# Patient Record
Sex: MALE | Race: WHITE | Employment: OTHER | URBAN - METROPOLITAN AREA
[De-identification: names, ages, dates, MRNs, and addresses within clinical notes are randomized per-mention and may not be internally consistent; named-entity substitution may affect disease eponyms.]

---

## 2017-03-31 ENCOUNTER — HOSPITAL ENCOUNTER (OUTPATIENT)
Dept: NON INVASIVE DIAGNOSTICS | Age: 72
Discharge: HOME | End: 2017-03-31
Payer: COMMERCIAL

## 2017-03-31 PROCEDURE — 6360000002 HC RX W HCPCS: Performed by: INTERNAL MEDICINE

## 2017-03-31 PROCEDURE — 93308 TTE F-UP OR LMTD: CPT

## 2017-03-31 RX ADMIN — PERFLUTREN 1.3 MG: 6.52 INJECTION, SUSPENSION INTRAVENOUS at 09:14

## 2017-04-27 ENCOUNTER — HOSPITAL ENCOUNTER (OUTPATIENT)
Dept: PREADMISSION TESTING | Age: 72
Discharge: HOME | End: 2017-04-27
Payer: COMMERCIAL

## 2017-04-27 VITALS
OXYGEN SATURATION: 90 % | WEIGHT: 243.39 LBS | RESPIRATION RATE: 16 BRPM | HEART RATE: 70 BPM | BODY MASS INDEX: 34.07 KG/M2 | HEIGHT: 71 IN | SYSTOLIC BLOOD PRESSURE: 151 MMHG | DIASTOLIC BLOOD PRESSURE: 57 MMHG

## 2017-04-27 LAB
ABSOLUTE EOS #: 0.3 K/UL (ref 0–0.4)
ABSOLUTE LYMPH #: 1.5 K/UL (ref 1–4.8)
ABSOLUTE MONO #: 0.7 K/UL (ref 0.2–0.8)
ANION GAP SERPL CALCULATED.3IONS-SCNC: 13 MMOL/L (ref 9–17)
BASOPHILS # BLD: 1 %
BASOPHILS ABSOLUTE: 0 K/UL (ref 0–0.2)
BUN BLDV-MCNC: 22 MG/DL (ref 8–23)
BUN/CREAT BLD: 13 (ref 9–20)
CALCIUM SERPL-MCNC: 9.6 MG/DL (ref 8.6–10.4)
CHLORIDE BLD-SCNC: 100 MMOL/L (ref 98–107)
CO2: 22 MMOL/L (ref 20–31)
CREAT SERPL-MCNC: 1.74 MG/DL (ref 0.7–1.2)
DIFFERENTIAL TYPE: ABNORMAL
EOSINOPHILS RELATIVE PERCENT: 4 %
GFR AFRICAN AMERICAN: 47 ML/MIN
GFR NON-AFRICAN AMERICAN: 39 ML/MIN
GFR SERPL CREATININE-BSD FRML MDRD: ABNORMAL ML/MIN/{1.73_M2}
GFR SERPL CREATININE-BSD FRML MDRD: ABNORMAL ML/MIN/{1.73_M2}
GLUCOSE BLD-MCNC: 67 MG/DL (ref 70–99)
HCT VFR BLD CALC: 42.5 % (ref 41–53)
HEMOGLOBIN: 13.4 G/DL (ref 13.5–17.5)
LYMPHOCYTES # BLD: 22 %
MCH RBC QN AUTO: 23.2 PG (ref 26–34)
MCHC RBC AUTO-ENTMCNC: 31.5 G/DL (ref 31–37)
MCV RBC AUTO: 73.7 FL (ref 80–100)
MONOCYTES # BLD: 10 %
PDW BLD-RTO: 17.3 % (ref 11.5–14.5)
PLATELET # BLD: 182 K/UL (ref 130–400)
PLATELET ESTIMATE: ABNORMAL
PMV BLD AUTO: 9.2 FL (ref 6–12)
POTASSIUM SERPL-SCNC: 4.7 MMOL/L (ref 3.7–5.3)
RBC # BLD: 5.77 M/UL (ref 4.5–5.9)
RBC # BLD: ABNORMAL 10*6/UL
SEG NEUTROPHILS: 63 %
SEGMENTED NEUTROPHILS ABSOLUTE COUNT: 4.5 K/UL (ref 1.8–7.7)
SODIUM BLD-SCNC: 135 MMOL/L (ref 135–144)
WBC # BLD: 7 K/UL (ref 3.5–11)
WBC # BLD: ABNORMAL 10*3/UL

## 2017-04-27 PROCEDURE — 80048 BASIC METABOLIC PNL TOTAL CA: CPT

## 2017-04-27 PROCEDURE — 85025 COMPLETE CBC W/AUTO DIFF WBC: CPT

## 2017-04-27 PROCEDURE — 36415 COLL VENOUS BLD VENIPUNCTURE: CPT

## 2017-04-27 RX ORDER — CARISOPRODOL 350 MG/1
350 TABLET ORAL 3 TIMES DAILY PRN
COMMUNITY
End: 2018-12-07

## 2017-04-27 RX ORDER — NAPROXEN SODIUM 220 MG
440 TABLET ORAL 2 TIMES DAILY WITH MEALS
Status: ON HOLD | COMMUNITY
End: 2018-12-11 | Stop reason: HOSPADM

## 2017-04-28 ENCOUNTER — ANESTHESIA EVENT (OUTPATIENT)
Dept: OPERATING ROOM | Age: 72
End: 2017-04-28
Payer: COMMERCIAL

## 2017-05-10 ENCOUNTER — ANESTHESIA (OUTPATIENT)
Dept: OPERATING ROOM | Age: 72
End: 2017-05-10
Payer: COMMERCIAL

## 2017-05-10 ENCOUNTER — HOSPITAL ENCOUNTER (OUTPATIENT)
Age: 72
Setting detail: OUTPATIENT SURGERY
Discharge: HOME | End: 2017-05-10
Attending: SURGERY | Admitting: SURGERY
Payer: COMMERCIAL

## 2017-05-10 VITALS
TEMPERATURE: 99 F | HEIGHT: 71 IN | OXYGEN SATURATION: 91 % | WEIGHT: 243 LBS | DIASTOLIC BLOOD PRESSURE: 57 MMHG | BODY MASS INDEX: 34.02 KG/M2 | HEART RATE: 79 BPM | RESPIRATION RATE: 13 BRPM | SYSTOLIC BLOOD PRESSURE: 127 MMHG

## 2017-05-10 VITALS — SYSTOLIC BLOOD PRESSURE: 101 MMHG | DIASTOLIC BLOOD PRESSURE: 56 MMHG | TEMPERATURE: 97.3 F | OXYGEN SATURATION: 94 %

## 2017-05-10 LAB
GLUCOSE BLD-MCNC: 138 MG/DL (ref 75–110)
GLUCOSE BLD-MCNC: 144 MG/DL (ref 75–110)

## 2017-05-10 PROCEDURE — 3600000012 HC SURGERY LEVEL 2 ADDTL 15MIN: Performed by: SURGERY

## 2017-05-10 PROCEDURE — 2500000003 HC RX 250 WO HCPCS: Performed by: SURGERY

## 2017-05-10 PROCEDURE — 3700000000 HC ANESTHESIA ATTENDED CARE: Performed by: SURGERY

## 2017-05-10 PROCEDURE — 82947 ASSAY GLUCOSE BLOOD QUANT: CPT

## 2017-05-10 PROCEDURE — 7100000001 HC PACU RECOVERY - ADDTL 15 MIN: Performed by: SURGERY

## 2017-05-10 PROCEDURE — 2580000003 HC RX 258: Performed by: ANESTHESIOLOGY

## 2017-05-10 PROCEDURE — 6360000002 HC RX W HCPCS: Performed by: SURGERY

## 2017-05-10 PROCEDURE — 88302 TISSUE EXAM BY PATHOLOGIST: CPT

## 2017-05-10 PROCEDURE — 2500000003 HC RX 250 WO HCPCS

## 2017-05-10 PROCEDURE — 7100000011 HC PHASE II RECOVERY - ADDTL 15 MIN: Performed by: SURGERY

## 2017-05-10 PROCEDURE — 7100000010 HC PHASE II RECOVERY - FIRST 15 MIN: Performed by: SURGERY

## 2017-05-10 PROCEDURE — 6370000000 HC RX 637 (ALT 250 FOR IP): Performed by: SURGERY

## 2017-05-10 PROCEDURE — 2500000003 HC RX 250 WO HCPCS: Performed by: NURSE ANESTHETIST, CERTIFIED REGISTERED

## 2017-05-10 PROCEDURE — 3600000002 HC SURGERY LEVEL 2 BASE: Performed by: SURGERY

## 2017-05-10 PROCEDURE — 7100000000 HC PACU RECOVERY - FIRST 15 MIN: Performed by: SURGERY

## 2017-05-10 PROCEDURE — 6360000002 HC RX W HCPCS: Performed by: NURSE ANESTHETIST, CERTIFIED REGISTERED

## 2017-05-10 PROCEDURE — 3700000001 HC ADD 15 MINUTES (ANESTHESIA): Performed by: SURGERY

## 2017-05-10 PROCEDURE — C1781 MESH (IMPLANTABLE): HCPCS | Performed by: SURGERY

## 2017-05-10 DEVICE — PATCH HERN M DIA2.5IN CIR W/ STRP SEPRA TECHNOLOGY ABSRB: Type: IMPLANTABLE DEVICE | Site: ABDOMEN | Status: FUNCTIONAL

## 2017-05-10 RX ORDER — ONDANSETRON 2 MG/ML
INJECTION INTRAMUSCULAR; INTRAVENOUS PRN
Status: DISCONTINUED | OUTPATIENT
Start: 2017-05-10 | End: 2017-05-10 | Stop reason: SDUPTHER

## 2017-05-10 RX ORDER — FENTANYL CITRATE 50 UG/ML
INJECTION, SOLUTION INTRAMUSCULAR; INTRAVENOUS PRN
Status: DISCONTINUED | OUTPATIENT
Start: 2017-05-10 | End: 2017-05-10 | Stop reason: SDUPTHER

## 2017-05-10 RX ORDER — DEXAMETHASONE SODIUM PHOSPHATE 10 MG/ML
4 INJECTION INTRAMUSCULAR; INTRAVENOUS
Status: DISCONTINUED | OUTPATIENT
Start: 2017-05-10 | End: 2017-05-10 | Stop reason: HOSPADM

## 2017-05-10 RX ORDER — LABETALOL HYDROCHLORIDE 5 MG/ML
5 INJECTION, SOLUTION INTRAVENOUS EVERY 10 MIN PRN
Status: DISCONTINUED | OUTPATIENT
Start: 2017-05-10 | End: 2017-05-10 | Stop reason: HOSPADM

## 2017-05-10 RX ORDER — HYDROMORPHONE HCL 110MG/55ML
0.5 PATIENT CONTROLLED ANALGESIA SYRINGE INTRAVENOUS EVERY 5 MIN PRN
Status: DISCONTINUED | OUTPATIENT
Start: 2017-05-10 | End: 2017-05-10 | Stop reason: HOSPADM

## 2017-05-10 RX ORDER — PROPOFOL 10 MG/ML
INJECTION, EMULSION INTRAVENOUS PRN
Status: DISCONTINUED | OUTPATIENT
Start: 2017-05-10 | End: 2017-05-10 | Stop reason: SDUPTHER

## 2017-05-10 RX ORDER — MEPERIDINE HYDROCHLORIDE 25 MG/ML
12.5 INJECTION INTRAMUSCULAR; INTRAVENOUS; SUBCUTANEOUS EVERY 5 MIN PRN
Status: DISCONTINUED | OUTPATIENT
Start: 2017-05-10 | End: 2017-05-10 | Stop reason: HOSPADM

## 2017-05-10 RX ORDER — ROCURONIUM BROMIDE 10 MG/ML
INJECTION, SOLUTION INTRAVENOUS PRN
Status: DISCONTINUED | OUTPATIENT
Start: 2017-05-10 | End: 2017-05-10 | Stop reason: SDUPTHER

## 2017-05-10 RX ORDER — EPHEDRINE SULFATE 50 MG/ML
INJECTION, SOLUTION INTRAVENOUS PRN
Status: DISCONTINUED | OUTPATIENT
Start: 2017-05-10 | End: 2017-05-10 | Stop reason: SDUPTHER

## 2017-05-10 RX ORDER — SODIUM CHLORIDE, SODIUM LACTATE, POTASSIUM CHLORIDE, CALCIUM CHLORIDE 600; 310; 30; 20 MG/100ML; MG/100ML; MG/100ML; MG/100ML
INJECTION, SOLUTION INTRAVENOUS CONTINUOUS
Status: DISCONTINUED | OUTPATIENT
Start: 2017-05-10 | End: 2017-05-10 | Stop reason: HOSPADM

## 2017-05-10 RX ORDER — ONDANSETRON 2 MG/ML
4 INJECTION INTRAMUSCULAR; INTRAVENOUS
Status: DISCONTINUED | OUTPATIENT
Start: 2017-05-10 | End: 2017-05-10 | Stop reason: HOSPADM

## 2017-05-10 RX ORDER — BUPIVACAINE HYDROCHLORIDE 5 MG/ML
INJECTION, SOLUTION PERINEURAL PRN
Status: DISCONTINUED | OUTPATIENT
Start: 2017-05-10 | End: 2017-05-10 | Stop reason: HOSPADM

## 2017-05-10 RX ORDER — VECURONIUM BROMIDE 1 MG/ML
INJECTION, POWDER, LYOPHILIZED, FOR SOLUTION INTRAVENOUS PRN
Status: DISCONTINUED | OUTPATIENT
Start: 2017-05-10 | End: 2017-05-10 | Stop reason: SDUPTHER

## 2017-05-10 RX ORDER — OXYCODONE HYDROCHLORIDE AND ACETAMINOPHEN 5; 325 MG/1; MG/1
1 TABLET ORAL EVERY 6 HOURS PRN
Qty: 30 TABLET | Refills: 0 | Status: SHIPPED | OUTPATIENT
Start: 2017-05-10 | End: 2017-05-17

## 2017-05-10 RX ORDER — GLYCOPYRROLATE 0.2 MG/ML
INJECTION INTRAMUSCULAR; INTRAVENOUS PRN
Status: DISCONTINUED | OUTPATIENT
Start: 2017-05-10 | End: 2017-05-10 | Stop reason: SDUPTHER

## 2017-05-10 RX ORDER — OXYCODONE HYDROCHLORIDE AND ACETAMINOPHEN 5; 325 MG/1; MG/1
1 TABLET ORAL PRN
Status: COMPLETED | OUTPATIENT
Start: 2017-05-10 | End: 2017-05-10

## 2017-05-10 RX ORDER — DOCUSATE SODIUM 100 MG/1
100 CAPSULE, LIQUID FILLED ORAL 2 TIMES DAILY
Qty: 30 CAPSULE | Refills: 0 | Status: SHIPPED | OUTPATIENT
Start: 2017-05-10 | End: 2018-12-07

## 2017-05-10 RX ORDER — FENTANYL CITRATE 50 UG/ML
25 INJECTION, SOLUTION INTRAMUSCULAR; INTRAVENOUS EVERY 5 MIN PRN
Status: DISCONTINUED | OUTPATIENT
Start: 2017-05-10 | End: 2017-05-10 | Stop reason: HOSPADM

## 2017-05-10 RX ORDER — DEXAMETHASONE SODIUM PHOSPHATE 10 MG/ML
INJECTION INTRAMUSCULAR; INTRAVENOUS PRN
Status: DISCONTINUED | OUTPATIENT
Start: 2017-05-10 | End: 2017-05-10 | Stop reason: SDUPTHER

## 2017-05-10 RX ORDER — LIDOCAINE HYDROCHLORIDE AND EPINEPHRINE 10; 10 MG/ML; UG/ML
INJECTION, SOLUTION INFILTRATION; PERINEURAL PRN
Status: DISCONTINUED | OUTPATIENT
Start: 2017-05-10 | End: 2017-05-10 | Stop reason: HOSPADM

## 2017-05-10 RX ORDER — LIDOCAINE HYDROCHLORIDE 20 MG/ML
INJECTION, SOLUTION INFILTRATION; PERINEURAL PRN
Status: DISCONTINUED | OUTPATIENT
Start: 2017-05-10 | End: 2017-05-10 | Stop reason: SDUPTHER

## 2017-05-10 RX ORDER — OXYCODONE HYDROCHLORIDE AND ACETAMINOPHEN 5; 325 MG/1; MG/1
2 TABLET ORAL PRN
Status: COMPLETED | OUTPATIENT
Start: 2017-05-10 | End: 2017-05-10

## 2017-05-10 RX ORDER — HYDRALAZINE HYDROCHLORIDE 20 MG/ML
5 INJECTION INTRAMUSCULAR; INTRAVENOUS EVERY 10 MIN PRN
Status: DISCONTINUED | OUTPATIENT
Start: 2017-05-10 | End: 2017-05-10 | Stop reason: HOSPADM

## 2017-05-10 RX ORDER — DIPHENHYDRAMINE HYDROCHLORIDE 50 MG/ML
12.5 INJECTION INTRAMUSCULAR; INTRAVENOUS
Status: DISCONTINUED | OUTPATIENT
Start: 2017-05-10 | End: 2017-05-10 | Stop reason: HOSPADM

## 2017-05-10 RX ADMIN — FENTANYL CITRATE 50 MCG: 50 INJECTION, SOLUTION INTRAMUSCULAR; INTRAVENOUS at 12:27

## 2017-05-10 RX ADMIN — PHENYLEPHRINE HYDROCHLORIDE 100 MCG: 10 INJECTION INTRAVENOUS at 11:36

## 2017-05-10 RX ADMIN — SODIUM CHLORIDE, POTASSIUM CHLORIDE, SODIUM LACTATE AND CALCIUM CHLORIDE: 600; 310; 30; 20 INJECTION, SOLUTION INTRAVENOUS at 10:12

## 2017-05-10 RX ADMIN — ROCURONIUM BROMIDE 50 MG: 10 INJECTION, SOLUTION INTRAVENOUS at 11:14

## 2017-05-10 RX ADMIN — PHENYLEPHRINE HYDROCHLORIDE 100 MCG: 10 INJECTION INTRAVENOUS at 11:42

## 2017-05-10 RX ADMIN — CEFAZOLIN SODIUM 2 G: 2 SOLUTION INTRAVENOUS at 11:10

## 2017-05-10 RX ADMIN — LIDOCAINE HYDROCHLORIDE 100 MG: 20 INJECTION, SOLUTION INFILTRATION; PERINEURAL at 11:14

## 2017-05-10 RX ADMIN — GLYCOPYRROLATE 0.2 MG: 0.2 INJECTION, SOLUTION INTRAMUSCULAR; INTRAVENOUS at 11:24

## 2017-05-10 RX ADMIN — ONDANSETRON 4 MG: 2 INJECTION INTRAMUSCULAR; INTRAVENOUS at 11:24

## 2017-05-10 RX ADMIN — FENTANYL CITRATE 50 MCG: 50 INJECTION, SOLUTION INTRAMUSCULAR; INTRAVENOUS at 12:36

## 2017-05-10 RX ADMIN — EPHEDRINE SULFATE 10 MG: 50 INJECTION INTRAMUSCULAR; INTRAVENOUS; SUBCUTANEOUS at 11:31

## 2017-05-10 RX ADMIN — DEXAMETHASONE SODIUM PHOSPHATE 10 MG: 10 INJECTION INTRAMUSCULAR; INTRAVENOUS at 11:24

## 2017-05-10 RX ADMIN — FENTANYL CITRATE 150 MCG: 50 INJECTION, SOLUTION INTRAMUSCULAR; INTRAVENOUS at 11:14

## 2017-05-10 RX ADMIN — SODIUM CHLORIDE, POTASSIUM CHLORIDE, SODIUM LACTATE AND CALCIUM CHLORIDE: 600; 310; 30; 20 INJECTION, SOLUTION INTRAVENOUS at 11:55

## 2017-05-10 RX ADMIN — EPHEDRINE SULFATE 5 MG: 50 INJECTION INTRAMUSCULAR; INTRAVENOUS; SUBCUTANEOUS at 11:42

## 2017-05-10 RX ADMIN — PROPOFOL 50 MG: 10 INJECTION, EMULSION INTRAVENOUS at 12:35

## 2017-05-10 RX ADMIN — PHENYLEPHRINE HYDROCHLORIDE 100 MCG: 10 INJECTION INTRAVENOUS at 11:52

## 2017-05-10 RX ADMIN — PROPOFOL 150 MG: 10 INJECTION, EMULSION INTRAVENOUS at 11:14

## 2017-05-10 RX ADMIN — SUGAMMADEX 200 MG: 100 INJECTION, SOLUTION INTRAVENOUS at 12:28

## 2017-05-10 RX ADMIN — PHENYLEPHRINE HYDROCHLORIDE 100 MCG: 10 INJECTION INTRAVENOUS at 11:26

## 2017-05-10 RX ADMIN — OXYCODONE HYDROCHLORIDE AND ACETAMINOPHEN 1 TABLET: 5; 325 TABLET ORAL at 14:58

## 2017-05-10 RX ADMIN — EPHEDRINE SULFATE 5 MG: 50 INJECTION INTRAMUSCULAR; INTRAVENOUS; SUBCUTANEOUS at 11:36

## 2017-05-10 RX ADMIN — GLYCOPYRROLATE 0.2 MG: 0.2 INJECTION, SOLUTION INTRAMUSCULAR; INTRAVENOUS at 11:22

## 2017-05-10 RX ADMIN — VECURONIUM BROMIDE 2 MG: 1 INJECTION, POWDER, LYOPHILIZED, FOR SOLUTION INTRAVENOUS at 11:40

## 2017-05-10 RX ADMIN — PHENYLEPHRINE HYDROCHLORIDE 100 MCG: 10 INJECTION INTRAVENOUS at 11:30

## 2017-05-10 ASSESSMENT — PAIN - FUNCTIONAL ASSESSMENT: PAIN_FUNCTIONAL_ASSESSMENT: 0-10

## 2017-05-10 ASSESSMENT — PAIN SCALES - GENERAL: PAINLEVEL_OUTOF10: 4

## 2017-05-11 LAB — SURGICAL PATHOLOGY REPORT: NORMAL

## 2018-12-07 ENCOUNTER — APPOINTMENT (OUTPATIENT)
Dept: NUCLEAR MEDICINE | Age: 73
DRG: 291 | End: 2018-12-07
Payer: COMMERCIAL

## 2018-12-07 ENCOUNTER — HOSPITAL ENCOUNTER (INPATIENT)
Age: 73
LOS: 4 days | Discharge: HOME | DRG: 291 | End: 2018-12-11
Attending: EMERGENCY MEDICINE | Admitting: FAMILY MEDICINE
Payer: COMMERCIAL

## 2018-12-07 ENCOUNTER — APPOINTMENT (OUTPATIENT)
Dept: GENERAL RADIOLOGY | Age: 73
DRG: 291 | End: 2018-12-07
Payer: COMMERCIAL

## 2018-12-07 DIAGNOSIS — I50.9 CONGESTIVE HEART FAILURE, UNSPECIFIED HF CHRONICITY, UNSPECIFIED HEART FAILURE TYPE (HCC): Primary | ICD-10-CM

## 2018-12-07 DIAGNOSIS — R09.02 HYPOXEMIA: ICD-10-CM

## 2018-12-07 PROBLEM — I50.82 BIVENTRICULAR CHF (CONGESTIVE HEART FAILURE) (HCC): Status: ACTIVE | Noted: 2018-12-07

## 2018-12-07 LAB
ABSOLUTE EOS #: 0.22 K/UL (ref 0–0.4)
ABSOLUTE IMMATURE GRANULOCYTE: ABNORMAL K/UL (ref 0–0.3)
ABSOLUTE LYMPH #: 0.81 K/UL (ref 1–4.8)
ABSOLUTE MONO #: 0.67 K/UL (ref 0.2–0.8)
ALBUMIN SERPL-MCNC: 4 G/DL (ref 3.5–5.2)
ALBUMIN/GLOBULIN RATIO: ABNORMAL (ref 1–2.5)
ALP BLD-CCNC: 106 U/L (ref 40–129)
ALT SERPL-CCNC: 26 U/L (ref 5–41)
ANION GAP SERPL CALCULATED.3IONS-SCNC: 15 MMOL/L (ref 9–17)
AST SERPL-CCNC: 36 U/L
BASOPHILS # BLD: 1 % (ref 0–2)
BASOPHILS ABSOLUTE: 0.07 K/UL (ref 0–0.2)
BILIRUB SERPL-MCNC: 0.46 MG/DL (ref 0.3–1.2)
BILIRUBIN URINE: NEGATIVE
BNP INTERPRETATION: ABNORMAL
BUN BLDV-MCNC: 27 MG/DL (ref 8–23)
BUN/CREAT BLD: 14 (ref 9–20)
CALCIUM SERPL-MCNC: 9.5 MG/DL (ref 8.6–10.4)
CHLORIDE BLD-SCNC: 102 MMOL/L (ref 98–107)
CO2: 21 MMOL/L (ref 20–31)
COLOR: YELLOW
COMMENT UA: ABNORMAL
CREAT SERPL-MCNC: 1.94 MG/DL (ref 0.7–1.2)
D-DIMER QUANTITATIVE: 1.18 MG/L FEU
DIFFERENTIAL TYPE: ABNORMAL
EKG ATRIAL RATE: 70 BPM
EKG P AXIS: 40 DEGREES
EKG P-R INTERVAL: 204 MS
EKG Q-T INTERVAL: 388 MS
EKG QRS DURATION: 100 MS
EKG QTC CALCULATION (BAZETT): 419 MS
EKG R AXIS: 75 DEGREES
EKG T AXIS: -76 DEGREES
EKG VENTRICULAR RATE: 70 BPM
EOSINOPHILS RELATIVE PERCENT: 3 % (ref 1–4)
FIO2: 24
GFR AFRICAN AMERICAN: 41 ML/MIN
GFR NON-AFRICAN AMERICAN: 34 ML/MIN
GFR SERPL CREATININE-BSD FRML MDRD: ABNORMAL ML/MIN/{1.73_M2}
GFR SERPL CREATININE-BSD FRML MDRD: ABNORMAL ML/MIN/{1.73_M2}
GLUCOSE BLD-MCNC: 151 MG/DL (ref 70–99)
GLUCOSE URINE: ABNORMAL
HCT VFR BLD CALC: 42 % (ref 41–53)
HEMOGLOBIN: 12.8 G/DL (ref 13.5–17.5)
IMMATURE GRANULOCYTES: ABNORMAL %
INR BLD: 1.1
KETONES, URINE: NEGATIVE
LEUKOCYTE ESTERASE, URINE: NEGATIVE
LYMPHOCYTES # BLD: 11 % (ref 24–44)
MCH RBC QN AUTO: 21 PG (ref 26–34)
MCHC RBC AUTO-ENTMCNC: 30.4 G/DL (ref 31–37)
MCV RBC AUTO: 69 FL (ref 80–100)
MONOCYTES # BLD: 9 % (ref 1–7)
MORPHOLOGY: ABNORMAL
NEGATIVE BASE EXCESS, ART: 5 (ref 0–2)
NITRITE, URINE: NEGATIVE
NRBC AUTOMATED: ABNORMAL PER 100 WBC
O2 DEVICE/FLOW/%: ABNORMAL
PARTIAL THROMBOPLASTIN TIME: 26 SEC (ref 23–31)
PATIENT TEMP: ABNORMAL
PDW BLD-RTO: 19.9 % (ref 11.5–14.5)
PH UA: 6.5 (ref 5–8)
PLATELET # BLD: 243 K/UL (ref 130–400)
PLATELET ESTIMATE: ABNORMAL
PMV BLD AUTO: 9.9 FL (ref 6–12)
POC HCO3: 19.8 MMOL/L (ref 22–27)
POC O2 SATURATION: 88 %
POC PCO2 TEMP: ABNORMAL MM HG
POC PCO2: 34 MM HG (ref 32–45)
POC PH TEMP: ABNORMAL
POC PH: 7.37 (ref 7.35–7.45)
POC PO2 TEMP: ABNORMAL MM HG
POC PO2: 55 MM HG (ref 75–95)
POSITIVE BASE EXCESS, ART: ABNORMAL (ref 0–2)
POTASSIUM SERPL-SCNC: 4.9 MMOL/L (ref 3.7–5.3)
PRO-BNP: 4270 PG/ML
PROTEIN UA: NEGATIVE
PROTHROMBIN TIME: 11.6 SEC (ref 9.7–11.6)
RBC # BLD: 6.08 M/UL (ref 4.5–5.9)
RBC # BLD: ABNORMAL 10*6/UL
SEG NEUTROPHILS: 76 % (ref 36–66)
SEGMENTED NEUTROPHILS ABSOLUTE COUNT: 5.63 K/UL (ref 1.8–7.7)
SODIUM BLD-SCNC: 138 MMOL/L (ref 135–144)
SPECIFIC GRAVITY UA: 1.01 (ref 1–1.03)
TCO2 (CALC), ART: 21 MMOL/L (ref 23–28)
TOTAL PROTEIN: 6.4 G/DL (ref 6.4–8.3)
TROPONIN INTERP: ABNORMAL
TROPONIN T: 0.05 NG/ML
TSH SERPL DL<=0.05 MIU/L-ACNC: 5.22 MIU/L (ref 0.3–5)
TURBIDITY: CLEAR
URINE HGB: NEGATIVE
UROBILINOGEN, URINE: NORMAL
WBC # BLD: 7.4 K/UL (ref 3.5–11)
WBC # BLD: ABNORMAL 10*3/UL

## 2018-12-07 PROCEDURE — 99285 EMERGENCY DEPT VISIT HI MDM: CPT

## 2018-12-07 PROCEDURE — 82947 ASSAY GLUCOSE BLOOD QUANT: CPT

## 2018-12-07 PROCEDURE — 2060000000 HC ICU INTERMEDIATE R&B

## 2018-12-07 PROCEDURE — 6370000000 HC RX 637 (ALT 250 FOR IP): Performed by: FAMILY MEDICINE

## 2018-12-07 PROCEDURE — 6360000002 HC RX W HCPCS: Performed by: EMERGENCY MEDICINE

## 2018-12-07 PROCEDURE — 93970 EXTREMITY STUDY: CPT

## 2018-12-07 PROCEDURE — 84484 ASSAY OF TROPONIN QUANT: CPT

## 2018-12-07 PROCEDURE — 2580000003 HC RX 258: Performed by: FAMILY MEDICINE

## 2018-12-07 PROCEDURE — 81003 URINALYSIS AUTO W/O SCOPE: CPT

## 2018-12-07 PROCEDURE — 94664 DEMO&/EVAL PT USE INHALER: CPT

## 2018-12-07 PROCEDURE — 3430000000 HC RX DIAGNOSTIC RADIOPHARMACEUTICAL: Performed by: EMERGENCY MEDICINE

## 2018-12-07 PROCEDURE — 94150 VITAL CAPACITY TEST: CPT

## 2018-12-07 PROCEDURE — 85730 THROMBOPLASTIN TIME PARTIAL: CPT

## 2018-12-07 PROCEDURE — 85025 COMPLETE CBC W/AUTO DIFF WBC: CPT

## 2018-12-07 PROCEDURE — 83880 ASSAY OF NATRIURETIC PEPTIDE: CPT

## 2018-12-07 PROCEDURE — 85610 PROTHROMBIN TIME: CPT

## 2018-12-07 PROCEDURE — 71045 X-RAY EXAM CHEST 1 VIEW: CPT

## 2018-12-07 PROCEDURE — 36600 WITHDRAWAL OF ARTERIAL BLOOD: CPT

## 2018-12-07 PROCEDURE — 84443 ASSAY THYROID STIM HORMONE: CPT

## 2018-12-07 PROCEDURE — 93005 ELECTROCARDIOGRAM TRACING: CPT

## 2018-12-07 PROCEDURE — 82805 BLOOD GASES W/O2 SATURATION: CPT

## 2018-12-07 PROCEDURE — A9538 TC99M PYROPHOSPHATE: HCPCS | Performed by: EMERGENCY MEDICINE

## 2018-12-07 PROCEDURE — A9540 TC99M MAA: HCPCS | Performed by: EMERGENCY MEDICINE

## 2018-12-07 PROCEDURE — 96374 THER/PROPH/DIAG INJ IV PUSH: CPT

## 2018-12-07 PROCEDURE — 78582 LUNG VENTILAT&PERFUS IMAGING: CPT

## 2018-12-07 PROCEDURE — 2700000000 HC OXYGEN THERAPY PER DAY

## 2018-12-07 PROCEDURE — 85379 FIBRIN DEGRADATION QUANT: CPT

## 2018-12-07 PROCEDURE — 94640 AIRWAY INHALATION TREATMENT: CPT

## 2018-12-07 PROCEDURE — 80053 COMPREHEN METABOLIC PANEL: CPT

## 2018-12-07 PROCEDURE — 94761 N-INVAS EAR/PLS OXIMETRY MLT: CPT

## 2018-12-07 PROCEDURE — 82803 BLOOD GASES ANY COMBINATION: CPT

## 2018-12-07 RX ORDER — ALPRAZOLAM 0.5 MG/1
0.5 TABLET ORAL 2 TIMES DAILY PRN
Status: DISCONTINUED | OUTPATIENT
Start: 2018-12-07 | End: 2018-12-11 | Stop reason: HOSPADM

## 2018-12-07 RX ORDER — ALBUTEROL SULFATE 2.5 MG/3ML
2.5 SOLUTION RESPIRATORY (INHALATION)
Status: DISCONTINUED | OUTPATIENT
Start: 2018-12-07 | End: 2018-12-07

## 2018-12-07 RX ORDER — SODIUM CHLORIDE 0.9 % (FLUSH) 0.9 %
10 SYRINGE (ML) INJECTION PRN
Status: DISCONTINUED | OUTPATIENT
Start: 2018-12-07 | End: 2018-12-11 | Stop reason: HOSPADM

## 2018-12-07 RX ORDER — ACETAMINOPHEN 325 MG/1
650 TABLET ORAL EVERY 4 HOURS PRN
Status: DISCONTINUED | OUTPATIENT
Start: 2018-12-07 | End: 2018-12-11 | Stop reason: HOSPADM

## 2018-12-07 RX ORDER — ALBUTEROL SULFATE 2.5 MG/3ML
2.5 SOLUTION RESPIRATORY (INHALATION) EVERY 4 HOURS PRN
Status: DISCONTINUED | OUTPATIENT
Start: 2018-12-07 | End: 2018-12-11 | Stop reason: HOSPADM

## 2018-12-07 RX ORDER — ALBUTEROL SULFATE 2.5 MG/3ML
5 SOLUTION RESPIRATORY (INHALATION)
Status: DISCONTINUED | OUTPATIENT
Start: 2018-12-07 | End: 2018-12-07

## 2018-12-07 RX ORDER — DEXTROSE MONOHYDRATE 50 MG/ML
100 INJECTION, SOLUTION INTRAVENOUS PRN
Status: DISCONTINUED | OUTPATIENT
Start: 2018-12-07 | End: 2018-12-11 | Stop reason: HOSPADM

## 2018-12-07 RX ORDER — FLUTICASONE PROPIONATE 50 MCG
1 SPRAY, SUSPENSION (ML) NASAL DAILY PRN
COMMUNITY

## 2018-12-07 RX ORDER — MIRTAZAPINE 15 MG/1
45 TABLET, FILM COATED ORAL NIGHTLY
Status: DISCONTINUED | OUTPATIENT
Start: 2018-12-07 | End: 2018-12-11 | Stop reason: HOSPADM

## 2018-12-07 RX ORDER — ALBUTEROL SULFATE 90 UG/1
2 AEROSOL, METERED RESPIRATORY (INHALATION) EVERY 6 HOURS PRN
COMMUNITY

## 2018-12-07 RX ORDER — FUROSEMIDE 10 MG/ML
80 INJECTION INTRAMUSCULAR; INTRAVENOUS ONCE
Status: COMPLETED | OUTPATIENT
Start: 2018-12-07 | End: 2018-12-07

## 2018-12-07 RX ORDER — BUDESONIDE AND FORMOTEROL FUMARATE DIHYDRATE 160; 4.5 UG/1; UG/1
2 AEROSOL RESPIRATORY (INHALATION) 2 TIMES DAILY
COMMUNITY
End: 2019-07-27 | Stop reason: ALTCHOICE

## 2018-12-07 RX ORDER — ATORVASTATIN CALCIUM 20 MG/1
20 TABLET, FILM COATED ORAL DAILY
Status: DISCONTINUED | OUTPATIENT
Start: 2018-12-07 | End: 2018-12-11 | Stop reason: HOSPADM

## 2018-12-07 RX ORDER — ALBUTEROL SULFATE 2.5 MG/3ML
2.5 SOLUTION RESPIRATORY (INHALATION) 4 TIMES DAILY
Status: DISCONTINUED | OUTPATIENT
Start: 2018-12-07 | End: 2018-12-10

## 2018-12-07 RX ORDER — ELECTROLYTES/DEXTROSE
1 SOLUTION, ORAL ORAL DAILY
COMMUNITY

## 2018-12-07 RX ORDER — INSULIN GLARGINE 100 [IU]/ML
10 INJECTION, SOLUTION SUBCUTANEOUS 2 TIMES DAILY
Status: DISCONTINUED | OUTPATIENT
Start: 2018-12-07 | End: 2018-12-11 | Stop reason: HOSPADM

## 2018-12-07 RX ORDER — METOPROLOL SUCCINATE 50 MG/1
50 TABLET, EXTENDED RELEASE ORAL DAILY
COMMUNITY

## 2018-12-07 RX ORDER — DEXTROSE MONOHYDRATE 25 G/50ML
12.5 INJECTION, SOLUTION INTRAVENOUS PRN
Status: DISCONTINUED | OUTPATIENT
Start: 2018-12-07 | End: 2018-12-11 | Stop reason: HOSPADM

## 2018-12-07 RX ORDER — IPRATROPIUM BROMIDE AND ALBUTEROL SULFATE 2.5; .5 MG/3ML; MG/3ML
1 SOLUTION RESPIRATORY (INHALATION)
Status: DISCONTINUED | OUTPATIENT
Start: 2018-12-07 | End: 2018-12-07

## 2018-12-07 RX ORDER — METOPROLOL SUCCINATE 50 MG/1
50 TABLET, EXTENDED RELEASE ORAL DAILY
Status: DISCONTINUED | OUTPATIENT
Start: 2018-12-07 | End: 2018-12-11 | Stop reason: HOSPADM

## 2018-12-07 RX ORDER — NICOTINE POLACRILEX 4 MG
15 LOZENGE BUCCAL PRN
Status: DISCONTINUED | OUTPATIENT
Start: 2018-12-07 | End: 2018-12-11 | Stop reason: HOSPADM

## 2018-12-07 RX ORDER — GLIMEPIRIDE 2 MG/1
2 TABLET ORAL
Status: DISCONTINUED | OUTPATIENT
Start: 2018-12-08 | End: 2018-12-11 | Stop reason: HOSPADM

## 2018-12-07 RX ORDER — ALBUTEROL SULFATE 90 UG/1
2 AEROSOL, METERED RESPIRATORY (INHALATION)
Status: DISCONTINUED | OUTPATIENT
Start: 2018-12-07 | End: 2018-12-07

## 2018-12-07 RX ORDER — SODIUM CHLORIDE 0.9 % (FLUSH) 0.9 %
10 SYRINGE (ML) INJECTION EVERY 12 HOURS SCHEDULED
Status: DISCONTINUED | OUTPATIENT
Start: 2018-12-07 | End: 2018-12-11 | Stop reason: HOSPADM

## 2018-12-07 RX ADMIN — METOPROLOL SUCCINATE 50 MG: 50 TABLET, FILM COATED, EXTENDED RELEASE ORAL at 22:07

## 2018-12-07 RX ADMIN — ATORVASTATIN CALCIUM 20 MG: 20 TABLET, FILM COATED ORAL at 22:07

## 2018-12-07 RX ADMIN — ALBUTEROL SULFATE 2.5 MG: 5 SOLUTION RESPIRATORY (INHALATION) at 11:23

## 2018-12-07 RX ADMIN — MIRTAZAPINE 45 MG: 15 TABLET, FILM COATED ORAL at 22:07

## 2018-12-07 RX ADMIN — Medication 8.2 MILLICURIE: at 14:35

## 2018-12-07 RX ADMIN — FUROSEMIDE 80 MG: 10 INJECTION, SOLUTION INTRAMUSCULAR; INTRAVENOUS at 11:44

## 2018-12-07 RX ADMIN — ACETAMINOPHEN 650 MG: 325 TABLET ORAL at 21:58

## 2018-12-07 RX ADMIN — Medication 10 ML: at 22:16

## 2018-12-07 RX ADMIN — Medication 39.5 MILLICURIE: at 14:10

## 2018-12-07 RX ADMIN — INSULIN GLARGINE 10 UNITS: 100 INJECTION, SOLUTION SUBCUTANEOUS at 22:15

## 2018-12-07 RX ADMIN — VITAMIN D, TAB 1000IU (100/BT) 1000 UNITS: 25 TAB at 22:07

## 2018-12-07 RX ADMIN — ALBUTEROL SULFATE 2.5 MG: 5 SOLUTION RESPIRATORY (INHALATION) at 16:22

## 2018-12-07 RX ADMIN — VENLAFAXINE 100 MG: 25 TABLET ORAL at 22:16

## 2018-12-07 RX ADMIN — ALBUTEROL SULFATE 2.5 MG: 5 SOLUTION RESPIRATORY (INHALATION) at 11:35

## 2018-12-07 ASSESSMENT — PAIN DESCRIPTION - PAIN TYPE: TYPE: ACUTE PAIN

## 2018-12-07 ASSESSMENT — PAIN SCALES - GENERAL
PAINLEVEL_OUTOF10: 1
PAINLEVEL_OUTOF10: 5
PAINLEVEL_OUTOF10: 0

## 2018-12-07 ASSESSMENT — PAIN DESCRIPTION - LOCATION: LOCATION: HEAD

## 2018-12-07 ASSESSMENT — PAIN DESCRIPTION - DESCRIPTORS: DESCRIPTORS: ACHING

## 2018-12-07 NOTE — ED PROVIDER NOTES
[address] [place] [address] ED  eMERGENCY dEPARTMENT eNCOUnter      Pt Name: Pilar Randall  MRN: [id number]  [place] 1945  Date of evaluation: 12/7/2018  Provider: Marquis Sneed MD    [address] [place] [address]     Chief Complaint   Patient presents with    Leg Swelling     bilateral leg swelling with bilateral scrotal swelling since fall 1 wk ago         HISTORY OF PRESENT ILLNESS   (Location/Symptom, Timing/Onset, Context/Setting,Quality, Duration, Modifying Factors, Severity)  Note limiting factors. Pilar Randall is a 74 y.o. male who presents to the emergency department With a one-week history of worsening of swelling in his legs and scrotum with shortness of breath. Patient cannot tolerate exertion because of shortness of breath. He denies chest pain. He has a history of heart failure. He does not use oxygen at home. He has a previous history of DVT in his left leg. The history is provided by the patient and medical records. Nursing Notes werereviewed. REVIEW OF SYSTEMS    (2-9 systems for level 4, 10 or more for level 5)     Review of Systems   All other systems reviewed and are negative. Except as noted above the remainder of the review of systems was reviewed and negative.        PAST MEDICAL HISTORY     Past Medical History:   Diagnosis Date    Arthritis     Cervical disc disease     degenerative disc disease    Diabetes mellitus ([address]     type 2    History of blood transfusion     Hx of blood clots     left leg    Hyperlipidemia     Neuropathy     Right kidney mass     \"urologist is watching\"    Snores          SURGICALHISTORY       Past Surgical History:   Procedure Laterality Date    ABDOMINAL AORTIC ANEURYSM REPAIR  2005    CARPAL TUNNEL RELEASE Bilateral     CERVICAL SPINE SURGERY      COLONOSCOPY      benign polyps removed    INGUINAL HERNIA REPAIR Right     [facility] REPAIR INCISIONAL HERNIA,REDUCIBLE N/A 5/10/2017    HERNIA VENTRAL REPAIR WITH MESH  performed by Oswaldo Carvajal, and feels better. Findings are discussed with his primary care physician Dr. Micah Valentino who is admitting him. MDM    CONSULTS:  IP CONSULT TO HOSPITALIST  IP CONSULT TO CARDIOLOGY    PROCEDURES:  Unlessotherwise noted below, none     Procedures    FINAL IMPRESSION      1. Congestive heart failure, unspecified HF chronicity, unspecified heart failure type ([facility] [address] [address]    2. Hypoxemia          DISPOSITION/PLAN   DISPOSITION Admitted 12/07/2018 04:22:46 PM      PATIENT REFERRED TO:  Keila Fleming MD  [facility] [address] [place] [address] [facility]            DISCHARGE MEDICATIONS:         Problem List:  Patient Active Problem List   Diagnosis Code    Biventricular CHF (congestive heart failure) ([facility] [address] [facility] I50.82           Summation      Patient Course:  Admitted. ED Medicationsadministered this visit:    Medications   albuterol (PROVENTIL) nebulizer solution 5 mg (2.5 mg Nebulization Given 12/7/18 1622)   ipratropium-albuterol (DUONEB) nebulizer solution 1 ampule (not administered)   furosemide (LASIX) injection 80 mg (80 mg Intravenous Given 12/7/18 1144)   technetium pyrophosphate (PYP) injection 30 millicurie (84.1 millicuries Intravenous Given 12/7/18 1410)   technetium albumin aggregated (MAA) solution 8 millicurie (8.2 millicuries Intravenous Given 12/7/18 1435)       New Prescriptions from this visit:    New Prescriptions    No medications on file       Follow-up:  Keila Fleming MD  [address] [place] [address]  [phone]              Final Impression:   1. Congestive heart failure, unspecified HF chronicity, unspecified heart failure type ([facility] [address]    2.  Hypoxemia               (Please note that portions of this note were completed with a voice recognitionprogram.  Efforts were made to edit the dictations but occasionally words are mis-transcribed.)    Carleen Allen MD (electronically signed)  Attending Emergency Physician            Carleen Allen MD  12/07/18 3291

## 2018-12-07 NOTE — PROGRESS NOTES
[facility]. Biventricular [facility] (congestive heart failure) ([facility] [I50.82] . Vitals:    12/07/18 1558   BP: (!) 166/61   Pulse: 87   Resp: 16   Temp:    SpO2: 92%   . Patients home meds are   Prior to Admission medications    Medication Sig Start Date End Date Taking?  Authorizing Provider   tiotropium (SPIRIVA RESPIMAT) 2.5 MCG/ACT AERS inhaler Inhale 2 puffs into the lungs daily    Yes Historical Provider, MD   albuterol sulfate  (90 Base) MCG/ACT inhaler Inhale 2 puffs into the lungs every 6 hours as needed for Wheezing or Shortness of Breath   Yes Historical Provider, MD   budesonide-formoterol (SYMBICORT) 160-4.5 MCG/ACT AERO Inhale 2 puffs into the lungs 2 times daily   Yes Historical Provider, MD   metoprolol succinate (TOPROL XL) 50 MG extended release tablet Take 50 mg by mouth daily   Yes Historical Provider, MD   Multiple Vitamins-Minerals (MULTIVITAMIN ADULT) TABS Take 1 tablet by mouth daily   Yes Historical Provider, MD   Misc Natural Products (OSTEO BI-FLEX JOINT SHIELD) TABS Take 1 tablet by mouth 2 times daily   Yes Historical Provider, MD   vitamin D (CHOLECALCIFEROL) 1000 UNIT TABS tablet Take 1,000 Units by mouth daily   Yes Historical Provider, MD   fluticasone (FLONASE) 50 MCG/ACT nasal spray 1 spray by Each Nare route daily as needed for Rhinitis   Yes Historical Provider, MD   insulin glargine (BASAGLAR KWIKPEN) 100 UNIT/ML injection pen Inject 15 Units into the skin every morning   Yes Historical Provider, MD   insulin glargine (BASAGLAR KWIKPEN) 100 UNIT/ML injection pen Inject 10 Units into the skin Daily with supper    Yes Historical Provider, MD   naproxen sodium (ALEVE) 220 MG tablet Take 440 mg by mouth 2 times daily (with meals) 2 tabs (=440mg) BID   Yes Historical Provider, MD   aspirin 325 MG EC tablet Take 325 mg by mouth daily    Yes Historical Provider, MD   Omega-3 Fatty Acids (FISH OIL) 1000 MG CAPS Take 1 capsule by mouth daily    Yes

## 2018-12-08 LAB
ABSOLUTE EOS #: 0.35 K/UL (ref 0–0.4)
ABSOLUTE IMMATURE GRANULOCYTE: ABNORMAL K/UL (ref 0–0.3)
ABSOLUTE LYMPH #: 0.59 K/UL (ref 1–4.8)
ABSOLUTE MONO #: 0.77 K/UL (ref 0.2–0.8)
ALBUMIN SERPL-MCNC: 3.6 G/DL (ref 3.5–5.2)
ALBUMIN/GLOBULIN RATIO: ABNORMAL (ref 1–2.5)
ALP BLD-CCNC: 103 U/L (ref 40–129)
ALT SERPL-CCNC: 25 U/L (ref 5–41)
ANION GAP SERPL CALCULATED.3IONS-SCNC: 18 MMOL/L (ref 9–17)
AST SERPL-CCNC: 42 U/L
BASOPHILS # BLD: 1 %
BASOPHILS ABSOLUTE: 0.06 K/UL (ref 0–0.2)
BILIRUB SERPL-MCNC: 0.57 MG/DL (ref 0.3–1.2)
BILIRUBIN DIRECT: 0.2 MG/DL
BILIRUBIN, INDIRECT: 0.37 MG/DL (ref 0–1)
BUN BLDV-MCNC: 22 MG/DL (ref 8–23)
BUN/CREAT BLD: 13 (ref 9–20)
CALCIUM SERPL-MCNC: 9 MG/DL (ref 8.6–10.4)
CHLORIDE BLD-SCNC: 102 MMOL/L (ref 98–107)
CO2: 23 MMOL/L (ref 20–31)
CREAT SERPL-MCNC: 1.72 MG/DL (ref 0.7–1.2)
DIFFERENTIAL TYPE: ABNORMAL
EOSINOPHILS RELATIVE PERCENT: 6 % (ref 1–4)
GFR AFRICAN AMERICAN: 47 ML/MIN
GFR NON-AFRICAN AMERICAN: 39 ML/MIN
GFR SERPL CREATININE-BSD FRML MDRD: ABNORMAL ML/MIN/{1.73_M2}
GFR SERPL CREATININE-BSD FRML MDRD: ABNORMAL ML/MIN/{1.73_M2}
GLOBULIN: ABNORMAL G/DL (ref 1.5–3.8)
GLUCOSE BLD-MCNC: 108 MG/DL (ref 75–110)
GLUCOSE BLD-MCNC: 137 MG/DL (ref 75–110)
GLUCOSE BLD-MCNC: 199 MG/DL (ref 75–110)
GLUCOSE BLD-MCNC: 222 MG/DL (ref 75–110)
GLUCOSE BLD-MCNC: 230 MG/DL (ref 75–110)
GLUCOSE BLD-MCNC: 86 MG/DL (ref 70–99)
HCT VFR BLD CALC: 44.1 % (ref 41–53)
HEMOGLOBIN: 12.7 G/DL (ref 13.5–17.5)
IMMATURE GRANULOCYTES: ABNORMAL %
LV EF: 55 %
LVEF MODALITY: NORMAL
LYMPHOCYTES # BLD: 10 % (ref 24–44)
MAGNESIUM: 1.6 MG/DL (ref 1.6–2.6)
MCH RBC QN AUTO: 20.2 PG (ref 26–34)
MCHC RBC AUTO-ENTMCNC: 28.9 G/DL (ref 31–37)
MCV RBC AUTO: 70.1 FL (ref 80–100)
MONOCYTES # BLD: 13 % (ref 1–7)
MORPHOLOGY: ABNORMAL
NRBC AUTOMATED: ABNORMAL PER 100 WBC
PDW BLD-RTO: 19 % (ref 11.5–14.5)
PLATELET # BLD: 242 K/UL (ref 130–400)
PLATELET ESTIMATE: ABNORMAL
PMV BLD AUTO: ABNORMAL FL (ref 6–12)
POTASSIUM SERPL-SCNC: 3.9 MMOL/L (ref 3.7–5.3)
POTASSIUM SERPL-SCNC: 4 MMOL/L (ref 3.7–5.3)
RBC # BLD: 6.29 M/UL (ref 4.5–5.9)
RBC # BLD: ABNORMAL 10*6/UL
SEG NEUTROPHILS: 70 % (ref 36–66)
SEGMENTED NEUTROPHILS ABSOLUTE COUNT: 4.13 K/UL (ref 1.8–7.7)
SODIUM BLD-SCNC: 143 MMOL/L (ref 135–144)
TOTAL PROTEIN: 6.1 G/DL (ref 6.4–8.3)
WBC # BLD: 5.9 K/UL (ref 3.5–11)
WBC # BLD: ABNORMAL 10*3/UL

## 2018-12-08 PROCEDURE — 80076 HEPATIC FUNCTION PANEL: CPT

## 2018-12-08 PROCEDURE — 6370000000 HC RX 637 (ALT 250 FOR IP): Performed by: INTERNAL MEDICINE

## 2018-12-08 PROCEDURE — 2060000000 HC ICU INTERMEDIATE R&B

## 2018-12-08 PROCEDURE — 83036 HEMOGLOBIN GLYCOSYLATED A1C: CPT

## 2018-12-08 PROCEDURE — 36415 COLL VENOUS BLD VENIPUNCTURE: CPT

## 2018-12-08 PROCEDURE — 84132 ASSAY OF SERUM POTASSIUM: CPT

## 2018-12-08 PROCEDURE — 94760 N-INVAS EAR/PLS OXIMETRY 1: CPT

## 2018-12-08 PROCEDURE — 6370000000 HC RX 637 (ALT 250 FOR IP): Performed by: FAMILY MEDICINE

## 2018-12-08 PROCEDURE — 82947 ASSAY GLUCOSE BLOOD QUANT: CPT

## 2018-12-08 PROCEDURE — 6360000002 HC RX W HCPCS: Performed by: INTERNAL MEDICINE

## 2018-12-08 PROCEDURE — 94640 AIRWAY INHALATION TREATMENT: CPT

## 2018-12-08 PROCEDURE — 2580000003 HC RX 258: Performed by: FAMILY MEDICINE

## 2018-12-08 PROCEDURE — 84156 ASSAY OF PROTEIN URINE: CPT

## 2018-12-08 PROCEDURE — 93306 TTE W/DOPPLER COMPLETE: CPT

## 2018-12-08 PROCEDURE — 83735 ASSAY OF MAGNESIUM: CPT

## 2018-12-08 PROCEDURE — 85025 COMPLETE CBC W/AUTO DIFF WBC: CPT

## 2018-12-08 PROCEDURE — 80048 BASIC METABOLIC PNL TOTAL CA: CPT

## 2018-12-08 PROCEDURE — 6360000002 HC RX W HCPCS: Performed by: FAMILY MEDICINE

## 2018-12-08 PROCEDURE — 2700000000 HC OXYGEN THERAPY PER DAY

## 2018-12-08 PROCEDURE — 82570 ASSAY OF URINE CREATININE: CPT

## 2018-12-08 RX ORDER — MAGNESIUM SULFATE 1 G/100ML
1 INJECTION INTRAVENOUS
Status: COMPLETED | OUTPATIENT
Start: 2018-12-08 | End: 2018-12-08

## 2018-12-08 RX ORDER — FUROSEMIDE 10 MG/ML
80 INJECTION INTRAMUSCULAR; INTRAVENOUS ONCE
Status: COMPLETED | OUTPATIENT
Start: 2018-12-08 | End: 2018-12-08

## 2018-12-08 RX ORDER — FUROSEMIDE 10 MG/ML
40 INJECTION INTRAMUSCULAR; INTRAVENOUS 2 TIMES DAILY
Status: DISCONTINUED | OUTPATIENT
Start: 2018-12-08 | End: 2018-12-11 | Stop reason: HOSPADM

## 2018-12-08 RX ORDER — MINERAL OIL/PETROLATUM,WHITE
CREAM (GRAM) TOPICAL 2 TIMES DAILY
Status: DISCONTINUED | OUTPATIENT
Start: 2018-12-08 | End: 2018-12-11 | Stop reason: HOSPADM

## 2018-12-08 RX ADMIN — FUROSEMIDE 80 MG: 10 INJECTION, SOLUTION INTRAMUSCULAR; INTRAVENOUS at 10:30

## 2018-12-08 RX ADMIN — ALBUTEROL SULFATE 2.5 MG: 2.5 SOLUTION RESPIRATORY (INHALATION) at 20:00

## 2018-12-08 RX ADMIN — GLIMEPIRIDE 2 MG: 2 TABLET ORAL at 17:14

## 2018-12-08 RX ADMIN — INSULIN GLARGINE 10 UNITS: 100 INJECTION, SOLUTION SUBCUTANEOUS at 21:15

## 2018-12-08 RX ADMIN — ASPIRIN 325 MG: 325 TABLET, DELAYED RELEASE ORAL at 09:10

## 2018-12-08 RX ADMIN — VITAMIN D, TAB 1000IU (100/BT) 1000 UNITS: 25 TAB at 09:11

## 2018-12-08 RX ADMIN — INSULIN LISPRO 4 UNITS: 100 INJECTION, SOLUTION INTRAVENOUS; SUBCUTANEOUS at 12:14

## 2018-12-08 RX ADMIN — ALPRAZOLAM 0.5 MG: 0.5 TABLET ORAL at 00:39

## 2018-12-08 RX ADMIN — MAGNESIUM SULFATE HEPTAHYDRATE 1 G: 1 INJECTION, SOLUTION INTRAVENOUS at 18:15

## 2018-12-08 RX ADMIN — ALPRAZOLAM 0.5 MG: 0.5 TABLET ORAL at 09:22

## 2018-12-08 RX ADMIN — ALBUTEROL SULFATE 2.5 MG: 2.5 SOLUTION RESPIRATORY (INHALATION) at 11:32

## 2018-12-08 RX ADMIN — ATORVASTATIN CALCIUM 20 MG: 20 TABLET, FILM COATED ORAL at 09:10

## 2018-12-08 RX ADMIN — INSULIN LISPRO 1 UNITS: 100 INJECTION, SOLUTION INTRAVENOUS; SUBCUTANEOUS at 21:15

## 2018-12-08 RX ADMIN — Medication 10 ML: at 09:19

## 2018-12-08 RX ADMIN — MAGNESIUM OXIDE TAB 400 MG (241.3 MG ELEMENTAL MG) 400 MG: 400 (241.3 MG) TAB at 21:15

## 2018-12-08 RX ADMIN — ALBUTEROL SULFATE 2.5 MG: 2.5 SOLUTION RESPIRATORY (INHALATION) at 06:20

## 2018-12-08 RX ADMIN — VENLAFAXINE 100 MG: 25 TABLET ORAL at 21:15

## 2018-12-08 RX ADMIN — ALBUTEROL SULFATE 2.5 MG: 2.5 SOLUTION RESPIRATORY (INHALATION) at 15:15

## 2018-12-08 RX ADMIN — ALBUTEROL SULFATE 2.5 MG: 2.5 SOLUTION RESPIRATORY (INHALATION) at 00:07

## 2018-12-08 RX ADMIN — METOPROLOL SUCCINATE 50 MG: 50 TABLET, FILM COATED, EXTENDED RELEASE ORAL at 09:10

## 2018-12-08 RX ADMIN — MIRTAZAPINE 45 MG: 15 TABLET, FILM COATED ORAL at 21:15

## 2018-12-08 RX ADMIN — SKIN PROTECTANT: 33 OINTMENT TOPICAL at 21:16

## 2018-12-08 RX ADMIN — GLIMEPIRIDE 2 MG: 2 TABLET ORAL at 09:10

## 2018-12-08 RX ADMIN — FUROSEMIDE 40 MG: 10 INJECTION, SOLUTION INTRAMUSCULAR; INTRAVENOUS at 16:26

## 2018-12-08 RX ADMIN — INSULIN LISPRO 4 UNITS: 100 INJECTION, SOLUTION INTRAVENOUS; SUBCUTANEOUS at 17:14

## 2018-12-08 RX ADMIN — GLIMEPIRIDE 2 MG: 2 TABLET ORAL at 12:14

## 2018-12-08 RX ADMIN — INSULIN GLARGINE 10 UNITS: 100 INJECTION, SOLUTION SUBCUTANEOUS at 09:11

## 2018-12-08 RX ADMIN — MAGNESIUM SULFATE HEPTAHYDRATE 1 G: 1 INJECTION, SOLUTION INTRAVENOUS at 19:42

## 2018-12-08 ASSESSMENT — ENCOUNTER SYMPTOMS
EYE PAIN: 0
FACIAL SWELLING: 0
ANAL BLEEDING: 0
BACK PAIN: 0
COUGH: 1
CONSTIPATION: 0
EYE ITCHING: 0
PHOTOPHOBIA: 0
EYE REDNESS: 0
ABDOMINAL DISTENTION: 0
SHORTNESS OF BREATH: 1
DIARRHEA: 0
ABDOMINAL PAIN: 0
BLOOD IN STOOL: 0
EYE DISCHARGE: 0
COLOR CHANGE: 1

## 2018-12-08 ASSESSMENT — PAIN SCALES - GENERAL
PAINLEVEL_OUTOF10: 0

## 2018-12-08 NOTE — PROGRESS NOTES
Alert and oriented to room. Admission history complete. Assessment complete. Call light within reach. Patient is not having any pain. RN will continue to monitor patient.

## 2018-12-08 NOTE — CONSULTS
[facility] Cardiology   Consult Note             Date:   12/8/2018  Patient name: Mike Woodson  Date of admission:  12/7/2018 10:45 AM  MRN:   [id number]  YOB: 1945    Reason for Admission:  CHF    CHIEF COMPLAINT:  Shortness of breath, pedal edema     History Obtained From:  patient    HISTORY OF PRESENT ILLNESS:      The patient is a 74 y.o.  male who is admitted to the hospital for worsening shortness of breath and pedal edema. He is well known to Dr Luanne jarvis and is followed up for shortness of breath. He has been well until a week ago when he started having worsening pedal edema. He then came to the ED for further management. He has no chest pain. He has shortness of breath with  Minimal exertion. He has orthopnea and pnd. Past Medical History:   has a past medical history of Arthritis; Cervical disc disease; Diabetes mellitus ([address] History of blood transfusion; Hx of blood clots; Hyperlipidemia; Neuropathy; Right kidney mass; and Snores. Past Surgical History:   has a past surgical history that includes Abdominal aortic aneurysm repair (2005); Carpal tunnel release (Bilateral); Cervical spine surgery; Inguinal hernia repair (Right); Upper gastrointestinal endoscopy; Colonoscopy; ventral hernia repair (05/10/2017); and pr repair incisional hernia,reducible (N/A, 5/10/2017). Home Medications:    Prior to Admission medications    Medication Sig Start Date End Date Taking?  Authorizing Provider   tiotropium (SPIRIVA RESPIMAT) 2.5 MCG/ACT AERS inhaler Inhale 2 puffs into the lungs daily    Yes Historical Provider, MD   albuterol sulfate  (90 Base) MCG/ACT inhaler Inhale 2 puffs into the lungs every 6 hours as needed for Wheezing or Shortness of Breath   Yes Historical Provider, MD   budesonide-formoterol (SYMBICORT) 160-4.5 MCG/ACT AERO Inhale 2 puffs into the lungs 2 times daily   Yes Historical Provider, MD   metoprolol succinate (TOPROL XL) 50 MG extended

## 2018-12-08 NOTE — H&P
frequency. Musculoskeletal: Negative for arthralgias, back pain and gait problem. Skin: Positive for color change and rash. Allergic/Immunologic: Negative for environmental allergies, food allergies and immunocompromised state. Neurological: Negative for dizziness, tremors, syncope, facial asymmetry, weakness and headaches. Hematological: Negative for adenopathy. Psychiatric/Behavioral: Negative for agitation. Physical Exam   Constitutional: He appears well-developed. HENT:   Head: Normocephalic. Nose: Nose normal.   Mouth/Throat: Oropharynx is clear and moist.   Eyes: Pupils are equal, round, and reactive to light. Neck: Normal range of motion. Cardiovascular: Normal rate, regular rhythm and normal heart sounds. Pulmonary/Chest: Effort normal.   Diminished in bases   Abdominal: Soft. He exhibits no distension. There is no tenderness. There is no guarding. Genitourinary:   Genitourinary Comments: Scrotum swollen   Musculoskeletal: Normal range of motion. Legs swollen up to knees red  dermatits over both legs up to mid calf  Some misl weeping several spots   Neurological: He is alert. Skin: Rash noted. There is erythema. Psychiatric: He has a normal mood and affect. Assessment:  1) edema  Had sudden 30# wt gain over 2 mo's  Eating very salty soy meals  Inc sob  2) copd  Just had ct scan of lungs yesterday  On inhalers at home  3)diabetes is on basaglar and invokanna and metfromin at home  Hold metformin and invokanna for now  3) renal insuff creat inc to 1.7  Has had lasix  This is after lasix. .    Plan:  See above  dureses  Low salt diet    Elida Fuentes MD  12/8/2018

## 2018-12-08 NOTE — PROGRESS NOTES
by mouth daily    Yes Historical Provider, MD   amLODIPine (NORVASC) 5 MG tablet Take 5 mg by mouth nightly    Yes Historical Provider, MD   mirtazapine (REMERON) 45 MG tablet Take 45 mg by mouth nightly   Yes Historical Provider, MD   gabapentin (NEURONTIN) 400 MG capsule Take 400 mg by mouth 3 times daily   Yes Historical Provider, MD   losartan (COZAAR) 100 MG tablet Take 100 mg by mouth nightly    Yes Historical Provider, MD   venlafaxine (EFFEXOR) 100 MG tablet Take 100 mg by mouth nightly    Yes Historical Provider, MD   ALPRAZolam (XANAX) 0.5 MG tablet Take 0.5 mg by mouth 3 times daily   Yes Historical Provider, MD   canagliflozin (INVOKANA) 100 MG TABS tablet Take 100 mg by mouth every morning (before breakfast)   Yes Historical Provider, MD   atorvastatin (LIPITOR) 20 MG tablet Take 20 mg by mouth daily   Yes Historical Provider, MD   glimepiride (AMARYL) 2 MG tablet Take 2 mg by mouth 3 times daily (before meals)    Yes Historical Provider, MD   metFORMIN (GLUCOPHAGE) 1000 MG tablet Take 1,000 mg by mouth 2 times daily (with meals)   Yes Historical Provider, MD   .  Recent Surgical History: None = 0     Assessment     Peak Flow (asthma only)    Predicted:    Personal Best:    PEF    % Predicted    Peak Flow : not applicable = 0    GJW6/NLS    FEV1 Predicted        FEV1      FEV1 % Predicted    FVC    IS volume    IBW    FIO2% 2 lpm  SPO2 95  RR 18  Breath Sounds: diminished      · Bronchodilator assessment at level  3  · Hyperinflation assessment at level   · Secretion Management assessment at level    · coughing[x]    Bronchodilator Assessment  BRONCHODILATOR ASSESSMENT SCORE  Score 0 1 2 3 4 5   Breath Sounds   []  Patient Baseline []  No Wheeze good aeration []  Faint, scattered wheezing, good aeration [x]  Expiratory Wheezing and or moderately diminished []  Insp/Exp wheeze and/or very diminished []  Insp/Exp and/ or marked distress   Respiratory Rate   []  Patient Baseline []  Less than 20 [x]  Less

## 2018-12-08 NOTE — CONSULTS
Reason for Consult:  Acute kidney injury. Requesting Physician:  Tanner Bryant MD    HISTORY OF PRESENT ILLNESS:    The patient is a 74 y.o. male who presents with LE edema and SOB, he had these symptoms progressing for a bout two weeks, he recently started eating ramen noodle daily His Cr was 1.94 at presentation, he reported no Hx of CKD, his records showed a Cr of 1.7 in 2017, He reported taking Aleeve daily for about 5 years. Has Hx of cryo ablation right kidney lieson few months ago  Denies any problems with nausea, vomiting, appetite, diarrhea or difficulty with urination. Review Of Systems:   Constitutional: No fever, chills, lethargy, weakness or wt loss. HEENT:  No headache, nasal discharge or sore throat. Cardiac:  No chest pain, Has exertional  Dyspnea,  Chest:              No cough, phlegm or wheezing. Abdomen:  No abdominal pain, nausea, vomiting or diarrhea. Neuro:             No gross focal weakness, numbness. No abnormal movements or seizure like activity. Skin:   No rashes or itching. :   No hematuria, pyuria, dysuria or flank pain. Extremities:  Has B/L significant LE  Swelling, and chronic joints  pains.        Past Medical History:   Diagnosis Date    Arthritis     Cervical disc disease     degenerative disc disease    Diabetes mellitus ([facility]     type 2    History of blood transfusion     Hx of blood clots     left leg    Hyperlipidemia     Neuropathy     Right kidney mass     \"urologist is watching\"    Amrik        Past Surgical History:   Procedure Laterality Date    ABDOMINAL AORTIC ANEURYSM REPAIR  2005    CARPAL TUNNEL RELEASE Bilateral     CERVICAL SPINE SURGERY      COLONOSCOPY      benign polyps removed    INGUINAL HERNIA REPAIR Right     [facility] REPAIR INCISIONAL HERNIA,REDUCIBLE N/A 5/10/2017    HERNIA VENTRAL REPAIR WITH MESH  performed by Zahida Rangel DO at [facility] Historical Provider, MD   gabapentin (NEURONTIN) 400 MG capsule Take 400 mg by mouth 3 times daily   Yes Historical Provider, MD   losartan (COZAAR) 100 MG tablet Take 100 mg by mouth nightly    Yes Historical Provider, MD   venlafaxine (EFFEXOR) 100 MG tablet Take 100 mg by mouth nightly    Yes Historical Provider, MD   ALPRAZolam (XANAX) 0.5 MG tablet Take 0.5 mg by mouth 3 times daily   Yes Historical Provider, MD   canagliflozin (INVOKANA) 100 MG TABS tablet Take 100 mg by mouth every morning (before breakfast)   Yes Historical Provider, MD   atorvastatin (LIPITOR) 20 MG tablet Take 20 mg by mouth daily   Yes Historical Provider, MD   glimepiride (AMARYL) 2 MG tablet Take 2 mg by mouth 3 times daily (before meals)    Yes Historical Provider, MD   metFORMIN (GLUCOPHAGE) 1000 MG tablet Take 1,000 mg by mouth 2 times daily (with meals)   Yes Historical Provider, MD       Scheduled Meds:   furosemide  40 mg Intravenous BID    sodium chloride flush  10 mL Intravenous 2 times per day    albuterol  2.5 mg Nebulization 4x daily    aspirin  325 mg Oral Daily    mirtazapine  45 mg Oral Nightly    venlafaxine  100 mg Oral Nightly    glimepiride  2 mg Oral TID AC    atorvastatin  20 mg Oral Daily    metoprolol succinate  50 mg Oral Daily    vitamin D  1,000 Units Oral Daily    insulin lispro  0-12 Units Subcutaneous TID WC    insulin lispro  0-6 Units Subcutaneous Nightly    insulin glargine  10 Units Subcutaneous BID     Continuous Infusions:   dextrose       PRN Meds:sodium chloride flush, acetaminophen, albuterol, ALPRAZolam, glucose, dextrose, glucagon (rDNA), dextrose    Allergies   Allergen Reactions    Barbiturates Anxiety    Codeine Palpitations    Sulfa Antibiotics Rash       Social History     Social History    Marital status:      Spouse name: N/A    Number of children: N/A    Years of education: N/A     Occupational History    Not on file.      Social History Main Topics    Smoking 12/07/2018    CREATININE 1.74 (H) 04/27/2017    GLUCOSE 86 12/08/2018    GLUCOSE 151 (H) 12/07/2018    GLUCOSE 67 (L) 04/27/2017     CMP:   Lab Results   Component Value Date     12/08/2018    K 4.0 12/08/2018     12/08/2018    CO2 23 12/08/2018    BUN 22 12/08/2018    CREATININE 1.72 12/08/2018    GLUCOSE 86 12/08/2018    CALCIUM 9.0 12/08/2018    PROT 6.1 12/08/2018    LABALBU 3.6 12/08/2018    BILITOT 0.57 12/08/2018    ALKPHOS 103 12/08/2018    AST 42 12/08/2018    ALT 25 12/08/2018      Hepatic:   Lab Results   Component Value Date    AST 42 (H) 12/08/2018    AST 36 12/07/2018    AST 18 07/23/2016    ALT 25 12/08/2018    ALT 26 12/07/2018    ALT 14 07/23/2016    BILITOT 0.57 12/08/2018    BILITOT 0.46 12/07/2018    BILITOT 0.27 (L) 07/23/2016    ALKPHOS 103 12/08/2018    ALKPHOS 106 12/07/2018    ALKPHOS 83 07/23/2016     BNP: No results found for: BNP  Lipids: No results found for: CHOL, HDL  INR:   Lab Results   Component Value Date    INR 1.1 12/07/2018    INR 1.0 08/03/2016     PTH: No results found for: PTH  Phosphorus:  No results found for: PHOS  Ionized Calcium: No results found for: IONCA  Magnesium: No results found for: MG  Albumin:   Lab Results   Component Value Date    LABALBU 3.6 12/08/2018     Last 3 CK, CKMB, Troponin: @LABRCNT(CKTOTAL:3,CKMB:3,TROPONINI:3)       URINE:)No results found for: Kizzy Garcia     Radiology:   Cardiomegaly without focal airspace consolidation. Assessment:  1. Renal failure, most likely chronic, secondary to long term NSAIDs use  2. Edema secondary to excessive salt intake  3. Hypoxia and SOB  4. Volume overload  5.  HTN      Plan:  Responded well to IV Lasix with about 5 L UOP after recent dose  Will continue Lasix at a lower dose 40 mg BID  Check Potassium and Magnesium levels  Check for proteinuria  Renal US   Evaluate BP after diuresis  Negative V/Q and LE doppler scans  Avoid further use of NSAIDs  Avoid nephrotoxic drugs and IV contrast

## 2018-12-09 LAB
ABSOLUTE EOS #: 0.38 K/UL (ref 0–0.4)
ABSOLUTE IMMATURE GRANULOCYTE: ABNORMAL K/UL (ref 0–0.3)
ABSOLUTE LYMPH #: 0.83 K/UL (ref 1–4.8)
ABSOLUTE MONO #: 1.05 K/UL (ref 0.2–0.8)
ANION GAP SERPL CALCULATED.3IONS-SCNC: 17 MMOL/L (ref 9–17)
BASOPHILS # BLD: 1 %
BASOPHILS ABSOLUTE: 0.08 K/UL (ref 0–0.2)
BUN BLDV-MCNC: 22 MG/DL (ref 8–23)
BUN/CREAT BLD: 11 (ref 9–20)
CALCIUM SERPL-MCNC: 9.4 MG/DL (ref 8.6–10.4)
CHLORIDE BLD-SCNC: 97 MMOL/L (ref 98–107)
CO2: 28 MMOL/L (ref 20–31)
CREAT SERPL-MCNC: 1.93 MG/DL (ref 0.7–1.2)
CREATININE URINE: 18 MG/DL (ref 39–259)
DIFFERENTIAL TYPE: ABNORMAL
EOSINOPHILS RELATIVE PERCENT: 5 % (ref 1–4)
ESTIMATED AVERAGE GLUCOSE: 177 MG/DL
GFR AFRICAN AMERICAN: 41 ML/MIN
GFR NON-AFRICAN AMERICAN: 34 ML/MIN
GFR SERPL CREATININE-BSD FRML MDRD: ABNORMAL ML/MIN/{1.73_M2}
GFR SERPL CREATININE-BSD FRML MDRD: ABNORMAL ML/MIN/{1.73_M2}
GLUCOSE BLD-MCNC: 120 MG/DL (ref 70–99)
GLUCOSE BLD-MCNC: 135 MG/DL (ref 75–110)
GLUCOSE BLD-MCNC: 191 MG/DL (ref 75–110)
GLUCOSE BLD-MCNC: 242 MG/DL (ref 75–110)
HBA1C MFR BLD: 7.8 % (ref 4–6)
HCT VFR BLD CALC: 44.4 % (ref 41–53)
HEMOGLOBIN: 13.4 G/DL (ref 13.5–17.5)
IMMATURE GRANULOCYTES: ABNORMAL %
LYMPHOCYTES # BLD: 11 % (ref 24–44)
MCH RBC QN AUTO: 20.8 PG (ref 26–34)
MCHC RBC AUTO-ENTMCNC: 30.2 G/DL (ref 31–37)
MCV RBC AUTO: 69 FL (ref 80–100)
MONOCYTES # BLD: 14 % (ref 1–7)
MORPHOLOGY: ABNORMAL
NRBC AUTOMATED: ABNORMAL PER 100 WBC
PDW BLD-RTO: 18.4 % (ref 11.5–14.5)
PLATELET # BLD: 219 K/UL (ref 130–400)
PLATELET ESTIMATE: ABNORMAL
PMV BLD AUTO: ABNORMAL FL (ref 6–12)
POTASSIUM SERPL-SCNC: 3.6 MMOL/L (ref 3.7–5.3)
RBC # BLD: 6.43 M/UL (ref 4.5–5.9)
RBC # BLD: ABNORMAL 10*6/UL
SEG NEUTROPHILS: 69 % (ref 36–66)
SEGMENTED NEUTROPHILS ABSOLUTE COUNT: 5.16 K/UL (ref 1.8–7.7)
SODIUM BLD-SCNC: 142 MMOL/L (ref 135–144)
TOTAL PROTEIN, URINE: 8 MG/DL
URINE TOTAL PROTEIN CREATININE RATIO: 0.44 (ref 0–0.2)
WBC # BLD: 7.5 K/UL (ref 3.5–11)
WBC # BLD: ABNORMAL 10*3/UL

## 2018-12-09 PROCEDURE — 6370000000 HC RX 637 (ALT 250 FOR IP): Performed by: FAMILY MEDICINE

## 2018-12-09 PROCEDURE — 85025 COMPLETE CBC W/AUTO DIFF WBC: CPT

## 2018-12-09 PROCEDURE — 6370000000 HC RX 637 (ALT 250 FOR IP): Performed by: INTERNAL MEDICINE

## 2018-12-09 PROCEDURE — 6360000002 HC RX W HCPCS: Performed by: INTERNAL MEDICINE

## 2018-12-09 PROCEDURE — 94640 AIRWAY INHALATION TREATMENT: CPT

## 2018-12-09 PROCEDURE — 80048 BASIC METABOLIC PNL TOTAL CA: CPT

## 2018-12-09 PROCEDURE — 2580000003 HC RX 258: Performed by: FAMILY MEDICINE

## 2018-12-09 PROCEDURE — 82947 ASSAY GLUCOSE BLOOD QUANT: CPT

## 2018-12-09 PROCEDURE — 2700000000 HC OXYGEN THERAPY PER DAY

## 2018-12-09 PROCEDURE — 36415 COLL VENOUS BLD VENIPUNCTURE: CPT

## 2018-12-09 PROCEDURE — 2060000000 HC ICU INTERMEDIATE R&B

## 2018-12-09 PROCEDURE — 6360000002 HC RX W HCPCS: Performed by: FAMILY MEDICINE

## 2018-12-09 RX ORDER — CEPHALEXIN 500 MG/1
500 CAPSULE ORAL 2 TIMES DAILY
Status: DISCONTINUED | OUTPATIENT
Start: 2018-12-09 | End: 2018-12-11 | Stop reason: HOSPADM

## 2018-12-09 RX ORDER — POTASSIUM CHLORIDE 20 MEQ/1
20 TABLET, EXTENDED RELEASE ORAL ONCE
Status: COMPLETED | OUTPATIENT
Start: 2018-12-09 | End: 2018-12-09

## 2018-12-09 RX ADMIN — MIRTAZAPINE 45 MG: 15 TABLET, FILM COATED ORAL at 20:52

## 2018-12-09 RX ADMIN — VITAMIN D, TAB 1000IU (100/BT) 1000 UNITS: 25 TAB at 09:43

## 2018-12-09 RX ADMIN — INSULIN LISPRO 2 UNITS: 100 INJECTION, SOLUTION INTRAVENOUS; SUBCUTANEOUS at 20:52

## 2018-12-09 RX ADMIN — GLIMEPIRIDE 2 MG: 2 TABLET ORAL at 09:52

## 2018-12-09 RX ADMIN — MAGNESIUM OXIDE TAB 400 MG (241.3 MG ELEMENTAL MG) 400 MG: 400 (241.3 MG) TAB at 09:43

## 2018-12-09 RX ADMIN — ASPIRIN 325 MG: 325 TABLET, DELAYED RELEASE ORAL at 09:43

## 2018-12-09 RX ADMIN — CEPHALEXIN 500 MG: 500 CAPSULE ORAL at 20:51

## 2018-12-09 RX ADMIN — MAGNESIUM OXIDE TAB 400 MG (241.3 MG ELEMENTAL MG) 400 MG: 400 (241.3 MG) TAB at 20:52

## 2018-12-09 RX ADMIN — ATORVASTATIN CALCIUM 20 MG: 20 TABLET, FILM COATED ORAL at 09:43

## 2018-12-09 RX ADMIN — ALPRAZOLAM 0.5 MG: 0.5 TABLET ORAL at 13:10

## 2018-12-09 RX ADMIN — ALBUTEROL SULFATE 2.5 MG: 2.5 SOLUTION RESPIRATORY (INHALATION) at 06:17

## 2018-12-09 RX ADMIN — ALPRAZOLAM 0.5 MG: 0.5 TABLET ORAL at 00:15

## 2018-12-09 RX ADMIN — SKIN PROTECTANT: 33 OINTMENT TOPICAL at 20:51

## 2018-12-09 RX ADMIN — GLIMEPIRIDE 2 MG: 2 TABLET ORAL at 17:33

## 2018-12-09 RX ADMIN — METOPROLOL SUCCINATE 50 MG: 50 TABLET, FILM COATED, EXTENDED RELEASE ORAL at 09:43

## 2018-12-09 RX ADMIN — INSULIN LISPRO 2 UNITS: 100 INJECTION, SOLUTION INTRAVENOUS; SUBCUTANEOUS at 13:32

## 2018-12-09 RX ADMIN — Medication 10 ML: at 09:43

## 2018-12-09 RX ADMIN — POTASSIUM CHLORIDE 20 MEQ: 20 TABLET, EXTENDED RELEASE ORAL at 12:58

## 2018-12-09 RX ADMIN — ALBUTEROL SULFATE 2.5 MG: 2.5 SOLUTION RESPIRATORY (INHALATION) at 19:23

## 2018-12-09 RX ADMIN — INSULIN GLARGINE 10 UNITS: 100 INJECTION, SOLUTION SUBCUTANEOUS at 09:45

## 2018-12-09 RX ADMIN — GLIMEPIRIDE 2 MG: 2 TABLET ORAL at 12:58

## 2018-12-09 RX ADMIN — Medication 10 ML: at 20:54

## 2018-12-09 RX ADMIN — FUROSEMIDE 40 MG: 10 INJECTION, SOLUTION INTRAMUSCULAR; INTRAVENOUS at 17:32

## 2018-12-09 RX ADMIN — CEPHALEXIN 500 MG: 500 CAPSULE ORAL at 12:58

## 2018-12-09 RX ADMIN — INSULIN GLARGINE 10 UNITS: 100 INJECTION, SOLUTION SUBCUTANEOUS at 20:52

## 2018-12-09 RX ADMIN — FUROSEMIDE 40 MG: 10 INJECTION, SOLUTION INTRAMUSCULAR; INTRAVENOUS at 09:43

## 2018-12-09 RX ADMIN — VENLAFAXINE 100 MG: 25 TABLET ORAL at 20:52

## 2018-12-09 RX ADMIN — ALBUTEROL SULFATE 2.5 MG: 2.5 SOLUTION RESPIRATORY (INHALATION) at 15:25

## 2018-12-09 RX ADMIN — SKIN PROTECTANT: 33 OINTMENT TOPICAL at 09:59

## 2018-12-09 ASSESSMENT — PAIN SCALES - GENERAL
PAINLEVEL_OUTOF10: 0

## 2018-12-09 ASSESSMENT — ENCOUNTER SYMPTOMS: SHORTNESS OF BREATH: 1

## 2018-12-09 NOTE — PLAN OF CARE
Problem: Fluid Volume:  Goal: Ability to achieve a balanced intake and output will improve  Ability to achieve a balanced intake and output will improve   Outcome: Ongoing  BLE extremity noted and scrotal edema noted. Monitoring fluid intake and output. Lasix given in ER.

## 2018-12-09 NOTE — PROGRESS NOTES
than 20 [x]  Less than 20 []  20-25 []  Greater than 25 []  Greater than 25   Peak flow % of Pred or PB [x]  NA   []  Greater than 90%  []  81-90% []  71-80% []  Less than or equal to 70%  or unable to perform []  Unable due to Respiratory Distress   Dyspnea re []  Patient Baseline []  No SOB []  No SOB [x]  SOB on exertion []  SOB min activity []  At rest/acute   e FEV% Predicted       []  NA []  Above 69%  []  Unable []  Above 60-69%  []  Unable []  Above 50-59%  [x]  Unable []  Above 35-49%  []  Unable []  Less than 35%  []  Unable                 []  Hyperinflation Assessment  Score 1 2 3   CXR and Breath Sounds   []  Clear []  No atelectasis  Basilar aeration []  Atelectasis or absent basilar breath sounds   Incentive Spirometry Volume  (Per IBW)   []  Greater than or equal to 15ml/Kg []  less than 15ml/Kg []  less than 15ml/Kg   Surgery within last 2 weeks []  None or general   []  Abdominal or thoracic surgery  []  Abdominal or thoracic   Chronic Pulmonary Historyre []  No []  Yes []  Yes     []  Secretion Management Assessment  Score 1 2 3   Bilateral Breath Sounds   []  Occasional Rhonchi []  Scattered Rhonchi []  Course Rhonchi and/or poor aeration   Sputum    []  Small amount of thin secretions []  Moderate amount of viscous secretions []  Copius, Viscious Yellow/ Secretions   CXR as reported by physician []  clear  []  Unavailable []  Infiltrates and/or consolidation  []  Unavailable []  Mucus Plugging and or lobar consolidation  []  Unavailable   Cough []  Strong, productive cough []  Weak productive cough []  No cough or weak non-productive cough

## 2018-12-09 NOTE — PROGRESS NOTES
Teo Whalen MD   50 mg at 12/09/18 6304    vitamin D (CHOLECALCIFEROL) tablet 1,000 Units  1,000 Units Oral Daily Teo Whalen MD   1,000 Units at 12/09/18 0943    insulin lispro (HUMALOG) injection vial 0-12 Units  0-12 Units Subcutaneous TID WC Luis Sanz MD   4 Units at 12/08/18 1714    insulin lispro (HUMALOG) injection vial 0-6 Units  0-6 Units Subcutaneous Nightly Luis Sanz MD   1 Units at 12/08/18 2115    insulin glargine (LANTUS) injection vial 10 Units  10 Units Subcutaneous BID Teo Whalen MD   10 Units at 12/09/18 0945    glucose (GLUTOSE) 40 % oral gel 15 g  15 g Oral PRN Luis Sanz MD        dextrose 50 % solution 12.5 g  12.5 g Intravenous PRN Luis Sanz MD        glucagon (rDNA) injection 1 mg  1 mg Intramuscular PRN Luis Sanz MD        dextrose 5 % solution  100 mL/hr Intravenous PRN Luis Sanz MD         Allergies   Allergen Reactions    Barbiturates Anxiety    Codeine Palpitations    Sulfa Antibiotics Rash     Active Problems:    Biventricular CHF (congestive heart failure) ([facility]  Resolved Problems:    * No resolved hospital problems. *    Blood pressure (!) 122/53, pulse 94, temperature 97.7 °F (36.5 °C), temperature source Oral, resp. rate 18, height 5' 11\" (1.803 m), weight 248 lb 6.4 oz (112.7 kg), SpO2 99 %. Subjective:  Symptoms:  Improved. He reports shortness of breath. Diet:  Adequate intake. Activity level: Returning to normal.    Pain:  He reports no pain. Objective:  General Appearance:  Comfortable (able to keep O2 off at reast). Vital signs: (most recent): Blood pressure (!) 122/53, pulse 94, temperature 97.7 °F (36.5 °C), temperature source Oral, resp. rate 18, height 5' 11\" (1.803 m), weight 248 lb 6.4 oz (112.7 kg), SpO2 99 %. Vital signs are normal.    Output: Producing urine and producing stool. HEENT: Normal HEENT exam.    Lungs:  Normal effort. Breath sounds clear to auscultation. Heart: Normal rate.

## 2018-12-10 ENCOUNTER — APPOINTMENT (OUTPATIENT)
Dept: ULTRASOUND IMAGING | Age: 73
DRG: 291 | End: 2018-12-10
Payer: COMMERCIAL

## 2018-12-10 ENCOUNTER — APPOINTMENT (OUTPATIENT)
Dept: MRI IMAGING | Age: 73
DRG: 291 | End: 2018-12-10
Payer: COMMERCIAL

## 2018-12-10 PROBLEM — N18.30 CKD (CHRONIC KIDNEY DISEASE) STAGE 3, GFR 30-59 ML/MIN (HCC): Chronic | Status: ACTIVE | Noted: 2018-12-10

## 2018-12-10 PROBLEM — I10 ESSENTIAL HYPERTENSION: Status: ACTIVE | Noted: 2018-12-10

## 2018-12-10 LAB
-: NORMAL
ABSOLUTE EOS #: 0.33 K/UL (ref 0–0.4)
ABSOLUTE IMMATURE GRANULOCYTE: ABNORMAL K/UL (ref 0–0.3)
ABSOLUTE LYMPH #: 0.74 K/UL (ref 1–4.8)
ABSOLUTE MONO #: 1.23 K/UL (ref 0.2–0.8)
AMORPHOUS: NORMAL
ANION GAP SERPL CALCULATED.3IONS-SCNC: 15 MMOL/L (ref 9–17)
BACTERIA: NORMAL
BASOPHILS # BLD: 1 %
BASOPHILS ABSOLUTE: 0.08 K/UL (ref 0–0.2)
BILIRUBIN URINE: NEGATIVE
BUN BLDV-MCNC: 22 MG/DL (ref 8–23)
BUN/CREAT BLD: 12 (ref 9–20)
CALCIUM SERPL-MCNC: 9.7 MG/DL (ref 8.6–10.4)
CASTS UA: NORMAL /LPF
CHLORIDE BLD-SCNC: 99 MMOL/L (ref 98–107)
CO2: 26 MMOL/L (ref 20–31)
COLOR: YELLOW
COMMENT UA: ABNORMAL
CREAT SERPL-MCNC: 1.79 MG/DL (ref 0.7–1.2)
CRYSTALS, UA: NORMAL /HPF
DIFFERENTIAL TYPE: ABNORMAL
EOSINOPHILS RELATIVE PERCENT: 4 % (ref 1–4)
EPITHELIAL CELLS UA: NORMAL /HPF (ref 0–5)
GFR AFRICAN AMERICAN: 45 ML/MIN
GFR NON-AFRICAN AMERICAN: 37 ML/MIN
GFR SERPL CREATININE-BSD FRML MDRD: ABNORMAL ML/MIN/{1.73_M2}
GFR SERPL CREATININE-BSD FRML MDRD: ABNORMAL ML/MIN/{1.73_M2}
GLUCOSE BLD-MCNC: 161 MG/DL (ref 70–99)
GLUCOSE BLD-MCNC: 180 MG/DL (ref 75–110)
GLUCOSE BLD-MCNC: 198 MG/DL (ref 75–110)
GLUCOSE BLD-MCNC: 223 MG/DL (ref 75–110)
GLUCOSE BLD-MCNC: 234 MG/DL (ref 75–110)
GLUCOSE BLD-MCNC: 88 MG/DL (ref 75–110)
GLUCOSE URINE: ABNORMAL
HCT VFR BLD CALC: 45.5 % (ref 41–53)
HEMOGLOBIN: 13.7 G/DL (ref 13.5–17.5)
IMMATURE GRANULOCYTES: ABNORMAL %
KETONES, URINE: NEGATIVE
LEUKOCYTE ESTERASE, URINE: NEGATIVE
LYMPHOCYTES # BLD: 9 % (ref 24–44)
MAGNESIUM: 2.1 MG/DL (ref 1.6–2.6)
MCH RBC QN AUTO: 20.9 PG (ref 26–34)
MCHC RBC AUTO-ENTMCNC: 30.1 G/DL (ref 31–37)
MCV RBC AUTO: 69.6 FL (ref 80–100)
MONOCYTES # BLD: 15 % (ref 1–7)
MORPHOLOGY: ABNORMAL
MUCUS: NORMAL
NITRITE, URINE: NEGATIVE
NRBC AUTOMATED: ABNORMAL PER 100 WBC
OTHER OBSERVATIONS UA: NORMAL
PDW BLD-RTO: 18.3 % (ref 11.5–14.5)
PH UA: 6 (ref 5–8)
PLATELET # BLD: 246 K/UL (ref 130–400)
PLATELET ESTIMATE: ABNORMAL
PMV BLD AUTO: ABNORMAL FL (ref 6–12)
POTASSIUM SERPL-SCNC: 4 MMOL/L (ref 3.7–5.3)
PROTEIN UA: ABNORMAL
RBC # BLD: 6.54 M/UL (ref 4.5–5.9)
RBC # BLD: ABNORMAL 10*6/UL
RBC UA: NORMAL /HPF (ref 0–2)
RENAL EPITHELIAL, UA: NORMAL /HPF
SEG NEUTROPHILS: 71 % (ref 36–66)
SEGMENTED NEUTROPHILS ABSOLUTE COUNT: 5.82 K/UL (ref 1.8–7.7)
SODIUM BLD-SCNC: 140 MMOL/L (ref 135–144)
SPECIFIC GRAVITY UA: 1.02 (ref 1–1.03)
TRICHOMONAS: NORMAL
TURBIDITY: CLEAR
URINE HGB: ABNORMAL
UROBILINOGEN, URINE: NORMAL
WBC # BLD: 8.2 K/UL (ref 3.5–11)
WBC # BLD: ABNORMAL 10*3/UL
WBC UA: NORMAL /HPF (ref 0–5)
YEAST: NORMAL

## 2018-12-10 PROCEDURE — 82947 ASSAY GLUCOSE BLOOD QUANT: CPT

## 2018-12-10 PROCEDURE — 6370000000 HC RX 637 (ALT 250 FOR IP): Performed by: FAMILY MEDICINE

## 2018-12-10 PROCEDURE — 85025 COMPLETE CBC W/AUTO DIFF WBC: CPT

## 2018-12-10 PROCEDURE — 6360000002 HC RX W HCPCS: Performed by: FAMILY MEDICINE

## 2018-12-10 PROCEDURE — 81001 URINALYSIS AUTO W/SCOPE: CPT

## 2018-12-10 PROCEDURE — 36415 COLL VENOUS BLD VENIPUNCTURE: CPT

## 2018-12-10 PROCEDURE — 76775 US EXAM ABDO BACK WALL LIM: CPT

## 2018-12-10 PROCEDURE — 83735 ASSAY OF MAGNESIUM: CPT

## 2018-12-10 PROCEDURE — 94760 N-INVAS EAR/PLS OXIMETRY 1: CPT

## 2018-12-10 PROCEDURE — 94640 AIRWAY INHALATION TREATMENT: CPT

## 2018-12-10 PROCEDURE — 6370000000 HC RX 637 (ALT 250 FOR IP): Performed by: INTERNAL MEDICINE

## 2018-12-10 PROCEDURE — 6360000002 HC RX W HCPCS: Performed by: INTERNAL MEDICINE

## 2018-12-10 PROCEDURE — 2580000003 HC RX 258: Performed by: FAMILY MEDICINE

## 2018-12-10 PROCEDURE — 80048 BASIC METABOLIC PNL TOTAL CA: CPT

## 2018-12-10 PROCEDURE — 70551 MRI BRAIN STEM W/O DYE: CPT

## 2018-12-10 PROCEDURE — 2700000000 HC OXYGEN THERAPY PER DAY

## 2018-12-10 PROCEDURE — 93880 EXTRACRANIAL BILAT STUDY: CPT

## 2018-12-10 PROCEDURE — 2060000000 HC ICU INTERMEDIATE R&B

## 2018-12-10 RX ORDER — ALBUTEROL SULFATE 2.5 MG/3ML
2.5 SOLUTION RESPIRATORY (INHALATION) 3 TIMES DAILY
Status: DISCONTINUED | OUTPATIENT
Start: 2018-12-10 | End: 2018-12-11 | Stop reason: HOSPADM

## 2018-12-10 RX ORDER — ALPRAZOLAM 0.5 MG/1
0.5 TABLET ORAL
Status: COMPLETED | OUTPATIENT
Start: 2018-12-10 | End: 2018-12-10

## 2018-12-10 RX ADMIN — GLIMEPIRIDE 2 MG: 2 TABLET ORAL at 12:36

## 2018-12-10 RX ADMIN — ALPRAZOLAM 0.5 MG: 0.5 TABLET ORAL at 13:54

## 2018-12-10 RX ADMIN — METOPROLOL SUCCINATE 50 MG: 50 TABLET, FILM COATED, EXTENDED RELEASE ORAL at 08:17

## 2018-12-10 RX ADMIN — ALBUTEROL SULFATE 2.5 MG: 2.5 SOLUTION RESPIRATORY (INHALATION) at 21:02

## 2018-12-10 RX ADMIN — ALBUTEROL SULFATE 2.5 MG: 2.5 SOLUTION RESPIRATORY (INHALATION) at 15:05

## 2018-12-10 RX ADMIN — INSULIN LISPRO 2 UNITS: 100 INJECTION, SOLUTION INTRAVENOUS; SUBCUTANEOUS at 08:42

## 2018-12-10 RX ADMIN — FUROSEMIDE 40 MG: 10 INJECTION, SOLUTION INTRAMUSCULAR; INTRAVENOUS at 08:16

## 2018-12-10 RX ADMIN — Medication 10 ML: at 08:18

## 2018-12-10 RX ADMIN — INSULIN GLARGINE 10 UNITS: 100 INJECTION, SOLUTION SUBCUTANEOUS at 08:42

## 2018-12-10 RX ADMIN — SKIN PROTECTANT: 33 OINTMENT TOPICAL at 21:46

## 2018-12-10 RX ADMIN — INSULIN LISPRO 4 UNITS: 100 INJECTION, SOLUTION INTRAVENOUS; SUBCUTANEOUS at 12:35

## 2018-12-10 RX ADMIN — FUROSEMIDE 40 MG: 10 INJECTION, SOLUTION INTRAMUSCULAR; INTRAVENOUS at 17:17

## 2018-12-10 RX ADMIN — ALPRAZOLAM 0.5 MG: 0.5 TABLET ORAL at 08:16

## 2018-12-10 RX ADMIN — ASPIRIN 325 MG: 325 TABLET, DELAYED RELEASE ORAL at 08:17

## 2018-12-10 RX ADMIN — MIRTAZAPINE 45 MG: 15 TABLET, FILM COATED ORAL at 21:41

## 2018-12-10 RX ADMIN — ALPRAZOLAM 0.5 MG: 0.5 TABLET ORAL at 21:41

## 2018-12-10 RX ADMIN — MAGNESIUM OXIDE TAB 400 MG (241.3 MG ELEMENTAL MG) 400 MG: 400 (241.3 MG) TAB at 08:17

## 2018-12-10 RX ADMIN — INSULIN GLARGINE 10 UNITS: 100 INJECTION, SOLUTION SUBCUTANEOUS at 21:41

## 2018-12-10 RX ADMIN — INSULIN LISPRO 4 UNITS: 100 INJECTION, SOLUTION INTRAVENOUS; SUBCUTANEOUS at 17:14

## 2018-12-10 RX ADMIN — VENLAFAXINE 100 MG: 25 TABLET ORAL at 21:41

## 2018-12-10 RX ADMIN — ATORVASTATIN CALCIUM 20 MG: 20 TABLET, FILM COATED ORAL at 08:17

## 2018-12-10 RX ADMIN — ALBUTEROL SULFATE 2.5 MG: 2.5 SOLUTION RESPIRATORY (INHALATION) at 07:23

## 2018-12-10 RX ADMIN — GLIMEPIRIDE 2 MG: 2 TABLET ORAL at 17:17

## 2018-12-10 RX ADMIN — VITAMIN D, TAB 1000IU (100/BT) 1000 UNITS: 25 TAB at 08:17

## 2018-12-10 RX ADMIN — GLIMEPIRIDE 2 MG: 2 TABLET ORAL at 08:16

## 2018-12-10 RX ADMIN — CEPHALEXIN 500 MG: 500 CAPSULE ORAL at 12:42

## 2018-12-10 RX ADMIN — ALBUTEROL SULFATE 2.5 MG: 2.5 SOLUTION RESPIRATORY (INHALATION) at 11:20

## 2018-12-10 RX ADMIN — MAGNESIUM OXIDE TAB 400 MG (241.3 MG ELEMENTAL MG) 400 MG: 400 (241.3 MG) TAB at 21:41

## 2018-12-10 RX ADMIN — Medication 10 ML: at 21:42

## 2018-12-10 RX ADMIN — CEPHALEXIN 500 MG: 500 CAPSULE ORAL at 21:41

## 2018-12-10 RX ADMIN — SKIN PROTECTANT: 33 OINTMENT TOPICAL at 08:16

## 2018-12-10 ASSESSMENT — PAIN SCALES - GENERAL
PAINLEVEL_OUTOF10: 0
PAINLEVEL_OUTOF10: 0

## 2018-12-10 NOTE — PLAN OF CARE
Problem: Falls - Risk of:  Goal: Will remain free from falls  Will remain free from falls   Outcome: Ongoing  Fall risk assessment completed. Patient instructed to use call light. Bed locked and in lowest position, side rails up 2/4, call light and bedside table within reach, clutter removed, and non-skid footwear on when pt out of bed. Hourly rounds will continue.       Problem: Fluid Volume:  Goal: Ability to achieve a balanced intake and output will improve  Ability to achieve a balanced intake and output will improve   Outcome: Ongoing

## 2018-12-10 NOTE — PROGRESS NOTES
mouth daily    Yes Historical Provider, MD   amLODIPine (NORVASC) 5 MG tablet Take 5 mg by mouth nightly    Yes Historical Provider, MD   mirtazapine (REMERON) 45 MG tablet Take 45 mg by mouth nightly   Yes Historical Provider, MD   gabapentin (NEURONTIN) 400 MG capsule Take 400 mg by mouth 3 times daily   Yes Historical Provider, MD   losartan (COZAAR) 100 MG tablet Take 100 mg by mouth nightly    Yes Historical Provider, MD   venlafaxine (EFFEXOR) 100 MG tablet Take 100 mg by mouth nightly    Yes Historical Provider, MD   ALPRAZolam (XANAX) 0.5 MG tablet Take 0.5 mg by mouth 3 times daily   Yes Historical Provider, MD   canagliflozin (INVOKANA) 100 MG TABS tablet Take 100 mg by mouth every morning (before breakfast)   Yes Historical Provider, MD   atorvastatin (LIPITOR) 20 MG tablet Take 20 mg by mouth daily   Yes Historical Provider, MD   glimepiride (AMARYL) 2 MG tablet Take 2 mg by mouth 3 times daily (before meals)    Yes Historical Provider, MD   metFORMIN (GLUCOPHAGE) 1000 MG tablet Take 1,000 mg by mouth 2 times daily (with meals)   Yes Historical Provider, MD   .  Recent Surgical History: None = 0     Assessment     Peak Flow (asthma only)    Predicted:   Personal Best:     % Predicted 85  Peak Flow : 80- 100% = 0    FEV1/FVC    FEV1 Predicted       FEV1 2.4    FEV1 % Predicted 76  FVC 3.6 83%  IS volume   IBW   FIO2% 21  SPO2 94  RR 18  Breath Sounds: Clear/Diminished      · Bronchodilator assessment at level  2  · Hyperinflation assessment at level   · Secretion Management assessment at level    ·   · [x]    Bronchodilator Assessment  BRONCHODILATOR ASSESSMENT SCORE  Score 0 1 2 3 4 5   Breath Sounds   []  Patient Baseline []  No Wheeze good aeration [x]  Faint, scattered wheezing, good aeration []  Expiratory Wheezing and or moderately diminished []  Insp/Exp wheeze and/or very diminished []  Insp/Exp and/ or marked distress   Respiratory Rate   []  Patient Baseline []  Less than 20 [x]  Less

## 2018-12-10 NOTE — SIGNIFICANT EVENT
19.0*  18.4*   PLT  243  242  219   MPV  9.9  NOT REPORTED  NOT REPORTED   INR  1.1   --    --    DDIMER  1.18   --    --      Chemistry:  Recent Labs      12/07/18   1100  12/07/18   1415  12/07/18   1715  12/08/18   0631  12/08/18   1652  12/09/18   0732   NA  138   --    --   143   --   142   K  4.9   --    --   4.0  3.9  3.6*   CL  102   --    --   102   --   97*   CO2  21   --    --   23   --   28   GLUCOSE  151*   --    --   86   --   120*   BUN  27*   --    --   22   --   22   CREATININE  1.94*   --    --   1.72*   --   1.93*   MG   --    --    --    --   1.6   --    ANIONGAP  15   --    --   18*   --   17   LABGLOM  34*   --    --   39*   --   34*   GFRAA  41*   --    --   47*   --   41*   CALCIUM  9.5   --    --   9.0   --   9.4   PROBNP  4,270*   --    --    --    --    --    TROPONINT  0.05*  0.05*  0.05*   --    --    --      Recent Labs      12/07/18   1100   12/08/18   0631   12/08/18   1651  12/08/18   2045  12/09/18   1311  12/09/18   1737  12/09/18   2038  12/09/18   2356   PROT  6.4   --   6.1*   --    --    --    --    --    --    --    LABALBU  4.0   --   3.6   --    --    --    --    --    --    --    LABA1C   --    --   7.8*   --    --    --    --    --    --    --    TSH  5.22*   --    --    --    --    --    --    --    --    --    AST  36   --   42*   --    --    --    --    --    --    --    ALT  26   --   25   --    --    --    --    --    --    --    ALKPHOS  106   --   103   --    --    --    --    --    --    --    BILITOT  0.46   --   0.57   --    --    --    --    --    --    --    BILIDIR   --    --   0.20   --    --    --    --    --    --    --    POCGLU   --    < >   --    < >  222*  199*  191*  135*  242*  198*    < > = values in this interval not displayed.          No results found for: SPECIAL  No results found for: CULTURE    Lab Results   Component Value Date    POCPH 7.37 12/07/2018    POCPCO2 34 12/07/2018    POCPO2 55 12/07/2018    POCHCO3 19.8 12/07/2018    NBEA 5

## 2018-12-10 NOTE — PROGRESS NOTES
Awake, alert, not in distress. Appears to be stated age. HEENT: Atraumatic, normocephalic. Anicteric sclera. Pink and moist oral mucosa. Neck supple. No JVD. Chest: Bilateral air entry, fine crackles bibasilar, no wheezing, rhonchi or rales. Cardiovascular: RRR, S1S2, no murmur, rub or gallop. B/L ++lower extremity edema. Abdomen: Soft, non tender to palpation. Musculoskeletal: No cyanosis or clubbing. Integumentary: Pink, warm and dry. Free from rash or lesions. CNS: Oriented to person, place and time. Speech clear. Face symmetrical. No tremor.      Data:  CBC:   Lab Results   Component Value Date    WBC 8.2 12/10/2018    HGB 13.7 12/10/2018    HCT 45.5 12/10/2018    MCV 69.6 (L) 12/10/2018     12/10/2018     BMP:    Lab Results   Component Value Date     12/10/2018    K 4.0 12/10/2018    CL 99 12/10/2018    CO2 26 12/10/2018    BUN 22 12/10/2018    CREATININE 1.79 (H) 12/10/2018    GLUCOSE 161 (H) 12/10/2018     CMP:   Lab Results   Component Value Date     12/10/2018    K 4.0 12/10/2018    CL 99 12/10/2018    CO2 26 12/10/2018    BUN 22 12/10/2018    CREATININE 1.79 (H) 12/10/2018    GLUCOSE 161 (H) 12/10/2018    CALCIUM 9.7 12/10/2018    PROT 6.1 (L) 12/08/2018    LABALBU 3.6 12/08/2018    BILITOT 0.57 12/08/2018    ALKPHOS 103 12/08/2018    AST 42 (H) 12/08/2018    ALT 25 12/08/2018      Hepatic:   Lab Results   Component Value Date    AST 42 (H) 12/08/2018    ALT 25 12/08/2018    BILITOT 0.57 12/08/2018    ALKPHOS 103 12/08/2018     BNP: No results found for: BNP  Lipids: No results found for: CHOL, HDL  INR:   Lab Results   Component Value Date    INR 1.1 12/07/2018     PTH: No results found for: PTH  Phosphorus:  No results found for: PHOS  Ionized Calcium: No results found for: IONCA  Magnesium:   Lab Results   Component Value Date    MG 2.1 12/10/2018     Albumin:   Lab Results   Component Value Date    LABALBU 3.6 12/08/2018     Last 3 CK, CKMB, [facility]roponin[facility](CKTOTAL:3,CKMB:3,TROPONINI:3)       URINE:)No results found for: Wolfgang Cummins    Radiology: Renal US:     FINDINGS:    Kidneys: The right kidney measures 10.8 x 6 x 5.6 cm.  Right renal cortical thickness  is 1.9 cm. The left kidney measures 10.3 x 5.7 x 5.1 cm.  Left renal cortical thickness  measures 1.8 cm. Kidneys demonstrate normal cortical echogenicity.  No evidence of  hydronephrosis or intrarenal stones.      Impression:       Normal-sized bilateral kidneys with no evidence for nephrolithiasis or  hydronephrosis.          Assessment:  1. Chronic kidney disease stage 3, secondary to long term NSAIDs use. 2. Edema secondary to excessive salt intake. 3. Hypoxia and SOB. 4. Volume overload. 5. Hypertension. 6. Hypokalemia. 7. Hypomagnesemia.     Plan: Will continue Lasix 40 mg iv BID. Urine total protein creatinine ratio 0.44. Renal US normal.  BP improved after diuresis  Avoid further use of NSAIDs  Avoid nephrotoxic drugs and IV contrast exposure. Follow up labs ordered for AM.     Please do not hesitate to call with questions. Pt seen in collaboration with Dr. Jus Caldwell. Electronically signed by HERB Carias CNP  on 12/10/2018 at 3:31 PM     Patient seen and examined. Agree with above note. Above note was modified. We will continue to follow up with you. Electronically signed by Baldo Newton MD on 12/10/2018 at 3:58 PM  [facility].   Ph: [phone]

## 2018-12-11 VITALS
TEMPERATURE: 98.2 F | RESPIRATION RATE: 20 BRPM | HEIGHT: 71 IN | BODY MASS INDEX: 34.77 KG/M2 | SYSTOLIC BLOOD PRESSURE: 132 MMHG | WEIGHT: 248.4 LBS | DIASTOLIC BLOOD PRESSURE: 72 MMHG | OXYGEN SATURATION: 98 % | HEART RATE: 76 BPM

## 2018-12-11 LAB
ABSOLUTE EOS #: 0.3 K/UL (ref 0–0.4)
ABSOLUTE IMMATURE GRANULOCYTE: ABNORMAL K/UL (ref 0–0.3)
ABSOLUTE LYMPH #: 0.6 K/UL (ref 1–4.8)
ABSOLUTE MONO #: 0.96 K/UL (ref 0.2–0.8)
ANION GAP SERPL CALCULATED.3IONS-SCNC: 20 MMOL/L (ref 9–17)
BASOPHILS # BLD: 0 %
BASOPHILS ABSOLUTE: 0 K/UL (ref 0–0.2)
BUN BLDV-MCNC: 27 MG/DL (ref 8–23)
BUN/CREAT BLD: 15 (ref 9–20)
CALCIUM SERPL-MCNC: 8.8 MG/DL (ref 8.6–10.4)
CHLORIDE BLD-SCNC: 94 MMOL/L (ref 98–107)
CO2: 23 MMOL/L (ref 20–31)
CREAT SERPL-MCNC: 1.85 MG/DL (ref 0.7–1.2)
DIFFERENTIAL TYPE: ABNORMAL
EOSINOPHILS RELATIVE PERCENT: 5 % (ref 1–4)
GFR AFRICAN AMERICAN: 43 ML/MIN
GFR NON-AFRICAN AMERICAN: 36 ML/MIN
GFR SERPL CREATININE-BSD FRML MDRD: ABNORMAL ML/MIN/{1.73_M2}
GFR SERPL CREATININE-BSD FRML MDRD: ABNORMAL ML/MIN/{1.73_M2}
GLUCOSE BLD-MCNC: 220 MG/DL (ref 75–110)
GLUCOSE BLD-MCNC: 224 MG/DL (ref 70–99)
GLUCOSE BLD-MCNC: 257 MG/DL (ref 75–110)
HCT VFR BLD CALC: 43.5 % (ref 41–53)
HEMOGLOBIN: 13.4 G/DL (ref 13.5–17.5)
IMMATURE GRANULOCYTES: ABNORMAL %
LYMPHOCYTES # BLD: 10 % (ref 24–44)
MAGNESIUM: 1.9 MG/DL (ref 1.6–2.6)
MCH RBC QN AUTO: 21 PG (ref 26–34)
MCHC RBC AUTO-ENTMCNC: 30.6 G/DL (ref 31–37)
MCV RBC AUTO: 68.4 FL (ref 80–100)
MONOCYTES # BLD: 16 % (ref 1–7)
MORPHOLOGY: ABNORMAL
NRBC AUTOMATED: ABNORMAL PER 100 WBC
PDW BLD-RTO: 19.4 % (ref 11.5–14.5)
PHOSPHORUS: 2.8 MG/DL (ref 2.5–4.5)
PLATELET # BLD: 220 K/UL (ref 130–400)
PLATELET ESTIMATE: ABNORMAL
PMV BLD AUTO: 9.3 FL (ref 6–12)
POTASSIUM SERPL-SCNC: 3.7 MMOL/L (ref 3.7–5.3)
RBC # BLD: 6.37 M/UL (ref 4.5–5.9)
RBC # BLD: ABNORMAL 10*6/UL
SEG NEUTROPHILS: 69 % (ref 36–66)
SEGMENTED NEUTROPHILS ABSOLUTE COUNT: 4.14 K/UL (ref 1.8–7.7)
SODIUM BLD-SCNC: 137 MMOL/L (ref 135–144)
WBC # BLD: 6 K/UL (ref 3.5–11)
WBC # BLD: ABNORMAL 10*3/UL

## 2018-12-11 PROCEDURE — 6360000002 HC RX W HCPCS: Performed by: INTERNAL MEDICINE

## 2018-12-11 PROCEDURE — 94760 N-INVAS EAR/PLS OXIMETRY 1: CPT

## 2018-12-11 PROCEDURE — 2700000000 HC OXYGEN THERAPY PER DAY

## 2018-12-11 PROCEDURE — 6370000000 HC RX 637 (ALT 250 FOR IP): Performed by: FAMILY MEDICINE

## 2018-12-11 PROCEDURE — 94640 AIRWAY INHALATION TREATMENT: CPT

## 2018-12-11 PROCEDURE — 36415 COLL VENOUS BLD VENIPUNCTURE: CPT

## 2018-12-11 PROCEDURE — 83735 ASSAY OF MAGNESIUM: CPT

## 2018-12-11 PROCEDURE — 6360000002 HC RX W HCPCS: Performed by: FAMILY MEDICINE

## 2018-12-11 PROCEDURE — 6370000000 HC RX 637 (ALT 250 FOR IP): Performed by: INTERNAL MEDICINE

## 2018-12-11 PROCEDURE — 80048 BASIC METABOLIC PNL TOTAL CA: CPT

## 2018-12-11 PROCEDURE — 85025 COMPLETE CBC W/AUTO DIFF WBC: CPT

## 2018-12-11 PROCEDURE — 84100 ASSAY OF PHOSPHORUS: CPT

## 2018-12-11 PROCEDURE — 94618 PULMONARY STRESS TESTING: CPT

## 2018-12-11 PROCEDURE — 2580000003 HC RX 258: Performed by: FAMILY MEDICINE

## 2018-12-11 PROCEDURE — 82947 ASSAY GLUCOSE BLOOD QUANT: CPT

## 2018-12-11 RX ORDER — POTASSIUM CHLORIDE 20 MEQ/1
20 TABLET, EXTENDED RELEASE ORAL ONCE
Status: COMPLETED | OUTPATIENT
Start: 2018-12-11 | End: 2018-12-11

## 2018-12-11 RX ORDER — POTASSIUM CHLORIDE 20 MEQ/1
10 TABLET, EXTENDED RELEASE ORAL 2 TIMES DAILY WITH MEALS
Status: DISCONTINUED | OUTPATIENT
Start: 2018-12-11 | End: 2018-12-11 | Stop reason: HOSPADM

## 2018-12-11 RX ORDER — CEPHALEXIN 500 MG/1
500 CAPSULE ORAL 2 TIMES DAILY
Qty: 20 CAPSULE | Refills: 0 | Status: SHIPPED | OUTPATIENT
Start: 2018-12-11 | End: 2019-07-27 | Stop reason: ALTCHOICE

## 2018-12-11 RX ORDER — POTASSIUM CHLORIDE 750 MG/1
10 TABLET, EXTENDED RELEASE ORAL 2 TIMES DAILY WITH MEALS
Qty: 60 TABLET | Refills: 0 | Status: SHIPPED | OUTPATIENT
Start: 2018-12-11 | End: 2019-07-27 | Stop reason: ALTCHOICE

## 2018-12-11 RX ORDER — FUROSEMIDE 40 MG/1
40 TABLET ORAL 2 TIMES DAILY
Qty: 60 TABLET | Refills: 3 | Status: SHIPPED | OUTPATIENT
Start: 2018-12-11

## 2018-12-11 RX ADMIN — Medication 10 ML: at 08:46

## 2018-12-11 RX ADMIN — ATORVASTATIN CALCIUM 20 MG: 20 TABLET, FILM COATED ORAL at 08:45

## 2018-12-11 RX ADMIN — GLIMEPIRIDE 2 MG: 2 TABLET ORAL at 08:45

## 2018-12-11 RX ADMIN — FUROSEMIDE 40 MG: 10 INJECTION, SOLUTION INTRAMUSCULAR; INTRAVENOUS at 08:45

## 2018-12-11 RX ADMIN — SKIN PROTECTANT: 33 OINTMENT TOPICAL at 08:48

## 2018-12-11 RX ADMIN — GLIMEPIRIDE 2 MG: 2 TABLET ORAL at 12:33

## 2018-12-11 RX ADMIN — INSULIN LISPRO 6 UNITS: 100 INJECTION, SOLUTION INTRAVENOUS; SUBCUTANEOUS at 12:43

## 2018-12-11 RX ADMIN — CEPHALEXIN 500 MG: 500 CAPSULE ORAL at 08:45

## 2018-12-11 RX ADMIN — METOPROLOL SUCCINATE 50 MG: 50 TABLET, FILM COATED, EXTENDED RELEASE ORAL at 08:45

## 2018-12-11 RX ADMIN — POTASSIUM CHLORIDE 20 MEQ: 20 TABLET, EXTENDED RELEASE ORAL at 12:33

## 2018-12-11 RX ADMIN — INSULIN GLARGINE 10 UNITS: 100 INJECTION, SOLUTION SUBCUTANEOUS at 08:45

## 2018-12-11 RX ADMIN — ASPIRIN 325 MG: 325 TABLET, DELAYED RELEASE ORAL at 08:45

## 2018-12-11 RX ADMIN — MAGNESIUM OXIDE TAB 400 MG (241.3 MG ELEMENTAL MG) 400 MG: 400 (241.3 MG) TAB at 08:45

## 2018-12-11 RX ADMIN — VITAMIN D, TAB 1000IU (100/BT) 1000 UNITS: 25 TAB at 08:45

## 2018-12-11 RX ADMIN — ALPRAZOLAM 0.5 MG: 0.5 TABLET ORAL at 12:43

## 2018-12-11 RX ADMIN — ALBUTEROL SULFATE 2.5 MG: 2.5 SOLUTION RESPIRATORY (INHALATION) at 09:17

## 2018-12-11 RX ADMIN — INSULIN LISPRO 4 UNITS: 100 INJECTION, SOLUTION INTRAVENOUS; SUBCUTANEOUS at 08:45

## 2018-12-11 NOTE — PROGRESS NOTES
Discharge teaching and instructions completed with patient using teachback method. AVS reviewed. Printed prescriptions given to patient (repeat lab work). Patient voiced understanding regarding prescriptions, follow up appointments, and care of self at home. Discharged home with oxygen via wheel chair. All questions answered.

## 2018-12-11 NOTE — PROGRESS NOTES
Spoke to Dr. Xiomara Pereira via telephone and updated on need for O2 documentation. Also will get a hold of nephrology regarding lasix and potassium at discharge.

## 2018-12-11 NOTE — DISCHARGE SUMMARY
cardiology followup ,,followup ct scan chest ,,     Discharge Medications:    Savi Erika   Home Medication Instructions Counts include 234 beds at the [facility]:[id number]    Printed on:12/10/18 2004   Medication Information                      albuterol sulfate  (90 Base) MCG/ACT inhaler  Inhale 2 puffs into the lungs every 6 hours as needed for Wheezing or Shortness of Breath             ALPRAZolam (XANAX) 0.5 MG tablet  Take 0.5 mg by mouth 3 times daily             amLODIPine (NORVASC) 5 MG tablet  Take 5 mg by mouth nightly              aspirin 325 MG EC tablet  Take 325 mg by mouth daily              atorvastatin (LIPITOR) 20 MG tablet  Take 20 mg by mouth daily             budesonide-formoterol (SYMBICORT) 160-4.5 MCG/ACT AERO  Inhale 2 puffs into the lungs 2 times daily             canagliflozin (INVOKANA) 100 MG TABS tablet  Take 100 mg by mouth every morning (before breakfast)             fluticasone (FLONASE) 50 MCG/ACT nasal spray  1 spray by Each Nare route daily as needed for Rhinitis             gabapentin (NEURONTIN) 400 MG capsule  Take 400 mg by mouth 3 times daily             glimepiride (AMARYL) 2 MG tablet  Take 2 mg by mouth 3 times daily (before meals)              insulin glargine (BASAGLAR KWIKPEN) 100 UNIT/ML injection pen  Inject 15 Units into the skin every morning             insulin glargine (BASAGLAR KWIKPEN) 100 UNIT/ML injection pen  Inject 10 Units into the skin Daily with supper              losartan (COZAAR) 100 MG tablet  Take 100 mg by mouth nightly              metFORMIN (GLUCOPHAGE) 1000 MG tablet  Take 1,000 mg by mouth 2 times daily (with meals)             metoprolol succinate (TOPROL XL) 50 MG extended release tablet  Take 50 mg by mouth daily             mirtazapine (REMERON) 45 MG tablet  Take 45 mg by mouth nightly             Misc Natural Products (OSTEO BI-FLEX JOINT SHIELD) TABS  Take 1 tablet by mouth 2 times daily             Multiple Vitamins-Minerals (MULTIVITAMIN ADULT) TABS  Take 1 tablet by mouth daily             naproxen sodium (ALEVE) 220 MG tablet  Take 440 mg by mouth 2 times daily (with meals) 2 tabs (=440mg) BID             Omega-3 Fatty Acids (FISH OIL) 1000 MG CAPS  Take 1 capsule by mouth daily              tiotropium (SPIRIVA RESPIMAT) 2.5 MCG/ACT AERS inhaler  Inhale 2 puffs into the lungs daily              venlafaxine (EFFEXOR) 100 MG tablet  Take 100 mg by mouth nightly              vitamin D (CHOLECALCIFEROL) 1000 UNIT TABS tablet  Take 1,000 Units by mouth daily                  Activity: ambulate in   Recommend repeat labs in one week   Consult vascular , asa daily low dose     Diet: cardiac diet and diabetic diet    Time Spent on discharge is more than 45 minutes in the examination, evaluation, counseling and review of medications and discharge plan. Electronically signed by Idris Wilkes on 12/10/2018 at 8:04 PM     Thank you Dr. Miya Baez MD for the opportunity to be involved in this patient's care.

## 2018-12-11 NOTE — PROGRESS NOTES
bruit, no JVD, supple, symmetrical, trachea midline and thyroid not enlarged, symmetric, no tenderness/mass/nodules  Lungs: clear to auscultation bilaterally  Heart: regular rate and rhythm, S1, S2 normal, no murmur, click, rub or gallop  Abdomen: soft, non-tender; bowel sounds normal; no masses,  no organomegaly  Extremities: extremities normal, atraumatic, no cyanosis or edema  Lymphatic: No significant lymph node enlargement papable  Neurologic: Mental status: Alert, oriented, thought content appropriate, symetrical strength \mild facial nerve weakness upper as well as lower , eye closeweakness decrease in frontal muscle         Assessment & Plan:    Patient Active Problem List:     Acute delirium ,,transient      Sequela bells palsy       Ckd,stage 3 acute exacerbation       Hx of nsaid abuse      Hypertension       Electrolyte imbalance       Type 2 dm        Salt and water overload     Suspected shante,          Biventricular CHF (congestive heart failure) ([address]      See orders   Disposition:mri brain, carotid , review echo ,asa   Switch to oral lasix if ok with nephrology       Elda Yates

## 2018-12-11 NOTE — PROGRESS NOTES
Call out to Dr. Soledad Levy regarding prescription needed for lasix? Potassium and the need for documentation on the need for O2.

## 2018-12-11 NOTE — PROGRESS NOTES
Reason for Follow up:     INTERVAL HISTORY:    Cr improving. Good UOP  BP is better today. Denies SOB. HISTORY OF PRESENT ILLNESS:    The patient is a 74 y.o. male who presents with LE edema and SOB, he had these symptoms progressing for a bout two weeks, he recently started eating ramen noodle daily His Cr was 1.94 at presentation, he reported no Hx of CKD, his records showed a Cr of 1.7 in 2017, He reported taking Aleeve daily for about 5 years. Has Hx of cryo ablation right kidney lieson few months ago  Denies any problems with nausea, vomiting, appetite, diarrhea or difficulty with urination. Review of Systems -   General: No fever, chills  Cardiac: No CP, dyspnea  Ext: +edema  Neuro: alert, cooperative      Scheduled Meds:   albuterol  2.5 mg Nebulization TID    cephALEXin  500 mg Oral BID    furosemide  40 mg Intravenous BID    dermacerin   Topical BID    magnesium oxide  400 mg Oral BID    sodium chloride flush  10 mL Intravenous 2 times per day    aspirin  325 mg Oral Daily    mirtazapine  45 mg Oral Nightly    venlafaxine  100 mg Oral Nightly    glimepiride  2 mg Oral TID AC    atorvastatin  20 mg Oral Daily    metoprolol succinate  50 mg Oral Daily    vitamin D  1,000 Units Oral Daily    insulin lispro  0-12 Units Subcutaneous TID WC    insulin lispro  0-6 Units Subcutaneous Nightly    insulin glargine  10 Units Subcutaneous BID   Continuous Infusions:   dextrose       PRN Meds:sodium chloride flush, acetaminophen, albuterol, ALPRAZolam, glucose, dextrose, glucagon (rDNA), dextrose    Allergies   Allergen Reactions    Barbiturates Anxiety    Codeine Palpitations    Sulfa Antibiotics Rash       Physical Exam:  Blood pressure (!) 164/71, pulse 93, temperature 98.4 °F (36.9 °C), temperature source Oral, resp. rate 20, height 5' 11\" (1.803 m), weight 248 lb 6.4 oz (112.7 kg), SpO2 93 %.   In: 480 [P.O.:480]  Out: 1400   In: 480   Out: 1400 [Urine:1400]    General:  Awake, alert,

## 2019-05-21 ENCOUNTER — TELEPHONE (OUTPATIENT)
Dept: ONCOLOGY | Age: 74
End: 2019-05-21

## 2019-05-21 NOTE — TELEPHONE ENCOUNTER
Received a fax request for lasix, it is addressed for Dr Sonny Belcher. [facility] by mistake. Writer called [facility] and spoke with personnel. They will resend to the correct destination.

## 2019-07-08 ENCOUNTER — HOSPITAL ENCOUNTER (OUTPATIENT)
Age: 74
Setting detail: SPECIMEN
Discharge: HOME | End: 2019-07-08
Payer: COMMERCIAL

## 2019-07-08 LAB
FERRITIN: 18 UG/L (ref 30–400)
FOLATE: >20 NG/ML
IRON SATURATION: 13 % (ref 20–55)
IRON: 52 UG/DL (ref 59–158)
TOTAL IRON BINDING CAPACITY: 409 UG/DL (ref 250–450)
UNSATURATED IRON BINDING CAPACITY: 357 UG/DL (ref 112–347)
VITAMIN B-12: 516 PG/ML (ref 232–1245)

## 2019-07-11 LAB — VITAMIN B1 WHOLE BLOOD: 68 NMOL/L (ref 70–180)

## 2019-07-23 ENCOUNTER — HOSPITAL ENCOUNTER (OUTPATIENT)
Age: 74
Setting detail: SPECIMEN
Discharge: HOME | End: 2019-07-23
Payer: COMMERCIAL

## 2019-07-23 LAB
ANION GAP SERPL CALCULATED.3IONS-SCNC: 12 MMOL/L (ref 9–17)
BUN BLDV-MCNC: 28 MG/DL (ref 8–23)
BUN/CREAT BLD: ABNORMAL (ref 9–20)
CALCIUM SERPL-MCNC: 9.6 MG/DL (ref 8.6–10.4)
CHLORIDE BLD-SCNC: 100 MMOL/L (ref 98–107)
CO2: 26 MMOL/L (ref 20–31)
CREAT SERPL-MCNC: 1.87 MG/DL (ref 0.7–1.2)
ESTIMATED AVERAGE GLUCOSE: 206 MG/DL
FERRITIN: 50 UG/L (ref 30–400)
GFR AFRICAN AMERICAN: 43 ML/MIN
GFR NON-AFRICAN AMERICAN: 35 ML/MIN
GFR SERPL CREATININE-BSD FRML MDRD: ABNORMAL ML/MIN/{1.73_M2}
GFR SERPL CREATININE-BSD FRML MDRD: ABNORMAL ML/MIN/{1.73_M2}
GLUCOSE BLD-MCNC: 162 MG/DL (ref 70–99)
HBA1C MFR BLD: 8.8 % (ref 4–6)
POTASSIUM SERPL-SCNC: 4.5 MMOL/L (ref 3.7–5.3)
SODIUM BLD-SCNC: 138 MMOL/L (ref 135–144)

## 2019-07-27 ENCOUNTER — APPOINTMENT (OUTPATIENT)
Dept: CT IMAGING | Age: 74
End: 2019-07-27
Payer: COMMERCIAL

## 2019-07-27 ENCOUNTER — HOSPITAL ENCOUNTER (OUTPATIENT)
Age: 74
Setting detail: OBSERVATION
Discharge: HOME | End: 2019-07-28
Attending: EMERGENCY MEDICINE | Admitting: FAMILY MEDICINE
Payer: COMMERCIAL

## 2019-07-27 ENCOUNTER — APPOINTMENT (OUTPATIENT)
Dept: GENERAL RADIOLOGY | Age: 74
End: 2019-07-27
Payer: COMMERCIAL

## 2019-07-27 DIAGNOSIS — H53.8 BLURRED VISION, RIGHT EYE: Primary | ICD-10-CM

## 2019-07-27 LAB
ABSOLUTE EOS #: 0.19 K/UL (ref 0–0.44)
ABSOLUTE IMMATURE GRANULOCYTE: 0.06 K/UL (ref 0–0.3)
ABSOLUTE LYMPH #: 1.28 K/UL (ref 1.1–3.7)
ABSOLUTE MONO #: 0.64 K/UL (ref 0.1–1.2)
ALBUMIN SERPL-MCNC: 4.4 G/DL (ref 3.5–5.2)
ALBUMIN/GLOBULIN RATIO: ABNORMAL (ref 1–2.5)
ALP BLD-CCNC: 124 U/L (ref 40–129)
ALT SERPL-CCNC: 13 U/L (ref 5–41)
ANION GAP SERPL CALCULATED.3IONS-SCNC: 15 MMOL/L (ref 9–17)
AST SERPL-CCNC: 18 U/L
BASOPHILS # BLD: 1 % (ref 0–2)
BASOPHILS ABSOLUTE: 0.06 K/UL (ref 0–0.2)
BILIRUB SERPL-MCNC: 0.41 MG/DL (ref 0.3–1.2)
BUN BLDV-MCNC: 24 MG/DL (ref 8–23)
BUN/CREAT BLD: 13 (ref 9–20)
C-REACTIVE PROTEIN: 2 MG/L (ref 0–5)
CALCIUM SERPL-MCNC: 10.1 MG/DL (ref 8.6–10.4)
CHLORIDE BLD-SCNC: 98 MMOL/L (ref 98–107)
CO2: 25 MMOL/L (ref 20–31)
CREAT SERPL-MCNC: 1.79 MG/DL (ref 0.7–1.2)
DIFFERENTIAL TYPE: ABNORMAL
EKG ATRIAL RATE: 80 BPM
EKG P AXIS: 80 DEGREES
EKG P-R INTERVAL: 204 MS
EKG Q-T INTERVAL: 396 MS
EKG QRS DURATION: 116 MS
EKG QTC CALCULATION (BAZETT): 456 MS
EKG R AXIS: 76 DEGREES
EKG T AXIS: 63 DEGREES
EKG VENTRICULAR RATE: 80 BPM
EOSINOPHILS RELATIVE PERCENT: 3 % (ref 1–4)
GFR AFRICAN AMERICAN: 45 ML/MIN
GFR NON-AFRICAN AMERICAN: 37 ML/MIN
GFR SERPL CREATININE-BSD FRML MDRD: ABNORMAL ML/MIN/{1.73_M2}
GFR SERPL CREATININE-BSD FRML MDRD: ABNORMAL ML/MIN/{1.73_M2}
GLUCOSE BLD-MCNC: 143 MG/DL (ref 75–110)
GLUCOSE BLD-MCNC: 189 MG/DL (ref 75–110)
GLUCOSE BLD-MCNC: 88 MG/DL (ref 70–99)
GLUCOSE BLD-MCNC: 94 MG/DL (ref 75–110)
HCT VFR BLD CALC: 51.8 % (ref 40.7–50.3)
HEMOGLOBIN: 15.3 G/DL (ref 13–17)
IMMATURE GRANULOCYTES: 1 %
INR BLD: 1
LYMPHOCYTES # BLD: 20 % (ref 24–43)
MAGNESIUM: 1.9 MG/DL (ref 1.6–2.6)
MCH RBC QN AUTO: 22.1 PG (ref 25.2–33.5)
MCHC RBC AUTO-ENTMCNC: 29.5 G/DL (ref 28.4–34.8)
MCV RBC AUTO: 74.7 FL (ref 82.6–102.9)
MONOCYTES # BLD: 10 % (ref 3–12)
MORPHOLOGY: ABNORMAL
NRBC AUTOMATED: 0 PER 100 WBC
PDW BLD-RTO: 21.3 % (ref 11.8–14.4)
PLATELET # BLD: 167 K/UL (ref 138–453)
PLATELET ESTIMATE: ABNORMAL
PMV BLD AUTO: ABNORMAL FL (ref 8.1–13.5)
POTASSIUM SERPL-SCNC: 4.2 MMOL/L (ref 3.7–5.3)
PROTHROMBIN TIME: 10.5 SEC (ref 9.7–11.6)
RBC # BLD: 6.93 M/UL (ref 4.21–5.77)
RBC # BLD: ABNORMAL 10*6/UL
SEDIMENTATION RATE, ERYTHROCYTE: 6 MM (ref 0–15)
SEG NEUTROPHILS: 65 % (ref 36–65)
SEGMENTED NEUTROPHILS ABSOLUTE COUNT: 4.17 K/UL (ref 1.5–8.1)
SODIUM BLD-SCNC: 138 MMOL/L (ref 135–144)
TOTAL PROTEIN: 7.3 G/DL (ref 6.4–8.3)
TROPONIN INTERP: ABNORMAL
TROPONIN INTERP: ABNORMAL
TROPONIN T: ABNORMAL NG/ML
TROPONIN T: ABNORMAL NG/ML
TROPONIN, HIGH SENSITIVITY: 27 NG/L (ref 0–22)
TROPONIN, HIGH SENSITIVITY: 29 NG/L (ref 0–22)
VITAMIN B1 WHOLE BLOOD: 154 NMOL/L (ref 70–180)
WBC # BLD: 6.4 K/UL (ref 3.5–11.3)
WBC # BLD: ABNORMAL 10*3/UL

## 2019-07-27 PROCEDURE — 85025 COMPLETE CBC W/AUTO DIFF WBC: CPT

## 2019-07-27 PROCEDURE — 99285 EMERGENCY DEPT VISIT HI MDM: CPT

## 2019-07-27 PROCEDURE — 6370000000 HC RX 637 (ALT 250 FOR IP): Performed by: FAMILY MEDICINE

## 2019-07-27 PROCEDURE — 83735 ASSAY OF MAGNESIUM: CPT

## 2019-07-27 PROCEDURE — 84484 ASSAY OF TROPONIN QUANT: CPT

## 2019-07-27 PROCEDURE — 71046 X-RAY EXAM CHEST 2 VIEWS: CPT

## 2019-07-27 PROCEDURE — 85651 RBC SED RATE NONAUTOMATED: CPT

## 2019-07-27 PROCEDURE — 85610 PROTHROMBIN TIME: CPT

## 2019-07-27 PROCEDURE — 93005 ELECTROCARDIOGRAM TRACING: CPT | Performed by: EMERGENCY MEDICINE

## 2019-07-27 PROCEDURE — 82947 ASSAY GLUCOSE BLOOD QUANT: CPT

## 2019-07-27 PROCEDURE — 6360000002 HC RX W HCPCS: Performed by: FAMILY MEDICINE

## 2019-07-27 PROCEDURE — 96372 THER/PROPH/DIAG INJ SC/IM: CPT

## 2019-07-27 PROCEDURE — 80053 COMPREHEN METABOLIC PANEL: CPT

## 2019-07-27 PROCEDURE — 2580000003 HC RX 258: Performed by: FAMILY MEDICINE

## 2019-07-27 PROCEDURE — G0378 HOSPITAL OBSERVATION PER HR: HCPCS

## 2019-07-27 PROCEDURE — 86140 C-REACTIVE PROTEIN: CPT

## 2019-07-27 PROCEDURE — 70450 CT HEAD/BRAIN W/O DYE: CPT

## 2019-07-27 RX ORDER — INSULIN LISPRO 100 [IU]/ML
15 INJECTION, SOLUTION INTRAVENOUS; SUBCUTANEOUS
Refills: 3 | Status: ON HOLD | COMMUNITY
Start: 2019-07-01 | End: 2019-07-28 | Stop reason: SDUPTHER

## 2019-07-27 RX ORDER — ALBUTEROL SULFATE 90 UG/1
2 AEROSOL, METERED RESPIRATORY (INHALATION) EVERY 6 HOURS PRN
Status: DISCONTINUED | OUTPATIENT
Start: 2019-07-27 | End: 2019-07-28 | Stop reason: HOSPADM

## 2019-07-27 RX ORDER — ALPRAZOLAM 0.5 MG/1
0.5 TABLET ORAL 3 TIMES DAILY PRN
Status: DISCONTINUED | OUTPATIENT
Start: 2019-07-27 | End: 2019-07-28 | Stop reason: HOSPADM

## 2019-07-27 RX ORDER — METOPROLOL SUCCINATE 50 MG/1
50 TABLET, EXTENDED RELEASE ORAL NIGHTLY
Status: DISCONTINUED | OUTPATIENT
Start: 2019-07-27 | End: 2019-07-28 | Stop reason: HOSPADM

## 2019-07-27 RX ORDER — ASPIRIN 325 MG
325 TABLET ORAL ONCE
Status: DISCONTINUED | OUTPATIENT
Start: 2019-07-27 | End: 2019-07-28 | Stop reason: HOSPADM

## 2019-07-27 RX ORDER — ATORVASTATIN CALCIUM 40 MG/1
40 TABLET, FILM COATED ORAL NIGHTLY
Status: DISCONTINUED | OUTPATIENT
Start: 2019-07-27 | End: 2019-07-28 | Stop reason: HOSPADM

## 2019-07-27 RX ORDER — FERROUS SULFATE 325(65) MG
325 TABLET ORAL
COMMUNITY

## 2019-07-27 RX ORDER — AMLODIPINE BESYLATE 5 MG/1
5 TABLET ORAL NIGHTLY
Status: DISCONTINUED | OUTPATIENT
Start: 2019-07-27 | End: 2019-07-28 | Stop reason: HOSPADM

## 2019-07-27 RX ORDER — FLUTICASONE PROPIONATE 50 MCG
1 SPRAY, SUSPENSION (ML) NASAL DAILY PRN
Status: DISCONTINUED | OUTPATIENT
Start: 2019-07-27 | End: 2019-07-28 | Stop reason: HOSPADM

## 2019-07-27 RX ORDER — CLOPIDOGREL BISULFATE 75 MG/1
75 TABLET ORAL DAILY
Status: DISCONTINUED | OUTPATIENT
Start: 2019-07-27 | End: 2019-07-28 | Stop reason: HOSPADM

## 2019-07-27 RX ORDER — DEXTROSE MONOHYDRATE 25 G/50ML
12.5 INJECTION, SOLUTION INTRAVENOUS PRN
Status: DISCONTINUED | OUTPATIENT
Start: 2019-07-27 | End: 2019-07-28 | Stop reason: HOSPADM

## 2019-07-27 RX ORDER — NICOTINE POLACRILEX 4 MG
15 LOZENGE BUCCAL PRN
Status: DISCONTINUED | OUTPATIENT
Start: 2019-07-27 | End: 2019-07-28 | Stop reason: HOSPADM

## 2019-07-27 RX ORDER — DEXTROSE MONOHYDRATE 50 MG/ML
100 INJECTION, SOLUTION INTRAVENOUS PRN
Status: DISCONTINUED | OUTPATIENT
Start: 2019-07-27 | End: 2019-07-28 | Stop reason: HOSPADM

## 2019-07-27 RX ORDER — ACETAMINOPHEN 325 MG/1
650 TABLET ORAL EVERY 4 HOURS PRN
Status: DISCONTINUED | OUTPATIENT
Start: 2019-07-27 | End: 2019-07-28 | Stop reason: HOSPADM

## 2019-07-27 RX ORDER — THIAMINE MONONITRATE (VIT B1) 100 MG
100 TABLET ORAL DAILY
COMMUNITY

## 2019-07-27 RX ORDER — SODIUM CHLORIDE 0.9 % (FLUSH) 0.9 %
10 SYRINGE (ML) INJECTION PRN
Status: DISCONTINUED | OUTPATIENT
Start: 2019-07-27 | End: 2019-07-28 | Stop reason: HOSPADM

## 2019-07-27 RX ORDER — SODIUM CHLORIDE 0.9 % (FLUSH) 0.9 %
10 SYRINGE (ML) INJECTION EVERY 12 HOURS SCHEDULED
Status: DISCONTINUED | OUTPATIENT
Start: 2019-07-27 | End: 2019-07-28 | Stop reason: HOSPADM

## 2019-07-27 RX ORDER — FUROSEMIDE 40 MG/1
40 TABLET ORAL 2 TIMES DAILY
Status: DISCONTINUED | OUTPATIENT
Start: 2019-07-27 | End: 2019-07-28 | Stop reason: HOSPADM

## 2019-07-27 RX ORDER — MIRTAZAPINE 15 MG/1
45 TABLET, FILM COATED ORAL NIGHTLY
Status: DISCONTINUED | OUTPATIENT
Start: 2019-07-27 | End: 2019-07-28 | Stop reason: HOSPADM

## 2019-07-27 RX ORDER — VENLAFAXINE HYDROCHLORIDE 75 MG/1
150 CAPSULE, EXTENDED RELEASE ORAL
Status: DISCONTINUED | OUTPATIENT
Start: 2019-07-28 | End: 2019-07-28 | Stop reason: HOSPADM

## 2019-07-27 RX ORDER — ALPRAZOLAM 0.5 MG/1
0.5 TABLET ORAL 3 TIMES DAILY
COMMUNITY

## 2019-07-27 RX ORDER — INSULIN GLARGINE 100 [IU]/ML
40 INJECTION, SOLUTION SUBCUTANEOUS 2 TIMES DAILY
Status: DISCONTINUED | OUTPATIENT
Start: 2019-07-27 | End: 2019-07-28 | Stop reason: HOSPADM

## 2019-07-27 RX ADMIN — ENOXAPARIN SODIUM 30 MG: 30 INJECTION SUBCUTANEOUS at 21:26

## 2019-07-27 RX ADMIN — INSULIN LISPRO 3 UNITS: 100 INJECTION, SOLUTION INTRAVENOUS; SUBCUTANEOUS at 18:02

## 2019-07-27 RX ADMIN — ATORVASTATIN CALCIUM 40 MG: 40 TABLET, FILM COATED ORAL at 21:50

## 2019-07-27 RX ADMIN — Medication 10 ML: at 21:27

## 2019-07-27 RX ADMIN — INSULIN GLARGINE 40 UNITS: 100 INJECTION, SOLUTION SUBCUTANEOUS at 21:27

## 2019-07-27 RX ADMIN — METOPROLOL SUCCINATE 50 MG: 50 TABLET, EXTENDED RELEASE ORAL at 21:26

## 2019-07-27 RX ADMIN — AMLODIPINE BESYLATE 5 MG: 5 TABLET ORAL at 18:02

## 2019-07-27 RX ADMIN — MIRTAZAPINE 45 MG: 15 TABLET, FILM COATED ORAL at 21:26

## 2019-07-27 RX ADMIN — CLOPIDOGREL BISULFATE 75 MG: 75 TABLET ORAL at 19:45

## 2019-07-27 RX ADMIN — FUROSEMIDE 40 MG: 40 TABLET ORAL at 18:02

## 2019-07-27 RX ADMIN — ALPRAZOLAM 0.5 MG: 0.5 TABLET ORAL at 21:50

## 2019-07-27 RX ADMIN — INSULIN LISPRO 2 UNITS: 100 INJECTION, SOLUTION INTRAVENOUS; SUBCUTANEOUS at 21:27

## 2019-07-27 ASSESSMENT — PAIN SCALES - GENERAL: PAINLEVEL_OUTOF10: 0

## 2019-07-27 NOTE — PLAN OF CARE
Pt's home medications resumed. Pt will have stroke w/u; MRI brain, Carotids and Echocardiogram ordered. Neurology to see. No complaints per patient. Will continue to monitor neuro status and vss.

## 2019-07-27 NOTE — ED PROVIDER NOTES
exhibits no discharge. Left eye exhibits no discharge. Neck: Normal range of motion. Neck supple. No tracheal deviation present. Cardiovascular: Normal rate, regular rhythm, normal heart sounds and intact distal pulses. Pulmonary/Chest: Effort normal and breath sounds normal. No stridor. No respiratory distress. He has no wheezes. He has no rales. He exhibits no tenderness. Abdominal: Soft. Bowel sounds are normal. There is no tenderness. There is no rebound and no guarding. Musculoskeletal: Normal range of motion. He exhibits no edema or tenderness. Neurological: He is alert and oriented to person, place, and time. Coordination normal.   NIH Stroke Scale Assessed      Yes   Interval      Baseline   Level of Consciousness (1a. )      0   LOC Questions (1b. )      0   LOC Commands (1c. )      0   Best Gaze (2. )      0   Visual (3. )      0   Facial Palsy (4. )      3 (left side, chronic for years per patient)   Motor Arm, Left (5a. )      0   Motor Arm, Right (5b. )      0   Motor Leg, Left (6a. )      0   Motor Leg, Right (6b. )      0   Limb Ataxia (7. )      0   Sensory (8. )      0   Best Language (9. )      0   Dysarthria (10. )      0   Extinction and Inattention (11)      0   Total      3 (calculated)   Eye Opening      4   Best Verbal Response      5   Best Motor Response      6   Oxford Coma Scale Score      15 (calculated)        Skin: Skin is warm and dry. No rash noted. He is not diaphoretic. No erythema. Psychiatric: He has a normal mood and affect. His behavior is normal. Judgment normal.       MEDICAL DECISION MAKING:   Out of window for tpa or PILY, lkw weeks ago    ED Course as of Jul 27 1332   Sat Jul 27, 2019   1122 Per Dr Tyler Portillo [phone] note he wants stroke workup, MRI brain, carotid imaging, crp, esr, and temporal arteritis rule out.  Patient informed he will be staying overnight for these tests    [WM]   12 DW Dr Manda Villafuerte accepts admit    [WM]      ED Course User Index  [WM] Nanda Sahu MD     Procedures    DIAGNOSTIC RESULTS   EKG:All EKG's are interpreted by the Emergency Department Physician who either signs or Co-signs this chart in the absence of a cardiologist.  NSR, nonspecific changes, no acute ischemic changes on ST segments, no change compared to old    RADIOLOGY:All plain film, CT, MRI, and formal ultrasound images (except ED bedside ultrasound) are read by the radiologist, see reports below, unless otherwisenoted in MDM or here. XR CHEST STANDARD (2 VW)   Final Result   1. No acute cardiopulmonary process. 2. Pulmonary vascular congestion and mild cardiomegaly. 3. Pulmonary hyperinflation, a finding that can be seen with emphysema,   asthma, or other etiologies. CT Head WO Contrast   Final Result   No acute intracranial abnormality. LABS: All lab results were reviewed by myself, and all abnormals are listed below.   Labs Reviewed   CBC WITH AUTO DIFFERENTIAL - Abnormal; Notable for the following components:       Result Value    RBC 6.93 (*)     Hematocrit 51.8 (*)     MCV 74.7 (*)     MCH 22.1 (*)     RDW 21.3 (*)     Lymphocytes 20 (*)     Immature Granulocytes 1 (*)     All other components within normal limits   COMPREHENSIVE METABOLIC PANEL - Abnormal; Notable for the following components:    BUN 24 (*)     CREATININE 1.79 (*)     GFR Non- 37 (*)     GFR  45 (*)     All other components within normal limits   TROPONIN - Abnormal; Notable for the following components:    Troponin, High Sensitivity 29 (*)     All other components within normal limits   SEDIMENTATION RATE   PROTIME-INR   MAGNESIUM   C-REACTIVE PROTEIN   TROPONIN   POC GLUCOSE FINGERSTICK       EMERGENCY DEPARTMENTCOURSE:         Vitals:    Vitals:    07/27/19 1242 07/27/19 1249 07/27/19 1255 07/27/19 1303   BP:   (!) 134/54    Pulse: 85 80 79 76   Resp:       Temp:       TempSrc:       SpO2: 90% 92% (!) 86% 90%   Weight:       Height:

## 2019-07-28 VITALS
TEMPERATURE: 99.4 F | RESPIRATION RATE: 18 BRPM | HEIGHT: 71 IN | OXYGEN SATURATION: 90 % | WEIGHT: 245 LBS | HEART RATE: 68 BPM | SYSTOLIC BLOOD PRESSURE: 134 MMHG | DIASTOLIC BLOOD PRESSURE: 96 MMHG | BODY MASS INDEX: 34.3 KG/M2

## 2019-07-28 PROBLEM — Z79.4 TYPE 2 DIABETES MELLITUS TREATED WITH INSULIN (HCC): Chronic | Status: ACTIVE | Noted: 2019-07-28

## 2019-07-28 PROBLEM — R29.810 WEAKNESS ON LEFT SIDE OF FACE: Status: ACTIVE | Noted: 2019-07-28

## 2019-07-28 PROBLEM — I70.90 ATHEROSCLEROTIC PLAQUE: Status: ACTIVE | Noted: 2019-07-28

## 2019-07-28 PROBLEM — E11.9 TYPE 2 DIABETES MELLITUS TREATED WITH INSULIN (HCC): Chronic | Status: ACTIVE | Noted: 2019-07-28

## 2019-07-28 LAB
ABSOLUTE EOS #: 0.22 K/UL (ref 0–0.44)
ABSOLUTE IMMATURE GRANULOCYTE: 0.06 K/UL (ref 0–0.3)
ABSOLUTE LYMPH #: 0.9 K/UL (ref 1.1–3.7)
ABSOLUTE MONO #: 0.5 K/UL (ref 0.1–1.2)
ANION GAP SERPL CALCULATED.3IONS-SCNC: 17 MMOL/L (ref 9–17)
BASOPHILS # BLD: 1 % (ref 0–2)
BASOPHILS ABSOLUTE: 0.06 K/UL (ref 0–0.2)
BUN BLDV-MCNC: 24 MG/DL (ref 8–23)
BUN/CREAT BLD: 13 (ref 9–20)
CALCIUM SERPL-MCNC: 9.5 MG/DL (ref 8.6–10.4)
CHLORIDE BLD-SCNC: 99 MMOL/L (ref 98–107)
CHOLESTEROL/HDL RATIO: 5
CHOLESTEROL: 149 MG/DL
CO2: 25 MMOL/L (ref 20–31)
CREAT SERPL-MCNC: 1.79 MG/DL (ref 0.7–1.2)
DIFFERENTIAL TYPE: ABNORMAL
EOSINOPHILS RELATIVE PERCENT: 4 % (ref 1–4)
GFR AFRICAN AMERICAN: 45 ML/MIN
GFR NON-AFRICAN AMERICAN: 37 ML/MIN
GFR SERPL CREATININE-BSD FRML MDRD: ABNORMAL ML/MIN/{1.73_M2}
GFR SERPL CREATININE-BSD FRML MDRD: ABNORMAL ML/MIN/{1.73_M2}
GLUCOSE BLD-MCNC: 111 MG/DL (ref 70–99)
GLUCOSE BLD-MCNC: 113 MG/DL (ref 75–110)
GLUCOSE BLD-MCNC: 127 MG/DL (ref 75–110)
GLUCOSE BLD-MCNC: 284 MG/DL (ref 75–110)
HCT VFR BLD CALC: 51.6 % (ref 40.7–50.3)
HDLC SERPL-MCNC: 30 MG/DL
HEMOGLOBIN: 15.1 G/DL (ref 13–17)
IMMATURE GRANULOCYTES: 1 %
LDL CHOLESTEROL: 42 MG/DL (ref 0–130)
LYMPHOCYTES # BLD: 16 % (ref 24–43)
MCH RBC QN AUTO: 22 PG (ref 25.2–33.5)
MCHC RBC AUTO-ENTMCNC: 29.3 G/DL (ref 28.4–34.8)
MCV RBC AUTO: 75.3 FL (ref 82.6–102.9)
MONOCYTES # BLD: 9 % (ref 3–12)
MORPHOLOGY: ABNORMAL
NRBC AUTOMATED: 0 PER 100 WBC
PDW BLD-RTO: 22 % (ref 11.8–14.4)
PLATELET # BLD: 171 K/UL (ref 138–453)
PLATELET ESTIMATE: ABNORMAL
PMV BLD AUTO: ABNORMAL FL (ref 8.1–13.5)
POTASSIUM SERPL-SCNC: 4.3 MMOL/L (ref 3.7–5.3)
RBC # BLD: 6.85 M/UL (ref 4.21–5.77)
RBC # BLD: ABNORMAL 10*6/UL
SEG NEUTROPHILS: 69 % (ref 36–65)
SEGMENTED NEUTROPHILS ABSOLUTE COUNT: 3.86 K/UL (ref 1.5–8.1)
SODIUM BLD-SCNC: 141 MMOL/L (ref 135–144)
TRIGL SERPL-MCNC: 384 MG/DL
VLDLC SERPL CALC-MCNC: ABNORMAL MG/DL (ref 1–30)
WBC # BLD: 5.6 K/UL (ref 3.5–11.3)
WBC # BLD: ABNORMAL 10*3/UL

## 2019-07-28 PROCEDURE — 36415 COLL VENOUS BLD VENIPUNCTURE: CPT

## 2019-07-28 PROCEDURE — G0378 HOSPITAL OBSERVATION PER HR: HCPCS

## 2019-07-28 PROCEDURE — 80048 BASIC METABOLIC PNL TOTAL CA: CPT

## 2019-07-28 PROCEDURE — 6360000002 HC RX W HCPCS: Performed by: FAMILY MEDICINE

## 2019-07-28 PROCEDURE — 80061 LIPID PANEL: CPT

## 2019-07-28 PROCEDURE — 2580000003 HC RX 258: Performed by: FAMILY MEDICINE

## 2019-07-28 PROCEDURE — 96372 THER/PROPH/DIAG INJ SC/IM: CPT

## 2019-07-28 PROCEDURE — 99244 OFF/OP CNSLTJ NEW/EST MOD 40: CPT | Performed by: PSYCHIATRY & NEUROLOGY

## 2019-07-28 PROCEDURE — 82947 ASSAY GLUCOSE BLOOD QUANT: CPT

## 2019-07-28 PROCEDURE — 85025 COMPLETE CBC W/AUTO DIFF WBC: CPT

## 2019-07-28 PROCEDURE — 6370000000 HC RX 637 (ALT 250 FOR IP): Performed by: FAMILY MEDICINE

## 2019-07-28 RX ORDER — VENLAFAXINE HYDROCHLORIDE 150 MG/1
150 CAPSULE, EXTENDED RELEASE ORAL
Qty: 30 CAPSULE | Refills: 3 | Status: SHIPPED | OUTPATIENT
Start: 2019-07-29

## 2019-07-28 RX ORDER — INSULIN LISPRO 100 [IU]/ML
5-15 INJECTION, SOLUTION INTRAVENOUS; SUBCUTANEOUS
Qty: 5 PEN | Refills: 3 | Status: ON HOLD | OUTPATIENT
Start: 2019-07-28 | End: 2022-01-01 | Stop reason: ALTCHOICE

## 2019-07-28 RX ADMIN — VENLAFAXINE HYDROCHLORIDE 150 MG: 75 CAPSULE, EXTENDED RELEASE ORAL at 08:42

## 2019-07-28 RX ADMIN — FUROSEMIDE 40 MG: 40 TABLET ORAL at 17:23

## 2019-07-28 RX ADMIN — ENOXAPARIN SODIUM 30 MG: 30 INJECTION SUBCUTANEOUS at 08:42

## 2019-07-28 RX ADMIN — TIOTROPIUM BROMIDE 18 MCG: 18 CAPSULE ORAL; RESPIRATORY (INHALATION) at 09:59

## 2019-07-28 RX ADMIN — FUROSEMIDE 40 MG: 40 TABLET ORAL at 08:42

## 2019-07-28 RX ADMIN — INSULIN LISPRO 9 UNITS: 100 INJECTION, SOLUTION INTRAVENOUS; SUBCUTANEOUS at 12:33

## 2019-07-28 RX ADMIN — ASPIRIN 325 MG: 325 TABLET, DELAYED RELEASE ORAL at 08:42

## 2019-07-28 RX ADMIN — Medication 10 ML: at 17:23

## 2019-07-28 RX ADMIN — ALPRAZOLAM 0.5 MG: 0.5 TABLET ORAL at 09:04

## 2019-07-28 RX ADMIN — CLOPIDOGREL BISULFATE 75 MG: 75 TABLET ORAL at 08:42

## 2019-07-28 RX ADMIN — AMLODIPINE BESYLATE 5 MG: 5 TABLET ORAL at 17:23

## 2019-07-28 RX ADMIN — INSULIN GLARGINE 40 UNITS: 100 INJECTION, SOLUTION SUBCUTANEOUS at 09:04

## 2019-07-28 ASSESSMENT — PAIN SCALES - GENERAL: PAINLEVEL_OUTOF10: 0

## 2019-07-28 NOTE — PROGRESS NOTES
adenopathy, no carotid bruit, no JVD, supple, symmetrical, trachea midline and thyroid not enlarged, symmetric, no tenderness/mass/nodules  Lungs: clear to auscultation bilaterally  Heart: regular rate and rhythm, S1, S2 normal, no murmur, click, rub or gallop  Abdomen: soft, non-tender; bowel sounds normal; no masses,  no organomegaly  Extremities: extremities normal, atraumatic, no cyanosis or edema  Lymphatic: No significant lymph node enlargement papable  Neurologic: Mental status: Alert, oriented, thought content appropriate      Assessment & Plan:    Patient Active Problem List:      Vision loss      Atherosclerotic plaque to left oph artery            Biventricular CHF (congestive heart failure) ([facility])     CKD (chronic kidney disease) stage 3, GFR 30-59 ml/min ([facility])     Essential hypertension     Blurred vision     Type 2 diabetes mellitus treated with insulin ([facility])     Atherosclerotic plaque     Weakness on left side of face      See orders   Disposition: see orders   sedrate ,crp normal  Carotid dopplers mri brain , if a concern consider mra cerebral artery   (cant do cta brain due to ckd )  Echo Ladruthann Skinner  Asa , lovenox   plavik if above neg    Laron Batres
Joanna Beavers MD   budesonide-formoterol [facility]) 160-4.5 MCG/ACT AERO Inhale 2 puffs into the lungs 2 times daily  7/27/19  Historical Provider, MD   insulin glargine (BASAGLAR KWIKPEN) 100 UNIT/ML injection pen Inject 10 Units into the skin Daily with supper   7/27/19  Historical Provider, MD   ALPRAZolam Tammy Murrieta) 0.5 MG tablet Take 0.5 mg by mouth 3 times daily  7/27/19  Historical Provider, MD   glimepiride (AMARYL) 2 MG tablet Take 2 mg by mouth 3 times daily (before meals)   7/27/19  Historical Provider, MD

## 2019-07-28 NOTE — CONSULTS
Diagnosis Date    Arthritis     Atherosclerotic plaque 7/28/2019    Cervical disc disease     degenerative disc disease    Diabetes mellitus ([facility] [address]     type 2    History of blood transfusion     Hx of blood clots     left leg    Hyperlipidemia     Neuropathy     Right kidney mass     \"urologist is watching\"    Snores     Type 2 diabetes mellitus treated with insulin ([facility] [address] 7/28/2019        Past Surgical History:     Past Surgical History:   Procedure Laterality Date    ABDOMINAL AORTIC ANEURYSM REPAIR  2005    CARPAL TUNNEL RELEASE Bilateral     CERVICAL SPINE SURGERY      COLONOSCOPY      benign polyps removed    INGUINAL HERNIA REPAIR Right     MA REPAIR INCISIONAL HERNIA,REDUCIBLE N/A 5/10/2017    HERNIA VENTRAL REPAIR WITH MESH  performed by Gauri Valverde DO at [address] [place] [address] Road  05/10/2017    with mesh        Medications Prior to Admission:     Prior to Admission medications    Medication Sig Start Date End Date Taking? Authorizing Provider   HUMALOG KWIKPEN 100 UNIT/ML pen Inject 15 Units into the skin 3 times daily (before meals)  7/1/19  Yes Historical Provider, MD   vitamin B-1 (THIAMINE) 100 MG tablet Take 100 mg by mouth daily   Yes Historical Provider, MD   ferrous sulfate 325 (65 Fe) MG tablet Take 325 mg by mouth daily (with breakfast)   Yes Historical Provider, MD   ALPRAZolam (XANAX) 0.5 MG tablet Take 0.5 mg by mouth three times daily.    Yes Historical Provider, MD   insulin glargine (BASAGLAR KWIKPEN) 100 UNIT/ML injection pen Inject 40 Units into the skin 2 times daily   Yes Historical Provider, MD   furosemide (LASIX) 40 MG tablet Take 1 tablet by mouth 2 times daily 12/11/18  Yes Caryn Evans MD   tiotropium (SPIRIVA RESPIMAT) 2.5 MCG/ACT AERS inhaler Inhale 2 puffs into the lungs daily    Yes Historical Provider, MD   albuterol sulfate  (90 Base) MCG/ACT inhaler Inhale 2 puffs into the lungs every 6 hours as Intact fine motor control over upper limbs   REFLEX FUNCTION: DTR 2+ on bilateral UE and LE, no perverted reflex, neg Babinski    STATION and GAIT  Not tested         Workup:      Laboratory Testing:  Lab Results   Component Value Date    WBC 5.6 07/28/2019    HGB 15.1 07/28/2019    HCT 51.6 (H) 07/28/2019    MCV 75.3 (L) 07/28/2019     07/28/2019     Lab Results   Component Value Date     07/28/2019    K 4.3 07/28/2019    CL 99 07/28/2019    CO2 25 07/28/2019    BUN 24 (H) 07/28/2019    CREATININE 1.79 (H) 07/28/2019    GLUCOSE 111 (H) 07/28/2019    CALCIUM 9.5 07/28/2019    PROT 7.3 07/27/2019    LABALBU 4.4 07/27/2019    BILITOT 0.41 07/27/2019    ALKPHOS 124 07/27/2019    AST 18 07/27/2019    ALT 13 07/27/2019    LABGLOM 37 (L) 07/28/2019    GFRAA 45 (L) 07/28/2019    GLOB NOT REPORTED 12/08/2018     Lab Results   Component Value Date    LDLCHOLESTEROL 42 07/28/2019     Lab Results   Component Value Date    LABA1C 8.8 (H) 07/23/2019     Lab Results   Component Value Date     07/23/2019         Imaging/Diagnostics:  Noncontrast head CT 7/27/2019: No acute findings  Carotid Duplex 12/10/2018:   Moderate 50-79% stenosis of the right internal carotid artery, close to    70%.  Moderate 50-79% stenosis of the left internal carotid artery, close to    60-65%.    Patent vertebral arteries with antegrade flow. MRI brain without contrast 12/10/2018:  1. No acute intracranial abnormality or acute infarction. 2. Parenchymal volume loss and mild chronic microvascular ischemic changes. Assessment :      Patient is a 66 y.o. male who is seen for neurological consult for a 2-month history of blurring of vision. He was admitted because his optometrist sent him over for reported \"cholesterol plaque on the left eye. \"  Clinically, his exam does not show any visual field cut, or any other findings that would localize to the brain.   He does not have any eye pain, jaw claudication or headache that would indicate temporal arteritis and ESR and CRP were both within normal limits. Plan:     1. Discussed case with Dr. Osvaldo Pavon. Since his symptoms have been going on for the past 2 months and his exam is reassuring, he can be worked up in the outpatient setting. No indication for MRI brain, I think the only testing he needs is a repeat carotid Doppler, given that his prior one from December showed about 70% carotid stenosis bilaterally. He would also need to see an ophthalmologist.  Advise to continue aspirin and Lipitor. He will need optimization of his glycemic control to minimize his vascular risk, most recent A1c was elevated at 8.8, LDL at goal.      Signed: Aries Katz MD  Neurology and Sleep Medicine  [facility].    Please note that this chart was generated using voice recognition Dragon dictation software. Although every effort was made to ensure the accuracy of this automated transcription, some errors in transcription may have occurred.

## 2019-09-18 ENCOUNTER — TELEPHONE (OUTPATIENT)
Dept: INTERVENTIONAL RADIOLOGY/VASCULAR | Age: 74
End: 2019-09-18

## 2019-09-18 NOTE — TELEPHONE ENCOUNTER
Received biopsy request from Dr Toño [place]. Dr Nate Bhardwaj reviewed and recommend follow up as stated on PET/CT report. No biopsy for now.   Юлия Daniels notified in office

## 2021-01-01 ENCOUNTER — APPOINTMENT (OUTPATIENT)
Dept: CT IMAGING | Age: 76
DRG: 101 | End: 2021-01-01
Payer: COMMERCIAL

## 2021-01-01 ENCOUNTER — APPOINTMENT (OUTPATIENT)
Dept: MRI IMAGING | Age: 76
DRG: 101 | End: 2021-01-01
Payer: COMMERCIAL

## 2021-01-01 ENCOUNTER — HOSPITAL ENCOUNTER (INPATIENT)
Age: 76
LOS: 1 days | Discharge: HOME | DRG: 101 | End: 2021-11-03
Attending: EMERGENCY MEDICINE | Admitting: PSYCHIATRY & NEUROLOGY
Payer: COMMERCIAL

## 2021-01-01 VITALS
RESPIRATION RATE: 17 BRPM | SYSTOLIC BLOOD PRESSURE: 144 MMHG | HEART RATE: 62 BPM | BODY MASS INDEX: 25.8 KG/M2 | DIASTOLIC BLOOD PRESSURE: 60 MMHG | OXYGEN SATURATION: 91 % | TEMPERATURE: 97.8 F | WEIGHT: 185 LBS

## 2021-01-01 DIAGNOSIS — R56.9 SEIZURE (HCC): Primary | ICD-10-CM

## 2021-01-01 DIAGNOSIS — I50.82 BIVENTRICULAR CHF (CONGESTIVE HEART FAILURE) (HCC): ICD-10-CM

## 2021-01-01 LAB
-: NORMAL
ABSOLUTE EOS #: 0.26 K/UL (ref 0–0.44)
ABSOLUTE IMMATURE GRANULOCYTE: 0.05 K/UL (ref 0–0.3)
ABSOLUTE LYMPH #: 2.02 K/UL (ref 1.1–3.7)
ABSOLUTE MONO #: 0.53 K/UL (ref 0.1–1.2)
ALBUMIN SERPL-MCNC: 3.5 G/DL (ref 3.5–5.2)
ALBUMIN SERPL-MCNC: 4.1 G/DL (ref 3.5–5.2)
ALBUMIN/GLOBULIN RATIO: 1.5 (ref 1–2.5)
ALBUMIN/GLOBULIN RATIO: 1.7 (ref 1–2.5)
ALLEN TEST: ABNORMAL
ALLEN TEST: ABNORMAL
ALLEN TEST: NORMAL
ALP BLD-CCNC: 140 U/L (ref 40–129)
ALP BLD-CCNC: 90 U/L (ref 40–129)
ALT SERPL-CCNC: 13 U/L (ref 5–41)
ALT SERPL-CCNC: 22 U/L (ref 5–41)
ANION GAP SERPL CALCULATED.3IONS-SCNC: 12 MMOL/L (ref 9–17)
ANION GAP SERPL CALCULATED.3IONS-SCNC: 18 MMOL/L (ref 9–17)
ANION GAP SERPL CALCULATED.3IONS-SCNC: 8 MMOL/L (ref 9–17)
ANION GAP: 15 MMOL/L (ref 7–16)
ANION GAP: 5 MMOL/L (ref 7–16)
AST SERPL-CCNC: 16 U/L
AST SERPL-CCNC: 30 U/L
BASOPHILS # BLD: 1 % (ref 0–2)
BASOPHILS ABSOLUTE: 0.04 K/UL (ref 0–0.2)
BETA-HYDROXYBUTYRATE: 0.37 MMOL/L (ref 0.02–0.27)
BILIRUB SERPL-MCNC: 0.31 MG/DL (ref 0.3–1.2)
BILIRUB SERPL-MCNC: 0.56 MG/DL (ref 0.3–1.2)
BNP INTERPRETATION: ABNORMAL
BUN BLDV-MCNC: 16 MG/DL (ref 8–23)
BUN BLDV-MCNC: 17 MG/DL (ref 8–23)
BUN BLDV-MCNC: 20 MG/DL (ref 8–23)
BUN/CREAT BLD: ABNORMAL (ref 9–20)
CALCIUM SERPL-MCNC: 10 MG/DL (ref 8.6–10.4)
CALCIUM SERPL-MCNC: 8.8 MG/DL (ref 8.6–10.4)
CALCIUM SERPL-MCNC: 9.2 MG/DL (ref 8.6–10.4)
CARBOXYHEMOGLOBIN: 4.6 % (ref 0–5)
CHLORIDE BLD-SCNC: 104 MMOL/L (ref 98–107)
CHLORIDE BLD-SCNC: 104 MMOL/L (ref 98–107)
CHLORIDE BLD-SCNC: 97 MMOL/L (ref 98–107)
CHOLESTEROL/HDL RATIO: 4.3
CHOLESTEROL: 129 MG/DL
CO2: 19 MMOL/L (ref 20–31)
CO2: 23 MMOL/L (ref 20–31)
CO2: 23 MMOL/L (ref 20–31)
CREAT SERPL-MCNC: 1.1 MG/DL (ref 0.7–1.2)
CREAT SERPL-MCNC: 1.15 MG/DL (ref 0.7–1.2)
CREAT SERPL-MCNC: 1.42 MG/DL (ref 0.7–1.2)
DIFFERENTIAL TYPE: ABNORMAL
EKG ATRIAL RATE: 62 BPM
EKG ATRIAL RATE: 79 BPM
EKG P AXIS: 46 DEGREES
EKG P AXIS: 59 DEGREES
EKG P-R INTERVAL: 136 MS
EKG P-R INTERVAL: 212 MS
EKG Q-T INTERVAL: 398 MS
EKG Q-T INTERVAL: 432 MS
EKG QRS DURATION: 116 MS
EKG QRS DURATION: 118 MS
EKG QTC CALCULATION (BAZETT): 438 MS
EKG QTC CALCULATION (BAZETT): 456 MS
EKG R AXIS: 39 DEGREES
EKG R AXIS: 62 DEGREES
EKG T AXIS: 57 DEGREES
EKG T AXIS: 88 DEGREES
EKG VENTRICULAR RATE: 62 BPM
EKG VENTRICULAR RATE: 79 BPM
EOSINOPHILS RELATIVE PERCENT: 3 % (ref 1–4)
ESTIMATED AVERAGE GLUCOSE: 413 MG/DL
ESTIMATED AVERAGE GLUCOSE: 413 MG/DL
FIO2: ABNORMAL
FIO2: ABNORMAL
FIO2: NORMAL
GFR AFRICAN AMERICAN: 58 ML/MIN
GFR AFRICAN AMERICAN: >60 ML/MIN
GFR AFRICAN AMERICAN: >60 ML/MIN
GFR NON-AFRICAN AMERICAN: 48 ML/MIN
GFR NON-AFRICAN AMERICAN: >60 ML/MIN
GFR SERPL CREATININE-BSD FRML MDRD: >60 ML/MIN
GFR SERPL CREATININE-BSD FRML MDRD: >60 ML/MIN
GFR SERPL CREATININE-BSD FRML MDRD: ABNORMAL ML/MIN/{1.73_M2}
GFR SERPL CREATININE-BSD FRML MDRD: NORMAL ML/MIN/{1.73_M2}
GFR SERPL CREATININE-BSD FRML MDRD: NORMAL ML/MIN/{1.73_M2}
GLUCOSE BLD-MCNC: 228 MG/DL (ref 75–110)
GLUCOSE BLD-MCNC: 246 MG/DL (ref 70–99)
GLUCOSE BLD-MCNC: 266 MG/DL (ref 75–110)
GLUCOSE BLD-MCNC: 274 MG/DL (ref 75–110)
GLUCOSE BLD-MCNC: 299 MG/DL (ref 70–99)
GLUCOSE BLD-MCNC: 333 MG/DL (ref 75–110)
GLUCOSE BLD-MCNC: 418 MG/DL (ref 74–100)
GLUCOSE BLD-MCNC: 431 MG/DL (ref 70–99)
GLUCOSE BLD-MCNC: 435 MG/DL (ref 74–100)
HBA1C MFR BLD: 16 % (ref 4–6)
HBA1C MFR BLD: 16 % (ref 4–6)
HCO3 VENOUS: 19.5 MMOL/L (ref 22–29)
HCO3 VENOUS: 20.7 MMOL/L (ref 24–30)
HCO3 VENOUS: 25 MMOL/L (ref 22–29)
HCT VFR BLD CALC: 49.2 % (ref 40.7–50.3)
HCT VFR BLD CALC: 56.1 % (ref 40.7–50.3)
HDLC SERPL-MCNC: 30 MG/DL
HEMOGLOBIN: 16 G/DL (ref 13–17)
HEMOGLOBIN: 18 G/DL (ref 13–17)
IMMATURE GRANULOCYTES: 1 %
INR BLD: 1
LDL CHOLESTEROL: 63 MG/DL (ref 0–130)
LYMPHOCYTES # BLD: 26 % (ref 24–43)
MCH RBC QN AUTO: 27.4 PG (ref 25.2–33.5)
MCH RBC QN AUTO: 27.9 PG (ref 25.2–33.5)
MCHC RBC AUTO-ENTMCNC: 32.1 G/DL (ref 28.4–34.8)
MCHC RBC AUTO-ENTMCNC: 32.5 G/DL (ref 28.4–34.8)
MCV RBC AUTO: 85.3 FL (ref 82.6–102.9)
MCV RBC AUTO: 85.9 FL (ref 82.6–102.9)
METHEMOGLOBIN: ABNORMAL % (ref 0–1.5)
MODE: ABNORMAL
MODE: ABNORMAL
MODE: NORMAL
MONOCYTES # BLD: 7 % (ref 3–12)
NEGATIVE BASE EXCESS, VEN: 1 (ref 0–2)
NEGATIVE BASE EXCESS, VEN: 3.5 MMOL/L (ref 0–2)
NEGATIVE BASE EXCESS, VEN: 9 (ref 0–2)
NOTIFICATION TIME: ABNORMAL
NOTIFICATION: ABNORMAL
NRBC AUTOMATED: 0 PER 100 WBC
NRBC AUTOMATED: 0 PER 100 WBC
O2 DEVICE/FLOW/%: ABNORMAL
O2 DEVICE/FLOW/%: ABNORMAL
O2 DEVICE/FLOW/%: NORMAL
O2 SAT, VEN: 61 % (ref 60–85)
O2 SAT, VEN: 69 % (ref 60–85)
O2 SAT, VEN: 98.9 % (ref 60–85)
OXYHEMOGLOBIN: ABNORMAL % (ref 95–98)
PATIENT TEMP: 37
PATIENT TEMP: ABNORMAL
PATIENT TEMP: NORMAL
PCO2, VEN, TEMP ADJ: ABNORMAL MMHG (ref 39–55)
PCO2, VEN: 36.8 (ref 39–55)
PCO2, VEN: 45.9 MM HG (ref 41–51)
PCO2, VEN: 48 MM HG (ref 41–51)
PDW BLD-RTO: 12.9 % (ref 11.8–14.4)
PDW BLD-RTO: 13.2 % (ref 11.8–14.4)
PEEP/CPAP: ABNORMAL
PH VENOUS: 7.22 (ref 7.32–7.43)
PH VENOUS: 7.34 (ref 7.32–7.43)
PH VENOUS: 7.37 (ref 7.32–7.42)
PH, VEN, TEMP ADJ: ABNORMAL (ref 7.32–7.42)
PLATELET # BLD: 116 K/UL (ref 138–453)
PLATELET # BLD: ABNORMAL K/UL (ref 138–453)
PLATELET ESTIMATE: ABNORMAL
PLATELET, FLUORESCENCE: 117 K/UL (ref 138–453)
PLATELET, IMMATURE FRACTION: 10.3 % (ref 1.1–10.3)
PMV BLD AUTO: 12.1 FL (ref 8.1–13.5)
PMV BLD AUTO: ABNORMAL FL (ref 8.1–13.5)
PO2, VEN, TEMP ADJ: ABNORMAL MMHG (ref 30–50)
PO2, VEN: 135 (ref 30–50)
PO2, VEN: 38.2 MM HG (ref 30–50)
PO2, VEN: 38.5 MM HG (ref 30–50)
POC BUN: 20 MG/DL (ref 8–26)
POC BUN: 21 MG/DL (ref 8–26)
POC CHLORIDE: 103 MMOL/L (ref 98–107)
POC CHLORIDE: 99 MMOL/L (ref 98–107)
POC CREATININE: 1.05 MG/DL (ref 0.51–1.19)
POC CREATININE: 1.12 MG/DL (ref 0.51–1.19)
POC HEMATOCRIT: 49 % (ref 41–53)
POC HEMATOCRIT: 57 % (ref 41–53)
POC HEMOGLOBIN: 16.6 G/DL (ref 13.5–17.5)
POC HEMOGLOBIN: 19.5 G/DL (ref 13.5–17.5)
POC IONIZED CALCIUM: 1.11 MMOL/L (ref 1.15–1.33)
POC IONIZED CALCIUM: 1.19 MMOL/L (ref 1.15–1.33)
POC LACTIC ACID: 14.93 MMOL/L (ref 0.56–1.39)
POC LACTIC ACID: 2.71 MMOL/L (ref 0.56–1.39)
POC PCO2 TEMP: ABNORMAL MM HG
POC PCO2 TEMP: NORMAL MM HG
POC PH TEMP: ABNORMAL
POC PH TEMP: NORMAL
POC PO2 TEMP: ABNORMAL MM HG
POC PO2 TEMP: NORMAL MM HG
POC POTASSIUM: 6.6 MMOL/L (ref 3.5–5.1)
POC POTASSIUM: 7.7 MMOL/L (ref 3.5–5.1)
POC SODIUM: 133 MMOL/L (ref 138–146)
POC SODIUM: 134 MMOL/L (ref 138–146)
POC TCO2: 21 MMOL/L (ref 22–30)
POC TCO2: 26 MMOL/L (ref 22–30)
POSITIVE BASE EXCESS, VEN: ABNORMAL (ref 0–3)
POSITIVE BASE EXCESS, VEN: ABNORMAL MMOL/L (ref 0–2)
POSITIVE BASE EXCESS, VEN: NORMAL (ref 0–3)
POTASSIUM SERPL-SCNC: 3.8 MMOL/L (ref 3.7–5.3)
POTASSIUM SERPL-SCNC: 4.4 MMOL/L (ref 3.7–5.3)
POTASSIUM SERPL-SCNC: 5 MMOL/L (ref 3.7–5.3)
PRO-BNP: 1467 PG/ML
PROTHROMBIN TIME: 11.1 SEC (ref 9.1–12.3)
PSV: ABNORMAL
PT. POSITION: ABNORMAL
RBC # BLD: 5.73 M/UL (ref 4.21–5.77)
RBC # BLD: 6.58 M/UL (ref 4.21–5.77)
RBC # BLD: ABNORMAL 10*6/UL
REASON FOR REJECTION: NORMAL
RESPIRATORY RATE: ABNORMAL
SAMPLE SITE: ABNORMAL
SAMPLE SITE: ABNORMAL
SAMPLE SITE: NORMAL
SARS-COV-2, RAPID: NOT DETECTED
SEG NEUTROPHILS: 62 % (ref 36–65)
SEGMENTED NEUTROPHILS ABSOLUTE COUNT: 4.75 K/UL (ref 1.5–8.1)
SET RATE: ABNORMAL
SODIUM BLD-SCNC: 134 MMOL/L (ref 135–144)
SODIUM BLD-SCNC: 135 MMOL/L (ref 135–144)
SODIUM BLD-SCNC: 139 MMOL/L (ref 135–144)
SPECIMEN DESCRIPTION: NORMAL
TEXT FOR RESPIRATORY: ABNORMAL
TOTAL CO2, VENOUS: ABNORMAL MMOL/L (ref 23–30)
TOTAL CO2, VENOUS: NORMAL MMOL/L (ref 23–30)
TOTAL HB: ABNORMAL G/DL (ref 12–16)
TOTAL PROTEIN: 5.6 G/DL (ref 6.4–8.3)
TOTAL PROTEIN: 6.8 G/DL (ref 6.4–8.3)
TOTAL RATE: ABNORMAL
TRIGL SERPL-MCNC: 181 MG/DL
TROPONIN INTERP: ABNORMAL
TROPONIN INTERP: ABNORMAL
TROPONIN T: ABNORMAL NG/ML
TROPONIN T: ABNORMAL NG/ML
TROPONIN, HIGH SENSITIVITY: 23 NG/L (ref 0–22)
TROPONIN, HIGH SENSITIVITY: 35 NG/L (ref 0–22)
VLDLC SERPL CALC-MCNC: ABNORMAL MG/DL (ref 1–30)
VT: ABNORMAL
WBC # BLD: 5.9 K/UL (ref 3.5–11.3)
WBC # BLD: 7.7 K/UL (ref 3.5–11.3)
WBC # BLD: ABNORMAL 10*3/UL
ZZ NTE CLEAN UP: ORDERED TEST: NORMAL
ZZ NTE WITH NAME CLEAN UP: SPECIMEN SOURCE: NORMAL

## 2021-01-01 PROCEDURE — G0378 HOSPITAL OBSERVATION PER HR: HCPCS

## 2021-01-01 PROCEDURE — 83880 ASSAY OF NATRIURETIC PEPTIDE: CPT

## 2021-01-01 PROCEDURE — 96372 THER/PROPH/DIAG INJ SC/IM: CPT

## 2021-01-01 PROCEDURE — 84484 ASSAY OF TROPONIN QUANT: CPT

## 2021-01-01 PROCEDURE — 6370000000 HC RX 637 (ALT 250 FOR IP): Performed by: STUDENT IN AN ORGANIZED HEALTH CARE EDUCATION/TRAINING PROGRAM

## 2021-01-01 PROCEDURE — 99285 EMERGENCY DEPT VISIT HI MDM: CPT

## 2021-01-01 PROCEDURE — 2580000003 HC RX 258: Performed by: STUDENT IN AN ORGANIZED HEALTH CARE EDUCATION/TRAINING PROGRAM

## 2021-01-01 PROCEDURE — 84520 ASSAY OF UREA NITROGEN: CPT

## 2021-01-01 PROCEDURE — 80051 ELECTROLYTE PANEL: CPT

## 2021-01-01 PROCEDURE — 36415 COLL VENOUS BLD VENIPUNCTURE: CPT

## 2021-01-01 PROCEDURE — 6360000002 HC RX W HCPCS: Performed by: STUDENT IN AN ORGANIZED HEALTH CARE EDUCATION/TRAINING PROGRAM

## 2021-01-01 PROCEDURE — 82947 ASSAY GLUCOSE BLOOD QUANT: CPT

## 2021-01-01 PROCEDURE — 99223 1ST HOSP IP/OBS HIGH 75: CPT | Performed by: PSYCHIATRY & NEUROLOGY

## 2021-01-01 PROCEDURE — 99239 HOSP IP/OBS DSCHRG MGMT >30: CPT | Performed by: PSYCHIATRY & NEUROLOGY

## 2021-01-01 PROCEDURE — 95813 EEG EXTND MNTR 61-119 MIN: CPT | Performed by: PSYCHIATRY & NEUROLOGY

## 2021-01-01 PROCEDURE — 96365 THER/PROPH/DIAG IV INF INIT: CPT

## 2021-01-01 PROCEDURE — 70551 MRI BRAIN STEM W/O DYE: CPT

## 2021-01-01 PROCEDURE — 85014 HEMATOCRIT: CPT

## 2021-01-01 PROCEDURE — 80048 BASIC METABOLIC PNL TOTAL CA: CPT

## 2021-01-01 PROCEDURE — 2060000000 HC ICU INTERMEDIATE R&B

## 2021-01-01 PROCEDURE — 82805 BLOOD GASES W/O2 SATURATION: CPT

## 2021-01-01 PROCEDURE — 87635 SARS-COV-2 COVID-19 AMP PRB: CPT

## 2021-01-01 PROCEDURE — 94640 AIRWAY INHALATION TREATMENT: CPT

## 2021-01-01 PROCEDURE — 97535 SELF CARE MNGMENT TRAINING: CPT

## 2021-01-01 PROCEDURE — 85027 COMPLETE CBC AUTOMATED: CPT

## 2021-01-01 PROCEDURE — 82010 KETONE BODYS QUAN: CPT

## 2021-01-01 PROCEDURE — 83036 HEMOGLOBIN GLYCOSYLATED A1C: CPT

## 2021-01-01 PROCEDURE — 96361 HYDRATE IV INFUSION ADD-ON: CPT

## 2021-01-01 PROCEDURE — 80053 COMPREHEN METABOLIC PANEL: CPT

## 2021-01-01 PROCEDURE — 82565 ASSAY OF CREATININE: CPT

## 2021-01-01 PROCEDURE — 2500000003 HC RX 250 WO HCPCS: Performed by: EMERGENCY MEDICINE

## 2021-01-01 PROCEDURE — 85610 PROTHROMBIN TIME: CPT

## 2021-01-01 PROCEDURE — 97165 OT EVAL LOW COMPLEX 30 MIN: CPT

## 2021-01-01 PROCEDURE — 93010 ELECTROCARDIOGRAM REPORT: CPT | Performed by: INTERNAL MEDICINE

## 2021-01-01 PROCEDURE — 85055 RETICULATED PLATELET ASSAY: CPT

## 2021-01-01 PROCEDURE — 80061 LIPID PANEL: CPT

## 2021-01-01 PROCEDURE — 70450 CT HEAD/BRAIN W/O DYE: CPT

## 2021-01-01 PROCEDURE — 93005 ELECTROCARDIOGRAM TRACING: CPT | Performed by: STUDENT IN AN ORGANIZED HEALTH CARE EDUCATION/TRAINING PROGRAM

## 2021-01-01 PROCEDURE — 96367 TX/PROPH/DG ADDL SEQ IV INF: CPT

## 2021-01-01 PROCEDURE — 96375 TX/PRO/DX INJ NEW DRUG ADDON: CPT

## 2021-01-01 PROCEDURE — 93005 ELECTROCARDIOGRAM TRACING: CPT | Performed by: EMERGENCY MEDICINE

## 2021-01-01 PROCEDURE — 6360000002 HC RX W HCPCS

## 2021-01-01 PROCEDURE — 83605 ASSAY OF LACTIC ACID: CPT

## 2021-01-01 PROCEDURE — 85025 COMPLETE CBC W/AUTO DIFF WBC: CPT

## 2021-01-01 PROCEDURE — 82330 ASSAY OF CALCIUM: CPT

## 2021-01-01 PROCEDURE — 82803 BLOOD GASES ANY COMBINATION: CPT

## 2021-01-01 PROCEDURE — 95812 EEG 41-60 MINUTES: CPT

## 2021-01-01 RX ORDER — INSULIN GLARGINE 100 [IU]/ML
40 INJECTION, SOLUTION SUBCUTANEOUS 2 TIMES DAILY
Status: DISCONTINUED | OUTPATIENT
Start: 2021-01-01 | End: 2021-01-01

## 2021-01-01 RX ORDER — NICOTINE POLACRILEX 4 MG
15 LOZENGE BUCCAL PRN
Status: DISCONTINUED | OUTPATIENT
Start: 2021-01-01 | End: 2021-01-01 | Stop reason: HOSPADM

## 2021-01-01 RX ORDER — ONDANSETRON 4 MG/1
4 TABLET, ORALLY DISINTEGRATING ORAL EVERY 8 HOURS PRN
Status: DISCONTINUED | OUTPATIENT
Start: 2021-01-01 | End: 2021-01-01 | Stop reason: HOSPADM

## 2021-01-01 RX ORDER — LEVETIRACETAM 500 MG/1
500 TABLET ORAL 2 TIMES DAILY
Status: DISCONTINUED | OUTPATIENT
Start: 2021-01-01 | End: 2021-01-01 | Stop reason: HOSPADM

## 2021-01-01 RX ORDER — 0.9 % SODIUM CHLORIDE 0.9 %
1000 INTRAVENOUS SOLUTION INTRAVENOUS ONCE
Status: COMPLETED | OUTPATIENT
Start: 2021-01-01 | End: 2021-01-01

## 2021-01-01 RX ORDER — SODIUM CHLORIDE 0.9 % (FLUSH) 0.9 %
5-40 SYRINGE (ML) INJECTION EVERY 12 HOURS SCHEDULED
Status: DISCONTINUED | OUTPATIENT
Start: 2021-01-01 | End: 2021-01-01 | Stop reason: HOSPADM

## 2021-01-01 RX ORDER — METOPROLOL SUCCINATE 50 MG/1
50 TABLET, EXTENDED RELEASE ORAL DAILY
Status: DISCONTINUED | OUTPATIENT
Start: 2021-01-01 | End: 2021-01-01 | Stop reason: HOSPADM

## 2021-01-01 RX ORDER — LORAZEPAM 2 MG/ML
1 INJECTION INTRAMUSCULAR EVERY 5 MIN PRN
Status: DISCONTINUED | OUTPATIENT
Start: 2021-01-01 | End: 2021-01-01 | Stop reason: HOSPADM

## 2021-01-01 RX ORDER — CALCIUM GLUCONATE 20 MG/ML
1000 INJECTION, SOLUTION INTRAVENOUS ONCE
Status: COMPLETED | OUTPATIENT
Start: 2021-01-01 | End: 2021-01-01

## 2021-01-01 RX ORDER — DEXTROSE MONOHYDRATE 50 MG/ML
100 INJECTION, SOLUTION INTRAVENOUS PRN
Status: DISCONTINUED | OUTPATIENT
Start: 2021-01-01 | End: 2021-01-01 | Stop reason: HOSPADM

## 2021-01-01 RX ORDER — LISINOPRIL 20 MG/1
20 TABLET ORAL DAILY
Status: DISCONTINUED | OUTPATIENT
Start: 2021-01-01 | End: 2021-01-01 | Stop reason: HOSPADM

## 2021-01-01 RX ORDER — SODIUM CHLORIDE 0.9 % (FLUSH) 0.9 %
5-40 SYRINGE (ML) INJECTION PRN
Status: DISCONTINUED | OUTPATIENT
Start: 2021-01-01 | End: 2021-01-01 | Stop reason: HOSPADM

## 2021-01-01 RX ORDER — ALBUTEROL SULFATE 90 UG/1
2 AEROSOL, METERED RESPIRATORY (INHALATION) EVERY 6 HOURS PRN
Status: DISCONTINUED | OUTPATIENT
Start: 2021-01-01 | End: 2021-01-01 | Stop reason: HOSPADM

## 2021-01-01 RX ORDER — DEXTROSE MONOHYDRATE 25 G/50ML
12.5 INJECTION, SOLUTION INTRAVENOUS PRN
Status: DISCONTINUED | OUTPATIENT
Start: 2021-01-01 | End: 2021-01-01 | Stop reason: HOSPADM

## 2021-01-01 RX ORDER — LANOLIN ALCOHOL/MO/W.PET/CERES
100 CREAM (GRAM) TOPICAL DAILY
Status: DISCONTINUED | OUTPATIENT
Start: 2021-01-01 | End: 2021-01-01 | Stop reason: HOSPADM

## 2021-01-01 RX ORDER — LORAZEPAM 2 MG/ML
INJECTION INTRAMUSCULAR
Status: COMPLETED
Start: 2021-01-01 | End: 2021-01-01

## 2021-01-01 RX ORDER — LEVETIRACETAM 15 MG/ML
1500 INJECTION INTRAVASCULAR ONCE
Status: COMPLETED | OUTPATIENT
Start: 2021-01-01 | End: 2021-01-01

## 2021-01-01 RX ORDER — LISINOPRIL 20 MG/1
20 TABLET ORAL DAILY
Qty: 30 TABLET | Refills: 3 | Status: ON HOLD | OUTPATIENT
Start: 2021-01-01 | End: 2022-01-01 | Stop reason: ALTCHOICE

## 2021-01-01 RX ORDER — POLYETHYLENE GLYCOL 3350 17 G/17G
17 POWDER, FOR SOLUTION ORAL DAILY PRN
Status: DISCONTINUED | OUTPATIENT
Start: 2021-01-01 | End: 2021-01-01 | Stop reason: HOSPADM

## 2021-01-01 RX ORDER — FLUTICASONE PROPIONATE 50 MCG
1 SPRAY, SUSPENSION (ML) NASAL DAILY PRN
Status: DISCONTINUED | OUTPATIENT
Start: 2021-01-01 | End: 2021-01-01 | Stop reason: HOSPADM

## 2021-01-01 RX ORDER — AMLODIPINE BESYLATE 5 MG/1
5 TABLET ORAL NIGHTLY
Status: DISCONTINUED | OUTPATIENT
Start: 2021-01-01 | End: 2021-01-01

## 2021-01-01 RX ORDER — ONDANSETRON 2 MG/ML
4 INJECTION INTRAMUSCULAR; INTRAVENOUS EVERY 6 HOURS PRN
Status: DISCONTINUED | OUTPATIENT
Start: 2021-01-01 | End: 2021-01-01 | Stop reason: HOSPADM

## 2021-01-01 RX ORDER — LEVETIRACETAM 500 MG/1
500 TABLET ORAL 2 TIMES DAILY
Qty: 60 TABLET | Refills: 3 | Status: SHIPPED | OUTPATIENT
Start: 2021-01-01

## 2021-01-01 RX ORDER — CALCIUM GLUCONATE 20 MG/ML
2000 INJECTION, SOLUTION INTRAVENOUS ONCE
Status: DISCONTINUED | OUTPATIENT
Start: 2021-01-01 | End: 2021-01-01

## 2021-01-01 RX ORDER — THIAMINE MONONITRATE (VIT B1) 100 MG
100 TABLET ORAL DAILY
Status: DISCONTINUED | OUTPATIENT
Start: 2021-01-01 | End: 2021-01-01 | Stop reason: SDUPTHER

## 2021-01-01 RX ORDER — VENLAFAXINE HYDROCHLORIDE 150 MG/1
150 CAPSULE, EXTENDED RELEASE ORAL
Status: DISCONTINUED | OUTPATIENT
Start: 2021-01-01 | End: 2021-01-01 | Stop reason: HOSPADM

## 2021-01-01 RX ORDER — ACETAMINOPHEN 325 MG/1
650 TABLET ORAL EVERY 6 HOURS PRN
Status: DISCONTINUED | OUTPATIENT
Start: 2021-01-01 | End: 2021-01-01 | Stop reason: HOSPADM

## 2021-01-01 RX ORDER — SODIUM CHLORIDE 9 MG/ML
INJECTION, SOLUTION INTRAVENOUS CONTINUOUS
Status: DISCONTINUED | OUTPATIENT
Start: 2021-01-01 | End: 2021-01-01 | Stop reason: HOSPADM

## 2021-01-01 RX ORDER — SODIUM CHLORIDE 9 MG/ML
1000 INJECTION, SOLUTION INTRAVENOUS CONTINUOUS
Status: ACTIVE | OUTPATIENT
Start: 2021-01-01 | End: 2021-01-01

## 2021-01-01 RX ORDER — IRON,CARBONYL/ASCORBIC ACID 100-250 MG
TABLET ORAL
Status: ON HOLD | COMMUNITY
End: 2022-01-01

## 2021-01-01 RX ORDER — INSULIN GLARGINE 100 [IU]/ML
40 INJECTION, SOLUTION SUBCUTANEOUS NIGHTLY
Status: DISCONTINUED | OUTPATIENT
Start: 2021-01-01 | End: 2021-01-01 | Stop reason: HOSPADM

## 2021-01-01 RX ORDER — VITAMIN B COMPLEX
1000 TABLET ORAL DAILY
Status: DISCONTINUED | OUTPATIENT
Start: 2021-01-01 | End: 2021-01-01 | Stop reason: HOSPADM

## 2021-01-01 RX ORDER — FUROSEMIDE 40 MG/1
40 TABLET ORAL 2 TIMES DAILY
Status: DISCONTINUED | OUTPATIENT
Start: 2021-01-01 | End: 2021-01-01 | Stop reason: HOSPADM

## 2021-01-01 RX ORDER — ATORVASTATIN CALCIUM 20 MG/1
20 TABLET, FILM COATED ORAL DAILY
Status: DISCONTINUED | OUTPATIENT
Start: 2021-01-01 | End: 2021-01-01 | Stop reason: HOSPADM

## 2021-01-01 RX ORDER — LORAZEPAM 2 MG/ML
2 INJECTION INTRAMUSCULAR ONCE
Status: COMPLETED | OUTPATIENT
Start: 2021-01-01 | End: 2021-01-01

## 2021-01-01 RX ORDER — SODIUM CHLORIDE 9 MG/ML
25 INJECTION, SOLUTION INTRAVENOUS PRN
Status: DISCONTINUED | OUTPATIENT
Start: 2021-01-01 | End: 2021-01-01 | Stop reason: HOSPADM

## 2021-01-01 RX ORDER — ACETAMINOPHEN 650 MG/1
650 SUPPOSITORY RECTAL EVERY 6 HOURS PRN
Status: DISCONTINUED | OUTPATIENT
Start: 2021-01-01 | End: 2021-01-01 | Stop reason: HOSPADM

## 2021-01-01 RX ADMIN — METOPROLOL SUCCINATE 50 MG: 50 TABLET, FILM COATED, EXTENDED RELEASE ORAL at 17:20

## 2021-01-01 RX ADMIN — INSULIN LISPRO 9 UNITS: 100 INJECTION, SOLUTION INTRAVENOUS; SUBCUTANEOUS at 13:07

## 2021-01-01 RX ADMIN — INSULIN LISPRO 9 UNITS: 100 INJECTION, SOLUTION INTRAVENOUS; SUBCUTANEOUS at 08:37

## 2021-01-01 RX ADMIN — Medication 1000 UNITS: at 10:11

## 2021-01-01 RX ADMIN — VENLAFAXINE HYDROCHLORIDE 150 MG: 150 CAPSULE, EXTENDED RELEASE ORAL at 10:10

## 2021-01-01 RX ADMIN — SODIUM CHLORIDE 1000 ML: 9 INJECTION, SOLUTION INTRAVENOUS at 07:36

## 2021-01-01 RX ADMIN — Medication 1000 UNITS: at 17:20

## 2021-01-01 RX ADMIN — ENOXAPARIN SODIUM 40 MG: 40 INJECTION SUBCUTANEOUS at 17:20

## 2021-01-01 RX ADMIN — TIOTROPIUM BROMIDE INHALATION SPRAY 2 PUFF: 3.12 SPRAY, METERED RESPIRATORY (INHALATION) at 08:44

## 2021-01-01 RX ADMIN — INSULIN LISPRO 8 UNITS: 100 INJECTION, SOLUTION INTRAVENOUS; SUBCUTANEOUS at 17:21

## 2021-01-01 RX ADMIN — SODIUM CHLORIDE: 9 INJECTION, SOLUTION INTRAVENOUS at 14:45

## 2021-01-01 RX ADMIN — AMLODIPINE BESYLATE 5 MG: 5 TABLET ORAL at 20:36

## 2021-01-01 RX ADMIN — ATORVASTATIN CALCIUM 20 MG: 20 TABLET, FILM COATED ORAL at 10:10

## 2021-01-01 RX ADMIN — LEVETIRACETAM 1500 MG: 15 INJECTION INTRAVENOUS at 07:10

## 2021-01-01 RX ADMIN — CALCIUM GLUCONATE 1000 MG: 20 INJECTION, SOLUTION INTRAVENOUS at 14:02

## 2021-01-01 RX ADMIN — LORAZEPAM 2 MG: 2 INJECTION INTRAMUSCULAR at 06:22

## 2021-01-01 RX ADMIN — LISINOPRIL 20 MG: 20 TABLET ORAL at 11:56

## 2021-01-01 RX ADMIN — Medication 100 MG: at 17:20

## 2021-01-01 RX ADMIN — Medication 100 MG: at 10:11

## 2021-01-01 RX ADMIN — FUROSEMIDE 40 MG: 40 TABLET ORAL at 20:36

## 2021-01-01 RX ADMIN — LEVETIRACETAM 500 MG: 500 TABLET, FILM COATED ORAL at 13:08

## 2021-01-01 RX ADMIN — METOPROLOL SUCCINATE 50 MG: 50 TABLET, FILM COATED, EXTENDED RELEASE ORAL at 10:11

## 2021-01-01 RX ADMIN — FUROSEMIDE 40 MG: 40 TABLET ORAL at 10:10

## 2021-01-01 RX ADMIN — SODIUM CHLORIDE: 9 INJECTION, SOLUTION INTRAVENOUS at 03:51

## 2021-01-01 RX ADMIN — ATORVASTATIN CALCIUM 20 MG: 20 TABLET, FILM COATED ORAL at 17:20

## 2021-01-01 RX ADMIN — ENOXAPARIN SODIUM 40 MG: 40 INJECTION SUBCUTANEOUS at 10:12

## 2021-01-01 ASSESSMENT — ENCOUNTER SYMPTOMS
BLOOD IN STOOL: 0
SHORTNESS OF BREATH: 0
PHOTOPHOBIA: 0
WHEEZING: 0
NAUSEA: 0
TROUBLE SWALLOWING: 0
SORE THROAT: 0
ABDOMINAL DISTENTION: 0
EYE PAIN: 0
SHORTNESS OF BREATH: 0
VOMITING: 0
APNEA: 0
DIARRHEA: 0
CHEST TIGHTNESS: 0
COUGH: 0
ABDOMINAL PAIN: 0
CHEST TIGHTNESS: 0
CONSTIPATION: 0
ABDOMINAL PAIN: 0
COUGH: 0
STRIDOR: 0
DIARRHEA: 0
CHOKING: 0
NAUSEA: 0
VOMITING: 0
RHINORRHEA: 0
WHEEZING: 0
ABDOMINAL DISTENTION: 0

## 2021-11-02 PROBLEM — R56.9 SEIZURE-LIKE ACTIVITY (HCC): Status: ACTIVE | Noted: 2021-01-01

## 2021-11-02 NOTE — ED NOTES
Bed: 25  Expected date:   Expected time:   Means of arrival:   Comments:  Michael Whatley RN  11/02/21 8104

## 2021-11-02 NOTE — ED NOTES
Pt begins seizing on stretcher, but does not lose consciousness during. Pt seizure begins in L arm and then goes throughout his body, becoming full body convulsions. Pt remains alert and is speaking to writer. Pt given 2 mg Ativan per Dr. Charis Mann.       Anna Watson, RN  11/02/21 2778

## 2021-11-02 NOTE — PROGRESS NOTES
Physical Therapy        Physical Therapy Cancel Note      DATE: 2021    NAME: Inge Hartman  MRN: [id number]   : 1945      Patient not seen this date for Physical Therapy due to:    Testing: EEG. PT will check back 11/3/21 for evaluation.        Electronically signed by Marco Antonio Gipson PT on 2021 at 3:04 PM

## 2021-11-02 NOTE — ED NOTES
Pt is resting comfortably on stretcher. No needs at this time.           Vel Isabel RN  11/02/21 1115

## 2021-11-02 NOTE — ED PROVIDER NOTES
[facility] ED  Emergency Department Encounter  EmergencyMedicine Resident     Pt Name:Viet Massey  MRN: [id number]  Clovisgfsamina 1945  Date of evaluation: 11/2/21  PCP:  Isi Penaloza MD    This patient was evaluated in the Emergency Department for symptoms described in the history of present illness. The patient was evaluated in the context of the global COVID-19 pandemic, which necessitated consideration that the patient might be at risk for infection with the SARS-CoV-2 virus that causes COVID-19. Institutional protocols and algorithms that pertain to the evaluation of patients at risk for COVID-19 are in a state of rapid change based on information released by regulatory bodies including the CDC and federal and state organizations. These policies and algorithms were followed during the patient's care in the ED. CHIEF COMPLAINT       Chief Complaint   Patient presents with    Seizures       HISTORY OF PRESENT ILLNESS  (Location/Symptom, Timing/Onset, Context/Setting, Quality, Duration, Modifying Factors, Severity.)      Fito Childers is a 64 y.o. male who presents with new onset seizures. Patient has history of HLD, DM, history of blood clots not on any anticoagulation medication. Patient has been having intermittent focal seizures progressing to generalized tonic-clonic seizures over the last 3 days. Patient has prodrome of seizure onset, left arm seizure-like activity, becomes unresponsive with tonic-clonic, witnessed by family and EMS, tongue biting, no incontinence, postictal period. Patient denies any other symptoms at this time. Patient denies headaches, vision changes, neck pain, cough, congestion, chest pain, shortness of breath, abdominal pain, nausea, vomiting, diarrhea. Patient has not been seen for these new onset seizures for the past 3 days. Patient denies history of alcohol abuse, drug abuse.   Patient was found by EMS to be having seizure-like activity, administered 2 mg Versed prior to arrival.  Prior to my exam, patient was still exhibiting seizure-like activity, administered 2 mg Ativan. PAST MEDICAL / SURGICAL / SOCIAL / FAMILY HISTORY      has a past medical history of Arthritis, Atherosclerotic plaque, Cervical disc disease, Diabetes mellitus ([facility] [address] History of blood transfusion, Hx of blood clots, Hyperlipidemia, Neuropathy, Right kidney mass, Snores, and Type 2 diabetes mellitus treated with insulin ([facility] [address] has a past surgical history that includes Abdominal aortic aneurysm repair (2005); Carpal tunnel release (Bilateral); Cervical spine surgery; Inguinal hernia repair (Right); Upper gastrointestinal endoscopy; Colonoscopy; ventral hernia repair (05/10/2017); and pr repair incisional hernia,reducible (N/A, 5/10/2017). Social History     Socioeconomic History    Marital status:      Spouse name: Not on file    Number of children: Not on file    Years of education: Not on file    Highest education level: Not on file   Occupational History    Not on file   Tobacco Use    Smoking status: Former Smoker    Smokeless tobacco: Never Used    Tobacco comment: quit 30 years ago   Substance and Sexual Activity    Alcohol use: No    Drug use: No    Sexual activity: Not on file   Other Topics Concern    Not on file   Social History Narrative    Not on file     Social Determinants of Health     Financial Resource Strain:     Difficulty of Paying Living Expenses:    Food Insecurity:     Worried About [address] in the Last Year:     [address]  in the Last Year:    Transportation Needs:     Lack of Transportation (Medical):      Lack of Transportation (Non-Medical):    Physical Activity:     Days of Exercise per Week:     Minutes of Exercise per Session:    Stress:     Feeling of Stress :    Social Connections:     Frequency of Communication with Friends and Family:     Frequency of Social Gatherings with Friends and Family:     Attends Protestant Services:     Active Member of Clubs or Organizations:     Attends Club or Organization Meetings:     Marital Status:    Intimate Partner Violence:     Fear of Current or Ex-Partner:     Emotionally Abused:     Physically Abused:     Sexually Abused:        Family History   Problem Relation Age of Onset    Ovarian Cancer Sister     Ovarian Cancer Sister        Allergies: Barbiturates, Codeine, and Sulfa antibiotics    Home Medications:  Prior to Admission medications    Medication Sig Start Date End Date Taking? Authorizing Provider   Iron-Vitamin C (IRON 100/C) 100-250 MG TABS iron    Historical Provider, MD   venlafaxine (EFFEXOR XR) 150 MG extended release capsule Take 1 capsule by mouth daily (with breakfast) 7/29/19   Arabella Kiser   HUMALOG KWIKPEN 100 UNIT/ML pen Inject 5-15 Units into the skin 3 times daily (before meals) 7/28/19   Arabella Kiser   vitamin B-1 (THIAMINE) 100 MG tablet Take 100 mg by mouth daily    Historical Provider, MD   ferrous sulfate 325 (65 Fe) MG tablet Take 325 mg by mouth daily (with breakfast)    Historical Provider, MD   ALPRAZolam (XANAX) 0.5 MG tablet Take 0.5 mg by mouth three times daily.     Historical Provider, MD   insulin glargine (BASAGLAR KWIKPEN) 100 UNIT/ML injection pen Inject 40 Units into the skin 2 times daily    Historical Provider, MD   furosemide (LASIX) 40 MG tablet Take 1 tablet by mouth 2 times daily 12/11/18   Tobias Juarez MD   tiotropium (SPIRIVA RESPIMAT) 2.5 MCG/ACT AERS inhaler Inhale 2 puffs into the lungs daily     Historical Provider, MD   albuterol sulfate  (90 Base) MCG/ACT inhaler Inhale 2 puffs into the lungs every 6 hours as needed for Wheezing or Shortness of Breath    Historical Provider, MD   metoprolol succinate (TOPROL XL) 50 MG extended release tablet Take 50 mg by mouth daily    Historical Provider, MD   Multiple Vitamins-Minerals (MULTIVITAMIN ADULT) TABS Take 1 tablet by mouth daily    Historical Provider, MD   vitamin D (CHOLECALCIFEROL) 1000 UNIT TABS tablet Take 1,000 Units by mouth daily    Historical Provider, MD   fluticasone (FLONASE) 50 MCG/ACT nasal spray 1 spray by Each Nare route daily as needed for Rhinitis    Historical Provider, MD   aspirin 325 MG EC tablet Take 325 mg by mouth daily     Historical Provider, MD   Omega-3 Fatty Acids (FISH OIL) 1000 MG CAPS Take 1 capsule by mouth daily     Historical Provider, MD   amLODIPine (NORVASC) 5 MG tablet Take 5 mg by mouth nightly     Historical Provider, MD   mirtazapine (REMERON) 45 MG tablet Take 45 mg by mouth nightly    Historical Provider, MD   atorvastatin (LIPITOR) 20 MG tablet Take 20 mg by mouth daily    Historical Provider, MD       REVIEW OF SYSTEMS    (2-9 systems for level 4, 10 or more for level 5)      Review of Systems   Constitutional: Negative for chills, diaphoresis, fatigue and fever. HENT: Negative for congestion, rhinorrhea and sore throat. Eyes: Negative for visual disturbance. Respiratory: Negative for shortness of breath. Cardiovascular: Negative for chest pain. Gastrointestinal: Negative for abdominal pain, blood in stool, constipation, diarrhea, nausea and vomiting. Genitourinary: Negative for dysuria and hematuria. Musculoskeletal: Negative for neck pain and neck stiffness. Skin: Negative for pallor and rash. Neurological: Positive for seizures. Negative for dizziness, syncope, facial asymmetry, speech difficulty, weakness, light-headedness, numbness and headaches. Psychiatric/Behavioral: Negative for confusion. PHYSICAL EXAM   (up to 7 for level 4, 8 or more for level 5)      INITIAL VITALS:   BP (!) 115/53   Pulse 67   Temp 98 °F (36.7 °C) (Oral)   Resp 14   Wt 185 lb (83.9 kg)   SpO2 100%   BMI 25.80 kg/m²     Physical Exam  Constitutional:       General: He is not in acute distress. Appearance: He is well-developed.  He is not ill-appearing, toxic-appearing or Range    pH, [place] 7.217 (L) 7.320 - 7.430    pCO2, [place] 48.0 41.0 - 51.0 mm Hg    pO2, [place] 38.2 30 - 50 mm Hg    HCO3, Venous 19.5 (L) 22.0 - 29.0 mmol/L    Total CO2, Venous NOT REPORTED 23.0 - 30.0 mmol/L    Negative Base Excess, [place] 9 (H) 0.0 - 2.0    Positive Base Excess, [place] NOT REPORTED 0.0 - 3.0    O2 Sat, [place] 61 60.0 - 85.0 %    O2 Device/Flow/% NOT REPORTED     Matt Test NOT REPORTED     Sample Site NOT REPORTED     Mode NOT REPORTED     FIO2 NOT REPORTED     Pt Temp NOT REPORTED     POC pH Temp NOT REPORTED     POC pCO2 Temp NOT REPORTED mm Hg    POC pO2 Temp NOT REPORTED mm Hg   Creatinine W/GFR Point of Care   Result Value Ref Range    POC Creatinine 1.12 0.51 - 1.19 mg/dL    GFR Comment >60 >60 mL/min    GFR Non-African American >60 >60 mL/min    GFR Comment         POCT urea (BUN)   Result Value Ref Range    POC BUN 21 8 - 26 mg/dL   Lactic Acid, POC   Result Value Ref Range    POC Lactic Acid 14.93 (H) 0.56 - 1.39 mmol/L   POCT Glucose   Result Value Ref Range    POC Glucose 418 (HH) 74 - 100 mg/dL       IMPRESSION: 68-year-old male with no history of seizure activity, presented with 3 days of intermittent focal seizures, progressing to generalized tonic-clonic. Patient has no associated symptoms. Will obtain CT imaging to evaluate for intracranial abnormality. Will obtain EKG, troponins to evaluate for ACS. Will obtain basic labs to evaluate for anemia, electrolyte abnormality. Administered Ativan on arrival as patient appeared to be having continued seizure activity. Will load patient with Keppra, consult with neurology.     RADIOLOGY:  Pending    EKG  EKG Interpretation    Interpreted by me    Rhythm: normal sinus   Rate: normal  Axis: normal  Ectopy: none  Conduction: normal  ST Segments: no acute change  T Waves: no acute change  Q Waves: none    Clinical Impression: no acute changes and normal EKG    All EKG's are interpreted by the Emergency Department Physician who either signs or Co-signs this chart in the absence of a cardiologist.    EMERGENCY DEPARTMENT COURSE:  Patient came to emergency department, HPI and physical exam were conducted. All nursing notes were reviewed. CT imaging and labs pending, care patient is handed off to Dr. Monique Mcallister. PROCEDURES:      CONSULTS:  IP CONSULT TO NEUROLOGY    CRITICAL CARE:      FINAL IMPRESSION      1. Seizure ([facility] [address]          DISPOSITION / PLAN     DISPOSITION Decision To Admit 11/02/2021 06:33:39 AM      PATIENT REFERRED TO:  No follow-up provider specified.     DISCHARGE MEDICATIONS:  New Prescriptions    No medications on file       Mini Ariza MD  Emergency Medicine Resident    (Please note that portions of thisnote were completed with a voice recognition program.  Efforts were made to edit the dictations but occasionally words are mis-transcribed.)        Mini Ariza MD  Resident  11/02/21 1240

## 2021-11-02 NOTE — ED NOTES
Pt resting in room, connected to cardiac monitor, no distress noted. No needs at this time.      Analia Wakefield RN  11/02/21 8445

## 2021-11-02 NOTE — CONSULTS
Neurology Consult Note      Reason for Consult:  First time seizures   Requesting Physician:  Dr. Gonzalez Reading:  krissy    History Obtained From:  patient, electronic medical record       HISTORY OF PRESENT ILLNESS:              The patient is a 64 y.o. male with significant past medical history of a remote stroke affecting his left side over 20 years ago with no residual deficits, T2DM, HLD, and h/o DVTs who presents with left sided shaking ongoing for x1 week. Feels build up prior to starting with no post-ictal confusion, incontinence or tongue biting. This has been occurring multiple times per day every day over the last week with no memorable inciting event per the patient. On further questioning he states he has been \"clumsy\" over the last month hitting into walls without a LOC. He has no known history of seizures. Until this morning, it is usually LUE only, but this AM left arm and leg involved. He also reports bilat lower extremity weakness to the point he could not lift his legs for 15 minutes. Without impaired consciousness, remained AOx4. PTA during transport with LS1, he had x2 seizures 2-3 min aprt and was given 2 mg Versed. He had an additional LUE focal seizure that generalized while in the ED aborted with 2 mg Ativan. Loaded with 1.5g LEV in the ED. No h/o HA, trauma, IVDA, infection, UTI, or new medications. Blood sugars in the 400s on arrival.     On exam, Axo4, GCS 15, MAEW, NIHSS 0, no seizure like activity during the interview.          Past Medical History:        Diagnosis Date    Arthritis     Atherosclerotic plaque 7/28/2019    Cervical disc disease     degenerative disc disease    Diabetes mellitus ([address]     type 2    History of blood transfusion     Hx of blood clots     left leg    Hyperlipidemia     Neuropathy     Right kidney mass     \"urologist is watching\"    Snores     Type 2 diabetes mellitus treated with insulin ([address] 7/28/2019     Past Surgical History:        Procedure Laterality Date    ABDOMINAL AORTIC ANEURYSM REPAIR  2005    CARPAL TUNNEL RELEASE Bilateral     CERVICAL SPINE SURGERY      COLONOSCOPY      benign polyps removed    INGUINAL HERNIA REPAIR Right     ME REPAIR INCISIONAL HERNIA,REDUCIBLE N/A 5/10/2017    HERNIA VENTRAL REPAIR WITH MESH  performed by Danis Rangel DO at [facility] [address]  05/10/2017    with mesh     Current Medications:   No current facility-administered medications for this encounter. Allergies: Barbiturates, Codeine, and Sulfa antibiotics    Social History:  TOBACCO:   reports that he has quit smoking. He has never used smokeless tobacco.  ETOH:   reports no history of alcohol use. DRUGS:   reports no history of drug use. ACTIVITIES OF DAILY LIVING:    INSTRUMENTAL ACTIVITIES OF DAILY LIVING:  Patient is able to perform all instrumental activities of daily living.     Family History:       Problem Relation Age of Onset    Ovarian Cancer Sister     Ovarian Cancer Sister        REVIEW OF SYSTEMS:  Review of Systems    PHYSICAL EXAM:    Vitals:  BP (!) 115/53   Pulse 65   Temp 98 °F (36.7 °C) (Oral)   Resp 20   Wt 185 lb (83.9 kg)   SpO2 98%   BMI 25.80 kg/m²        Physical Exam  Neurologic Exam         DATA  Recent Results (from the past 24 hour(s))   Venous Blood Gas, POC    Collection Time: 11/02/21  5:53 AM   Result Value Ref Range    pH, [place] 7.217 (L) 7.320 - 7.430    pCO2, [place] 48.0 41.0 - 51.0 mm Hg    pO2, [place] 38.2 30 - 50 mm Hg    HCO3, Venous 19.5 (L) 22.0 - 29.0 mmol/L    Total CO2, Venous NOT REPORTED 23.0 - 30.0 mmol/L    Negative Base Excess, [place] 9 (H) 0.0 - 2.0    Positive Base Excess, [place] NOT REPORTED 0.0 - 3.0    O2 Sat, [place] 61 60.0 - 85.0 %    O2 Device/Flow/% NOT REPORTED     Matt Test NOT REPORTED     Sample Site NOT REPORTED     Mode NOT REPORTED     FIO2 NOT REPORTED     Pt Temp NOT REPORTED     POC pH Temp NOT REPORTED     POC pCO2 Temp NOT REPORTED mm Hg    POC pO2 Temp NOT REPORTED mm Hg   ELECTROLYTES PLUS    Collection Time: 11/02/21  5:53 AM   Result Value Ref Range    POC Sodium 134 (L) 138 - 146 mmol/L    POC Potassium 6.6 (HH) 3.5 - 5.1 mmol/L    POC Chloride 99 98 - 107 mmol/L    POC TCO2 21 (L) 22 - 30 mmol/L    Anion Gap 15 7 - 16 mmol/L   Hemoglobin and hematocrit, blood    Collection Time: 11/02/21  5:53 AM   Result Value Ref Range    POC Hemoglobin 19.5 (H) 13.5 - 17.5 g/dL    POC Hematocrit 57 (H) 41 - 53 %   Creatinine W/GFR Point of Care    Collection Time: 11/02/21  5:53 AM   Result Value Ref Range    POC Creatinine 1.12 0.51 - 1.19 mg/dL    GFR Comment >60 >60 mL/min    GFR Non-African American >60 >60 mL/min    GFR Comment         CALCIUM, IONIC (POC)    Collection Time: 11/02/21  5:53 AM   Result Value Ref Range    POC Ionized Calcium 1.11 (L) 1.15 - 1.33 mmol/L   POCT urea (BUN)    Collection Time: 11/02/21  5:53 AM   Result Value Ref Range    POC BUN 21 8 - 26 mg/dL   Lactic Acid, POC    Collection Time: 11/02/21  5:53 AM   Result Value Ref Range    POC Lactic Acid 14.93 (H) 0.56 - 1.39 mmol/L   POCT Glucose    Collection Time: 11/02/21  5:53 AM   Result Value Ref Range    POC Glucose 418 (HH) 74 - 100 mg/dL   CBC Auto Differential    Collection Time: 11/02/21  6:30 AM   Result Value Ref Range    WBC 7.7 3.5 - 11.3 k/uL    RBC 6.58 (H) 4.21 - 5.77 m/uL    Hemoglobin 18.0 (H) 13.0 - 17.0 g/dL    Hematocrit 56.1 (H) 40.7 - 50.3 %    MCV 85.3 82.6 - 102.9 fL    MCH 27.4 25.2 - 33.5 pg    MCHC 32.1 28.4 - 34.8 g/dL    RDW 13.2 11.8 - 14.4 %    Platelets See Reflexed IPF Result 138 - 453 k/uL    MPV NOT REPORTED 8.1 - 13.5 fL    NRBC Automated 0.0 0.0 per 100 WBC    Differential Type NOT REPORTED     WBC Morphology NOT REPORTED     RBC Morphology NOT REPORTED     Platelet Estimate NOT REPORTED     Seg Neutrophils 62 36 - 65 %    Lymphocytes 26 24 - 43 %    Monocytes 7 3 - 12 %    Eosinophils % 3 1 - 4 %    Basophils 1 0 - 2 %    Immature Granulocytes 1 (H) 0 %    Segs Absolute 4.75 1.50 - 8.10 k/uL    Absolute Lymph # 2.02 1.10 - 3.70 k/uL    Absolute Mono # 0.53 0.10 - 1.20 k/uL    Absolute Eos # 0.26 0.00 - 0.44 k/uL    Basophils Absolute 0.04 0.00 - 0.20 k/uL    Absolute Immature Granulocyte 0.05 0.00 - 0.30 k/uL   Comprehensive Metabolic Panel w/ Reflex to MG    Collection Time: 11/02/21  6:30 AM   Result Value Ref Range    Glucose 431 (HH) 70 - 99 mg/dL    BUN 20 8 - 23 mg/dL    CREATININE 1.42 (H) 0.70 - 1.20 mg/dL    Bun/Cre Ratio NOT REPORTED 9 - 20    Calcium 10.0 8.6 - 10.4 mg/dL    Sodium 134 (L) 135 - 144 mmol/L    Potassium 4.4 3.7 - 5.3 mmol/L    Chloride 97 (L) 98 - 107 mmol/L    CO2 19 (L) 20 - 31 mmol/L    Anion Gap 18 (H) 9 - 17 mmol/L    Alkaline Phosphatase 140 (H) 40 - 129 U/L    ALT 22 5 - 41 U/L    AST 30 <40 U/L    Total Bilirubin 0.31 0.3 - 1.2 mg/dL    Total Protein 6.8 6.4 - 8.3 g/dL    Albumin 4.1 3.5 - 5.2 g/dL    Albumin/Globulin Ratio 1.5 1.0 - 2.5    GFR Non- 48 (L) >60 mL/min    GFR  58 (L) >60 mL/min    GFR Comment          GFR Staging NOT REPORTED    Troponin    Collection Time: 11/02/21  6:30 AM   Result Value Ref Range    Troponin, High Sensitivity 23 (H) 0 - 22 ng/L    Troponin T NOT REPORTED <0.03 ng/mL    Troponin Interp NOT REPORTED    Immature Platelet Fraction    Collection Time: 11/02/21  6:30 AM   Result Value Ref Range    Platelet, Immature Fraction 10.3 1.1 - 10.3 %    Platelet, Fluorescence 117 (L) 138 - 453 k/uL   COVID-19, Rapid    Collection Time: 11/02/21  6:58 AM    Specimen: Nasopharyngeal Swab   Result Value Ref Range    Specimen Description . NASOPHARYNGEAL SWAB     SARS-CoV-2, Rapid Not Detected Not Detected   Troponin    Collection Time: 11/02/21  7:14 AM   Result Value Ref Range    Troponin, High Sensitivity 35 (H) 0 - 22 ng/L    Troponin T NOT REPORTED <0.03 ng/mL    Troponin Interp NOT REPORTED              IMAGING    CT Head WO Contrast    Result Date: 11/2/2021  No acute intracranial abnormality. IMPRESSION     Case of a 63 y/o M presenting with new onset focal seizures with generalization for the last week. RECOMMENDATIONS:     1. Blood work including CBC, CMP, AST, ALT, Lytes including Mg and Calcium, lactic acid, CPK  2. UDS and toxicology screen  3. ECG  4. CTH - negative  5. MRI brain is recommended to exclude cerebral malformations, structural lesions, Chiari malformation, assessment of size of ventricles and myelination pattern. 6. An EEG is also recommended to evaluate for epileptiform activity. 7. Seizure precautions were recommended to be maintained. The patient was instructed to notify our clinic if the child has any breakthrough seizures for an earlier appointment. 8. Troponins trending up 23-->35   9. Platelets 601  10. Lactic 14.93, on IVF @   11. Hgb 18.0  12. Blood   13. Agree withy 1.5g LEV load, recommend c/w 500 mg BID maintenance. 14. Ativan 2 mg IV PRN for seizures lasting greater than 3 minutes  15. Side effects of the medication were discussed with the patient. 16. Seizure safety precautions are also recommended to be followed. This includes not to climb high places, such as rooftops, up trees or mountain climbing. When near water, the patient should be supervised by someone else who is aware of risk of seizures, for example during tub baths, swimming, boating or fishing. A helmet should be worn when riding a bike. Thank you for the consultation. Will follow. Case will be consulted with Dr. Aurora Staton.        Kevin Ponce MD, [facility]  PGY-4 Neurology  11/2/2021 at 8:47 AM

## 2021-11-02 NOTE — PROCEDURES
EEG REPORT       Patient: Inge Hartman Age: 64 y.o. MRN: [id number]    Date: 11/2/2021  Referring Provider: No ref. provider found    History: This routine one hour three minute scalp EEG was recorded with video- monitoring for a 64 y.o.. male  who presented with concern for focal onset seizures with secondary generalization. This EEG was performed to evaluate for focal and epileptiform abnormalities.      Inge Hartman   Current Facility-Administered Medications   Medication Dose Route Frequency Provider Last Rate Last Admin    atorvastatin (LIPITOR) tablet 20 mg  20 mg Oral Daily Heath Lesches, MD        furosemide (LASIX) tablet 40 mg  40 mg Oral BID Heath Lesches, MD        metoprolol succinate (TOPROL XL) extended release tablet 50 mg  50 mg Oral Daily Heath Lesches, MD        tiotropium (SPIRIVA RESPIMAT) 2.5 MCG/ACT inhaler 2 puff  2 puff Inhalation Daily Heath Lesches, MD [facility] [address] [place] 11/3/2021] venlafaxine (EFFEXOR XR) extended release capsule 150 mg  150 mg Oral Daily with breakfast Heath Lesches, MD        vitamin D (CHOLECALCIFEROL) tablet 1,000 Units  1,000 Units Oral Daily Heath Lesches, MD        amLODIPine (NORVASC) tablet 5 mg  5 mg Oral Nightly Heath Lesches, MD        albuterol sulfate  (90 Base) MCG/ACT inhaler 2 puff  2 puff Inhalation Q6H PRN Heath Lesches, MD        fluticasone (FLONASE) 50 MCG/ACT nasal spray 1 spray  1 spray Each Nostril Daily PRN Heath Lesches, MD        0.9 % sodium chloride infusion   IntraVENous Continuous Heath Lesches, MD 75 mL/hr at 11/02/21 1445 New Bag at 11/02/21 1445    sodium chloride flush 0.9 % injection 5-40 mL  5-40 mL IntraVENous 2 times per day Heath Lesches, MD        sodium chloride flush 0.9 % injection 5-40 mL  5-40 mL IntraVENous PRN Heath Lesches, MD        0.9 % sodium chloride infusion  25 mL IntraVENous PRN Heath Lesches, MD        LORazepam (ATIVAN) injection 1 mg  1 mg IntraVENous Q5 Min PRN Trini Haywood MD        enoxaparin (LOVENOX) injection 40 mg  40 mg SubCUTAneous Daily Trini Haywood MD        ondansetron (ZOFRAN-ODT) disintegrating tablet 4 mg  4 mg Oral Q8H PRN Trini Haywood MD        Or    [facility]) injection 4 mg  4 mg IntraVENous Q6H PRN Trini Haywood MD        polyethylene glycol (GLYCOLAX) packet 17 g  17 g Oral Daily PRN Trini Haywood MD        acetaminophen (TYLENOL) tablet 650 mg  650 mg Oral Q6H PRN Trini Haywood MD        Or   [facility] acetaminophen (TYLENOL) suppository 650 mg  650 mg Rectal Q6H PRN Trini Haywood MD        thiamine tablet 100 mg  100 mg Oral Daily Trini Haywood MD        insulin lispro (HUMALOG) injection vial 0-12 Units  0-12 Units SubCUTAneous TID WC Loreto Morrell MD        glucose (GLUTOSE) 40 % oral gel 15 g  15 g Oral PRN Loreto Morrell MD        dextrose 50 % IV solution  12.5 g IntraVENous PRN Loreto Morrell MD        glucagon (rDNA) injection 1 mg  1 mg IntraMUSCular PRN Loreto Morrell MD        dextrose 5 % solution  100 mL/hr IntraVENous PRN Loreto Morrell MD            Technical Description: This is a 21 channel digital EEG recording with time-locked video. Electrodes were placed in accordance with the 10-20 International System of Electrode Placement. Single lead EKG monitoring as well as temporal electrodes were included. The patient was not sleep deprived. This recording was obtained during wakefulness. EEG Description: The dominant background activity during maximal recorded wakefulness consisted of bioccipitally dominant 8.5-9 Hz, 25-35 uV symmetric, regular activity that was reactive to eye opening. During drowsiness, the background rhythm waxed and waned and there were periods of slowing. During stage II sleep symmetric V waves, K complexes, and sleep spindles were seen. Photic stimulation - was not performed. Hyperventilation - was not preformed.      No abnormalities were activated by photic stimulation The EKG channel demonstrated a normal sinus rhythm. Interpretation  This EEG was normal in wakefulness and sleep. Clinical correlation  This one hour three minute EEG was normal. No focal or epileptiform abnormalities were seen. Donald Day MD    I have personally reviewed the EEG of Ayo Leonidas. I agree with the documentation by the resident with changes made to the note as needed. Adrian Land MD  Neurology    This note is created with the assistance of a speech-recognition program. While intending to generate a document that actually reflects the content of the visit, the document can still have some errors including those of syntax and sound a- like substitutions which may escape proofreading. In such instances, actual meaning can be extrapolated by contextual derivation.

## 2021-11-02 NOTE — ED NOTES
Pt remains on stretcher resting with eyes closed. Pt remains connected to cardiac monitor. No distress noted.      Joe Juarez RN  11/02/21 2728

## 2021-11-02 NOTE — ED NOTES
Pt is boosted up in bed, repositioned, and given MRI checklist to fill out.       Ronda Woodson RN  11/02/21 0601

## 2021-11-02 NOTE — ED NOTES
The following labs labeled with pt sticker and tubed to lab:     [] Blue     [] Lavender   [] on ice  [x] Green/yellow  [] Green/black [] on ice  [] Yellow  [] Red  [] Pink      [] COVID-19 swab    [] Rapid  [] PCR  [] Peds Viral Panel     [] Urine Sample  [] Pelvic Cultures  [] Blood Cultures            David Sargent RN  11/02/21 3499

## 2021-11-02 NOTE — CONSULTS
Neurological: Negative for dizziness, tremors, seizures, syncope, facial asymmetry, speech difficulty, weakness, light-headedness, numbness and headaches. PAST MEDICAL HISTORY     Past Medical History:   Diagnosis Date    Arthritis     Atherosclerotic plaque 7/28/2019    Cervical disc disease     degenerative disc disease    Diabetes mellitus ([facility] [address]     type 2    History of blood transfusion     Hx of blood clots     left leg    Hyperlipidemia     Neuropathy     Right kidney mass     \"urologist is watching\"    Snores     Type 2 diabetes mellitus treated with insulin ([facility] [address] 7/28/2019       PAST SURGICAL HISTORY     Past Surgical History:   Procedure Laterality Date    ABDOMINAL AORTIC ANEURYSM REPAIR  2005    CARPAL TUNNEL RELEASE Bilateral     CERVICAL SPINE SURGERY      COLONOSCOPY      benign polyps removed    INGUINAL HERNIA REPAIR Right     OR REPAIR INCISIONAL HERNIA,REDUCIBLE N/A 5/10/2017    HERNIA VENTRAL REPAIR WITH MESH  performed by Adrianna Ferrera DO at [address] [place] [address] Road  05/10/2017    with mesh       ALLERGIES     Barbiturates, Codeine, and Sulfa antibiotics    MEDICATIONS PRIOR TO ADMISSION     Prior to Admission medications    Medication Sig Start Date End Date Taking? Authorizing Provider   Iron-Vitamin C (IRON 100/C) 100-250 MG TABS iron    Historical Provider, MD   venlafaxine (EFFEXOR XR) 150 MG extended release capsule Take 1 capsule by mouth daily (with breakfast) 7/29/19   Arabella Kiser   HUMALOG KWIKPEN 100 UNIT/ML pen Inject 5-15 Units into the skin 3 times daily (before meals) 7/28/19   Arabella Kiser   vitamin B-1 (THIAMINE) 100 MG tablet Take 100 mg by mouth daily    Historical Provider, MD   ferrous sulfate 325 (65 Fe) MG tablet Take 325 mg by mouth daily (with breakfast)    Historical Provider, MD   ALPRAZolam (XANAX) 0.5 MG tablet Take 0.5 mg by mouth three times daily.     Historical Provider, MD insulin glargine (BASAGLAR KWIKPEN) 100 UNIT/ML injection pen Inject 40 Units into the skin 2 times daily    Historical Provider, MD   furosemide (LASIX) 40 MG tablet Take 1 tablet by mouth 2 times daily 18   Bossman Chisholm MD   tiotropium (SPIRIVA RESPIMAT) 2.5 MCG/ACT AERS inhaler Inhale 2 puffs into the lungs daily     Historical Provider, MD   albuterol sulfate  (90 Base) MCG/ACT inhaler Inhale 2 puffs into the lungs every 6 hours as needed for Wheezing or Shortness of Breath    Historical Provider, MD   metoprolol succinate (TOPROL XL) 50 MG extended release tablet Take 50 mg by mouth daily    Historical Provider, MD   Multiple Vitamins-Minerals (MULTIVITAMIN ADULT) TABS Take 1 tablet by mouth daily    Historical Provider, MD   vitamin D (CHOLECALCIFEROL) 1000 UNIT TABS tablet Take 1,000 Units by mouth daily    Historical Provider, MD   fluticasone (FLONASE) 50 MCG/ACT nasal spray 1 spray by Each Nare route daily as needed for Rhinitis    Historical Provider, MD   aspirin 325 MG EC tablet Take 325 mg by mouth daily     Historical Provider, MD   Omega-3 Fatty Acids (FISH OIL) 1000 MG CAPS Take 1 capsule by mouth daily     Historical Provider, MD   amLODIPine (NORVASC) 5 MG tablet Take 5 mg by mouth nightly     Historical Provider, MD   mirtazapine (REMERON) 45 MG tablet Take 45 mg by mouth nightly    Historical Provider, MD   atorvastatin (LIPITOR) 20 MG tablet Take 20 mg by mouth daily    Historical Provider, MD       SOCIAL HISTORY     Tobacco: None  Alcohol: None  Illicits: none    FAMILY HISTORY     Family History   Problem Relation Age of Onset    Ovarian Cancer Sister     Ovarian Cancer Sister        PHYSICAL EXAM     Vitals: BP (!) 170/84   Pulse 70   Temp 98.1 °F (36.7 °C) (Oral)   Resp 16   Wt 185 lb (83.9 kg)   SpO2 99%   BMI 25.80 kg/m²   Tmax: Temp (24hrs), Av.1 °F (36.7 °C), Min:98 °F (36.7 °C), Max:98.1 °F (36.7 °C)    Last Body weight:   Wt Readings from Last 3 Encounters:   11/02/21 185 lb (83.9 kg)   07/27/19 245 lb (111.1 kg)   12/08/18 248 lb 6.4 oz (112.7 kg)     Body Mass Index : Body mass index is 25.8 kg/m². PHYSICAL EXAMINATION:  Constitutional: This is a well developed,70 y.o. year old male who is alert, oriented, cooperative and in no apparent distress. Head:normocephalic and atraumatic. EENT:  PERRLA. No conjunctival injections. Septum was midline, mucosa was without erythema, exudates or cobblestoning. No thrush was noted. Neck: Supple without thyromegaly. No elevated JVP. Trachea was midline. Respiratory: Chest was symmetrical without dullness to percussion. Breath sounds bilaterally were clear to auscultation. There were no wheezes, rhonchi or rales. There is no intercostal retraction or use of accessory muscles. No egophony noted. Cardiovascular: Regular without murmur, clicks, gallops or rubs. Abdomen: Slightly rounded and soft without organomegaly. No rebound, rigidity or guarding was appreciated. Lymphatic: No lymphadenopathy. Musculoskeletal: Normal curvature of the spine. No gross muscle weakness. Extremities:  No lower extremity edema, ulcerations, tenderness, varicosities. Erythema noted to bilateral lower extremity. Muscle size, tone and strength are normal.  No involuntary movements are noted. Skin:  Warm and dry. Good color, turgor and pigmentation. No lesions or scars.   No cyanosis or clubbing  Neurological/Psychiatric: The patient's general behavior, level of consciousness, thought content and emotional status is normal.          INVESTIGATIONS     Laboratory Testing:     Recent Results (from the past 24 hour(s))   Venous Blood Gas, POC    Collection Time: 11/02/21  5:53 AM   Result Value Ref Range    pH, [place] 7.217 (L) 7.320 - 7.430    pCO2, [place] 48.0 41.0 - 51.0 mm Hg    pO2, [place] 38.2 30 - 50 mm Hg    HCO3, Venous 19.5 (L) 22.0 - 29.0 mmol/L    Total CO2, Venous NOT REPORTED 23.0 - 30.0 mmol/L    Negative Base Excess, [place] 9 (H) 0.0 - 2.0    Positive Base Excess, [place] NOT REPORTED 0.0 - 3.0    O2 Sat, [place] 61 60.0 - 85.0 %    O2 Device/Flow/% NOT REPORTED     Matt Test NOT REPORTED     Sample Site NOT REPORTED     Mode NOT REPORTED     FIO2 NOT REPORTED     Pt Temp NOT REPORTED     POC pH Temp NOT REPORTED     POC pCO2 Temp NOT REPORTED mm Hg    POC pO2 Temp NOT REPORTED mm Hg   ELECTROLYTES PLUS    Collection Time: 11/02/21  5:53 AM   Result Value Ref Range    POC Sodium 134 (L) 138 - 146 mmol/L    POC Potassium 6.6 (HH) 3.5 - 5.1 mmol/L    POC Chloride 99 98 - 107 mmol/L    POC TCO2 21 (L) 22 - 30 mmol/L    Anion Gap 15 7 - 16 mmol/L   Hemoglobin and hematocrit, blood    Collection Time: 11/02/21  5:53 AM   Result Value Ref Range    POC Hemoglobin 19.5 (H) 13.5 - 17.5 g/dL    POC Hematocrit 57 (H) 41 - 53 %   Creatinine W/GFR Point of Care    Collection Time: 11/02/21  5:53 AM   Result Value Ref Range    POC Creatinine 1.12 0.51 - 1.19 mg/dL    GFR Comment >60 >60 mL/min    GFR Non-African American >60 >60 mL/min    GFR Comment         CALCIUM, IONIC (POC)    Collection Time: 11/02/21  5:53 AM   Result Value Ref Range    POC Ionized Calcium 1.11 (L) 1.15 - 1.33 mmol/L   POCT urea (BUN)    Collection Time: 11/02/21  5:53 AM   Result Value Ref Range    POC BUN 21 8 - 26 mg/dL   Lactic Acid, POC    Collection Time: 11/02/21  5:53 AM   Result Value Ref Range    POC Lactic Acid 14.93 (H) 0.56 - 1.39 mmol/L   POCT Glucose    Collection Time: 11/02/21  5:53 AM   Result Value Ref Range    POC Glucose 418 (HH) 74 - 100 mg/dL   CBC Auto Differential    Collection Time: 11/02/21  6:30 AM   Result Value Ref Range    WBC 7.7 3.5 - 11.3 k/uL    RBC 6.58 (H) 4.21 - 5.77 m/uL    Hemoglobin 18.0 (H) 13.0 - 17.0 g/dL    Hematocrit 56.1 (H) 40.7 - 50.3 %    MCV 85.3 82.6 - 102.9 fL    MCH 27.4 25.2 - 33.5 pg    MCHC 32.1 28.4 - 34.8 g/dL    RDW 13.2 11.8 - 14.4 %    Platelets See Reflexed IPF Result 138 - 453 k/uL    MPV NOT REPORTED 8.1 - 13.5 fL    NRBC Automated 0.0 0.0 per 100 WBC    Differential Type NOT REPORTED     WBC Morphology NOT REPORTED     RBC Morphology NOT REPORTED     Platelet Estimate NOT REPORTED     Seg Neutrophils 62 36 - 65 %    Lymphocytes 26 24 - 43 %    Monocytes 7 3 - 12 %    Eosinophils % 3 1 - 4 %    Basophils 1 0 - 2 %    Immature Granulocytes 1 (H) 0 %    Segs Absolute 4.75 1.50 - 8.10 k/uL    Absolute Lymph # 2.02 1.10 - 3.70 k/uL    Absolute Mono # 0.53 0.10 - 1.20 k/uL    Absolute Eos # 0.26 0.00 - 0.44 k/uL    Basophils Absolute 0.04 0.00 - 0.20 k/uL    Absolute Immature Granulocyte 0.05 0.00 - 0.30 k/uL   Comprehensive Metabolic Panel w/ Reflex to MG    Collection Time: 11/02/21  6:30 AM   Result Value Ref Range    Glucose 431 (HH) 70 - 99 mg/dL    BUN 20 8 - 23 mg/dL    CREATININE 1.42 (H) 0.70 - 1.20 mg/dL    Bun/Cre Ratio NOT REPORTED 9 - 20    Calcium 10.0 8.6 - 10.4 mg/dL    Sodium 134 (L) 135 - 144 mmol/L    Potassium 4.4 3.7 - 5.3 mmol/L    Chloride 97 (L) 98 - 107 mmol/L    CO2 19 (L) 20 - 31 mmol/L    Anion Gap 18 (H) 9 - 17 mmol/L    Alkaline Phosphatase 140 (H) 40 - 129 U/L    ALT 22 5 - 41 U/L    AST 30 <40 U/L    Total Bilirubin 0.31 0.3 - 1.2 mg/dL    Total Protein 6.8 6.4 - 8.3 g/dL    Albumin 4.1 3.5 - 5.2 g/dL    Albumin/Globulin Ratio 1.5 1.0 - 2.5    GFR Non- 48 (L) >60 mL/min    GFR  58 (L) >60 mL/min    GFR Comment          GFR Staging NOT REPORTED    Troponin    Collection Time: 11/02/21  6:30 AM   Result Value Ref Range    Troponin, High Sensitivity 23 (H) 0 - 22 ng/L    Troponin T NOT REPORTED <0.03 ng/mL    Troponin Interp NOT REPORTED    Immature Platelet Fraction    Collection Time: 11/02/21  6:30 AM   Result Value Ref Range    Platelet, Immature Fraction 10.3 1.1 - 10.3 %    Platelet, Fluorescence 117 (L) 138 - 453 k/uL   Beta-Hydroxybutyrate    Collection Time: 11/02/21  6:38 AM   Result Value Ref Range    Beta-Hydroxybutyrate 0.37 (H) 0.02 - 0.27 mmol/L BUN 20 8 - 26 mg/dL   Lactic Acid, POC    Collection Time: 11/02/21  9:22 AM   Result Value Ref Range    POC Lactic Acid 2.71 (H) 0.56 - 1.39 mmol/L   POCT Glucose    Collection Time: 11/02/21  9:22 AM   Result Value Ref Range    POC Glucose 435 (HH) 74 - 100 mg/dL   BLOOD GAS, VENOUS    Collection Time: 11/02/21  9:25 AM   Result Value Ref Range    pH, [place] 7.370 7.320 - 7.420    pCO2, [place] 36.8 (L) 39 - 55    pO2, [place] 135.0 (H) 30 - 50    HCO3, Venous 20.7 (L) 24 - 30 mmol/L    Positive Base Excess, [place] NOT REPORTED 0.0 - 2.0 mmol/L    Negative Base Excess, [place] 3.5 (H) 0.0 - 2.0 mmol/L    O2 Sat, [place] 98.9 (H) 60.0 - 85.0 %    Total Hb NOT REPORTED 12.0 - 16.0 g/dl    Oxyhemoglobin NOT REPORTED 95.0 - 98.0 %    Carboxyhemoglobin 4.6 0 - 5 %    Methemoglobin NOT REPORTED 0.0 - 1.5 %    Pt Temp 37.0     pH, [place], Temp Adj NOT REPORTED 7.320 - 7.420    pCO2, [place], Temp Adj NOT REPORTED 39 - 55 mmHg    pO2, [place], Temp Adj NOT REPORTED 30 - 50 mmHg    O2 Device/Flow/% NOT REPORTED     Respiratory Rate NOT REPORTED     Matt Test NOT REPORTED     Sample Site NOT REPORTED     Pt. Position NOT REPORTED     Mode NOT REPORTED     Set Rate NOT REPORTED     Total Rate NOT REPORTED     VT NOT REPORTED     FIO2 INFORMATION NOT PROVIDED     Peep/Cpap NOT REPORTED     PSV NOT REPORTED     Text for Respiratory NOT REPORTED     NOTIFICATION NOT REPORTED     NOTIFICATION TIME NOT REPORTED    SPECIMEN REJECTION    Collection Time: 11/02/21  9:25 AM   Result Value Ref Range    Specimen Source . BLOOD     Ordered Test BMP     Reason for Rejection Unable to perform testing: Specimen hemolyzed.      - NOT REPORTED    Basic Metabolic Panel    Collection Time: 11/02/21  1:08 PM   Result Value Ref Range    Glucose 299 (H) 70 - 99 mg/dL    BUN 17 8 - 23 mg/dL    CREATININE 1.10 0.70 - 1.20 mg/dL    Bun/Cre Ratio NOT REPORTED 9 - 20    Calcium 9.2 8.6 - 10.4 mg/dL    Sodium 135 135 - 144 mmol/L    Potassium 5.0 3.7 - 5.3 mmol/L    Chloride 104 98 - 107 mmol/L    CO2 23 20 - 31 mmol/L    Anion Gap 8 (L) 9 - 17 mmol/L    GFR Non-African American >60 >60 mL/min    GFR African American >60 >60 mL/min    GFR Comment          GFR Staging NOT REPORTED        Imaging:   CT Head WO Contrast    Result Date: 11/2/2021  No acute intracranial abnormality. MRI BRAIN WO CONTRAST    Result Date: 11/2/2021  No acute intracranial abnormality. Minimal chronic microvascular disease within the periventricular white matter with associated mild atrophy. ASSESSMENT & PLAN     ASSESSMENT / PLAN:     Thank you for allowing us to see Terrie Grijalva in consultation for medical management of new onset focal seizures w/o LOC or head trauma. Active Problems:    Seizure-like activity ([facility] [address]   Plan:   1. Seizure   - Managed by neurology  - 500 mg BID maintenance Keppra  - CTH negative  - MRI ordered  - Seizure precautions    2. Type 2 diabetes mellitus with neuropathy  -  Home meds- Humalog Kwikpen 100 unit/mL pen  - insulin glargine 100 unit/mL  - Venlafaxine 150 mg    3. HLD  - Lipitor 20 mg    4. HTN with CKD  - metoprolol 50 mg  - lasix 40 mg  - norvasc 5 mg     5. DVT   - Lovenox 40 mg     6. COPD  - albuterol  - Flonase 50mcg/act nasal spray  - tiotropium 2.5 mcg/act         Kimberlee Guzman DPM  Internal Medicine Resident, PGY-1  [facility];  [place], [place]  11/2/2021, 3:05 PM

## 2021-11-02 NOTE — ED NOTES
The following labs labeled with pt sticker and tubed to lab:     [] Blue     [] Lavender   [] on ice  [x] Green/yellow  [] Green/black [] on ice  [] Yellow  [] Red  [] Pink      [] COVID-19 swab    [] Rapid  [] PCR  [] Peds Viral Panel     [] Urine Sample  [] Pelvic Cultures  [] Blood Cultures            Julita Domingo RN  11/02/21 7014

## 2021-11-02 NOTE — ED NOTES
The following labs labeled with pt sticker and tubed to lab:     [] Blue     [x] Lavender   [] on ice  [x] Green/yellow  [x] Green/black [] on ice  [] Yellow  [] Red  [] Pink      [] COVID-19 swab    [] Rapid  [] PCR  [] Peds Viral Panel     [] Urine Sample  [] Pelvic Cultures  [] Blood Cultures                 Shana Wong RN  11/02/21 2601

## 2021-11-02 NOTE — ED PROVIDER NOTES
[address] ED  Emergency Department  Emergency Medicine Resident Sign-out     Care of Ghada Sharpe was assumed from Dr. Juju Casarez and is being seen for Seizures  . The patient's initial evaluation and plan have been discussed with the prior provider who initially evaluated the patient.      EMERGENCY DEPARTMENT COURSE / MEDICAL DECISION MAKING:       MEDICATIONS GIVEN:  Orders Placed This Encounter   Medications    LORazepam (ATIVAN) 2 MG/ML injection     Billy Arnoldo: cabinet override    LORazepam (ATIVAN) injection 2 mg    levETIRAcetam (KEPPRA) 1500 mg/100 mL IVPB    0.9 % sodium chloride bolus       LABS / RADIOLOGY:     Labs Reviewed   CBC WITH AUTO DIFFERENTIAL - Abnormal; Notable for the following components:       Result Value    RBC 6.58 (*)     Hemoglobin 18.0 (*)     Hematocrit 56.1 (*)     Immature Granulocytes 1 (*)     All other components within normal limits   COMPREHENSIVE METABOLIC PANEL W/ REFLEX TO MG FOR LOW K - Abnormal; Notable for the following components:    Glucose 431 (*)     CREATININE 1.42 (*)     Sodium 134 (*)     Chloride 97 (*)     CO2 19 (*)     Anion Gap 18 (*)     Alkaline Phosphatase 140 (*)     GFR Non- 48 (*)     GFR  58 (*)     All other components within normal limits   TROPONIN - Abnormal; Notable for the following components:    Troponin, High Sensitivity 23 (*)     All other components within normal limits   IMMATURE PLATELET FRACTION - Abnormal; Notable for the following components:    Platelet, Fluorescence 117 (*)     All other components within normal limits   ELECTROLYTES PLUS - Abnormal; Notable for the following components:    POC Sodium 134 (*)     POC Potassium 6.6 (*)     POC TCO2 21 (*)     All other components within normal limits   HGB/HCT - Abnormal; Notable for the following components:    POC Hemoglobin 19.5 (*)     POC Hematocrit 57 (*)     All other components within normal limits   CALCIUM, IONIC (POC) - Abnormal; Notable for the following components:    POC Ionized Calcium 1.11 (*)     All other components within normal limits   VENOUS BLOOD GAS, POINT OF CARE - Abnormal; Notable for the following components:    pH, [place] 7.217 (*)     HCO3, Venous 19.5 (*)     Negative Base Excess, [place] 9 (*)     All other components within normal limits   LACTIC ACID,POINT OF CARE - Abnormal; Notable for the following components:    POC Lactic Acid 14.93 (*)     All other components within normal limits   POCT GLUCOSE - Abnormal; Notable for the following components:    POC Glucose 418 (*)     All other components within normal limits   COVID-19, RAPID   TROPONIN   CREATININE W/GFR POINT OF CARE   UREA N (POC)       CT Head WO Contrast    Result Date: 11/2/2021  EXAMINATION: CT OF THE HEAD WITHOUT CONTRAST  11/2/2021 6:31 am TECHNIQUE: CT of the head was performed without the administration of intravenous contrast. Dose modulation, iterative reconstruction, and/or weight based adjustment of the mA/kV was utilized to reduce the radiation dose to as low as reasonably achievable. COMPARISON: CT head without contrast measure July 27, 2019. MRI brain without contrast December 10, 2018. HISTORY: ORDERING SYSTEM PROVIDED HISTORY: New seizures TECHNOLOGIST PROVIDED HISTORY: New seizures Decision Support Exception - unselect if not a suspected or confirmed emergency medical condition->Emergency Medical Condition (MA) FINDINGS: BRAIN/VENTRICLES: There is no acute intracranial hemorrhage, mass effect or midline shift. No abnormal extra-axial fluid collection. The gray-white differentiation is maintained without evidence of an acute infarct. There is no evidence of hydrocephalus. ORBITS: The visualized portion of the orbits demonstrate no acute abnormality. SINUSES: The visualized paranasal sinuses and mastoid air cells demonstrate no acute abnormality.  SOFT TISSUES/SKULL:  No acute abnormality of the visualized skull or soft tissues. No acute intracranial abnormality. RECENT VITALS:     Temp: 98 °F (36.7 °C),  Pulse: 67, Resp: 14, BP: (!) 115/53, SpO2: 100 %      This patient is a 64 y.o. Male with new onset seizures. Patient denies history of seizures, CVA, has been having 3 days of focal seizures of the left arm progressing to generalized tonic-clonic seizures. Patient was found to have generalized tonic-clonic seizures, tongue biting, EMS was called, found him to be having seizure-like activity, administered 2 mg of Versed, on arrival, patient was still having seizure-like activity, administered 2 mg of Ativan, patient had some postictal period. Pending CT imaging of the head, labs, neurology consult. Patient does have anion gap metabolic acidosis, pending IV fluids, redraw of the VBG. Patient should be admitted to the neurology service. ED Course as of Nov 02 1522   Tue Nov 02, 2021   5951 Patient reassessed, repeat EKG with elevated troponins noted peak T waves that are similar to original EKG. Patient had point-of-care on arrival of 6.6 of potassium, actual lab study in the lab noted to have 4.4, repeated point-of-care potassium now which demonstrates 7.7, will give calcium gluconate. On evaluation of patient he reports no chest pain no shortness of breath, no nausea or vomiting. We will continue to evaluate repeat BMP, VBG. Patient noted to have elevated troponins but this may be secondary to seizure activity. [CR]   1355 Patient potassium noted to be 5.0 on lab draw, gave only 1 g of calcium. Beta hydroxy noted to be 0.37, mild ketones in blood but no concerns for DKA at this time glucose trending downward.    [CR]      ED Course User Index  [CR] Milad Selby, DO       OUTSTANDING TASKS / RECOMMENDATIONS:    1. CT imaging  2. Labs  3. Neurology evaluation and recommendations  4. Disposition     FINAL IMPRESSION:     1.  Seizure ([address]        DISPOSITION:         DISPOSITION:  []  Discharge []  Transfer -    [x]  Admission -neurology   []  Against Medical Advice   []  Eloped   FOLLOW-UP: No follow-up provider specified.    DISCHARGE MEDICATIONS: New Prescriptions    No medications on file          Nai Ramírez DO  Emergency Medicine Resident  [facility]     Nai Ramírez [place]  Resident  11/02/21 1548

## 2021-11-02 NOTE — ED NOTES
Pt arrived to ED via LS1. Pt has recently been diagnosed with a seizure condition. Pt states he is A&OX4 during seizure and his L hand tremors. Pt states he is not on any medication for his seizures. Per EMS pt has 2-3 seizures to mins apart PTA, as a result pt was given 2mg of versed. Pt is A&OX4, calm and answers all questions approprietly. Pt was changed into a gown, EKG was completed, connected to cardiac monitor, EPOC was completed, a second line was established.       Olivier Houston RN  11/02/21 0700

## 2021-11-02 NOTE — ED NOTES
Pt rests comfortably on stretcher within room with light on. Pt is connected to cardiac monitor. No needs at this time.      Kristi Nuñez RN  11/02/21 9740

## 2021-11-02 NOTE — CARE COORDINATION
Case Management Initial Discharge Plan  Neeraj Anthony,             Met with:patient to discuss discharge plans. Information verified: address, contacts, phone number, , insurance Yes  Insurance Provider: Medicare and [facility]    Emergency Contact/Next of Kin name & number: spouse Sanjiv Hunter  [phone]  Who are involved in patient's support system? family    PCP: Tobias Anderson MD  Date of last visit: Oct of 2021      Discharge Planning    Living Arrangements:    wife    Home has 1 stories  2 stairs to climb to get into front door, 0stairs to climb to reach second floor  Location of bedroom/bathroom in homemain    Patient able to perform ADL's:Independent    Current Services (outpatient & in home) none   DME equipment: walker at times  DME provider:     Is patient receiving oral anticoagulation therapy? No    If indicated:   Physician managing anticoagulation treatment:   Where does patient obtain lab work for ATC treatment? Potential Assistance Needed:       Patient agreeable to home care: unsure  [place] of choice provided:  yes    Prior SNF/Rehab Placement and Facility: no  Agreeable to SNF/Rehab: No  [place] of choice provided: n/a     Evaluation: no    Expected Discharge date:       Patient expects to be discharged to: If home: is the family and/or caregiver wiling & able to provide support at home? yes  Who will be providing this support? Wife*    Follow Up Appointment: Best Day/ Time:      Transportation provider: self and wife  Transportation arrangements needed for discharge: Yes    Readmission Risk              Risk of Unplanned Readmission:  12             Does patient have a readmission risk score greater than 14?: No  If yes, follow-up appointment must be made within 7 days of discharge.      Goals of Care:       Educated pateint on transitional options, provided freedom of choice and are agreeable with plan      Discharge Plan: home independently          Electronically signed by Iqbal RN on 11/2/21 at [address] [facility] EDT

## 2021-11-02 NOTE — ED NOTES
The following labs labeled with pt sticker and tubed to lab:     [] Blue     [] Lavender   [] on ice  [x] Green/yellow  [] Green/black [] on ice  [] Yellow  [] Red  [] Pink      [] COVID-19 swab    [] Rapid  [] PCR  [] Peds Viral Panel     [] Urine Sample  [] Pelvic Cultures  [] Blood Cultures            Elizabeth Delcid RN  11/02/21 9795

## 2021-11-02 NOTE — H&P
degenerative disc disease    Diabetes mellitus ([facility] [address]     type 2    History of blood transfusion     Hx of blood clots     left leg    Hyperlipidemia     Neuropathy     Right kidney mass     \"urologist is watching\"    Snores     Type 2 diabetes mellitus treated with insulin ([facility] [address] 7/28/2019        Past Surgical History:     Past Surgical History:   Procedure Laterality Date    ABDOMINAL AORTIC ANEURYSM REPAIR  2005    CARPAL TUNNEL RELEASE Bilateral     CERVICAL SPINE SURGERY      COLONOSCOPY      benign polyps removed    INGUINAL HERNIA REPAIR Right     RI REPAIR INCISIONAL HERNIA,REDUCIBLE N/A 5/10/2017    HERNIA VENTRAL REPAIR WITH MESH  performed by Melissa Saini DO at [address] [place] [address] Road  05/10/2017    with mesh        Medications Prior to Admission:     Prior to Admission medications    Medication Sig Start Date End Date Taking? Authorizing Provider   Iron-Vitamin C (IRON 100/C) 100-250 MG TABS iron    Historical Provider, MD   venlafaxine (EFFEXOR XR) 150 MG extended release capsule Take 1 capsule by mouth daily (with breakfast) 7/29/19   Arabella Kiser   HUMALOG KWIKPEN 100 UNIT/ML pen Inject 5-15 Units into the skin 3 times daily (before meals) 7/28/19   Arabella Kiser   vitamin B-1 (THIAMINE) 100 MG tablet Take 100 mg by mouth daily    Historical Provider, MD   ferrous sulfate 325 (65 Fe) MG tablet Take 325 mg by mouth daily (with breakfast)    Historical Provider, MD   ALPRAZolam (XANAX) 0.5 MG tablet Take 0.5 mg by mouth three times daily.     Historical Provider, MD   insulin glargine (BASAGLAR KWIKPEN) 100 UNIT/ML injection pen Inject 40 Units into the skin 2 times daily    Historical Provider, MD   furosemide (LASIX) 40 MG tablet Take 1 tablet by mouth 2 times daily 12/11/18   Esau Brice MD   tiotropium (SPIRIVA RESPIMAT) 2.5 MCG/ACT AERS inhaler Inhale 2 puffs into the lungs daily     Historical Provider, MD albuterol sulfate  (90 Base) MCG/ACT inhaler Inhale 2 puffs into the lungs every 6 hours as needed for Wheezing or Shortness of Breath    Historical Provider, MD   metoprolol succinate (TOPROL XL) 50 MG extended release tablet Take 50 mg by mouth daily    Historical Provider, MD   Multiple Vitamins-Minerals (MULTIVITAMIN ADULT) TABS Take 1 tablet by mouth daily    Historical Provider, MD   vitamin D (CHOLECALCIFEROL) 1000 UNIT TABS tablet Take 1,000 Units by mouth daily    Historical Provider, MD   fluticasone (FLONASE) 50 MCG/ACT nasal spray 1 spray by Each Nare route daily as needed for Rhinitis    Historical Provider, MD   aspirin 325 MG EC tablet Take 325 mg by mouth daily     Historical Provider, MD   Omega-3 Fatty Acids (FISH OIL) 1000 MG CAPS Take 1 capsule by mouth daily     Historical Provider, MD   amLODIPine (NORVASC) 5 MG tablet Take 5 mg by mouth nightly     Historical Provider, MD   mirtazapine (REMERON) 45 MG tablet Take 45 mg by mouth nightly    Historical Provider, MD   atorvastatin (LIPITOR) 20 MG tablet Take 20 mg by mouth daily    Historical Provider, MD        Allergies: Barbiturates, Codeine, and Sulfa antibiotics    Social History:     Tobacco:    reports that he has quit smoking. He has never used smokeless tobacco.  Alcohol:      reports no history of alcohol use. Drug Use:  reports no history of drug use. Family History:     Family History   Problem Relation Age of Onset    Ovarian Cancer Sister     Ovarian Cancer Sister        Review of Systems:     ROS:    Review of Systems   Constitutional: Negative for activity change, appetite change, chills, diaphoresis, fatigue, fever and unexpected weight change. HENT: Negative for tinnitus and trouble swallowing. Eyes: Negative for photophobia, pain and visual disturbance. Respiratory: Negative for apnea, cough, choking, chest tightness, shortness of breath, wheezing and stridor.     Cardiovascular: Negative for chest pain, palpitations and leg swelling. Gastrointestinal: Negative for abdominal distention, abdominal pain, constipation, diarrhea, nausea and vomiting. Genitourinary: Negative for dysuria. Musculoskeletal: Negative for gait problem, neck pain and neck stiffness. Skin: Negative for rash. Neurological: Negative for dizziness, tremors, seizures, syncope, facial asymmetry, speech difficulty, weakness, light-headedness, numbness and headaches. Physical Exam:   BP (!) 115/53   Pulse 65   Temp 98 °F (36.7 °C) (Oral)   Resp 20   Wt 185 lb (83.9 kg)   SpO2 98%   BMI 25.80 kg/m²   Temp (24hrs), Av °F (36.7 °C), Min:98 °F (36.7 °C), Max:98 °F (36.7 °C)    Recent Labs     21  0553 21  0922   POCGLU 418* 435*     No intake or output data in the 24 hours ending 21 1213    Physical Exam  Vitals and nursing note reviewed. Constitutional:       General: He is not in acute distress. Appearance: He is well-developed. He is not diaphoretic. HENT:      Head: Normocephalic and atraumatic. Right Ear: External ear normal.      Left Ear: External ear normal.   Eyes:      General: No scleral icterus. Right eye: No discharge. Left eye: No discharge. Conjunctiva/sclera: Conjunctivae normal.      Pupils: Pupils are equal, round, and reactive to light. Pulmonary:      Effort: Pulmonary effort is normal.   Abdominal:      General: There is no distension. Palpations: Abdomen is soft. Tenderness: There is no abdominal tenderness. There is no guarding. Musculoskeletal:         General: No tenderness or deformity. Normal range of motion. Cervical back: Normal range of motion. Skin:     General: Skin is warm and dry. Capillary Refill: Capillary refill takes less than 2 seconds. Findings: No erythema or rash. Neurological:      General: No focal deficit present. Mental Status: He is alert and oriented to person, place, and time.  Mental status is POC pO2 Temp NOT REPORTED mm Hg   ELECTROLYTES PLUS    Collection Time: 11/02/21  5:53 AM   Result Value Ref Range    POC Sodium 134 (L) 138 - 146 mmol/L    POC Potassium 6.6 (HH) 3.5 - 5.1 mmol/L    POC Chloride 99 98 - 107 mmol/L    POC TCO2 21 (L) 22 - 30 mmol/L    Anion Gap 15 7 - 16 mmol/L   Hemoglobin and hematocrit, blood    Collection Time: 11/02/21  5:53 AM   Result Value Ref Range    POC Hemoglobin 19.5 (H) 13.5 - 17.5 g/dL    POC Hematocrit 57 (H) 41 - 53 %   Creatinine W/GFR Point of Care    Collection Time: 11/02/21  5:53 AM   Result Value Ref Range    POC Creatinine 1.12 0.51 - 1.19 mg/dL    GFR Comment >60 >60 mL/min    GFR Non-African American >60 >60 mL/min    GFR Comment         CALCIUM, IONIC (POC)    Collection Time: 11/02/21  5:53 AM   Result Value Ref Range    POC Ionized Calcium 1.11 (L) 1.15 - 1.33 mmol/L   POCT urea (BUN)    Collection Time: 11/02/21  5:53 AM   Result Value Ref Range    POC BUN 21 8 - 26 mg/dL   Lactic Acid, POC    Collection Time: 11/02/21  5:53 AM   Result Value Ref Range    POC Lactic Acid 14.93 (H) 0.56 - 1.39 mmol/L   POCT Glucose    Collection Time: 11/02/21  5:53 AM   Result Value Ref Range    POC Glucose 418 (HH) 74 - 100 mg/dL   CBC Auto Differential    Collection Time: 11/02/21  6:30 AM   Result Value Ref Range    WBC 7.7 3.5 - 11.3 k/uL    RBC 6.58 (H) 4.21 - 5.77 m/uL    Hemoglobin 18.0 (H) 13.0 - 17.0 g/dL    Hematocrit 56.1 (H) 40.7 - 50.3 %    MCV 85.3 82.6 - 102.9 fL    MCH 27.4 25.2 - 33.5 pg    MCHC 32.1 28.4 - 34.8 g/dL    RDW 13.2 11.8 - 14.4 %    Platelets See Reflexed IPF Result 138 - 453 k/uL    MPV NOT REPORTED 8.1 - 13.5 fL    NRBC Automated 0.0 0.0 per 100 WBC    Differential Type NOT REPORTED     WBC Morphology NOT REPORTED     RBC Morphology NOT REPORTED     Platelet Estimate NOT REPORTED     Seg Neutrophils 62 36 - 65 %    Lymphocytes 26 24 - 43 %    Monocytes 7 3 - 12 %    Eosinophils % 3 1 - 4 %    Basophils 1 0 - 2 %    Immature Granulocytes 1 (H) 0 %    Segs Absolute 4.75 1.50 - 8.10 k/uL    Absolute Lymph # 2.02 1.10 - 3.70 k/uL    Absolute Mono # 0.53 0.10 - 1.20 k/uL    Absolute Eos # 0.26 0.00 - 0.44 k/uL    Basophils Absolute 0.04 0.00 - 0.20 k/uL    Absolute Immature Granulocyte 0.05 0.00 - 0.30 k/uL   Comprehensive Metabolic Panel w/ Reflex to MG    Collection Time: 11/02/21  6:30 AM   Result Value Ref Range    Glucose 431 (HH) 70 - 99 mg/dL    BUN 20 8 - 23 mg/dL    CREATININE 1.42 (H) 0.70 - 1.20 mg/dL    Bun/Cre Ratio NOT REPORTED 9 - 20    Calcium 10.0 8.6 - 10.4 mg/dL    Sodium 134 (L) 135 - 144 mmol/L    Potassium 4.4 3.7 - 5.3 mmol/L    Chloride 97 (L) 98 - 107 mmol/L    CO2 19 (L) 20 - 31 mmol/L    Anion Gap 18 (H) 9 - 17 mmol/L    Alkaline Phosphatase 140 (H) 40 - 129 U/L    ALT 22 5 - 41 U/L    AST 30 <40 U/L    Total Bilirubin 0.31 0.3 - 1.2 mg/dL    Total Protein 6.8 6.4 - 8.3 g/dL    Albumin 4.1 3.5 - 5.2 g/dL    Albumin/Globulin Ratio 1.5 1.0 - 2.5    GFR Non- 48 (L) >60 mL/min    GFR  58 (L) >60 mL/min    GFR Comment          GFR Staging NOT REPORTED    Troponin    Collection Time: 11/02/21  6:30 AM   Result Value Ref Range    Troponin, High Sensitivity 23 (H) 0 - 22 ng/L    Troponin T NOT REPORTED <0.03 ng/mL    Troponin Interp NOT REPORTED    Immature Platelet Fraction    Collection Time: 11/02/21  6:30 AM   Result Value Ref Range    Platelet, Immature Fraction 10.3 1.1 - 10.3 %    Platelet, Fluorescence 117 (L) 138 - 453 k/uL   COVID-19, Rapid    Collection Time: 11/02/21  6:58 AM    Specimen: Nasopharyngeal Swab   Result Value Ref Range    Specimen Description . NASOPHARYNGEAL SWAB     SARS-CoV-2, Rapid Not Detected Not Detected   Troponin    Collection Time: 11/02/21  7:14 AM   Result Value Ref Range    Troponin, High Sensitivity 35 (H) 0 - 22 ng/L    Troponin T NOT REPORTED <0.03 ng/mL    Troponin Interp NOT REPORTED    Venous Blood Gas, POC    Collection Time: 11/02/21  9:22 AM   Result Value Ref Range    pH, [place] 7.345 7.320 - 7.430    pCO2, [place] 45.9 41.0 - 51.0 mm Hg    pO2, [place] 38.5 30 - 50 mm Hg    HCO3, Venous 25.0 22.0 - 29.0 mmol/L    Total CO2, Venous NOT REPORTED 23.0 - 30.0 mmol/L    Negative Base Excess, [place] 1 0.0 - 2.0    Positive Base Excess, [place] NOT REPORTED 0.0 - 3.0    O2 Sat, [place] 69 60.0 - 85.0 %    O2 Device/Flow/% NOT REPORTED     Matt Test NOT REPORTED     Sample Site NOT REPORTED     Mode NOT REPORTED     FIO2 NOT REPORTED     Pt Temp NOT REPORTED     POC pH Temp NOT REPORTED     POC pCO2 Temp NOT REPORTED mm Hg    POC pO2 Temp NOT REPORTED mm Hg   ELECTROLYTES PLUS    Collection Time: 11/02/21  9:22 AM   Result Value Ref Range    POC Sodium 133 (L) 138 - 146 mmol/L    POC Potassium 7.7 (HH) 3.5 - 4.5 mmol/L    POC Chloride 103 98 - 107 mmol/L    POC TCO2 26 22 - 30 mmol/L    Anion Gap 5 (L) 7 - 16 mmol/L   Hemoglobin and hematocrit, blood    Collection Time: 11/02/21  9:22 AM   Result Value Ref Range    POC Hemoglobin 16.6 13.5 - 17.5 g/dL    POC Hematocrit 49 41 - 53 %   Creatinine W/GFR Point of Care    Collection Time: 11/02/21  9:22 AM   Result Value Ref Range    POC Creatinine 1.05 0.51 - 1.19 mg/dL    GFR Comment >60 >60 mL/min    GFR Non-African American >60 >60 mL/min    GFR Comment         CALCIUM, IONIC (POC)    Collection Time: 11/02/21  9:22 AM   Result Value Ref Range    POC Ionized Calcium 1.19 1.15 - 1.33 mmol/L   POCT urea (BUN)    Collection Time: 11/02/21  9:22 AM   Result Value Ref Range    POC BUN 20 8 - 26 mg/dL   Lactic Acid, POC    Collection Time: 11/02/21  9:22 AM   Result Value Ref Range    POC Lactic Acid 2.71 (H) 0.56 - 1.39 mmol/L   POCT Glucose    Collection Time: 11/02/21  9:22 AM   Result Value Ref Range    POC Glucose 435 (HH) 74 - 100 mg/dL   BLOOD GAS, VENOUS    Collection Time: 11/02/21  9:25 AM   Result Value Ref Range    pH, [place] 7.370 7.320 - 7.420    pCO2, [place] 36.8 (L) 39 - 55    pO2, [place] 135.0 (H) 30 - 50    HCO3, Venous 20.7 (L) 24 - 30 mmol/L    Positive Base Excess, [place] NOT REPORTED 0.0 - 2.0 mmol/L    Negative Base Excess, [place] 3.5 (H) 0.0 - 2.0 mmol/L    O2 Sat, [place] 98.9 (H) 60.0 - 85.0 %    Total Hb NOT REPORTED 12.0 - 16.0 g/dl    Oxyhemoglobin NOT REPORTED 95.0 - 98.0 %    Carboxyhemoglobin 4.6 0 - 5 %    Methemoglobin NOT REPORTED 0.0 - 1.5 %    Pt Temp 37.0     pH, [place], Temp Adj NOT REPORTED 7.320 - 7.420    pCO2, [place], Temp Adj NOT REPORTED 39 - 55 mmHg    pO2, [place], Temp Adj NOT REPORTED 30 - 50 mmHg    O2 Device/Flow/% NOT REPORTED     Respiratory Rate NOT REPORTED     Matt Test NOT REPORTED     Sample Site NOT REPORTED     Pt. Position NOT REPORTED     Mode NOT REPORTED     Set Rate NOT REPORTED     Total Rate NOT REPORTED     VT NOT REPORTED     FIO2 INFORMATION NOT PROVIDED     Peep/Cpap NOT REPORTED     PSV NOT REPORTED     Text for Respiratory NOT REPORTED     NOTIFICATION NOT REPORTED     NOTIFICATION TIME NOT REPORTED    SPECIMEN REJECTION    Collection Time: 11/02/21  9:25 AM   Result Value Ref Range    Specimen Source . BLOOD     Ordered Test BMP     Reason for Rejection Unable to perform testing: Specimen hemolyzed. - NOT REPORTED        Imaging/Diagnostics:    CT Head WO Contrast    Result Date: 11/2/2021  No acute intracranial abnormality. MRI BRAIN WO CONTRAST    Result Date: 11/2/2021  No acute intracranial abnormality. Minimal chronic microvascular disease within the periventricular white matter with associated mild atrophy. Assessment :      Primary Problem  <principal problem not specified>    Active Hospital Problems    Diagnosis Date Noted    Seizure-like activity [facility]) [R56.9] 11/02/2021                 Case of a 65 y/o M presenting with new onset focal seizures with generalization for the last week.      Plan:     Patient status Admit as inpatient in the  Progressive Unit/Step down      Blood work including CBC, CMP, AST, ALT, Lytes including Mg and Calcium, lactic acid, CPK  Agree with 1.5g LEV load, recommend c/w 500 mg BID maintenance. UA, UDS and toxicology screen  ECG  CTH - negative  MRI brain is recommended to exclude cerebral malformations, structural lesions, Chiari malformation, assessment of size of ventricles and myelination pattern. An EEG is also recommended to evaluate for epileptiform activity. Seizure precautions were recommended to be maintained. Troponins trending up 23-->35   Platelets 222  Lactic 14.93, on IVF @   Hgb 18.0  Blood glucose 431  Consult internal medicine for medical management  C/w home medications including NOrvasc 5 qD, Lipitor 40 qHS, Lasix 40 BID, metoprolol 50 qD,  qD, Venlafaxine 150 qD, and   Ativan 2 mg IV PRN for seizures lasting greater than 3 minutes  Side effects of the medication were discussed with the patient. Seizure safety precautions are also recommended to be followed. Consultations:   IP CONSULT TO NEUROLOGY  IP CONSULT TO INTERNAL MEDICINE     Patient is admitted as inpatient status because of co-morbidities listed above, severity of signs and symptoms as outlined, requirement for current medical therapies and most importantly because of direct risk to patient if care not provided in a hospital setting. Abraham Castellanos MD, [facility]  PGY-4 Neurology  11/2/2021 at 12:13 PM      Please note that this chart was generated using voice recognition Dragon dictation software. Although every effort was made to ensure the accuracy of this automated transcription, some errors in transcription may have occurred.

## 2021-11-02 NOTE — ED NOTES
Pt given 1 gram of calcium instead of 2 grams per Dr. Tracy Roy and Dr. Reynaldo Gardner.       Ric Isaac RN  11/02/21 [id number]

## 2021-11-03 NOTE — PROGRESS NOTES
CLINICAL PHARMACY NOTE: MEDS TO BEDS    Total # of Prescriptions Filled: 2   The following medications were delivered to the patient:  · Levetiracetam 500mg  · Lisinopril 20mg    Additional Documentation: delivered to patient in room 541 11/3 at 12:01pm. Co-pay paid with cash ($4.47).

## 2021-11-03 NOTE — PROGRESS NOTES
Physical Therapy        Physical Therapy Cancel Note      DATE: 11/3/2021    NAME: Vedia Severe  MRN: [id number]   : 1945      Patient not seen this date for Physical Therapy due to:    Patient independent with functional mobility. Will defer PT evaluation at this time. Please reorder PT if future needs arise.        Electronically signed by Isabella Figueroa PT on 11/3/2021 at 10:07 AM

## 2021-11-03 NOTE — PROGRESS NOTES
[facility]  Internal Medicine Teaching Residency Program  Inpatient Daily Progress Note  ______________________________________________________________________________    Patient: Stepan Austin  YOB: 1945   [id number]    Acct: [de-identified]     Room: [facility]-  Admit date: 11/2/2021  Today's date: 11/03/21  Number of days in the hospital: 1    SUBJECTIVE   Admitting Diagnosis: Seizure ([address]  CC: Medical Management, Seizure  Pt examined at bedside. Chart & results reviewed. No acute episodes overnight  Patient is heme stable and febrile  No further seizure episodes  Neurology primary with plans to discharge today     ROS:  Constitutional: Negative for activity change, appetite change, chills, diaphoresis, fatigue, fever and unexpected weight change. HENT: Negative for tinnitus and trouble swallowing.    Eyes: Negative for photophobia, pain and visual disturbance. Respiratory: Negative for apnea, cough, choking, chest tightness, shortness of breath, wheezing and stridor.    Cardiovascular: Negative for chest pain, palpitations and leg swelling. Gastrointestinal: Negative for abdominal distention, abdominal pain, constipation, diarrhea, nausea and vomiting. Genitourinary: Negative for dysuria. Musculoskeletal: Negative for gait problem, neck pain and neck stiffness. Skin: Negative for rash. Neurological: Negative for dizziness, tremors, seizures, syncope, facial asymmetry, speech difficulty, weakness, light-headedness, numbness and headaches.     BRIEF HISTORY     The patient is a pleasant 64 y.o. male presents with a chief complaint of seizures. Patient presented to [facility]'s ED with left sided shaking which was ongoing for 1 week. Denies any LOC, any trauma. No post-ictal confusion, incontinence, or tongue biting. Patient states this has been ongoing for multiple times a day sporadically throughout the week. No known PMH of seizures. Until today, usually presents with LUE however today reported bilateral LE weakness. Patient was administered 2 mg Versed, and had additional LUE focal seizure that was aborted with 2 mg Ativan and 1.5 g Keppra in ED. Reports no trauma to the head, no new medications. Patient has a PMH of T2DM, HLD, history of DVTs. Blood glucose upon arrival to the ED in 400s. OBJECTIVE     Vital Signs:  BP (!) 144/60   Pulse 62   Temp 97.8 °F (36.6 °C) (Oral)   Resp 17   Wt 185 lb (83.9 kg)   SpO2 91%   BMI 25.80 kg/m²     Temp (24hrs), Av °F (36.7 °C), Min:97.4 °F (36.3 °C), Max:98.3 °F (36.8 °C)    In: -   Out: 300 [Urine:300]    Physical Exam:  Constitutional: This is a well developed,70 y.o. year old male who is alert, oriented, cooperative and in no apparent distress. Head:normocephalic and atraumatic. EENT:  PERRLA. No conjunctival injections. Septum was midline, mucosa was without erythema, exudates or cobblestoning. No thrush was noted. Neck: Supple without thyromegaly. No elevated JVP. Trachea was midline. Respiratory: Chest was symmetrical without dullness to percussion. Breath sounds bilaterally were clear to auscultation. There were no wheezes, rhonchi or rales. There is no intercostal retraction or use of accessory muscles. No egophony noted. Cardiovascular: Regular without murmur, clicks, gallops or rubs. Abdomen: Slightly rounded and soft without organomegaly. No rebound, rigidity or guarding was appreciated. Lymphatic: No lymphadenopathy. Musculoskeletal: Normal curvature of the spine. No gross muscle weakness. Extremities:  No lower extremity edema, ulcerations, tenderness, varicosities. Erythema noted to bilateral lower extremity. Muscle size, tone and strength are normal.  No involuntary movements are noted. Skin:  Warm and dry. Good color, turgor and pigmentation. No lesions or scars.   No cyanosis or clubbing  Neurological/Psychiatric: The patient's general behavior, level of consciousness, thought content and emotional status is normal.       Medications:  Scheduled Medications:    insulin lispro  0-18 Units SubCUTAneous TID WC    insulin lispro  0-9 Units SubCUTAneous Nightly    lisinopril  20 mg Oral Daily    levETIRAcetam  500 mg Oral BID    atorvastatin  20 mg Oral Daily    furosemide  40 mg Oral BID    metoprolol succinate  50 mg Oral Daily    tiotropium  2 puff Inhalation Daily    venlafaxine  150 mg Oral Daily with breakfast    Vitamin D  1,000 Units Oral Daily    sodium chloride flush  5-40 mL IntraVENous 2 times per day    enoxaparin  40 mg SubCUTAneous Daily    thiamine  100 mg Oral Daily    insulin glargine  40 Units SubCUTAneous Nightly     Continuous Infusions:    sodium chloride 75 mL/hr at 11/03/21 0800    sodium chloride      dextrose       PRN Medicationsalbuterol sulfate HFA, 2 puff, Q6H PRN  fluticasone, 1 spray, Daily PRN  sodium chloride flush, 5-40 mL, PRN  sodium chloride, 25 mL, PRN  LORazepam, 1 mg, Q5 Min PRN  ondansetron, 4 mg, Q8H PRN   Or  ondansetron, 4 mg, Q6H PRN  polyethylene glycol, 17 g, Daily PRN  acetaminophen, 650 mg, Q6H PRN   Or  acetaminophen, 650 mg, Q6H PRN  glucose, 15 g, PRN  dextrose, 12.5 g, PRN  glucagon (rDNA), 1 mg, PRN  dextrose, 100 mL/hr, PRN      Diagnostic Labs:  CBC:   Recent Labs     11/02/21  0630 11/03/21  0338   WBC 7.7 5.9   RBC 6.58* 5.73   HGB 18.0* 16.0   HCT 56.1* 49.2   MCV 85.3 85.9   RDW 13.2 12.9   PLT See Reflexed IPF Result 116*     BMP:   Recent Labs     11/02/21  0553 11/02/21  0630 11/02/21  0922 11/02/21  1308 11/03/21  0338   NA  --  134*  --  135 139   K  --  4.4  --  5.0 3.8   CL  --  97*  --  104 104   CO2  --  19*  --  23 23   BUN  --  20  --  17 16   CREATININE   < > 1.42* 1.05 1.10 1.15    < > = values in this interval not displayed. BNP: No results for input(s): BNP in the last 72 hours.   PT/INR:   Recent Labs     11/03/21  0338   PROTIME 11.1   INR 1.0     APTT: No results for input(s): APTT in the last 72 hours. CARDIAC ENZYMES: No results for input(s): CKMB, CKMBINDEX, TROPONINI in the last 72 hours. Invalid input(s): CKTOTAL;3  FASTING LIPID PANEL:  Lab Results   Component Value Date    CHOL 129 11/03/2021    HDL 30 (L) 11/03/2021    TRIG 181 (H) 11/03/2021     LIVER PROFILE:   Recent Labs     11/02/21  0630 11/03/21  0338   AST 30 16   ALT 22 13   BILITOT 0.31 0.56   ALKPHOS 140* 90      MICROBIOLOGY: No results found for: CULTURE    Imaging:    CT Head WO Contrast    Result Date: 11/2/2021  No acute intracranial abnormality. MRI BRAIN WO CONTRAST    Result Date: 11/2/2021  No acute intracranial abnormality. Minimal chronic microvascular disease within the periventricular white matter with associated mild atrophy. ASSESSMENT & PLAN     ASSESSMENT / PLAN:     Seizure   -Managed by neurology  -500 mg BID maintenance Keppra  -Seizure precautions  -Plan to discharge today per Primary      Type 2 diabetes mellitus with neuropathy  -Home meds- Humalog Kwikpen 100 unit/mL pen  - Insulin glargine 100 unit/mL  -Venlafaxine 150 mg     HLD  -Lipitor 20 mg     HTN with CKD  -Metoprolol 50 mg  -Lasix 40 mg  -Norvasc 5 mg      COPD  -Albuterol  -Flonase 50mcg/act nasal spray  -Tiotropium 2.5 mcg/act     Thank you for allowing us to participate in care of this patient. Please do not hesitate to contact us with any questions/concerns. Layton Regalado MD  PGY-2, Internal Medicine Resident  [facility]  11/3/2021 2:55 PM

## 2021-11-03 NOTE — PROGRESS NOTES
Occupational Therapy   Occupational Therapy Initial Assessment  Date: 11/3/2021   Patient Name: Tadeo Busby  MRN: [id number]     : 1945    Date of Service: 11/3/2021  Chief Complaint   Patient presents with    Seizures       Discharge Recommendations:    No occupational therapy recommended at discharge     OT Equipment Recommendations  Equipment Needed: No    Assessment   Assessment: Pt independent with functional transfers/mobility and ADLs. Prognosis: Good  Decision Making: Low Complexity  Patient Education: Pt ed on POC, reason for eval - good return  No Skilled OT: Independent with functional mobility; Independent with ADL's;No OT goals identified  REQUIRES OT FOLLOW UP: No  Activity Tolerance  Activity Tolerance: Patient Tolerated treatment well  Safety Devices  Safety Devices in place: Yes  Type of devices: Left in bed;Bed alarm in place;Call light within reach;Gait belt;Nurse notified  Restraints  Initially in place: No           Patient Diagnosis(es): The primary encounter diagnosis was Seizure ([facility] [address] A diagnosis of Biventricular CHF (congestive heart failure) (HCC) was also pertinent to this visit. has a past medical history of Arthritis, Atherosclerotic plaque, Cervical disc disease, Diabetes mellitus ([address] History of blood transfusion, Hx of blood clots, Hyperlipidemia, Neuropathy, Right kidney mass, Snores, and Type 2 diabetes mellitus treated with insulin ([address] has a past surgical history that includes Abdominal aortic aneurysm repair (); Carpal tunnel release (Bilateral); Cervical spine surgery; Inguinal hernia repair (Right); Upper gastrointestinal endoscopy; Colonoscopy; ventral hernia repair (05/10/2017); and pr repair incisional hernia,reducible (N/A, 5/10/2017).            Restrictions  Restrictions/Precautions  Restrictions/Precautions: Seizure  Required Braces or Orthoses?: No    Subjective   General  Patient assessed for rehabilitation services?: Yes  Family / Caregiver Present: No  Diagnosis: Seizure  General Comment  Comments: RN ok'd OT this morning. Pt agreed to participate and was cooperative during session. Patient Currently in Pain: Denies  Vital Signs  Patient Currently in Pain: Denies  Social/Functional History  Social/Functional History  Lives With: Spouse  Type of Home: House  Home Layout: One level  Home Access: Stairs to enter with rails  Entrance Stairs - Number of Steps: 5  Entrance Stairs - Rails: Both  Bathroom Shower/Tub: Tub/Shower unit  Bathroom Toilet: Standard  Home Equipment: Cane (Pt reported use of cane PRN for use to/from car to grocery store until has cart to use.)  ADL Assistance: Independent  Homemaking Assistance: Independent  Homemaking Responsibilities: Yes  Meal Prep Responsibility: Secondary  Laundry Responsibility: Secondary  Cleaning Responsibility: Secondary  Ambulation Assistance: Independent  Transfer Assistance: Independent  Active : Yes  Mode of Transportation: Truck  Occupation: Retired  Type of occupation: maintenance  Leisure & Hobbies: colect coins and stamps, making masks       Objective   Vision: Impaired (Pt reported had cataract surgery.)  Vision Exceptions: Wears glasses for reading  Hearing: Within functional limits    Orientation  Overall Orientation Status: Within Functional Limits     Balance  Sitting Balance: Independent (~15 minutes EOB)  Standing Balance: Supervision (for safety)  Standing Balance  Time: ~6 minutes  Activity: Pt completed static/dynamic standing at sink in bathroom for oral care. Comment: Pt demo'd no LOB or unsteadiness. Functional Mobility  Functional - Mobility Device: No device  Activity: To/from bathroom  Assist Level: Supervision  ADL  Feeding: Independent  Grooming: Independent  UE Bathing: Independent  LE Bathing: Independent  UE Dressing: Independent  LE Dressing: Independent  Toileting: Independent  Additional Comments: OT facilitated pt doffing/donning R sock while sitting EOB.  Pt completed oral care while standing at sink in bathroom.   Tone RUE  RUE Tone: Normotonic  Tone LUE  LUE Tone: Normotonic  Coordination  Movements Are Fluid And Coordinated: Yes     Bed mobility  Supine to Sit:  (Pt sitting EOB upon arrival)  Sit to Supine:  (Pt sitting EOB upon exit with RN present)  Scooting: Independent  Transfers  Sit to stand: Supervision  Stand to sit: Supervision  Transfer Comments: for safety     Cognition  Overall Cognitive Status: WFL        Sensation  Overall Sensation Status: WFL        LUE AROM (degrees)  LUE AROM : WFL  Left Hand AROM (degrees)  Left Hand AROM: WFL  RUE AROM (degrees)  RUE AROM : WFL  Right Hand AROM (degrees)  Right Hand AROM: WFL  LUE Strength  Gross LUE Strength: WFL  L Shoulder Flex: 4+/5  L Shoulder Ext: 4+/5  L Elbow Flex: 4+/5  L Elbow Ext: 4+/5  L Hand General: 4+/5  RUE Strength  Gross RUE Strength: WFL  R Shoulder Flex: 4+/5  R Shoulder Ext: 4+/5  R Elbow Flex: 4+/5  R Elbow Ext: 4+/5  R Hand General: 4+/5                   Plan   Plan  Times per week: d/c OT    AM-PAC Score        AM-PAC Inpatient Daily Activity Raw Score: 24 (11/03/21 1103)  AM-PAC Inpatient ADL T-Scale Score : 57.54 (11/03/21 1103)  ADL Inpatient CMS 0-100% Score: 0 (11/03/21 1103)  ADL Inpatient CMS G-Code Modifier : CH (11/03/21 1103)    Goals          Therapy Time   Individual Concurrent Group Co-treatment   Time In 1000         Time Out 1021         Minutes 21         Timed Code Treatment Minutes: 19 Minutes       Tana Gordon, S/OT

## 2021-11-03 NOTE — PLAN OF CARE
Problem: Falls - Risk of:  Goal: Will remain free from falls  Description: Will remain free from falls  11/3/2021 1323 by Alexsandra Snell RN  Outcome: Completed  11/3/2021 0639 by Barbara Berg RN  Outcome: Ongoing  Goal: Absence of physical injury  Description: Absence of physical injury  11/3/2021 1323 by Alexsandra Snell RN  Outcome: Completed  11/3/2021 0639 by Barbara Berg RN  Outcome: Ongoing

## 2021-11-03 NOTE — PROGRESS NOTES
[facility] Neurology   [address] [place] [address]    Progress Note             Date:   11/3/2021  Patient name:  Alex Hubbard  Date of admission:  11/2/2021  5:28 AM  MRN:   [id number]  Account:  [de-identified]  YOB: 1945  PCP:    Felisa Saleh MD  Room:   [address] [place] [address]  Code Status:    Full Code    Chief Complaint:     Chief Complaint   Patient presents with    Seizures       Interval hx: The patient was seen and examined at bedside. Is vitally stable, alert and oriented x 3. No acute events overnight. The patient stated that he has had no more seizure-like activity since starting the Keppra. Discussed MRI and EEG results with him. Plan to discharge home today with Keppra. Brief History of Present Illness: The patient is a 64 y.o. Non- / non  male who presents with Seizures   and he is admitted to the hospital for the management of new onset seizures for the past 1 week.   Patient had generalized tonic-clonic seizures witnessed in hospital.    Past Medical History:     Past Medical History:   Diagnosis Date    Arthritis     Atherosclerotic plaque 7/28/2019    Cervical disc disease     degenerative disc disease    Diabetes mellitus ([facility] [address]     type 2    History of blood transfusion     Hx of blood clots     left leg    Hyperlipidemia     Neuropathy     Right kidney mass     \"urologist is watching\"    Snores     Type 2 diabetes mellitus treated with insulin ([facility] [address] 7/28/2019        Past Surgical History:     Past Surgical History:   Procedure Laterality Date    ABDOMINAL AORTIC ANEURYSM REPAIR  2005    CARPAL TUNNEL RELEASE Bilateral     CERVICAL SPINE SURGERY      COLONOSCOPY      benign polyps removed    INGUINAL HERNIA REPAIR Right     [place] REPAIR INCISIONAL HERNIA,REDUCIBLE N/A 5/10/2017    HERNIA VENTRAL REPAIR WITH MESH  performed by Reuben Chatterjee DO at [address] [place] [address] 05/10/2017    with mesh        Medications Prior to Admission:     Prior to Admission medications    Medication Sig Start Date End Date Taking? Authorizing Provider   Iron-Vitamin C (IRON 100/C) 100-250 MG TABS iron    Historical Provider, MD   venlafaxine (EFFEXOR XR) 150 MG extended release capsule Take 1 capsule by mouth daily (with breakfast) 7/29/19   Arabella Kiser   HUMALOG KWIKPEN 100 UNIT/ML pen Inject 5-15 Units into the skin 3 times daily (before meals) 7/28/19   Arabella Kiser   vitamin B-1 (THIAMINE) 100 MG tablet Take 100 mg by mouth daily    Historical Provider, MD   ferrous sulfate 325 (65 Fe) MG tablet Take 325 mg by mouth daily (with breakfast)    Historical Provider, MD   ALPRAZolam (XANAX) 0.5 MG tablet Take 0.5 mg by mouth three times daily.     Historical Provider, MD   insulin glargine (BASAGLAR KWIKPEN) 100 UNIT/ML injection pen Inject 40 Units into the skin 2 times daily    Historical Provider, MD   furosemide (LASIX) 40 MG tablet Take 1 tablet by mouth 2 times daily 12/11/18   Agustina Hamilton MD   tiotropium (SPIRIVA RESPIMAT) 2.5 MCG/ACT AERS inhaler Inhale 2 puffs into the lungs daily     Historical Provider, MD   albuterol sulfate  (90 Base) MCG/ACT inhaler Inhale 2 puffs into the lungs every 6 hours as needed for Wheezing or Shortness of Breath    Historical Provider, MD   metoprolol succinate (TOPROL XL) 50 MG extended release tablet Take 50 mg by mouth daily    Historical Provider, MD   Multiple Vitamins-Minerals (MULTIVITAMIN ADULT) TABS Take 1 tablet by mouth daily    Historical Provider, MD   vitamin D (CHOLECALCIFEROL) 1000 UNIT TABS tablet Take 1,000 Units by mouth daily    Historical Provider, MD   fluticasone (FLONASE) 50 MCG/ACT nasal spray 1 spray by Each Nare route daily as needed for Rhinitis    Historical Provider, MD   aspirin 325 MG EC tablet Take 325 mg by mouth daily     Historical Provider, MD   Omega-3 Fatty Acids (FISH OIL) 1000 MG CAPS Take 1 capsule by mouth daily     Historical Provider, MD   amLODIPine (NORVASC) 5 MG tablet Take 5 mg by mouth nightly     Historical Provider, MD   mirtazapine (REMERON) 45 MG tablet Take 45 mg by mouth nightly    Historical Provider, MD   atorvastatin (LIPITOR) 20 MG tablet Take 20 mg by mouth daily    Historical Provider, MD        Allergies: Barbiturates, Codeine, and Sulfa antibiotics    Social History:     Tobacco:    reports that he has quit smoking. He has never used smokeless tobacco.  Alcohol:      reports no history of alcohol use. Drug Use:  reports no history of drug use. Family History:     Family History   Problem Relation Age of Onset    Ovarian Cancer Sister     Ovarian Cancer Sister        Review of Systems:     Review of Systems   Constitutional: Negative for activity change, appetite change, chills and fever. HENT: Negative for congestion. Respiratory: Negative for cough, chest tightness, shortness of breath and wheezing. Cardiovascular: Negative for chest pain and leg swelling. Gastrointestinal: Negative for abdominal distention, abdominal pain, diarrhea, nausea and vomiting. Genitourinary: Negative for dysuria and flank pain. Skin: Negative for rash. Neurological: Negative for dizziness, weakness, numbness and headaches. Psychiatric/Behavioral: Negative for agitation, behavioral problems, confusion and sleep disturbance.        Physical Exam:   BP (!) 140/63   Pulse 65   Temp 97.4 °F (36.3 °C) (Oral)   Resp 16   Wt 185 lb (83.9 kg)   SpO2 94%   BMI 25.80 kg/m²   Temp (24hrs), Av °F (36.7 °C), Min:97.4 °F (36.3 °C), Max:98.3 °F (36.8 °C)    Recent Labs     21  0922 21  1714 21  2105 21  0757   POCGLU 435* 333* 228* 274*       Intake/Output Summary (Last 24 hours) at 11/3/2021 0958  Last data filed at 11/3/2021 0454  Gross per 24 hour   Intake --   Output 300 ml   Net -300 ml         Neurologic Exam     GENERAL  Appears comfortable and in no distress HEENT  NC/ AT   HEART  S1 and S2 heard; palpation of pulses: radial pulse    NECK  Supple and no bruits heard   MENTAL STATUS:  Alert, oriented, intact memory, no confusion, normal speech, normal language, no hallucination or delusion   CRANIAL NERVES: II     -      Visual fields intact to confrontation  III,IV,VI -  PERR, EOMs full, no ptosis  V     -     Normal facial sensation   VII    -     Normal facial symmetry  VIII   -     Intact hearing   IX,X -     Symmetrical palate  XI    -     Symmetrical shoulder shrug  XII   -     Midline tongue, no atrophy    MOTOR FUNCTION: RUE: Significant for good strength of grade 5/5 in proximal and distal muscle groups   LUE: Significant for good strength of grade 5/5 in proximal and distal muscle groups   RLE: Significant for good strength of grade 5/5 in proximal and distal muscle groups   LLE: Significant for good strength of grade 5/5 in proximal and distal muscle groups      Normal bulk, normal tone and no involuntary movements, no tremor   SENSORY FUNCTION:  Normal touch, normal pinprick, normal vibration, normal proprioception   CEREBELLAR FUNCTION:  Intact fine motor control over upper limbs and lower limbs   REFLEX FUNCTION:  Symmetric in upper and lower extremities, no Babinski sign   STATION and GAIT  Normal gait and tandem station, normal tip toes and heel walking       Investigations:      Laboratory Testing:  Recent Results (from the past 24 hour(s))   Basic Metabolic Panel    Collection Time: 11/02/21  1:08 PM   Result Value Ref Range    Glucose 299 (H) 70 - 99 mg/dL    BUN 17 8 - 23 mg/dL    CREATININE 1.10 0.70 - 1.20 mg/dL    Bun/Cre Ratio NOT REPORTED 9 - 20    Calcium 9.2 8.6 - 10.4 mg/dL    Sodium 135 135 - 144 mmol/L    Potassium 5.0 3.7 - 5.3 mmol/L    Chloride 104 98 - 107 mmol/L    CO2 23 20 - 31 mmol/L    Anion Gap 8 (L) 9 - 17 mmol/L    GFR Non-African American >60 >60 mL/min    GFR African American >60 >60 mL/min    GFR Comment          GFR Staging NOT REPORTED    POC Glucose Fingerstick    Collection Time: 11/02/21  5:14 PM   Result Value Ref Range    POC Glucose 333 (H) 75 - 110 mg/dL   POC Glucose Fingerstick    Collection Time: 11/02/21  9:05 PM   Result Value Ref Range    POC Glucose 228 (H) 75 - 110 mg/dL   Comprehensive Metabolic Panel w/ Reflex to MG    Collection Time: 11/03/21  3:38 AM   Result Value Ref Range    Glucose 246 (H) 70 - 99 mg/dL    BUN 16 8 - 23 mg/dL    CREATININE 1.15 0.70 - 1.20 mg/dL    Bun/Cre Ratio NOT REPORTED 9 - 20    Calcium 8.8 8.6 - 10.4 mg/dL    Sodium 139 135 - 144 mmol/L    Potassium 3.8 3.7 - 5.3 mmol/L    Chloride 104 98 - 107 mmol/L    CO2 23 20 - 31 mmol/L    Anion Gap 12 9 - 17 mmol/L    Alkaline Phosphatase 90 40 - 129 U/L    ALT 13 5 - 41 U/L    AST 16 <40 U/L    Total Bilirubin 0.56 0.3 - 1.2 mg/dL    Total Protein 5.6 (L) 6.4 - 8.3 g/dL    Albumin 3.5 3.5 - 5.2 g/dL    Albumin/Globulin Ratio 1.7 1.0 - 2.5    GFR Non-African American >60 >60 mL/min    GFR African American >60 >60 mL/min    GFR Comment          GFR Staging NOT REPORTED    CBC    Collection Time: 11/03/21  3:38 AM   Result Value Ref Range    WBC 5.9 3.5 - 11.3 k/uL    RBC 5.73 4.21 - 5.77 m/uL    Hemoglobin 16.0 13.0 - 17.0 g/dL    Hematocrit 49.2 40.7 - 50.3 %    MCV 85.9 82.6 - 102.9 fL    MCH 27.9 25.2 - 33.5 pg    MCHC 32.5 28.4 - 34.8 g/dL    RDW 12.9 11.8 - 14.4 %    Platelets 425 (L) 040 - 453 k/uL    MPV 12.1 8.1 - 13.5 fL    NRBC Automated 0.0 0.0 per 100 WBC   Protime-INR    Collection Time: 11/03/21  3:38 AM   Result Value Ref Range    Protime 11.1 9.1 - 12.3 sec    INR 1.0    POC Glucose Fingerstick    Collection Time: 11/03/21  7:57 AM   Result Value Ref Range    POC Glucose 274 (H) 75 - 110 mg/dL     Recent Labs     11/03/21  0338   WBC 5.9   RBC 5.73   HGB 16.0   HCT 49.2   MCV 85.9   MCH 27.9   MCHC 32.5   RDW 12.9   *   MPV 12.1     Recent Labs     11/03/21  0338      K 3.8      CO2 23   BUN 16   CREATININE 1.15 GLUCOSE 246*   CALCIUM 8.8   PROT 5.6*   LABALBU 3.5   BILITOT 0.56   ALKPHOS 90   AST 16   ALT 13     Hemoglobin A1C   Date Value Ref Range Status   07/23/2019 8.8 (H) 4.0 - 6.0 % Final       Assessment :      Primary Problem  Seizure [facility])    Active Hospital Problems    Diagnosis Date Noted    Seizure [facility]) [R56.9] 11/02/2021    Type 2 diabetes mellitus treated with insulin ([facility] [address] [E11.9, Z79.4] 07/28/2019    CKD (chronic kidney disease) stage 3, GFR 30-59 ml/min (HCC) [N18.30] 12/10/2018    Essential hypertension [I10] 12/10/2018     Case of a 47-year-old male with new onset seizures for 1 week described as left-sided shaking with feeling of aura/buildup prior to onset of shaking. Patient has been having episodes multiple times a day. Witnessed gen focal seizures with progression to bilateral tonic-clonic activity in the ER aborted with 2 mg Ativan. -CT head without contrast: Unremarkable  -MRI brain: No acute intracranial normality, minimal chronic microvascular disease  -Routine EEG: Normal in wakefulness and sleep        Plan:     -Continue Keppra 500 mg twice daily  -CT head, MRI brain unremarkable  -EEG normal in wakefulness and sleep  -Continue Lipitor 20 mg   -Ativan 1 mg as needed for seizures greater than 2 minutes  -OT/PT  -Anticipate discharge home today    No driving for at least 6 months. No heavy lifting for at least 6 months  No bathing or swimming unsupervised  Avoid locking doors, it prevents some to help you if needed  Avoid stairs unsupervised  Avoid cooking unsupervised        Follow-up further recommendations after discussing the case with attending  The plan was discussed with the patient, patient's family and the medical staff.    Consultations:   IP CONSULT TO NEUROLOGY  IP CONSULT TO INTERNAL MEDICINE    Patient is admitted as inpatient status because of co-morbidities listed above, severity of signs and symptoms as outlined, requirement for current medical therapies and most importantly because of direct risk to patient if care not provided in a hospital setting.     Omar Solares MD  11/3/2021  9:58 AM    Copy sent to Dr. Marianna Weinstein MD

## 2021-11-03 NOTE — PROGRESS NOTES
Patient discharged; left by wheelchair with daughter by car.  AVS given; medication explained; follow-ups explained

## 2021-11-03 NOTE — PROGRESS NOTES
Physician Progress Note      PATIENT:               Hector Su  CSN #:                  [id number]  :                       1945  ADMIT DATE:       2021 5:28 AM  [address] [place] DATE:  RESPONDING  PROVIDER #:        Audi Hatch MD          QUERY TEXT:    Pt admitted with seizure. Pt noted to have cardiac arrhythmias and elevated   troponins. If possible, please document in progress notes and discharge   summary if you are evaluating and/or treating any of the following: The medical record reflects the following:  Risk Factors: EKG- previous infarcts and arrhythmias, DM 2 with hyperglycemia  Clinical Indicators: troponins- 23,  35,  EKG- sinus arrhythmias with  1st   degree AV block with previous lateral and inferior infarcts cited before 2021,  glucose 246-431, VBG- 7.217 / 48 . 38.2 / 19.5 repeat with O2  -> 7.37   / 36.8 / 135 / 20.7  Treatment: Keppra - IV with PO dosing, IV Ativan PRN,  Oxygen therapy,   medicine consult    Thank You, Mikhail Granados, LATASHA-  email - [email or web address]  office- [phone]  Options provided:  -- Seizure due to cardiac arrhythmias and elevated troponins  -- Seizure due to cardiac arrhythmias  -- Seizure due to elevated troponins  -- Psychogenic Seizure  -- Seizure new onset ,unspecified  -- Other - I will add my own diagnosis  -- Disagree - Not applicable / Not valid  -- Disagree - Clinically unable to determine / Unknown  -- Refer to Clinical Documentation Reviewer    PROVIDER RESPONSE TEXT:    Seizure was a new onset and unspecified.     Query created by: Jahaira Gustafson on 11/3/2021 7:32 AM      Electronically signed by:  Audi Hatch MD 11/3/2021 12:34 PM

## 2021-11-04 NOTE — DISCHARGE SUMMARY
[facility]     Department of Neurology    INPATIENT DISCHARGE SUMMARY        Patient Identification:  Ghada Sharpe is a 64 y.o. male. :  1945  MRN: [id number]     Acct: [de-identified]   Admit Date:  2021  Discharge date and time: 11/3/2021  2:31 PM   Attending Provider: Luis Eduardo Remy DO                                   Admission Diagnoses:   Seizure ([facility] [address] [R56.9]  Seizure-like activity ([facility] [address] [R56.9]    Discharge Diagnoses:   Principal Problem:    New onset seizure ([address]  Active Problems:    CKD (chronic kidney disease) stage 3, GFR 30-59 ml/min ([facility] [address]    Essential hypertension    Type 2 diabetes mellitus treated with insulin ([facility] [address]  Resolved Problems:    * No resolved hospital problems. *       Consults:   IM    Brief Inpatient course:    Patient has a significant past medical history of peptic use, hyperlipidemia, DVT. He presented with left-sided shaking involving arm and leg for the past 1 week which have been occurring multiple times per day with no clear trigger. Patient has no history of seizures, head trauma, intracranial infections, family history. In the ER, patient had episode of GTC seizure aborted with 2 mg IV Ativan. Patient laoded with 1500 mg Keppra and continued on 500 mg bid. CT head, MRI brain unremarkable  EEG normal in wakefulness and sleep    During inpatient course, patient's BP found to hypertensive on his current antihypertensive regimen. Lisinopril 20 mg added per IM. Patient was also evaluated by IM due to concern for fluid overload. proBNP elevated at 1467. Plan for outpatient 2D ECHO, continue Lasix, and obtain BMP in 1 week with PCP follow up. Discussed with patient seizure precautions. No driving for at least 6 months.   No heavy lifting for at least 6 months  No bathing or swimming unsupervised  Avoid locking doors, it prevents some to help you if needed  Avoid stairs unsupervised  Avoid cooking unsupervised      Patient is stable from neurological standpoint to be discharged. Procedures:  EEG, MRI, CT     Any Hospital Acquired Infections: none    Discharge Functional Status:  stable    Disposition: home    Patient Instructions:   Continue Keppra 500 mg bid   Follow up with PCP regarding BMP results. Follow up with cardiology for 2D ECHO. No driving for at least 6 months. No heavy lifting for at least 6 months  No bathing or swimming unsupervised  Avoid locking doors, it prevents some to help you if needed  Avoid stairs unsupervised  Avoid cooking unsupervised        Discharge Medications:  Discharge Medication List as of 11/3/2021  1:36 PM        START taking these medications    Details   levETIRAcetam (KEPPRA) 500 MG tablet Take 1 tablet by mouth 2 times daily, Disp-60 tablet, R-3Normal      lisinopril (PRINIVIL;ZESTRIL) 20 MG tablet Take 1 tablet by mouth daily, Disp-30 tablet, R-3Normal           CONTINUE these medications which have NOT CHANGED    Details   Iron-Vitamin C (IRON 100/C) 100-250 MG TABS ironHistorical Med      venlafaxine (EFFEXOR XR) 150 MG extended release capsule Take 1 capsule by mouth daily (with breakfast), Disp-30 capsule, R-3Print      HUMALOG KWIKPEN 100 UNIT/ML pen Inject 5-15 Units into the skin 3 times daily (before meals), Disp-5 pen, R-3, DAWPrint      vitamin B-1 (THIAMINE) 100 MG tablet Take 100 mg by mouth dailyHistorical Med      ferrous sulfate 325 (65 Fe) MG tablet Take 325 mg by mouth daily (with breakfast)Historical Med      ALPRAZolam (XANAX) 0.5 MG tablet Take 0.5 mg by mouth three times daily. Historical Med      insulin glargine (BASAGLAR KWIKPEN) 100 UNIT/ML injection pen Inject 40 Units into the skin 2 times dailyHistorical Med      furosemide (LASIX) 40 MG tablet Take 1 tablet by mouth 2 times daily, Disp-60 tablet, R-3Normal      tiotropium (SPIRIVA RESPIMAT) 2.5 MCG/ACT AERS inhaler Inhale 2 puffs into the lungs daily Historical Med      albuterol sulfate  (90 Base) MCG/ACT inhaler

## 2021-11-07 NOTE — ED PROVIDER NOTES
[facility]   Emergency Department  Faculty Attestation       I performed a history and physical examination of the patient and discussed management with the resident. I reviewed the residents note and agree with the documented findings including all diagnostic interpretations and plan of care. Any areas of disagreement are noted on the chart. I was personally present for the key portions of any procedures. I have documented in the chart those procedures where I was not present during the key portions. I have reviewed the emergency nurses triage note. I agree with the chief complaint, past medical history, past surgical history, allergies, medications, social and family history as documented unless otherwise noted below. Documentation of the HPI, Physical Exam and Medical Decision Making performed by [email or web address] is based on my personal performance of the HPI, PE and MDM. For Physician Assistant/ Nurse Practitioner cases/documentation I have personally evaluated this patient and have completed at least one if not all key elements of the E/M (history, physical exam, and MDM). Additional findings are as noted. Pertinent Comments     Primary Care Physician: Felisa Saleh MD      ED Triage Vitals   BP Temp Temp Source Pulse Resp SpO2 Height Weight   11/02/21 0533 11/02/21 0601 11/02/21 0601 11/02/21 0533 11/02/21 0533 11/02/21 0533 -- 11/02/21 0601   111/65 98 °F (36.7 °C) Oral 79 19 93 %  185 lb (83.9 kg)        History/Physical: This is a 64 y.o. male who presents to the Emergency Department with complaint of new onset seizures reported as focal seizures progressing to generalized tonic-clonic seizures over 3 days. Reports tongue biting. Patient denies any chest pain, cough, fevers, or headaches. No reported neck stiffness. No reported drug use. Patient had a witnessed seizure requiring ativan prior to my evaluation.      On exam patient is awake and answering questions, but does appear postictal and groggy on my evaluation. Of note he has seizure-like activity and received Ativan prior to my evaluation normocephalic/atraumatic. Pupils are equal and reactive. Heart sounds regular lungs are auscultation. Abdomen soft nontender nondistended. No obvious extremity edema or deformity. Patient is alert and answering questions appropriately, but does not off during the conversation. Has no drift in normal strength and sensation throughout with no ataxia. No jaundice or rash on exposed skin    MDM/Plan:   New onset seizure  Seizure onset at this age is atypical, concern for electrolyte disturbance, medication withdrawal, mass, encephalopathy  Will check basic labs, EKG, and CT head  Initially was negative calcium because there is hyperacute T waves on patient's EKG however potassium resulted prior to that and family were initial decision to give calcium was held. Signed out to oncoming physician    EKG Interpretation    Interpreted by emergency department physician    Clinical Impression: Normal sinus rhythm with hyperacute T waves and questionable T wave inversions. Koko Qureshi MD        Critical Care: There was significant risk of life threatening deterioration of patient's condition requiring my direct management. Critical care time 30 minutes, excluding any documented procedures.     Koko Qureshi MD  Attending Emergency Physician        Koko Qureshi MD  11/07/21 2989

## 2022-01-01 ENCOUNTER — APPOINTMENT (OUTPATIENT)
Dept: ULTRASOUND IMAGING | Age: 77
DRG: 870 | End: 2022-01-01
Payer: COMMERCIAL

## 2022-01-01 ENCOUNTER — APPOINTMENT (OUTPATIENT)
Dept: GENERAL RADIOLOGY | Age: 77
DRG: 870 | End: 2022-01-01
Payer: COMMERCIAL

## 2022-01-01 ENCOUNTER — HOSPITAL ENCOUNTER (INPATIENT)
Age: 77
LOS: 33 days | DRG: 870 | End: 2022-02-18
Attending: EMERGENCY MEDICINE | Admitting: FAMILY MEDICINE
Payer: COMMERCIAL

## 2022-01-01 ENCOUNTER — APPOINTMENT (OUTPATIENT)
Dept: CT IMAGING | Age: 77
DRG: 870 | End: 2022-01-01
Payer: COMMERCIAL

## 2022-01-01 VITALS
TEMPERATURE: 98.5 F | BODY MASS INDEX: 34.65 KG/M2 | HEIGHT: 70 IN | DIASTOLIC BLOOD PRESSURE: 31 MMHG | OXYGEN SATURATION: 100 % | WEIGHT: 242 LBS | SYSTOLIC BLOOD PRESSURE: 69 MMHG

## 2022-01-01 DIAGNOSIS — N17.9 AKI (ACUTE KIDNEY INJURY) (HCC): ICD-10-CM

## 2022-01-01 DIAGNOSIS — J44.1 COPD EXACERBATION (HCC): ICD-10-CM

## 2022-01-01 DIAGNOSIS — J96.01 ACUTE RESPIRATORY FAILURE WITH HYPOXIA (HCC): ICD-10-CM

## 2022-01-01 DIAGNOSIS — U07.1 COVID-19: Primary | ICD-10-CM

## 2022-01-01 DIAGNOSIS — E87.6 HYPOKALEMIA: ICD-10-CM

## 2022-01-01 DIAGNOSIS — E83.42 HYPOMAGNESEMIA: ICD-10-CM

## 2022-01-01 DIAGNOSIS — I50.23 ACUTE ON CHRONIC SYSTOLIC CHF (CONGESTIVE HEART FAILURE) (HCC): ICD-10-CM

## 2022-01-01 LAB
-: ABNORMAL
ABSOLUTE EOS #: 0 K/UL (ref 0–0.4)
ABSOLUTE EOS #: 0 K/UL (ref 0–0.44)
ABSOLUTE EOS #: 0.08 K/UL (ref 0–0.44)
ABSOLUTE EOS #: 0.11 K/UL (ref 0–0.4)
ABSOLUTE EOS #: 0.14 K/UL (ref 0–0.44)
ABSOLUTE EOS #: 0.15 K/UL (ref 0–0.4)
ABSOLUTE EOS #: 0.15 K/UL (ref 0–0.4)
ABSOLUTE EOS #: 0.18 K/UL (ref 0–0.4)
ABSOLUTE EOS #: 0.2 K/UL (ref 0–0.4)
ABSOLUTE EOS #: 0.23 K/UL (ref 0–0.4)
ABSOLUTE EOS #: 0.36 K/UL (ref 0–0.4)
ABSOLUTE EOS #: 0.39 K/UL (ref 0–0.4)
ABSOLUTE EOS #: <0.03 K/UL (ref 0–0.44)
ABSOLUTE IMMATURE GRANULOCYTE: 0 K/UL (ref 0–0.3)
ABSOLUTE IMMATURE GRANULOCYTE: 0.02 K/UL (ref 0–0.3)
ABSOLUTE IMMATURE GRANULOCYTE: 0.03 K/UL (ref 0–0.3)
ABSOLUTE IMMATURE GRANULOCYTE: 0.04 K/UL (ref 0–0.3)
ABSOLUTE IMMATURE GRANULOCYTE: 0.04 K/UL (ref 0–0.3)
ABSOLUTE IMMATURE GRANULOCYTE: 0.06 K/UL (ref 0–0.3)
ABSOLUTE IMMATURE GRANULOCYTE: 0.07 K/UL (ref 0–0.3)
ABSOLUTE IMMATURE GRANULOCYTE: 0.07 K/UL (ref 0–0.3)
ABSOLUTE IMMATURE GRANULOCYTE: 0.08 K/UL (ref 0–0.3)
ABSOLUTE IMMATURE GRANULOCYTE: 0.08 K/UL (ref 0–0.3)
ABSOLUTE IMMATURE GRANULOCYTE: 0.09 K/UL (ref 0–0.3)
ABSOLUTE IMMATURE GRANULOCYTE: 0.11 K/UL (ref 0–0.3)
ABSOLUTE IMMATURE GRANULOCYTE: 0.11 K/UL (ref 0–0.3)
ABSOLUTE IMMATURE GRANULOCYTE: 0.12 K/UL (ref 0–0.3)
ABSOLUTE IMMATURE GRANULOCYTE: 0.14 K/UL (ref 0–0.3)
ABSOLUTE IMMATURE GRANULOCYTE: 0.15 K/UL (ref 0–0.3)
ABSOLUTE IMMATURE GRANULOCYTE: 0.15 K/UL (ref 0–0.3)
ABSOLUTE IMMATURE GRANULOCYTE: 0.17 K/UL (ref 0–0.3)
ABSOLUTE LYMPH #: 0.2 K/UL (ref 1–4.8)
ABSOLUTE LYMPH #: 0.21 K/UL (ref 1–4.8)
ABSOLUTE LYMPH #: 0.23 K/UL (ref 1–4.8)
ABSOLUTE LYMPH #: 0.28 K/UL (ref 1.1–3.7)
ABSOLUTE LYMPH #: 0.29 K/UL (ref 1–4.8)
ABSOLUTE LYMPH #: 0.31 K/UL (ref 1–4.8)
ABSOLUTE LYMPH #: 0.32 K/UL (ref 1–4.8)
ABSOLUTE LYMPH #: 0.34 K/UL (ref 1–4.8)
ABSOLUTE LYMPH #: 0.35 K/UL (ref 1.1–3.7)
ABSOLUTE LYMPH #: 0.35 K/UL (ref 1–4.8)
ABSOLUTE LYMPH #: 0.36 K/UL (ref 1.1–3.7)
ABSOLUTE LYMPH #: 0.37 K/UL (ref 1–4.8)
ABSOLUTE LYMPH #: 0.38 K/UL (ref 1–4.8)
ABSOLUTE LYMPH #: 0.39 K/UL (ref 1–4.8)
ABSOLUTE LYMPH #: 0.41 K/UL (ref 1.1–3.7)
ABSOLUTE LYMPH #: 0.43 K/UL (ref 1.1–3.7)
ABSOLUTE LYMPH #: 0.43 K/UL (ref 1–4.8)
ABSOLUTE LYMPH #: 0.43 K/UL (ref 1–4.8)
ABSOLUTE LYMPH #: 0.44 K/UL (ref 1.1–3.7)
ABSOLUTE LYMPH #: 0.48 K/UL (ref 1–4.8)
ABSOLUTE LYMPH #: 0.51 K/UL (ref 1–4.8)
ABSOLUTE LYMPH #: 0.54 K/UL (ref 1.1–3.7)
ABSOLUTE LYMPH #: 0.54 K/UL (ref 1–4.8)
ABSOLUTE LYMPH #: 0.58 K/UL (ref 1–4.8)
ABSOLUTE LYMPH #: 0.6 K/UL (ref 1–4.8)
ABSOLUTE LYMPH #: 0.6 K/UL (ref 1–4.8)
ABSOLUTE LYMPH #: 0.61 K/UL (ref 1.1–3.7)
ABSOLUTE LYMPH #: 0.65 K/UL (ref 1–4.8)
ABSOLUTE LYMPH #: 0.69 K/UL (ref 1–4.8)
ABSOLUTE LYMPH #: 0.71 K/UL (ref 1.1–3.7)
ABSOLUTE LYMPH #: 0.74 K/UL (ref 1–4.8)
ABSOLUTE LYMPH #: 0.75 K/UL (ref 1.1–3.7)
ABSOLUTE LYMPH #: 0.91 K/UL (ref 1–4.8)
ABSOLUTE LYMPH #: 1.14 K/UL (ref 1.1–3.7)
ABSOLUTE MONO #: 0.15 K/UL (ref 0.2–0.8)
ABSOLUTE MONO #: 0.2 K/UL (ref 0.1–1.2)
ABSOLUTE MONO #: 0.24 K/UL (ref 0.1–1.2)
ABSOLUTE MONO #: 0.24 K/UL (ref 0.2–0.8)
ABSOLUTE MONO #: 0.24 K/UL (ref 0.2–0.8)
ABSOLUTE MONO #: 0.31 K/UL (ref 0.2–0.8)
ABSOLUTE MONO #: 0.31 K/UL (ref 0.2–0.8)
ABSOLUTE MONO #: 0.32 K/UL (ref 0.2–0.8)
ABSOLUTE MONO #: 0.33 K/UL (ref 0.2–0.8)
ABSOLUTE MONO #: 0.36 K/UL (ref 0.2–0.8)
ABSOLUTE MONO #: 0.39 K/UL (ref 0.2–0.8)
ABSOLUTE MONO #: 0.4 K/UL (ref 0.2–0.8)
ABSOLUTE MONO #: 0.43 K/UL (ref 0.2–0.8)
ABSOLUTE MONO #: 0.45 K/UL (ref 0.1–1.2)
ABSOLUTE MONO #: 0.46 K/UL (ref 0.2–0.8)
ABSOLUTE MONO #: 0.48 K/UL (ref 0.2–0.8)
ABSOLUTE MONO #: 0.54 K/UL (ref 0.2–0.8)
ABSOLUTE MONO #: 0.57 K/UL (ref 0.1–1.2)
ABSOLUTE MONO #: 0.58 K/UL (ref 0.2–0.8)
ABSOLUTE MONO #: 0.59 K/UL (ref 0.2–0.8)
ABSOLUTE MONO #: 0.6 K/UL (ref 0.2–0.8)
ABSOLUTE MONO #: 0.6 K/UL (ref 0.2–0.8)
ABSOLUTE MONO #: 0.62 K/UL (ref 0.1–1.2)
ABSOLUTE MONO #: 0.62 K/UL (ref 0.1–1.2)
ABSOLUTE MONO #: 0.66 K/UL (ref 0.1–1.2)
ABSOLUTE MONO #: 0.68 K/UL (ref 0.1–1.2)
ABSOLUTE MONO #: 0.68 K/UL (ref 0.2–0.8)
ABSOLUTE MONO #: 0.71 K/UL (ref 0.1–1.2)
ABSOLUTE MONO #: 0.82 K/UL (ref 0.2–0.8)
ABSOLUTE MONO #: 0.84 K/UL (ref 0.1–1.2)
ABSOLUTE MONO #: 0.96 K/UL (ref 0.2–0.8)
ABSOLUTE MONO #: 1.05 K/UL (ref 0.1–1.2)
ALBUMIN SERPL-MCNC: 1.6 G/DL (ref 3.5–5.2)
ALBUMIN SERPL-MCNC: 2.4 G/DL (ref 3.5–5.2)
ALBUMIN SERPL-MCNC: 2.4 G/DL (ref 3.5–5.2)
ALBUMIN SERPL-MCNC: 2.5 G/DL (ref 3.5–5.2)
ALBUMIN SERPL-MCNC: 3.8 G/DL (ref 3.5–5.2)
ALBUMIN/GLOBULIN RATIO: ABNORMAL (ref 1–2.5)
ALLEN TEST: ABNORMAL
ALLEN TEST: NORMAL
ALLEN TEST: NORMAL
ALP BLD-CCNC: 100 U/L (ref 40–129)
ALP BLD-CCNC: 105 U/L (ref 40–129)
ALP BLD-CCNC: 117 U/L (ref 40–129)
ALP BLD-CCNC: 123 U/L (ref 40–129)
ALP BLD-CCNC: 99 U/L (ref 40–129)
ALT SERPL-CCNC: 24 U/L (ref 5–41)
ALT SERPL-CCNC: 26 U/L (ref 5–41)
ALT SERPL-CCNC: 36 U/L (ref 5–41)
ALT SERPL-CCNC: 43 U/L (ref 5–41)
ALT SERPL-CCNC: 77 U/L (ref 5–41)
AMMONIA: 30 UMOL/L (ref 16–60)
AMORPHOUS: ABNORMAL
AMYLASE: 46 U/L (ref 28–100)
ANION GAP SERPL CALCULATED.3IONS-SCNC: 10 MMOL/L (ref 9–17)
ANION GAP SERPL CALCULATED.3IONS-SCNC: 11 MMOL/L (ref 9–17)
ANION GAP SERPL CALCULATED.3IONS-SCNC: 12 MMOL/L (ref 9–17)
ANION GAP SERPL CALCULATED.3IONS-SCNC: 13 MMOL/L (ref 9–17)
ANION GAP SERPL CALCULATED.3IONS-SCNC: 14 MMOL/L (ref 9–17)
ANION GAP SERPL CALCULATED.3IONS-SCNC: 14 MMOL/L (ref 9–17)
ANION GAP SERPL CALCULATED.3IONS-SCNC: 15 MMOL/L (ref 9–17)
ANION GAP SERPL CALCULATED.3IONS-SCNC: 15 MMOL/L (ref 9–17)
ANION GAP SERPL CALCULATED.3IONS-SCNC: 16 MMOL/L (ref 9–17)
ANION GAP SERPL CALCULATED.3IONS-SCNC: 17 MMOL/L (ref 9–17)
ANION GAP SERPL CALCULATED.3IONS-SCNC: 4 MMOL/L (ref 9–17)
ANION GAP SERPL CALCULATED.3IONS-SCNC: 5 MMOL/L (ref 9–17)
ANION GAP SERPL CALCULATED.3IONS-SCNC: 5 MMOL/L (ref 9–17)
ANION GAP SERPL CALCULATED.3IONS-SCNC: 6 MMOL/L (ref 9–17)
ANION GAP SERPL CALCULATED.3IONS-SCNC: 7 MMOL/L (ref 9–17)
ANION GAP SERPL CALCULATED.3IONS-SCNC: 7 MMOL/L (ref 9–17)
ANION GAP SERPL CALCULATED.3IONS-SCNC: 8 MMOL/L (ref 9–17)
ANION GAP SERPL CALCULATED.3IONS-SCNC: 9 MMOL/L (ref 9–17)
AST SERPL-CCNC: 28 U/L
AST SERPL-CCNC: 39 U/L
AST SERPL-CCNC: 45 U/L
AST SERPL-CCNC: 46 U/L
AST SERPL-CCNC: 72 U/L
BACTERIA: ABNORMAL
BASOPHILS # BLD: 0 %
BASOPHILS # BLD: 0 % (ref 0–2)
BASOPHILS # BLD: 1 %
BASOPHILS # BLD: 1 % (ref 0–2)
BASOPHILS ABSOLUTE: 0 K/UL (ref 0–0.2)
BASOPHILS ABSOLUTE: 0.01 K/UL (ref 0–0.2)
BASOPHILS ABSOLUTE: 0.02 K/UL (ref 0–0.2)
BASOPHILS ABSOLUTE: 0.02 K/UL (ref 0–0.2)
BASOPHILS ABSOLUTE: 0.04 K/UL (ref 0–0.2)
BASOPHILS ABSOLUTE: 0.08 K/UL (ref 0–0.2)
BASOPHILS ABSOLUTE: 0.11 K/UL (ref 0–0.2)
BASOPHILS ABSOLUTE: <0.03 K/UL (ref 0–0.2)
BILIRUB SERPL-MCNC: 0.28 MG/DL (ref 0.3–1.2)
BILIRUB SERPL-MCNC: 0.32 MG/DL (ref 0.3–1.2)
BILIRUB SERPL-MCNC: 0.37 MG/DL (ref 0.3–1.2)
BILIRUB SERPL-MCNC: 0.37 MG/DL (ref 0.3–1.2)
BILIRUB SERPL-MCNC: 0.68 MG/DL (ref 0.3–1.2)
BILIRUBIN DIRECT: 0.16 MG/DL
BILIRUBIN DIRECT: 0.16 MG/DL
BILIRUBIN DIRECT: 0.21 MG/DL
BILIRUBIN URINE: NEGATIVE
BILIRUBIN, INDIRECT: 0.16 MG/DL (ref 0–1)
BILIRUBIN, INDIRECT: 0.16 MG/DL (ref 0–1)
BILIRUBIN, INDIRECT: 0.21 MG/DL (ref 0–1)
BNP INTERPRETATION: ABNORMAL
BUN BLDV-MCNC: 20 MG/DL (ref 8–23)
BUN BLDV-MCNC: 23 MG/DL (ref 8–23)
BUN BLDV-MCNC: 24 MG/DL (ref 8–23)
BUN BLDV-MCNC: 27 MG/DL (ref 8–23)
BUN BLDV-MCNC: 28 MG/DL (ref 8–23)
BUN BLDV-MCNC: 32 MG/DL (ref 8–23)
BUN BLDV-MCNC: 35 MG/DL (ref 8–23)
BUN BLDV-MCNC: 38 MG/DL (ref 8–23)
BUN BLDV-MCNC: 38 MG/DL (ref 8–23)
BUN BLDV-MCNC: 40 MG/DL (ref 8–23)
BUN BLDV-MCNC: 42 MG/DL (ref 8–23)
BUN BLDV-MCNC: 43 MG/DL (ref 8–23)
BUN BLDV-MCNC: 46 MG/DL (ref 8–23)
BUN BLDV-MCNC: 46 MG/DL (ref 8–23)
BUN BLDV-MCNC: 54 MG/DL (ref 8–23)
BUN BLDV-MCNC: 61 MG/DL (ref 8–23)
BUN BLDV-MCNC: 72 MG/DL (ref 8–23)
BUN BLDV-MCNC: 77 MG/DL (ref 8–23)
BUN BLDV-MCNC: 78 MG/DL (ref 8–23)
BUN BLDV-MCNC: 81 MG/DL (ref 8–23)
BUN BLDV-MCNC: 82 MG/DL (ref 8–23)
BUN BLDV-MCNC: 83 MG/DL (ref 8–23)
BUN BLDV-MCNC: 83 MG/DL (ref 8–23)
BUN BLDV-MCNC: 84 MG/DL (ref 8–23)
BUN BLDV-MCNC: 84 MG/DL (ref 8–23)
BUN BLDV-MCNC: 85 MG/DL (ref 8–23)
BUN BLDV-MCNC: 87 MG/DL (ref 8–23)
BUN BLDV-MCNC: 87 MG/DL (ref 8–23)
BUN BLDV-MCNC: 88 MG/DL (ref 8–23)
BUN BLDV-MCNC: 90 MG/DL (ref 8–23)
BUN BLDV-MCNC: 91 MG/DL (ref 8–23)
BUN BLDV-MCNC: 91 MG/DL (ref 8–23)
BUN BLDV-MCNC: 92 MG/DL (ref 8–23)
BUN BLDV-MCNC: 93 MG/DL (ref 8–23)
BUN BLDV-MCNC: 95 MG/DL (ref 8–23)
BUN BLDV-MCNC: 96 MG/DL (ref 8–23)
BUN/CREAT BLD: 15 (ref 9–20)
BUN/CREAT BLD: 18 (ref 9–20)
BUN/CREAT BLD: 19 (ref 9–20)
BUN/CREAT BLD: 24 (ref 9–20)
BUN/CREAT BLD: 25 (ref 9–20)
BUN/CREAT BLD: 27 (ref 9–20)
BUN/CREAT BLD: 34 (ref 9–20)
BUN/CREAT BLD: 43 (ref 9–20)
BUN/CREAT BLD: 49 (ref 9–20)
BUN/CREAT BLD: 51 (ref 9–20)
BUN/CREAT BLD: 51 (ref 9–20)
BUN/CREAT BLD: 57 (ref 9–20)
BUN/CREAT BLD: 58 (ref 9–20)
BUN/CREAT BLD: 59 (ref 9–20)
BUN/CREAT BLD: 60 (ref 9–20)
BUN/CREAT BLD: 60 (ref 9–20)
BUN/CREAT BLD: 62 (ref 9–20)
BUN/CREAT BLD: 63 (ref 9–20)
BUN/CREAT BLD: 64 (ref 9–20)
BUN/CREAT BLD: 65 (ref 9–20)
BUN/CREAT BLD: 67 (ref 9–20)
BUN/CREAT BLD: 68 (ref 9–20)
BUN/CREAT BLD: 69 (ref 9–20)
BUN/CREAT BLD: 70 (ref 9–20)
BUN/CREAT BLD: 72 (ref 9–20)
BUN/CREAT BLD: 73 (ref 9–20)
BUN/CREAT BLD: 74 (ref 9–20)
BUN/CREAT BLD: 80 (ref 9–20)
BUN/CREAT BLD: 81 (ref 9–20)
BUN/CREAT BLD: 83 (ref 9–20)
BUN/CREAT BLD: 85 (ref 9–20)
BUN/CREAT BLD: 86 (ref 9–20)
BUN/CREAT BLD: 87 (ref 9–20)
BUN/CREAT BLD: 88 (ref 9–20)
BUN/CREAT BLD: 92 (ref 9–20)
C-REACTIVE PROTEIN: 23.5 MG/L (ref 0–5)
CALCIUM SERPL-MCNC: 8.3 MG/DL (ref 8.6–10.4)
CALCIUM SERPL-MCNC: 8.4 MG/DL (ref 8.6–10.4)
CALCIUM SERPL-MCNC: 8.4 MG/DL (ref 8.6–10.4)
CALCIUM SERPL-MCNC: 8.5 MG/DL (ref 8.6–10.4)
CALCIUM SERPL-MCNC: 8.5 MG/DL (ref 8.6–10.4)
CALCIUM SERPL-MCNC: 8.6 MG/DL (ref 8.6–10.4)
CALCIUM SERPL-MCNC: 8.7 MG/DL (ref 8.6–10.4)
CALCIUM SERPL-MCNC: 8.8 MG/DL (ref 8.6–10.4)
CALCIUM SERPL-MCNC: 8.9 MG/DL (ref 8.6–10.4)
CALCIUM SERPL-MCNC: 9 MG/DL (ref 8.6–10.4)
CALCIUM SERPL-MCNC: 9.1 MG/DL (ref 8.6–10.4)
CALCIUM SERPL-MCNC: 9.2 MG/DL (ref 8.6–10.4)
CALCIUM SERPL-MCNC: 9.3 MG/DL (ref 8.6–10.4)
CALCIUM SERPL-MCNC: 9.4 MG/DL (ref 8.6–10.4)
CALCIUM SERPL-MCNC: 9.5 MG/DL (ref 8.6–10.4)
CALCIUM SERPL-MCNC: 9.6 MG/DL (ref 8.6–10.4)
CALCIUM SERPL-MCNC: 9.6 MG/DL (ref 8.6–10.4)
CALCIUM SERPL-MCNC: 9.7 MG/DL (ref 8.6–10.4)
CALCIUM SERPL-MCNC: 9.7 MG/DL (ref 8.6–10.4)
CALCIUM SERPL-MCNC: 9.9 MG/DL (ref 8.6–10.4)
CALCIUM SERPL-MCNC: 9.9 MG/DL (ref 8.6–10.4)
CASTS UA: ABNORMAL /LPF
CHLORIDE BLD-SCNC: 100 MMOL/L (ref 98–107)
CHLORIDE BLD-SCNC: 102 MMOL/L (ref 98–107)
CHLORIDE BLD-SCNC: 103 MMOL/L (ref 98–107)
CHLORIDE BLD-SCNC: 104 MMOL/L (ref 98–107)
CHLORIDE BLD-SCNC: 105 MMOL/L (ref 98–107)
CHLORIDE BLD-SCNC: 106 MMOL/L (ref 98–107)
CHLORIDE BLD-SCNC: 107 MMOL/L (ref 98–107)
CHLORIDE BLD-SCNC: 108 MMOL/L (ref 98–107)
CHLORIDE BLD-SCNC: 108 MMOL/L (ref 98–107)
CHLORIDE BLD-SCNC: 109 MMOL/L (ref 98–107)
CHLORIDE BLD-SCNC: 94 MMOL/L (ref 98–107)
CHLORIDE BLD-SCNC: 96 MMOL/L (ref 98–107)
CHLORIDE BLD-SCNC: 96 MMOL/L (ref 98–107)
CHLORIDE BLD-SCNC: 97 MMOL/L (ref 98–107)
CHLORIDE BLD-SCNC: 98 MMOL/L (ref 98–107)
CHLORIDE BLD-SCNC: 98 MMOL/L (ref 98–107)
CHLORIDE BLD-SCNC: 99 MMOL/L (ref 98–107)
CHLORIDE BLD-SCNC: 99 MMOL/L (ref 98–107)
CO2: 19 MMOL/L (ref 20–31)
CO2: 20 MMOL/L (ref 20–31)
CO2: 21 MMOL/L (ref 20–31)
CO2: 22 MMOL/L (ref 20–31)
CO2: 23 MMOL/L (ref 20–31)
CO2: 24 MMOL/L (ref 20–31)
CO2: 25 MMOL/L (ref 20–31)
CO2: 26 MMOL/L (ref 20–31)
CO2: 27 MMOL/L (ref 20–31)
CO2: 28 MMOL/L (ref 20–31)
CO2: 29 MMOL/L (ref 20–31)
CO2: 30 MMOL/L (ref 20–31)
CO2: 30 MMOL/L (ref 20–31)
CO2: 31 MMOL/L (ref 20–31)
CO2: 31 MMOL/L (ref 20–31)
CO2: 32 MMOL/L (ref 20–31)
COLOR: YELLOW
COMMENT UA: ABNORMAL
CREAT SERPL-MCNC: 0.59 MG/DL (ref 0.7–1.2)
CREAT SERPL-MCNC: 0.6 MG/DL (ref 0.7–1.2)
CREAT SERPL-MCNC: 0.66 MG/DL (ref 0.7–1.2)
CREAT SERPL-MCNC: 0.67 MG/DL (ref 0.7–1.2)
CREAT SERPL-MCNC: 0.69 MG/DL (ref 0.7–1.2)
CREAT SERPL-MCNC: 0.73 MG/DL (ref 0.7–1.2)
CREAT SERPL-MCNC: 0.74 MG/DL (ref 0.7–1.2)
CREAT SERPL-MCNC: 0.78 MG/DL (ref 0.7–1.2)
CREAT SERPL-MCNC: 0.84 MG/DL (ref 0.7–1.2)
CREAT SERPL-MCNC: 0.89 MG/DL (ref 0.7–1.2)
CREAT SERPL-MCNC: 0.9 MG/DL (ref 0.7–1.2)
CREAT SERPL-MCNC: 0.92 MG/DL (ref 0.7–1.2)
CREAT SERPL-MCNC: 0.97 MG/DL (ref 0.7–1.2)
CREAT SERPL-MCNC: 0.98 MG/DL (ref 0.7–1.2)
CREAT SERPL-MCNC: 1.01 MG/DL (ref 0.7–1.2)
CREAT SERPL-MCNC: 1.02 MG/DL (ref 0.7–1.2)
CREAT SERPL-MCNC: 1.04 MG/DL (ref 0.7–1.2)
CREAT SERPL-MCNC: 1.04 MG/DL (ref 0.7–1.2)
CREAT SERPL-MCNC: 1.08 MG/DL (ref 0.7–1.2)
CREAT SERPL-MCNC: 1.1 MG/DL (ref 0.7–1.2)
CREAT SERPL-MCNC: 1.18 MG/DL (ref 0.7–1.2)
CREAT SERPL-MCNC: 1.2 MG/DL (ref 0.7–1.2)
CREAT SERPL-MCNC: 1.23 MG/DL (ref 0.7–1.2)
CREAT SERPL-MCNC: 1.28 MG/DL (ref 0.7–1.2)
CREAT SERPL-MCNC: 1.28 MG/DL (ref 0.7–1.2)
CREAT SERPL-MCNC: 1.3 MG/DL (ref 0.7–1.2)
CREAT SERPL-MCNC: 1.31 MG/DL (ref 0.7–1.2)
CREAT SERPL-MCNC: 1.35 MG/DL (ref 0.7–1.2)
CREAT SERPL-MCNC: 1.46 MG/DL (ref 0.7–1.2)
CREAT SERPL-MCNC: 1.47 MG/DL (ref 0.7–1.2)
CREAT SERPL-MCNC: 1.47 MG/DL (ref 0.7–1.2)
CREAT SERPL-MCNC: 1.49 MG/DL (ref 0.7–1.2)
CREAT SERPL-MCNC: 1.51 MG/DL (ref 0.7–1.2)
CREAT SERPL-MCNC: 1.54 MG/DL (ref 0.7–1.2)
CREAT SERPL-MCNC: 1.56 MG/DL (ref 0.7–1.2)
CREAT SERPL-MCNC: 1.6 MG/DL (ref 0.7–1.2)
CREAT SERPL-MCNC: 1.71 MG/DL (ref 0.7–1.2)
CREAT SERPL-MCNC: 1.84 MG/DL (ref 0.7–1.2)
CREAT SERPL-MCNC: 2.04 MG/DL (ref 0.7–1.2)
CREAT SERPL-MCNC: 2.29 MG/DL (ref 0.7–1.2)
CREAT SERPL-MCNC: 2.43 MG/DL (ref 0.7–1.2)
CRYSTALS, UA: ABNORMAL /HPF
CULTURE: NORMAL
D-DIMER QUANTITATIVE: 1.53 MG/L FEU (ref 0–0.59)
D-DIMER QUANTITATIVE: 1.73 MG/L FEU (ref 0–0.59)
D-DIMER QUANTITATIVE: 2.96 MG/L FEU (ref 0–0.59)
D-DIMER QUANTITATIVE: 4.53 MG/L FEU (ref 0–0.59)
D-DIMER QUANTITATIVE: 5.84 MG/L FEU (ref 0–0.59)
DIFFERENTIAL TYPE: ABNORMAL
DIRECT EXAM: NORMAL
EKG ATRIAL RATE: 106 BPM
EKG ATRIAL RATE: 292 BPM
EKG ATRIAL RATE: 82 BPM
EKG P AXIS: 54 DEGREES
EKG P AXIS: 60 DEGREES
EKG P AXIS: 88 DEGREES
EKG P-R INTERVAL: 180 MS
EKG P-R INTERVAL: 210 MS
EKG Q-T INTERVAL: 300 MS
EKG Q-T INTERVAL: 332 MS
EKG Q-T INTERVAL: 428 MS
EKG QRS DURATION: 108 MS
EKG QRS DURATION: 108 MS
EKG QRS DURATION: 120 MS
EKG QTC CALCULATION (BAZETT): 441 MS
EKG QTC CALCULATION (BAZETT): 458 MS
EKG QTC CALCULATION (BAZETT): 467 MS
EKG R AXIS: 101 DEGREES
EKG R AXIS: 103 DEGREES
EKG R AXIS: 87 DEGREES
EKG T AXIS: -26 DEGREES
EKG T AXIS: -73 DEGREES
EKG T AXIS: -8 DEGREES
EKG VENTRICULAR RATE: 106 BPM
EKG VENTRICULAR RATE: 146 BPM
EKG VENTRICULAR RATE: 69 BPM
EOSINOPHILS RELATIVE PERCENT: 0 % (ref 1–4)
EOSINOPHILS RELATIVE PERCENT: 1 % (ref 1–4)
EOSINOPHILS RELATIVE PERCENT: 3 % (ref 1–4)
EOSINOPHILS RELATIVE PERCENT: 5 % (ref 1–4)
EPITHELIAL CELLS UA: ABNORMAL /HPF (ref 0–5)
ESTIMATED AVERAGE GLUCOSE: 214 MG/DL
ESTIMATED AVERAGE GLUCOSE: 232 MG/DL
ESTIMATED AVERAGE GLUCOSE: 232 MG/DL
FERRITIN: 569 UG/L (ref 30–400)
FIBRINOGEN: 402 MG/DL (ref 179–518)
FIO2: 100
FIO2: 45
FIO2: 50
FIO2: 55
FIO2: 60
FIO2: 65
FIO2: 70
FIO2: 75
FIO2: 80
FIO2: 85
FIO2: 90
FIO2: 95
FIO2: ABNORMAL
GFR AFRICAN AMERICAN: 31 ML/MIN
GFR AFRICAN AMERICAN: 34 ML/MIN
GFR AFRICAN AMERICAN: 38 ML/MIN
GFR AFRICAN AMERICAN: 43 ML/MIN
GFR AFRICAN AMERICAN: 47 ML/MIN
GFR AFRICAN AMERICAN: 51 ML/MIN
GFR AFRICAN AMERICAN: 52 ML/MIN
GFR AFRICAN AMERICAN: 53 ML/MIN
GFR AFRICAN AMERICAN: 54 ML/MIN
GFR AFRICAN AMERICAN: 55 ML/MIN
GFR AFRICAN AMERICAN: 56 ML/MIN
GFR AFRICAN AMERICAN: 56 ML/MIN
GFR AFRICAN AMERICAN: 57 ML/MIN
GFR AFRICAN AMERICAN: >60 ML/MIN
GFR NON-AFRICAN AMERICAN: 26 ML/MIN
GFR NON-AFRICAN AMERICAN: 28 ML/MIN
GFR NON-AFRICAN AMERICAN: 32 ML/MIN
GFR NON-AFRICAN AMERICAN: 36 ML/MIN
GFR NON-AFRICAN AMERICAN: 39 ML/MIN
GFR NON-AFRICAN AMERICAN: 42 ML/MIN
GFR NON-AFRICAN AMERICAN: 43 ML/MIN
GFR NON-AFRICAN AMERICAN: 44 ML/MIN
GFR NON-AFRICAN AMERICAN: 45 ML/MIN
GFR NON-AFRICAN AMERICAN: 46 ML/MIN
GFR NON-AFRICAN AMERICAN: 47 ML/MIN
GFR NON-AFRICAN AMERICAN: 51 ML/MIN
GFR NON-AFRICAN AMERICAN: 53 ML/MIN
GFR NON-AFRICAN AMERICAN: 53 ML/MIN
GFR NON-AFRICAN AMERICAN: 54 ML/MIN
GFR NON-AFRICAN AMERICAN: 54 ML/MIN
GFR NON-AFRICAN AMERICAN: 57 ML/MIN
GFR NON-AFRICAN AMERICAN: 59 ML/MIN
GFR NON-AFRICAN AMERICAN: 60 ML/MIN
GFR NON-AFRICAN AMERICAN: >60 ML/MIN
GFR SERPL CREATININE-BSD FRML MDRD: ABNORMAL ML/MIN/{1.73_M2}
GLOBULIN: ABNORMAL G/DL (ref 1.5–3.8)
GLUCOSE BLD-MCNC: 100 MG/DL (ref 75–110)
GLUCOSE BLD-MCNC: 101 MG/DL (ref 75–110)
GLUCOSE BLD-MCNC: 101 MG/DL (ref 75–110)
GLUCOSE BLD-MCNC: 102 MG/DL (ref 75–110)
GLUCOSE BLD-MCNC: 102 MG/DL (ref 75–110)
GLUCOSE BLD-MCNC: 104 MG/DL (ref 75–110)
GLUCOSE BLD-MCNC: 104 MG/DL (ref 75–110)
GLUCOSE BLD-MCNC: 105 MG/DL (ref 75–110)
GLUCOSE BLD-MCNC: 105 MG/DL (ref 75–110)
GLUCOSE BLD-MCNC: 107 MG/DL (ref 75–110)
GLUCOSE BLD-MCNC: 108 MG/DL (ref 75–110)
GLUCOSE BLD-MCNC: 108 MG/DL (ref 75–110)
GLUCOSE BLD-MCNC: 111 MG/DL (ref 75–110)
GLUCOSE BLD-MCNC: 114 MG/DL (ref 75–110)
GLUCOSE BLD-MCNC: 114 MG/DL (ref 75–110)
GLUCOSE BLD-MCNC: 115 MG/DL (ref 75–110)
GLUCOSE BLD-MCNC: 118 MG/DL (ref 75–110)
GLUCOSE BLD-MCNC: 119 MG/DL (ref 75–110)
GLUCOSE BLD-MCNC: 120 MG/DL (ref 75–110)
GLUCOSE BLD-MCNC: 122 MG/DL (ref 75–110)
GLUCOSE BLD-MCNC: 122 MG/DL (ref 75–110)
GLUCOSE BLD-MCNC: 123 MG/DL (ref 75–110)
GLUCOSE BLD-MCNC: 124 MG/DL (ref 70–99)
GLUCOSE BLD-MCNC: 124 MG/DL (ref 75–110)
GLUCOSE BLD-MCNC: 126 MG/DL (ref 75–110)
GLUCOSE BLD-MCNC: 126 MG/DL (ref 75–110)
GLUCOSE BLD-MCNC: 127 MG/DL (ref 70–99)
GLUCOSE BLD-MCNC: 130 MG/DL (ref 75–110)
GLUCOSE BLD-MCNC: 132 MG/DL (ref 75–110)
GLUCOSE BLD-MCNC: 133 MG/DL (ref 75–110)
GLUCOSE BLD-MCNC: 134 MG/DL (ref 75–110)
GLUCOSE BLD-MCNC: 134 MG/DL (ref 75–110)
GLUCOSE BLD-MCNC: 135 MG/DL (ref 75–110)
GLUCOSE BLD-MCNC: 135 MG/DL (ref 75–110)
GLUCOSE BLD-MCNC: 136 MG/DL (ref 75–110)
GLUCOSE BLD-MCNC: 137 MG/DL (ref 75–110)
GLUCOSE BLD-MCNC: 137 MG/DL (ref 75–110)
GLUCOSE BLD-MCNC: 138 MG/DL (ref 75–110)
GLUCOSE BLD-MCNC: 139 MG/DL (ref 75–110)
GLUCOSE BLD-MCNC: 140 MG/DL (ref 75–110)
GLUCOSE BLD-MCNC: 140 MG/DL (ref 75–110)
GLUCOSE BLD-MCNC: 141 MG/DL (ref 75–110)
GLUCOSE BLD-MCNC: 142 MG/DL (ref 70–99)
GLUCOSE BLD-MCNC: 142 MG/DL (ref 75–110)
GLUCOSE BLD-MCNC: 142 MG/DL (ref 75–110)
GLUCOSE BLD-MCNC: 143 MG/DL (ref 70–99)
GLUCOSE BLD-MCNC: 143 MG/DL (ref 75–110)
GLUCOSE BLD-MCNC: 144 MG/DL (ref 75–110)
GLUCOSE BLD-MCNC: 145 MG/DL (ref 75–110)
GLUCOSE BLD-MCNC: 146 MG/DL (ref 70–99)
GLUCOSE BLD-MCNC: 146 MG/DL (ref 75–110)
GLUCOSE BLD-MCNC: 147 MG/DL (ref 75–110)
GLUCOSE BLD-MCNC: 147 MG/DL (ref 75–110)
GLUCOSE BLD-MCNC: 148 MG/DL (ref 75–110)
GLUCOSE BLD-MCNC: 149 MG/DL (ref 75–110)
GLUCOSE BLD-MCNC: 150 MG/DL (ref 75–110)
GLUCOSE BLD-MCNC: 150 MG/DL (ref 75–110)
GLUCOSE BLD-MCNC: 151 MG/DL (ref 75–110)
GLUCOSE BLD-MCNC: 152 MG/DL (ref 75–110)
GLUCOSE BLD-MCNC: 153 MG/DL (ref 70–99)
GLUCOSE BLD-MCNC: 153 MG/DL (ref 70–99)
GLUCOSE BLD-MCNC: 153 MG/DL (ref 75–110)
GLUCOSE BLD-MCNC: 154 MG/DL (ref 75–110)
GLUCOSE BLD-MCNC: 155 MG/DL (ref 70–99)
GLUCOSE BLD-MCNC: 155 MG/DL (ref 75–110)
GLUCOSE BLD-MCNC: 155 MG/DL (ref 75–110)
GLUCOSE BLD-MCNC: 156 MG/DL (ref 75–110)
GLUCOSE BLD-MCNC: 156 MG/DL (ref 75–110)
GLUCOSE BLD-MCNC: 157 MG/DL (ref 75–110)
GLUCOSE BLD-MCNC: 157 MG/DL (ref 75–110)
GLUCOSE BLD-MCNC: 158 MG/DL (ref 75–110)
GLUCOSE BLD-MCNC: 159 MG/DL (ref 75–110)
GLUCOSE BLD-MCNC: 160 MG/DL (ref 75–110)
GLUCOSE BLD-MCNC: 161 MG/DL (ref 75–110)
GLUCOSE BLD-MCNC: 161 MG/DL (ref 75–110)
GLUCOSE BLD-MCNC: 163 MG/DL (ref 75–110)
GLUCOSE BLD-MCNC: 164 MG/DL (ref 75–110)
GLUCOSE BLD-MCNC: 165 MG/DL (ref 70–99)
GLUCOSE BLD-MCNC: 165 MG/DL (ref 75–110)
GLUCOSE BLD-MCNC: 165 MG/DL (ref 75–110)
GLUCOSE BLD-MCNC: 167 MG/DL (ref 75–110)
GLUCOSE BLD-MCNC: 168 MG/DL (ref 75–110)
GLUCOSE BLD-MCNC: 168 MG/DL (ref 75–110)
GLUCOSE BLD-MCNC: 169 MG/DL (ref 75–110)
GLUCOSE BLD-MCNC: 170 MG/DL (ref 75–110)
GLUCOSE BLD-MCNC: 171 MG/DL (ref 70–99)
GLUCOSE BLD-MCNC: 172 MG/DL (ref 75–110)
GLUCOSE BLD-MCNC: 173 MG/DL (ref 75–110)
GLUCOSE BLD-MCNC: 174 MG/DL (ref 75–110)
GLUCOSE BLD-MCNC: 175 MG/DL (ref 75–110)
GLUCOSE BLD-MCNC: 176 MG/DL (ref 75–110)
GLUCOSE BLD-MCNC: 177 MG/DL (ref 75–110)
GLUCOSE BLD-MCNC: 178 MG/DL (ref 70–99)
GLUCOSE BLD-MCNC: 178 MG/DL (ref 75–110)
GLUCOSE BLD-MCNC: 178 MG/DL (ref 75–110)
GLUCOSE BLD-MCNC: 179 MG/DL (ref 75–110)
GLUCOSE BLD-MCNC: 179 MG/DL (ref 75–110)
GLUCOSE BLD-MCNC: 180 MG/DL (ref 75–110)
GLUCOSE BLD-MCNC: 180 MG/DL (ref 75–110)
GLUCOSE BLD-MCNC: 181 MG/DL (ref 75–110)
GLUCOSE BLD-MCNC: 182 MG/DL (ref 75–110)
GLUCOSE BLD-MCNC: 183 MG/DL (ref 70–99)
GLUCOSE BLD-MCNC: 183 MG/DL (ref 70–99)
GLUCOSE BLD-MCNC: 183 MG/DL (ref 75–110)
GLUCOSE BLD-MCNC: 185 MG/DL (ref 75–110)
GLUCOSE BLD-MCNC: 185 MG/DL (ref 75–110)
GLUCOSE BLD-MCNC: 186 MG/DL (ref 75–110)
GLUCOSE BLD-MCNC: 187 MG/DL (ref 75–110)
GLUCOSE BLD-MCNC: 188 MG/DL (ref 75–110)
GLUCOSE BLD-MCNC: 190 MG/DL (ref 75–110)
GLUCOSE BLD-MCNC: 191 MG/DL (ref 75–110)
GLUCOSE BLD-MCNC: 192 MG/DL (ref 70–99)
GLUCOSE BLD-MCNC: 192 MG/DL (ref 75–110)
GLUCOSE BLD-MCNC: 193 MG/DL (ref 75–110)
GLUCOSE BLD-MCNC: 194 MG/DL (ref 70–99)
GLUCOSE BLD-MCNC: 194 MG/DL (ref 75–110)
GLUCOSE BLD-MCNC: 195 MG/DL (ref 70–99)
GLUCOSE BLD-MCNC: 196 MG/DL (ref 70–99)
GLUCOSE BLD-MCNC: 196 MG/DL (ref 75–110)
GLUCOSE BLD-MCNC: 197 MG/DL (ref 75–110)
GLUCOSE BLD-MCNC: 201 MG/DL (ref 75–110)
GLUCOSE BLD-MCNC: 202 MG/DL (ref 75–110)
GLUCOSE BLD-MCNC: 204 MG/DL (ref 75–110)
GLUCOSE BLD-MCNC: 205 MG/DL (ref 70–99)
GLUCOSE BLD-MCNC: 205 MG/DL (ref 75–110)
GLUCOSE BLD-MCNC: 208 MG/DL (ref 75–110)
GLUCOSE BLD-MCNC: 210 MG/DL (ref 75–110)
GLUCOSE BLD-MCNC: 211 MG/DL (ref 70–99)
GLUCOSE BLD-MCNC: 211 MG/DL (ref 75–110)
GLUCOSE BLD-MCNC: 211 MG/DL (ref 75–110)
GLUCOSE BLD-MCNC: 212 MG/DL (ref 75–110)
GLUCOSE BLD-MCNC: 213 MG/DL (ref 75–110)
GLUCOSE BLD-MCNC: 214 MG/DL (ref 70–99)
GLUCOSE BLD-MCNC: 214 MG/DL (ref 75–110)
GLUCOSE BLD-MCNC: 215 MG/DL (ref 70–99)
GLUCOSE BLD-MCNC: 215 MG/DL (ref 75–110)
GLUCOSE BLD-MCNC: 216 MG/DL (ref 75–110)
GLUCOSE BLD-MCNC: 217 MG/DL (ref 75–110)
GLUCOSE BLD-MCNC: 222 MG/DL (ref 75–110)
GLUCOSE BLD-MCNC: 223 MG/DL (ref 75–110)
GLUCOSE BLD-MCNC: 225 MG/DL (ref 75–110)
GLUCOSE BLD-MCNC: 227 MG/DL (ref 70–99)
GLUCOSE BLD-MCNC: 227 MG/DL (ref 75–110)
GLUCOSE BLD-MCNC: 228 MG/DL (ref 70–99)
GLUCOSE BLD-MCNC: 228 MG/DL (ref 75–110)
GLUCOSE BLD-MCNC: 229 MG/DL (ref 75–110)
GLUCOSE BLD-MCNC: 236 MG/DL (ref 75–110)
GLUCOSE BLD-MCNC: 237 MG/DL (ref 75–110)
GLUCOSE BLD-MCNC: 238 MG/DL (ref 75–110)
GLUCOSE BLD-MCNC: 238 MG/DL (ref 75–110)
GLUCOSE BLD-MCNC: 241 MG/DL (ref 75–110)
GLUCOSE BLD-MCNC: 246 MG/DL (ref 75–110)
GLUCOSE BLD-MCNC: 249 MG/DL (ref 75–110)
GLUCOSE BLD-MCNC: 250 MG/DL (ref 70–99)
GLUCOSE BLD-MCNC: 251 MG/DL (ref 75–110)
GLUCOSE BLD-MCNC: 252 MG/DL (ref 75–110)
GLUCOSE BLD-MCNC: 255 MG/DL (ref 70–99)
GLUCOSE BLD-MCNC: 256 MG/DL (ref 75–110)
GLUCOSE BLD-MCNC: 257 MG/DL (ref 75–110)
GLUCOSE BLD-MCNC: 261 MG/DL (ref 75–110)
GLUCOSE BLD-MCNC: 263 MG/DL (ref 75–110)
GLUCOSE BLD-MCNC: 267 MG/DL (ref 75–110)
GLUCOSE BLD-MCNC: 270 MG/DL (ref 75–110)
GLUCOSE BLD-MCNC: 274 MG/DL (ref 70–99)
GLUCOSE BLD-MCNC: 274 MG/DL (ref 75–110)
GLUCOSE BLD-MCNC: 275 MG/DL (ref 75–110)
GLUCOSE BLD-MCNC: 280 MG/DL (ref 75–110)
GLUCOSE BLD-MCNC: 283 MG/DL (ref 75–110)
GLUCOSE BLD-MCNC: 284 MG/DL (ref 75–110)
GLUCOSE BLD-MCNC: 292 MG/DL (ref 75–110)
GLUCOSE BLD-MCNC: 293 MG/DL (ref 75–110)
GLUCOSE BLD-MCNC: 296 MG/DL (ref 75–110)
GLUCOSE BLD-MCNC: 299 MG/DL (ref 75–110)
GLUCOSE BLD-MCNC: 301 MG/DL (ref 70–99)
GLUCOSE BLD-MCNC: 301 MG/DL (ref 75–110)
GLUCOSE BLD-MCNC: 303 MG/DL (ref 75–110)
GLUCOSE BLD-MCNC: 315 MG/DL (ref 70–99)
GLUCOSE BLD-MCNC: 315 MG/DL (ref 70–99)
GLUCOSE BLD-MCNC: 315 MG/DL (ref 75–110)
GLUCOSE BLD-MCNC: 318 MG/DL (ref 75–110)
GLUCOSE BLD-MCNC: 318 MG/DL (ref 75–110)
GLUCOSE BLD-MCNC: 320 MG/DL (ref 75–110)
GLUCOSE BLD-MCNC: 320 MG/DL (ref 75–110)
GLUCOSE BLD-MCNC: 324 MG/DL (ref 70–99)
GLUCOSE BLD-MCNC: 331 MG/DL (ref 70–99)
GLUCOSE BLD-MCNC: 335 MG/DL (ref 70–99)
GLUCOSE BLD-MCNC: 341 MG/DL (ref 75–110)
GLUCOSE BLD-MCNC: 347 MG/DL (ref 70–99)
GLUCOSE BLD-MCNC: 353 MG/DL (ref 75–110)
GLUCOSE BLD-MCNC: 356 MG/DL (ref 75–110)
GLUCOSE BLD-MCNC: 358 MG/DL (ref 75–110)
GLUCOSE BLD-MCNC: 359 MG/DL (ref 70–99)
GLUCOSE BLD-MCNC: 375 MG/DL (ref 75–110)
GLUCOSE BLD-MCNC: 390 MG/DL (ref 70–99)
GLUCOSE BLD-MCNC: 417 MG/DL (ref 75–110)
GLUCOSE BLD-MCNC: 419 MG/DL (ref 70–99)
GLUCOSE BLD-MCNC: 430 MG/DL (ref 75–110)
GLUCOSE BLD-MCNC: 437 MG/DL (ref 75–110)
GLUCOSE BLD-MCNC: 448 MG/DL (ref 75–110)
GLUCOSE BLD-MCNC: 51 MG/DL (ref 75–110)
GLUCOSE BLD-MCNC: 52 MG/DL (ref 70–99)
GLUCOSE BLD-MCNC: 62 MG/DL (ref 75–110)
GLUCOSE BLD-MCNC: 63 MG/DL (ref 75–110)
GLUCOSE BLD-MCNC: 65 MG/DL (ref 75–110)
GLUCOSE BLD-MCNC: 66 MG/DL (ref 75–110)
GLUCOSE BLD-MCNC: 67 MG/DL (ref 75–110)
GLUCOSE BLD-MCNC: 68 MG/DL (ref 75–110)
GLUCOSE BLD-MCNC: 69 MG/DL (ref 75–110)
GLUCOSE BLD-MCNC: 71 MG/DL (ref 75–110)
GLUCOSE BLD-MCNC: 72 MG/DL (ref 75–110)
GLUCOSE BLD-MCNC: 75 MG/DL (ref 75–110)
GLUCOSE BLD-MCNC: 75 MG/DL (ref 75–110)
GLUCOSE BLD-MCNC: 77 MG/DL (ref 75–110)
GLUCOSE BLD-MCNC: 80 MG/DL (ref 70–99)
GLUCOSE BLD-MCNC: 80 MG/DL (ref 75–110)
GLUCOSE BLD-MCNC: 83 MG/DL (ref 70–99)
GLUCOSE BLD-MCNC: 83 MG/DL (ref 75–110)
GLUCOSE BLD-MCNC: 84 MG/DL (ref 75–110)
GLUCOSE BLD-MCNC: 86 MG/DL (ref 75–110)
GLUCOSE BLD-MCNC: 88 MG/DL (ref 75–110)
GLUCOSE BLD-MCNC: 91 MG/DL (ref 75–110)
GLUCOSE BLD-MCNC: 92 MG/DL (ref 75–110)
GLUCOSE BLD-MCNC: 93 MG/DL (ref 70–99)
GLUCOSE BLD-MCNC: 94 MG/DL (ref 75–110)
GLUCOSE BLD-MCNC: 95 MG/DL (ref 70–99)
GLUCOSE BLD-MCNC: 95 MG/DL (ref 75–110)
GLUCOSE BLD-MCNC: 96 MG/DL (ref 75–110)
GLUCOSE BLD-MCNC: 96 MG/DL (ref 75–110)
GLUCOSE BLD-MCNC: 97 MG/DL (ref 75–110)
GLUCOSE BLD-MCNC: 98 MG/DL (ref 75–110)
GLUCOSE URINE: ABNORMAL
HBA1C MFR BLD: 9.1 % (ref 4–6)
HBA1C MFR BLD: 9.7 % (ref 4–6)
HBA1C MFR BLD: 9.7 % (ref 4–6)
HCO3 VENOUS: 29 MMOL/L (ref 22–29)
HCO3 VENOUS: 29.6 MMOL/L (ref 22–29)
HCO3 VENOUS: 30.8 MMOL/L (ref 22–29)
HCT VFR BLD CALC: 33.7 % (ref 40.7–50.3)
HCT VFR BLD CALC: 36.1 % (ref 40.7–50.3)
HCT VFR BLD CALC: 36.7 % (ref 40.7–50.3)
HCT VFR BLD CALC: 37.1 % (ref 40.7–50.3)
HCT VFR BLD CALC: 37.2 % (ref 40.7–50.3)
HCT VFR BLD CALC: 37.7 % (ref 40.7–50.3)
HCT VFR BLD CALC: 38.2 % (ref 40.7–50.3)
HCT VFR BLD CALC: 38.7 % (ref 40.7–50.3)
HCT VFR BLD CALC: 39.7 % (ref 40.7–50.3)
HCT VFR BLD CALC: 39.8 % (ref 40.7–50.3)
HCT VFR BLD CALC: 39.9 % (ref 40.7–50.3)
HCT VFR BLD CALC: 43.4 % (ref 40.7–50.3)
HCT VFR BLD CALC: 43.5 % (ref 40.7–50.3)
HCT VFR BLD CALC: 43.6 % (ref 40.7–50.3)
HCT VFR BLD CALC: 45.6 % (ref 40.7–50.3)
HCT VFR BLD CALC: 46 % (ref 40.7–50.3)
HCT VFR BLD CALC: 46.4 % (ref 40.7–50.3)
HCT VFR BLD CALC: 47 % (ref 40.7–50.3)
HCT VFR BLD CALC: 47.6 % (ref 40.7–50.3)
HCT VFR BLD CALC: 47.7 % (ref 40.7–50.3)
HCT VFR BLD CALC: 48.8 % (ref 40.7–50.3)
HCT VFR BLD CALC: 49.5 % (ref 40.7–50.3)
HCT VFR BLD CALC: 51.6 % (ref 40.7–50.3)
HCT VFR BLD CALC: 53 % (ref 40.7–50.3)
HCT VFR BLD CALC: 53.7 % (ref 40.7–50.3)
HCT VFR BLD CALC: 54 % (ref 40.7–50.3)
HCT VFR BLD CALC: 55.3 % (ref 40.7–50.3)
HCT VFR BLD CALC: 56.6 % (ref 40.7–50.3)
HCT VFR BLD CALC: 57.1 % (ref 40.7–50.3)
HCT VFR BLD CALC: 57.3 % (ref 40.7–50.3)
HCT VFR BLD CALC: 57.7 % (ref 40.7–50.3)
HCT VFR BLD CALC: 58.1 % (ref 40.7–50.3)
HCT VFR BLD CALC: 59.3 % (ref 40.7–50.3)
HCT VFR BLD CALC: 64.4 % (ref 40.7–50.3)
HEMOGLOBIN: 10.5 G/DL (ref 13–17)
HEMOGLOBIN: 11.1 G/DL (ref 13–17)
HEMOGLOBIN: 11.2 G/DL (ref 13–17)
HEMOGLOBIN: 11.3 G/DL (ref 13–17)
HEMOGLOBIN: 11.4 G/DL (ref 13–17)
HEMOGLOBIN: 11.5 G/DL (ref 13–17)
HEMOGLOBIN: 11.7 G/DL (ref 13–17)
HEMOGLOBIN: 11.9 G/DL (ref 13–17)
HEMOGLOBIN: 12 G/DL (ref 13–17)
HEMOGLOBIN: 12.6 G/DL (ref 13–17)
HEMOGLOBIN: 13.1 G/DL (ref 13–17)
HEMOGLOBIN: 13.2 G/DL (ref 13–17)
HEMOGLOBIN: 13.2 G/DL (ref 13–17)
HEMOGLOBIN: 13.7 G/DL (ref 13–17)
HEMOGLOBIN: 13.7 G/DL (ref 13–17)
HEMOGLOBIN: 13.8 G/DL (ref 13–17)
HEMOGLOBIN: 13.9 G/DL (ref 13–17)
HEMOGLOBIN: 14.2 G/DL (ref 13–17)
HEMOGLOBIN: 14.6 G/DL (ref 13–17)
HEMOGLOBIN: 15 G/DL (ref 13–17)
HEMOGLOBIN: 15.7 G/DL (ref 13–17)
HEMOGLOBIN: 16.3 G/DL (ref 13–17)
HEMOGLOBIN: 16.4 G/DL (ref 13–17)
HEMOGLOBIN: 16.4 G/DL (ref 13–17)
HEMOGLOBIN: 16.5 G/DL (ref 13–17)
HEMOGLOBIN: 17.3 G/DL (ref 13–17)
HEMOGLOBIN: 17.5 G/DL (ref 13–17)
HEMOGLOBIN: 17.7 G/DL (ref 13–17)
HEMOGLOBIN: 17.7 G/DL (ref 13–17)
HEMOGLOBIN: 18.2 G/DL (ref 13–17)
HEMOGLOBIN: 18.3 G/DL (ref 13–17)
HEMOGLOBIN: 18.8 G/DL (ref 13–17)
IMMATURE GRANULOCYTES: 0 %
IMMATURE GRANULOCYTES: 1 %
IMMATURE GRANULOCYTES: 2 %
IMMATURE GRANULOCYTES: 3 %
INR BLD: 1
KETONES, URINE: ABNORMAL
LACTATE DEHYDROGENASE: 336 U/L (ref 135–225)
LACTIC ACID, SEPSIS WHOLE BLOOD: NORMAL MMOL/L (ref 0.5–1.9)
LACTIC ACID, SEPSIS WHOLE BLOOD: NORMAL MMOL/L (ref 0.5–1.9)
LACTIC ACID, SEPSIS: 1.2 MMOL/L (ref 0.5–1.9)
LACTIC ACID, SEPSIS: 1.6 MMOL/L (ref 0.5–1.9)
LACTIC ACID: 1.6 MMOL/L (ref 0.5–2.2)
LACTIC ACID: 1.6 MMOL/L (ref 0.5–2.2)
LACTIC ACID: 1.9 MMOL/L (ref 0.5–2.2)
LACTIC ACID: 2 MMOL/L (ref 0.5–2.2)
LEUKOCYTE ESTERASE, URINE: NEGATIVE
LIPASE: 19 U/L (ref 13–60)
LIPASE: 34 U/L (ref 13–60)
LYMPHOCYTES # BLD: 12 % (ref 24–44)
LYMPHOCYTES # BLD: 16 % (ref 24–43)
LYMPHOCYTES # BLD: 19 % (ref 24–43)
LYMPHOCYTES # BLD: 2 % (ref 24–44)
LYMPHOCYTES # BLD: 21 % (ref 24–43)
LYMPHOCYTES # BLD: 21 % (ref 24–44)
LYMPHOCYTES # BLD: 3 % (ref 24–43)
LYMPHOCYTES # BLD: 3 % (ref 24–44)
LYMPHOCYTES # BLD: 3 % (ref 24–44)
LYMPHOCYTES # BLD: 30 % (ref 24–44)
LYMPHOCYTES # BLD: 30 % (ref 24–44)
LYMPHOCYTES # BLD: 4 % (ref 24–44)
LYMPHOCYTES # BLD: 49 % (ref 24–43)
LYMPHOCYTES # BLD: 5 % (ref 24–43)
LYMPHOCYTES # BLD: 5 % (ref 24–44)
LYMPHOCYTES # BLD: 6 % (ref 24–43)
LYMPHOCYTES # BLD: 6 % (ref 24–44)
LYMPHOCYTES # BLD: 7 % (ref 24–43)
LYMPHOCYTES # BLD: 7 % (ref 24–44)
LYMPHOCYTES # BLD: 7 % (ref 24–44)
LYMPHOCYTES # BLD: 8 % (ref 24–43)
LYMPHOCYTES # BLD: 9 % (ref 24–44)
Lab: NORMAL
MAGNESIUM: 1.5 MG/DL (ref 1.6–2.6)
MAGNESIUM: 1.5 MG/DL (ref 1.6–2.6)
MAGNESIUM: 1.7 MG/DL (ref 1.6–2.6)
MAGNESIUM: 1.8 MG/DL (ref 1.6–2.6)
MAGNESIUM: 1.9 MG/DL (ref 1.6–2.6)
MAGNESIUM: 2 MG/DL (ref 1.6–2.6)
MAGNESIUM: 2 MG/DL (ref 1.6–2.6)
MAGNESIUM: 2.1 MG/DL (ref 1.6–2.6)
MAGNESIUM: 2.3 MG/DL (ref 1.6–2.6)
MAGNESIUM: 2.4 MG/DL (ref 1.6–2.6)
MAGNESIUM: 2.6 MG/DL (ref 1.6–2.6)
MCH RBC QN AUTO: 24.3 PG (ref 25.2–33.5)
MCH RBC QN AUTO: 24.5 PG (ref 25.2–33.5)
MCH RBC QN AUTO: 24.6 PG (ref 25.2–33.5)
MCH RBC QN AUTO: 24.7 PG (ref 25.2–33.5)
MCH RBC QN AUTO: 24.8 PG (ref 25.2–33.5)
MCH RBC QN AUTO: 24.9 PG (ref 25.2–33.5)
MCH RBC QN AUTO: 25 PG (ref 25.2–33.5)
MCH RBC QN AUTO: 25.1 PG (ref 25.2–33.5)
MCH RBC QN AUTO: 25.2 PG (ref 25.2–33.5)
MCH RBC QN AUTO: 25.4 PG (ref 25.2–33.5)
MCH RBC QN AUTO: 25.5 PG (ref 25.2–33.5)
MCH RBC QN AUTO: 25.7 PG (ref 25.2–33.5)
MCH RBC QN AUTO: 25.7 PG (ref 25.2–33.5)
MCHC RBC AUTO-ENTMCNC: 28.7 G/DL (ref 28.4–34.8)
MCHC RBC AUTO-ENTMCNC: 29 G/DL (ref 28.4–34.8)
MCHC RBC AUTO-ENTMCNC: 29 G/DL (ref 28.4–34.8)
MCHC RBC AUTO-ENTMCNC: 29.2 G/DL (ref 28.4–34.8)
MCHC RBC AUTO-ENTMCNC: 29.3 G/DL (ref 28.4–34.8)
MCHC RBC AUTO-ENTMCNC: 29.5 G/DL (ref 28.4–34.8)
MCHC RBC AUTO-ENTMCNC: 29.6 G/DL (ref 28.4–34.8)
MCHC RBC AUTO-ENTMCNC: 29.7 G/DL (ref 28.4–34.8)
MCHC RBC AUTO-ENTMCNC: 29.7 G/DL (ref 28–38)
MCHC RBC AUTO-ENTMCNC: 29.8 G/DL (ref 28.4–34.8)
MCHC RBC AUTO-ENTMCNC: 29.8 G/DL (ref 28.4–34.8)
MCHC RBC AUTO-ENTMCNC: 29.9 G/DL (ref 28.4–34.8)
MCHC RBC AUTO-ENTMCNC: 29.9 G/DL (ref 28.4–34.8)
MCHC RBC AUTO-ENTMCNC: 30 G/DL (ref 28.4–34.8)
MCHC RBC AUTO-ENTMCNC: 30.2 G/DL (ref 28.4–34.8)
MCHC RBC AUTO-ENTMCNC: 30.3 G/DL (ref 28.4–34.8)
MCHC RBC AUTO-ENTMCNC: 30.4 G/DL (ref 28.4–34.8)
MCHC RBC AUTO-ENTMCNC: 30.5 G/DL (ref 28.4–34.8)
MCHC RBC AUTO-ENTMCNC: 30.7 G/DL (ref 28.4–34.8)
MCHC RBC AUTO-ENTMCNC: 30.8 G/DL (ref 28.4–34.8)
MCHC RBC AUTO-ENTMCNC: 30.9 G/DL (ref 28.4–34.8)
MCHC RBC AUTO-ENTMCNC: 30.9 G/DL (ref 28.4–34.8)
MCHC RBC AUTO-ENTMCNC: 31.2 G/DL (ref 28.4–34.8)
MCHC RBC AUTO-ENTMCNC: 31.3 G/DL (ref 28.4–34.8)
MCHC RBC AUTO-ENTMCNC: 31.5 G/DL (ref 28.4–34.8)
MCV RBC AUTO: 78.4 FL (ref 82.6–102.9)
MCV RBC AUTO: 79.3 FL (ref 82.6–102.9)
MCV RBC AUTO: 80.2 FL (ref 82.6–102.9)
MCV RBC AUTO: 81.3 FL (ref 82.6–102.9)
MCV RBC AUTO: 81.4 FL (ref 82.6–102.9)
MCV RBC AUTO: 81.6 FL (ref 82.6–102.9)
MCV RBC AUTO: 81.6 FL (ref 82.6–102.9)
MCV RBC AUTO: 81.8 FL (ref 82.6–102.9)
MCV RBC AUTO: 81.9 FL (ref 82.6–102.9)
MCV RBC AUTO: 82 FL (ref 82.6–102.9)
MCV RBC AUTO: 82 FL (ref 82.6–102.9)
MCV RBC AUTO: 82.1 FL (ref 82.6–102.9)
MCV RBC AUTO: 82.1 FL (ref 82.6–102.9)
MCV RBC AUTO: 82.2 FL (ref 82.6–102.9)
MCV RBC AUTO: 82.2 FL (ref 82.6–102.9)
MCV RBC AUTO: 82.3 FL (ref 82.6–102.9)
MCV RBC AUTO: 82.4 FL (ref 82.6–102.9)
MCV RBC AUTO: 82.7 FL (ref 82.6–102.9)
MCV RBC AUTO: 82.8 FL (ref 82.6–102.9)
MCV RBC AUTO: 82.9 FL (ref 82.6–102.9)
MCV RBC AUTO: 82.9 FL (ref 82.6–102.9)
MCV RBC AUTO: 83 FL (ref 82.6–102.9)
MCV RBC AUTO: 83.1 FL (ref 82.6–102.9)
MCV RBC AUTO: 83.2 FL (ref 82.6–102.9)
MCV RBC AUTO: 83.3 FL (ref 82.6–102.9)
MCV RBC AUTO: 83.4 FL (ref 82.6–102.9)
MCV RBC AUTO: 83.7 FL (ref 82.6–102.9)
MCV RBC AUTO: 84.7 FL (ref 82.6–102.9)
MCV RBC AUTO: 84.8 FL (ref 82.6–102.9)
MCV RBC AUTO: 85.6 FL (ref 82.6–102.9)
MCV RBC AUTO: 85.8 FL (ref 82.6–102.9)
MCV RBC AUTO: 86 FL (ref 82.6–102.9)
MODE: ABNORMAL
MODE: NORMAL
MODE: NORMAL
MONOCYTES # BLD: 10 % (ref 1–7)
MONOCYTES # BLD: 11 % (ref 3–12)
MONOCYTES # BLD: 14 % (ref 1–7)
MONOCYTES # BLD: 14 % (ref 3–12)
MONOCYTES # BLD: 17 % (ref 1–7)
MONOCYTES # BLD: 18 % (ref 1–7)
MONOCYTES # BLD: 18 % (ref 3–12)
MONOCYTES # BLD: 21 % (ref 3–12)
MONOCYTES # BLD: 3 % (ref 1–7)
MONOCYTES # BLD: 4 % (ref 1–7)
MONOCYTES # BLD: 5 % (ref 1–7)
MONOCYTES # BLD: 5 % (ref 3–12)
MONOCYTES # BLD: 5 % (ref 3–12)
MONOCYTES # BLD: 6 % (ref 3–12)
MONOCYTES # BLD: 7 % (ref 1–7)
MONOCYTES # BLD: 7 % (ref 3–12)
MONOCYTES # BLD: 7 % (ref 3–12)
MONOCYTES # BLD: 9 % (ref 1–7)
MONOCYTES # BLD: 9 % (ref 3–12)
MONOCYTES # BLD: 9 % (ref 3–12)
MORPHOLOGY: ABNORMAL
MRSA, DNA, NASAL: NEGATIVE
MUCUS: ABNORMAL
NEGATIVE BASE EXCESS, ART: 1 (ref 0–2)
NEGATIVE BASE EXCESS, ART: 3 (ref 0–2)
NEGATIVE BASE EXCESS, ART: 4 (ref 0–2)
NEGATIVE BASE EXCESS, ART: 5 (ref 0–2)
NEGATIVE BASE EXCESS, ART: 5 (ref 0–2)
NEGATIVE BASE EXCESS, ART: ABNORMAL (ref 0–2)
NEGATIVE BASE EXCESS, ART: NORMAL (ref 0–2)
NEGATIVE BASE EXCESS, ART: NORMAL (ref 0–2)
NEGATIVE BASE EXCESS, VEN: ABNORMAL (ref 0–2)
NITRITE, URINE: NEGATIVE
NRBC AUTOMATED: 0 PER 100 WBC
NRBC AUTOMATED: ABNORMAL PER 100 WBC
O2 DEVICE/FLOW/%: ABNORMAL
O2 DEVICE/FLOW/%: NORMAL
O2 DEVICE/FLOW/%: NORMAL
O2 SAT, VEN: 68 % (ref 60–85)
O2 SAT, VEN: 90 % (ref 60–85)
O2 SAT, VEN: 95 % (ref 60–85)
OTHER OBSERVATIONS UA: ABNORMAL
PATIENT TEMP: 37
PATIENT TEMP: 37.9
PATIENT TEMP: 38
PATIENT TEMP: 38.3
PATIENT TEMP: 38.6
PATIENT TEMP: 38.8
PATIENT TEMP: ABNORMAL
PATIENT TEMP: NORMAL
PATIENT TEMP: NORMAL
PCO2, VEN: 38.3 MM HG (ref 41–51)
PCO2, VEN: 42.1 MM HG (ref 41–51)
PCO2, VEN: 52.1 MM HG (ref 41–51)
PDW BLD-RTO: 15.1 % (ref 11.8–14.4)
PDW BLD-RTO: 15.3 % (ref 11.8–14.4)
PDW BLD-RTO: 15.5 % (ref 11.8–14.4)
PDW BLD-RTO: 15.6 % (ref 11.8–14.4)
PDW BLD-RTO: 15.7 % (ref 11.8–14.4)
PDW BLD-RTO: 15.8 % (ref 11.8–14.4)
PDW BLD-RTO: 15.9 % (ref 11.8–14.4)
PDW BLD-RTO: 16 % (ref 11.8–14.4)
PDW BLD-RTO: 16 % (ref 11.8–14.4)
PDW BLD-RTO: 16.1 % (ref 11.8–14.4)
PDW BLD-RTO: 16.2 % (ref 11.8–14.4)
PDW BLD-RTO: 16.2 % (ref 11.8–14.4)
PDW BLD-RTO: 16.3 % (ref 11.8–14.4)
PDW BLD-RTO: 16.6 % (ref 11.8–14.4)
PDW BLD-RTO: 16.8 % (ref 11.8–14.4)
PDW BLD-RTO: 16.9 % (ref 11.8–14.4)
PDW BLD-RTO: 17 % (ref 11.8–14.4)
PDW BLD-RTO: 17.2 % (ref 11.8–14.4)
PDW BLD-RTO: 17.3 % (ref 11.8–14.4)
PDW BLD-RTO: 17.6 % (ref 11.8–14.4)
PDW BLD-RTO: 17.7 % (ref 11.8–14.4)
PH UA: 7 (ref 5–8)
PH VENOUS: 7.38 (ref 7.32–7.43)
PH VENOUS: 7.45 (ref 7.32–7.43)
PH VENOUS: 7.5 (ref 7.32–7.43)
PHOSPHORUS: 1.9 MG/DL (ref 2.5–4.5)
PHOSPHORUS: 2 MG/DL (ref 2.5–4.5)
PHOSPHORUS: 2.2 MG/DL (ref 2.5–4.5)
PHOSPHORUS: 2.3 MG/DL (ref 2.5–4.5)
PHOSPHORUS: 2.4 MG/DL (ref 2.5–4.5)
PHOSPHORUS: 2.4 MG/DL (ref 2.5–4.5)
PHOSPHORUS: 2.6 MG/DL (ref 2.5–4.5)
PHOSPHORUS: 2.9 MG/DL (ref 2.5–4.5)
PHOSPHORUS: 3 MG/DL (ref 2.5–4.5)
PHOSPHORUS: 3 MG/DL (ref 2.5–4.5)
PHOSPHORUS: 3.2 MG/DL (ref 2.5–4.5)
PHOSPHORUS: 3.2 MG/DL (ref 2.5–4.5)
PHOSPHORUS: 3.3 MG/DL (ref 2.5–4.5)
PHOSPHORUS: 3.5 MG/DL (ref 2.5–4.5)
PLATELET # BLD: 100 K/UL (ref 138–453)
PLATELET # BLD: 105 K/UL (ref 138–453)
PLATELET # BLD: 107 K/UL (ref 138–453)
PLATELET # BLD: 111 K/UL (ref 138–453)
PLATELET # BLD: 115 K/UL (ref 138–453)
PLATELET # BLD: 119 K/UL (ref 138–453)
PLATELET # BLD: 120 K/UL (ref 138–453)
PLATELET # BLD: 121 K/UL (ref 138–453)
PLATELET # BLD: 123 K/UL (ref 138–453)
PLATELET # BLD: 125 K/UL (ref 138–453)
PLATELET # BLD: 125 K/UL (ref 138–453)
PLATELET # BLD: 129 K/UL (ref 138–453)
PLATELET # BLD: 134 K/UL (ref 138–453)
PLATELET # BLD: 138 K/UL (ref 138–453)
PLATELET # BLD: 144 K/UL (ref 138–453)
PLATELET # BLD: 148 K/UL (ref 138–453)
PLATELET # BLD: 154 K/UL (ref 138–453)
PLATELET # BLD: 157 K/UL (ref 138–453)
PLATELET # BLD: 159 K/UL (ref 138–453)
PLATELET # BLD: 162 K/UL (ref 138–453)
PLATELET # BLD: 165 K/UL (ref 138–453)
PLATELET # BLD: 170 K/UL (ref 138–453)
PLATELET # BLD: 170 K/UL (ref 138–453)
PLATELET # BLD: 171 K/UL (ref 138–453)
PLATELET # BLD: 183 K/UL (ref 138–453)
PLATELET # BLD: 193 K/UL (ref 138–453)
PLATELET # BLD: 199 K/UL (ref 138–453)
PLATELET # BLD: 203 K/UL (ref 138–453)
PLATELET # BLD: 210 K/UL (ref 138–453)
PLATELET # BLD: 70 K/UL (ref 138–453)
PLATELET # BLD: 71 K/UL (ref 138–453)
PLATELET # BLD: 73 K/UL (ref 138–453)
PLATELET # BLD: 75 K/UL (ref 138–453)
PLATELET # BLD: 95 K/UL (ref 138–453)
PLATELET ESTIMATE: ABNORMAL
PMV BLD AUTO: 11.1 FL (ref 8.1–13.5)
PMV BLD AUTO: 11.2 FL (ref 8.1–13.5)
PMV BLD AUTO: 11.3 FL (ref 8.1–13.5)
PMV BLD AUTO: 11.5 FL (ref 8.1–13.5)
PMV BLD AUTO: 11.7 FL (ref 8.1–13.5)
PMV BLD AUTO: 11.7 FL (ref 8.1–13.5)
PMV BLD AUTO: 11.9 FL (ref 8.1–13.5)
PMV BLD AUTO: 12 FL (ref 8.1–13.5)
PMV BLD AUTO: 12 FL (ref 8.1–13.5)
PMV BLD AUTO: 12.1 FL (ref 8.1–13.5)
PMV BLD AUTO: 12.2 FL (ref 8.1–13.5)
PMV BLD AUTO: 12.2 FL (ref 8.1–13.5)
PMV BLD AUTO: 12.3 FL (ref 8.1–13.5)
PMV BLD AUTO: 12.4 FL (ref 8.1–13.5)
PMV BLD AUTO: 12.4 FL (ref 8.1–13.5)
PMV BLD AUTO: 12.6 FL (ref 8.1–13.5)
PMV BLD AUTO: 12.7 FL (ref 8.1–13.5)
PMV BLD AUTO: 12.8 FL (ref 8.1–13.5)
PMV BLD AUTO: 12.8 FL (ref 8.1–13.5)
PMV BLD AUTO: 12.9 FL (ref 8.1–13.5)
PMV BLD AUTO: 12.9 FL (ref 8.1–13.5)
PMV BLD AUTO: 13.4 FL (ref 8.1–13.5)
PMV BLD AUTO: 13.4 FL (ref 8.1–13.5)
PMV BLD AUTO: 9.9 FL (ref 8.1–13.5)
PMV BLD AUTO: ABNORMAL FL (ref 8.1–13.5)
PO2, VEN: 36.8 MM HG (ref 30–50)
PO2, VEN: 53.1 MM HG (ref 30–50)
PO2, VEN: 72.2 MM HG (ref 30–50)
POC HCO3: 19.8 MMOL/L (ref 21–28)
POC HCO3: 22 MMOL/L (ref 21–28)
POC HCO3: 22.9 MMOL/L (ref 21–28)
POC HCO3: 23.1 MMOL/L (ref 21–28)
POC HCO3: 23.6 MMOL/L (ref 21–28)
POC HCO3: 23.9 MMOL/L (ref 21–28)
POC HCO3: 24 MMOL/L (ref 21–28)
POC HCO3: 24.2 MMOL/L (ref 21–28)
POC HCO3: 24.8 MMOL/L (ref 21–28)
POC HCO3: 25.6 MMOL/L (ref 21–28)
POC HCO3: 25.9 MMOL/L (ref 21–28)
POC HCO3: 26.2 MMOL/L (ref 21–28)
POC HCO3: 26.3 MMOL/L (ref 21–28)
POC HCO3: 26.6 MMOL/L (ref 21–28)
POC HCO3: 27.1 MMOL/L (ref 21–28)
POC HCO3: 28 MMOL/L (ref 21–28)
POC HCO3: 28.2 MMOL/L (ref 21–28)
POC HCO3: 28.6 MMOL/L (ref 21–28)
POC HCO3: 28.7 MMOL/L (ref 21–28)
POC HCO3: 28.8 MMOL/L (ref 21–28)
POC HCO3: 29 MMOL/L (ref 21–28)
POC HCO3: 29.4 MMOL/L (ref 21–28)
POC HCO3: 29.7 MMOL/L (ref 21–28)
POC HCO3: 29.9 MMOL/L (ref 21–28)
POC HCO3: 30.4 MMOL/L (ref 21–28)
POC HCO3: 30.6 MMOL/L (ref 21–28)
POC HCO3: 32 MMOL/L (ref 21–28)
POC HCO3: 32 MMOL/L (ref 21–28)
POC HCO3: 32.7 MMOL/L (ref 21–28)
POC O2 SATURATION: 81 % (ref 94–98)
POC O2 SATURATION: 82 % (ref 94–98)
POC O2 SATURATION: 85 % (ref 94–98)
POC O2 SATURATION: 89 % (ref 94–98)
POC O2 SATURATION: 90 % (ref 94–98)
POC O2 SATURATION: 91 % (ref 94–98)
POC O2 SATURATION: 91 % (ref 94–98)
POC O2 SATURATION: 93 % (ref 94–98)
POC O2 SATURATION: 94 % (ref 94–98)
POC O2 SATURATION: 95 % (ref 94–98)
POC O2 SATURATION: 95 % (ref 94–98)
POC O2 SATURATION: 96 % (ref 94–98)
POC O2 SATURATION: 97 % (ref 94–98)
POC O2 SATURATION: 98 % (ref 94–98)
POC O2 SATURATION: 99 % (ref 94–98)
POC O2 SATURATION: 99 % (ref 94–98)
POC PCO2 TEMP: 41 MM HG
POC PCO2 TEMP: 46 MM HG
POC PCO2 TEMP: 47 MM HG
POC PCO2 TEMP: 51 MM HG
POC PCO2 TEMP: 59 MM HG
POC PCO2 TEMP: ABNORMAL MM HG
POC PCO2 TEMP: NORMAL MM HG
POC PCO2 TEMP: NORMAL MM HG
POC PCO2: 33.6 MM HG (ref 35–48)
POC PCO2: 35 MM HG (ref 35–48)
POC PCO2: 36.6 MM HG (ref 35–48)
POC PCO2: 37 MM HG (ref 35–48)
POC PCO2: 37.3 MM HG (ref 35–48)
POC PCO2: 38.9 MM HG (ref 35–48)
POC PCO2: 41.4 MM HG (ref 35–48)
POC PCO2: 41.8 MM HG (ref 35–48)
POC PCO2: 42.6 MM HG (ref 35–48)
POC PCO2: 43.1 MM HG (ref 35–48)
POC PCO2: 43.4 MM HG (ref 35–48)
POC PCO2: 43.5 MM HG (ref 35–48)
POC PCO2: 45.2 MM HG (ref 35–48)
POC PCO2: 45.4 MM HG (ref 35–48)
POC PCO2: 45.6 MM HG (ref 35–48)
POC PCO2: 46.5 MM HG (ref 35–48)
POC PCO2: 46.7 MM HG (ref 35–48)
POC PCO2: 46.8 MM HG (ref 35–48)
POC PCO2: 47.4 MM HG (ref 35–48)
POC PCO2: 48 MM HG (ref 35–48)
POC PCO2: 49 MM HG (ref 35–48)
POC PCO2: 49.2 MM HG (ref 35–48)
POC PCO2: 51.2 MM HG (ref 35–48)
POC PCO2: 52.2 MM HG (ref 35–48)
POC PCO2: 52.3 MM HG (ref 35–48)
POC PCO2: 55.9 MM HG (ref 35–48)
POC PCO2: 55.9 MM HG (ref 35–48)
POC PCO2: 59.1 MM HG (ref 35–48)
POC PCO2: 60.8 MM HG (ref 35–48)
POC PH TEMP: 7.34
POC PH TEMP: 7.36
POC PH TEMP: 7.43
POC PH TEMP: 7.43
POC PH TEMP: 7.46
POC PH TEMP: ABNORMAL
POC PH TEMP: NORMAL
POC PH TEMP: NORMAL
POC PH: 7.22 (ref 7.35–7.45)
POC PH: 7.28 (ref 7.35–7.45)
POC PH: 7.28 (ref 7.35–7.45)
POC PH: 7.31 (ref 7.35–7.45)
POC PH: 7.31 (ref 7.35–7.45)
POC PH: 7.32 (ref 7.35–7.45)
POC PH: 7.32 (ref 7.35–7.45)
POC PH: 7.34 (ref 7.35–7.45)
POC PH: 7.35 (ref 7.35–7.45)
POC PH: 7.36 (ref 7.35–7.45)
POC PH: 7.36 (ref 7.35–7.45)
POC PH: 7.37 (ref 7.35–7.45)
POC PH: 7.37 (ref 7.35–7.45)
POC PH: 7.38 (ref 7.35–7.45)
POC PH: 7.41 (ref 7.35–7.45)
POC PH: 7.42 (ref 7.35–7.45)
POC PH: 7.44 (ref 7.35–7.45)
POC PH: 7.45 (ref 7.35–7.45)
POC PH: 7.45 (ref 7.35–7.45)
POC PH: 7.46 (ref 7.35–7.45)
POC PH: 7.47 (ref 7.35–7.45)
POC PH: 7.47 (ref 7.35–7.45)
POC PH: 7.5 (ref 7.35–7.45)
POC PH: 7.5 (ref 7.35–7.45)
POC PH: 7.51 (ref 7.35–7.45)
POC PO2 TEMP: 106 MM HG
POC PO2 TEMP: 176 MM HG
POC PO2 TEMP: 64 MM HG
POC PO2 TEMP: 67 MM HG
POC PO2 TEMP: 84 MM HG
POC PO2 TEMP: ABNORMAL MM HG
POC PO2 TEMP: NORMAL MM HG
POC PO2 TEMP: NORMAL MM HG
POC PO2: 102.8 MM HG (ref 83–108)
POC PO2: 104.4 MM HG (ref 83–108)
POC PO2: 105.3 MM HG (ref 83–108)
POC PO2: 113.4 MM HG (ref 83–108)
POC PO2: 125.1 MM HG (ref 83–108)
POC PO2: 153.5 MM HG (ref 83–108)
POC PO2: 170 MM HG (ref 83–108)
POC PO2: 41 MM HG (ref 83–108)
POC PO2: 44.8 MM HG (ref 83–108)
POC PO2: 45.4 MM HG (ref 83–108)
POC PO2: 55.3 MM HG (ref 83–108)
POC PO2: 55.5 MM HG (ref 83–108)
POC PO2: 56.9 MM HG (ref 83–108)
POC PO2: 63.2 MM HG (ref 83–108)
POC PO2: 66.5 MM HG (ref 83–108)
POC PO2: 66.6 MM HG (ref 83–108)
POC PO2: 71.3 MM HG (ref 83–108)
POC PO2: 71.8 MM HG (ref 83–108)
POC PO2: 74.8 MM HG (ref 83–108)
POC PO2: 77.1 MM HG (ref 83–108)
POC PO2: 83.4 MM HG (ref 83–108)
POC PO2: 86 MM HG (ref 83–108)
POC PO2: 87 MM HG (ref 83–108)
POC PO2: 88.7 MM HG (ref 83–108)
POC PO2: 93.9 MM HG (ref 83–108)
POC PO2: 96.9 MM HG (ref 83–108)
POC PO2: 97.5 MM HG (ref 83–108)
POC PO2: 97.8 MM HG (ref 83–108)
POC PO2: 99.2 MM HG (ref 83–108)
POC TCO2: 29 MMOL/L (ref 22–30)
POSITIVE BASE EXCESS, ART: 0 (ref 0–3)
POSITIVE BASE EXCESS, ART: 0 (ref 0–3)
POSITIVE BASE EXCESS, ART: 1 (ref 0–3)
POSITIVE BASE EXCESS, ART: 1 (ref 0–3)
POSITIVE BASE EXCESS, ART: 2 (ref 0–3)
POSITIVE BASE EXCESS, ART: 3 (ref 0–3)
POSITIVE BASE EXCESS, ART: 4 (ref 0–3)
POSITIVE BASE EXCESS, ART: 5 (ref 0–3)
POSITIVE BASE EXCESS, ART: 6 (ref 0–3)
POSITIVE BASE EXCESS, ART: ABNORMAL (ref 0–3)
POSITIVE BASE EXCESS, VEN: 4 (ref 0–3)
POSITIVE BASE EXCESS, VEN: 4 (ref 0–3)
POSITIVE BASE EXCESS, VEN: 6 (ref 0–3)
POTASSIUM SERPL-SCNC: 3.1 MMOL/L (ref 3.7–5.3)
POTASSIUM SERPL-SCNC: 3.3 MMOL/L (ref 3.7–5.3)
POTASSIUM SERPL-SCNC: 3.4 MMOL/L (ref 3.7–5.3)
POTASSIUM SERPL-SCNC: 3.5 MMOL/L (ref 3.7–5.3)
POTASSIUM SERPL-SCNC: 3.6 MMOL/L (ref 3.7–5.3)
POTASSIUM SERPL-SCNC: 3.6 MMOL/L (ref 3.7–5.3)
POTASSIUM SERPL-SCNC: 3.7 MMOL/L (ref 3.7–5.3)
POTASSIUM SERPL-SCNC: 3.7 MMOL/L (ref 3.7–5.3)
POTASSIUM SERPL-SCNC: 3.8 MMOL/L (ref 3.7–5.3)
POTASSIUM SERPL-SCNC: 3.8 MMOL/L (ref 3.7–5.3)
POTASSIUM SERPL-SCNC: 3.9 MMOL/L (ref 3.7–5.3)
POTASSIUM SERPL-SCNC: 4.2 MMOL/L (ref 3.7–5.3)
POTASSIUM SERPL-SCNC: 4.2 MMOL/L (ref 3.7–5.3)
POTASSIUM SERPL-SCNC: 4.3 MMOL/L (ref 3.7–5.3)
POTASSIUM SERPL-SCNC: 4.4 MMOL/L (ref 3.7–5.3)
POTASSIUM SERPL-SCNC: 4.5 MMOL/L (ref 3.7–5.3)
POTASSIUM SERPL-SCNC: 4.6 MMOL/L (ref 3.7–5.3)
POTASSIUM SERPL-SCNC: 4.7 MMOL/L (ref 3.7–5.3)
POTASSIUM SERPL-SCNC: 4.8 MMOL/L (ref 3.7–5.3)
POTASSIUM SERPL-SCNC: 4.9 MMOL/L (ref 3.7–5.3)
POTASSIUM SERPL-SCNC: 5.2 MMOL/L (ref 3.7–5.3)
POTASSIUM SERPL-SCNC: 5.4 MMOL/L (ref 3.7–5.3)
POTASSIUM SERPL-SCNC: 5.5 MMOL/L (ref 3.7–5.3)
POTASSIUM SERPL-SCNC: 5.6 MMOL/L (ref 3.7–5.3)
POTASSIUM SERPL-SCNC: 5.7 MMOL/L (ref 3.7–5.3)
POTASSIUM SERPL-SCNC: 5.8 MMOL/L (ref 3.7–5.3)
POTASSIUM SERPL-SCNC: 5.9 MMOL/L (ref 3.7–5.3)
POTASSIUM SERPL-SCNC: 6 MMOL/L (ref 3.7–5.3)
POTASSIUM SERPL-SCNC: 6 MMOL/L (ref 3.7–5.3)
PRO-BNP: 3484 PG/ML
PRO-BNP: 3642 PG/ML
PRO-BNP: 4221 PG/ML
PRO-BNP: 8610 PG/ML
PRO-BNP: 9327 PG/ML
PRO-BNP: ABNORMAL PG/ML
PROCALCITONIN: 0.11 NG/ML
PROCALCITONIN: 0.11 NG/ML
PROCALCITONIN: 0.24 NG/ML
PROCALCITONIN: 0.25 NG/ML
PROTEIN UA: ABNORMAL
PROTHROMBIN TIME: 13.3 SEC (ref 11.5–14.2)
RBC # BLD: 4.25 M/UL (ref 4.21–5.77)
RBC # BLD: 4.5 M/UL (ref 4.21–5.77)
RBC # BLD: 4.56 M/UL (ref 4.21–5.77)
RBC # BLD: 4.56 M/UL (ref 4.21–5.77)
RBC # BLD: 4.61 M/UL (ref 4.21–5.77)
RBC # BLD: 4.64 M/UL (ref 4.21–5.77)
RBC # BLD: 4.68 M/UL (ref 4.21–5.77)
RBC # BLD: 4.7 M/UL (ref 4.21–5.77)
RBC # BLD: 4.81 M/UL (ref 4.21–5.77)
RBC # BLD: 4.82 M/UL (ref 4.21–5.77)
RBC # BLD: 4.84 M/UL (ref 4.21–5.77)
RBC # BLD: 5.08 M/UL (ref 4.21–5.77)
RBC # BLD: 5.25 M/UL (ref 4.21–5.77)
RBC # BLD: 5.27 M/UL (ref 4.21–5.77)
RBC # BLD: 5.35 M/UL (ref 4.21–5.77)
RBC # BLD: 5.45 M/UL (ref 4.21–5.77)
RBC # BLD: 5.55 M/UL (ref 4.21–5.77)
RBC # BLD: 5.55 M/UL (ref 4.21–5.77)
RBC # BLD: 5.57 M/UL (ref 4.21–5.77)
RBC # BLD: 5.75 M/UL (ref 4.21–5.77)
RBC # BLD: 5.85 M/UL (ref 4.21–5.77)
RBC # BLD: 5.95 M/UL (ref 4.21–5.77)
RBC # BLD: 6.28 M/UL (ref 4.21–5.77)
RBC # BLD: 6.46 M/UL (ref 4.21–5.77)
RBC # BLD: 6.46 M/UL (ref 4.21–5.77)
RBC # BLD: 6.58 M/UL (ref 4.21–5.77)
RBC # BLD: 6.67 M/UL (ref 4.21–5.77)
RBC # BLD: 6.82 M/UL (ref 4.21–5.77)
RBC # BLD: 6.97 M/UL (ref 4.21–5.77)
RBC # BLD: 6.98 M/UL (ref 4.21–5.77)
RBC # BLD: 7.03 M/UL (ref 4.21–5.77)
RBC # BLD: 7.12 M/UL (ref 4.21–5.77)
RBC # BLD: 7.24 M/UL (ref 4.21–5.77)
RBC # BLD: 7.59 M/UL (ref 4.21–5.77)
RBC # BLD: ABNORMAL 10*6/UL
RBC UA: ABNORMAL /HPF (ref 0–2)
RENAL EPITHELIAL, UA: ABNORMAL /HPF
SAMPLE SITE: ABNORMAL
SAMPLE SITE: NORMAL
SAMPLE SITE: NORMAL
SARS-COV-2, RAPID: DETECTED
SEDIMENTATION RATE, ERYTHROCYTE: 3 MM (ref 0–20)
SEG NEUTROPHILS: 30 % (ref 36–65)
SEG NEUTROPHILS: 49 % (ref 36–66)
SEG NEUTROPHILS: 53 % (ref 36–66)
SEG NEUTROPHILS: 62 % (ref 36–65)
SEG NEUTROPHILS: 62 % (ref 36–66)
SEG NEUTROPHILS: 65 % (ref 36–65)
SEG NEUTROPHILS: 66 % (ref 36–65)
SEG NEUTROPHILS: 77 % (ref 36–66)
SEG NEUTROPHILS: 80 % (ref 36–66)
SEG NEUTROPHILS: 84 % (ref 36–65)
SEG NEUTROPHILS: 85 % (ref 36–65)
SEG NEUTROPHILS: 85 % (ref 36–65)
SEG NEUTROPHILS: 85 % (ref 36–66)
SEG NEUTROPHILS: 86 % (ref 36–66)
SEG NEUTROPHILS: 87 % (ref 36–65)
SEG NEUTROPHILS: 87 % (ref 36–65)
SEG NEUTROPHILS: 87 % (ref 36–66)
SEG NEUTROPHILS: 87 % (ref 36–66)
SEG NEUTROPHILS: 88 % (ref 36–66)
SEG NEUTROPHILS: 89 % (ref 36–66)
SEG NEUTROPHILS: 89 % (ref 36–66)
SEG NEUTROPHILS: 90 % (ref 36–65)
SEG NEUTROPHILS: 90 % (ref 36–65)
SEG NEUTROPHILS: 90 % (ref 36–66)
SEG NEUTROPHILS: 91 % (ref 36–66)
SEG NEUTROPHILS: 91 % (ref 36–66)
SEG NEUTROPHILS: 92 % (ref 36–66)
SEG NEUTROPHILS: 92 % (ref 36–66)
SEG NEUTROPHILS: 93 % (ref 36–66)
SEGMENTED NEUTROPHILS ABSOLUTE COUNT: 0.33 K/UL (ref 1.5–8.1)
SEGMENTED NEUTROPHILS ABSOLUTE COUNT: 0.88 K/UL (ref 1.8–7.7)
SEGMENTED NEUTROPHILS ABSOLUTE COUNT: 0.9 K/UL (ref 1.8–7.7)
SEGMENTED NEUTROPHILS ABSOLUTE COUNT: 1.11 K/UL (ref 1.8–7.7)
SEGMENTED NEUTROPHILS ABSOLUTE COUNT: 1.37 K/UL (ref 1.5–8.1)
SEGMENTED NEUTROPHILS ABSOLUTE COUNT: 1.67 K/UL (ref 1.5–8.1)
SEGMENTED NEUTROPHILS ABSOLUTE COUNT: 10.34 K/UL (ref 1.8–7.7)
SEGMENTED NEUTROPHILS ABSOLUTE COUNT: 10.44 K/UL (ref 1.8–7.7)
SEGMENTED NEUTROPHILS ABSOLUTE COUNT: 10.56 K/UL (ref 1.5–8.1)
SEGMENTED NEUTROPHILS ABSOLUTE COUNT: 10.57 K/UL (ref 1.8–7.7)
SEGMENTED NEUTROPHILS ABSOLUTE COUNT: 10.65 K/UL (ref 1.8–7.7)
SEGMENTED NEUTROPHILS ABSOLUTE COUNT: 10.71 K/UL (ref 1.5–8.1)
SEGMENTED NEUTROPHILS ABSOLUTE COUNT: 12.23 K/UL (ref 1.5–8.1)
SEGMENTED NEUTROPHILS ABSOLUTE COUNT: 12.86 K/UL (ref 1.5–8.1)
SEGMENTED NEUTROPHILS ABSOLUTE COUNT: 13.07 K/UL (ref 1.8–7.7)
SEGMENTED NEUTROPHILS ABSOLUTE COUNT: 13.44 K/UL (ref 1.8–7.7)
SEGMENTED NEUTROPHILS ABSOLUTE COUNT: 15.81 K/UL (ref 1.8–7.7)
SEGMENTED NEUTROPHILS ABSOLUTE COUNT: 2.11 K/UL (ref 1.5–8.1)
SEGMENTED NEUTROPHILS ABSOLUTE COUNT: 2.77 K/UL (ref 1.8–7.7)
SEGMENTED NEUTROPHILS ABSOLUTE COUNT: 4.65 K/UL (ref 1.8–7.7)
SEGMENTED NEUTROPHILS ABSOLUTE COUNT: 5.25 K/UL (ref 1.8–7.7)
SEGMENTED NEUTROPHILS ABSOLUTE COUNT: 5.51 K/UL (ref 1.8–7.7)
SEGMENTED NEUTROPHILS ABSOLUTE COUNT: 5.86 K/UL (ref 1.5–8.1)
SEGMENTED NEUTROPHILS ABSOLUTE COUNT: 5.94 K/UL (ref 1.8–7.7)
SEGMENTED NEUTROPHILS ABSOLUTE COUNT: 6.15 K/UL (ref 1.8–7.7)
SEGMENTED NEUTROPHILS ABSOLUTE COUNT: 6.87 K/UL (ref 1.8–7.7)
SEGMENTED NEUTROPHILS ABSOLUTE COUNT: 7.18 K/UL (ref 1.8–7.7)
SEGMENTED NEUTROPHILS ABSOLUTE COUNT: 7.4 K/UL (ref 1.8–7.7)
SEGMENTED NEUTROPHILS ABSOLUTE COUNT: 8.09 K/UL (ref 1.5–8.1)
SEGMENTED NEUTROPHILS ABSOLUTE COUNT: 8.44 K/UL (ref 1.5–8.1)
SEGMENTED NEUTROPHILS ABSOLUTE COUNT: 8.73 K/UL (ref 1.8–7.7)
SEGMENTED NEUTROPHILS ABSOLUTE COUNT: 9.2 K/UL (ref 1.8–7.7)
SEGMENTED NEUTROPHILS ABSOLUTE COUNT: 9.39 K/UL (ref 1.8–7.7)
SEGMENTED NEUTROPHILS ABSOLUTE COUNT: 9.95 K/UL (ref 1.8–7.7)
SODIUM BLD-SCNC: 132 MMOL/L (ref 135–144)
SODIUM BLD-SCNC: 133 MMOL/L (ref 135–144)
SODIUM BLD-SCNC: 134 MMOL/L (ref 135–144)
SODIUM BLD-SCNC: 135 MMOL/L (ref 135–144)
SODIUM BLD-SCNC: 135 MMOL/L (ref 135–144)
SODIUM BLD-SCNC: 136 MMOL/L (ref 135–144)
SODIUM BLD-SCNC: 136 MMOL/L (ref 135–144)
SODIUM BLD-SCNC: 137 MMOL/L (ref 135–144)
SODIUM BLD-SCNC: 138 MMOL/L (ref 135–144)
SODIUM BLD-SCNC: 139 MMOL/L (ref 135–144)
SODIUM BLD-SCNC: 140 MMOL/L (ref 135–144)
SODIUM BLD-SCNC: 141 MMOL/L (ref 135–144)
SODIUM BLD-SCNC: 142 MMOL/L (ref 135–144)
SODIUM BLD-SCNC: 143 MMOL/L (ref 135–144)
SODIUM BLD-SCNC: 144 MMOL/L (ref 135–144)
SODIUM BLD-SCNC: 144 MMOL/L (ref 135–144)
SODIUM BLD-SCNC: 145 MMOL/L (ref 135–144)
SODIUM BLD-SCNC: 147 MMOL/L (ref 135–144)
SPECIFIC GRAVITY UA: 1.02 (ref 1–1.03)
SPECIMEN DESCRIPTION: ABNORMAL
SPECIMEN DESCRIPTION: NORMAL
TCO2 (CALC), ART: ABNORMAL MMOL/L (ref 22–29)
TCO2 (CALC), ART: NORMAL MMOL/L (ref 22–29)
TCO2 (CALC), ART: NORMAL MMOL/L (ref 22–29)
TOTAL CO2, VENOUS: ABNORMAL MMOL/L (ref 23–30)
TOTAL PROTEIN: 5 G/DL (ref 6.4–8.3)
TOTAL PROTEIN: 5.6 G/DL (ref 6.4–8.3)
TOTAL PROTEIN: 5.9 G/DL (ref 6.4–8.3)
TOTAL PROTEIN: 6 G/DL (ref 6.4–8.3)
TOTAL PROTEIN: 6.5 G/DL (ref 6.4–8.3)
TRICHOMONAS: ABNORMAL
TROPONIN INTERP: ABNORMAL
TROPONIN T: ABNORMAL NG/ML
TROPONIN, HIGH SENSITIVITY: 129 NG/L (ref 0–22)
TROPONIN, HIGH SENSITIVITY: 31 NG/L (ref 0–22)
TROPONIN, HIGH SENSITIVITY: 33 NG/L (ref 0–22)
TROPONIN, HIGH SENSITIVITY: 36 NG/L (ref 0–22)
TROPONIN, HIGH SENSITIVITY: 37 NG/L (ref 0–22)
TROPONIN, HIGH SENSITIVITY: 39 NG/L (ref 0–22)
TROPONIN, HIGH SENSITIVITY: 63 NG/L (ref 0–22)
TROPONIN, HIGH SENSITIVITY: 68 NG/L (ref 0–22)
TROPONIN, HIGH SENSITIVITY: 82 NG/L (ref 0–22)
TURBIDITY: CLEAR
URINE HGB: ABNORMAL
UROBILINOGEN, URINE: NORMAL
VITAMIN D 25-HYDROXY: 26.6 NG/ML (ref 30–100)
WBC # BLD: 1.1 K/UL (ref 3.5–11.3)
WBC # BLD: 1.7 K/UL (ref 3.5–11.3)
WBC # BLD: 1.8 K/UL (ref 3.5–11.3)
WBC # BLD: 1.8 K/UL (ref 3.5–11.3)
WBC # BLD: 10.7 K/UL (ref 3.5–11.3)
WBC # BLD: 10.7 K/UL (ref 3.5–11.3)
WBC # BLD: 10.8 K/UL (ref 3.5–11.3)
WBC # BLD: 11.5 K/UL (ref 3.5–11.3)
WBC # BLD: 11.6 K/UL (ref 3.5–11.3)
WBC # BLD: 11.7 K/UL (ref 3.5–11.3)
WBC # BLD: 11.9 K/UL (ref 3.5–11.3)
WBC # BLD: 11.9 K/UL (ref 3.5–11.3)
WBC # BLD: 12.1 K/UL (ref 3.5–11.3)
WBC # BLD: 13.6 K/UL (ref 3.5–11.3)
WBC # BLD: 14.7 K/UL (ref 3.5–11.3)
WBC # BLD: 15.1 K/UL (ref 3.5–11.3)
WBC # BLD: 15.2 K/UL (ref 3.5–11.3)
WBC # BLD: 17 K/UL (ref 3.5–11.3)
WBC # BLD: 2.1 K/UL (ref 3.5–11.3)
WBC # BLD: 2.7 K/UL (ref 3.5–11.3)
WBC # BLD: 3.2 K/UL (ref 3.5–11.3)
WBC # BLD: 3.6 K/UL (ref 3.5–11.3)
WBC # BLD: 5 K/UL (ref 3.5–11.3)
WBC # BLD: 6.1 K/UL (ref 3.5–11.3)
WBC # BLD: 6.5 K/UL (ref 3.5–11.3)
WBC # BLD: 6.9 K/UL (ref 3.5–11.3)
WBC # BLD: 6.9 K/UL (ref 3.5–11.3)
WBC # BLD: 7.7 K/UL (ref 3.5–11.3)
WBC # BLD: 7.8 K/UL (ref 3.5–11.3)
WBC # BLD: 7.9 K/UL (ref 3.5–11.3)
WBC # BLD: 8.6 K/UL (ref 3.5–11.3)
WBC # BLD: 9.3 K/UL (ref 3.5–11.3)
WBC # BLD: 9.5 K/UL (ref 3.5–11.3)
WBC # BLD: 9.9 K/UL (ref 3.5–11.3)
WBC # BLD: ABNORMAL 10*3/UL
WBC UA: ABNORMAL /HPF (ref 0–5)
YEAST: ABNORMAL

## 2022-01-01 PROCEDURE — 6370000000 HC RX 637 (ALT 250 FOR IP): Performed by: INTERNAL MEDICINE

## 2022-01-01 PROCEDURE — 82947 ASSAY GLUCOSE BLOOD QUANT: CPT

## 2022-01-01 PROCEDURE — 97530 THERAPEUTIC ACTIVITIES: CPT

## 2022-01-01 PROCEDURE — 3609013300 HC EGD TUBE PLACEMENT: Performed by: INTERNAL MEDICINE

## 2022-01-01 PROCEDURE — 99233 SBSQ HOSP IP/OBS HIGH 50: CPT | Performed by: PSYCHIATRY & NEUROLOGY

## 2022-01-01 PROCEDURE — 2580000003 HC RX 258: Performed by: INTERNAL MEDICINE

## 2022-01-01 PROCEDURE — 2700000000 HC OXYGEN THERAPY PER DAY

## 2022-01-01 PROCEDURE — 6370000000 HC RX 637 (ALT 250 FOR IP): Performed by: FAMILY MEDICINE

## 2022-01-01 PROCEDURE — 99232 SBSQ HOSP IP/OBS MODERATE 35: CPT | Performed by: NURSE PRACTITIONER

## 2022-01-01 PROCEDURE — 94761 N-INVAS EAR/PLS OXIMETRY MLT: CPT

## 2022-01-01 PROCEDURE — 36556 INSERT NON-TUNNEL CV CATH: CPT

## 2022-01-01 PROCEDURE — 6360000002 HC RX W HCPCS: Performed by: FAMILY MEDICINE

## 2022-01-01 PROCEDURE — 2000000000 HC ICU R&B

## 2022-01-01 PROCEDURE — 6360000002 HC RX W HCPCS: Performed by: INTERNAL MEDICINE

## 2022-01-01 PROCEDURE — 82803 BLOOD GASES ANY COMBINATION: CPT

## 2022-01-01 PROCEDURE — 83735 ASSAY OF MAGNESIUM: CPT

## 2022-01-01 PROCEDURE — C9113 INJ PANTOPRAZOLE SODIUM, VIA: HCPCS | Performed by: INTERNAL MEDICINE

## 2022-01-01 PROCEDURE — 99283 EMERGENCY DEPT VISIT LOW MDM: CPT

## 2022-01-01 PROCEDURE — 2580000003 HC RX 258: Performed by: FAMILY MEDICINE

## 2022-01-01 PROCEDURE — 80048 BASIC METABOLIC PNL TOTAL CA: CPT

## 2022-01-01 PROCEDURE — 94003 VENT MGMT INPAT SUBQ DAY: CPT

## 2022-01-01 PROCEDURE — 71045 X-RAY EXAM CHEST 1 VIEW: CPT

## 2022-01-01 PROCEDURE — 6360000004 HC RX CONTRAST MEDICATION: Performed by: INTERNAL MEDICINE

## 2022-01-01 PROCEDURE — 94640 AIRWAY INHALATION TREATMENT: CPT

## 2022-01-01 PROCEDURE — 2500000003 HC RX 250 WO HCPCS: Performed by: INTERNAL MEDICINE

## 2022-01-01 PROCEDURE — 99232 SBSQ HOSP IP/OBS MODERATE 35: CPT | Performed by: INTERNAL MEDICINE

## 2022-01-01 PROCEDURE — 97116 GAIT TRAINING THERAPY: CPT

## 2022-01-01 PROCEDURE — 6360000002 HC RX W HCPCS: Performed by: HOSPITALIST

## 2022-01-01 PROCEDURE — 99233 SBSQ HOSP IP/OBS HIGH 50: CPT | Performed by: INTERNAL MEDICINE

## 2022-01-01 PROCEDURE — 36415 COLL VENOUS BLD VENIPUNCTURE: CPT

## 2022-01-01 PROCEDURE — 37799 UNLISTED PX VASCULAR SURGERY: CPT

## 2022-01-01 PROCEDURE — 85025 COMPLETE CBC W/AUTO DIFF WBC: CPT

## 2022-01-01 PROCEDURE — 85379 FIBRIN DEGRADATION QUANT: CPT

## 2022-01-01 PROCEDURE — 84100 ASSAY OF PHOSPHORUS: CPT

## 2022-01-01 PROCEDURE — 6370000000 HC RX 637 (ALT 250 FOR IP): Performed by: NURSE PRACTITIONER

## 2022-01-01 PROCEDURE — 6360000002 HC RX W HCPCS

## 2022-01-01 PROCEDURE — 83615 LACTATE (LD) (LDH) ENZYME: CPT

## 2022-01-01 PROCEDURE — 93308 TTE F-UP OR LMTD: CPT

## 2022-01-01 PROCEDURE — 99232 SBSQ HOSP IP/OBS MODERATE 35: CPT | Performed by: PSYCHIATRY & NEUROLOGY

## 2022-01-01 PROCEDURE — 99254 IP/OBS CNSLTJ NEW/EST MOD 60: CPT | Performed by: INTERNAL MEDICINE

## 2022-01-01 PROCEDURE — 0DH63UZ INSERTION OF FEEDING DEVICE INTO STOMACH, PERCUTANEOUS APPROACH: ICD-10-PCS | Performed by: INTERNAL MEDICINE

## 2022-01-01 PROCEDURE — APPSS30 APP SPLIT SHARED TIME 16-30 MINUTES: Performed by: NURSE PRACTITIONER

## 2022-01-01 PROCEDURE — 82374 ASSAY BLOOD CARBON DIOXIDE: CPT

## 2022-01-01 PROCEDURE — 83036 HEMOGLOBIN GLYCOSYLATED A1C: CPT

## 2022-01-01 PROCEDURE — 94660 CPAP INITIATION&MGMT: CPT

## 2022-01-01 PROCEDURE — 2500000003 HC RX 250 WO HCPCS

## 2022-01-01 PROCEDURE — 43246 EGD PLACE GASTROSTOMY TUBE: CPT | Performed by: INTERNAL MEDICINE

## 2022-01-01 PROCEDURE — 2500000003 HC RX 250 WO HCPCS: Performed by: FAMILY MEDICINE

## 2022-01-01 PROCEDURE — 93325 DOPPLER ECHO COLOR FLOW MAPG: CPT

## 2022-01-01 PROCEDURE — 2060000000 HC ICU INTERMEDIATE R&B

## 2022-01-01 PROCEDURE — 82140 ASSAY OF AMMONIA: CPT

## 2022-01-01 PROCEDURE — 87040 BLOOD CULTURE FOR BACTERIA: CPT

## 2022-01-01 PROCEDURE — 84484 ASSAY OF TROPONIN QUANT: CPT

## 2022-01-01 PROCEDURE — 93970 EXTREMITY STUDY: CPT

## 2022-01-01 PROCEDURE — 93320 DOPPLER ECHO COMPLETE: CPT

## 2022-01-01 PROCEDURE — A4216 STERILE WATER/SALINE, 10 ML: HCPCS | Performed by: INTERNAL MEDICINE

## 2022-01-01 PROCEDURE — 82150 ASSAY OF AMYLASE: CPT

## 2022-01-01 PROCEDURE — 83880 ASSAY OF NATRIURETIC PEPTIDE: CPT

## 2022-01-01 PROCEDURE — 2580000003 HC RX 258: Performed by: EMERGENCY MEDICINE

## 2022-01-01 PROCEDURE — 2709999900 HC NON-CHARGEABLE SUPPLY: Performed by: INTERNAL MEDICINE

## 2022-01-01 PROCEDURE — 02HV33Z INSERTION OF INFUSION DEVICE INTO SUPERIOR VENA CAVA, PERCUTANEOUS APPROACH: ICD-10-PCS | Performed by: INTERNAL MEDICINE

## 2022-01-01 PROCEDURE — 6360000002 HC RX W HCPCS: Performed by: NURSE PRACTITIONER

## 2022-01-01 PROCEDURE — 82306 VITAMIN D 25 HYDROXY: CPT

## 2022-01-01 PROCEDURE — APPNB30 APP NON BILLABLE TIME 0-30 MINS: Performed by: NURSE PRACTITIONER

## 2022-01-01 PROCEDURE — 85652 RBC SED RATE AUTOMATED: CPT

## 2022-01-01 PROCEDURE — 97163 PT EVAL HIGH COMPLEX 45 MIN: CPT

## 2022-01-01 PROCEDURE — 87086 URINE CULTURE/COLONY COUNT: CPT

## 2022-01-01 PROCEDURE — 93005 ELECTROCARDIOGRAM TRACING: CPT | Performed by: FAMILY MEDICINE

## 2022-01-01 PROCEDURE — 76705 ECHO EXAM OF ABDOMEN: CPT

## 2022-01-01 PROCEDURE — 74018 RADEX ABDOMEN 1 VIEW: CPT

## 2022-01-01 PROCEDURE — 0BH17EZ INSERTION OF ENDOTRACHEAL AIRWAY INTO TRACHEA, VIA NATURAL OR ARTIFICIAL OPENING: ICD-10-PCS | Performed by: INTERNAL MEDICINE

## 2022-01-01 PROCEDURE — 81001 URINALYSIS AUTO W/SCOPE: CPT

## 2022-01-01 PROCEDURE — 83690 ASSAY OF LIPASE: CPT

## 2022-01-01 PROCEDURE — 70450 CT HEAD/BRAIN W/O DYE: CPT

## 2022-01-01 PROCEDURE — 93005 ELECTROCARDIOGRAM TRACING: CPT | Performed by: EMERGENCY MEDICINE

## 2022-01-01 PROCEDURE — 6370000000 HC RX 637 (ALT 250 FOR IP): Performed by: STUDENT IN AN ORGANIZED HEALTH CARE EDUCATION/TRAINING PROGRAM

## 2022-01-01 PROCEDURE — 85610 PROTHROMBIN TIME: CPT

## 2022-01-01 PROCEDURE — 2580000003 HC RX 258

## 2022-01-01 PROCEDURE — 6360000002 HC RX W HCPCS: Performed by: EMERGENCY MEDICINE

## 2022-01-01 PROCEDURE — 87641 MR-STAPH DNA AMP PROBE: CPT

## 2022-01-01 PROCEDURE — 99233 SBSQ HOSP IP/OBS HIGH 50: CPT | Performed by: NURSE PRACTITIONER

## 2022-01-01 PROCEDURE — 96375 TX/PRO/DX INJ NEW DRUG ADDON: CPT

## 2022-01-01 PROCEDURE — 97110 THERAPEUTIC EXERCISES: CPT

## 2022-01-01 PROCEDURE — 96365 THER/PROPH/DIAG IV INF INIT: CPT

## 2022-01-01 PROCEDURE — 6370000000 HC RX 637 (ALT 250 FOR IP)

## 2022-01-01 PROCEDURE — 97167 OT EVAL HIGH COMPLEX 60 MIN: CPT

## 2022-01-01 PROCEDURE — 82728 ASSAY OF FERRITIN: CPT

## 2022-01-01 PROCEDURE — 99221 1ST HOSP IP/OBS SF/LOW 40: CPT | Performed by: PSYCHIATRY & NEUROLOGY

## 2022-01-01 PROCEDURE — 84132 ASSAY OF SERUM POTASSIUM: CPT

## 2022-01-01 PROCEDURE — 80053 COMPREHEN METABOLIC PANEL: CPT

## 2022-01-01 PROCEDURE — 99213 OFFICE O/P EST LOW 20 MIN: CPT

## 2022-01-01 PROCEDURE — 84145 PROCALCITONIN (PCT): CPT

## 2022-01-01 PROCEDURE — 83605 ASSAY OF LACTIC ACID: CPT

## 2022-01-01 PROCEDURE — 99222 1ST HOSP IP/OBS MODERATE 55: CPT | Performed by: INTERNAL MEDICINE

## 2022-01-01 PROCEDURE — 97164 PT RE-EVAL EST PLAN CARE: CPT

## 2022-01-01 PROCEDURE — XW0DXM6 INTRODUCTION OF BARICITINIB INTO MOUTH AND PHARYNX, EXTERNAL APPROACH, NEW TECHNOLOGY GROUP 6: ICD-10-PCS | Performed by: FAMILY MEDICINE

## 2022-01-01 PROCEDURE — 85384 FIBRINOGEN ACTIVITY: CPT

## 2022-01-01 PROCEDURE — 2720000010 HC SURG SUPPLY STERILE: Performed by: INTERNAL MEDICINE

## 2022-01-01 PROCEDURE — 89220 SPUTUM SPECIMEN COLLECTION: CPT

## 2022-01-01 PROCEDURE — 6370000000 HC RX 637 (ALT 250 FOR IP): Performed by: EMERGENCY MEDICINE

## 2022-01-01 PROCEDURE — 80076 HEPATIC FUNCTION PANEL: CPT

## 2022-01-01 PROCEDURE — 2580000003 HC RX 258: Performed by: HOSPITALIST

## 2022-01-01 PROCEDURE — 36620 INSERTION CATHETER ARTERY: CPT

## 2022-01-01 PROCEDURE — 94645 CONT INHLJ TX EACH ADDL HOUR: CPT

## 2022-01-01 PROCEDURE — 93010 ELECTROCARDIOGRAM REPORT: CPT | Performed by: INTERNAL MEDICINE

## 2022-01-01 PROCEDURE — 97112 NEUROMUSCULAR REEDUCATION: CPT

## 2022-01-01 PROCEDURE — 87070 CULTURE OTHR SPECIMN AEROBIC: CPT

## 2022-01-01 PROCEDURE — 87635 SARS-COV-2 COVID-19 AMP PRB: CPT

## 2022-01-01 PROCEDURE — 6360000004 HC RX CONTRAST MEDICATION: Performed by: EMERGENCY MEDICINE

## 2022-01-01 PROCEDURE — 87205 SMEAR GRAM STAIN: CPT

## 2022-01-01 PROCEDURE — 5A1955Z RESPIRATORY VENTILATION, GREATER THAN 96 CONSECUTIVE HOURS: ICD-10-PCS | Performed by: INTERNAL MEDICINE

## 2022-01-01 PROCEDURE — 97535 SELF CARE MNGMENT TRAINING: CPT | Performed by: NURSE PRACTITIONER

## 2022-01-01 PROCEDURE — 97535 SELF CARE MNGMENT TRAINING: CPT

## 2022-01-01 PROCEDURE — 94002 VENT MGMT INPAT INIT DAY: CPT

## 2022-01-01 PROCEDURE — 36600 WITHDRAWAL OF ARTERIAL BLOOD: CPT

## 2022-01-01 PROCEDURE — 99222 1ST HOSP IP/OBS MODERATE 55: CPT | Performed by: NURSE PRACTITIONER

## 2022-01-01 PROCEDURE — 96368 THER/DIAG CONCURRENT INF: CPT

## 2022-01-01 PROCEDURE — 87804 INFLUENZA ASSAY W/OPTIC: CPT

## 2022-01-01 PROCEDURE — 86140 C-REACTIVE PROTEIN: CPT

## 2022-01-01 PROCEDURE — 74176 CT ABD & PELVIS W/O CONTRAST: CPT

## 2022-01-01 PROCEDURE — 74177 CT ABD & PELVIS W/CONTRAST: CPT

## 2022-01-01 PROCEDURE — 93971 EXTREMITY STUDY: CPT

## 2022-01-01 PROCEDURE — 71260 CT THORAX DX C+: CPT

## 2022-01-01 PROCEDURE — 93005 ELECTROCARDIOGRAM TRACING: CPT | Performed by: INTERNAL MEDICINE

## 2022-01-01 RX ORDER — SODIUM CHLORIDE 9 MG/ML
INJECTION, SOLUTION INTRAVENOUS CONTINUOUS
Status: DISCONTINUED | OUTPATIENT
Start: 2022-01-01 | End: 2022-01-01

## 2022-01-01 RX ORDER — NICOTINE POLACRILEX 4 MG
15 LOZENGE BUCCAL PRN
Status: DISCONTINUED | OUTPATIENT
Start: 2022-01-01 | End: 2022-01-01

## 2022-01-01 RX ORDER — QUETIAPINE FUMARATE 25 MG/1
50 TABLET, FILM COATED ORAL ONCE
Status: DISCONTINUED | OUTPATIENT
Start: 2022-01-01 | End: 2022-01-01 | Stop reason: HOSPADM

## 2022-01-01 RX ORDER — METOPROLOL SUCCINATE 50 MG/1
50 TABLET, EXTENDED RELEASE ORAL NIGHTLY
Status: DISCONTINUED | OUTPATIENT
Start: 2022-01-01 | End: 2022-01-01

## 2022-01-01 RX ORDER — LEVALBUTEROL 1.25 MG/.5ML
1.25 SOLUTION, CONCENTRATE RESPIRATORY (INHALATION) EVERY 6 HOURS
Status: DISCONTINUED | OUTPATIENT
Start: 2022-01-01 | End: 2022-01-01 | Stop reason: ALTCHOICE

## 2022-01-01 RX ORDER — ALPRAZOLAM 0.5 MG/1
0.5 TABLET ORAL 3 TIMES DAILY
Status: DISCONTINUED | OUTPATIENT
Start: 2022-01-01 | End: 2022-01-01

## 2022-01-01 RX ORDER — ATORVASTATIN CALCIUM 20 MG/1
20 TABLET, FILM COATED ORAL DAILY
Status: DISCONTINUED | OUTPATIENT
Start: 2022-01-01 | End: 2022-01-01 | Stop reason: HOSPADM

## 2022-01-01 RX ORDER — 0.9 % SODIUM CHLORIDE 0.9 %
80 INTRAVENOUS SOLUTION INTRAVENOUS ONCE
Status: COMPLETED | OUTPATIENT
Start: 2022-01-01 | End: 2022-01-01

## 2022-01-01 RX ORDER — MAGNESIUM SULFATE IN WATER 40 MG/ML
2000 INJECTION, SOLUTION INTRAVENOUS ONCE
Status: DISCONTINUED | OUTPATIENT
Start: 2022-01-01 | End: 2022-01-01

## 2022-01-01 RX ORDER — SODIUM POLYSTYRENE SULFONATE 15 G/60ML
45 SUSPENSION ORAL; RECTAL ONCE
Status: COMPLETED | OUTPATIENT
Start: 2022-01-01 | End: 2022-01-01

## 2022-01-01 RX ORDER — DEXTROSE MONOHYDRATE 50 MG/ML
100 INJECTION, SOLUTION INTRAVENOUS PRN
Status: DISCONTINUED | OUTPATIENT
Start: 2022-01-01 | End: 2022-01-01

## 2022-01-01 RX ORDER — LORAZEPAM 2 MG/ML
1 INJECTION INTRAMUSCULAR EVERY 4 HOURS PRN
Status: DISCONTINUED | OUTPATIENT
Start: 2022-01-01 | End: 2022-01-01 | Stop reason: DRUGHIGH

## 2022-01-01 RX ORDER — DEXTROSE MONOHYDRATE 25 G/50ML
25 INJECTION, SOLUTION INTRAVENOUS PRN
Status: DISCONTINUED | OUTPATIENT
Start: 2022-01-01 | End: 2022-01-01 | Stop reason: ALTCHOICE

## 2022-01-01 RX ORDER — PROPOFOL 10 MG/ML
5-50 INJECTION, EMULSION INTRAVENOUS
Status: DISCONTINUED | OUTPATIENT
Start: 2022-01-01 | End: 2022-01-01

## 2022-01-01 RX ORDER — INSULIN GLARGINE 100 [IU]/ML
45 INJECTION, SOLUTION SUBCUTANEOUS 2 TIMES DAILY
Status: DISCONTINUED | OUTPATIENT
Start: 2022-01-01 | End: 2022-01-01 | Stop reason: ALTCHOICE

## 2022-01-01 RX ORDER — IPRATROPIUM BROMIDE AND ALBUTEROL SULFATE 2.5; .5 MG/3ML; MG/3ML
1 SOLUTION RESPIRATORY (INHALATION) 4 TIMES DAILY
Status: DISCONTINUED | OUTPATIENT
Start: 2022-01-01 | End: 2022-01-01

## 2022-01-01 RX ORDER — SODIUM CHLORIDE 9 MG/ML
25 INJECTION, SOLUTION INTRAVENOUS PRN
Status: DISCONTINUED | OUTPATIENT
Start: 2022-01-01 | End: 2022-01-01 | Stop reason: HOSPADM

## 2022-01-01 RX ORDER — ONDANSETRON 2 MG/ML
4 INJECTION INTRAMUSCULAR; INTRAVENOUS EVERY 6 HOURS PRN
Status: DISCONTINUED | OUTPATIENT
Start: 2022-01-01 | End: 2022-01-01 | Stop reason: HOSPADM

## 2022-01-01 RX ORDER — POLYETHYLENE GLYCOL 3350 17 G/17G
17 POWDER, FOR SOLUTION ORAL DAILY
Status: DISCONTINUED | OUTPATIENT
Start: 2022-01-01 | End: 2022-01-01 | Stop reason: HOSPADM

## 2022-01-01 RX ORDER — MAGNESIUM SULFATE IN WATER 40 MG/ML
2000 INJECTION, SOLUTION INTRAVENOUS ONCE
Status: COMPLETED | OUTPATIENT
Start: 2022-01-01 | End: 2022-01-01

## 2022-01-01 RX ORDER — ROSUVASTATIN CALCIUM 40 MG/1
40 TABLET, COATED ORAL EVERY EVENING
COMMUNITY

## 2022-01-01 RX ORDER — LINEZOLID 2 MG/ML
600 INJECTION, SOLUTION INTRAVENOUS EVERY 12 HOURS
Status: DISCONTINUED | OUTPATIENT
Start: 2022-01-01 | End: 2022-01-01 | Stop reason: HOSPADM

## 2022-01-01 RX ORDER — AMLODIPINE BESYLATE 5 MG/1
5 TABLET ORAL 2 TIMES DAILY
Status: DISCONTINUED | OUTPATIENT
Start: 2022-01-01 | End: 2022-01-01 | Stop reason: HOSPADM

## 2022-01-01 RX ORDER — POTASSIUM CHLORIDE 7.45 MG/ML
10 INJECTION INTRAVENOUS ONCE
Status: COMPLETED | OUTPATIENT
Start: 2022-01-01 | End: 2022-01-01

## 2022-01-01 RX ORDER — SUCRALFATE 1 G/1
1 TABLET ORAL EVERY 6 HOURS SCHEDULED
Status: DISCONTINUED | OUTPATIENT
Start: 2022-01-01 | End: 2022-01-01 | Stop reason: HOSPADM

## 2022-01-01 RX ORDER — DEXTROSE MONOHYDRATE 50 MG/ML
INJECTION, SOLUTION INTRAVENOUS CONTINUOUS
Status: DISCONTINUED | OUTPATIENT
Start: 2022-01-01 | End: 2022-01-01

## 2022-01-01 RX ORDER — LEVOFLOXACIN 500 MG/1
500 TABLET, FILM COATED ORAL DAILY
Status: DISCONTINUED | OUTPATIENT
Start: 2022-01-01 | End: 2022-01-01

## 2022-01-01 RX ORDER — FUROSEMIDE 10 MG/ML
20 INJECTION INTRAMUSCULAR; INTRAVENOUS DAILY
Status: DISCONTINUED | OUTPATIENT
Start: 2022-01-01 | End: 2022-01-01

## 2022-01-01 RX ORDER — ONDANSETRON 4 MG/1
4 TABLET, ORALLY DISINTEGRATING ORAL EVERY 8 HOURS PRN
Status: DISCONTINUED | OUTPATIENT
Start: 2022-01-01 | End: 2022-01-01 | Stop reason: HOSPADM

## 2022-01-01 RX ORDER — SODIUM POLYSTYRENE SULFONATE 15 G/60ML
30 SUSPENSION ORAL; RECTAL ONCE
Status: COMPLETED | OUTPATIENT
Start: 2022-01-01 | End: 2022-01-01

## 2022-01-01 RX ORDER — LACTULOSE 10 G/15ML
20 SOLUTION ORAL 2 TIMES DAILY
Status: DISCONTINUED | OUTPATIENT
Start: 2022-01-01 | End: 2022-01-01

## 2022-01-01 RX ORDER — ALBUTEROL SULFATE 90 UG/1
2 AEROSOL, METERED RESPIRATORY (INHALATION) 2 TIMES DAILY
Status: DISCONTINUED | OUTPATIENT
Start: 2022-01-01 | End: 2022-01-01

## 2022-01-01 RX ORDER — ACETAMINOPHEN 325 MG/1
650 TABLET ORAL EVERY 6 HOURS PRN
Status: DISCONTINUED | OUTPATIENT
Start: 2022-01-01 | End: 2022-01-01 | Stop reason: HOSPADM

## 2022-01-01 RX ORDER — AMIODARONE HYDROCHLORIDE 200 MG/1
200 TABLET ORAL DAILY
Status: DISCONTINUED | OUTPATIENT
Start: 2022-01-01 | End: 2022-01-01 | Stop reason: HOSPADM

## 2022-01-01 RX ORDER — SODIUM CHLORIDE FOR INHALATION 0.9 %
3 VIAL, NEBULIZER (ML) INHALATION EVERY 8 HOURS PRN
Status: DISCONTINUED | OUTPATIENT
Start: 2022-01-01 | End: 2022-01-01

## 2022-01-01 RX ORDER — BISACODYL 10 MG
10 SUPPOSITORY, RECTAL RECTAL DAILY
Status: DISCONTINUED | OUTPATIENT
Start: 2022-01-01 | End: 2022-01-01 | Stop reason: HOSPADM

## 2022-01-01 RX ORDER — ALBUTEROL SULFATE 90 UG/1
2 AEROSOL, METERED RESPIRATORY (INHALATION) EVERY 4 HOURS PRN
Status: DISCONTINUED | OUTPATIENT
Start: 2022-01-01 | End: 2022-01-01

## 2022-01-01 RX ORDER — DEXAMETHASONE SODIUM PHOSPHATE 10 MG/ML
10 INJECTION, SOLUTION INTRAMUSCULAR; INTRAVENOUS ONCE
Status: COMPLETED | OUTPATIENT
Start: 2022-01-01 | End: 2022-01-01

## 2022-01-01 RX ORDER — DEXTROSE MONOHYDRATE 25 G/50ML
25 INJECTION, SOLUTION INTRAVENOUS ONCE
Status: COMPLETED | OUTPATIENT
Start: 2022-01-01 | End: 2022-01-01

## 2022-01-01 RX ORDER — HYDRALAZINE HYDROCHLORIDE 20 MG/ML
10 INJECTION INTRAMUSCULAR; INTRAVENOUS EVERY 6 HOURS PRN
Status: DISCONTINUED | OUTPATIENT
Start: 2022-01-01 | End: 2022-01-01 | Stop reason: HOSPADM

## 2022-01-01 RX ORDER — DEXAMETHASONE SODIUM PHOSPHATE 10 MG/ML
6 INJECTION, SOLUTION INTRAMUSCULAR; INTRAVENOUS EVERY 24 HOURS
Status: DISCONTINUED | OUTPATIENT
Start: 2022-01-01 | End: 2022-01-01

## 2022-01-01 RX ORDER — ASPIRIN 81 MG/1
324 TABLET, CHEWABLE ORAL DAILY
Status: DISCONTINUED | OUTPATIENT
Start: 2022-01-01 | End: 2022-01-01 | Stop reason: HOSPADM

## 2022-01-01 RX ORDER — SODIUM CHLORIDE 0.9 % (FLUSH) 0.9 %
10 SYRINGE (ML) INJECTION PRN
Status: DISCONTINUED | OUTPATIENT
Start: 2022-01-01 | End: 2022-01-01 | Stop reason: HOSPADM

## 2022-01-01 RX ORDER — DEXTROSE MONOHYDRATE 50 MG/ML
100 INJECTION, SOLUTION INTRAVENOUS PRN
Status: DISCONTINUED | OUTPATIENT
Start: 2022-01-01 | End: 2022-01-01 | Stop reason: HOSPADM

## 2022-01-01 RX ORDER — LEVETIRACETAM 100 MG/ML
500 SOLUTION ORAL 2 TIMES DAILY
Status: DISCONTINUED | OUTPATIENT
Start: 2022-01-01 | End: 2022-01-01 | Stop reason: HOSPADM

## 2022-01-01 RX ORDER — POTASSIUM CHLORIDE 20 MEQ/1
40 TABLET, EXTENDED RELEASE ORAL PRN
Status: DISCONTINUED | OUTPATIENT
Start: 2022-01-01 | End: 2022-01-01

## 2022-01-01 RX ORDER — POTASSIUM CHLORIDE 29.8 MG/ML
20 INJECTION INTRAVENOUS
Status: COMPLETED | OUTPATIENT
Start: 2022-01-01 | End: 2022-01-01

## 2022-01-01 RX ORDER — VITAMIN B COMPLEX
1000 TABLET ORAL DAILY
Status: DISCONTINUED | OUTPATIENT
Start: 2022-01-01 | End: 2022-01-01 | Stop reason: HOSPADM

## 2022-01-01 RX ORDER — LISINOPRIL 20 MG/1
20 TABLET ORAL DAILY
Status: DISCONTINUED | OUTPATIENT
Start: 2022-01-01 | End: 2022-01-01

## 2022-01-01 RX ORDER — LORAZEPAM 2 MG/ML
1 INJECTION INTRAMUSCULAR
Status: DISCONTINUED | OUTPATIENT
Start: 2022-01-01 | End: 2022-01-01 | Stop reason: HOSPADM

## 2022-01-01 RX ORDER — FUROSEMIDE 10 MG/ML
40 INJECTION INTRAMUSCULAR; INTRAVENOUS 2 TIMES DAILY
Status: DISCONTINUED | OUTPATIENT
Start: 2022-01-01 | End: 2022-01-01

## 2022-01-01 RX ORDER — LEVETIRACETAM 500 MG/1
500 TABLET ORAL 2 TIMES DAILY
Status: DISCONTINUED | OUTPATIENT
Start: 2022-01-01 | End: 2022-01-01

## 2022-01-01 RX ORDER — DEXTROSE MONOHYDRATE 25 G/50ML
12.5 INJECTION, SOLUTION INTRAVENOUS PRN
Status: DISCONTINUED | OUTPATIENT
Start: 2022-01-01 | End: 2022-01-01 | Stop reason: ALTCHOICE

## 2022-01-01 RX ORDER — DEXTROSE MONOHYDRATE 25 G/50ML
12.5 INJECTION, SOLUTION INTRAVENOUS PRN
Status: DISCONTINUED | OUTPATIENT
Start: 2022-01-01 | End: 2022-01-01

## 2022-01-01 RX ORDER — SODIUM POLYSTYRENE SULFONATE 15 G/60ML
SUSPENSION ORAL; RECTAL
Status: DISPENSED
Start: 2022-01-01 | End: 2022-01-01

## 2022-01-01 RX ORDER — POTASSIUM CHLORIDE 29.8 MG/ML
20 INJECTION INTRAVENOUS PRN
Status: DISCONTINUED | OUTPATIENT
Start: 2022-01-01 | End: 2022-01-01 | Stop reason: HOSPADM

## 2022-01-01 RX ORDER — MAGNESIUM SULFATE 1 G/100ML
1000 INJECTION INTRAVENOUS PRN
Status: DISCONTINUED | OUTPATIENT
Start: 2022-01-01 | End: 2022-01-01 | Stop reason: HOSPADM

## 2022-01-01 RX ORDER — SODIUM CHLORIDE 0.9 % (FLUSH) 0.9 %
10 SYRINGE (ML) INJECTION PRN
Status: DISCONTINUED | OUTPATIENT
Start: 2022-01-01 | End: 2022-01-01 | Stop reason: SDUPTHER

## 2022-01-01 RX ORDER — POTASSIUM CHLORIDE 7.45 MG/ML
10 INJECTION INTRAVENOUS PRN
Status: DISCONTINUED | OUTPATIENT
Start: 2022-01-01 | End: 2022-01-01

## 2022-01-01 RX ORDER — BISACODYL 10 MG
10 SUPPOSITORY, RECTAL RECTAL DAILY PRN
Status: DISCONTINUED | OUTPATIENT
Start: 2022-01-01 | End: 2022-01-01

## 2022-01-01 RX ORDER — MAGNESIUM SULFATE 1 G/100ML
1000 INJECTION INTRAVENOUS PRN
Status: DISCONTINUED | OUTPATIENT
Start: 2022-01-01 | End: 2022-01-01

## 2022-01-01 RX ORDER — INSULIN GLARGINE 100 [IU]/ML
40 INJECTION, SOLUTION SUBCUTANEOUS 2 TIMES DAILY
Status: DISCONTINUED | OUTPATIENT
Start: 2022-01-01 | End: 2022-01-01

## 2022-01-01 RX ORDER — CHLORHEXIDINE GLUCONATE 0.12 MG/ML
15 RINSE ORAL 2 TIMES DAILY
Status: DISCONTINUED | OUTPATIENT
Start: 2022-01-01 | End: 2022-01-01 | Stop reason: ALTCHOICE

## 2022-01-01 RX ORDER — DEXTROSE MONOHYDRATE 25 G/50ML
25 INJECTION, SOLUTION INTRAVENOUS ONCE
Status: DISCONTINUED | OUTPATIENT
Start: 2022-01-01 | End: 2022-01-01

## 2022-01-01 RX ORDER — DEXTROSE MONOHYDRATE 25 G/50ML
12.5 INJECTION, SOLUTION INTRAVENOUS PRN
Status: DISCONTINUED | OUTPATIENT
Start: 2022-01-01 | End: 2022-01-01 | Stop reason: SDUPTHER

## 2022-01-01 RX ORDER — ACETAMINOPHEN 500 MG
1000 TABLET ORAL ONCE
Status: COMPLETED | OUTPATIENT
Start: 2022-01-01 | End: 2022-01-01

## 2022-01-01 RX ORDER — SODIUM CHLORIDE 0.9 % (FLUSH) 0.9 %
5-40 SYRINGE (ML) INJECTION EVERY 12 HOURS SCHEDULED
Status: DISCONTINUED | OUTPATIENT
Start: 2022-01-01 | End: 2022-01-01 | Stop reason: HOSPADM

## 2022-01-01 RX ORDER — AMLODIPINE BESYLATE 5 MG/1
5 TABLET ORAL NIGHTLY
Status: DISCONTINUED | OUTPATIENT
Start: 2022-01-01 | End: 2022-01-01

## 2022-01-01 RX ORDER — FUROSEMIDE 10 MG/ML
20 INJECTION INTRAMUSCULAR; INTRAVENOUS ONCE
Status: COMPLETED | OUTPATIENT
Start: 2022-01-01 | End: 2022-01-01

## 2022-01-01 RX ORDER — DEXTROSE MONOHYDRATE 50 MG/ML
100 INJECTION, SOLUTION INTRAVENOUS PRN
Status: DISCONTINUED | OUTPATIENT
Start: 2022-01-01 | End: 2022-01-01 | Stop reason: SDUPTHER

## 2022-01-01 RX ORDER — POTASSIUM CHLORIDE 7.45 MG/ML
10 INJECTION INTRAVENOUS
Status: ACTIVE | OUTPATIENT
Start: 2022-01-01 | End: 2022-01-01

## 2022-01-01 RX ORDER — FUROSEMIDE 10 MG/ML
40 INJECTION INTRAMUSCULAR; INTRAVENOUS 3 TIMES DAILY
Status: DISCONTINUED | OUTPATIENT
Start: 2022-01-01 | End: 2022-01-01 | Stop reason: HOSPADM

## 2022-01-01 RX ORDER — DEXTROSE MONOHYDRATE 25 G/50ML
25 INJECTION, SOLUTION INTRAVENOUS PRN
Status: DISCONTINUED | OUTPATIENT
Start: 2022-01-01 | End: 2022-01-01 | Stop reason: RX

## 2022-01-01 RX ORDER — NICOTINE POLACRILEX 4 MG
15 LOZENGE BUCCAL PRN
Status: DISCONTINUED | OUTPATIENT
Start: 2022-01-01 | End: 2022-01-01 | Stop reason: SDUPTHER

## 2022-01-01 RX ORDER — INSULIN GLARGINE 100 [IU]/ML
9 INJECTION, SOLUTION SUBCUTANEOUS DAILY
Status: DISCONTINUED | OUTPATIENT
Start: 2022-01-01 | End: 2022-01-01

## 2022-01-01 RX ORDER — POLYETHYLENE GLYCOL 3350 17 G/17G
17 POWDER, FOR SOLUTION ORAL DAILY
Status: DISCONTINUED | OUTPATIENT
Start: 2022-01-01 | End: 2022-01-01 | Stop reason: ALTCHOICE

## 2022-01-01 RX ORDER — 0.9 % SODIUM CHLORIDE 0.9 %
80 INTRAVENOUS SOLUTION INTRAVENOUS ONCE
Status: DISCONTINUED | OUTPATIENT
Start: 2022-01-01 | End: 2022-01-01 | Stop reason: HOSPADM

## 2022-01-01 RX ORDER — ACETAMINOPHEN 650 MG/1
650 SUPPOSITORY RECTAL EVERY 6 HOURS PRN
Status: DISCONTINUED | OUTPATIENT
Start: 2022-01-01 | End: 2022-01-01 | Stop reason: HOSPADM

## 2022-01-01 RX ORDER — BUDESONIDE 0.5 MG/2ML
0.5 INHALANT ORAL 2 TIMES DAILY
Status: DISCONTINUED | OUTPATIENT
Start: 2022-01-01 | End: 2022-01-01 | Stop reason: ALTCHOICE

## 2022-01-01 RX ORDER — NICOTINE POLACRILEX 4 MG
15 LOZENGE BUCCAL PRN
Status: DISCONTINUED | OUTPATIENT
Start: 2022-01-01 | End: 2022-01-01 | Stop reason: HOSPADM

## 2022-01-01 RX ORDER — SODIUM CHLORIDE 9 MG/ML
10 INJECTION INTRAVENOUS DAILY
Status: DISCONTINUED | OUTPATIENT
Start: 2022-01-01 | End: 2022-01-01 | Stop reason: HOSPADM

## 2022-01-01 RX ORDER — AMLODIPINE BESYLATE 5 MG/1
2.5 TABLET ORAL NIGHTLY
Status: DISCONTINUED | OUTPATIENT
Start: 2022-01-01 | End: 2022-01-01

## 2022-01-01 RX ORDER — DIGOXIN 0.25 MG/ML
250 INJECTION INTRAMUSCULAR; INTRAVENOUS ONCE
Status: COMPLETED | OUTPATIENT
Start: 2022-01-01 | End: 2022-01-01

## 2022-01-01 RX ORDER — GLYCOPYRROLATE 1 MG/5 ML
0.1 SYRINGE (ML) INTRAVENOUS EVERY 6 HOURS PRN
Status: DISCONTINUED | OUTPATIENT
Start: 2022-01-01 | End: 2022-01-01 | Stop reason: HOSPADM

## 2022-01-01 RX ORDER — FUROSEMIDE 10 MG/ML
20 INJECTION INTRAMUSCULAR; INTRAVENOUS 2 TIMES DAILY
Status: DISCONTINUED | OUTPATIENT
Start: 2022-01-01 | End: 2022-01-01

## 2022-01-01 RX ORDER — FUROSEMIDE 10 MG/ML
40 INJECTION INTRAMUSCULAR; INTRAVENOUS ONCE
Status: COMPLETED | OUTPATIENT
Start: 2022-01-01 | End: 2022-01-01

## 2022-01-01 RX ORDER — ALPRAZOLAM 0.5 MG/1
0.5 TABLET ORAL 3 TIMES DAILY
Status: DISCONTINUED | OUTPATIENT
Start: 2022-01-01 | End: 2022-01-01 | Stop reason: ALTCHOICE

## 2022-01-01 RX ORDER — INSULIN GLARGINE 100 [IU]/ML
15 INJECTION, SOLUTION SUBCUTANEOUS 2 TIMES DAILY
Status: DISCONTINUED | OUTPATIENT
Start: 2022-01-01 | End: 2022-01-01 | Stop reason: HOSPADM

## 2022-01-01 RX ORDER — PANTOPRAZOLE SODIUM 40 MG/10ML
40 INJECTION, POWDER, LYOPHILIZED, FOR SOLUTION INTRAVENOUS DAILY
Status: DISCONTINUED | OUTPATIENT
Start: 2022-01-01 | End: 2022-01-01 | Stop reason: HOSPADM

## 2022-01-01 RX ORDER — AMIODARONE HYDROCHLORIDE 50 MG/ML
150 INJECTION, SOLUTION INTRAVENOUS ONCE
Status: DISCONTINUED | OUTPATIENT
Start: 2022-01-01 | End: 2022-01-01 | Stop reason: SDUPTHER

## 2022-01-01 RX ADMIN — PANTOPRAZOLE SODIUM 40 MG: 40 INJECTION, POWDER, FOR SOLUTION INTRAVENOUS at 09:19

## 2022-01-01 RX ADMIN — SUCRALFATE 1 G: 1 TABLET ORAL at 23:58

## 2022-01-01 RX ADMIN — PANTOPRAZOLE SODIUM 40 MG: 40 INJECTION, POWDER, FOR SOLUTION INTRAVENOUS at 08:31

## 2022-01-01 RX ADMIN — SODIUM CHLORIDE, PRESERVATIVE FREE 10 ML: 5 INJECTION INTRAVENOUS at 11:23

## 2022-01-01 RX ADMIN — LEVETIRACETAM 500 MG: 500 TABLET, FILM COATED ORAL at 08:39

## 2022-01-01 RX ADMIN — IPRATROPIUM BROMIDE AND ALBUTEROL SULFATE 1 AMPULE: 2.5; .5 SOLUTION RESPIRATORY (INHALATION) at 15:29

## 2022-01-01 RX ADMIN — CHLORHEXIDINE GLUCONATE 15 ML: 1.2 RINSE ORAL at 21:06

## 2022-01-01 RX ADMIN — IPRATROPIUM BROMIDE AND ALBUTEROL SULFATE 1 AMPULE: 2.5; .5 SOLUTION RESPIRATORY (INHALATION) at 15:09

## 2022-01-01 RX ADMIN — METOPROLOL SUCCINATE 50 MG: 50 TABLET, FILM COATED, EXTENDED RELEASE ORAL at 20:54

## 2022-01-01 RX ADMIN — ENOXAPARIN SODIUM 30 MG: 100 INJECTION SUBCUTANEOUS at 08:30

## 2022-01-01 RX ADMIN — Medication 1000 UNITS: at 09:22

## 2022-01-01 RX ADMIN — Medication 3 MCG/MIN: at 12:19

## 2022-01-01 RX ADMIN — ASPIRIN 81 MG CHEWABLE TABLET 324 MG: 81 TABLET CHEWABLE at 08:08

## 2022-01-01 RX ADMIN — BUDESONIDE 500 MCG: 0.5 SUSPENSION RESPIRATORY (INHALATION) at 06:18

## 2022-01-01 RX ADMIN — Medication 3 ML: at 03:16

## 2022-01-01 RX ADMIN — INSULIN GLARGINE 15 UNITS: 100 INJECTION, SOLUTION SUBCUTANEOUS at 09:46

## 2022-01-01 RX ADMIN — ENOXAPARIN SODIUM 30 MG: 100 INJECTION SUBCUTANEOUS at 09:36

## 2022-01-01 RX ADMIN — SUCRALFATE 1 G: 1 TABLET ORAL at 12:22

## 2022-01-01 RX ADMIN — ACETAMINOPHEN 650 MG: 325 TABLET ORAL at 08:46

## 2022-01-01 RX ADMIN — ALPRAZOLAM 0.5 MG: 0.5 TABLET ORAL at 15:44

## 2022-01-01 RX ADMIN — LEVALBUTEROL HYDROCHLORIDE 1.25 MG: 1.25 SOLUTION, CONCENTRATE RESPIRATORY (INHALATION) at 19:36

## 2022-01-01 RX ADMIN — DEXTROSE MONOHYDRATE 125 ML: 100 INJECTION, SOLUTION INTRAVENOUS at 17:39

## 2022-01-01 RX ADMIN — MEROPENEM 1000 MG: 1 INJECTION, POWDER, FOR SOLUTION INTRAVENOUS at 18:37

## 2022-01-01 RX ADMIN — INSULIN LISPRO 3 UNITS: 100 INJECTION, SOLUTION INTRAVENOUS; SUBCUTANEOUS at 17:45

## 2022-01-01 RX ADMIN — ENOXAPARIN SODIUM 30 MG: 100 INJECTION SUBCUTANEOUS at 20:49

## 2022-01-01 RX ADMIN — SUCRALFATE 1 G: 1 TABLET ORAL at 05:19

## 2022-01-01 RX ADMIN — IPRATROPIUM BROMIDE AND ALBUTEROL SULFATE 1 AMPULE: 2.5; .5 SOLUTION RESPIRATORY (INHALATION) at 08:07

## 2022-01-01 RX ADMIN — ATORVASTATIN CALCIUM 20 MG: 20 TABLET, FILM COATED ORAL at 21:02

## 2022-01-01 RX ADMIN — ALPRAZOLAM 0.5 MG: 0.5 TABLET ORAL at 08:47

## 2022-01-01 RX ADMIN — INSULIN GLARGINE 40 UNITS: 100 INJECTION, SOLUTION SUBCUTANEOUS at 09:07

## 2022-01-01 RX ADMIN — INSULIN GLARGINE 9 UNITS: 100 INJECTION, SOLUTION SUBCUTANEOUS at 09:18

## 2022-01-01 RX ADMIN — SODIUM CHLORIDE, PRESERVATIVE FREE 10 ML: 5 INJECTION INTRAVENOUS at 21:27

## 2022-01-01 RX ADMIN — LEVALBUTEROL HYDROCHLORIDE 1.25 MG: 1.25 SOLUTION, CONCENTRATE RESPIRATORY (INHALATION) at 03:21

## 2022-01-01 RX ADMIN — SODIUM CHLORIDE, PRESERVATIVE FREE 10 ML: 5 INJECTION INTRAVENOUS at 10:17

## 2022-01-01 RX ADMIN — SUCRALFATE 1 G: 1 TABLET ORAL at 17:43

## 2022-01-01 RX ADMIN — Medication 1000 UNITS: at 08:40

## 2022-01-01 RX ADMIN — ENOXAPARIN SODIUM 30 MG: 100 INJECTION SUBCUTANEOUS at 20:42

## 2022-01-01 RX ADMIN — INSULIN LISPRO 6 UNITS: 100 INJECTION, SOLUTION INTRAVENOUS; SUBCUTANEOUS at 09:07

## 2022-01-01 RX ADMIN — SUCRALFATE 1 G: 1 TABLET ORAL at 12:57

## 2022-01-01 RX ADMIN — SODIUM CHLORIDE, PRESERVATIVE FREE 10 ML: 5 INJECTION INTRAVENOUS at 21:06

## 2022-01-01 RX ADMIN — ALPRAZOLAM 0.5 MG: 0.5 TABLET ORAL at 10:09

## 2022-01-01 RX ADMIN — SUCRALFATE 1 G: 1 TABLET ORAL at 00:28

## 2022-01-01 RX ADMIN — LEVALBUTEROL HYDROCHLORIDE 1.25 MG: 1.25 SOLUTION, CONCENTRATE RESPIRATORY (INHALATION) at 02:50

## 2022-01-01 RX ADMIN — LISINOPRIL 20 MG: 20 TABLET ORAL at 10:10

## 2022-01-01 RX ADMIN — LEVALBUTEROL HYDROCHLORIDE 1.25 MG: 1.25 SOLUTION, CONCENTRATE RESPIRATORY (INHALATION) at 18:52

## 2022-01-01 RX ADMIN — CHLORHEXIDINE GLUCONATE 15 ML: 1.2 RINSE ORAL at 09:00

## 2022-01-01 RX ADMIN — FUROSEMIDE 40 MG: 10 INJECTION, SOLUTION INTRAMUSCULAR; INTRAVENOUS at 09:26

## 2022-01-01 RX ADMIN — Medication 2 MG/HR: at 02:30

## 2022-01-01 RX ADMIN — ENOXAPARIN SODIUM 30 MG: 100 INJECTION SUBCUTANEOUS at 21:52

## 2022-01-01 RX ADMIN — BUDESONIDE 500 MCG: 0.5 SUSPENSION RESPIRATORY (INHALATION) at 18:08

## 2022-01-01 RX ADMIN — AMIODARONE HYDROCHLORIDE 200 MG: 200 TABLET ORAL at 08:31

## 2022-01-01 RX ADMIN — ALPRAZOLAM 0.5 MG: 0.5 TABLET ORAL at 15:15

## 2022-01-01 RX ADMIN — ENOXAPARIN SODIUM 30 MG: 100 INJECTION SUBCUTANEOUS at 20:39

## 2022-01-01 RX ADMIN — PROPOFOL 30 MCG/KG/MIN: 10 INJECTION, EMULSION INTRAVENOUS at 21:37

## 2022-01-01 RX ADMIN — IPRATROPIUM BROMIDE AND ALBUTEROL SULFATE 1 AMPULE: 2.5; .5 SOLUTION RESPIRATORY (INHALATION) at 06:15

## 2022-01-01 RX ADMIN — CHLORHEXIDINE GLUCONATE 15 ML: 1.2 RINSE ORAL at 21:50

## 2022-01-01 RX ADMIN — IPRATROPIUM BROMIDE AND ALBUTEROL SULFATE 1 AMPULE: 2.5; .5 SOLUTION RESPIRATORY (INHALATION) at 13:42

## 2022-01-01 RX ADMIN — CEFEPIME HYDROCHLORIDE 2000 MG: 2 INJECTION, POWDER, FOR SOLUTION INTRAVENOUS at 23:04

## 2022-01-01 RX ADMIN — ACETAMINOPHEN 650 MG: 325 TABLET ORAL at 12:48

## 2022-01-01 RX ADMIN — SODIUM CHLORIDE, PRESERVATIVE FREE 10 ML: 5 INJECTION INTRAVENOUS at 08:30

## 2022-01-01 RX ADMIN — BUDESONIDE 500 MCG: 0.5 SUSPENSION RESPIRATORY (INHALATION) at 19:27

## 2022-01-01 RX ADMIN — INSULIN GLARGINE 15 UNITS: 100 INJECTION, SOLUTION SUBCUTANEOUS at 21:17

## 2022-01-01 RX ADMIN — LACTULOSE 20 G: 20 SOLUTION ORAL at 08:41

## 2022-01-01 RX ADMIN — BUDESONIDE 500 MCG: 0.5 SUSPENSION RESPIRATORY (INHALATION) at 07:52

## 2022-01-01 RX ADMIN — BUDESONIDE 500 MCG: 0.5 SUSPENSION RESPIRATORY (INHALATION) at 08:13

## 2022-01-01 RX ADMIN — INSULIN GLARGINE 40 UNITS: 100 INJECTION, SOLUTION SUBCUTANEOUS at 20:07

## 2022-01-01 RX ADMIN — Medication 6 MG/HR: at 22:21

## 2022-01-01 RX ADMIN — AMIODARONE HYDROCHLORIDE 200 MG: 200 TABLET ORAL at 09:22

## 2022-01-01 RX ADMIN — SODIUM CHLORIDE, PRESERVATIVE FREE 10 ML: 5 INJECTION INTRAVENOUS at 20:44

## 2022-01-01 RX ADMIN — IPRATROPIUM BROMIDE AND ALBUTEROL SULFATE 1 AMPULE: 2.5; .5 SOLUTION RESPIRATORY (INHALATION) at 08:17

## 2022-01-01 RX ADMIN — ALPRAZOLAM 0.5 MG: 0.5 TABLET ORAL at 09:59

## 2022-01-01 RX ADMIN — INSULIN LISPRO 3 UNITS: 100 INJECTION, SOLUTION INTRAVENOUS; SUBCUTANEOUS at 18:06

## 2022-01-01 RX ADMIN — SODIUM CHLORIDE, PRESERVATIVE FREE 10 ML: 5 INJECTION INTRAVENOUS at 20:34

## 2022-01-01 RX ADMIN — LEVALBUTEROL HYDROCHLORIDE 1.25 MG: 1.25 SOLUTION, CONCENTRATE RESPIRATORY (INHALATION) at 02:58

## 2022-01-01 RX ADMIN — SODIUM CHLORIDE, PRESERVATIVE FREE 10 ML: 5 INJECTION INTRAVENOUS at 08:44

## 2022-01-01 RX ADMIN — AZITHROMYCIN MONOHYDRATE 500 MG: 500 INJECTION, POWDER, LYOPHILIZED, FOR SOLUTION INTRAVENOUS at 22:13

## 2022-01-01 RX ADMIN — MEROPENEM 1000 MG: 1 INJECTION, POWDER, FOR SOLUTION INTRAVENOUS at 09:44

## 2022-01-01 RX ADMIN — LEVETIRACETAM 500 MG: 100 SOLUTION ORAL at 08:09

## 2022-01-01 RX ADMIN — PROPOFOL 50 MCG/KG/MIN: 10 INJECTION, EMULSION INTRAVENOUS at 11:29

## 2022-01-01 RX ADMIN — ENOXAPARIN SODIUM 30 MG: 100 INJECTION SUBCUTANEOUS at 08:15

## 2022-01-01 RX ADMIN — LEVETIRACETAM 500 MG: 100 SOLUTION ORAL at 08:00

## 2022-01-01 RX ADMIN — SODIUM CHLORIDE 0.9 MCG/KG/HR: 9 INJECTION, SOLUTION INTRAVENOUS at 08:39

## 2022-01-01 RX ADMIN — Medication 1000 UNITS: at 09:19

## 2022-01-01 RX ADMIN — SODIUM CHLORIDE, PRESERVATIVE FREE 10 ML: 5 INJECTION INTRAVENOUS at 08:53

## 2022-01-01 RX ADMIN — FUROSEMIDE 20 MG: 10 INJECTION, SOLUTION INTRAMUSCULAR; INTRAVENOUS at 17:38

## 2022-01-01 RX ADMIN — CHLORHEXIDINE GLUCONATE 15 ML: 1.2 RINSE ORAL at 19:37

## 2022-01-01 RX ADMIN — Medication 9 MG/HR: at 09:11

## 2022-01-01 RX ADMIN — LACTULOSE 20 G: 20 SOLUTION ORAL at 13:46

## 2022-01-01 RX ADMIN — SODIUM CHLORIDE 0.7 MCG/KG/HR: 9 INJECTION, SOLUTION INTRAVENOUS at 13:27

## 2022-01-01 RX ADMIN — SUCRALFATE 1 G: 1 TABLET ORAL at 06:29

## 2022-01-01 RX ADMIN — Medication 100 MCG/HR: at 12:14

## 2022-01-01 RX ADMIN — DEXAMETHASONE SODIUM PHOSPHATE 6 MG: 10 INJECTION INTRAMUSCULAR; INTRAVENOUS at 15:15

## 2022-01-01 RX ADMIN — SODIUM CHLORIDE, PRESERVATIVE FREE 10 ML: 5 INJECTION INTRAVENOUS at 20:42

## 2022-01-01 RX ADMIN — LEVALBUTEROL HYDROCHLORIDE 1.25 MG: 1.25 SOLUTION, CONCENTRATE RESPIRATORY (INHALATION) at 03:34

## 2022-01-01 RX ADMIN — ALPRAZOLAM 0.5 MG: 0.5 TABLET ORAL at 21:18

## 2022-01-01 RX ADMIN — Medication 75 MCG/HR: at 01:29

## 2022-01-01 RX ADMIN — SODIUM CHLORIDE 1 MCG/KG/HR: 9 INJECTION, SOLUTION INTRAVENOUS at 03:11

## 2022-01-01 RX ADMIN — PROPOFOL 20 MCG/KG/MIN: 10 INJECTION, EMULSION INTRAVENOUS at 09:50

## 2022-01-01 RX ADMIN — LEVALBUTEROL HYDROCHLORIDE 1.25 MG: 1.25 SOLUTION, CONCENTRATE RESPIRATORY (INHALATION) at 19:44

## 2022-01-01 RX ADMIN — IPRATROPIUM BROMIDE AND ALBUTEROL SULFATE 1 AMPULE: 2.5; .5 SOLUTION RESPIRATORY (INHALATION) at 07:47

## 2022-01-01 RX ADMIN — IPRATROPIUM BROMIDE AND ALBUTEROL SULFATE 1 AMPULE: 2.5; .5 SOLUTION RESPIRATORY (INHALATION) at 15:36

## 2022-01-01 RX ADMIN — Medication 4 MG/HR: at 22:29

## 2022-01-01 RX ADMIN — ENOXAPARIN SODIUM 30 MG: 100 INJECTION SUBCUTANEOUS at 21:42

## 2022-01-01 RX ADMIN — INSULIN GLARGINE 15 UNITS: 100 INJECTION, SOLUTION SUBCUTANEOUS at 21:20

## 2022-01-01 RX ADMIN — ASPIRIN 81 MG CHEWABLE TABLET 324 MG: 81 TABLET CHEWABLE at 08:54

## 2022-01-01 RX ADMIN — Medication 3 ML: at 16:11

## 2022-01-01 RX ADMIN — ALPRAZOLAM 0.5 MG: 0.5 TABLET ORAL at 12:35

## 2022-01-01 RX ADMIN — ACETAMINOPHEN 650 MG: 650 SUPPOSITORY RECTAL at 15:48

## 2022-01-01 RX ADMIN — BUDESONIDE 500 MCG: 0.5 SUSPENSION RESPIRATORY (INHALATION) at 19:23

## 2022-01-01 RX ADMIN — Medication 75 MCG/HR: at 22:20

## 2022-01-01 RX ADMIN — BUDESONIDE 500 MCG: 0.5 SUSPENSION RESPIRATORY (INHALATION) at 20:24

## 2022-01-01 RX ADMIN — Medication 1000 UNITS: at 09:56

## 2022-01-01 RX ADMIN — Medication 3 ML: at 14:27

## 2022-01-01 RX ADMIN — LEVALBUTEROL HYDROCHLORIDE 1.25 MG: 1.25 SOLUTION, CONCENTRATE RESPIRATORY (INHALATION) at 03:10

## 2022-01-01 RX ADMIN — BUDESONIDE 500 MCG: 0.5 SUSPENSION RESPIRATORY (INHALATION) at 06:12

## 2022-01-01 RX ADMIN — LEVALBUTEROL HYDROCHLORIDE 1.25 MG: 1.25 SOLUTION, CONCENTRATE RESPIRATORY (INHALATION) at 14:29

## 2022-01-01 RX ADMIN — BISACODYL 10 MG: 10 SUPPOSITORY RECTAL at 09:23

## 2022-01-01 RX ADMIN — SODIUM CHLORIDE, PRESERVATIVE FREE 10 ML: 5 INJECTION INTRAVENOUS at 21:05

## 2022-01-01 RX ADMIN — AMIODARONE HYDROCHLORIDE 1 MG/MIN: 50 INJECTION, SOLUTION INTRAVENOUS at 07:39

## 2022-01-01 RX ADMIN — ACETAMINOPHEN 650 MG: 650 SUPPOSITORY RECTAL at 08:42

## 2022-01-01 RX ADMIN — AMIODARONE HYDROCHLORIDE 200 MG: 200 TABLET ORAL at 08:55

## 2022-01-01 RX ADMIN — AMIODARONE HYDROCHLORIDE 200 MG: 200 TABLET ORAL at 09:07

## 2022-01-01 RX ADMIN — Medication 3 ML: at 14:53

## 2022-01-01 RX ADMIN — LEVALBUTEROL HYDROCHLORIDE 1.25 MG: 1.25 SOLUTION, CONCENTRATE RESPIRATORY (INHALATION) at 08:07

## 2022-01-01 RX ADMIN — DEXTROSE MONOHYDRATE 250 ML: 10 INJECTION, SOLUTION INTRAVENOUS at 08:43

## 2022-01-01 RX ADMIN — INSULIN GLARGINE 15 UNITS: 100 INJECTION, SOLUTION SUBCUTANEOUS at 07:57

## 2022-01-01 RX ADMIN — SODIUM CHLORIDE 80 ML: 0.9 INJECTION, SOLUTION INTRAVENOUS at 14:22

## 2022-01-01 RX ADMIN — SODIUM CHLORIDE 0.3 MCG/KG/HR: 9 INJECTION, SOLUTION INTRAVENOUS at 04:57

## 2022-01-01 RX ADMIN — Medication 9 MG/HR: at 21:13

## 2022-01-01 RX ADMIN — INSULIN GLARGINE 15 UNITS: 100 INJECTION, SOLUTION SUBCUTANEOUS at 22:10

## 2022-01-01 RX ADMIN — Medication 5 MG/HR: at 05:41

## 2022-01-01 RX ADMIN — Medication 50 MCG/HR: at 11:07

## 2022-01-01 RX ADMIN — SUCRALFATE 1 G: 1 TABLET ORAL at 23:04

## 2022-01-01 RX ADMIN — INSULIN LISPRO 6 UNITS: 100 INJECTION, SOLUTION INTRAVENOUS; SUBCUTANEOUS at 09:46

## 2022-01-01 RX ADMIN — CHLORHEXIDINE GLUCONATE 15 ML: 1.2 RINSE ORAL at 21:04

## 2022-01-01 RX ADMIN — IPRATROPIUM BROMIDE AND ALBUTEROL SULFATE 1 AMPULE: 2.5; .5 SOLUTION RESPIRATORY (INHALATION) at 18:11

## 2022-01-01 RX ADMIN — FUROSEMIDE 40 MG: 10 INJECTION, SOLUTION INTRAMUSCULAR; INTRAVENOUS at 20:41

## 2022-01-01 RX ADMIN — PANTOPRAZOLE SODIUM 40 MG: 40 INJECTION, POWDER, FOR SOLUTION INTRAVENOUS at 09:26

## 2022-01-01 RX ADMIN — INSULIN GLARGINE 9 UNITS: 100 INJECTION, SOLUTION SUBCUTANEOUS at 08:52

## 2022-01-01 RX ADMIN — LEVALBUTEROL HYDROCHLORIDE 1.25 MG: 1.25 SOLUTION, CONCENTRATE RESPIRATORY (INHALATION) at 19:27

## 2022-01-01 RX ADMIN — LEVALBUTEROL HYDROCHLORIDE 1.25 MG: 1.25 SOLUTION, CONCENTRATE RESPIRATORY (INHALATION) at 18:55

## 2022-01-01 RX ADMIN — SODIUM CHLORIDE 1.4 MCG/KG/HR: 9 INJECTION, SOLUTION INTRAVENOUS at 21:48

## 2022-01-01 RX ADMIN — ASPIRIN 81 MG CHEWABLE TABLET 324 MG: 81 TABLET CHEWABLE at 10:09

## 2022-01-01 RX ADMIN — AMIODARONE HYDROCHLORIDE 0.5 MG/MIN: 50 INJECTION, SOLUTION INTRAVENOUS at 22:20

## 2022-01-01 RX ADMIN — INSULIN GLARGINE 40 UNITS: 100 INJECTION, SOLUTION SUBCUTANEOUS at 10:40

## 2022-01-01 RX ADMIN — LEVALBUTEROL HYDROCHLORIDE 1.25 MG: 1.25 SOLUTION, CONCENTRATE RESPIRATORY (INHALATION) at 03:44

## 2022-01-01 RX ADMIN — ALPRAZOLAM 0.5 MG: 0.5 TABLET ORAL at 08:09

## 2022-01-01 RX ADMIN — IPRATROPIUM BROMIDE AND ALBUTEROL SULFATE 1 AMPULE: 2.5; .5 SOLUTION RESPIRATORY (INHALATION) at 18:08

## 2022-01-01 RX ADMIN — SODIUM ZIRCONIUM CYCLOSILICATE 10 G: 10 POWDER, FOR SUSPENSION ORAL at 08:51

## 2022-01-01 RX ADMIN — LEVETIRACETAM 500 MG: 100 SOLUTION ORAL at 08:31

## 2022-01-01 RX ADMIN — HYDRALAZINE HYDROCHLORIDE 10 MG: 20 INJECTION INTRAMUSCULAR; INTRAVENOUS at 06:49

## 2022-01-01 RX ADMIN — LEVALBUTEROL HYDROCHLORIDE 1.25 MG: 1.25 SOLUTION, CONCENTRATE RESPIRATORY (INHALATION) at 19:55

## 2022-01-01 RX ADMIN — POLYETHYLENE GLYCOL 3350 17 G: 17 POWDER, FOR SOLUTION ORAL at 07:55

## 2022-01-01 RX ADMIN — BUDESONIDE 500 MCG: 0.5 SUSPENSION RESPIRATORY (INHALATION) at 19:44

## 2022-01-01 RX ADMIN — ACETAMINOPHEN 650 MG: 325 TABLET ORAL at 23:54

## 2022-01-01 RX ADMIN — SODIUM CHLORIDE 1.4 MCG/KG/HR: 9 INJECTION, SOLUTION INTRAVENOUS at 23:49

## 2022-01-01 RX ADMIN — INSULIN GLARGINE 40 UNITS: 100 INJECTION, SOLUTION SUBCUTANEOUS at 20:57

## 2022-01-01 RX ADMIN — PROPOFOL 20 MCG/KG/MIN: 10 INJECTION, EMULSION INTRAVENOUS at 17:04

## 2022-01-01 RX ADMIN — ATORVASTATIN CALCIUM 20 MG: 20 TABLET, FILM COATED ORAL at 08:40

## 2022-01-01 RX ADMIN — CHLORHEXIDINE GLUCONATE 15 ML: 1.2 RINSE ORAL at 10:24

## 2022-01-01 RX ADMIN — BUDESONIDE 500 MCG: 0.5 SUSPENSION RESPIRATORY (INHALATION) at 08:18

## 2022-01-01 RX ADMIN — PANTOPRAZOLE SODIUM 40 MG: 40 INJECTION, POWDER, FOR SOLUTION INTRAVENOUS at 10:04

## 2022-01-01 RX ADMIN — PHENYLEPHRINE HYDROCHLORIDE 110 MCG/MIN: 10 INJECTION INTRAVENOUS at 11:28

## 2022-01-01 RX ADMIN — CHLORHEXIDINE GLUCONATE 15 ML: 1.2 RINSE ORAL at 09:33

## 2022-01-01 RX ADMIN — LEVETIRACETAM 500 MG: 100 SOLUTION ORAL at 07:14

## 2022-01-01 RX ADMIN — DEXMEDETOMIDINE HYDROCHLORIDE 1.4 MCG/KG/HR: 100 INJECTION, SOLUTION INTRAVENOUS at 05:38

## 2022-01-01 RX ADMIN — BUDESONIDE 500 MCG: 0.5 SUSPENSION RESPIRATORY (INHALATION) at 07:53

## 2022-01-01 RX ADMIN — SODIUM CHLORIDE 2.1 UNITS/HR: 9 INJECTION, SOLUTION INTRAVENOUS at 05:50

## 2022-01-01 RX ADMIN — ASPIRIN 325 MG: 325 TABLET, COATED ORAL at 08:15

## 2022-01-01 RX ADMIN — IPRATROPIUM BROMIDE AND ALBUTEROL SULFATE 1 AMPULE: 2.5; .5 SOLUTION RESPIRATORY (INHALATION) at 08:32

## 2022-01-01 RX ADMIN — SODIUM CHLORIDE, PRESERVATIVE FREE 10 ML: 5 INJECTION INTRAVENOUS at 08:31

## 2022-01-01 RX ADMIN — Medication 75 MCG/HR: at 08:25

## 2022-01-01 RX ADMIN — IPRATROPIUM BROMIDE AND ALBUTEROL SULFATE 1 AMPULE: 2.5; .5 SOLUTION RESPIRATORY (INHALATION) at 18:12

## 2022-01-01 RX ADMIN — SODIUM CHLORIDE 1.4 MCG/KG/HR: 9 INJECTION, SOLUTION INTRAVENOUS at 23:59

## 2022-01-01 RX ADMIN — AMIODARONE HYDROCHLORIDE 0.5 MG/MIN: 50 INJECTION, SOLUTION INTRAVENOUS at 19:31

## 2022-01-01 RX ADMIN — DEXTROSE MONOHYDRATE 125 ML: 100 INJECTION, SOLUTION INTRAVENOUS at 08:33

## 2022-01-01 RX ADMIN — ENOXAPARIN SODIUM 30 MG: 100 INJECTION SUBCUTANEOUS at 21:16

## 2022-01-01 RX ADMIN — SODIUM ZIRCONIUM CYCLOSILICATE 15 G: 10 POWDER, FOR SUSPENSION ORAL at 08:35

## 2022-01-01 RX ADMIN — CHLORHEXIDINE GLUCONATE 15 ML: 1.2 RINSE ORAL at 22:00

## 2022-01-01 RX ADMIN — SODIUM CHLORIDE 0.8 MCG/KG/HR: 9 INJECTION, SOLUTION INTRAVENOUS at 14:07

## 2022-01-01 RX ADMIN — LEVALBUTEROL HYDROCHLORIDE 1.25 MG: 1.25 SOLUTION, CONCENTRATE RESPIRATORY (INHALATION) at 20:17

## 2022-01-01 RX ADMIN — BUDESONIDE 500 MCG: 0.5 SUSPENSION RESPIRATORY (INHALATION) at 08:00

## 2022-01-01 RX ADMIN — IPRATROPIUM BROMIDE AND ALBUTEROL SULFATE 1 AMPULE: 2.5; .5 SOLUTION RESPIRATORY (INHALATION) at 06:19

## 2022-01-01 RX ADMIN — INSULIN GLARGINE 40 UNITS: 100 INJECTION, SOLUTION SUBCUTANEOUS at 08:43

## 2022-01-01 RX ADMIN — MAGNESIUM HYDROXIDE 30 ML: 2400 SUSPENSION ORAL at 18:31

## 2022-01-01 RX ADMIN — INSULIN HUMAN 10 UNITS: 100 INJECTION, SOLUTION PARENTERAL at 08:46

## 2022-01-01 RX ADMIN — SODIUM CHLORIDE, PRESERVATIVE FREE 10 ML: 5 INJECTION INTRAVENOUS at 07:55

## 2022-01-01 RX ADMIN — VANCOMYCIN HYDROCHLORIDE 750 MG: 750 INJECTION, POWDER, LYOPHILIZED, FOR SOLUTION INTRAVENOUS at 06:43

## 2022-01-01 RX ADMIN — SODIUM CHLORIDE, PRESERVATIVE FREE 10 ML: 5 INJECTION INTRAVENOUS at 08:34

## 2022-01-01 RX ADMIN — BUDESONIDE 500 MCG: 0.5 SUSPENSION RESPIRATORY (INHALATION) at 06:02

## 2022-01-01 RX ADMIN — IPRATROPIUM BROMIDE AND ALBUTEROL SULFATE 1 AMPULE: 2.5; .5 SOLUTION RESPIRATORY (INHALATION) at 10:10

## 2022-01-01 RX ADMIN — SODIUM CHLORIDE 1 MCG/KG/HR: 9 INJECTION, SOLUTION INTRAVENOUS at 13:55

## 2022-01-01 RX ADMIN — ACETAMINOPHEN 650 MG: 325 TABLET ORAL at 00:37

## 2022-01-01 RX ADMIN — IPRATROPIUM BROMIDE AND ALBUTEROL SULFATE 1 AMPULE: 2.5; .5 SOLUTION RESPIRATORY (INHALATION) at 18:25

## 2022-01-01 RX ADMIN — INSULIN LISPRO 5 UNITS: 100 INJECTION, SOLUTION INTRAVENOUS; SUBCUTANEOUS at 20:33

## 2022-01-01 RX ADMIN — LEVETIRACETAM 500 MG: 500 TABLET, FILM COATED ORAL at 08:49

## 2022-01-01 RX ADMIN — ATORVASTATIN CALCIUM 20 MG: 20 TABLET, FILM COATED ORAL at 08:59

## 2022-01-01 RX ADMIN — SODIUM CHLORIDE, PRESERVATIVE FREE 10 ML: 5 INJECTION INTRAVENOUS at 20:10

## 2022-01-01 RX ADMIN — INSULIN GLARGINE 15 UNITS: 100 INJECTION, SOLUTION SUBCUTANEOUS at 09:44

## 2022-01-01 RX ADMIN — BUDESONIDE 500 MCG: 0.5 SUSPENSION RESPIRATORY (INHALATION) at 19:11

## 2022-01-01 RX ADMIN — INSULIN GLARGINE 15 UNITS: 100 INJECTION, SOLUTION SUBCUTANEOUS at 22:00

## 2022-01-01 RX ADMIN — MEROPENEM 1000 MG: 1 INJECTION, POWDER, FOR SOLUTION INTRAVENOUS at 10:26

## 2022-01-01 RX ADMIN — BUDESONIDE 500 MCG: 0.5 SUSPENSION RESPIRATORY (INHALATION) at 18:20

## 2022-01-01 RX ADMIN — PANTOPRAZOLE SODIUM 40 MG: 40 INJECTION, POWDER, FOR SOLUTION INTRAVENOUS at 07:59

## 2022-01-01 RX ADMIN — METOPROLOL SUCCINATE 50 MG: 50 TABLET, FILM COATED, EXTENDED RELEASE ORAL at 21:02

## 2022-01-01 RX ADMIN — Medication 100 MCG/HR: at 21:36

## 2022-01-01 RX ADMIN — FUROSEMIDE 20 MG: 10 INJECTION, SOLUTION INTRAMUSCULAR; INTRAVENOUS at 08:59

## 2022-01-01 RX ADMIN — FUROSEMIDE 40 MG: 10 INJECTION, SOLUTION INTRAMUSCULAR; INTRAVENOUS at 07:59

## 2022-01-01 RX ADMIN — LEVALBUTEROL HYDROCHLORIDE 1.25 MG: 1.25 SOLUTION, CONCENTRATE RESPIRATORY (INHALATION) at 08:01

## 2022-01-01 RX ADMIN — INSULIN LISPRO 3 UNITS: 100 INJECTION, SOLUTION INTRAVENOUS; SUBCUTANEOUS at 12:40

## 2022-01-01 RX ADMIN — LEVETIRACETAM 500 MG: 100 SOLUTION ORAL at 10:10

## 2022-01-01 RX ADMIN — CHLORHEXIDINE GLUCONATE 15 ML: 1.2 RINSE ORAL at 10:05

## 2022-01-01 RX ADMIN — FUROSEMIDE 20 MG: 10 INJECTION, SOLUTION INTRAMUSCULAR; INTRAVENOUS at 11:21

## 2022-01-01 RX ADMIN — PANTOPRAZOLE SODIUM 40 MG: 40 INJECTION, POWDER, FOR SOLUTION INTRAVENOUS at 08:21

## 2022-01-01 RX ADMIN — ASPIRIN 325 MG: 325 TABLET, COATED ORAL at 09:25

## 2022-01-01 RX ADMIN — BARICITINIB 2 MG: 2 TABLET, FILM COATED ORAL at 12:48

## 2022-01-01 RX ADMIN — ASPIRIN 325 MG: 325 TABLET, COATED ORAL at 10:21

## 2022-01-01 RX ADMIN — ENOXAPARIN SODIUM 30 MG: 100 INJECTION SUBCUTANEOUS at 20:57

## 2022-01-01 RX ADMIN — Medication 3 ML: at 02:50

## 2022-01-01 RX ADMIN — LEVALBUTEROL HYDROCHLORIDE 1.25 MG: 1.25 SOLUTION, CONCENTRATE RESPIRATORY (INHALATION) at 03:15

## 2022-01-01 RX ADMIN — POTASSIUM PHOSPHATE, MONOBASIC POTASSIUM PHOSPHATE, DIBASIC 20 MMOL: 224; 236 INJECTION, SOLUTION, CONCENTRATE INTRAVENOUS at 13:51

## 2022-01-01 RX ADMIN — PROPOFOL 30 MCG/KG/MIN: 10 INJECTION, EMULSION INTRAVENOUS at 17:10

## 2022-01-01 RX ADMIN — FUROSEMIDE 20 MG: 10 INJECTION, SOLUTION INTRAMUSCULAR; INTRAVENOUS at 18:49

## 2022-01-01 RX ADMIN — Medication 1000 UNITS: at 09:08

## 2022-01-01 RX ADMIN — INSULIN GLARGINE 40 UNITS: 100 INJECTION, SOLUTION SUBCUTANEOUS at 20:32

## 2022-01-01 RX ADMIN — ATORVASTATIN CALCIUM 20 MG: 20 TABLET, FILM COATED ORAL at 08:34

## 2022-01-01 RX ADMIN — DEXMEDETOMIDINE HYDROCHLORIDE 0.6 MCG/KG/HR: 100 INJECTION, SOLUTION INTRAVENOUS at 01:49

## 2022-01-01 RX ADMIN — FUROSEMIDE 20 MG: 10 INJECTION, SOLUTION INTRAMUSCULAR; INTRAVENOUS at 09:40

## 2022-01-01 RX ADMIN — BISACODYL 10 MG: 10 SUPPOSITORY RECTAL at 09:10

## 2022-01-01 RX ADMIN — TIOTROPIUM BROMIDE INHALATION SPRAY 2 PUFF: 3.12 SPRAY, METERED RESPIRATORY (INHALATION) at 09:00

## 2022-01-01 RX ADMIN — ASPIRIN 325 MG: 325 TABLET, COATED ORAL at 09:06

## 2022-01-01 RX ADMIN — INSULIN LISPRO 8 UNITS: 100 INJECTION, SOLUTION INTRAVENOUS; SUBCUTANEOUS at 18:34

## 2022-01-01 RX ADMIN — LEVALBUTEROL HYDROCHLORIDE 1.25 MG: 1.25 SOLUTION, CONCENTRATE RESPIRATORY (INHALATION) at 07:51

## 2022-01-01 RX ADMIN — PHENYLEPHRINE HYDROCHLORIDE 190 MCG/MIN: 10 INJECTION INTRAVENOUS at 12:24

## 2022-01-01 RX ADMIN — INSULIN LISPRO 3 UNITS: 100 INJECTION, SOLUTION INTRAVENOUS; SUBCUTANEOUS at 08:40

## 2022-01-01 RX ADMIN — LACTULOSE 20 G: 20 SOLUTION ORAL at 08:59

## 2022-01-01 RX ADMIN — AMIODARONE HYDROCHLORIDE 1 MG/MIN: 50 INJECTION, SOLUTION INTRAVENOUS at 23:59

## 2022-01-01 RX ADMIN — Medication 50 MCG/HR: at 06:06

## 2022-01-01 RX ADMIN — LEVETIRACETAM 500 MG: 100 SOLUTION ORAL at 21:41

## 2022-01-01 RX ADMIN — BUDESONIDE 500 MCG: 0.5 SUSPENSION RESPIRATORY (INHALATION) at 19:36

## 2022-01-01 RX ADMIN — LEVETIRACETAM 500 MG: 100 SOLUTION ORAL at 21:06

## 2022-01-01 RX ADMIN — INSULIN LISPRO 5 UNITS: 100 INJECTION, SOLUTION INTRAVENOUS; SUBCUTANEOUS at 21:03

## 2022-01-01 RX ADMIN — BUDESONIDE 500 MCG: 0.5 SUSPENSION RESPIRATORY (INHALATION) at 20:44

## 2022-01-01 RX ADMIN — LEVETIRACETAM 500 MG: 100 SOLUTION ORAL at 21:04

## 2022-01-01 RX ADMIN — SUCRALFATE 1 G: 1 TABLET ORAL at 12:33

## 2022-01-01 RX ADMIN — INSULIN GLARGINE 40 UNITS: 100 INJECTION, SOLUTION SUBCUTANEOUS at 08:22

## 2022-01-01 RX ADMIN — BUDESONIDE 500 MCG: 0.5 SUSPENSION RESPIRATORY (INHALATION) at 19:18

## 2022-01-01 RX ADMIN — INSULIN LISPRO 3 UNITS: 100 INJECTION, SOLUTION INTRAVENOUS; SUBCUTANEOUS at 08:37

## 2022-01-01 RX ADMIN — SODIUM ZIRCONIUM CYCLOSILICATE 10 G: 10 POWDER, FOR SUSPENSION ORAL at 13:23

## 2022-01-01 RX ADMIN — LEVETIRACETAM 500 MG: 100 SOLUTION ORAL at 20:42

## 2022-01-01 RX ADMIN — AMLODIPINE BESYLATE 5 MG: 5 TABLET ORAL at 20:34

## 2022-01-01 RX ADMIN — ENOXAPARIN SODIUM 40 MG: 100 INJECTION SUBCUTANEOUS at 08:15

## 2022-01-01 RX ADMIN — BUDESONIDE 500 MCG: 0.5 SUSPENSION RESPIRATORY (INHALATION) at 07:51

## 2022-01-01 RX ADMIN — SODIUM CHLORIDE 5.76 UNITS/HR: 9 INJECTION, SOLUTION INTRAVENOUS at 01:50

## 2022-01-01 RX ADMIN — LINEZOLID 600 MG: 600 INJECTION, SOLUTION INTRAVENOUS at 23:17

## 2022-01-01 RX ADMIN — ENOXAPARIN SODIUM 30 MG: 100 INJECTION SUBCUTANEOUS at 08:21

## 2022-01-01 RX ADMIN — ACETAMINOPHEN 650 MG: 325 TABLET ORAL at 23:04

## 2022-01-01 RX ADMIN — ASPIRIN 81 MG CHEWABLE TABLET 324 MG: 81 TABLET CHEWABLE at 11:05

## 2022-01-01 RX ADMIN — SODIUM CHLORIDE 5.46 UNITS/HR: 9 INJECTION, SOLUTION INTRAVENOUS at 21:38

## 2022-01-01 RX ADMIN — LEVALBUTEROL HYDROCHLORIDE 1.25 MG: 1.25 SOLUTION, CONCENTRATE RESPIRATORY (INHALATION) at 02:53

## 2022-01-01 RX ADMIN — ATORVASTATIN CALCIUM 20 MG: 20 TABLET, FILM COATED ORAL at 08:21

## 2022-01-01 RX ADMIN — Medication 3 ML: at 14:38

## 2022-01-01 RX ADMIN — LORAZEPAM 1 MG: 2 INJECTION INTRAMUSCULAR; INTRAVENOUS at 15:18

## 2022-01-01 RX ADMIN — LINEZOLID 600 MG: 600 INJECTION, SOLUTION INTRAVENOUS at 21:58

## 2022-01-01 RX ADMIN — SODIUM ZIRCONIUM CYCLOSILICATE 10 G: 10 POWDER, FOR SUSPENSION ORAL at 21:18

## 2022-01-01 RX ADMIN — BUDESONIDE 500 MCG: 0.5 SUSPENSION RESPIRATORY (INHALATION) at 08:39

## 2022-01-01 RX ADMIN — Medication 10 MG/HR: at 21:40

## 2022-01-01 RX ADMIN — Medication 75 MCG/HR: at 14:12

## 2022-01-01 RX ADMIN — SODIUM CHLORIDE, PRESERVATIVE FREE 10 ML: 5 INJECTION INTRAVENOUS at 08:10

## 2022-01-01 RX ADMIN — SUCRALFATE 1 G: 1 TABLET ORAL at 06:02

## 2022-01-01 RX ADMIN — ATORVASTATIN CALCIUM 20 MG: 20 TABLET, FILM COATED ORAL at 08:55

## 2022-01-01 RX ADMIN — INSULIN GLARGINE 15 UNITS: 100 INJECTION, SOLUTION SUBCUTANEOUS at 20:47

## 2022-01-01 RX ADMIN — INSULIN GLARGINE 40 UNITS: 100 INJECTION, SOLUTION SUBCUTANEOUS at 21:03

## 2022-01-01 RX ADMIN — INSULIN LISPRO 4 UNITS: 100 INJECTION, SOLUTION INTRAVENOUS; SUBCUTANEOUS at 17:26

## 2022-01-01 RX ADMIN — ASPIRIN 81 MG CHEWABLE TABLET 324 MG: 81 TABLET CHEWABLE at 09:19

## 2022-01-01 RX ADMIN — ACETAMINOPHEN 650 MG: 325 TABLET ORAL at 04:20

## 2022-01-01 RX ADMIN — Medication 100 MCG/HR: at 17:03

## 2022-01-01 RX ADMIN — AMIODARONE HYDROCHLORIDE 200 MG: 200 TABLET ORAL at 08:40

## 2022-01-01 RX ADMIN — CHLORHEXIDINE GLUCONATE 15 ML: 1.2 RINSE ORAL at 11:09

## 2022-01-01 RX ADMIN — PHENYLEPHRINE HYDROCHLORIDE 55 MCG/MIN: 10 INJECTION INTRAVENOUS at 04:11

## 2022-01-01 RX ADMIN — INSULIN LISPRO 2 UNITS: 100 INJECTION, SOLUTION INTRAVENOUS; SUBCUTANEOUS at 12:08

## 2022-01-01 RX ADMIN — ENOXAPARIN SODIUM 40 MG: 100 INJECTION SUBCUTANEOUS at 08:49

## 2022-01-01 RX ADMIN — AMIODARONE HYDROCHLORIDE 1 MG/MIN: 50 INJECTION, SOLUTION INTRAVENOUS at 15:33

## 2022-01-01 RX ADMIN — BUDESONIDE 500 MCG: 0.5 SUSPENSION RESPIRATORY (INHALATION) at 21:40

## 2022-01-01 RX ADMIN — BUDESONIDE 500 MCG: 0.5 SUSPENSION RESPIRATORY (INHALATION) at 18:25

## 2022-01-01 RX ADMIN — LEVETIRACETAM 500 MG: 500 TABLET, FILM COATED ORAL at 20:34

## 2022-01-01 RX ADMIN — SODIUM CHLORIDE, PRESERVATIVE FREE 10 ML: 5 INJECTION INTRAVENOUS at 09:22

## 2022-01-01 RX ADMIN — Medication 1000 UNITS: at 08:55

## 2022-01-01 RX ADMIN — LEVETIRACETAM 500 MG: 100 SOLUTION ORAL at 20:39

## 2022-01-01 RX ADMIN — LEVALBUTEROL HYDROCHLORIDE 1.25 MG: 1.25 SOLUTION, CONCENTRATE RESPIRATORY (INHALATION) at 14:49

## 2022-01-01 RX ADMIN — ASPIRIN 81 MG CHEWABLE TABLET 324 MG: 81 TABLET CHEWABLE at 08:34

## 2022-01-01 RX ADMIN — LEVALBUTEROL HYDROCHLORIDE 1.25 MG: 1.25 SOLUTION, CONCENTRATE RESPIRATORY (INHALATION) at 14:20

## 2022-01-01 RX ADMIN — AMIODARONE HYDROCHLORIDE 0.5 MG/MIN: 50 INJECTION, SOLUTION INTRAVENOUS at 05:12

## 2022-01-01 RX ADMIN — LEVALBUTEROL HYDROCHLORIDE 1.25 MG: 1.25 SOLUTION, CONCENTRATE RESPIRATORY (INHALATION) at 07:35

## 2022-01-01 RX ADMIN — INSULIN LISPRO 3 UNITS: 100 INJECTION, SOLUTION INTRAVENOUS; SUBCUTANEOUS at 09:39

## 2022-01-01 RX ADMIN — FUROSEMIDE 20 MG: 10 INJECTION, SOLUTION INTRAMUSCULAR; INTRAVENOUS at 16:56

## 2022-01-01 RX ADMIN — INSULIN HUMAN 10 UNITS: 100 INJECTION, SOLUTION PARENTERAL at 18:45

## 2022-01-01 RX ADMIN — MAGNESIUM HYDROXIDE 30 ML: 400 SUSPENSION ORAL at 11:10

## 2022-01-01 RX ADMIN — IPRATROPIUM BROMIDE AND ALBUTEROL SULFATE 1 AMPULE: 2.5; .5 SOLUTION RESPIRATORY (INHALATION) at 06:02

## 2022-01-01 RX ADMIN — ALPRAZOLAM 0.5 MG: 0.5 TABLET ORAL at 20:54

## 2022-01-01 RX ADMIN — BISACODYL 10 MG: 10 SUPPOSITORY RECTAL at 09:58

## 2022-01-01 RX ADMIN — ENOXAPARIN SODIUM 30 MG: 100 INJECTION SUBCUTANEOUS at 10:16

## 2022-01-01 RX ADMIN — SODIUM CHLORIDE, PRESERVATIVE FREE 10 ML: 5 INJECTION INTRAVENOUS at 08:25

## 2022-01-01 RX ADMIN — Medication 1000 UNITS: at 08:21

## 2022-01-01 RX ADMIN — ASPIRIN 81 MG CHEWABLE TABLET 324 MG: 81 TABLET CHEWABLE at 07:52

## 2022-01-01 RX ADMIN — Medication 100 MCG/HR: at 12:10

## 2022-01-01 RX ADMIN — INSULIN LISPRO 12 UNITS: 100 INJECTION, SOLUTION INTRAVENOUS; SUBCUTANEOUS at 12:43

## 2022-01-01 RX ADMIN — MEROPENEM 1000 MG: 1 INJECTION, POWDER, FOR SOLUTION INTRAVENOUS at 10:29

## 2022-01-01 RX ADMIN — LEVETIRACETAM 500 MG: 500 TABLET, FILM COATED ORAL at 09:06

## 2022-01-01 RX ADMIN — FUROSEMIDE 20 MG: 10 INJECTION, SOLUTION INTRAMUSCULAR; INTRAVENOUS at 09:19

## 2022-01-01 RX ADMIN — IPRATROPIUM BROMIDE AND ALBUTEROL SULFATE 1 AMPULE: 2.5; .5 SOLUTION RESPIRATORY (INHALATION) at 19:34

## 2022-01-01 RX ADMIN — LEVALBUTEROL HYDROCHLORIDE 1.25 MG: 1.25 SOLUTION, CONCENTRATE RESPIRATORY (INHALATION) at 01:33

## 2022-01-01 RX ADMIN — CHLORHEXIDINE GLUCONATE 15 ML: 1.2 RINSE ORAL at 20:13

## 2022-01-01 RX ADMIN — INSULIN LISPRO 9 UNITS: 100 INJECTION, SOLUTION INTRAVENOUS; SUBCUTANEOUS at 08:43

## 2022-01-01 RX ADMIN — BUDESONIDE 500 MCG: 0.5 SUSPENSION RESPIRATORY (INHALATION) at 19:29

## 2022-01-01 RX ADMIN — BUDESONIDE 500 MCG: 0.5 SUSPENSION RESPIRATORY (INHALATION) at 07:35

## 2022-01-01 RX ADMIN — DEXTROSE MONOHYDRATE 125 ML: 100 INJECTION, SOLUTION INTRAVENOUS at 07:23

## 2022-01-01 RX ADMIN — IPRATROPIUM BROMIDE AND ALBUTEROL SULFATE 1 AMPULE: 2.5; .5 SOLUTION RESPIRATORY (INHALATION) at 15:24

## 2022-01-01 RX ADMIN — INSULIN LISPRO 6 UNITS: 100 INJECTION, SOLUTION INTRAVENOUS; SUBCUTANEOUS at 17:07

## 2022-01-01 RX ADMIN — ENOXAPARIN SODIUM 30 MG: 100 INJECTION SUBCUTANEOUS at 10:55

## 2022-01-01 RX ADMIN — BUDESONIDE 500 MCG: 0.5 SUSPENSION RESPIRATORY (INHALATION) at 19:37

## 2022-01-01 RX ADMIN — LEVALBUTEROL HYDROCHLORIDE 1.25 MG: 1.25 SOLUTION, CONCENTRATE RESPIRATORY (INHALATION) at 14:15

## 2022-01-01 RX ADMIN — Medication 1000 UNITS: at 07:52

## 2022-01-01 RX ADMIN — LEVETIRACETAM 500 MG: 100 SOLUTION ORAL at 09:07

## 2022-01-01 RX ADMIN — SODIUM CHLORIDE, PRESERVATIVE FREE 10 ML: 5 INJECTION INTRAVENOUS at 08:22

## 2022-01-01 RX ADMIN — PHENYLEPHRINE HYDROCHLORIDE 30 MCG/MIN: 10 INJECTION INTRAVENOUS at 04:04

## 2022-01-01 RX ADMIN — Medication 50 MCG/HR: at 10:10

## 2022-01-01 RX ADMIN — LINEZOLID 600 MG: 600 INJECTION, SOLUTION INTRAVENOUS at 22:09

## 2022-01-01 RX ADMIN — IPRATROPIUM BROMIDE AND ALBUTEROL SULFATE 1 AMPULE: 2.5; .5 SOLUTION RESPIRATORY (INHALATION) at 19:36

## 2022-01-01 RX ADMIN — AMLODIPINE BESYLATE 5 MG: 5 TABLET ORAL at 20:54

## 2022-01-01 RX ADMIN — ALPRAZOLAM 0.5 MG: 0.5 TABLET ORAL at 08:39

## 2022-01-01 RX ADMIN — BISACODYL 10 MG: 10 SUPPOSITORY RECTAL at 08:16

## 2022-01-01 RX ADMIN — ASPIRIN 81 MG CHEWABLE TABLET 324 MG: 81 TABLET CHEWABLE at 12:19

## 2022-01-01 RX ADMIN — SODIUM CHLORIDE 1.3 MCG/KG/HR: 9 INJECTION, SOLUTION INTRAVENOUS at 00:35

## 2022-01-01 RX ADMIN — LEVETIRACETAM 500 MG: 100 SOLUTION ORAL at 12:20

## 2022-01-01 RX ADMIN — ENOXAPARIN SODIUM 30 MG: 100 INJECTION SUBCUTANEOUS at 09:22

## 2022-01-01 RX ADMIN — SODIUM ZIRCONIUM CYCLOSILICATE 10 G: 10 POWDER, FOR SUSPENSION ORAL at 20:21

## 2022-01-01 RX ADMIN — CHLORHEXIDINE GLUCONATE 15 ML: 1.2 RINSE ORAL at 09:19

## 2022-01-01 RX ADMIN — SODIUM CHLORIDE: 9 INJECTION, SOLUTION INTRAVENOUS at 13:54

## 2022-01-01 RX ADMIN — POTASSIUM PHOSPHATE, MONOBASIC POTASSIUM PHOSPHATE, DIBASIC 15 MMOL: 224; 236 INJECTION, SOLUTION, CONCENTRATE INTRAVENOUS at 14:44

## 2022-01-01 RX ADMIN — ENOXAPARIN SODIUM 30 MG: 100 INJECTION SUBCUTANEOUS at 07:14

## 2022-01-01 RX ADMIN — LEVALBUTEROL HYDROCHLORIDE 1.25 MG: 1.25 SOLUTION, CONCENTRATE RESPIRATORY (INHALATION) at 14:34

## 2022-01-01 RX ADMIN — AMLODIPINE BESYLATE 5 MG: 5 TABLET ORAL at 20:49

## 2022-01-01 RX ADMIN — IPRATROPIUM BROMIDE AND ALBUTEROL SULFATE 1 AMPULE: 2.5; .5 SOLUTION RESPIRATORY (INHALATION) at 16:36

## 2022-01-01 RX ADMIN — Medication 50 MCG/HR: at 15:41

## 2022-01-01 RX ADMIN — LEVALBUTEROL HYDROCHLORIDE 1.25 MG: 1.25 SOLUTION, CONCENTRATE RESPIRATORY (INHALATION) at 14:38

## 2022-01-01 RX ADMIN — DEXTROSE MONOHYDRATE 125 ML: 100 INJECTION, SOLUTION INTRAVENOUS at 08:16

## 2022-01-01 RX ADMIN — ATORVASTATIN CALCIUM 20 MG: 20 TABLET, FILM COATED ORAL at 08:42

## 2022-01-01 RX ADMIN — DEXMEDETOMIDINE HYDROCHLORIDE 0.2 MCG/KG/HR: 100 INJECTION, SOLUTION INTRAVENOUS at 07:20

## 2022-01-01 RX ADMIN — MEROPENEM 1000 MG: 1 INJECTION, POWDER, FOR SOLUTION INTRAVENOUS at 09:59

## 2022-01-01 RX ADMIN — INSULIN LISPRO 3 UNITS: 100 INJECTION, SOLUTION INTRAVENOUS; SUBCUTANEOUS at 08:10

## 2022-01-01 RX ADMIN — SODIUM CHLORIDE 1.1 MCG/KG/HR: 9 INJECTION, SOLUTION INTRAVENOUS at 17:01

## 2022-01-01 RX ADMIN — INSULIN LISPRO 9 UNITS: 100 INJECTION, SOLUTION INTRAVENOUS; SUBCUTANEOUS at 08:22

## 2022-01-01 RX ADMIN — LISINOPRIL 20 MG: 20 TABLET ORAL at 08:21

## 2022-01-01 RX ADMIN — DEXAMETHASONE SODIUM PHOSPHATE 10 MG: 10 INJECTION, SOLUTION INTRAMUSCULAR; INTRAVENOUS at 15:12

## 2022-01-01 RX ADMIN — POLYETHYLENE GLYCOL 3350 17 G: 17 POWDER, FOR SOLUTION ORAL at 08:55

## 2022-01-01 RX ADMIN — FUROSEMIDE 40 MG: 10 INJECTION, SOLUTION INTRAMUSCULAR; INTRAVENOUS at 21:17

## 2022-01-01 RX ADMIN — AMIODARONE HYDROCHLORIDE 0.5 MG/MIN: 50 INJECTION, SOLUTION INTRAVENOUS at 03:58

## 2022-01-01 RX ADMIN — INSULIN LISPRO 5 UNITS: 100 INJECTION, SOLUTION INTRAVENOUS; SUBCUTANEOUS at 21:58

## 2022-01-01 RX ADMIN — AMIODARONE HYDROCHLORIDE 1 MG/MIN: 50 INJECTION, SOLUTION INTRAVENOUS at 09:30

## 2022-01-01 RX ADMIN — LINEZOLID 600 MG: 600 INJECTION, SOLUTION INTRAVENOUS at 11:10

## 2022-01-01 RX ADMIN — AMIODARONE HYDROCHLORIDE 200 MG: 200 TABLET ORAL at 07:52

## 2022-01-01 RX ADMIN — Medication 1000 UNITS: at 08:59

## 2022-01-01 RX ADMIN — ENOXAPARIN SODIUM 30 MG: 100 INJECTION SUBCUTANEOUS at 07:53

## 2022-01-01 RX ADMIN — LEVETIRACETAM 500 MG: 100 SOLUTION ORAL at 11:06

## 2022-01-01 RX ADMIN — INSULIN GLARGINE 15 UNITS: 100 INJECTION, SOLUTION SUBCUTANEOUS at 20:58

## 2022-01-01 RX ADMIN — CEFEPIME HYDROCHLORIDE 2000 MG: 2 INJECTION, POWDER, FOR SOLUTION INTRAVENOUS at 22:58

## 2022-01-01 RX ADMIN — PROPOFOL 20 MCG/KG/MIN: 10 INJECTION, EMULSION INTRAVENOUS at 23:45

## 2022-01-01 RX ADMIN — SODIUM CHLORIDE, PRESERVATIVE FREE 10 ML: 5 INJECTION INTRAVENOUS at 08:41

## 2022-01-01 RX ADMIN — Medication 100 MCG/HR: at 03:00

## 2022-01-01 RX ADMIN — CHLORHEXIDINE GLUCONATE 15 ML: 1.2 RINSE ORAL at 09:08

## 2022-01-01 RX ADMIN — POLYETHYLENE GLYCOL 3350 17 G: 17 POWDER, FOR SOLUTION ORAL at 09:56

## 2022-01-01 RX ADMIN — LEVETIRACETAM 500 MG: 100 SOLUTION ORAL at 22:00

## 2022-01-01 RX ADMIN — INSULIN GLARGINE 15 UNITS: 100 INJECTION, SOLUTION SUBCUTANEOUS at 10:06

## 2022-01-01 RX ADMIN — ALPRAZOLAM 0.5 MG: 0.5 TABLET ORAL at 14:14

## 2022-01-01 RX ADMIN — SODIUM CHLORIDE, PRESERVATIVE FREE 10 ML: 5 INJECTION INTRAVENOUS at 07:14

## 2022-01-01 RX ADMIN — IPRATROPIUM BROMIDE AND ALBUTEROL SULFATE 1 AMPULE: 2.5; .5 SOLUTION RESPIRATORY (INHALATION) at 10:05

## 2022-01-01 RX ADMIN — SUCRALFATE 1 G: 1 TABLET ORAL at 06:51

## 2022-01-01 RX ADMIN — LEVALBUTEROL HYDROCHLORIDE 1.25 MG: 1.25 SOLUTION, CONCENTRATE RESPIRATORY (INHALATION) at 14:27

## 2022-01-01 RX ADMIN — BUDESONIDE 500 MCG: 0.5 SUSPENSION RESPIRATORY (INHALATION) at 20:40

## 2022-01-01 RX ADMIN — INSULIN LISPRO 3 UNITS: 100 INJECTION, SOLUTION INTRAVENOUS; SUBCUTANEOUS at 21:00

## 2022-01-01 RX ADMIN — Medication 240 ML: at 15:20

## 2022-01-01 RX ADMIN — Medication 75 MCG/HR: at 22:49

## 2022-01-01 RX ADMIN — IOPAMIDOL 75 ML: 755 INJECTION, SOLUTION INTRAVENOUS at 17:05

## 2022-01-01 RX ADMIN — IOPAMIDOL 75 ML: 755 INJECTION, SOLUTION INTRAVENOUS at 14:22

## 2022-01-01 RX ADMIN — BUDESONIDE 500 MCG: 0.5 SUSPENSION RESPIRATORY (INHALATION) at 08:07

## 2022-01-01 RX ADMIN — LORAZEPAM 1 MG: 2 INJECTION INTRAMUSCULAR; INTRAVENOUS at 06:18

## 2022-01-01 RX ADMIN — Medication 50 MCG/HR: at 21:20

## 2022-01-01 RX ADMIN — SODIUM CHLORIDE, PRESERVATIVE FREE 10 ML: 5 INJECTION INTRAVENOUS at 21:16

## 2022-01-01 RX ADMIN — CHLORHEXIDINE GLUCONATE 15 ML: 1.2 RINSE ORAL at 21:16

## 2022-01-01 RX ADMIN — MEROPENEM 1000 MG: 1 INJECTION, POWDER, FOR SOLUTION INTRAVENOUS at 18:46

## 2022-01-01 RX ADMIN — SODIUM CHLORIDE, PRESERVATIVE FREE 10 ML: 5 INJECTION INTRAVENOUS at 08:56

## 2022-01-01 RX ADMIN — DEXMEDETOMIDINE HYDROCHLORIDE 0.7 MCG/KG/HR: 100 INJECTION, SOLUTION INTRAVENOUS at 09:43

## 2022-01-01 RX ADMIN — INSULIN GLARGINE 15 UNITS: 100 INJECTION, SOLUTION SUBCUTANEOUS at 08:41

## 2022-01-01 RX ADMIN — SUCRALFATE 1 G: 1 TABLET ORAL at 17:33

## 2022-01-01 RX ADMIN — CHLORHEXIDINE GLUCONATE 15 ML: 1.2 RINSE ORAL at 08:11

## 2022-01-01 RX ADMIN — INSULIN LISPRO 3 UNITS: 100 INJECTION, SOLUTION INTRAVENOUS; SUBCUTANEOUS at 07:57

## 2022-01-01 RX ADMIN — AMIODARONE HYDROCHLORIDE 0.5 MG/MIN: 50 INJECTION, SOLUTION INTRAVENOUS at 18:23

## 2022-01-01 RX ADMIN — AMIODARONE HYDROCHLORIDE 0.5 MG/MIN: 50 INJECTION, SOLUTION INTRAVENOUS at 12:05

## 2022-01-01 RX ADMIN — FUROSEMIDE 20 MG: 10 INJECTION, SOLUTION INTRAMUSCULAR; INTRAVENOUS at 08:54

## 2022-01-01 RX ADMIN — ENOXAPARIN SODIUM 30 MG: 100 INJECTION SUBCUTANEOUS at 21:12

## 2022-01-01 RX ADMIN — ASPIRIN 81 MG CHEWABLE TABLET 324 MG: 81 TABLET CHEWABLE at 08:31

## 2022-01-01 RX ADMIN — ATORVASTATIN CALCIUM 20 MG: 20 TABLET, FILM COATED ORAL at 08:15

## 2022-01-01 RX ADMIN — SODIUM CHLORIDE 5.07 UNITS/HR: 9 INJECTION, SOLUTION INTRAVENOUS at 11:27

## 2022-01-01 RX ADMIN — CHLORHEXIDINE GLUCONATE 15 ML: 1.2 RINSE ORAL at 20:24

## 2022-01-01 RX ADMIN — ALPRAZOLAM 0.5 MG: 0.5 TABLET ORAL at 20:07

## 2022-01-01 RX ADMIN — MEROPENEM 1000 MG: 1 INJECTION, POWDER, FOR SOLUTION INTRAVENOUS at 19:34

## 2022-01-01 RX ADMIN — LEVALBUTEROL HYDROCHLORIDE 1.25 MG: 1.25 SOLUTION, CONCENTRATE RESPIRATORY (INHALATION) at 19:47

## 2022-01-01 RX ADMIN — ASPIRIN 81 MG CHEWABLE TABLET 324 MG: 81 TABLET CHEWABLE at 08:47

## 2022-01-01 RX ADMIN — INSULIN LISPRO 3 UNITS: 100 INJECTION, SOLUTION INTRAVENOUS; SUBCUTANEOUS at 07:58

## 2022-01-01 RX ADMIN — BUDESONIDE 500 MCG: 0.5 SUSPENSION RESPIRATORY (INHALATION) at 18:12

## 2022-01-01 RX ADMIN — INSULIN LISPRO 3 UNITS: 100 INJECTION, SOLUTION INTRAVENOUS; SUBCUTANEOUS at 21:17

## 2022-01-01 RX ADMIN — FUROSEMIDE 20 MG: 10 INJECTION, SOLUTION INTRAMUSCULAR; INTRAVENOUS at 08:38

## 2022-01-01 RX ADMIN — Medication 75 MCG/HR: at 13:49

## 2022-01-01 RX ADMIN — PROPOFOL 50 MCG/KG/MIN: 10 INJECTION, EMULSION INTRAVENOUS at 04:51

## 2022-01-01 RX ADMIN — INSULIN GLARGINE 15 UNITS: 100 INJECTION, SOLUTION SUBCUTANEOUS at 21:13

## 2022-01-01 RX ADMIN — Medication 3 MG/HR: at 08:39

## 2022-01-01 RX ADMIN — Medication 75 MCG/HR: at 11:36

## 2022-01-01 RX ADMIN — LEVETIRACETAM 500 MG: 100 SOLUTION ORAL at 08:40

## 2022-01-01 RX ADMIN — ATORVASTATIN CALCIUM 20 MG: 20 TABLET, FILM COATED ORAL at 07:59

## 2022-01-01 RX ADMIN — PHENYLEPHRINE HYDROCHLORIDE 30 MCG/MIN: 10 INJECTION INTRAVENOUS at 14:35

## 2022-01-01 RX ADMIN — CHLORHEXIDINE GLUCONATE 15 ML: 1.2 RINSE ORAL at 20:23

## 2022-01-01 RX ADMIN — LINEZOLID 600 MG: 600 INJECTION, SOLUTION INTRAVENOUS at 12:52

## 2022-01-01 RX ADMIN — LEVETIRACETAM 500 MG: 100 SOLUTION ORAL at 08:48

## 2022-01-01 RX ADMIN — CHLORHEXIDINE GLUCONATE 15 ML: 1.2 RINSE ORAL at 08:42

## 2022-01-01 RX ADMIN — SUCRALFATE 1 G: 1 TABLET ORAL at 00:37

## 2022-01-01 RX ADMIN — SODIUM CHLORIDE 1 MCG/KG/HR: 9 INJECTION, SOLUTION INTRAVENOUS at 14:12

## 2022-01-01 RX ADMIN — INSULIN LISPRO 6 UNITS: 100 INJECTION, SOLUTION INTRAVENOUS; SUBCUTANEOUS at 11:54

## 2022-01-01 RX ADMIN — LEVETIRACETAM 500 MG: 500 TABLET, FILM COATED ORAL at 21:02

## 2022-01-01 RX ADMIN — CHLORHEXIDINE GLUCONATE 15 ML: 1.2 RINSE ORAL at 09:34

## 2022-01-01 RX ADMIN — LEVETIRACETAM 500 MG: 100 SOLUTION ORAL at 21:17

## 2022-01-01 RX ADMIN — SODIUM CHLORIDE, PRESERVATIVE FREE 10 ML: 5 INJECTION INTRAVENOUS at 20:14

## 2022-01-01 RX ADMIN — LINEZOLID 600 MG: 600 INJECTION, SOLUTION INTRAVENOUS at 22:05

## 2022-01-01 RX ADMIN — ENOXAPARIN SODIUM 30 MG: 100 INJECTION SUBCUTANEOUS at 22:00

## 2022-01-01 RX ADMIN — SODIUM CHLORIDE 1.3 MCG/KG/HR: 9 INJECTION, SOLUTION INTRAVENOUS at 22:32

## 2022-01-01 RX ADMIN — CEFEPIME HYDROCHLORIDE 2000 MG: 2 INJECTION, POWDER, FOR SOLUTION INTRAVENOUS at 11:29

## 2022-01-01 RX ADMIN — PANTOPRAZOLE SODIUM 40 MG: 40 INJECTION, POWDER, FOR SOLUTION INTRAVENOUS at 09:30

## 2022-01-01 RX ADMIN — INSULIN LISPRO 6 UNITS: 100 INJECTION, SOLUTION INTRAVENOUS; SUBCUTANEOUS at 11:55

## 2022-01-01 RX ADMIN — Medication 50 MCG/HR: at 06:08

## 2022-01-01 RX ADMIN — DEXMEDETOMIDINE HYDROCHLORIDE 0.2 MCG/KG/HR: 100 INJECTION, SOLUTION INTRAVENOUS at 16:39

## 2022-01-01 RX ADMIN — LEVALBUTEROL HYDROCHLORIDE 1.25 MG: 1.25 SOLUTION, CONCENTRATE RESPIRATORY (INHALATION) at 20:47

## 2022-01-01 RX ADMIN — PANTOPRAZOLE SODIUM 40 MG: 40 INJECTION, POWDER, FOR SOLUTION INTRAVENOUS at 07:52

## 2022-01-01 RX ADMIN — INSULIN LISPRO 12 UNITS: 100 INJECTION, SOLUTION INTRAVENOUS; SUBCUTANEOUS at 18:24

## 2022-01-01 RX ADMIN — PANTOPRAZOLE SODIUM 40 MG: 40 INJECTION, POWDER, FOR SOLUTION INTRAVENOUS at 08:08

## 2022-01-01 RX ADMIN — CHLORHEXIDINE GLUCONATE 15 ML: 1.2 RINSE ORAL at 08:22

## 2022-01-01 RX ADMIN — BISACODYL 10 MG: 10 SUPPOSITORY RECTAL at 08:09

## 2022-01-01 RX ADMIN — AMLODIPINE BESYLATE 5 MG: 5 TABLET ORAL at 21:02

## 2022-01-01 RX ADMIN — CHLORHEXIDINE GLUCONATE 15 ML: 1.2 RINSE ORAL at 08:32

## 2022-01-01 RX ADMIN — LEVETIRACETAM 500 MG: 100 SOLUTION ORAL at 09:21

## 2022-01-01 RX ADMIN — SODIUM CHLORIDE 1.3 MCG/KG/HR: 9 INJECTION, SOLUTION INTRAVENOUS at 22:52

## 2022-01-01 RX ADMIN — SODIUM CHLORIDE, PRESERVATIVE FREE 10 ML: 5 INJECTION INTRAVENOUS at 22:16

## 2022-01-01 RX ADMIN — INSULIN LISPRO 4 UNITS: 100 INJECTION, SOLUTION INTRAVENOUS; SUBCUTANEOUS at 20:58

## 2022-01-01 RX ADMIN — BUDESONIDE 500 MCG: 0.5 SUSPENSION RESPIRATORY (INHALATION) at 07:47

## 2022-01-01 RX ADMIN — ALPRAZOLAM 0.5 MG: 0.5 TABLET ORAL at 18:27

## 2022-01-01 RX ADMIN — ALBUTEROL SULFATE 2 PUFF: 108 AEROSOL, METERED RESPIRATORY (INHALATION) at 19:43

## 2022-01-01 RX ADMIN — LEVETIRACETAM 500 MG: 100 SOLUTION ORAL at 20:22

## 2022-01-01 RX ADMIN — LEVETIRACETAM 500 MG: 100 SOLUTION ORAL at 08:59

## 2022-01-01 RX ADMIN — CHLORHEXIDINE GLUCONATE 15 ML: 1.2 RINSE ORAL at 20:52

## 2022-01-01 RX ADMIN — IPRATROPIUM BROMIDE AND ALBUTEROL SULFATE 1 AMPULE: 2.5; .5 SOLUTION RESPIRATORY (INHALATION) at 10:46

## 2022-01-01 RX ADMIN — CEFEPIME HYDROCHLORIDE 2000 MG: 2 INJECTION, POWDER, FOR SOLUTION INTRAVENOUS at 23:37

## 2022-01-01 RX ADMIN — SODIUM CHLORIDE, PRESERVATIVE FREE 10 ML: 5 INJECTION INTRAVENOUS at 23:17

## 2022-01-01 RX ADMIN — ENOXAPARIN SODIUM 30 MG: 100 INJECTION SUBCUTANEOUS at 20:58

## 2022-01-01 RX ADMIN — SODIUM CHLORIDE, PRESERVATIVE FREE 10 ML: 5 INJECTION INTRAVENOUS at 08:43

## 2022-01-01 RX ADMIN — CHLORHEXIDINE GLUCONATE 15 ML: 1.2 RINSE ORAL at 11:00

## 2022-01-01 RX ADMIN — CHLORHEXIDINE GLUCONATE 15 ML: 1.2 RINSE ORAL at 07:51

## 2022-01-01 RX ADMIN — ACETAMINOPHEN 650 MG: 650 SUPPOSITORY RECTAL at 08:21

## 2022-01-01 RX ADMIN — LEVALBUTEROL HYDROCHLORIDE 1.25 MG: 1.25 SOLUTION, CONCENTRATE RESPIRATORY (INHALATION) at 14:42

## 2022-01-01 RX ADMIN — LEVALBUTEROL HYDROCHLORIDE 1.25 MG: 1.25 SOLUTION, CONCENTRATE RESPIRATORY (INHALATION) at 14:53

## 2022-01-01 RX ADMIN — BUDESONIDE 500 MCG: 0.5 SUSPENSION RESPIRATORY (INHALATION) at 20:17

## 2022-01-01 RX ADMIN — LEVALBUTEROL HYDROCHLORIDE 1.25 MG: 1.25 SOLUTION, CONCENTRATE RESPIRATORY (INHALATION) at 04:46

## 2022-01-01 RX ADMIN — INSULIN LISPRO 2 UNITS: 100 INJECTION, SOLUTION INTRAVENOUS; SUBCUTANEOUS at 20:38

## 2022-01-01 RX ADMIN — BISACODYL 10 MG: 10 SUPPOSITORY RECTAL at 13:46

## 2022-01-01 RX ADMIN — ALPRAZOLAM 0.5 MG: 0.5 TABLET ORAL at 14:00

## 2022-01-01 RX ADMIN — PANTOPRAZOLE SODIUM 40 MG: 40 INJECTION, POWDER, FOR SOLUTION INTRAVENOUS at 10:56

## 2022-01-01 RX ADMIN — Medication 9 MG/HR: at 07:46

## 2022-01-01 RX ADMIN — Medication 100 MCG/HR: at 03:06

## 2022-01-01 RX ADMIN — AMIODARONE HYDROCHLORIDE 1 MG/MIN: 50 INJECTION, SOLUTION INTRAVENOUS at 23:54

## 2022-01-01 RX ADMIN — DEXMEDETOMIDINE HYDROCHLORIDE 0.6 MCG/KG/HR: 100 INJECTION, SOLUTION INTRAVENOUS at 08:39

## 2022-01-01 RX ADMIN — POTASSIUM BICARBONATE 40 MEQ: 782 TABLET, EFFERVESCENT ORAL at 13:01

## 2022-01-01 RX ADMIN — ENOXAPARIN SODIUM 30 MG: 100 INJECTION SUBCUTANEOUS at 21:04

## 2022-01-01 RX ADMIN — DEXTROSE MONOHYDRATE: 50 INJECTION, SOLUTION INTRAVENOUS at 11:32

## 2022-01-01 RX ADMIN — LEVETIRACETAM 500 MG: 100 SOLUTION ORAL at 11:56

## 2022-01-01 RX ADMIN — ATORVASTATIN CALCIUM 20 MG: 20 TABLET, FILM COATED ORAL at 09:07

## 2022-01-01 RX ADMIN — Medication 1000 UNITS: at 07:59

## 2022-01-01 RX ADMIN — ALPRAZOLAM 0.5 MG: 0.5 TABLET ORAL at 08:21

## 2022-01-01 RX ADMIN — LEVALBUTEROL HYDROCHLORIDE 1.25 MG: 1.25 SOLUTION, CONCENTRATE RESPIRATORY (INHALATION) at 07:28

## 2022-01-01 RX ADMIN — MAGNESIUM SULFATE HEPTAHYDRATE 2000 MG: 40 INJECTION, SOLUTION INTRAVENOUS at 15:21

## 2022-01-01 RX ADMIN — ALPRAZOLAM 0.5 MG: 0.5 TABLET ORAL at 14:17

## 2022-01-01 RX ADMIN — SUCRALFATE 1 G: 1 TABLET ORAL at 11:54

## 2022-01-01 RX ADMIN — Medication 1000 UNITS: at 08:31

## 2022-01-01 RX ADMIN — INSULIN LISPRO 3 UNITS: 100 INJECTION, SOLUTION INTRAVENOUS; SUBCUTANEOUS at 20:57

## 2022-01-01 RX ADMIN — ASPIRIN 81 MG CHEWABLE TABLET 324 MG: 81 TABLET CHEWABLE at 07:13

## 2022-01-01 RX ADMIN — SODIUM CHLORIDE, PRESERVATIVE FREE 10 ML: 5 INJECTION INTRAVENOUS at 09:26

## 2022-01-01 RX ADMIN — HYDRALAZINE HYDROCHLORIDE 10 MG: 20 INJECTION INTRAMUSCULAR; INTRAVENOUS at 00:52

## 2022-01-01 RX ADMIN — IPRATROPIUM BROMIDE AND ALBUTEROL SULFATE 1 AMPULE: 2.5; .5 SOLUTION RESPIRATORY (INHALATION) at 10:15

## 2022-01-01 RX ADMIN — LEVETIRACETAM 500 MG: 100 SOLUTION ORAL at 21:16

## 2022-01-01 RX ADMIN — DEXTROSE MONOHYDRATE 150 MG: 50 INJECTION, SOLUTION INTRAVENOUS at 02:19

## 2022-01-01 RX ADMIN — ALPRAZOLAM 0.5 MG: 0.5 TABLET ORAL at 20:33

## 2022-01-01 RX ADMIN — BUDESONIDE 500 MCG: 0.5 SUSPENSION RESPIRATORY (INHALATION) at 06:13

## 2022-01-01 RX ADMIN — SODIUM CHLORIDE, PRESERVATIVE FREE 10 ML: 5 INJECTION INTRAVENOUS at 21:54

## 2022-01-01 RX ADMIN — ENOXAPARIN SODIUM 30 MG: 100 INJECTION SUBCUTANEOUS at 20:52

## 2022-01-01 RX ADMIN — INSULIN GLARGINE 15 UNITS: 100 INJECTION, SOLUTION SUBCUTANEOUS at 08:45

## 2022-01-01 RX ADMIN — LEVALBUTEROL HYDROCHLORIDE 1.25 MG: 1.25 SOLUTION, CONCENTRATE RESPIRATORY (INHALATION) at 03:16

## 2022-01-01 RX ADMIN — POTASSIUM CHLORIDE 20 MEQ: 29.8 INJECTION, SOLUTION INTRAVENOUS at 10:57

## 2022-01-01 RX ADMIN — PHENYLEPHRINE HYDROCHLORIDE 25 MCG/MIN: 10 INJECTION INTRAVENOUS at 02:25

## 2022-01-01 RX ADMIN — INSULIN LISPRO 6 UNITS: 100 INJECTION, SOLUTION INTRAVENOUS; SUBCUTANEOUS at 17:21

## 2022-01-01 RX ADMIN — INSULIN LISPRO 9 UNITS: 100 INJECTION, SOLUTION INTRAVENOUS; SUBCUTANEOUS at 11:26

## 2022-01-01 RX ADMIN — ENOXAPARIN SODIUM 30 MG: 100 INJECTION SUBCUTANEOUS at 08:09

## 2022-01-01 RX ADMIN — ASPIRIN 81 MG CHEWABLE TABLET 324 MG: 81 TABLET CHEWABLE at 07:59

## 2022-01-01 RX ADMIN — AMLODIPINE BESYLATE 5 MG: 5 TABLET ORAL at 07:52

## 2022-01-01 RX ADMIN — PANTOPRAZOLE SODIUM 40 MG: 40 INJECTION, POWDER, FOR SOLUTION INTRAVENOUS at 08:48

## 2022-01-01 RX ADMIN — LEVALBUTEROL HYDROCHLORIDE 1.25 MG: 1.25 SOLUTION, CONCENTRATE RESPIRATORY (INHALATION) at 02:17

## 2022-01-01 RX ADMIN — IPRATROPIUM BROMIDE AND ALBUTEROL SULFATE 1 AMPULE: 2.5; .5 SOLUTION RESPIRATORY (INHALATION) at 06:12

## 2022-01-01 RX ADMIN — BUDESONIDE 500 MCG: 0.5 SUSPENSION RESPIRATORY (INHALATION) at 08:32

## 2022-01-01 RX ADMIN — LINEZOLID 600 MG: 600 INJECTION, SOLUTION INTRAVENOUS at 23:36

## 2022-01-01 RX ADMIN — INSULIN GLARGINE 40 UNITS: 100 INJECTION, SOLUTION SUBCUTANEOUS at 09:38

## 2022-01-01 RX ADMIN — IPRATROPIUM BROMIDE AND ALBUTEROL SULFATE 1 AMPULE: 2.5; .5 SOLUTION RESPIRATORY (INHALATION) at 14:10

## 2022-01-01 RX ADMIN — POTASSIUM BICARBONATE 40 MEQ: 782 TABLET, EFFERVESCENT ORAL at 12:22

## 2022-01-01 RX ADMIN — ENOXAPARIN SODIUM 30 MG: 100 INJECTION SUBCUTANEOUS at 08:52

## 2022-01-01 RX ADMIN — FUROSEMIDE 20 MG: 10 INJECTION, SOLUTION INTRAMUSCULAR; INTRAVENOUS at 08:53

## 2022-01-01 RX ADMIN — SUCRALFATE 1 G: 1 TABLET ORAL at 01:05

## 2022-01-01 RX ADMIN — LEVETIRACETAM 500 MG: 100 SOLUTION ORAL at 22:10

## 2022-01-01 RX ADMIN — Medication 10 MG/HR: at 11:20

## 2022-01-01 RX ADMIN — IPRATROPIUM BROMIDE AND ALBUTEROL SULFATE 1 AMPULE: 2.5; .5 SOLUTION RESPIRATORY (INHALATION) at 10:58

## 2022-01-01 RX ADMIN — ENOXAPARIN SODIUM 30 MG: 100 INJECTION SUBCUTANEOUS at 20:13

## 2022-01-01 RX ADMIN — LEVETIRACETAM 500 MG: 500 TABLET, FILM COATED ORAL at 20:07

## 2022-01-01 RX ADMIN — LEVALBUTEROL HYDROCHLORIDE 1.25 MG: 1.25 SOLUTION, CONCENTRATE RESPIRATORY (INHALATION) at 19:23

## 2022-01-01 RX ADMIN — SUCRALFATE 1 G: 1 TABLET ORAL at 06:14

## 2022-01-01 RX ADMIN — SODIUM CHLORIDE 0.7 MCG/KG/HR: 9 INJECTION, SOLUTION INTRAVENOUS at 20:46

## 2022-01-01 RX ADMIN — MEROPENEM 1000 MG: 1 INJECTION, POWDER, FOR SOLUTION INTRAVENOUS at 18:17

## 2022-01-01 RX ADMIN — PROPOFOL 50 MCG/KG/MIN: 10 INJECTION, EMULSION INTRAVENOUS at 08:42

## 2022-01-01 RX ADMIN — SODIUM CHLORIDE, PRESERVATIVE FREE 10 ML: 5 INJECTION INTRAVENOUS at 09:30

## 2022-01-01 RX ADMIN — LEVETIRACETAM 500 MG: 100 SOLUTION ORAL at 08:21

## 2022-01-01 RX ADMIN — ENOXAPARIN SODIUM 30 MG: 100 INJECTION SUBCUTANEOUS at 09:20

## 2022-01-01 RX ADMIN — DEXMEDETOMIDINE HYDROCHLORIDE 0.2 MCG/KG/HR: 100 INJECTION, SOLUTION INTRAVENOUS at 01:18

## 2022-01-01 RX ADMIN — ATORVASTATIN CALCIUM 20 MG: 20 TABLET, FILM COATED ORAL at 09:25

## 2022-01-01 RX ADMIN — SODIUM ZIRCONIUM CYCLOSILICATE 15 G: 10 POWDER, FOR SUSPENSION ORAL at 08:03

## 2022-01-01 RX ADMIN — LEVETIRACETAM 500 MG: 100 SOLUTION ORAL at 21:38

## 2022-01-01 RX ADMIN — LEVETIRACETAM 500 MG: 100 SOLUTION ORAL at 21:11

## 2022-01-01 RX ADMIN — INSULIN LISPRO 6 UNITS: 100 INJECTION, SOLUTION INTRAVENOUS; SUBCUTANEOUS at 00:38

## 2022-01-01 RX ADMIN — DEXTROSE MONOHYDRATE 250 ML: 100 INJECTION, SOLUTION INTRAVENOUS at 06:53

## 2022-01-01 RX ADMIN — Medication 3 ML: at 14:20

## 2022-01-01 RX ADMIN — SUCRALFATE 1 G: 1 TABLET ORAL at 12:00

## 2022-01-01 RX ADMIN — BUDESONIDE 500 MCG: 0.5 SUSPENSION RESPIRATORY (INHALATION) at 06:19

## 2022-01-01 RX ADMIN — LACTULOSE 20 G: 20 SOLUTION ORAL at 08:54

## 2022-01-01 RX ADMIN — Medication 50 MCG/HR: at 04:39

## 2022-01-01 RX ADMIN — LEVALBUTEROL HYDROCHLORIDE 1.25 MG: 1.25 SOLUTION, CONCENTRATE RESPIRATORY (INHALATION) at 15:10

## 2022-01-01 RX ADMIN — Medication 75 MCG/HR: at 15:18

## 2022-01-01 RX ADMIN — FUROSEMIDE 40 MG: 10 INJECTION, SOLUTION INTRAMUSCULAR; INTRAVENOUS at 22:10

## 2022-01-01 RX ADMIN — LINEZOLID 600 MG: 600 INJECTION, SOLUTION INTRAVENOUS at 10:51

## 2022-01-01 RX ADMIN — INSULIN LISPRO 3 UNITS: 100 INJECTION, SOLUTION INTRAVENOUS; SUBCUTANEOUS at 18:49

## 2022-01-01 RX ADMIN — SODIUM CHLORIDE 1.3 MCG/KG/HR: 9 INJECTION, SOLUTION INTRAVENOUS at 16:08

## 2022-01-01 RX ADMIN — DEXAMETHASONE SODIUM PHOSPHATE 6 MG: 10 INJECTION INTRAMUSCULAR; INTRAVENOUS at 15:17

## 2022-01-01 RX ADMIN — PANTOPRAZOLE SODIUM 40 MG: 40 INJECTION, POWDER, FOR SOLUTION INTRAVENOUS at 09:57

## 2022-01-01 RX ADMIN — SODIUM CHLORIDE 26 UNITS/HR: 9 INJECTION, SOLUTION INTRAVENOUS at 04:50

## 2022-01-01 RX ADMIN — INSULIN HUMAN 10 UNITS: 100 INJECTION, SOLUTION PARENTERAL at 06:39

## 2022-01-01 RX ADMIN — BUDESONIDE 500 MCG: 0.5 SUSPENSION RESPIRATORY (INHALATION) at 07:44

## 2022-01-01 RX ADMIN — ACETAMINOPHEN 650 MG: 325 TABLET ORAL at 07:55

## 2022-01-01 RX ADMIN — SODIUM CHLORIDE 1.2 MCG/KG/HR: 9 INJECTION, SOLUTION INTRAVENOUS at 05:27

## 2022-01-01 RX ADMIN — INSULIN LISPRO 3 UNITS: 100 INJECTION, SOLUTION INTRAVENOUS; SUBCUTANEOUS at 12:32

## 2022-01-01 RX ADMIN — ATORVASTATIN CALCIUM 20 MG: 20 TABLET, FILM COATED ORAL at 09:22

## 2022-01-01 RX ADMIN — IPRATROPIUM BROMIDE AND ALBUTEROL SULFATE 1 AMPULE: 2.5; .5 SOLUTION RESPIRATORY (INHALATION) at 17:52

## 2022-01-01 RX ADMIN — Medication 50 MCG/HR: at 01:45

## 2022-01-01 RX ADMIN — LINEZOLID 600 MG: 600 INJECTION, SOLUTION INTRAVENOUS at 10:48

## 2022-01-01 RX ADMIN — Medication 10 MG/HR: at 01:22

## 2022-01-01 RX ADMIN — BUDESONIDE 500 MCG: 0.5 SUSPENSION RESPIRATORY (INHALATION) at 20:22

## 2022-01-01 RX ADMIN — SODIUM CHLORIDE, PRESERVATIVE FREE 10 ML: 5 INJECTION INTRAVENOUS at 08:08

## 2022-01-01 RX ADMIN — LEVALBUTEROL HYDROCHLORIDE 1.25 MG: 1.25 SOLUTION, CONCENTRATE RESPIRATORY (INHALATION) at 08:00

## 2022-01-01 RX ADMIN — DEXTROSE MONOHYDRATE 150 MG: 50 INJECTION, SOLUTION INTRAVENOUS at 16:33

## 2022-01-01 RX ADMIN — DEXMEDETOMIDINE HYDROCHLORIDE 1.4 MCG/KG/HR: 100 INJECTION, SOLUTION INTRAVENOUS at 22:31

## 2022-01-01 RX ADMIN — SODIUM CHLORIDE, PRESERVATIVE FREE 10 ML: 5 INJECTION INTRAVENOUS at 21:00

## 2022-01-01 RX ADMIN — ATORVASTATIN CALCIUM 20 MG: 20 TABLET, FILM COATED ORAL at 08:09

## 2022-01-01 RX ADMIN — PHENYLEPHRINE HYDROCHLORIDE 12 MCG/MIN: 10 INJECTION INTRAVENOUS at 11:00

## 2022-01-01 RX ADMIN — AMLODIPINE BESYLATE 5 MG: 5 TABLET ORAL at 09:56

## 2022-01-01 RX ADMIN — ENOXAPARIN SODIUM 40 MG: 100 INJECTION SUBCUTANEOUS at 08:37

## 2022-01-01 RX ADMIN — PROPOFOL 20 MCG/KG/MIN: 10 INJECTION, EMULSION INTRAVENOUS at 23:57

## 2022-01-01 RX ADMIN — CHLORHEXIDINE GLUCONATE 15 ML: 1.2 RINSE ORAL at 20:02

## 2022-01-01 RX ADMIN — SODIUM CHLORIDE: 9 INJECTION, SOLUTION INTRAVENOUS at 18:49

## 2022-01-01 RX ADMIN — SODIUM CHLORIDE 1.4 MCG/KG/HR: 9 INJECTION, SOLUTION INTRAVENOUS at 15:49

## 2022-01-01 RX ADMIN — SODIUM CHLORIDE 1.1 MCG/KG/HR: 9 INJECTION, SOLUTION INTRAVENOUS at 22:11

## 2022-01-01 RX ADMIN — INSULIN LISPRO 3 UNITS: 100 INJECTION, SOLUTION INTRAVENOUS; SUBCUTANEOUS at 12:41

## 2022-01-01 RX ADMIN — LINEZOLID 600 MG: 600 INJECTION, SOLUTION INTRAVENOUS at 10:09

## 2022-01-01 RX ADMIN — PANTOPRAZOLE SODIUM 40 MG: 40 INJECTION, POWDER, FOR SOLUTION INTRAVENOUS at 09:21

## 2022-01-01 RX ADMIN — CHLORHEXIDINE GLUCONATE 15 ML: 1.2 RINSE ORAL at 07:14

## 2022-01-01 RX ADMIN — LEVETIRACETAM 500 MG: 100 SOLUTION ORAL at 20:44

## 2022-01-01 RX ADMIN — LEVETIRACETAM 500 MG: 100 SOLUTION ORAL at 08:35

## 2022-01-01 RX ADMIN — ENOXAPARIN SODIUM 40 MG: 100 INJECTION SUBCUTANEOUS at 09:06

## 2022-01-01 RX ADMIN — PANTOPRAZOLE SODIUM 40 MG: 40 INJECTION, POWDER, FOR SOLUTION INTRAVENOUS at 12:19

## 2022-01-01 RX ADMIN — SODIUM PHOSPHATE, MONOBASIC, MONOHYDRATE 10 MMOL: 276; 142 INJECTION, SOLUTION INTRAVENOUS at 15:18

## 2022-01-01 RX ADMIN — BUDESONIDE 500 MCG: 0.5 SUSPENSION RESPIRATORY (INHALATION) at 18:52

## 2022-01-01 RX ADMIN — POTASSIUM BICARBONATE 40 MEQ: 782 TABLET, EFFERVESCENT ORAL at 10:20

## 2022-01-01 RX ADMIN — ENOXAPARIN SODIUM 40 MG: 100 INJECTION SUBCUTANEOUS at 09:25

## 2022-01-01 RX ADMIN — SUCRALFATE 1 G: 1 TABLET ORAL at 17:21

## 2022-01-01 RX ADMIN — SUCRALFATE 1 G: 1 TABLET ORAL at 06:11

## 2022-01-01 RX ADMIN — SODIUM CHLORIDE, PRESERVATIVE FREE 10 ML: 5 INJECTION INTRAVENOUS at 07:53

## 2022-01-01 RX ADMIN — CHLORHEXIDINE GLUCONATE 15 ML: 1.2 RINSE ORAL at 08:43

## 2022-01-01 RX ADMIN — DEXAMETHASONE SODIUM PHOSPHATE 6 MG: 10 INJECTION INTRAMUSCULAR; INTRAVENOUS at 14:19

## 2022-01-01 RX ADMIN — HYDRALAZINE HYDROCHLORIDE 10 MG: 20 INJECTION INTRAMUSCULAR; INTRAVENOUS at 02:42

## 2022-01-01 RX ADMIN — IPRATROPIUM BROMIDE AND ALBUTEROL SULFATE 1 AMPULE: 2.5; .5 SOLUTION RESPIRATORY (INHALATION) at 17:27

## 2022-01-01 RX ADMIN — PROPOFOL 32.24 MCG/KG/MIN: 10 INJECTION, EMULSION INTRAVENOUS at 09:43

## 2022-01-01 RX ADMIN — ACETAMINOPHEN 650 MG: 325 TABLET ORAL at 00:42

## 2022-01-01 RX ADMIN — ALBUTEROL SULFATE 2 PUFF: 108 AEROSOL, METERED RESPIRATORY (INHALATION) at 09:01

## 2022-01-01 RX ADMIN — SODIUM CHLORIDE, PRESERVATIVE FREE 10 ML: 5 INJECTION INTRAVENOUS at 09:57

## 2022-01-01 RX ADMIN — CEFEPIME HYDROCHLORIDE 2000 MG: 2 INJECTION, POWDER, FOR SOLUTION INTRAVENOUS at 10:59

## 2022-01-01 RX ADMIN — INSULIN GLARGINE 15 UNITS: 100 INJECTION, SOLUTION SUBCUTANEOUS at 08:49

## 2022-01-01 RX ADMIN — AMIODARONE HYDROCHLORIDE 0.5 MG/MIN: 50 INJECTION, SOLUTION INTRAVENOUS at 04:11

## 2022-01-01 RX ADMIN — CHLORHEXIDINE GLUCONATE 15 ML: 1.2 RINSE ORAL at 08:47

## 2022-01-01 RX ADMIN — DEXTROSE MONOHYDRATE 125 ML: 100 INJECTION, SOLUTION INTRAVENOUS at 20:19

## 2022-01-01 RX ADMIN — ENOXAPARIN SODIUM 30 MG: 100 INJECTION SUBCUTANEOUS at 21:11

## 2022-01-01 RX ADMIN — LEVALBUTEROL HYDROCHLORIDE 1.25 MG: 1.25 SOLUTION, CONCENTRATE RESPIRATORY (INHALATION) at 08:13

## 2022-01-01 RX ADMIN — Medication 50 MCG/HR: at 04:52

## 2022-01-01 RX ADMIN — Medication 9 MG/HR: at 04:10

## 2022-01-01 RX ADMIN — SODIUM CHLORIDE 0.7 MCG/KG/HR: 9 INJECTION, SOLUTION INTRAVENOUS at 05:10

## 2022-01-01 RX ADMIN — LINEZOLID 600 MG: 600 INJECTION, SOLUTION INTRAVENOUS at 23:22

## 2022-01-01 RX ADMIN — LACTULOSE 20 G: 20 SOLUTION ORAL at 21:25

## 2022-01-01 RX ADMIN — SODIUM CHLORIDE, PRESERVATIVE FREE 10 ML: 5 INJECTION INTRAVENOUS at 08:50

## 2022-01-01 RX ADMIN — ENOXAPARIN SODIUM 30 MG: 100 INJECTION SUBCUTANEOUS at 08:54

## 2022-01-01 RX ADMIN — FUROSEMIDE 40 MG: 10 INJECTION, SOLUTION INTRAMUSCULAR; INTRAVENOUS at 14:06

## 2022-01-01 RX ADMIN — LEVETIRACETAM 500 MG: 500 TABLET, FILM COATED ORAL at 20:54

## 2022-01-01 RX ADMIN — SODIUM POLYSTYRENE SULFONATE 30 G: 15 SUSPENSION ORAL; RECTAL at 18:30

## 2022-01-01 RX ADMIN — INSULIN LISPRO 10 UNITS: 100 INJECTION, SOLUTION INTRAVENOUS; SUBCUTANEOUS at 06:15

## 2022-01-01 RX ADMIN — ATORVASTATIN CALCIUM 20 MG: 20 TABLET, FILM COATED ORAL at 10:21

## 2022-01-01 RX ADMIN — ENOXAPARIN SODIUM 30 MG: 100 INJECTION SUBCUTANEOUS at 08:34

## 2022-01-01 RX ADMIN — AMIODARONE HYDROCHLORIDE 200 MG: 200 TABLET ORAL at 07:59

## 2022-01-01 RX ADMIN — LEVALBUTEROL HYDROCHLORIDE 1.25 MG: 1.25 SOLUTION, CONCENTRATE RESPIRATORY (INHALATION) at 21:40

## 2022-01-01 RX ADMIN — CEFEPIME HYDROCHLORIDE 2000 MG: 2 INJECTION, POWDER, FOR SOLUTION INTRAVENOUS at 23:58

## 2022-01-01 RX ADMIN — ENOXAPARIN SODIUM 30 MG: 100 INJECTION SUBCUTANEOUS at 21:25

## 2022-01-01 RX ADMIN — LEVETIRACETAM 500 MG: 500 TABLET, FILM COATED ORAL at 10:20

## 2022-01-01 RX ADMIN — SODIUM CHLORIDE, PRESERVATIVE FREE 10 ML: 5 INJECTION INTRAVENOUS at 21:04

## 2022-01-01 RX ADMIN — FUROSEMIDE 40 MG: 10 INJECTION, SOLUTION INTRAMUSCULAR; INTRAVENOUS at 13:40

## 2022-01-01 RX ADMIN — INSULIN GLARGINE 9 UNITS: 100 INJECTION, SOLUTION SUBCUTANEOUS at 08:35

## 2022-01-01 RX ADMIN — INSULIN LISPRO 3 UNITS: 100 INJECTION, SOLUTION INTRAVENOUS; SUBCUTANEOUS at 12:53

## 2022-01-01 RX ADMIN — SODIUM CHLORIDE, PRESERVATIVE FREE 10 ML: 5 INJECTION INTRAVENOUS at 20:39

## 2022-01-01 RX ADMIN — Medication 10 MG/HR: at 11:28

## 2022-01-01 RX ADMIN — MEROPENEM 1000 MG: 1 INJECTION, POWDER, FOR SOLUTION INTRAVENOUS at 18:27

## 2022-01-01 RX ADMIN — INSULIN LISPRO 3 UNITS: 100 INJECTION, SOLUTION INTRAVENOUS; SUBCUTANEOUS at 12:12

## 2022-01-01 RX ADMIN — SODIUM CHLORIDE, PRESERVATIVE FREE 10 ML: 5 INJECTION INTRAVENOUS at 14:22

## 2022-01-01 RX ADMIN — INSULIN LISPRO 3 UNITS: 100 INJECTION, SOLUTION INTRAVENOUS; SUBCUTANEOUS at 11:44

## 2022-01-01 RX ADMIN — Medication 3 ML: at 03:44

## 2022-01-01 RX ADMIN — SODIUM CHLORIDE, PRESERVATIVE FREE 10 ML: 5 INJECTION INTRAVENOUS at 15:03

## 2022-01-01 RX ADMIN — FUROSEMIDE 40 MG: 10 INJECTION, SOLUTION INTRAMUSCULAR; INTRAVENOUS at 14:47

## 2022-01-01 RX ADMIN — DEXAMETHASONE SODIUM PHOSPHATE 6 MG: 10 INJECTION INTRAMUSCULAR; INTRAVENOUS at 15:10

## 2022-01-01 RX ADMIN — ENOXAPARIN SODIUM 30 MG: 100 INJECTION SUBCUTANEOUS at 07:59

## 2022-01-01 RX ADMIN — ALPRAZOLAM 0.5 MG: 0.5 TABLET ORAL at 14:12

## 2022-01-01 RX ADMIN — PANTOPRAZOLE SODIUM 40 MG: 40 INJECTION, POWDER, FOR SOLUTION INTRAVENOUS at 08:54

## 2022-01-01 RX ADMIN — POTASSIUM CHLORIDE 20 MEQ: 29.8 INJECTION, SOLUTION INTRAVENOUS at 10:56

## 2022-01-01 RX ADMIN — Medication 9 MG/HR: at 05:30

## 2022-01-01 RX ADMIN — BUDESONIDE 500 MCG: 0.5 SUSPENSION RESPIRATORY (INHALATION) at 08:03

## 2022-01-01 RX ADMIN — CHLORHEXIDINE GLUCONATE 15 ML: 1.2 RINSE ORAL at 21:45

## 2022-01-01 RX ADMIN — PANTOPRAZOLE SODIUM 40 MG: 40 INJECTION, POWDER, FOR SOLUTION INTRAVENOUS at 10:17

## 2022-01-01 RX ADMIN — POTASSIUM CHLORIDE 10 MEQ: 7.46 INJECTION, SOLUTION INTRAVENOUS at 15:20

## 2022-01-01 RX ADMIN — INSULIN GLARGINE 9 UNITS: 100 INJECTION, SOLUTION SUBCUTANEOUS at 09:13

## 2022-01-01 RX ADMIN — SODIUM CHLORIDE, PRESERVATIVE FREE 10 ML: 5 INJECTION INTRAVENOUS at 20:23

## 2022-01-01 RX ADMIN — BUDESONIDE 500 MCG: 0.5 SUSPENSION RESPIRATORY (INHALATION) at 08:01

## 2022-01-01 RX ADMIN — SODIUM CHLORIDE 1.2 MCG/KG/HR: 9 INJECTION, SOLUTION INTRAVENOUS at 06:51

## 2022-01-01 RX ADMIN — BUDESONIDE 500 MCG: 0.5 SUSPENSION RESPIRATORY (INHALATION) at 19:55

## 2022-01-01 RX ADMIN — ENOXAPARIN SODIUM 30 MG: 100 INJECTION SUBCUTANEOUS at 08:46

## 2022-01-01 RX ADMIN — PHENYLEPHRINE HYDROCHLORIDE 50 MCG/MIN: 10 INJECTION INTRAVENOUS at 13:25

## 2022-01-01 RX ADMIN — LEVALBUTEROL HYDROCHLORIDE 1.25 MG: 1.25 SOLUTION, CONCENTRATE RESPIRATORY (INHALATION) at 14:28

## 2022-01-01 RX ADMIN — ENOXAPARIN SODIUM 30 MG: 100 INJECTION SUBCUTANEOUS at 21:19

## 2022-01-01 RX ADMIN — IPRATROPIUM BROMIDE AND ALBUTEROL SULFATE 1 AMPULE: 2.5; .5 SOLUTION RESPIRATORY (INHALATION) at 15:21

## 2022-01-01 RX ADMIN — BUDESONIDE 500 MCG: 0.5 SUSPENSION RESPIRATORY (INHALATION) at 17:52

## 2022-01-01 RX ADMIN — ALPRAZOLAM 0.5 MG: 0.5 TABLET ORAL at 08:15

## 2022-01-01 RX ADMIN — ACETAMINOPHEN 650 MG: 325 TABLET ORAL at 00:24

## 2022-01-01 RX ADMIN — DEXMEDETOMIDINE HYDROCHLORIDE 0.5 MCG/KG/HR: 100 INJECTION, SOLUTION INTRAVENOUS at 17:30

## 2022-01-01 RX ADMIN — IPRATROPIUM BROMIDE AND ALBUTEROL SULFATE 1 AMPULE: 2.5; .5 SOLUTION RESPIRATORY (INHALATION) at 09:59

## 2022-01-01 RX ADMIN — LEVETIRACETAM 500 MG: 500 TABLET, FILM COATED ORAL at 22:18

## 2022-01-01 RX ADMIN — DEXMEDETOMIDINE HYDROCHLORIDE 0.7 MCG/KG/HR: 100 INJECTION, SOLUTION INTRAVENOUS at 01:03

## 2022-01-01 RX ADMIN — DEXAMETHASONE SODIUM PHOSPHATE 6 MG: 10 INJECTION INTRAMUSCULAR; INTRAVENOUS at 14:14

## 2022-01-01 RX ADMIN — PHENYLEPHRINE HYDROCHLORIDE 155 MCG/MIN: 10 INJECTION INTRAVENOUS at 22:21

## 2022-01-01 RX ADMIN — IPRATROPIUM BROMIDE AND ALBUTEROL SULFATE 1 AMPULE: 2.5; .5 SOLUTION RESPIRATORY (INHALATION) at 08:04

## 2022-01-01 RX ADMIN — SODIUM CHLORIDE, PRESERVATIVE FREE 10 ML: 5 INJECTION INTRAVENOUS at 12:19

## 2022-01-01 RX ADMIN — LISINOPRIL 20 MG: 20 TABLET ORAL at 07:52

## 2022-01-01 RX ADMIN — PROPOFOL 32.24 MCG/KG/MIN: 10 INJECTION, EMULSION INTRAVENOUS at 03:20

## 2022-01-01 RX ADMIN — INSULIN LISPRO 4 UNITS: 100 INJECTION, SOLUTION INTRAVENOUS; SUBCUTANEOUS at 11:48

## 2022-01-01 RX ADMIN — DEXAMETHASONE SODIUM PHOSPHATE 6 MG: 10 INJECTION INTRAMUSCULAR; INTRAVENOUS at 15:32

## 2022-01-01 RX ADMIN — LEVALBUTEROL HYDROCHLORIDE 1.25 MG: 1.25 SOLUTION, CONCENTRATE RESPIRATORY (INHALATION) at 03:04

## 2022-01-01 RX ADMIN — PHENYLEPHRINE HYDROCHLORIDE 47 MCG/MIN: 10 INJECTION INTRAVENOUS at 06:13

## 2022-01-01 RX ADMIN — CHLORHEXIDINE GLUCONATE 15 ML: 1.2 RINSE ORAL at 10:27

## 2022-01-01 RX ADMIN — ASPIRIN 81 MG CHEWABLE TABLET 324 MG: 81 TABLET CHEWABLE at 08:22

## 2022-01-01 RX ADMIN — INSULIN LISPRO 5 UNITS: 100 INJECTION, SOLUTION INTRAVENOUS; SUBCUTANEOUS at 22:05

## 2022-01-01 RX ADMIN — LEVETIRACETAM 500 MG: 100 SOLUTION ORAL at 08:41

## 2022-01-01 RX ADMIN — DEXAMETHASONE SODIUM PHOSPHATE 6 MG: 10 INJECTION INTRAMUSCULAR; INTRAVENOUS at 16:25

## 2022-01-01 RX ADMIN — ACETAMINOPHEN 650 MG: 325 TABLET ORAL at 10:01

## 2022-01-01 RX ADMIN — ASPIRIN 81 MG CHEWABLE TABLET 324 MG: 81 TABLET CHEWABLE at 11:56

## 2022-01-01 RX ADMIN — PHENYLEPHRINE HYDROCHLORIDE 40 MCG/MIN: 10 INJECTION INTRAVENOUS at 00:53

## 2022-01-01 RX ADMIN — Medication 75 MCG/HR: at 10:14

## 2022-01-01 RX ADMIN — ATORVASTATIN CALCIUM 20 MG: 20 TABLET, FILM COATED ORAL at 09:19

## 2022-01-01 RX ADMIN — DEXMEDETOMIDINE HYDROCHLORIDE 0.2 MCG/KG/HR: 100 INJECTION, SOLUTION INTRAVENOUS at 21:22

## 2022-01-01 RX ADMIN — POLYETHYLENE GLYCOL 3350 17 G: 17 POWDER, FOR SOLUTION ORAL at 09:08

## 2022-01-01 RX ADMIN — ATORVASTATIN CALCIUM 20 MG: 20 TABLET, FILM COATED ORAL at 08:31

## 2022-01-01 RX ADMIN — LEVALBUTEROL HYDROCHLORIDE 1.25 MG: 1.25 SOLUTION, CONCENTRATE RESPIRATORY (INHALATION) at 15:36

## 2022-01-01 RX ADMIN — ENOXAPARIN SODIUM 30 MG: 100 INJECTION SUBCUTANEOUS at 08:39

## 2022-01-01 RX ADMIN — LEVALBUTEROL HYDROCHLORIDE 1.25 MG: 1.25 SOLUTION, CONCENTRATE RESPIRATORY (INHALATION) at 19:11

## 2022-01-01 RX ADMIN — CEFTRIAXONE SODIUM 1000 MG: 1 INJECTION, POWDER, FOR SOLUTION INTRAMUSCULAR; INTRAVENOUS at 18:25

## 2022-01-01 RX ADMIN — CEFEPIME HYDROCHLORIDE 2000 MG: 2 INJECTION, POWDER, FOR SOLUTION INTRAVENOUS at 18:04

## 2022-01-01 RX ADMIN — ENOXAPARIN SODIUM 30 MG: 100 INJECTION SUBCUTANEOUS at 11:22

## 2022-01-01 RX ADMIN — AMIODARONE HYDROCHLORIDE 200 MG: 200 TABLET ORAL at 09:48

## 2022-01-01 RX ADMIN — PHENYLEPHRINE HYDROCHLORIDE 4 MCG/MIN: 10 INJECTION INTRAVENOUS at 00:25

## 2022-01-01 RX ADMIN — LEVALBUTEROL HYDROCHLORIDE 1.25 MG: 1.25 SOLUTION, CONCENTRATE RESPIRATORY (INHALATION) at 19:29

## 2022-01-01 RX ADMIN — LEVETIRACETAM 500 MG: 500 TABLET, FILM COATED ORAL at 20:55

## 2022-01-01 RX ADMIN — INSULIN LISPRO 9 UNITS: 100 INJECTION, SOLUTION INTRAVENOUS; SUBCUTANEOUS at 12:20

## 2022-01-01 RX ADMIN — POLYETHYLENE GLYCOL 3350 17 G: 17 POWDER, FOR SOLUTION ORAL at 09:48

## 2022-01-01 RX ADMIN — MEROPENEM 1000 MG: 1 INJECTION, POWDER, FOR SOLUTION INTRAVENOUS at 01:47

## 2022-01-01 RX ADMIN — MEROPENEM 1000 MG: 1 INJECTION, POWDER, FOR SOLUTION INTRAVENOUS at 10:54

## 2022-01-01 RX ADMIN — BUDESONIDE 500 MCG: 0.5 SUSPENSION RESPIRATORY (INHALATION) at 19:34

## 2022-01-01 RX ADMIN — ACETAMINOPHEN 650 MG: 325 TABLET ORAL at 10:23

## 2022-01-01 RX ADMIN — LEVETIRACETAM 500 MG: 100 SOLUTION ORAL at 10:00

## 2022-01-01 RX ADMIN — ENOXAPARIN SODIUM 30 MG: 100 INJECTION SUBCUTANEOUS at 20:15

## 2022-01-01 RX ADMIN — INSULIN LISPRO 12 UNITS: 100 INJECTION, SOLUTION INTRAVENOUS; SUBCUTANEOUS at 17:17

## 2022-01-01 RX ADMIN — CHLORHEXIDINE GLUCONATE 15 ML: 1.2 RINSE ORAL at 08:10

## 2022-01-01 RX ADMIN — LEVETIRACETAM 500 MG: 100 SOLUTION ORAL at 08:54

## 2022-01-01 RX ADMIN — PROPOFOL 40 MCG/KG/MIN: 10 INJECTION, EMULSION INTRAVENOUS at 12:23

## 2022-01-01 RX ADMIN — ASPIRIN 81 MG CHEWABLE TABLET 324 MG: 81 TABLET CHEWABLE at 08:42

## 2022-01-01 RX ADMIN — FUROSEMIDE 40 MG: 10 INJECTION, SOLUTION INTRAMUSCULAR; INTRAVENOUS at 09:54

## 2022-01-01 RX ADMIN — IPRATROPIUM BROMIDE AND ALBUTEROL SULFATE 1 AMPULE: 2.5; .5 SOLUTION RESPIRATORY (INHALATION) at 14:18

## 2022-01-01 RX ADMIN — CHLORHEXIDINE GLUCONATE 15 ML: 1.2 RINSE ORAL at 09:23

## 2022-01-01 RX ADMIN — INSULIN GLARGINE 15 UNITS: 100 INJECTION, SOLUTION SUBCUTANEOUS at 09:45

## 2022-01-01 RX ADMIN — SODIUM CHLORIDE, PRESERVATIVE FREE 10 ML: 5 INJECTION INTRAVENOUS at 11:53

## 2022-01-01 RX ADMIN — INSULIN LISPRO 3 UNITS: 100 INJECTION, SOLUTION INTRAVENOUS; SUBCUTANEOUS at 19:19

## 2022-01-01 RX ADMIN — AMLODIPINE BESYLATE 2.5 MG: 5 TABLET ORAL at 21:25

## 2022-01-01 RX ADMIN — BUDESONIDE 500 MCG: 0.5 SUSPENSION RESPIRATORY (INHALATION) at 19:47

## 2022-01-01 RX ADMIN — Medication 9 MG/HR: at 14:08

## 2022-01-01 RX ADMIN — ALPRAZOLAM 0.5 MG: 0.5 TABLET ORAL at 09:06

## 2022-01-01 RX ADMIN — ENOXAPARIN SODIUM 30 MG: 100 INJECTION SUBCUTANEOUS at 10:13

## 2022-01-01 RX ADMIN — LEVETIRACETAM 500 MG: 100 SOLUTION ORAL at 09:19

## 2022-01-01 RX ADMIN — INSULIN LISPRO 6 UNITS: 100 INJECTION, SOLUTION INTRAVENOUS; SUBCUTANEOUS at 12:25

## 2022-01-01 RX ADMIN — ALPRAZOLAM 0.5 MG: 0.5 TABLET ORAL at 15:35

## 2022-01-01 RX ADMIN — BUDESONIDE 500 MCG: 0.5 SUSPENSION RESPIRATORY (INHALATION) at 20:47

## 2022-01-01 RX ADMIN — AMLODIPINE BESYLATE 5 MG: 5 TABLET ORAL at 21:16

## 2022-01-01 RX ADMIN — SODIUM CHLORIDE 6.6 UNITS/HR: 9 INJECTION, SOLUTION INTRAVENOUS at 05:57

## 2022-01-01 RX ADMIN — ASPIRIN 81 MG CHEWABLE TABLET 324 MG: 81 TABLET CHEWABLE at 08:59

## 2022-01-01 RX ADMIN — DEXAMETHASONE SODIUM PHOSPHATE 6 MG: 10 INJECTION INTRAMUSCULAR; INTRAVENOUS at 13:54

## 2022-01-01 RX ADMIN — LEVETIRACETAM 500 MG: 100 SOLUTION ORAL at 20:58

## 2022-01-01 RX ADMIN — LEVALBUTEROL HYDROCHLORIDE 1.25 MG: 1.25 SOLUTION, CONCENTRATE RESPIRATORY (INHALATION) at 07:53

## 2022-01-01 RX ADMIN — ATORVASTATIN CALCIUM 20 MG: 20 TABLET, FILM COATED ORAL at 10:09

## 2022-01-01 RX ADMIN — IPRATROPIUM BROMIDE AND ALBUTEROL SULFATE 1 AMPULE: 2.5; .5 SOLUTION RESPIRATORY (INHALATION) at 18:19

## 2022-01-01 RX ADMIN — DEXAMETHASONE SODIUM PHOSPHATE 6 MG: 10 INJECTION INTRAMUSCULAR; INTRAVENOUS at 14:00

## 2022-01-01 RX ADMIN — POLYETHYLENE GLYCOL 3350 17 G: 17 POWDER, FOR SOLUTION ORAL at 08:39

## 2022-01-01 RX ADMIN — DEXAMETHASONE SODIUM PHOSPHATE 6 MG: 10 INJECTION INTRAMUSCULAR; INTRAVENOUS at 15:35

## 2022-01-01 RX ADMIN — INSULIN HUMAN 10 UNITS: 100 INJECTION, SOLUTION PARENTERAL at 09:27

## 2022-01-01 RX ADMIN — PANTOPRAZOLE SODIUM 40 MG: 40 INJECTION, POWDER, FOR SOLUTION INTRAVENOUS at 08:22

## 2022-01-01 RX ADMIN — Medication 4 MG/HR: at 05:48

## 2022-01-01 RX ADMIN — CEFEPIME HYDROCHLORIDE 2000 MG: 2 INJECTION, POWDER, FOR SOLUTION INTRAVENOUS at 11:47

## 2022-01-01 RX ADMIN — POTASSIUM CHLORIDE 20 MEQ: 29.8 INJECTION, SOLUTION INTRAVENOUS at 09:57

## 2022-01-01 RX ADMIN — INSULIN GLARGINE 40 UNITS: 100 INJECTION, SOLUTION SUBCUTANEOUS at 21:00

## 2022-01-01 RX ADMIN — BUDESONIDE 500 MCG: 0.5 SUSPENSION RESPIRATORY (INHALATION) at 07:46

## 2022-01-01 RX ADMIN — SODIUM CHLORIDE 0.2 MCG/KG/HR: 9 INJECTION, SOLUTION INTRAVENOUS at 15:52

## 2022-01-01 RX ADMIN — LEVETIRACETAM 500 MG: 100 SOLUTION ORAL at 20:15

## 2022-01-01 RX ADMIN — ENOXAPARIN SODIUM 30 MG: 100 INJECTION SUBCUTANEOUS at 09:58

## 2022-01-01 RX ADMIN — LEVETIRACETAM 500 MG: 100 SOLUTION ORAL at 21:18

## 2022-01-01 RX ADMIN — SODIUM CHLORIDE 1.4 MCG/KG/HR: 9 INJECTION, SOLUTION INTRAVENOUS at 06:11

## 2022-01-01 RX ADMIN — Medication 100 MCG/HR: at 16:36

## 2022-01-01 RX ADMIN — ALPRAZOLAM 0.5 MG: 0.5 TABLET ORAL at 13:23

## 2022-01-01 RX ADMIN — FUROSEMIDE 40 MG: 10 INJECTION, SOLUTION INTRAMUSCULAR; INTRAVENOUS at 09:06

## 2022-01-01 RX ADMIN — SODIUM CHLORIDE 1.3 MCG/KG/HR: 9 INJECTION, SOLUTION INTRAVENOUS at 13:26

## 2022-01-01 RX ADMIN — SODIUM CHLORIDE 1.4 MCG/KG/HR: 9 INJECTION, SOLUTION INTRAVENOUS at 14:51

## 2022-01-01 RX ADMIN — Medication 75 MCG/HR: at 21:10

## 2022-01-01 RX ADMIN — ENOXAPARIN SODIUM 30 MG: 100 INJECTION SUBCUTANEOUS at 09:23

## 2022-01-01 RX ADMIN — INSULIN LISPRO 2 UNITS: 100 INJECTION, SOLUTION INTRAVENOUS; SUBCUTANEOUS at 22:10

## 2022-01-01 RX ADMIN — ASPIRIN 325 MG: 325 TABLET, COATED ORAL at 08:49

## 2022-01-01 RX ADMIN — SODIUM CHLORIDE 1 MCG/KG/HR: 9 INJECTION, SOLUTION INTRAVENOUS at 00:00

## 2022-01-01 RX ADMIN — LEVALBUTEROL HYDROCHLORIDE 1.25 MG: 1.25 SOLUTION, CONCENTRATE RESPIRATORY (INHALATION) at 02:49

## 2022-01-01 RX ADMIN — ENOXAPARIN SODIUM 30 MG: 100 INJECTION SUBCUTANEOUS at 21:17

## 2022-01-01 RX ADMIN — Medication 100 MCG/HR: at 07:25

## 2022-01-01 RX ADMIN — IPRATROPIUM BROMIDE AND ALBUTEROL SULFATE 1 AMPULE: 2.5; .5 SOLUTION RESPIRATORY (INHALATION) at 14:31

## 2022-01-01 RX ADMIN — Medication 3 ML: at 14:19

## 2022-01-01 RX ADMIN — LEVALBUTEROL HYDROCHLORIDE 1.25 MG: 1.25 SOLUTION, CONCENTRATE RESPIRATORY (INHALATION) at 08:39

## 2022-01-01 RX ADMIN — INSULIN LISPRO 4 UNITS: 100 INJECTION, SOLUTION INTRAVENOUS; SUBCUTANEOUS at 18:04

## 2022-01-01 RX ADMIN — SODIUM CHLORIDE 11.1 UNITS/HR: 9 INJECTION, SOLUTION INTRAVENOUS at 20:21

## 2022-01-01 RX ADMIN — ATORVASTATIN CALCIUM 20 MG: 20 TABLET, FILM COATED ORAL at 09:06

## 2022-01-01 RX ADMIN — DEXTROSE MONOHYDRATE 150 MG: 50 INJECTION, SOLUTION INTRAVENOUS at 09:20

## 2022-01-01 RX ADMIN — INSULIN LISPRO 3 UNITS: 100 INJECTION, SOLUTION INTRAVENOUS; SUBCUTANEOUS at 11:45

## 2022-01-01 RX ADMIN — ALPRAZOLAM 0.5 MG: 0.5 TABLET ORAL at 08:49

## 2022-01-01 RX ADMIN — FUROSEMIDE 40 MG: 10 INJECTION, SOLUTION INTRAMUSCULAR; INTRAVENOUS at 15:11

## 2022-01-01 RX ADMIN — BUDESONIDE 500 MCG: 0.5 SUSPENSION RESPIRATORY (INHALATION) at 07:42

## 2022-01-01 RX ADMIN — Medication 75 MCG/HR: at 02:00

## 2022-01-01 RX ADMIN — ACETAMINOPHEN 650 MG: 325 TABLET ORAL at 17:55

## 2022-01-01 RX ADMIN — Medication 10 MG/HR: at 10:47

## 2022-01-01 RX ADMIN — SODIUM CHLORIDE 1 MCG/KG/HR: 9 INJECTION, SOLUTION INTRAVENOUS at 18:24

## 2022-01-01 RX ADMIN — SUCRALFATE 1 G: 1 TABLET ORAL at 18:17

## 2022-01-01 RX ADMIN — CHLORHEXIDINE GLUCONATE 15 ML: 1.2 RINSE ORAL at 21:00

## 2022-01-01 RX ADMIN — ASPIRIN 325 MG: 325 TABLET, COATED ORAL at 18:27

## 2022-01-01 RX ADMIN — LEVETIRACETAM 500 MG: 100 SOLUTION ORAL at 20:13

## 2022-01-01 RX ADMIN — ATORVASTATIN CALCIUM 20 MG: 20 TABLET, FILM COATED ORAL at 11:56

## 2022-01-01 RX ADMIN — INSULIN LISPRO 2 UNITS: 100 INJECTION, SOLUTION INTRAVENOUS; SUBCUTANEOUS at 20:07

## 2022-01-01 RX ADMIN — ENOXAPARIN SODIUM 30 MG: 100 INJECTION SUBCUTANEOUS at 08:42

## 2022-01-01 RX ADMIN — SODIUM CHLORIDE, PRESERVATIVE FREE 10 ML: 5 INJECTION INTRAVENOUS at 08:48

## 2022-01-01 RX ADMIN — DEXTROSE MONOHYDRATE 125 ML: 100 INJECTION, SOLUTION INTRAVENOUS at 06:55

## 2022-01-01 RX ADMIN — DEXAMETHASONE SODIUM PHOSPHATE 6 MG: 10 INJECTION INTRAMUSCULAR; INTRAVENOUS at 16:30

## 2022-01-01 RX ADMIN — FUROSEMIDE 40 MG: 10 INJECTION, SOLUTION INTRAMUSCULAR; INTRAVENOUS at 09:25

## 2022-01-01 RX ADMIN — INSULIN LISPRO 6 UNITS: 100 INJECTION, SOLUTION INTRAVENOUS; SUBCUTANEOUS at 06:20

## 2022-01-01 RX ADMIN — IPRATROPIUM BROMIDE AND ALBUTEROL SULFATE 1 AMPULE: 2.5; .5 SOLUTION RESPIRATORY (INHALATION) at 11:18

## 2022-01-01 RX ADMIN — SODIUM CHLORIDE, PRESERVATIVE FREE 10 ML: 5 INJECTION INTRAVENOUS at 20:54

## 2022-01-01 RX ADMIN — BUDESONIDE 500 MCG: 0.5 SUSPENSION RESPIRATORY (INHALATION) at 07:28

## 2022-01-01 RX ADMIN — SUCRALFATE 1 G: 1 TABLET ORAL at 00:11

## 2022-01-01 RX ADMIN — INSULIN GLARGINE 40 UNITS: 100 INJECTION, SOLUTION SUBCUTANEOUS at 22:04

## 2022-01-01 RX ADMIN — INSULIN LISPRO 6 UNITS: 100 INJECTION, SOLUTION INTRAVENOUS; SUBCUTANEOUS at 16:44

## 2022-01-01 RX ADMIN — INSULIN GLARGINE 9 UNITS: 100 INJECTION, SOLUTION SUBCUTANEOUS at 12:15

## 2022-01-01 RX ADMIN — DEXAMETHASONE SODIUM PHOSPHATE 6 MG: 10 INJECTION INTRAMUSCULAR; INTRAVENOUS at 14:47

## 2022-01-01 RX ADMIN — BUDESONIDE 500 MCG: 0.5 SUSPENSION RESPIRATORY (INHALATION) at 18:55

## 2022-01-01 RX ADMIN — INSULIN LISPRO 4 UNITS: 100 INJECTION, SOLUTION INTRAVENOUS; SUBCUTANEOUS at 11:27

## 2022-01-01 RX ADMIN — IPRATROPIUM BROMIDE AND ALBUTEROL SULFATE 1 AMPULE: 2.5; .5 SOLUTION RESPIRATORY (INHALATION) at 20:24

## 2022-01-01 RX ADMIN — LEVALBUTEROL HYDROCHLORIDE 1.25 MG: 1.25 SOLUTION, CONCENTRATE RESPIRATORY (INHALATION) at 03:06

## 2022-01-01 RX ADMIN — AMIODARONE HYDROCHLORIDE 0.5 MG/MIN: 50 INJECTION, SOLUTION INTRAVENOUS at 14:15

## 2022-01-01 RX ADMIN — ENOXAPARIN SODIUM 40 MG: 100 INJECTION SUBCUTANEOUS at 22:17

## 2022-01-01 RX ADMIN — BUDESONIDE 500 MCG: 0.5 SUSPENSION RESPIRATORY (INHALATION) at 06:27

## 2022-01-01 RX ADMIN — AMIODARONE HYDROCHLORIDE 1 MG/MIN: 50 INJECTION, SOLUTION INTRAVENOUS at 04:05

## 2022-01-01 RX ADMIN — PANTOPRAZOLE SODIUM 40 MG: 40 INJECTION, POWDER, FOR SOLUTION INTRAVENOUS at 07:14

## 2022-01-01 RX ADMIN — IPRATROPIUM BROMIDE AND ALBUTEROL SULFATE 1 AMPULE: 2.5; .5 SOLUTION RESPIRATORY (INHALATION) at 10:32

## 2022-01-01 RX ADMIN — SODIUM CHLORIDE 0.6 MCG/KG/HR: 9 INJECTION, SOLUTION INTRAVENOUS at 11:25

## 2022-01-01 RX ADMIN — LEVALBUTEROL HYDROCHLORIDE 1.25 MG: 1.25 SOLUTION, CONCENTRATE RESPIRATORY (INHALATION) at 08:03

## 2022-01-01 RX ADMIN — Medication 3 ML: at 14:29

## 2022-01-01 RX ADMIN — SUCRALFATE 1 G: 1 TABLET ORAL at 18:37

## 2022-01-01 RX ADMIN — INSULIN LISPRO 6 UNITS: 100 INJECTION, SOLUTION INTRAVENOUS; SUBCUTANEOUS at 08:52

## 2022-01-01 RX ADMIN — SODIUM CHLORIDE 1 MCG/KG/HR: 9 INJECTION, SOLUTION INTRAVENOUS at 03:26

## 2022-01-01 RX ADMIN — SODIUM CHLORIDE, PRESERVATIVE FREE 10 ML: 5 INJECTION INTRAVENOUS at 20:59

## 2022-01-01 RX ADMIN — AMLODIPINE BESYLATE 5 MG: 5 TABLET ORAL at 20:55

## 2022-01-01 RX ADMIN — IPRATROPIUM BROMIDE AND ALBUTEROL SULFATE 1 AMPULE: 2.5; .5 SOLUTION RESPIRATORY (INHALATION) at 12:48

## 2022-01-01 RX ADMIN — SODIUM CHLORIDE: 9 INJECTION, SOLUTION INTRAVENOUS at 10:38

## 2022-01-01 RX ADMIN — FUROSEMIDE 40 MG: 10 INJECTION, SOLUTION INTRAMUSCULAR; INTRAVENOUS at 18:05

## 2022-01-01 RX ADMIN — SODIUM CHLORIDE, PRESERVATIVE FREE 10 ML: 5 INJECTION INTRAVENOUS at 10:49

## 2022-01-01 RX ADMIN — INSULIN LISPRO 3 UNITS: 100 INJECTION, SOLUTION INTRAVENOUS; SUBCUTANEOUS at 17:30

## 2022-01-01 RX ADMIN — LEVETIRACETAM 500 MG: 100 SOLUTION ORAL at 09:56

## 2022-01-01 RX ADMIN — Medication 4 MG/HR: at 13:31

## 2022-01-01 RX ADMIN — ALPRAZOLAM 0.5 MG: 0.5 TABLET ORAL at 11:56

## 2022-01-01 RX ADMIN — INSULIN LISPRO 2 UNITS: 100 INJECTION, SOLUTION INTRAVENOUS; SUBCUTANEOUS at 20:47

## 2022-01-01 RX ADMIN — LEVETIRACETAM 500 MG: 100 SOLUTION ORAL at 08:55

## 2022-01-01 RX ADMIN — ATORVASTATIN CALCIUM 20 MG: 20 TABLET, FILM COATED ORAL at 07:52

## 2022-01-01 RX ADMIN — BUDESONIDE 500 MCG: 0.5 SUSPENSION RESPIRATORY (INHALATION) at 17:28

## 2022-01-01 RX ADMIN — LEVALBUTEROL HYDROCHLORIDE 1.25 MG: 1.25 SOLUTION, CONCENTRATE RESPIRATORY (INHALATION) at 16:11

## 2022-01-01 RX ADMIN — Medication 1000 UNITS: at 07:14

## 2022-01-01 RX ADMIN — IPRATROPIUM BROMIDE AND ALBUTEROL SULFATE 1 AMPULE: 2.5; .5 SOLUTION RESPIRATORY (INHALATION) at 10:20

## 2022-01-01 RX ADMIN — INSULIN LISPRO 12 UNITS: 100 INJECTION, SOLUTION INTRAVENOUS; SUBCUTANEOUS at 11:50

## 2022-01-01 RX ADMIN — Medication 50 MCG/HR: at 19:55

## 2022-01-01 RX ADMIN — CHLORHEXIDINE GLUCONATE 15 ML: 1.2 RINSE ORAL at 20:49

## 2022-01-01 RX ADMIN — ONDANSETRON 4 MG: 2 INJECTION INTRAMUSCULAR; INTRAVENOUS at 20:58

## 2022-01-01 RX ADMIN — IPRATROPIUM BROMIDE AND ALBUTEROL SULFATE 1 AMPULE: 2.5; .5 SOLUTION RESPIRATORY (INHALATION) at 11:55

## 2022-01-01 RX ADMIN — Medication 100 MCG/HR: at 17:04

## 2022-01-01 RX ADMIN — BISACODYL 10 MG: 10 SUPPOSITORY RECTAL at 08:47

## 2022-01-01 RX ADMIN — AMIODARONE HYDROCHLORIDE 0.5 MG/MIN: 50 INJECTION, SOLUTION INTRAVENOUS at 04:19

## 2022-01-01 RX ADMIN — DEXTROSE MONOHYDRATE 25 G: 25 INJECTION, SOLUTION INTRAVENOUS at 09:24

## 2022-01-01 RX ADMIN — Medication 6 MG/HR: at 18:25

## 2022-01-01 RX ADMIN — SUCRALFATE 1 G: 1 TABLET ORAL at 13:16

## 2022-01-01 RX ADMIN — SODIUM CHLORIDE, PRESERVATIVE FREE 10 ML: 5 INJECTION INTRAVENOUS at 20:50

## 2022-01-01 RX ADMIN — SUCRALFATE 1 G: 1 TABLET ORAL at 11:44

## 2022-01-01 RX ADMIN — AMIODARONE HYDROCHLORIDE 1 MG/MIN: 50 INJECTION, SOLUTION INTRAVENOUS at 06:23

## 2022-01-01 RX ADMIN — CHLORHEXIDINE GLUCONATE 15 ML: 1.2 RINSE ORAL at 08:56

## 2022-01-01 RX ADMIN — SODIUM CHLORIDE, PRESERVATIVE FREE 10 ML: 5 INJECTION INTRAVENOUS at 07:59

## 2022-01-01 RX ADMIN — POTASSIUM CHLORIDE 10 MEQ: 7.46 INJECTION, SOLUTION INTRAVENOUS at 08:33

## 2022-01-01 RX ADMIN — SODIUM CHLORIDE, PRESERVATIVE FREE 10 ML: 5 INJECTION INTRAVENOUS at 09:19

## 2022-01-01 RX ADMIN — INSULIN LISPRO 3 UNITS: 100 INJECTION, SOLUTION INTRAVENOUS; SUBCUTANEOUS at 10:22

## 2022-01-01 RX ADMIN — LACTULOSE 20 G: 20 SOLUTION ORAL at 20:13

## 2022-01-01 RX ADMIN — LEVETIRACETAM 500 MG: 100 SOLUTION ORAL at 20:52

## 2022-01-01 RX ADMIN — ATORVASTATIN CALCIUM 20 MG: 20 TABLET, FILM COATED ORAL at 09:48

## 2022-01-01 RX ADMIN — ASPIRIN 81 MG CHEWABLE TABLET 324 MG: 81 TABLET CHEWABLE at 08:39

## 2022-01-01 RX ADMIN — AMLODIPINE BESYLATE 5 MG: 5 TABLET ORAL at 20:07

## 2022-01-01 RX ADMIN — AMIODARONE HYDROCHLORIDE 200 MG: 200 TABLET ORAL at 08:22

## 2022-01-01 RX ADMIN — MEROPENEM 1000 MG: 1 INJECTION, POWDER, FOR SOLUTION INTRAVENOUS at 02:02

## 2022-01-01 RX ADMIN — Medication 9 MG/HR: at 17:06

## 2022-01-01 RX ADMIN — AMIODARONE HYDROCHLORIDE 200 MG: 200 TABLET ORAL at 15:25

## 2022-01-01 RX ADMIN — SUCRALFATE 1 G: 1 TABLET ORAL at 12:16

## 2022-01-01 RX ADMIN — PROPOFOL 20 MCG/KG/MIN: 10 INJECTION, EMULSION INTRAVENOUS at 18:16

## 2022-01-01 RX ADMIN — LEVETIRACETAM 500 MG: 100 SOLUTION ORAL at 20:24

## 2022-01-01 RX ADMIN — IPRATROPIUM BROMIDE AND ALBUTEROL SULFATE 1 AMPULE: 2.5; .5 SOLUTION RESPIRATORY (INHALATION) at 15:30

## 2022-01-01 RX ADMIN — LINEZOLID 600 MG: 600 INJECTION, SOLUTION INTRAVENOUS at 11:18

## 2022-01-01 RX ADMIN — SODIUM CHLORIDE 23.8 UNITS/HR: 9 INJECTION, SOLUTION INTRAVENOUS at 01:01

## 2022-01-01 RX ADMIN — BUDESONIDE 500 MCG: 0.5 SUSPENSION RESPIRATORY (INHALATION) at 18:11

## 2022-01-01 RX ADMIN — ATORVASTATIN CALCIUM 20 MG: 20 TABLET, FILM COATED ORAL at 08:39

## 2022-01-01 RX ADMIN — LEVETIRACETAM 500 MG: 500 TABLET, FILM COATED ORAL at 09:25

## 2022-01-01 RX ADMIN — FUROSEMIDE 20 MG: 10 INJECTION, SOLUTION INTRAMUSCULAR; INTRAVENOUS at 16:34

## 2022-01-01 RX ADMIN — DEXAMETHASONE SODIUM PHOSPHATE 6 MG: 10 INJECTION INTRAMUSCULAR; INTRAVENOUS at 14:17

## 2022-01-01 RX ADMIN — DEXTROSE MONOHYDRATE 125 ML: 100 INJECTION, SOLUTION INTRAVENOUS at 06:41

## 2022-01-01 RX ADMIN — INSULIN LISPRO 3 UNITS: 100 INJECTION, SOLUTION INTRAVENOUS; SUBCUTANEOUS at 11:24

## 2022-01-01 RX ADMIN — ENOXAPARIN SODIUM 30 MG: 100 INJECTION SUBCUTANEOUS at 22:10

## 2022-01-01 RX ADMIN — POLYETHYLENE GLYCOL 3350 17 G: 17 POWDER, FOR SOLUTION ORAL at 08:09

## 2022-01-01 RX ADMIN — CEFEPIME HYDROCHLORIDE 2000 MG: 2 INJECTION, POWDER, FOR SOLUTION INTRAVENOUS at 23:23

## 2022-01-01 RX ADMIN — DIGOXIN 250 MCG: 0.25 INJECTION INTRAMUSCULAR; INTRAVENOUS at 04:19

## 2022-01-01 RX ADMIN — INSULIN GLARGINE 40 UNITS: 100 INJECTION, SOLUTION SUBCUTANEOUS at 09:25

## 2022-01-01 RX ADMIN — LEVALBUTEROL HYDROCHLORIDE 1.25 MG: 1.25 SOLUTION, CONCENTRATE RESPIRATORY (INHALATION) at 19:37

## 2022-01-01 RX ADMIN — INSULIN LISPRO 2 UNITS: 100 INJECTION, SOLUTION INTRAVENOUS; SUBCUTANEOUS at 21:46

## 2022-01-01 RX ADMIN — SODIUM CHLORIDE, PRESERVATIVE FREE 10 ML: 5 INJECTION INTRAVENOUS at 10:27

## 2022-01-01 RX ADMIN — POLYETHYLENE GLYCOL 3350 17 G: 17 POWDER, FOR SOLUTION ORAL at 13:23

## 2022-01-01 RX ADMIN — POLYETHYLENE GLYCOL 3350 17 G: 17 POWDER, FOR SOLUTION ORAL at 11:35

## 2022-01-01 RX ADMIN — INSULIN LISPRO 6 UNITS: 100 INJECTION, SOLUTION INTRAVENOUS; SUBCUTANEOUS at 00:00

## 2022-01-01 RX ADMIN — ENOXAPARIN SODIUM 30 MG: 100 INJECTION SUBCUTANEOUS at 09:53

## 2022-01-01 RX ADMIN — LEVALBUTEROL HYDROCHLORIDE 1.25 MG: 1.25 SOLUTION, CONCENTRATE RESPIRATORY (INHALATION) at 07:46

## 2022-01-01 RX ADMIN — SODIUM CHLORIDE 12.04 UNITS/HR: 9 INJECTION, SOLUTION INTRAVENOUS at 11:15

## 2022-01-01 RX ADMIN — SODIUM CHLORIDE 1.1 MCG/KG/HR: 9 INJECTION, SOLUTION INTRAVENOUS at 06:24

## 2022-01-01 RX ADMIN — CEFEPIME HYDROCHLORIDE 2000 MG: 2 INJECTION, POWDER, FOR SOLUTION INTRAVENOUS at 02:14

## 2022-01-01 RX ADMIN — SODIUM CHLORIDE, PRESERVATIVE FREE 10 ML: 5 INJECTION INTRAVENOUS at 20:52

## 2022-01-01 RX ADMIN — LEVETIRACETAM 500 MG: 500 TABLET, FILM COATED ORAL at 08:15

## 2022-01-01 RX ADMIN — SODIUM CHLORIDE 1 MCG/KG/HR: 9 INJECTION, SOLUTION INTRAVENOUS at 23:04

## 2022-01-01 RX ADMIN — ALPRAZOLAM 0.5 MG: 0.5 TABLET ORAL at 08:42

## 2022-01-01 RX ADMIN — ALPRAZOLAM 0.5 MG: 0.5 TABLET ORAL at 15:32

## 2022-01-01 RX ADMIN — SUCRALFATE 1 G: 1 TABLET ORAL at 01:00

## 2022-01-01 RX ADMIN — Medication 50 MCG/HR: at 04:45

## 2022-01-01 RX ADMIN — LEVETIRACETAM 500 MG: 100 SOLUTION ORAL at 07:52

## 2022-01-01 RX ADMIN — ASPIRIN 81 MG CHEWABLE TABLET 324 MG: 81 TABLET CHEWABLE at 09:56

## 2022-01-01 RX ADMIN — PANTOPRAZOLE SODIUM 40 MG: 40 INJECTION, POWDER, FOR SOLUTION INTRAVENOUS at 08:14

## 2022-01-01 RX ADMIN — ENOXAPARIN SODIUM 30 MG: 100 INJECTION SUBCUTANEOUS at 20:31

## 2022-01-01 RX ADMIN — BUDESONIDE 500 MCG: 0.5 SUSPENSION RESPIRATORY (INHALATION) at 06:15

## 2022-01-01 RX ADMIN — SUCRALFATE 1 G: 1 TABLET ORAL at 05:26

## 2022-01-01 RX ADMIN — POTASSIUM CHLORIDE 20 MEQ: 29.8 INJECTION, SOLUTION INTRAVENOUS at 12:13

## 2022-01-01 RX ADMIN — BUDESONIDE 500 MCG: 0.5 SUSPENSION RESPIRATORY (INHALATION) at 08:06

## 2022-01-01 RX ADMIN — SUCRALFATE 1 G: 1 TABLET ORAL at 00:29

## 2022-01-01 RX ADMIN — FUROSEMIDE 40 MG: 10 INJECTION, SOLUTION INTRAMUSCULAR; INTRAVENOUS at 22:16

## 2022-01-01 RX ADMIN — INSULIN GLARGINE 15 UNITS: 100 INJECTION, SOLUTION SUBCUTANEOUS at 21:26

## 2022-01-01 RX ADMIN — SODIUM CHLORIDE 1.1 MCG/KG/HR: 9 INJECTION, SOLUTION INTRAVENOUS at 10:30

## 2022-01-01 RX ADMIN — POTASSIUM CHLORIDE 40 MEQ: 1500 TABLET, EXTENDED RELEASE ORAL at 08:39

## 2022-01-01 RX ADMIN — PANTOPRAZOLE SODIUM 40 MG: 40 INJECTION, POWDER, FOR SOLUTION INTRAVENOUS at 08:58

## 2022-01-01 RX ADMIN — CHLORHEXIDINE GLUCONATE 15 ML: 1.2 RINSE ORAL at 07:56

## 2022-01-01 RX ADMIN — PANTOPRAZOLE SODIUM 40 MG: 40 INJECTION, POWDER, FOR SOLUTION INTRAVENOUS at 08:34

## 2022-01-01 RX ADMIN — INSULIN LISPRO 6 UNITS: 100 INJECTION, SOLUTION INTRAVENOUS; SUBCUTANEOUS at 10:14

## 2022-01-01 RX ADMIN — SODIUM CHLORIDE, PRESERVATIVE FREE 10 ML: 5 INJECTION INTRAVENOUS at 09:00

## 2022-01-01 RX ADMIN — LEVETIRACETAM 500 MG: 100 SOLUTION ORAL at 21:27

## 2022-01-01 RX ADMIN — IPRATROPIUM BROMIDE AND ALBUTEROL SULFATE 1 AMPULE: 2.5; .5 SOLUTION RESPIRATORY (INHALATION) at 06:11

## 2022-01-01 RX ADMIN — MAGNESIUM SULFATE HEPTAHYDRATE 2000 MG: 40 INJECTION, SOLUTION INTRAVENOUS at 12:06

## 2022-01-01 RX ADMIN — SUCRALFATE 1 G: 1 TABLET ORAL at 05:30

## 2022-01-01 RX ADMIN — LORAZEPAM 1 MG: 2 INJECTION INTRAMUSCULAR; INTRAVENOUS at 20:58

## 2022-01-01 RX ADMIN — SODIUM CHLORIDE, PRESERVATIVE FREE 10 ML: 5 INJECTION INTRAVENOUS at 22:00

## 2022-01-01 RX ADMIN — CHLORHEXIDINE GLUCONATE 15 ML: 1.2 RINSE ORAL at 22:13

## 2022-01-01 RX ADMIN — AMIODARONE HYDROCHLORIDE 0.5 MG/MIN: 50 INJECTION, SOLUTION INTRAVENOUS at 11:23

## 2022-01-01 RX ADMIN — PROPOFOL 40 MCG/KG/MIN: 10 INJECTION, EMULSION INTRAVENOUS at 01:23

## 2022-01-01 RX ADMIN — INSULIN LISPRO 3 UNITS: 100 INJECTION, SOLUTION INTRAVENOUS; SUBCUTANEOUS at 17:43

## 2022-01-01 RX ADMIN — LEVETIRACETAM 500 MG: 100 SOLUTION ORAL at 20:02

## 2022-01-01 RX ADMIN — Medication 9 MG/HR: at 23:20

## 2022-01-01 RX ADMIN — AMIODARONE HYDROCHLORIDE 0.5 MG/MIN: 50 INJECTION, SOLUTION INTRAVENOUS at 12:30

## 2022-01-01 RX ADMIN — CHLORHEXIDINE GLUCONATE 15 ML: 1.2 RINSE ORAL at 21:30

## 2022-01-01 RX ADMIN — INSULIN GLARGINE 9 UNITS: 100 INJECTION, SOLUTION SUBCUTANEOUS at 11:10

## 2022-01-01 RX ADMIN — PHENYLEPHRINE HYDROCHLORIDE 30 MCG/MIN: 10 INJECTION INTRAVENOUS at 13:56

## 2022-01-01 RX ADMIN — SODIUM CHLORIDE, PRESERVATIVE FREE 10 ML: 5 INJECTION INTRAVENOUS at 19:38

## 2022-01-01 RX ADMIN — Medication 75 MCG/HR: at 03:30

## 2022-01-01 RX ADMIN — LEVALBUTEROL HYDROCHLORIDE 1.25 MG: 1.25 SOLUTION, CONCENTRATE RESPIRATORY (INHALATION) at 20:44

## 2022-01-01 RX ADMIN — FUROSEMIDE 40 MG: 10 INJECTION, SOLUTION INTRAMUSCULAR; INTRAVENOUS at 08:04

## 2022-01-01 RX ADMIN — LACTULOSE 20 G: 20 SOLUTION ORAL at 20:15

## 2022-01-01 RX ADMIN — BUDESONIDE 500 MCG: 0.5 SUSPENSION RESPIRATORY (INHALATION) at 06:11

## 2022-01-01 RX ADMIN — PANTOPRAZOLE SODIUM 40 MG: 40 INJECTION, POWDER, FOR SOLUTION INTRAVENOUS at 11:23

## 2022-01-01 RX ADMIN — PANTOPRAZOLE SODIUM 40 MG: 40 INJECTION, POWDER, FOR SOLUTION INTRAVENOUS at 08:41

## 2022-01-01 RX ADMIN — IPRATROPIUM BROMIDE AND ALBUTEROL SULFATE 1 AMPULE: 2.5; .5 SOLUTION RESPIRATORY (INHALATION) at 13:41

## 2022-01-01 RX ADMIN — ATORVASTATIN CALCIUM 20 MG: 20 TABLET, FILM COATED ORAL at 11:06

## 2022-01-01 RX ADMIN — PROPOFOL 20 MCG/KG/MIN: 10 INJECTION, EMULSION INTRAVENOUS at 08:25

## 2022-01-01 RX ADMIN — Medication 100 MCG/HR: at 12:18

## 2022-01-01 RX ADMIN — Medication 3 MG/HR: at 11:21

## 2022-01-01 RX ADMIN — FUROSEMIDE 20 MG: 10 INJECTION, SOLUTION INTRAMUSCULAR; INTRAVENOUS at 08:09

## 2022-01-01 RX ADMIN — Medication 10 MG/HR: at 22:00

## 2022-01-01 RX ADMIN — FUROSEMIDE 20 MG: 10 INJECTION, SOLUTION INTRAMUSCULAR; INTRAVENOUS at 08:15

## 2022-01-01 RX ADMIN — CHLORHEXIDINE GLUCONATE 15 ML: 1.2 RINSE ORAL at 21:11

## 2022-01-01 RX ADMIN — AMLODIPINE BESYLATE 5 MG: 5 TABLET ORAL at 08:03

## 2022-01-01 RX ADMIN — LEVALBUTEROL HYDROCHLORIDE 1.25 MG: 1.25 SOLUTION, CONCENTRATE RESPIRATORY (INHALATION) at 07:42

## 2022-01-01 RX ADMIN — SODIUM CHLORIDE, PRESERVATIVE FREE 10 ML: 5 INJECTION INTRAVENOUS at 20:03

## 2022-01-01 RX ADMIN — ATORVASTATIN CALCIUM 20 MG: 20 TABLET, FILM COATED ORAL at 08:53

## 2022-01-01 RX ADMIN — AMLODIPINE BESYLATE 2.5 MG: 5 TABLET ORAL at 20:14

## 2022-01-01 RX ADMIN — SODIUM POLYSTYRENE SULFONATE 45 G: 15 SUSPENSION ORAL; RECTAL at 13:46

## 2022-01-01 RX ADMIN — LINEZOLID 600 MG: 600 INJECTION, SOLUTION INTRAVENOUS at 12:00

## 2022-01-01 RX ADMIN — INSULIN GLARGINE 45 UNITS: 100 INJECTION, SOLUTION SUBCUTANEOUS at 09:30

## 2022-01-01 RX ADMIN — INSULIN LISPRO 15 UNITS: 100 INJECTION, SOLUTION INTRAVENOUS; SUBCUTANEOUS at 08:48

## 2022-01-01 RX ADMIN — ASPIRIN 81 MG CHEWABLE TABLET 324 MG: 81 TABLET CHEWABLE at 09:48

## 2022-01-01 RX ADMIN — Medication 50 MCG/HR: at 00:15

## 2022-01-01 RX ADMIN — CEFEPIME HYDROCHLORIDE 2000 MG: 2 INJECTION, POWDER, FOR SOLUTION INTRAVENOUS at 12:16

## 2022-01-01 RX ADMIN — SODIUM ZIRCONIUM CYCLOSILICATE 10 G: 10 POWDER, FOR SUSPENSION ORAL at 12:25

## 2022-01-01 RX ADMIN — SUCRALFATE 1 G: 1 TABLET ORAL at 12:12

## 2022-01-01 RX ADMIN — SODIUM CHLORIDE 1 MCG/KG/HR: 9 INJECTION, SOLUTION INTRAVENOUS at 13:46

## 2022-01-01 RX ADMIN — ALPRAZOLAM 0.5 MG: 0.5 TABLET ORAL at 13:58

## 2022-01-01 RX ADMIN — LACTULOSE 20 G: 20 SOLUTION ORAL at 21:06

## 2022-01-01 RX ADMIN — SODIUM CHLORIDE, PRESERVATIVE FREE 10 ML: 5 INJECTION INTRAVENOUS at 21:12

## 2022-01-01 RX ADMIN — ASPIRIN 81 MG CHEWABLE TABLET 324 MG: 81 TABLET CHEWABLE at 09:07

## 2022-01-01 RX ADMIN — SODIUM PHOSPHATE, MONOBASIC, MONOHYDRATE 15 MMOL: 276; 142 INJECTION, SOLUTION INTRAVENOUS at 12:18

## 2022-01-01 RX ADMIN — ASPIRIN 81 MG CHEWABLE TABLET 324 MG: 81 TABLET CHEWABLE at 08:21

## 2022-01-01 RX ADMIN — Medication 9 MG/HR: at 06:10

## 2022-01-01 RX ADMIN — DEXAMETHASONE SODIUM PHOSPHATE 6 MG: 10 INJECTION INTRAMUSCULAR; INTRAVENOUS at 15:44

## 2022-01-01 RX ADMIN — Medication 50 MCG/HR: at 12:12

## 2022-01-01 RX ADMIN — MEROPENEM 1000 MG: 1 INJECTION, POWDER, FOR SOLUTION INTRAVENOUS at 01:49

## 2022-01-01 RX ADMIN — Medication 100 MCG/HR: at 02:17

## 2022-01-01 RX ADMIN — AMIODARONE HYDROCHLORIDE 0.5 MG/MIN: 50 INJECTION, SOLUTION INTRAVENOUS at 10:15

## 2022-01-01 RX ADMIN — Medication 1000 UNITS: at 14:02

## 2022-01-01 RX ADMIN — IPRATROPIUM BROMIDE AND ALBUTEROL SULFATE 1 AMPULE: 2.5; .5 SOLUTION RESPIRATORY (INHALATION) at 14:48

## 2022-01-01 RX ADMIN — ENOXAPARIN SODIUM 30 MG: 100 INJECTION SUBCUTANEOUS at 20:24

## 2022-01-01 RX ADMIN — INSULIN LISPRO 6 UNITS: 100 INJECTION, SOLUTION INTRAVENOUS; SUBCUTANEOUS at 05:38

## 2022-01-01 RX ADMIN — AMIODARONE HYDROCHLORIDE 1 MG/MIN: 50 INJECTION, SOLUTION INTRAVENOUS at 16:34

## 2022-01-01 RX ADMIN — INSULIN LISPRO 12 UNITS: 100 INJECTION, SOLUTION INTRAVENOUS; SUBCUTANEOUS at 10:40

## 2022-01-01 RX ADMIN — DEXAMETHASONE SODIUM PHOSPHATE 6 MG: 10 INJECTION INTRAMUSCULAR; INTRAVENOUS at 17:47

## 2022-01-01 RX ADMIN — DEXTROSE MONOHYDRATE 1 MG/MIN: 5 INJECTION, SOLUTION INTRAVENOUS at 17:14

## 2022-01-01 RX ADMIN — FUROSEMIDE 40 MG: 10 INJECTION, SOLUTION INTRAMUSCULAR; INTRAVENOUS at 16:44

## 2022-01-01 RX ADMIN — PROPOFOL 30 MCG/KG/MIN: 10 INJECTION, EMULSION INTRAVENOUS at 14:04

## 2022-01-01 RX ADMIN — CHLORHEXIDINE GLUCONATE 15 ML: 1.2 RINSE ORAL at 20:57

## 2022-01-01 RX ADMIN — ACETAMINOPHEN 1000 MG: 500 TABLET ORAL at 13:27

## 2022-01-01 RX ADMIN — Medication 75 MCG/HR: at 04:34

## 2022-01-01 RX ADMIN — AMLODIPINE BESYLATE 2.5 MG: 5 TABLET ORAL at 21:04

## 2022-01-01 RX ADMIN — IPRATROPIUM BROMIDE AND ALBUTEROL SULFATE 1 AMPULE: 2.5; .5 SOLUTION RESPIRATORY (INHALATION) at 13:56

## 2022-01-01 RX ADMIN — LEVALBUTEROL HYDROCHLORIDE 1.25 MG: 1.25 SOLUTION, CONCENTRATE RESPIRATORY (INHALATION) at 14:45

## 2022-01-01 RX ADMIN — ATORVASTATIN CALCIUM 20 MG: 20 TABLET, FILM COATED ORAL at 09:56

## 2022-01-01 RX ADMIN — INSULIN LISPRO 3 UNITS: 100 INJECTION, SOLUTION INTRAVENOUS; SUBCUTANEOUS at 08:15

## 2022-01-01 RX ADMIN — LEVALBUTEROL HYDROCHLORIDE 1.25 MG: 1.25 SOLUTION, CONCENTRATE RESPIRATORY (INHALATION) at 20:22

## 2022-01-01 RX ADMIN — Medication 3 ML: at 14:15

## 2022-01-01 RX ADMIN — AMIODARONE HYDROCHLORIDE 0.5 MG/MIN: 50 INJECTION, SOLUTION INTRAVENOUS at 19:49

## 2022-01-01 RX ADMIN — ENOXAPARIN SODIUM 30 MG: 100 INJECTION SUBCUTANEOUS at 08:59

## 2022-01-01 RX ADMIN — MEROPENEM 1000 MG: 1 INJECTION, POWDER, FOR SOLUTION INTRAVENOUS at 11:11

## 2022-01-01 RX ADMIN — MEROPENEM 1000 MG: 1 INJECTION, POWDER, FOR SOLUTION INTRAVENOUS at 01:06

## 2022-01-01 RX ADMIN — LEVALBUTEROL HYDROCHLORIDE 1.25 MG: 1.25 SOLUTION, CONCENTRATE RESPIRATORY (INHALATION) at 07:52

## 2022-01-01 RX ADMIN — PHENYLEPHRINE HYDROCHLORIDE 30 MCG/MIN: 10 INJECTION INTRAVENOUS at 03:29

## 2022-01-01 RX ADMIN — SODIUM CHLORIDE, PRESERVATIVE FREE 10 ML: 5 INJECTION INTRAVENOUS at 09:06

## 2022-01-01 RX ADMIN — SODIUM CHLORIDE 1.3 MCG/KG/HR: 9 INJECTION, SOLUTION INTRAVENOUS at 14:00

## 2022-01-01 RX ADMIN — ASPIRIN 81 MG CHEWABLE TABLET 324 MG: 81 TABLET CHEWABLE at 08:40

## 2022-01-01 RX ADMIN — ASPIRIN 81 MG CHEWABLE TABLET 324 MG: 81 TABLET CHEWABLE at 08:55

## 2022-01-01 RX ADMIN — SODIUM CHLORIDE, PRESERVATIVE FREE 10 ML: 5 INJECTION INTRAVENOUS at 07:15

## 2022-01-01 RX ADMIN — DEXAMETHASONE SODIUM PHOSPHATE 6 MG: 10 INJECTION INTRAMUSCULAR; INTRAVENOUS at 14:12

## 2022-01-01 RX ADMIN — SODIUM CHLORIDE 1.1 MCG/KG/HR: 9 INJECTION, SOLUTION INTRAVENOUS at 06:32

## 2022-01-01 RX ADMIN — DEXMEDETOMIDINE HYDROCHLORIDE 0.7 MCG/KG/HR: 100 INJECTION, SOLUTION INTRAVENOUS at 18:48

## 2022-01-01 RX ADMIN — ASPIRIN 81 MG CHEWABLE TABLET 324 MG: 81 TABLET CHEWABLE at 09:21

## 2022-01-01 RX ADMIN — MEROPENEM 1000 MG: 1 INJECTION, POWDER, FOR SOLUTION INTRAVENOUS at 03:06

## 2022-01-01 RX ADMIN — FUROSEMIDE 40 MG: 10 INJECTION, SOLUTION INTRAMUSCULAR; INTRAVENOUS at 21:13

## 2022-01-01 RX ADMIN — INSULIN LISPRO 3 UNITS: 100 INJECTION, SOLUTION INTRAVENOUS; SUBCUTANEOUS at 21:22

## 2022-01-01 RX ADMIN — LEVETIRACETAM 500 MG: 100 SOLUTION ORAL at 21:25

## 2022-01-01 RX ADMIN — ALPRAZOLAM 0.5 MG: 0.5 TABLET ORAL at 09:25

## 2022-01-01 RX ADMIN — IPRATROPIUM BROMIDE AND ALBUTEROL SULFATE 1 AMPULE: 2.5; .5 SOLUTION RESPIRATORY (INHALATION) at 06:18

## 2022-01-01 RX ADMIN — IPRATROPIUM BROMIDE AND ALBUTEROL SULFATE 1 AMPULE: 2.5; .5 SOLUTION RESPIRATORY (INHALATION) at 10:27

## 2022-01-01 RX ADMIN — SUCRALFATE 1 G: 1 TABLET ORAL at 18:07

## 2022-01-01 RX ADMIN — DEXTROSE MONOHYDRATE 125 ML: 100 INJECTION, SOLUTION INTRAVENOUS at 01:54

## 2022-01-01 RX ADMIN — AMLODIPINE BESYLATE 5 MG: 5 TABLET ORAL at 21:17

## 2022-01-01 RX ADMIN — Medication 1000 UNITS: at 09:48

## 2022-01-01 RX ADMIN — INSULIN LISPRO 3 UNITS: 100 INJECTION, SOLUTION INTRAVENOUS; SUBCUTANEOUS at 20:42

## 2022-01-01 RX ADMIN — SUCRALFATE 1 G: 1 TABLET ORAL at 18:15

## 2022-01-01 RX ADMIN — ENOXAPARIN SODIUM 30 MG: 100 INJECTION SUBCUTANEOUS at 21:27

## 2022-01-01 RX ADMIN — PROPOFOL 15 MCG/KG/MIN: 10 INJECTION, EMULSION INTRAVENOUS at 05:06

## 2022-01-01 RX ADMIN — LEVALBUTEROL HYDROCHLORIDE 1.25 MG: 1.25 SOLUTION, CONCENTRATE RESPIRATORY (INHALATION) at 14:19

## 2022-01-01 RX ADMIN — CHLORHEXIDINE GLUCONATE 15 ML: 1.2 RINSE ORAL at 21:28

## 2022-01-01 RX ADMIN — LEVOFLOXACIN 500 MG: 500 TABLET, FILM COATED ORAL at 12:48

## 2022-01-01 RX ADMIN — Medication 9 MG/HR: at 17:09

## 2022-01-01 RX ADMIN — IPRATROPIUM BROMIDE AND ALBUTEROL SULFATE 1 AMPULE: 2.5; .5 SOLUTION RESPIRATORY (INHALATION) at 06:13

## 2022-01-01 RX ADMIN — ASPIRIN 325 MG: 325 TABLET, COATED ORAL at 08:39

## 2022-01-01 RX ADMIN — SODIUM CHLORIDE, PRESERVATIVE FREE 10 ML: 5 INJECTION INTRAVENOUS at 20:56

## 2022-01-01 RX ADMIN — DEXAMETHASONE SODIUM PHOSPHATE 6 MG: 10 INJECTION INTRAMUSCULAR; INTRAVENOUS at 14:49

## 2022-01-01 RX ADMIN — AMLODIPINE BESYLATE 5 MG: 5 TABLET ORAL at 22:17

## 2022-01-01 RX ADMIN — VANCOMYCIN HYDROCHLORIDE 2500 MG: 500 INJECTION, POWDER, LYOPHILIZED, FOR SOLUTION INTRAVENOUS at 18:08

## 2022-01-01 RX ADMIN — INSULIN GLARGINE 15 UNITS: 100 INJECTION, SOLUTION SUBCUTANEOUS at 07:59

## 2022-01-01 RX ADMIN — Medication 3 MG/HR: at 08:27

## 2022-01-01 RX ADMIN — ENOXAPARIN SODIUM 30 MG: 100 INJECTION SUBCUTANEOUS at 20:22

## 2022-01-01 RX ADMIN — BISACODYL 10 MG: 10 SUPPOSITORY RECTAL at 15:28

## 2022-01-01 RX ADMIN — ATORVASTATIN CALCIUM 20 MG: 20 TABLET, FILM COATED ORAL at 07:14

## 2022-01-01 RX ADMIN — BUDESONIDE 500 MCG: 0.5 SUSPENSION RESPIRATORY (INHALATION) at 07:07

## 2022-01-01 RX ADMIN — CHLORHEXIDINE GLUCONATE 15 ML: 1.2 RINSE ORAL at 21:27

## 2022-01-01 RX ADMIN — Medication 75 MCG/HR: at 12:15

## 2022-01-01 RX ADMIN — BUDESONIDE 500 MCG: 0.5 SUSPENSION RESPIRATORY (INHALATION) at 08:04

## 2022-01-01 RX ADMIN — FUROSEMIDE 40 MG: 10 INJECTION, SOLUTION INTRAMUSCULAR; INTRAVENOUS at 14:03

## 2022-01-01 RX ADMIN — INSULIN LISPRO 6 UNITS: 100 INJECTION, SOLUTION INTRAVENOUS; SUBCUTANEOUS at 12:06

## 2022-01-01 RX ADMIN — SODIUM CHLORIDE 1.2 MCG/KG/HR: 9 INJECTION, SOLUTION INTRAVENOUS at 21:38

## 2022-01-01 RX ADMIN — INSULIN LISPRO 3 UNITS: 100 INJECTION, SOLUTION INTRAVENOUS; SUBCUTANEOUS at 18:26

## 2022-01-01 RX ADMIN — SODIUM CHLORIDE 1.4 MCG/KG/HR: 9 INJECTION, SOLUTION INTRAVENOUS at 07:40

## 2022-01-01 RX ADMIN — AMLODIPINE BESYLATE 5 MG: 5 TABLET ORAL at 20:41

## 2022-01-01 RX ADMIN — INSULIN GLARGINE 45 UNITS: 100 INJECTION, SOLUTION SUBCUTANEOUS at 20:38

## 2022-01-01 RX ADMIN — LEVALBUTEROL HYDROCHLORIDE 1.25 MG: 1.25 SOLUTION, CONCENTRATE RESPIRATORY (INHALATION) at 07:44

## 2022-01-01 RX ADMIN — LEVETIRACETAM 500 MG: 100 SOLUTION ORAL at 09:48

## 2022-01-01 RX ADMIN — METOPROLOL SUCCINATE 50 MG: 50 TABLET, FILM COATED, EXTENDED RELEASE ORAL at 20:07

## 2022-01-01 RX ADMIN — SODIUM ZIRCONIUM CYCLOSILICATE 10 G: 10 POWDER, FOR SUSPENSION ORAL at 06:57

## 2022-01-01 RX ADMIN — ENOXAPARIN SODIUM 30 MG: 100 INJECTION SUBCUTANEOUS at 08:48

## 2022-01-01 RX ADMIN — MEROPENEM 1000 MG: 1 INJECTION, POWDER, FOR SOLUTION INTRAVENOUS at 18:10

## 2022-01-01 RX ADMIN — SODIUM CHLORIDE, PRESERVATIVE FREE 10 ML: 5 INJECTION INTRAVENOUS at 10:56

## 2022-01-01 RX ADMIN — POLYETHYLENE GLYCOL 3350 17 G: 17 POWDER, FOR SOLUTION ORAL at 10:20

## 2022-01-01 RX ADMIN — DEXTROSE MONOHYDRATE 125 ML: 100 INJECTION, SOLUTION INTRAVENOUS at 16:28

## 2022-01-01 RX ADMIN — CHLORHEXIDINE GLUCONATE 15 ML: 1.2 RINSE ORAL at 21:48

## 2022-01-01 RX ADMIN — LEVALBUTEROL HYDROCHLORIDE 1.25 MG: 1.25 SOLUTION, CONCENTRATE RESPIRATORY (INHALATION) at 14:12

## 2022-01-01 RX ADMIN — BUDESONIDE 500 MCG: 0.5 SUSPENSION RESPIRATORY (INHALATION) at 19:14

## 2022-01-01 RX ADMIN — LEVALBUTEROL HYDROCHLORIDE 1.25 MG: 1.25 SOLUTION, CONCENTRATE RESPIRATORY (INHALATION) at 03:20

## 2022-01-01 RX ADMIN — LEVETIRACETAM 500 MG: 100 SOLUTION ORAL at 20:49

## 2022-01-01 RX ADMIN — SUCRALFATE 1 G: 1 TABLET ORAL at 18:46

## 2022-01-01 RX ADMIN — SODIUM CHLORIDE, PRESERVATIVE FREE 10 ML: 5 INJECTION INTRAVENOUS at 17:05

## 2022-01-01 RX ADMIN — LEVALBUTEROL HYDROCHLORIDE 1.25 MG: 1.25 SOLUTION, CONCENTRATE RESPIRATORY (INHALATION) at 19:18

## 2022-01-01 RX ADMIN — SODIUM CHLORIDE, PRESERVATIVE FREE 10 ML: 5 INJECTION INTRAVENOUS at 21:02

## 2022-01-01 RX ADMIN — SODIUM CHLORIDE 1.1 MCG/KG/HR: 9 INJECTION, SOLUTION INTRAVENOUS at 02:52

## 2022-01-01 RX ADMIN — LISINOPRIL 20 MG: 20 TABLET ORAL at 08:55

## 2022-01-01 RX ADMIN — PANTOPRAZOLE SODIUM 40 MG: 40 INJECTION, POWDER, FOR SOLUTION INTRAVENOUS at 08:37

## 2022-01-01 RX ADMIN — SODIUM CHLORIDE, PRESERVATIVE FREE 10 ML: 5 INJECTION INTRAVENOUS at 08:54

## 2022-01-01 RX ADMIN — SODIUM CHLORIDE: 9 INJECTION, SOLUTION INTRAVENOUS at 19:52

## 2022-01-01 RX ADMIN — Medication 100 MCG/HR: at 06:51

## 2022-01-01 RX ADMIN — MEROPENEM 1000 MG: 1 INJECTION, POWDER, FOR SOLUTION INTRAVENOUS at 02:27

## 2022-01-01 RX ADMIN — SODIUM CHLORIDE, PRESERVATIVE FREE 10 ML: 5 INJECTION INTRAVENOUS at 08:38

## 2022-01-01 RX ADMIN — DEXTROSE MONOHYDRATE 250 ML: 10 INJECTION, SOLUTION INTRAVENOUS at 18:44

## 2022-01-01 RX ADMIN — SODIUM CHLORIDE 1.4 MCG/KG/HR: 9 INJECTION, SOLUTION INTRAVENOUS at 15:31

## 2022-01-01 RX ADMIN — Medication 50 MCG/HR: at 20:22

## 2022-01-01 RX ADMIN — ACETAMINOPHEN 650 MG: 325 TABLET ORAL at 20:22

## 2022-01-01 RX ADMIN — INSULIN LISPRO 6 UNITS: 100 INJECTION, SOLUTION INTRAVENOUS; SUBCUTANEOUS at 12:07

## 2022-01-01 RX ADMIN — MEROPENEM 1000 MG: 1 INJECTION, POWDER, FOR SOLUTION INTRAVENOUS at 10:15

## 2022-01-01 RX ADMIN — SODIUM CHLORIDE, PRESERVATIVE FREE 10 ML: 5 INJECTION INTRAVENOUS at 10:05

## 2022-01-01 RX ADMIN — CHLORHEXIDINE GLUCONATE 15 ML: 1.2 RINSE ORAL at 20:39

## 2022-01-01 RX ADMIN — Medication 50 MCG/HR: at 00:10

## 2022-01-01 RX ADMIN — ACETAMINOPHEN 650 MG: 325 TABLET ORAL at 09:25

## 2022-01-01 RX ADMIN — SODIUM CHLORIDE: 9 INJECTION, SOLUTION INTRAVENOUS at 05:46

## 2022-01-01 RX ADMIN — ACETAMINOPHEN 650 MG: 325 TABLET ORAL at 17:07

## 2022-01-01 RX ADMIN — SODIUM POLYSTYRENE SULFONATE 45 G: 15 SUSPENSION ORAL; RECTAL at 17:49

## 2022-01-01 RX ADMIN — Medication 75 MCG/HR: at 22:01

## 2022-01-01 RX ADMIN — SODIUM ZIRCONIUM CYCLOSILICATE 10 G: 10 POWDER, FOR SUSPENSION ORAL at 15:51

## 2022-01-01 RX ADMIN — ATORVASTATIN CALCIUM 20 MG: 20 TABLET, FILM COATED ORAL at 08:49

## 2022-01-01 RX ADMIN — CHLORHEXIDINE GLUCONATE 15 ML: 1.2 RINSE ORAL at 20:43

## 2022-01-01 RX ADMIN — CHLORHEXIDINE GLUCONATE 15 ML: 1.2 RINSE ORAL at 21:52

## 2022-01-01 RX ADMIN — CHLORHEXIDINE GLUCONATE 15 ML: 1.2 RINSE ORAL at 08:55

## 2022-01-01 RX ADMIN — IPRATROPIUM BROMIDE AND ALBUTEROL SULFATE 1 AMPULE: 2.5; .5 SOLUTION RESPIRATORY (INHALATION) at 10:08

## 2022-01-01 RX ADMIN — PROPOFOL 10 MCG/KG/MIN: 10 INJECTION, EMULSION INTRAVENOUS at 10:43

## 2022-01-01 RX ADMIN — FUROSEMIDE 20 MG: 10 INJECTION, SOLUTION INTRAMUSCULAR; INTRAVENOUS at 08:34

## 2022-01-01 RX ADMIN — ACETAMINOPHEN 650 MG: 325 TABLET ORAL at 01:08

## 2022-01-01 RX ADMIN — INSULIN GLARGINE 15 UNITS: 100 INJECTION, SOLUTION SUBCUTANEOUS at 21:46

## 2022-01-01 RX ADMIN — IPRATROPIUM BROMIDE AND ALBUTEROL SULFATE 1 AMPULE: 2.5; .5 SOLUTION RESPIRATORY (INHALATION) at 19:14

## 2022-01-01 RX ADMIN — ALPRAZOLAM 0.5 MG: 0.5 TABLET ORAL at 20:55

## 2022-01-01 RX ADMIN — Medication 80 ML: at 17:05

## 2022-01-01 RX ADMIN — AMLODIPINE BESYLATE 5 MG: 5 TABLET ORAL at 08:21

## 2022-01-01 RX ADMIN — LEVETIRACETAM 500 MG: 100 SOLUTION ORAL at 21:53

## 2022-01-01 RX ADMIN — Medication 3 ML: at 03:10

## 2022-01-01 RX ADMIN — ENOXAPARIN SODIUM 40 MG: 100 INJECTION SUBCUTANEOUS at 08:33

## 2022-01-01 RX ADMIN — ASPIRIN 81 MG CHEWABLE TABLET 324 MG: 81 TABLET CHEWABLE at 09:59

## 2022-01-01 RX ADMIN — INSULIN LISPRO 15 UNITS: 100 INJECTION, SOLUTION INTRAVENOUS; SUBCUTANEOUS at 06:38

## 2022-01-01 RX ADMIN — Medication 50 MCG/HR: at 17:00

## 2022-01-01 RX ADMIN — SODIUM CHLORIDE 1.4 MCG/KG/HR: 9 INJECTION, SOLUTION INTRAVENOUS at 08:20

## 2022-01-01 RX ADMIN — IPRATROPIUM BROMIDE AND ALBUTEROL SULFATE 1 AMPULE: 2.5; .5 SOLUTION RESPIRATORY (INHALATION) at 20:40

## 2022-01-01 RX ADMIN — ENOXAPARIN SODIUM 30 MG: 100 INJECTION SUBCUTANEOUS at 21:06

## 2022-01-01 RX ADMIN — IPRATROPIUM BROMIDE AND ALBUTEROL SULFATE 1 AMPULE: 2.5; .5 SOLUTION RESPIRATORY (INHALATION) at 06:27

## 2022-01-01 RX ADMIN — ATORVASTATIN CALCIUM 20 MG: 20 TABLET, FILM COATED ORAL at 09:59

## 2022-01-01 RX ADMIN — DEXAMETHASONE SODIUM PHOSPHATE 6 MG: 10 INJECTION INTRAMUSCULAR; INTRAVENOUS at 15:41

## 2022-01-01 RX ADMIN — Medication 100 MCG/HR: at 21:01

## 2022-01-01 RX ADMIN — LACTULOSE 20 G: 20 SOLUTION ORAL at 08:35

## 2022-01-01 RX ADMIN — ACETAMINOPHEN 650 MG: 325 TABLET ORAL at 17:50

## 2022-01-01 RX ADMIN — INSULIN LISPRO 3 UNITS: 100 INJECTION, SOLUTION INTRAVENOUS; SUBCUTANEOUS at 17:58

## 2022-01-01 ASSESSMENT — PULMONARY FUNCTION TESTS
PIF_VALUE: 29
PIF_VALUE: 26
PIF_VALUE: 32
PIF_VALUE: 24
PIF_VALUE: 36
PIF_VALUE: 31
PIF_VALUE: 33
PIF_VALUE: 34
PIF_VALUE: 35
PIF_VALUE: 33
PIF_VALUE: 33
PIF_VALUE: 32
PIF_VALUE: 37
PIF_VALUE: 31
PIF_VALUE: 25
PIF_VALUE: 24
PIF_VALUE: 35
PIF_VALUE: 31
PIF_VALUE: 36
PIF_VALUE: 32
PIF_VALUE: 32
PIF_VALUE: 28
PIF_VALUE: 33
PIF_VALUE: 24
PIF_VALUE: 37
PIF_VALUE: 32
PIF_VALUE: 36
PIF_VALUE: 30
PIF_VALUE: 31
PIF_VALUE: 33
PIF_VALUE: 31
PIF_VALUE: 32
PIF_VALUE: 40
PIF_VALUE: 32
PIF_VALUE: 29
PIF_VALUE: 32
PIF_VALUE: 36
PIF_VALUE: 25
PIF_VALUE: 32
PIF_VALUE: 25
PIF_VALUE: 33
PIF_VALUE: 32
PIF_VALUE: 30
PIF_VALUE: 40
PIF_VALUE: 31
PIF_VALUE: 32
PIF_VALUE: 24
PIF_VALUE: 32
PIF_VALUE: 37
PIF_VALUE: 28
PIF_VALUE: 30
PIF_VALUE: 22
PIF_VALUE: 32
PIF_VALUE: 32
PIF_VALUE: 33
PIF_VALUE: 31
PIF_VALUE: 32
PIF_VALUE: 33
PIF_VALUE: 28
PIF_VALUE: 32
PIF_VALUE: 30
PIF_VALUE: 32
PIF_VALUE: 22
PIF_VALUE: 37
PIF_VALUE: 25
PIF_VALUE: 32
PIF_VALUE: 42
PIF_VALUE: 19
PIF_VALUE: 31
PIF_VALUE: 30
PIF_VALUE: 31
PIF_VALUE: 31
PIF_VALUE: 34
PIF_VALUE: 32
PIF_VALUE: 33
PIF_VALUE: 24
PIF_VALUE: 27
PIF_VALUE: 39
PIF_VALUE: 36
PIF_VALUE: 32
PIF_VALUE: 32
PIF_VALUE: 31
PIF_VALUE: 38
PIF_VALUE: 31
PIF_VALUE: 30
PIF_VALUE: 24
PIF_VALUE: 23
PIF_VALUE: 23
PIF_VALUE: 20
PIF_VALUE: 33
PIF_VALUE: 31
PIF_VALUE: 29
PIF_VALUE: 27
PIF_VALUE: 37
PIF_VALUE: 27
PIF_VALUE: 31
PIF_VALUE: 32
PIF_VALUE: 33
PIF_VALUE: 31
PIF_VALUE: 33
PIF_VALUE: 26
PIF_VALUE: 38
PIF_VALUE: 31
PIF_VALUE: 31
PIF_VALUE: 24
PIF_VALUE: 32
PIF_VALUE: 25
PIF_VALUE: 27
PIF_VALUE: 34
PIF_VALUE: 35
PIF_VALUE: 24
PIF_VALUE: 30
PIF_VALUE: 28
PIF_VALUE: 31
PIF_VALUE: 30
PIF_VALUE: 30
PIF_VALUE: 25
PIF_VALUE: 25
PIF_VALUE: 33
PIF_VALUE: 30
PIF_VALUE: 26
PIF_VALUE: 31
PIF_VALUE: 25
PIF_VALUE: 37
PIF_VALUE: 29
PIF_VALUE: 32
PIF_VALUE: 27
PIF_VALUE: 31
PIF_VALUE: 25
PIF_VALUE: 29
PIF_VALUE: 32
PIF_VALUE: 30
PIF_VALUE: 33
PIF_VALUE: 35
PIF_VALUE: 31
PIF_VALUE: 32
PIF_VALUE: 31
PIF_VALUE: 29
PIF_VALUE: 32
PIF_VALUE: 31
PIF_VALUE: 26
PIF_VALUE: 27
PIF_VALUE: 33
PIF_VALUE: 33
PIF_VALUE: 28
PIF_VALUE: 32
PIF_VALUE: 31
PIF_VALUE: 33
PIF_VALUE: 29
PIF_VALUE: 26
PIF_VALUE: 36
PIF_VALUE: 32

## 2022-01-01 ASSESSMENT — PAIN SCALES - GENERAL
PAINLEVEL_OUTOF10: 0
PAINLEVEL_OUTOF10: 5
PAINLEVEL_OUTOF10: 0
PAINLEVEL_OUTOF10: 2
PAINLEVEL_OUTOF10: 0
PAINLEVEL_OUTOF10: 1
PAINLEVEL_OUTOF10: 0
PAINLEVEL_OUTOF10: 1
PAINLEVEL_OUTOF10: 0
PAINLEVEL_OUTOF10: 2

## 2022-01-01 ASSESSMENT — ENCOUNTER SYMPTOMS
ALLERGIC/IMMUNOLOGIC NEGATIVE: 1
ALLERGIC/IMMUNOLOGIC NEGATIVE: 1
DIARRHEA: 0
GASTROINTESTINAL NEGATIVE: 1
SHORTNESS OF BREATH: 1
GASTROINTESTINAL NEGATIVE: 1
VOMITING: 0
RHINORRHEA: 0
RESPIRATORY NEGATIVE: 1
ABDOMINAL PAIN: 0
COUGH: 0
ALLERGIC/IMMUNOLOGIC NEGATIVE: 1
RESPIRATORY NEGATIVE: 1
GASTROINTESTINAL NEGATIVE: 1
NAUSEA: 0
GASTROINTESTINAL NEGATIVE: 1
GASTROINTESTINAL NEGATIVE: 1
RESPIRATORY NEGATIVE: 1
SORE THROAT: 0

## 2022-01-16 PROBLEM — U07.1 COVID: Status: ACTIVE | Noted: 2022-01-01

## 2022-01-16 NOTE — ED PROVIDER NOTES
[facility]  Emergency Department Encounter     Pt Name: Gela Choudhury  MRN: [id number]  [place] 1945  Date of evaluation: 1/16/22  PCP:  Park Pereyra MD    [address] [place] [address]       Chief Complaint   Patient presents with    Leg Swelling     bilateral, has CHF, on diuretic, EMT saw pt , assisted pt into car    Fever    Other     low SPO2       HISTORY OF PRESENT ILLNESS  (Location/Symptom, Timing/Onset, Context/Setting, Quality, Duration, Modifying Factors, Severity.)    Gela Choudhury is a 64 y.o. male who has a past medical history of COPD not on oxygen at home, history of heart failure on Lasix who presents emergency department with shortness of breath, bilateral leg swelling. Patient was unaware that he has a fever and denies any body aches chills sweats. Denies any recent cough, congestion, headache, runny nose. No known exposure to anyone with COVID-19. Has received both of his doses, however not yet received his booster. No abdominal pain nausea vomiting or diarrhea. Denies any chest pain or palpitations. PAST MEDICAL / SURGICAL / SOCIAL / FAMILY HISTORY    has a past medical history of Arthritis, Atherosclerotic plaque, Cervical disc disease, Diabetes mellitus ([address] History of blood transfusion, Hx of blood clots, Hyperlipidemia, Neuropathy, Right kidney mass, Snores, and Type 2 diabetes mellitus treated with insulin ([address] has a past surgical history that includes Abdominal aortic aneurysm repair (2005); Carpal tunnel release (Bilateral); Cervical spine surgery; Inguinal hernia repair (Right); Upper gastrointestinal endoscopy; Colonoscopy; ventral hernia repair (05/10/2017); and pr repair incisional hernia,reducible (N/A, 5/10/2017).     Social History     Socioeconomic History    Marital status:      Spouse name: Not on file    Number of children: Not on file    Years of education: Not on file    Highest education level: Not on file   Occupational History    Not on file   Tobacco Use    Smoking status: Former Smoker    Smokeless tobacco: Never Used    Tobacco comment: quit 30 years ago   Substance and Sexual Activity    Alcohol use: No    Drug use: No    Sexual activity: Not on file   Other Topics Concern    Not on file   Social History Narrative    Not on file     Social Determinants of Health     Financial Resource Strain:     Difficulty of Paying Living Expenses: Not on file   Food Insecurity:     Worried About Running Out of Food in the Last Year: Not on file    Dona of Food in the Last Year: Not on file   Transportation Needs:     Lack of Transportation (Medical): Not on file    Lack of Transportation (Non-Medical): Not on file   Physical Activity:     Days of Exercise per Week: Not on file    Minutes of Exercise per Session: Not on file   Stress:     Feeling of Stress : Not on file   Social Connections:     Frequency of Communication with Friends and Family: Not on file    Frequency of Social Gatherings with Friends and Family: Not on file    Attends Hinduism Services: Not on file    Active Member of [address] [place] [address] or Organizations: Not on file    Attends Club or Organization Meetings: Not on file    Marital Status: Not on file   Intimate Partner Violence:     Fear of Current or Ex-Partner: Not on file    Emotionally Abused: Not on file    Physically Abused: Not on file    Sexually Abused: Not on file   Housing Stability:     Unable to Pay for Housing in the Last Year: Not on file    Number of [place] in the Last Year: Not on file    Unstable Housing in the Last Year: Not on file       Family History   Problem Relation Age of Onset    Ovarian Cancer Sister     Ovarian Cancer Sister        Allergies: Barbiturates, Codeine, and Sulfa antibiotics    Home Medications:  Prior to Admission medications    Medication Sig Start Date End Date Taking?  Authorizing Provider   levETIRAcetam (KEPPRA) 500 MG tablet Take 1 tablet by mouth 2 times daily 11/3/21   Kellie Coates MD   lisinopril (PRINIVIL;ZESTRIL) 20 MG tablet Take 1 tablet by mouth daily 11/3/21   Kellie Coates MD   Iron-Vitamin C (IRON 100/C) 100-250 MG TABS iron    Historical Provider, MD   venlafaxine (EFFEXOR XR) 150 MG extended release capsule Take 1 capsule by mouth daily (with breakfast) 7/29/19   Arabella Kiser   HUMALOG KWIKPEN 100 UNIT/ML pen Inject 5-15 Units into the skin 3 times daily (before meals) 7/28/19   Arabella Kiser   vitamin B-1 (THIAMINE) 100 MG tablet Take 100 mg by mouth daily    Historical Provider, MD   ferrous sulfate 325 (65 Fe) MG tablet Take 325 mg by mouth daily (with breakfast)    Historical Provider, MD   ALPRAZolam [place]) 0.5 MG tablet Take 0.5 mg by mouth three times daily.     Historical Provider, MD   insulin glargine (BASAGLAR KWIKPEN) 100 UNIT/ML injection pen Inject 40 Units into the skin 2 times daily    Historical Provider, MD   furosemide (LASIX) 40 MG tablet Take 1 tablet by mouth 2 times daily 12/11/18   Michael Waldrop MD   tiotropium (SPIRIVA RESPIMAT) 2.5 MCG/ACT AERS inhaler Inhale 2 puffs into the lungs daily     Historical Provider, MD   albuterol sulfate  (90 Base) MCG/ACT inhaler Inhale 2 puffs into the lungs every 6 hours as needed for Wheezing or Shortness of Breath    Historical Provider, MD   metoprolol succinate (TOPROL XL) 50 MG extended release tablet Take 50 mg by mouth daily    Historical Provider, MD   Multiple Vitamins-Minerals (MULTIVITAMIN ADULT) TABS Take 1 tablet by mouth daily    Historical Provider, MD   vitamin D (CHOLECALCIFEROL) 1000 UNIT TABS tablet Take 1,000 Units by mouth daily    Historical Provider, MD   fluticasone (FLONASE) 50 MCG/ACT nasal spray 1 spray by Each Nare route daily as needed for Rhinitis    Historical Provider, MD   aspirin 325 MG EC tablet Take 325 mg by mouth daily     Historical Provider, MD   Omega-3 Fatty Acids (FISH OIL) 1000 MG CAPS Take 1 PT evaluation and treat    Initiate Oxygen Therapy Protocol    Initiate Oxygen Therapy Protocol    Pulse Oximetry Spot Check    Venous Blood Gas, POC    POCT glucose    POCT glucose - If patient is NPO, TPN, or continuous tube feed and on HumaLOG    POCT Glucose    EKG 12 Lead    Insert peripheral IV    PATIENT STATUS (FROM ED OR OR/PROCEDURAL) Inpatient       MEDICATIONS ORDERED:  Orders Placed This Encounter   Medications    acetaminophen (TYLENOL) tablet 1,000 mg    potassium chloride 10 mEq/100 mL IVPB (Peripheral Line)    furosemide (LASIX) injection 40 mg    dexamethasone (PF) (DECADRON) injection 10 mg    magnesium sulfate 2000 mg in 50 mL IVPB premix    azithromycin (ZITHROMAX) 500 mg in D5W 250ml addavial     Order Specific Question:   Antimicrobial Indications     Answer:   Pneumonia (CAP)    cefTRIAXone (ROCEPHIN) 1000 mg IVPB in 50 mL D5W minibag     Order Specific Question:   Antimicrobial Indications     Answer:   Pneumonia (CAP)    0.9 % sodium chloride bolus    DISCONTD: sodium chloride flush 0.9 % injection 10 mL    iopamidol (ISOVUE-370) 76 % injection 75 mL    dexamethasone (PF) (DECADRON) injection 6 mg    albuterol sulfate  (90 Base) MCG/ACT inhaler 2 puff     Order Specific Question:   Initiate RT Bronchodilator Protocol     Answer:    Yes    furosemide (LASIX) injection 40 mg    sodium chloride flush 0.9 % injection 5-40 mL    sodium chloride flush 0.9 % injection 10 mL    0.9 % sodium chloride infusion    OR Linked Order Group     potassium chloride (KLOR-CON M) extended release tablet 40 mEq     potassium bicarb-citric acid (EFFER-K) effervescent tablet 40 mEq     potassium chloride 10 mEq/100 mL IVPB (Peripheral Line)    magnesium sulfate 1000 mg in dextrose 5% 100 mL IVPB    enoxaparin (LOVENOX) injection 40 mg    OR Linked Order Group     ondansetron (ZOFRAN-ODT) disintegrating tablet 4 mg     ondansetron (ZOFRAN) injection 4 mg    magnesium hydroxide (MILK OF MAGNESIA) 400 MG/5ML suspension 30 mL    OR Linked Order Group     acetaminophen (TYLENOL) tablet 650 mg     acetaminophen (TYLENOL) suppository 650 mg    levETIRAcetam (KEPPRA) tablet 500 mg    aspirin EC tablet 325 mg    amLODIPine (NORVASC) tablet 5 mg    atorvastatin (LIPITOR) tablet 20 mg    ALPRAZolam (XANAX) tablet 0.5 mg    insulin glargine (LANTUS;BASAGLAR) injection pen 40 Units    insulin lispro (HUMALOG) injection vial 0-18 Units    insulin lispro (HUMALOG) injection vial 0-9 Units    glucose (GLUTOSE) 40 % oral gel 15 g    dextrose 50 % IV solution    glucagon (rDNA) injection 1 mg    dextrose 5 % solution         DIAGNOSTIC RESULTS / EMERGENCYDEPARTMENT COURSE / MDM   LABS:  Labs Reviewed   COVID-19, RAPID - Abnormal; Notable for the following components:       Result Value    SARS-CoV-2, Rapid DETECTED (*)     All other components within normal limits   CBC WITH AUTO DIFFERENTIAL - Abnormal; Notable for the following components:    WBC 2.7 (*)     RBC 6.82 (*)     Hemoglobin 17.5 (*)     Hematocrit 56.6 (*)     RDW 16.6 (*)     Platelets 831 (*)     Lymphocytes 16 (*)     Monocytes 21 (*)     Eosinophils % 0 (*)     Immature Granulocytes 1 (*)     Absolute Lymph # 0.43 (*)     All other components within normal limits   COMPREHENSIVE METABOLIC PANEL W/ REFLEX TO MG FOR LOW K - Abnormal; Notable for the following components:    Glucose 215 (*)     CREATININE 1.31 (*)     Calcium 8.5 (*)     Potassium 3.5 (*)     Chloride 94 (*)     AST 45 (*)     GFR Non- 53 (*)     All other components within normal limits   TROPONIN - Abnormal; Notable for the following components:    Troponin, High Sensitivity 36 (*)     All other components within normal limits   BRAIN NATRIURETIC PEPTIDE - Abnormal; Notable for the following components:    Pro-BNP 9,327 (*)     All other components within normal limits   D-DIMER, QUANTITATIVE - Abnormal; Notable for the following components:    D-Dimer, Quant 1.73 (*)     All other components within normal limits   FERRITIN - Abnormal; Notable for the following components:    Ferritin 569 (*)     All other components within normal limits   LACTATE DEHYDROGENASE - Abnormal; Notable for the following components:     (*)     All other components within normal limits   URINALYSIS WITH MICROSCOPIC - Abnormal; Notable for the following components:    Glucose, Ur TRACE (*)     Ketones, Urine 1+ (*)     Urine Hgb TRACE (*)     Protein, UA 2+ (*)     Bacteria, UA FEW (*)     All other components within normal limits   MAGNESIUM - Abnormal; Notable for the following components:    Magnesium 1.5 (*)     All other components within normal limits   TROPONIN - Abnormal; Notable for the following components:    Troponin, High Sensitivity 31 (*)     All other components within normal limits   VENOUS BLOOD GAS, POINT OF CARE - Abnormal; Notable for the following components:    pCO2, [place] 52.1 (*)     HCO3, Venous 30.8 (*)     Positive Base Excess, [place] 4 (*)     All other components within normal limits   RAPID INFLUENZA A/B ANTIGENS   CULTURE, BLOOD 1   CULTURE, BLOOD 1   CULTURE, URINE   LACTIC ACID   FIBRINOGEN   SEDIMENTATION RATE   PROCALCITONIN   C-REACTIVE PROTEIN   HEMOGLOBIN A1C   TROPONIN   TROPONIN   TROPONIN   POCT GLUCOSE   POCT GLUCOSE   POCT GLUCOSE   POCT GLUCOSE   POCT GLUCOSE       RADIOLOGY:  XR CHEST 1 VIEW    Result Date: 1/16/2022  EXAMINATION: ONE XRAY VIEW OF THE CHEST 1/16/2022 1:18 pm COMPARISON: The previous study performed 07/27/2019. HISTORY: ORDERING SYSTEM PROVIDED HISTORY: SOB cough leg swelling hypoxia TECHNOLOGIST PROVIDED HISTORY: SOB cough leg swelling hypoxia Reason for Exam: SOB, leg swelling, fever. AP UPRIGHT PORTABLE FINDINGS: The lungs are hypoinflated. Cardiomegaly is again identified. Diffuse, increased interstitial markings are noted throughout both lungs.   Some of this can be seen on the prior examination and may represent baseline chronic interstitial lung changes. The increased prominence on this examination could be secondary to the suboptimal inspiratory effort, however, the presence of pulmonary vascular congestion/mild CHF or bilateral interstitial pneumonia cannot be excluded. Clinical correlation is recommended. Follow-up radiographs are recommended. No active pleural disease is seen. The thoracic aorta is again atherosclerotic. The tracheobronchial tree is midline and patent. Osteo-degenerative changes are again noted involving the thoracic spine. Hypoinflated lungs. Cardiomegaly again seen, when compared to the previous study performed 07/27/2019. Diffuse, increased interstitial markings throughout both lungs, some of which can be seen on the prior study may represent baseline chronic interstitial lung changes. The increased prominence on this exam could be secondary to the suboptimal inspiratory effort. The presence of pulmonary vascular congestion/mild CHF or bilateral interstitial pneumonia cannot be excluded. Clinical correlation is recommended. CT CHEST PULMONARY EMBOLISM W CONTRAST    Result Date: 1/16/2022  EXAMINATION: CTA OF THE CHEST, 1/16/2022 2:09 pm TECHNIQUE: CTA of the chest was performed after the administration of intravenous contrast.  Multiplanar reformatted images are provided for review. MIP images are provided for review. Dose modulation, iterative reconstruction, and/or weight based adjustment of the mA/kV was utilized to reduce the radiation dose to as low as reasonably achievable. COMPARISON: CT scan of the chest from 2008, and chest x-ray from the same day at 13:10. HISTORY: ORDERING SYSTEM PROVIDED HISTORY:  +D-dimer, hypoxia, SOB. TECHNOLOGIST PROVIDED HISTORY: +D-dimer, hypoxia, SOB. Decision Support Exception - unselect if not a suspected or confirmed emergency medical condition->Emergency Medical Condition (MA) Reason for Exam:  D-dimer 1.73 COVID +, SOB. subacute or acute interstitial lung disease, best seen left upper lobe; differential includes interstitial pneumonia or pneumonitis superimposed on emphysema, DIP, HP, and other interstitial pneumonias as well as infectious or inflammatory process superimposed on emphysema disease. Findings are not typical for COVID-19 pneumonia, but an element of the latter should be considered. Thickening and distortion left major fissure with ill-defined mass-like focus left lower lobe. The latter could also be infectious or inflammatory, although neoplasm should be excluded. Underlying worsening emphysema. Nodular densities, best seen right upper lobe. Small pleural effusions, slightly larger on the left. See recommendations below. Mild mediastinal and left hilar adenopathy; see recommendations below. Worsening now moderate-severe pulmonary artery hypertension. Mild cardiomegaly. Stable mild dilatation ascending thoracic aorta. Additional unchanged or incidental findings, as above. RECOMMENDATIONS: Clinical correlation and short-term follow-up CT chest in 1-4 months depending upon the clinical presentation/course.        EKG    EKG Interpretation    Interpreted by emergency department physician    Rhythm: normal sinus   Rate: normal  Axis: normal  Ectopy: premature ventricular contractions (infrequent)  Conduction: 1st degree AV block  ST Segments: no acute change  T Waves: no acute change  Q Waves: III    Clinical Impression: non-specific EKG    All EKG's are interpreted by the Emergency Department Physician whoeither signs or Co-signs this chart in the absence of a cardiologist.    EMERGENCY DEPARTMENT COURSE:  ED Course as of 01/16/22 1645   Sun Jan 16, 2022   1252 CBC with DIFF(!):    WBC 2.7(!)   RBC 6.82(!)   Hemoglobin Quant 17.5(!)   Hematocrit 56.6(!)   MCV 83.0   MCH 25.7   MCHC 30.9   RDW 16.6(!)   Platelet Count 713(!)   MPV 12.4   NRBC Automated 0.0   Differential Type NOT REPORTED   Seg Neutrophils PENDING Lymphocytes PENDING   Monocytes PENDING   Eosinophils % PENDING   Basophils PENDING   Immature Granulocytes PENDING   Segs Absolute PENDING   Absolute Lymph # PENDING   Absolute Mono # PENDING   Absolute Eos # PENDING   Basophils Absolute PENDING   Absolute Immature Granulocyte PENDING   WBC Morphology NOT REPORTED   RBC Morphology NOT REPORTED   Platelet Estimate NOT REPORTED [AO]   1814 Venous Blood Gas, POC(!):    pH, [place] 7.380   pCO2, [place] 52. 1(!)   pO2, [place] 36.8   HCO3, Venous 30.8(!)   Total CO2, Venous NOT REPORTED   Negative Base Excess, [place] NOT REPORTED   Positive Base Excess, [place] 4(!)   O2 Sat, [place] 68   O2 Device/Flow/% NOT REPORTED   Matt Test NOT REPORTED   Sample Site NOT REPORTED   Mode NOT REPORTED   FIO2 NOT REPORTED   Pt Temp NOT REPORTED   POC pH Temp NOT REPORTED   POC pCO2 Temp NOT REPORTED   POC pO2 Temp NOT REPORTED [AO]   1252 Sed Rate: 3 [AO]   1301 Lactic Acid: 2.0 [AO]   1312 LD(!): 336 [AO]   1312 D-Dimer, Quant(!): 1.73 [AO]   1312 Comprehensive Metabolic Panel w/ Reflex to MG(!):    Glucose 215(!)   BUN 20   Creatinine 1.31(!)   Bun/Cre Ratio 15   CALCIUM, SERUM, 912614 8.5(!)   Sodium 135   Potassium PENDING   Chloride 94(!)   CO2 26   Anion Gap 15   Alk Phos 123   ALT 24   AST 45(!)   Bilirubin 0.68   Total Protein 6.5   Albumin 3.8   Albumin/Globulin Ratio NOT REPORTED   GFR Non- 53(!)   GFR  >60   GFR Comment        GFR Staging NOT REPORTED [AO]   1315 SARS-CoV-2, Rapid(!): DETECTED [AO]   1315 Fibrinogen: 402 [AO]   1315 DIRECT EXAM.: NEGATIVE for Influenza A + B antigens. PCR testing to confirm this result is available upon request.  Specimen will be saved in the laboratory for 7 days. Please call [phone] if PCR testing is indicated.  [AO]   1329 Pro-BNP(!): 9,327 [AO]   1333 Troponin, High Sensitivity(!): 36 [AO]   1341 Magnesium(!): 1.5 [AO]   1359 Dr Jeannette Bates agrees with plan [AO]   1106 Urinalysis with Microscopic(!):    Color, UA Yellow   Turbidity UA Clear   Glucose, UA TRACE(!)   Bilirubin, Urine NEGATIVE   Ketones, Urine 1+(!)   Specific Gravity, UA 1.025   Urine Hgb TRACE(!)   pH, UA 7.0   Protein, UA 2+(!)   Urobilinogen, Urine Normal   Nitrite, Urine NEGATIVE   Leukocyte Esterase, Urine NEGATIVE   Urinalysis Comments NOT REPORTED   -        WBC, UA None   RBC, UA None   Casts UA NOT REPORTED   Crystals, UA NOT REPORTED   Epithelial Cells, UA None   Renal Epithelial, UA NOT REPORTED   Bacteria, UA FEW(!)   Mucus, UA NOT REPORTED   Trichomonas, UA NOT REPORTED   Amorphous, UA NOT REPORTED   Other Observations UA NOT REPORTED   Yeast, Urine NOT REPORTED [AO]   1502 Troponin, High Sensitivity(!): 31 [AO]   1539 CT CHEST PULMONARY EMBOLISM W CONTRAST [AO]   1545 Patient updated states that he is feeling a littler bit better  [AO]   1613 Speaking to Dr. She Mooney [AO]      ED Course User Index  [AO] Ev Leonardo Mckeon 1721, DO       MDM  Number of Diagnoses or Management Options  Acute on chronic systolic CHF (congestive heart failure) (HCC)  Acute respiratory failure with hypoxia (HCC)  BLANCHE (acute kidney injury) ([facility]  COPD exacerbation (HCC)  COVID-19  Hypokalemia  Hypomagnesemia  Diagnosis management comments: 75-year-old male with known history of COPD and CHF but does not wear oxygen at home presents emergency department dyspnea on exertion and leg swelling. Initial evaluation he is mildly hypertensive but also febrile, tachypneic and hypoxic on room air requiring 4 L nasal cannula. Heart regular rate and rhythm. Lungs with rhonchi. 3+ pitting edema to bilateral lower extremities which are exquisitely tender to touch. Venous insufficiency also noted. Patient appears to be in acute congestive heart failure exacerbation. Mild elevation in tropes, however EKG unchanged. Chest x-ray for CHF exacerbation/pulmonary edema versus infectious process versus COVID-19. Unfortunately he is COVID-19 positive.   Did speak to cardiology, feel like his mild elevation in troponins is likely secondary to heart failure. Agree with diuresis. He is on 40 of Lasix oral at home, this was given to him by IV. He was also given Decadron for both COVID and likely COPD exacerbation. Replaced both his magnesium and potassium IV. Mild BLANCHE. Positive D-dimer, CT of the chest redemonstrated COVID-19 pneumonia, however there is no pulmonary embolism. There is also an area on the left concerning for potential suspicious mass. Patient states that he did start to have some mild improvement in how he felt while he was in the emergency department. Plan for admission to hospitalist with cardiology on his consult. Amount and/or Complexity of Data Reviewed  Clinical lab tests: ordered and reviewed  Tests in the radiology section of CPT®: ordered and reviewed  Review and summarize past medical records: yes  Discuss the patient with other providers: yes  Independent visualization of images, tracings, or specimens: yes    Critical Care  Total time providing critical care: < 30 minutes    Patient Progress  Patient progress: stable      PROCEDURES:  Procedures     CONSULTS:  IP CONSULT TO CARDIOLOGY  IP CONSULT TO HOSPITALIST  IP CONSULT TO INFECTIOUS DISEASES  IP CONSULT TO PULMONOLOGY    CRITICAL CARE:  There was significant risk of life threatening deterioration of patient's condition requiring my direct management. Critical care time 19 minutes, excluding any documented procedures. FINAL IMPRESSION     1. COVID-19    2. Acute respiratory failure with hypoxia (HCC)    3. BLANCHE (acute kidney injury) ([address]    4. Acute on chronic systolic CHF (congestive heart failure) ([address]    5. Hypokalemia    6. Hypomagnesemia    7. COPD exacerbation ([address]         DISPOSITION / PLAN   DISPOSITION      ADMIT    Goldie Pike [place], [place]  Emergency Medicine Physician    (Please note that portions of this note were completed with a voice recognition program.  Efforts were made to edit the dictations but occasionally words are mis-transcribed.)        Andree Odom DO  01/16/22 4400

## 2022-01-16 NOTE — H&P
History & Physical  [facility]    Adult Hospitalist      Name: Fermin Coates  MRN: [id number]     Acct: [de-identified]  Room: [facility]07/07    Admit Date: 1/16/2022 11:51 AM  PCP: Janey Layne MD    Primary Problem  Active Problems:    COVID  Resolved Problems:    * No resolved hospital problems. *        Assesment:   Acute congestive heart failure, unspecified  COVID-19  Viral pneumonia secondary to above  Essential hypertension  Mixed hyperlipidemia  Diabetes mellitus type 2  History of seizure disorder  CKD stage III  Lung mass? Plan:   Admit patient to intermediate floor  Oxygen to keep SPO2 more than 90%  Telemetry  Check vitals closely  CBC BMP daily    Trend troponin  Trend BMP  Echocardiogram  IV Lasix  Cardiology consult    IV Decadron  IV azithromycin  Bronchodilators  Infectious disease consult  Pulmonology consult    Continue Keppra  Continue amlodipine atorvastatin  Continue aspirin  Continue other medication as below.     Scheduled Meds:   magnesium sulfate  2,000 mg IntraVENous Once    azithromycin  500 mg IntraVENous Once    cefTRIAXone (ROCEPHIN) IV  1,000 mg IntraVENous Once    [START ON 1/17/2022] dexamethasone  6 mg IntraVENous Q24H    furosemide  40 mg IntraVENous BID    sodium chloride flush  5-40 mL IntraVENous 2 times per day    enoxaparin  40 mg SubCUTAneous Daily    levETIRAcetam  500 mg Oral BID    aspirin  325 mg Oral Daily    amLODIPine  5 mg Oral Nightly    atorvastatin  20 mg Oral Daily    ALPRAZolam  0.5 mg Oral TID    insulin glargine  40 Units SubCUTAneous BID    insulin lispro  0-18 Units SubCUTAneous TID WC    insulin lispro  0-9 Units SubCUTAneous Nightly     Continuous Infusions:   sodium chloride      dextrose       PRN Meds:  sodium chloride flush, 10 mL, PRN  albuterol sulfate HFA, 2 puff, Q4H PRN  sodium chloride flush, 10 mL, PRN  sodium chloride, 25 mL, PRN  potassium chloride, 40 mEq, PRN   Or  potassium alternative oral replacement, 40 mEq, PRN   Or  potassium chloride, 10 mEq, PRN  magnesium sulfate, 1,000 mg, PRN  ondansetron, 4 mg, Q8H PRN   Or  ondansetron, 4 mg, Q6H PRN  magnesium hydroxide, 30 mL, Daily PRN  acetaminophen, 650 mg, Q6H PRN   Or  acetaminophen, 650 mg, Q6H PRN  glucose, 15 g, PRN  dextrose, 12.5 g, PRN  glucagon (rDNA), 1 mg, PRN  dextrose, 100 mL/hr, PRN        Chief Complaint:     Chief Complaint   Patient presents with    Leg Swelling     bilateral, has CHF, on diuretic, EMT saw pt , assisted pt into car    Fever    Other     low SPO2         History of Present Illness:      Odette Freeman is a 64 y.o.  male who presents with Leg Swelling (bilateral, has CHF, on diuretic, EMT saw pt , assisted pt into car), Fever, and Other (low SPO2)  This is a 78-year-old gentleman admitted via ER, come to ER with complaint of having shortness of breath, patient has medical history significant for COPD patient with history of cardiac failure: Patient noted that he has been having increasing swelling in his lower extremity and becoming more short of breath, further patient also been having some body aches and sweats, patient patient testing in the emergency room showed that he is positive for COVID-19 also noticed to have an elevated BNP, imaging concerning for fluid overload, admitted for further regiment    I have personally reviewed the past medical history, past surgical history, medications, social history, and family history, and summarized in the note. Review of Systems:     All 10 point system is reviewed and negative otherwise mentioned in HPI.       Past Medical History:     Past Medical History:   Diagnosis Date    Arthritis     Atherosclerotic plaque 7/28/2019    Cervical disc disease     degenerative disc disease    Diabetes mellitus ([facility]     type 2    History of blood transfusion     Hx of blood clots     left leg    Hyperlipidemia     Neuropathy     Right kidney mass     \"urologist is watching\"    Snores  Type 2 diabetes mellitus treated with insulin ([facility] [address] 7/28/2019        Past Surgical History:     Past Surgical History:   Procedure Laterality Date    ABDOMINAL AORTIC ANEURYSM REPAIR  2005    CARPAL TUNNEL RELEASE Bilateral     CERVICAL SPINE SURGERY      COLONOSCOPY      benign polyps removed    INGUINAL HERNIA REPAIR Right     [place] REPAIR INCISIONAL HERNIA,REDUCIBLE N/A 5/10/2017    HERNIA VENTRAL REPAIR WITH MESH  performed by Brenda Robison DO at [address] [place] [address] Road  05/10/2017    with mesh        Medications Prior to Admission:       Prior to Admission medications    Medication Sig Start Date End Date Taking? Authorizing Provider   levETIRAcetam (KEPPRA) 500 MG tablet Take 1 tablet by mouth 2 times daily 11/3/21   Inge Valdez MD   lisinopril (PRINIVIL;ZESTRIL) 20 MG tablet Take 1 tablet by mouth daily 11/3/21   Inge Valdez MD   Iron-Vitamin C (IRON 100/C) 100-250 MG TABS iron    Historical Provider, MD   venlafaxine (EFFEXOR XR) 150 MG extended release capsule Take 1 capsule by mouth daily (with breakfast) 7/29/19   Arabella Kiser   HUMALOG KWIKPEN 100 UNIT/ML pen Inject 5-15 Units into the skin 3 times daily (before meals) 7/28/19   Arabella Kiser   vitamin B-1 (THIAMINE) 100 MG tablet Take 100 mg by mouth daily    Historical Provider, MD   ferrous sulfate 325 (65 Fe) MG tablet Take 325 mg by mouth daily (with breakfast)    Historical Provider, MD   ALPRAZolam [place]) 0.5 MG tablet Take 0.5 mg by mouth three times daily.     Historical Provider, MD   insulin glargine (BASAGLAR KWIKPEN) 100 UNIT/ML injection pen Inject 40 Units into the skin 2 times daily    Historical Provider, MD   furosemide (LASIX) 40 MG tablet Take 1 tablet by mouth 2 times daily 12/11/18   Bing Mares MD   tiotropium (SPIRIVA RESPIMAT) 2.5 MCG/ACT AERS inhaler Inhale 2 puffs into the lungs daily     Historical Provider, MD   albuterol sulfate  (90 clear  Ears - hearing appears to be intact  Nose - no drainage noted  Mouth - mucous membranes moist  Neck - supple, no carotid bruits, thyroid not palpable  Chest -basal crackles to auscultation, normal effort  Heart - normal rate, regular rhythm, no murmur  Abdomen - soft, nontender, nondistended, bowel sounds present all four quadrants, no masses, hepatomegaly or splenomegaly  Neurological - normal speech, no focal findings or movement disorder noted, cranial nerves II through XII grossly intact  Extremities - peripheral pulses palpable, 2+ edema  Skin - no gross lesions, rashes, or induration noted        Data:     Labs:    Hematology:  Recent Labs     01/16/22  1220   WBC 2.7*   RBC 6.82*   HGB 17.5*   HCT 56.6*   MCV 83.0   MCH 25.7   MCHC 30.9   RDW 16.6*   *   MPV 12.4   SEDRATE 3   DDIMER 1.73*     Chemistry:  Recent Labs     01/16/22  1220 01/16/22  1430     --    K 3.5*  --    CL 94*  --    CO2 26  --    GLUCOSE 215*  --    BUN 20  --    CREATININE 1.31*  --    MG 1.5*  --    ANIONGAP 15  --    LABGLOM 53*  --    GFRAA >60  --    CALCIUM 8.5*  --    PROBNP 9,327*  --    TROPHS 36* 31*     Recent Labs     01/16/22  1220   PROT 6.5   LABALBU 3.8   AST 45*   ALT 24   *   ALKPHOS 123   BILITOT 0.68       Lab Results   Component Value Date    INR 1.0 11/03/2021    INR 1.0 07/27/2019    INR 1.1 12/07/2018    PROTIME 11.1 11/03/2021    PROTIME 10.5 07/27/2019    PROTIME 11.6 12/07/2018       Lab Results   Component Value Date/Time    SPECIAL NOT REPORTED 01/16/2022 12:30 PM     Lab Results   Component Value Date/Time    CULTURE NO GROWTH <24 HRS 01/16/2022 12:20 PM       Lab Results   Component Value Date    POCPH 7.37 12/07/2018    POCPCO2 34 12/07/2018    POCPO2 55 12/07/2018    POCHCO3 19.8 12/07/2018    NBEA 5 12/07/2018    PBEA NOT REPORTED 12/07/2018    ZUI4UUU 21 12/07/2018    UEES4VDG 88 12/07/2018    FIO2 NOT REPORTED 01/16/2022       Radiology:    XR CHEST 1 VIEW    Result Date: 1/16/2022  Hypoinflated lungs. Cardiomegaly again seen, when compared to the previous study performed 07/27/2019. Diffuse, increased interstitial markings throughout both lungs, some of which can be seen on the prior study may represent baseline chronic interstitial lung changes. The increased prominence on this exam could be secondary to the suboptimal inspiratory effort. The presence of pulmonary vascular congestion/mild CHF or bilateral interstitial pneumonia cannot be excluded. Clinical correlation is recommended. CT CHEST PULMONARY EMBOLISM W CONTRAST    Result Date: 1/16/2022  No evidence of pulmonary embolism. Compared to 2008, worsening underlying emphysema, with worsening subacute or acute interstitial lung disease, best seen left upper lobe; differential includes interstitial pneumonia or pneumonitis superimposed on emphysema, DIP, HP, and other interstitial pneumonias as well as infectious or inflammatory process superimposed on emphysema disease. Findings are not typical for COVID-19 pneumonia, but an element of the latter should be considered. Thickening and distortion left major fissure with ill-defined mass-like focus left lower lobe. The latter could also be infectious or inflammatory, although neoplasm should be excluded. Underlying worsening emphysema. Nodular densities, best seen right upper lobe. Small pleural effusions, slightly larger on the left. See recommendations below. Mild mediastinal and left hilar adenopathy; see recommendations below. Worsening now moderate-severe pulmonary artery hypertension. Mild cardiomegaly. Stable mild dilatation ascending thoracic aorta. Additional unchanged or incidental findings, as above. RECOMMENDATIONS: Clinical correlation and short-term follow-up CT chest in 1-4 months depending upon the clinical presentation/course.          All radiological studies reviewed                Code Status:  Full Code    Electronically signed by Darlene Mackay Pushpa Carrizales MD on 1/16/2022 at 4:30 PM     Copy sent to Dr. Alexis Pompa MD    This note was created with the assistance of a speech-recognition program.  Although the intention is to generate a document that actually reflects the content of the visit, no guarantees can be provided that every mistake has been identified and corrected by editing. Note was updated later by me after  physical examination and  completion of the assessment.

## 2022-01-17 NOTE — CONSULTS
[facility]    Consult                        Today's Date: 1/17/2022  Patient Name: Sammy Schrader  Date of admission: 1/16/2022 11:51 AM  Patient's age: 64 y.o., 1945  Admission Dx: Hypokalemia [E87.6]  Hypomagnesemia [E83.42]  COPD exacerbation ([address] [J44.1]  BLANCHE (acute kidney injury) ([address] [N17.9]  Acute respiratory failure with hypoxia ([address] [J96.01]  Acute on chronic systolic CHF (congestive heart failure) ([address] [I50.23]  COVID [U07.1]  COVID-19 [U07.1]    Reason for Consult:  Cardiac evaluation    Requesting Physician: Christie Lucas MD    CHIEF COMPLAINT:  Shortness of breath    History Obtained From:  patient, electronic medical record    HISTORY OF PRESENT ILLNESS:      The patient is a 64 y.o.  male who is admitted to the hospital for COVID-19 PNA, CHF exacerbation. Sammy Schrader is a 64 y.o.  male who presents with Leg Swelling (bilateral, has CHF, on diuretic, EMT saw pt , assisted pt into car), Fever, and Other (low SPO2)  This is a 68-year-old gentleman admitted via ER, come to ER with complaint of having shortness of breath, patient has medical history significant for COPD patient with history of cardiac failure: Patient noted that he has been having increasing swelling in his lower extremity and becoming more short of breath, further patient also been having some body aches and sweats, patient patient testing in the emergency room showed that he is positive for COVID-19 also noticed to have an elevated BNP, imaging concerning for fluid overload. Has never been seen by cardiology    Past Medical History:   has a past medical history of Arthritis, Atherosclerotic plaque, Cervical disc disease, Diabetes mellitus ([address] History of blood transfusion, Hx of blood clots, Hyperlipidemia, Neuropathy, Right kidney mass, Snores, and Type 2 diabetes mellitus treated with insulin ([address]     Past Surgical History:   has a past surgical history that includes Abdominal aortic aneurysm repair (2005); Carpal tunnel release (Bilateral); Cervical spine surgery; Inguinal hernia repair (Right); Upper gastrointestinal endoscopy; Colonoscopy; ventral hernia repair (05/10/2017); and pr repair incisional hernia,reducible (N/A, 5/10/2017). Home Medications:    Prior to Admission medications    Medication Sig Start Date End Date Taking? Authorizing Provider   levETIRAcetam (KEPPRA) 500 MG tablet Take 1 tablet by mouth 2 times daily 11/3/21  Yes Douglas Duckworth MD   lisinopril (PRINIVIL;ZESTRIL) 20 MG tablet Take 1 tablet by mouth daily 11/3/21  Yes Douglas Duckworth MD   venlafaxine (EFFEXOR XR) 150 MG extended release capsule Take 1 capsule by mouth daily (with breakfast) 7/29/19  Yes Arabella Kiser   vitamin B-1 (THIAMINE) 100 MG tablet Take 100 mg by mouth daily   Yes Historical Provider, MD   ferrous sulfate 325 (65 Fe) MG tablet Take 325 mg by mouth daily (with breakfast)   Yes Historical Provider, MD   ALPRAZolam (XANAX) 0.5 MG tablet Take 0.5 mg by mouth three times daily.    Yes Historical Provider, MD   furosemide (LASIX) 40 MG tablet Take 1 tablet by mouth 2 times daily 12/11/18  Yes Mary Kate Evans MD   tiotropium (SPIRIVA RESPIMAT) 2.5 MCG/ACT AERS inhaler Inhale 2 puffs into the lungs daily    Yes Historical Provider, MD   albuterol sulfate  (90 Base) MCG/ACT inhaler Inhale 2 puffs into the lungs every 6 hours as needed for Wheezing or Shortness of Breath   Yes Historical Provider, MD   metoprolol succinate (TOPROL XL) 50 MG extended release tablet Take 50 mg by mouth daily   Yes Historical Provider, MD   Multiple Vitamins-Minerals (MULTIVITAMIN ADULT) TABS Take 1 tablet by mouth daily   Yes Historical Provider, MD   vitamin D (CHOLECALCIFEROL) 1000 UNIT TABS tablet Take 1,000 Units by mouth daily   Yes Historical Provider, MD   fluticasone (FLONASE) 50 MCG/ACT nasal spray 1 spray by Each Nare route daily as needed for Rhinitis   Yes Historical Provider, MD   aspirin 325 MG EC tablet Take 325 mg by mouth daily    Yes Historical Provider, MD   Omega-3 Fatty Acids (FISH OIL) 1000 MG CAPS Take 1 capsule by mouth daily    Yes Historical Provider, MD   amLODIPine (NORVASC) 5 MG tablet Take 5 mg by mouth nightly    Yes Historical Provider, MD   mirtazapine (REMERON) 45 MG tablet Take 45 mg by mouth nightly   Yes Historical Provider, MD   Iron-Vitamin C (IRON 100/C) 100-250 MG TABS iron    Historical Provider, MD   HUMALOG KWIKPEN 100 UNIT/ML pen Inject 5-15 Units into the skin 3 times daily (before meals) 7/28/19   Arabella Kiser   insulin glargine (BASAGLAR KWIKPEN) 100 UNIT/ML injection pen Inject 40 Units into the skin 2 times daily    Historical Provider, MD   atorvastatin (LIPITOR) 20 MG tablet Take 20 mg by mouth daily    Historical Provider, MD       Allergies: Barbiturates, Codeine, and Sulfa antibiotics    Social History:   reports that he has quit smoking. He has never used smokeless tobacco. He reports that he does not drink alcohol and does not use drugs. Family History: family history includes Ovarian Cancer in his sister and sister. No h/o sudden cardiac death. No for premature CAD    REVIEW OF SYSTEMS:    Constitutional: there has been no unanticipated weight loss. There's been No change in energy level, No change in activity level. Eyes: No visual changes or diplopia. No scleral icterus. ENT: No Headaches, hearing loss or vertigo. No mouth sores or sore throat. Cardiovascular: see above  Respiratory: see above  Gastrointestinal: No abdominal pain, appetite loss, blood in stools. Genitourinary: No dysuria, trouble voiding, or hematuria. Musculoskeletal:  No gait disturbance, No weakness or joint complaints. Integumentary: No rash or pruritis. Neurological: No headache or diplopia. No tingling  Psychiatric: No anxiety, or depression. Endocrine: No temperature intolerance.    Hematologic/Lymphatic: No abnormal bruising or bleeding, blood clots or swollen lymph nodes. Allergic/Immunologic: No nasal congestion or hives. PHYSICAL EXAM:      /69   Pulse 70   Temp 98.2 °F (36.8 °C) (Oral)   Resp 19   Ht 5' 10.98\" (1.803 m)   Wt 228 lb (103.4 kg)   SpO2 93%   BMI 31.81 kg/m²    Constitutional and General Appearance: alert, cooperative, no distress and appears stated age  HEENT: PERRL, no cervical lymphadenopathy. No masses palpable. Normal oral mucosa  Respiratory:  Normal excursion and expansion without use of accessory muscles  Resp Auscultation: Good respiratory effort. No for increased work of breathing. On auscultation: clear to auscultation bilaterally  Cardiovascular:  Heart tones are crisp and normal. regular S1 and S2.  Jugular venous pulsation Normal  The carotid upstroke is normal in amplitude and contour without delay or bruit   Abdomen:   soft  Bowel sounds present  Extremities:   No edema  Neurological:  Alert and oriented. DATA:    Diagnostics:    EKG: normal EKG, normal sinus rhythm, unchanged from previous tracings. ECHO: previously taken 2018 and show as below. Ejection fraction: 55%  Stress Test: not obtained. Cardiac Angiography: not obtained. Echo 2018  Summary  Moderate left ventricular hypertrophy  Global left ventricular systolic function is normal  Estimated ejection fraction is 55 % . Left atrium is mildly dilated. Right atrium is mildly dilated . No significant valvular regurgitation or stenosis seen. No pericardial effusion seen. Normal aortic root dimension. Labs:     CBC:   Recent Labs     01/16/22  1220 01/17/22  0435   WBC 2.7* 1.8*   HGB 17.5* 17.7*   HCT 56.6* 57.3*   * 100*     BMP:   Recent Labs     01/16/22  1220 01/17/22  0435    138   K 3.5* 3.6*   CO2 26 28   BUN 20 27*   CREATININE 1.31* 1.49*   LABGLOM 53* 46*   GLUCOSE 215* 195*     BNP: No results for input(s): BNP in the last 72 hours.   PT/INR:   Recent Labs     01/17/22  0435   PROTIME 13.3   INR 1.0     APTT:No results for input(s): APTT in the last 72 hours. CARDIAC ENZYMES:No results for input(s): CKTOTAL, CKMB, CKMBINDEX, TROPONINI in the last 72 hours. FASTING LIPID PANEL:  Lab Results   Component Value Date    HDL 30 11/03/2021    TRIG 181 11/03/2021     LIVER PROFILE:  Recent Labs     01/16/22  1220   AST 45*   ALT 24   LABALBU 3.8       IMPRESSION:    Patient Active Problem List   Diagnosis    Biventricular CHF (congestive heart failure) ([facility]    CKD (chronic kidney disease) stage 3, GFR 30-59 ml/min ([facility])    Essential hypertension    Blurred vision, right eye    Type 2 diabetes mellitus treated with insulin ([address]    Atherosclerotic plaque    Weakness on left side of face    Carotid stenosis, asymptomatic, bilateral    New onset seizure ([address]    COVID     1. Covid 19 PNA  2. Acute CHF, likely diastolic  3. HTN  4. Minimal trop elevation, demand ischemia in setting of acute CHF & COVID PNA  5. CKD3  6. Seizure disorder  7. Hypomagnesium and Hypokalemia  8. HLP        RECOMMENDATIONS:  Presently stable with some improvement. On 4L NC. Continue IV Lasix through today and appreciate nephrology recommendations on diuretics. Resume home BB and will hold off on Lisinopril until seen by nephrology  Trop elevation flat and in setting of volume overload, COVID, and known CAD. No trend to suggest ischemic. Continue PO ASA, statin, & BB. Will check echo to reassess LVEF and WMA  COVID-19 tx per primary/ID      Discussed with patient and Nurse    HERB Wheeler - CNP    Attending Cardiologist Addendum: I have reviewed the history, physical, subjective, objective, assessment, and plan with the CNP and agree with the note. I have made changes to the note above as needed. Thank you for allowing me to participate in the care of this patient, please do not hesitate to call if you have any questions. [place] Query, [id number] [facility]  [facility]. IQ[facility]  [phone]

## 2022-01-17 NOTE — CONSULTS
[facility]  Initial Consult Note  Date: 1/17/2022    Hospital day :1     Impression:   1. COVID-19 virus infection tested + 1/16/2022  2. Acute respiratory insufficiency on 3 L of oxygen by nasal  3. Emphysema with worsening changes on imaging  4. Worsening acute interstitial lung disease and clinically not typical of COVID-19 virus infection  5. Worsening moderate to severe pulmonary artery hypertension  6. Bilateral lower extremity edema likely related to above    Recommendations   · COVID therapy:  · The patient is COVID-19 vaccinated though on boosted and has very low inflammatory markers  · Given the constellation of symptoms I doubt that the patient truly has COVID pneumonia  · I would hold off any antiviral therapy, monoclonal antibiotic, or immunosuppressive therapy as again COVID-19 virus infection is not the likely cause of his presentation  · The patient likely has increasing interstitial changes either relating to an underlying pulmonary process versus congestive heart failure. · I would recommend diuretic therapy  · Continue supplemental oxygen  · I will follow his clinical progress and adjust therapy accordingly    Chief complaint/reason for consultation:   COVID-19 virus infection    History of Present Illness:   Stacey Al is a 64y.o.-year-old male who was initially admitted on 1/16/2022. Zhanna Leyva has a history of diabetes mellitus type 2, essential hypertension, seizure disorder, chronic kidney disease stage III, hyperlipidemia among other medical problems. The patient reports that about 1 to 2 months ago he had his diabetes medications changed and since that time he has noticed increasing swelling in his legs, hardness in the legs, difficulty ambulating, and weight gain from 178 to 255 pounds over the last 1 to 2 months. He did not report any subjective fevers, chills, cough or shortness of breath. He denies any loss of smell or change in taste.   No abdominal pain nausea vomiting or diarrhea. The patient does report that he has home oxygen that he uses as needed at home and he reports that he hardly needs it. He is vaccinated did receive the J&J vaccine but has not yet received a booster dose. The patient presented to the emergency department and was found to have leukopenia with a white blood cell count of 2.7 and thrombocytopenia with a platelet count of 367. Creatinine slightly elevated at 1.31 and proBNP is elevated at 9327. Chest x-ray showed hyperinflated lungs with cardiomegaly seen and diffuse increased interstitial markings in both lungs which may represent baseline chronic interstitial lung changes given that these findings were present before. A CT of the chest was done with IV contrast that showed no evidence of pulmonary embolism and compared to 2008 there is worsening underlying emphysema with worsening subacute or acute interstitial lung disease best seen in the left upper lobe. Findings were not felt typical for COVID-19 virus pneumonia. There was thickening and distortion of the left major fissure with an ill-defined masslike focus in the left lower lobe. There is worsening moderate to severe pulmonary artery hypertension    COVID testing was positive and I was asked to evaluate and help with COVID-19 virus infection    I have personally reviewed the past medical history, past surgical history, medications, social history, and family history, and I have updated the database accordingly.   Past Medical History:     Past Medical History:   Diagnosis Date    Arthritis     Atherosclerotic plaque 7/28/2019    Cervical disc disease     degenerative disc disease    Diabetes mellitus ([address]     type 2    History of blood transfusion     Hx of blood clots     left leg    Hyperlipidemia     Neuropathy     Right kidney mass     \"urologist is watching\"    Snores     Type 2 diabetes mellitus treated with insulin ([address] 7/28/2019     Past Surgical History:     Past Surgical History:   Procedure Laterality Date    ABDOMINAL AORTIC ANEURYSM REPAIR  2005    CARPAL TUNNEL RELEASE Bilateral     CERVICAL SPINE SURGERY      COLONOSCOPY      benign polyps removed    INGUINAL HERNIA REPAIR Right     LA REPAIR INCISIONAL HERNIA,REDUCIBLE N/A 5/10/2017    HERNIA VENTRAL REPAIR WITH MESH  performed by Anya Betancourt DO at [address]  05/10/2017    with mesh     Medications:      metoprolol succinate  50 mg Oral Nightly    dexamethasone  6 mg IntraVENous Q24H    furosemide  40 mg IntraVENous BID    sodium chloride flush  5-40 mL IntraVENous 2 times per day    enoxaparin  40 mg SubCUTAneous Daily    levETIRAcetam  500 mg Oral BID    aspirin  325 mg Oral Daily    amLODIPine  5 mg Oral Nightly    atorvastatin  20 mg Oral Daily    ALPRAZolam  0.5 mg Oral TID    insulin glargine  40 Units SubCUTAneous BID    insulin lispro  0-18 Units SubCUTAneous TID WC    insulin lispro  0-9 Units SubCUTAneous Nightly     Social History:     Social History     Socioeconomic History    Marital status:      Spouse name: Not on file    Number of children: Not on file    Years of education: Not on file    Highest education level: Not on file   Occupational History    Not on file   Tobacco Use    Smoking status: Former Smoker    Smokeless tobacco: Never Used    Tobacco comment: quit 30 years ago   Substance and Sexual Activity    Alcohol use: No    Drug use: No    Sexual activity: Not on file   Other Topics Concern    Not on file   Social History Narrative    Not on file     Social Determinants of Health     Financial Resource Strain:     Difficulty of Paying Living Expenses: Not on file   Food Insecurity:     Worried About Running Out of Food in the Last Year: Not on file    Dona of Food in the Last Year: Not on file   Transportation Needs:     Lack of Transportation (Medical):  Not on file    Lack of Transportation (Non-Medical): Not on file   Physical Activity:     Days of Exercise per Week: Not on file    Minutes of Exercise per Session: Not on file   Stress:     Feeling of Stress : Not on file   Social Connections:     Frequency of Communication with Friends and Family: Not on file    Frequency of Social Gatherings with Friends and Family: Not on file    Attends Zoroastrian Services: Not on file    Active Member of [address] [place] [address] or Organizations: Not on file    Attends Club or Organization Meetings: Not on file    Marital Status: Not on file   Intimate Partner Violence:     Fear of Current or Ex-Partner: Not on file    Emotionally Abused: Not on file    Physically Abused: Not on file    Sexually Abused: Not on file   Housing Stability:     Unable to Pay for Housing in the Last Year: Not on file    Number of [place] in the Last Year: Not on file    Unstable Housing in the Last Year: Not on file     Family History:     Family History   Problem Relation Age of Onset    Ovarian Cancer Sister     Ovarian Cancer Sister       Allergies: Barbiturates, Codeine, and Sulfa antibiotics     Review of Systems:   General: No fevers or chills. Eyes: No double vision or blurry vision. ENT: No sore throat or runny nose. Cardiovascular: No chest pain or palpitations. Lung: No significant cough but did have some mild increasing shortness of breath  Abdomen: No nausea, vomiting, diarrhea, or abdominal pain. Genitourinary: No increased urinary frequency, or dysuria. Musculoskeletal: He has had increasing swelling in the lower extremities bilaterally  Hematologic: No bleeding or bruising. Neurologic: No headache, weakness, numbness, or tingling.     Physical Examination :   /69   Pulse 70   Temp 98.2 °F (36.8 °C) (Oral)   Resp 19   Ht 5' 10.98\" (1.803 m)   Wt 228 lb (103.4 kg)   SpO2 93%   BMI 31.81 kg/m²     Temperature Range: Temp: 98.2 °F (36.8 °C) Temp  Av.3 °F (36.8 °C) Min: 97.5 °F (36.4 °C)  Max: 100.2 °F (37.9 °C)  General Appearance: Awake, alert, and in no apparent distress  Head: Normocephalic, without obvious abnormality, atraumatic  Eyes: Pupils equal, round, reactive, to light and accommodation; extraocular movements intact; sclera anicteric; conjunctivae pink  ENT: Oropharynx clear, without erythema, exudate, or thrush. Neck: Supple, without lymphadenopathy. Pulmonary/Chest: Clear to auscultation, without wheezes, rales, or rhonchi  Cardiovascular: Regular rate and rhythm without murmurs, rubs, or gallops. Abdomen: Soft, nontender, nondistended. Extremities: There is significant soft tissue swelling in the lower extremities bilaterally which are taut in the legs to the thighs with some mild dermatitis skin changes  Neurologic: No gross sensory or motor deficits. Skin: Warm and dry with no rash.     Medical Decision Making:   I have independently reviewed/ordered the following labs:  CBC with Differential:   Recent Labs     01/16/22  1220 01/17/22  0435   WBC 2.7* 1.8*   HGB 17.5* 17.7*   HCT 56.6* 57.3*   * 100*   LYMPHOPCT 16* 21*   MONOPCT 21* 17*     BMP:   Recent Labs     01/16/22  1220 01/17/22  0435    138   K 3.5* 3.6*   CL 94* 94*   CO2 26 28   BUN 20 27*   CREATININE 1.31* 1.49*   MG 1.5*  --      Hepatic Function Panel:   Recent Labs     01/16/22  1220   PROT 6.5   LABALBU 3.8   BILITOT 0.68   ALKPHOS 123   ALT 24   AST 45*       Lab Results   Component Value Date    PROCAL 0.11 01/16/2022       Lab Results   Component Value Date    CRP 23.5 01/16/2022    CRP 2.0 07/27/2019     Lab Results   Component Value Date    FERRITIN 569 01/16/2022    FERRITIN 50 07/23/2019    FERRITIN 18 07/08/2019     Lab Results   Component Value Date    FIBRINOGEN 402 01/16/2022     Lab Results   Component Value Date    DDIMER 1.73 01/16/2022    DDIMER 1.18 12/07/2018     Lab Results   Component Value Date     01/16/2022       Lab Results   Component Value Date    SEDRATE 3 01/16/2022       Lab Results   Component Value Date    COVID19 DETECTED 01/16/2022    COVID19 Not Detected 11/02/2021     No results found for requested labs within last 30 days. Imaging Studies:   ONE XRAY VIEW OF THE CHEST 1/16/2022 1:18 pm FINDINGS:   Hypoinflated lungs. Cardiomegaly again seen, when compared to the previous study performed 07/27/2019. Diffuse, increased interstitial markings throughout both lungs, some of which can be seen on the prior study may represent baseline chronic interstitial lung changes. The increased prominence on this exam could be secondary to the suboptimal inspiratory effort. The presence of pulmonary vascular congestion/mild CHF or bilateral interstitial pneumonia cannot be excluded. Clinical correlation is recommended. CTA OF THE CHEST, 1/16/2022 2:09 pm           FINDINGS:   No evidence of pulmonary embolism. Compared to 2008, worsening underlying emphysema, with worsening subacute or acute interstitial lung disease, best seen left upper lobe; differential includes interstitial pneumonia or pneumonitis superimposed on emphysema, DIP, HP, and other interstitial pneumonias as well as infectious or inflammatory process superimposed on emphysema disease. Findings are not typical for COVID-19 pneumonia, but an element of the latter should be considered. Thickening and distortion left major fissure with ill-defined mass-like focus left lower lobe. The latter could also be infectious or inflammatory, although neoplasm should be excluded. Underlying worsening emphysema. Nodular densities, best seen right upper lobe. Small pleural effusions, slightly larger on the left. See recommendations below. Mild mediastinal and left hilar adenopathy; see recommendations below. Worsening now moderate-severe pulmonary artery hypertension. Mild cardiomegaly. Stable mild dilatation ascending thoracic aorta.  Additional unchanged or incidental findings, as above. RECOMMENDATIONS: Clinical correlation and short-term follow-up CT chest in 1-4 months depending upon the clinical presentation/course. Cultures:     Culture, Blood 1 [[id number]] Collected: 01/16/22 1205   Order Status: Completed Specimen: Blood Updated: 01/17/22 0316    Specimen Description . BLOOD    Special Requests RAC 12ML    Culture NO GROWTH 12 HOURS   Culture, Blood 1 [[id number]] Collected: 01/16/22 1220   Order Status: Completed Specimen: Blood Updated: 01/17/22 0314    Specimen Description . BLOOD    Special Requests LAC 12ML    Culture NO GROWTH 12 HOURS   Culture, Urine [[id number]] Collected: 01/16/22 1315   Order Status: Sent Specimen: Urine, clean catch Updated: 01/16/22 1853   COVID-19, Rapid [[id number]] (Abnormal) Collected: 01/16/22 1230   Order Status: Completed Specimen: Nasopharyngeal Swab Updated: 01/16/22 1314    Specimen Description . NASOPHARYNGEAL SWAB    SARS-CoV-2, Rapid DETECTED Abnormal     Comment:        Rapid NAAT: The specimen is POSITIVE for SARS-Cov-2, the novel coronavirus associated with   COVID-19.         This test has been authorized by the FDA under an Emergency Use Authorization (EUA) for use   by authorized laboratories.         The ID NOW COVID-19 assay is designed to detect the virus that causes COVID-19 in patients   with signs and symptoms of infection who are suspected of COVID-19. An individual without symptoms of COVID-19 and who is not shedding SARS-CoV-2 virus would   expect to have a negative (not detected) result in this assay.    Fact sheet for Healthcare Providers: Cee   Fact sheet for Patients: [email or web address]           Methodology: Isothermal Nucleic Acid Amplification         Results reported to the appropriate Health Department       Flu A/B Ag Detection [[id number]] Collected: 01/16/22 1230   Order Status: Completed Specimen: Nasopharyngeal Swab Updated: 01/16/22 1313    Specimen Description . NASOPHARYNGEAL SWAB    Special Requests NOT REPORTED    Direct Exam NEGATIVE for Influenza A + B antigens.                                PCR testing to confirm this result is available upon request. Og Coulter will be saved in the laboratory for 7 days.  Please call [phone] if PCR testing is indicated. Thank you for allowing us to participate in the care of this patient. Please call with questions. Electronically signed by Maria G Perrin MD on 1/17/2022 at 10:10 AM      [facility]  Maria G Perrin MD  [employer] messaging  OFFICE: [phone]      This note is created with the assistance of a speech recognition program.  While intending to generate a document that actually reflects the content of the visit, the document can still have some errors including those of syntax and sound a like substitutions which may escape proof reading. In such instances, actual meaning can be extrapolated by contextual diversion.

## 2022-01-17 NOTE — CARE COORDINATION
Social Work-Spoke with patient's wife regarding dc plans. Discussed therapy progress. Discussed home vs SNF vs acute rehab. If rehab is required at dc, her first choice is [facility] and 2 choice is [facility]. Sent referral to [facility]. Asked wife who supplies th O2. She does not know supplier.  Dewey Ramirez

## 2022-01-17 NOTE — FLOWSHEET NOTE
Patient is COVID. Writer attempted phone call but no answer. Writer provided a silent prayer of healing, comfort, and rest during his stay. Spiritual care will follow up as needed or requested.      01/17/22 1512   Encounter Summary   Services provided to: Patient   Referral/Consult From: Denny   Continue Visiting   (1/17/2022 COVID-19 attempted Phone call)   Complexity of Encounter Low   Length of Encounter 15 minutes   Routine   Type Initial   Assessment   (Attempted Phone call no answer)   Intervention Prayer

## 2022-01-17 NOTE — CARE COORDINATION
Case Management Initial Discharge Plan  Drena Agent,         Readmission Risk              Risk of Unplanned Readmission:  26             Met with:patient to discuss discharge plans. Information verified: address, contacts, phone number, , insurance Yes  PCP: Josefa Klein MD  Date of last visit: 1 month     Insurance Provider: medicare and [facility]     Discharge Planning  Current Residence:  Private home   Living Arrangements:  Spouse/Significant Other       Home has 1 stories/1 stairs to climb  Support Systems:  Spouse/Significant Other,Children       Current Services PTA:  02   Agency: private paid to agency. Unsure of name       Patient able to perform ADL's:Assisted  DME in home:  Walker, cane, neb and 02 at HS   DME used to aid ambulation prior to admission:   Walker prn   DME used during admission:  [address] Needed:  Outpatient PT/OT    Ph[facility]acy: [facility] [address] Medications:  No  Does patient want to participate in local refill/ meds to beds program?  No    Patient agreeable to home care: Yes  Freedom of choice provided:  yes     The Plan for Transition of Care is related to the following treatment goals: skilled RN and therapy     The Patient and/or patient representative   was provided with a choice of provider and agrees   with the discharge plan. [x] Yes [] No    Freedom of choice list was provided with basic dialogue that supports the patient's individualized plan of care/goals, treatment preferences and shares the quality data associated with the providers.  [x] Yes [] No      Type of Home Care Services:  None  Patient expects to be discharged to:    home care vs snf     Prior SNF/Rehab Placement and Facility: none   Agreeable to SNF/Rehab: unsure   [place] of choice provided: await speak with wife    Evaluation: yes    Expected Discharge date:  22  Follow Up Appointment: Best Day/ Time: Tuesday AM    Transportation provider: depends on final plan   Transportation arrangements needed for discharge: No    Discharge Plan:   Met with patient to complete an assessment. Patient lives at home with wife and daughter Dane Floyd. He is independent in most ADL's. Therapy just completed working with patient and initially stated home care . Patient admitted with COVID and is currently on 4 Liters. Discussed at first home care and he was agreeable but wishes for writer to speak with wife. Will follow. Patient has 02 but he stated was privately bought, no thru his insurance. Explained that medicare and [facility] cover home 02 if needed and will complete a home 02 evaluation prior to discharge if home. Patient would need home care if home. Did discuss with him and he was agreeable to agency and did not have preference understanding that choices are limited due to COVID>  Did place referral to [facility] thru [facility].     Therapy did come back to writer after assessment completed and stated they are going to now be recommending. Did update SS on rounds that PT will likely be recommending snf. Attempted to call wife both at home and on cell but no answer. ( home [phone] and cell [phone]). Will attempt again later today.      Electronically signed by Aaron Lindsey RN on 1/17/22 at 12:22 PM EST

## 2022-01-17 NOTE — PROGRESS NOTES
[facility]    Adult Hospitalist      Name: Donal Cortes  MRN: [id number]     Acct: [de-identified]  Room: [address] [place] [address]    Admit Date: 1/16/2022 11:51 AM  PCP: Kaveh Cano MD    Primary Problem  Active Problems:    COVID  Resolved Problems:    * No resolved hospital problems. *        Assesment:   Acute congestive heart failure, unspecified  COVID-19  Viral pneumonia secondary to above  Essential hypertension  Mixed hyperlipidemia  Diabetes mellitus type 2  History of seizure disorder  CKD stage III  Lung mass? Leukopenia  Polycythemia  Thrombocytopenia  Anxiety disorder      Plan:   Admit patient to intermediate floor  Oxygen to keep SPO2 more than 90%  Telemetry  Check vitals closely  CBC BMP daily    Trend troponin  Trend BMP  Echocardiogram  IV Lasix  Cardiology consult    IV Decadron  IV azithromycin  Bronchodilators  Infectious disease consult  Pulmonology consult    Continue Keppra  Continue amlodipine atorvastatin  Continue aspirin  Continue other medication as below.     Scheduled Meds:   metoprolol succinate  50 mg Oral Nightly    albuterol sulfate HFA  2 puff Inhalation BID    dexamethasone  6 mg IntraVENous Q24H    furosemide  40 mg IntraVENous BID    sodium chloride flush  5-40 mL IntraVENous 2 times per day    enoxaparin  40 mg SubCUTAneous Daily    levETIRAcetam  500 mg Oral BID    aspirin  325 mg Oral Daily    amLODIPine  5 mg Oral Nightly    atorvastatin  20 mg Oral Daily    ALPRAZolam  0.5 mg Oral TID    insulin glargine  40 Units SubCUTAneous BID    insulin lispro  0-18 Units SubCUTAneous TID WC    insulin lispro  0-9 Units SubCUTAneous Nightly     Continuous Infusions:   sodium chloride      dextrose       PRN Meds:  dextrose bolus (hypoglycemia), 125 mL, PRN   Or  dextrose bolus (hypoglycemia), 250 mL, PRN  sodium chloride flush, 10 mL, PRN  sodium chloride, 25 mL, PRN  potassium chloride, 40 mEq, PRN   Or  potassium alternative oral replacement, 40 mEq, PRN Or  potassium chloride, 10 mEq, PRN  magnesium sulfate, 1,000 mg, PRN  ondansetron, 4 mg, Q8H PRN   Or  ondansetron, 4 mg, Q6H PRN  magnesium hydroxide, 30 mL, Daily PRN  acetaminophen, 650 mg, Q6H PRN   Or  acetaminophen, 650 mg, Q6H PRN  glucose, 15 g, PRN  glucagon (rDNA), 1 mg, PRN  dextrose, 100 mL/hr, PRN  hydrALAZINE, 10 mg, Q6H PRN        Chief Complaint:     Chief Complaint   Patient presents with    Leg Swelling     bilateral, has CHF, on diuretic, EMT saw pt , assisted pt into car    Fever    Other     low SPO2         History of Present Illness:        Patient seen and examined at bedside  Patient says he feels better  Breathing is improved 70 to 80%  Cough improved  Denies any wheezing  Denies chest tightness or orthopnea  Sitting up in chair  Says pedal edema has improved  Eating better    Denies chest pain, orthopnea, nausea or vomiting  Denies headache, photophobia, diplopia or neck pain  Denies nausea, vomiting or abdominal pain  Denies diarrhea or constipation  Denies back pain or joint swelling      Initial HPI  Toy Walters is a 64 y.o.  male who presents with Leg Swelling (bilateral, has CHF, on diuretic, EMT saw pt , assisted pt into car), Fever, and Other (low SPO2)  This is a 68-year-old gentleman admitted via ER, come to ER with complaint of having shortness of breath, patient has medical history significant for COPD patient with history of cardiac failure: Patient noted that he has been having increasing swelling in his lower extremity and becoming more short of breath, further patient also been having some body aches and sweats, patient patient testing in the emergency room showed that he is positive for COVID-19 also noticed to have an elevated BNP, imaging concerning for fluid overload, admitted for further regiment    I have personally reviewed the past medical history, past surgical history, medications, social history, and family history, and summarized in the note.     Review of Systems:     All 10 point system is reviewed and negative otherwise mentioned in HPI. Past Medical History:     Past Medical History:   Diagnosis Date    Arthritis     Atherosclerotic plaque 7/28/2019    Cervical disc disease     degenerative disc disease    Diabetes mellitus ([facility] [address]     type 2    History of blood transfusion     Hx of blood clots     left leg    Hyperlipidemia     Neuropathy     Right kidney mass     \"urologist is watching\"    Snores     Type 2 diabetes mellitus treated with insulin ([facility] [address] 7/28/2019        Past Surgical History:     Past Surgical History:   Procedure Laterality Date    ABDOMINAL AORTIC ANEURYSM REPAIR  2005    CARPAL TUNNEL RELEASE Bilateral     CERVICAL SPINE SURGERY      COLONOSCOPY      benign polyps removed    INGUINAL HERNIA REPAIR Right     PA REPAIR INCISIONAL HERNIA,REDUCIBLE N/A 5/10/2017    HERNIA VENTRAL REPAIR WITH MESH  performed by Rachell Herbert DO at [address] [place] [address] Road  05/10/2017    with mesh        Medications Prior to Admission:       Prior to Admission medications    Medication Sig Start Date End Date Taking? Authorizing Provider   levETIRAcetam (KEPPRA) 500 MG tablet Take 1 tablet by mouth 2 times daily 11/3/21  Yes Mildred Mitchell MD   lisinopril (PRINIVIL;ZESTRIL) 20 MG tablet Take 1 tablet by mouth daily 11/3/21  Yes Mildred Mitchell MD   venlafaxine (EFFEXOR XR) 150 MG extended release capsule Take 1 capsule by mouth daily (with breakfast) 7/29/19  Yes Arabella Kiser   vitamin B-1 (THIAMINE) 100 MG tablet Take 100 mg by mouth daily   Yes Historical Provider, MD   ferrous sulfate 325 (65 Fe) MG tablet Take 325 mg by mouth daily (with breakfast)   Yes Historical Provider, MD   ALPRAZolam (XANAX) 0.5 MG tablet Take 0.5 mg by mouth three times daily.    Yes Historical Provider, MD   furosemide (LASIX) 40 MG tablet Take 1 tablet by mouth 2 times daily 12/11/18  Yes Aislinn Evans, MD   tiotropium (SPIRIVA RESPIMAT) 2.5 MCG/ACT AERS inhaler Inhale 2 puffs into the lungs daily    Yes Historical Provider, MD   albuterol sulfate  (90 Base) MCG/ACT inhaler Inhale 2 puffs into the lungs every 6 hours as needed for Wheezing or Shortness of Breath   Yes Historical Provider, MD   metoprolol succinate (TOPROL XL) 50 MG extended release tablet Take 50 mg by mouth daily   Yes Historical Provider, MD   Multiple Vitamins-Minerals (MULTIVITAMIN ADULT) TABS Take 1 tablet by mouth daily   Yes Historical Provider, MD   vitamin D (CHOLECALCIFEROL) 1000 UNIT TABS tablet Take 1,000 Units by mouth daily   Yes Historical Provider, MD   fluticasone (FLONASE) 50 MCG/ACT nasal spray 1 spray by Each Nare route daily as needed for Rhinitis   Yes Historical Provider, MD   aspirin 325 MG EC tablet Take 325 mg by mouth daily    Yes Historical Provider, MD   Omega-3 Fatty Acids (FISH OIL) 1000 MG CAPS Take 1 capsule by mouth daily    Yes Historical Provider, MD   amLODIPine (NORVASC) 5 MG tablet Take 5 mg by mouth nightly    Yes Historical Provider, MD   mirtazapine (REMERON) 45 MG tablet Take 45 mg by mouth nightly   Yes Historical Provider, MD   Iron-Vitamin C (IRON 100/C) 100-250 MG TABS iron    Historical Provider, MD   HUMALOG KWIKPEN 100 UNIT/ML pen Inject 5-15 Units into the skin 3 times daily (before meals) 7/28/19   Arabella HOFFMAN Margi   insulin glargine (BASAGLAR KWIKPEN) 100 UNIT/ML injection pen Inject 40 Units into the skin 2 times daily    Historical Provider, MD   atorvastatin (LIPITOR) 20 MG tablet Take 20 mg by mouth daily    Historical Provider, MD        Allergies: Barbiturates, Codeine, and Sulfa antibiotics    Social History:     Tobacco:    reports that he has quit smoking. He has never used smokeless tobacco.  Alcohol:      reports no history of alcohol use. Drug Use:  reports no history of drug use.     Family History:     Family History   Problem Relation Age of Onset    Ovarian Cancer Sister     Ovarian Cancer Sister          Physical Exam:     Vitals:  BP (!) 144/57   Pulse 72   Temp 97.5 °F (36.4 °C) (Oral)   Resp 18   Ht 5' 10.98\" (1.803 m)   Wt 228 lb (103.4 kg)   SpO2 90%   BMI 31.82 kg/m²   Temp (24hrs), Av.8 °F (36.6 °C), Min:97.5 °F (36.4 °C), Max:98.2 °F (36.8 °C)      General appearance - alert, well appearing, and in no acute distress  Mental status - oriented to person, place, and time with normal affect  Head - normocephalic and atraumatic  Eyes - pupils equal and reactive, extraocular eye movements intact, conjunctiva clear  Ears - hearing appears to be intact  Nose - no drainage noted  Mouth - mucous membranes moist  Neck - supple, no carotid bruits, thyroid not palpable  Chest -sporadic coarse crackles to auscultation, normal effort  Heart - normal rate, regular rhythm, no murmur  Abdomen - soft, nontender, nondistended, bowel sounds present all four quadrants, no masses, hepatomegaly or splenomegaly  Neurological - normal speech, no focal findings or movement disorder noted, cranial nerves II through XII grossly intact  Extremities - peripheral pulses palpable, 2+ edema  Skin - no gross lesions, rashes, or induration noted        Data:     Labs:    Hematology:  Recent Labs     22  1220 22  0435   WBC 2.7* 1.8*   RBC 6.82* 6.97*   HGB 17.5* 17.7*   HCT 56.6* 57.3*   MCV 83.0 82.2*   MCH 25.7 25.4   MCHC 30.9 30.9   RDW 16.6* 16.8*   * 100*   MPV 12.4 11.3   SEDRATE 3  --    CRP 23.5*  --    INR  --  1.0   DDIMER 1.73*  --      Chemistry:  Recent Labs     22  1220 22  1430 22  1630 22  2225 22  0435     --   --   --  138   K 3.5*  --   --   --  3.6*   CL 94*  --   --   --  94*   CO2 26  --   --   --  28   GLUCOSE 215*  --   --   --  195*   BUN 20  --   --   --  27*   CREATININE 1.31*  --   --   --  1.49*   MG 1.5*  --   --   --   --    ANIONGAP 15  --   --   --  16   LABGLOM 53*  --   --   --  46*   GFRAA >60  -- --   --  55*   CALCIUM 8.5*  --   --   --  8.4*   PROBNP 9,327*  --   --   --  8,610*   TROPHS 36*   < > 39* 33* 37*    < > = values in this interval not displayed. Recent Labs     01/16/22  1220 01/16/22  1630 01/16/22  1817 01/16/22 2028 01/17/22  0721 01/17/22  1219   PROT 6.5  --   --   --   --   --    LABALBU 3.8  --   --   --   --   --    LABA1C  --  9.1*  --   --   --   --    AST 45*  --   --   --   --   --    ALT 24  --   --   --   --   --    *  --   --   --   --   --    ALKPHOS 123  --   --   --   --   --    BILITOT 0.68  --   --   --   --   --    POCGLU  --   --  197* 263* 205* 114*       Lab Results   Component Value Date    INR 1.0 01/17/2022    INR 1.0 11/03/2021    INR 1.0 07/27/2019    PROTIME 13.3 01/17/2022    PROTIME 11.1 11/03/2021    PROTIME 10.5 07/27/2019       Lab Results   Component Value Date/Time    SPECIAL NOT REPORTED 01/16/2022 12:30 PM     Lab Results   Component Value Date/Time    CULTURE NO GROWTH 12 HOURS 01/16/2022 12:20 PM       Lab Results   Component Value Date    POCPH 7.37 12/07/2018    POCPCO2 34 12/07/2018    POCPO2 55 12/07/2018    POCHCO3 19.8 12/07/2018    NBEA 5 12/07/2018    PBEA NOT REPORTED 12/07/2018    TAY1LLS 21 12/07/2018    HJMZ3NTY 88 12/07/2018    FIO2 NOT REPORTED 01/16/2022       Radiology:    XR CHEST 1 VIEW    Result Date: 1/16/2022  Hypoinflated lungs. Cardiomegaly again seen, when compared to the previous study performed 07/27/2019. Diffuse, increased interstitial markings throughout both lungs, some of which can be seen on the prior study may represent baseline chronic interstitial lung changes. The increased prominence on this exam could be secondary to the suboptimal inspiratory effort. The presence of pulmonary vascular congestion/mild CHF or bilateral interstitial pneumonia cannot be excluded. Clinical correlation is recommended. CT CHEST PULMONARY EMBOLISM W CONTRAST    Result Date: 1/16/2022  No evidence of pulmonary embolism. Compared to 2008, worsening underlying emphysema, with worsening subacute or acute interstitial lung disease, best seen left upper lobe; differential includes interstitial pneumonia or pneumonitis superimposed on emphysema, DIP, HP, and other interstitial pneumonias as well as infectious or inflammatory process superimposed on emphysema disease. Findings are not typical for COVID-19 pneumonia, but an element of the latter should be considered. Thickening and distortion left major fissure with ill-defined mass-like focus left lower lobe. The latter could also be infectious or inflammatory, although neoplasm should be excluded. Underlying worsening emphysema. Nodular densities, best seen right upper lobe. Small pleural effusions, slightly larger on the left. See recommendations below. Mild mediastinal and left hilar adenopathy; see recommendations below. Worsening now moderate-severe pulmonary artery hypertension. Mild cardiomegaly. Stable mild dilatation ascending thoracic aorta. Additional unchanged or incidental findings, as above. RECOMMENDATIONS: Clinical correlation and short-term follow-up CT chest in 1-4 months depending upon the clinical presentation/course. All radiological studies reviewed                Code Status:  Full Code    Electronically signed by Monetta Lesches, MD on 1/17/2022 at 1:08 PM     Copy sent to Dr. Park Pereyra MD    This note was created with the assistance of a speech-recognition program.  Although the intention is to generate a document that actually reflects the content of the visit, no guarantees can be provided that every mistake has been identified and corrected by editing. Note was updated later by me after  physical examination and  completion of the assessment.

## 2022-01-17 NOTE — RT PROTOCOL NOTE
RT Inhaler-Nebulizer Bronchodilator Protocol Note    There is a bronchodilator order in the chart from a provider indicating to follow the RT Bronchodilator Protocol and there is an Initiate RT Inhaler-Nebulizer Bronchodilator Protocol order as well (see protocol at bottom of note). CXR Findings:  No results found. The findings from the last RT Protocol Assessment were as follows:   History Pulmonary Disease: Chronic pulmonary disease  Respiratory Pattern: Regular pattern and RR 12-20 bpm  Breath Sounds: Slightly diminished and/or crackles  Cough: Strong, spontaneous, non-productive  Indication for Bronchodilator Therapy: On home bronchodilators  Bronchodilator Assessment Score: 4    Aerosolized bronchodilator medication orders have been revised according to the RT Inhaler-Nebulizer Bronchodilator Protocol below. Respiratory Therapist to perform RT Therapy Protocol Assessment initially then follow the protocol. Repeat RT Therapy Protocol Assessment PRN for score 0-3 or on second treatment, BID, and PRN for scores above 3. No Indications - adjust the frequency to every 6 hours PRN wheezing or bronchospasm, if no treatments needed after 48 hours then discontinue using Per Protocol order mode. If indication present, adjust the RT bronchodilator orders based on the Bronchodilator Assessment Score as indicated below. Use Inhaler orders unless patient has one or more of the following: on home nebulizer, not able to hold breath for 10 seconds, is not alert and oriented, cannot activate and use MDI correctly, or respiratory rate 25 breaths per minute or more, then use the equivalent nebulizer order(s) with same Frequency and PRN reasons based on the score. If a patient is on this medication at home then do not decrease Frequency below that used at home.     0-3 - enter or revise RT bronchodilator order(s) to equivalent RT Bronchodilator order with Frequency of every 4 hours PRN for wheezing or increased work of breathing using Per Protocol order mode. 4-6 - enter or revise RT Bronchodilator order(s) to two equivalent RT bronchodilator orders with one order with BID Frequency and one order with Frequency of every 4 hours PRN wheezing or increased work of breathing using Per Protocol order mode. 7-10 - enter or revise RT Bronchodilator order(s) to two equivalent RT bronchodilator orders with one order with TID Frequency and one order with Frequency of every 4 hours PRN wheezing or increased work of breathing using Per Protocol order mode. 11-13 - enter or revise RT Bronchodilator order(s) to one equivalent RT bronchodilator order with QID Frequency and an Albuterol order with Frequency of every 4 hours PRN wheezing or increased work of breathing using Per Protocol order mode. Greater than 13 - enter or revise RT Bronchodilator order(s) to one equivalent RT bronchodilator order with every 4 hours Frequency and an Albuterol order with Frequency of every 2 hours PRN wheezing or increased work of breathing using Per Protocol order mode. RT to enter RT Home Evaluation for COPD & MDI Assessment order using Per Protocol order mode.     Electronically signed by Jerel Cardoso RCP on 1/17/2022 at 10:41 AM

## 2022-01-17 NOTE — PROGRESS NOTES
Occupational Therapy   Occupational Therapy Initial Assessment  Date: 2022   Patient Name: Elena Castellano  MRN: [id number]     : 1945    Date of Service: 2022    Discharge Recommendations:  Patient would benefit from continued therapy after discharge   Pt currently functioning below baseline. Would suggest additional therapy at time of discharge to maximize long term outcomes and prevent re-admission. Please refer to AM-PAC score for current level of function. RN reports patient is medically stable for therapy treatment this date. Chart reviewed prior to treatment and patient is agreeable for therapy. All lines intact and patient positioned comfortably at end of treatment. All patient needs addressed prior to ending therapy session. Assessment   Performance deficits / Impairments: Decreased functional mobility ; Decreased balance;Decreased safe awareness;Decreased ADL status; Decreased endurance;Decreased strength;Decreased cognition;Decreased posture;Decreased sensation  Assessment: Skilled OT is indicated to increase overall IND and safety with self-care and functional tasks to return home when appropriate  Prognosis: Good  Decision Making: High Complexity  OT Education: OT Role;Transfer Training;Equipment;Plan of Care;Energy Conservation;Home Exercise Program;ADL Adaptive Strategies  Patient Education: OT POC, benefits of OOB activity, pacing, pursed lip breathing, call light/fall prevention  REQUIRES OT FOLLOW UP: Yes  Activity Tolerance  Activity Tolerance: Patient Tolerated treatment well  Safety Devices  Safety Devices in place: Yes  Type of devices: All fall risk precautions in place;Nurse notified; Patient at risk for falls;Gait belt;Left in chair;Chair alarm in place;Call light within reach           Patient Diagnosis(es): The primary encounter diagnosis was COVID-19.  Diagnoses of Acute respiratory failure with hypoxia ([address] BLANCHE (acute kidney injury) ([address] Acute on chronic systolic CHF (congestive heart failure) ([facility] Hypokalemia, Hypomagnesemia, and COPD exacerbation ([facility] [address] [facility] were also pertinent to this visit. has a past medical history of Arthritis, Atherosclerotic plaque, Cervical disc disease, Diabetes mellitus ([address] History of blood transfusion, Hx of blood clots, Hyperlipidemia, Neuropathy, Right kidney mass, Snores, and Type 2 diabetes mellitus treated with insulin ([facility] [address] has a past surgical history that includes Abdominal aortic aneurysm repair (2005); Carpal tunnel release (Bilateral); Cervical spine surgery; Inguinal hernia repair (Right); Upper gastrointestinal endoscopy; Colonoscopy; ventral hernia repair (05/10/2017); and pr repair incisional hernia,reducible (N/A, 5/10/2017). Per H&P: Kizzy Herron is a 64 y.o.  male who presents with Leg Swelling (bilateral, has CHF, on diuretic, EMT saw pt , assisted pt into car), Fever, and Other (low SPO2)  This is a 70-year-old gentleman admitted via ER, come to ER with complaint of having shortness of breath, patient has medical history significant for COPD patient with history of cardiac failure: Patient noted that he has been having increasing swelling in his lower extremity and becoming more short of breath, further patient also been having some body aches and sweats, patient patient testing in the emergency room showed that he is positive for COVID-19 also noticed to have an elevated BNP, imaging concerning for fluid overload, admitted for further regiment     Restrictions  Restrictions/Precautions  Restrictions/Precautions: General Precautions,Fall Risk,Isolation  Required Braces or Orthoses?: No  Position Activity Restriction  Other position/activity restrictions:  Up with assist, telemetry, droplet+ isiolation 2* Covid 19, RUE IV, O2 [email or web address]    Subjective   General  Chart Reviewed: Yes  Patient assessed for rehabilitation services?: Yes  Family / Caregiver Present: No  Subjective  Subjective: \"I feel good today\"  General Comment  Comments: pt pleasant and cooperative for OT eval  Vital Signs  Temp: 97.5 °F (36.4 °C)  Temp Source: Oral  Pulse: 72  Resp: 18  BP: (!) 144/57  BP Location: Left upper arm  MAP (mmHg): 80  Patient Position: Sitting  Height and Weight  Height: 5' 10.98\" (180.3 cm)  Oxygen Therapy  SpO2: 90 %  O2 Device: Nasal cannula  Social/Functional History  Social/Functional History  Lives With: Spouse  Type of Home: House  Home Layout: One level  Home Access: Stairs to enter with rails  Entrance Stairs - Number of Steps: 2  Entrance Stairs - Rails: Both  Bathroom Shower/Tub: Tub/Shower unit  Bathroom Toilet: Handicap height  Bathroom Equipment: Shower chair  Home Equipment: 4 wheeled walker,Cane  ADL Assistance: Independent  Homemaking Assistance: Needs assistance (Spouse completes I ADL's)  Ambulation Assistance: Independent (no device in home, 4WW in community)  Transfer Assistance: Independent  Active : No (spouse or daughter)  Mode of Transportation: Car  Occupation: Retired  Type of occupation: building maintenance  Leisure & Hobbies: collects coins or stamps  Additional Comments: pt denies had recent fall 2 weeks ago when slid out of recliner & flipped him       Objective   Vision: Impaired  Vision Exceptions: Wears glasses for reading (pt reports he has had saman cataract surgery)  Hearing: Exceptions to [facility]  Hearing Exceptions: Hard of hearing/hearing concerns; No hearing aid    Orientation  Overall Orientation Status: Within Functional Limits  Observation/Palpation  Posture: Poor (forward flexed posture)  Observation: pt seated EOB on 4LO2 NC, saO2 resting at 88%. With pursed lip breathing techniques inc to 91-93% after 2 minutes. BLE's are very edematous & erythema appearance with fragkile skin & blistering noted at lower legs.  Pt has healing abrasions at left wrist/forearm caused 2* getting arm caught in closet door  Edema: BLE's & has mod pitting  Scar: old scar posterior neck from cerv fusion  Balance  Sitting Balance: Stand by assistance  Standing Balance: Moderate assistance (initally Mod A x2 d/t posterior lean, progressed to Min A x2)  Standing Balance  Time: ~3-4  Activity: functional mob  Functional Mobility  Functional - Mobility Device: Rolling Walker  Activity: To/from bathroom  Assist Level: Moderate assistance (Mod A x2 for safety and line mgmt)  Functional Mobility Comments: SPO2 dec to ~80% with mobility, however SPO2 had a questionable reading. Pt took small steps, d/t BLE edema. Pt unable to take larger steps this date. MAX VCs for pursed lip breating. Pt recovered to ~91% with pursed lip breathing and seated rest. MOD VCs for pacing, pursed lip breathing, upright posture, scanning environment, RW safety/mgmt, assist/awareness of lines, all to increase safety/reduce fall risk. Toilet Transfers  Toilet - Technique: Ambulating  Equipment Used: Standard toilet  Toilet Transfer: Maximum assistance;2 Person assistance  ADL  Feeding: Modified independent   Grooming: Setup;Verbal cueing;Stand by assistance (seated)  UE Bathing: Stand by assistance;Setup  LE Bathing: Maximum assistance;Setup  UE Dressing: Minimal assistance;Setup (to adjust hosp gown)  LE Dressing: Maximum assistance;Setup (to doff/hay B socks)  Toileting: Minimal assistance (Min A for clothing mgmt, pt urinated while seated on toilet, SBA for hygiene while seated.)  Additional Comments: Pt limited d/t SOB, COVID+, dec standing gael/balance, dec mobility, and dec strength/endurance. Pt instructed to \"move what moves\", such as moving all joints in any comfortable direction, within ROM and pain tolerance. OT provided demonstration of AROM of neck, shoulders, elbows, forearms, wrists, fingers, knees, and ankles. OT recommended while pt is watching TV to use commercials as a guide to initiate AROM of one joint.  Pt with Good understanding of importance of AROM to promote circulation, maintain strength/endurance and to Inpatient Daily Activity Raw Score: 17 (01/17/22 1258)  AM-PAC Inpatient ADL T-Scale Score : 37.26 (01/17/22 1258)  ADL Inpatient CMS 0-100% Score: 50.11 (01/17/22 1258)  ADL Inpatient CMS G-Code Modifier : CK (01/17/22 1258)    Goals  Short term goals  Time Frame for Short term goals: by discharge, pt to demo  Short term goal 1: I/MI for bed mob tasks without bed rails, while maintaining an appropriate SPO2%  Short term goal 2: I/MI for UB ADLs and SBA for LB ADLs with AE as needed and Good safety, while maintaining an appropriate SPO2%  Short term goal 3: SBA for ADL transfers and functional mob with AD and Good safety, while maintaining an appropriate SPO2%  Short term goal 4: increase BUE strength by 1/2 grade to increase IND with self-care and be IND with HEP  Short term goal 5: pt to be IND with EC/WS, fall prevention tech, COVID+ education, as well as DME/AE recommendations with use of handouts as needed  Patient Goals   Patient goals : To go home       Therapy Time   Individual Concurrent Group Co-treatment   Time In 0855 +10 chart review         Time Out 0953         Minutes 68          tx time: 62 min      Co-treatment with PT warranted secondary to decreased safety and independence requiring 2 skilled therapy professionals to address individual discipline's goals. OT addressing preparation for ADL transfer, sitting balance for increased ADL performance, sitting/activity tolerance, functional reaching, environmental safety/scanning, fall prevention, functional mobility for ADL transfers, ability to sequence and follow directions and functional UE strength. Upon writer exit, call light within reach, pt retired to chair. All lines intact and patient positioned comfortably. All patient needs addressed prior to ending therapy session. Chart reviewed prior to treatment and patient is agreeable for therapy. RN reports patient is medically stable for therapy treatment this date.     Juliann Alexis OTR/L

## 2022-01-17 NOTE — PROGRESS NOTES
Comprehensive Nutrition Assessment    Type and Reason for Visit:  Initial,Positive Nutrition Screen (2-13 lb weight loss, malnutrition score: 1 ; wounds: open blisters to bilateral ankles)    Nutrition Recommendations/Plan:   1. Modify current diet to include 4 CHO per meal (60 gms/meal) due to hx T2DM  2. Monitor po intakes and labs     Nutrition Assessment:  Patient admission r/t SOB and bilateral leg swelling. Pt +COVID-19 virus - placed in droplet plus precautions. Hx: T2DM and CKD III. Spoke with pt on phone- Pt reports 1 to 2 months ago having a change in his diabetes medications and then since that time he started experiencing increasing swelling in legs which caused him difficulty to ambulate/bend over. Pt gained wt from 178-255#. Pt reports change in wt due to fluid retention. Pt reports good appetite- consuming 100% of all meals and no N/V. Pt reports he used to have blisters on ankles- reports this has since improved. Discussed CHF dx and need for diet recommendations- pt reports watching what he eats and not needing diet edu. Malnutrition Assessment:  Malnutrition Status: At risk for malnutrition (Comment)    Context:  Chronic Illness     Findings of the 6 clinical characteristics of malnutrition:  Energy Intake:  No significant decrease in energy intake  Weight Loss:  1 - Mild weight loss (specify amount and time period) (Fluid retention/loss -- 178# to 255# to now 228 #)     Body Fat Loss:  Unable to assess     Muscle Mass Loss:  Unable to assess    Fluid Accumulation:  1 - Mild Extremities   Strength:  Not Performed    Estimated Daily Nutrient Needs:  Energy (kcal):  4355-5059 kcal (16-18 kcal/kg); Weight Used for Energy Requirements:  Current     Protein (g):   gm protein (0.8-1.0 g/kg) d/t renal status; Weight Used for Protein Requirements:  Ideal       Nutrition Related Findings:  COVID-19 virus. Concern for fluid overload. Edema: BLE +2 pitting.       Wounds:  None       Current Nutrition Therapies:    ADULT DIET; Regular    Anthropometric Measures:  · Height: 5' 10.98\" (180.3 cm)  · Current Body Weight: 228 lb (103.4 kg)   · Admission Body Weight: 184 lb 15.5 oz (83.9 kg)    · Usual Body Weight: 255 lb (115.7 kg) (Fluid retention)     · Ideal Body Weight: 172 lbs; % Ideal Body Weight 132.6 %   · BMI: 31.8  · Adjusted Body Weight:  ; No Adjustment    · BMI Categories: Obese Class 1 (BMI 30.0-34. 9)       Nutrition Diagnosis:   · Unintended weight loss related to endocrine dysfuntion (change in diabetes medication) as evidenced by localized or generalized fluid accumulation (fluid retention/loss)    Nutrition Interventions:   Food and/or Nutrient Delivery:  Continue Current Diet  Nutrition Education/Counseling:  Education not indicated   Coordination of Nutrition Care:  Continue to monitor while inpatient    Goals:  PO intakes to provide >75% of estimated nutritional needs       Nutrition Monitoring and Evaluation:   Behavioral-Environmental Outcomes:  None Identified   Food/Nutrient Intake Outcomes:  Food and Nutrient Intake  Physical Signs/Symptoms Outcomes:  Biochemical Data,Fluid Status or Edema,Weight,Skin     Discharge Planning:    Continue current diet     Jorge Devine, 64 [address]  Office Number: [phone]

## 2022-01-17 NOTE — PLAN OF CARE
Nutrition Problem #1: Unintended weight loss  Intervention: Food and/or Nutrient Delivery: Modify Current Diet  Nutritional Goals: PO intakes to provide >75% of estimated nutritional needs

## 2022-01-17 NOTE — PROGRESS NOTES
Pt admitted to room 1015 per cart in stable condition from ED   Oriented to room and surroundings  Bed in lowest position, wheels locked, 2/4 side rails up  Call light in reach, room free of clutter, adequate lighting provided  Denies any further questions at this time  Instructed to call out with any questions/concerns/new onset of pain and/or n/v   White board updated  Continue to monitor with hourly rounding  STAY WITH ME protocol initiated   Bed alarm on/Fall Risk signs in place/Fall risk sticker to wrist band  Non-skid socks on/at bedside

## 2022-01-17 NOTE — PROGRESS NOTES
Physical Therapy    Facility/Department: [facility]ESS[facility]  Initial Assessment    NAME: Aaron Lockwood  : 1945  MRN: [id number]    Date of Service: 2022    Discharge Recommendations:  Patient would benefit from continued therapy after discharge       Pt presented to ED on 22 with shortness of breath, bilateral leg swelling. Pt was unaware that he has a fever and denies any body aches chills sweats. Denies any recent cough, congestion, headache, runny nose. No known exposure to anyone with COVID-19    Pt underwent Covid testing & specimen  POSITIVE for SARS-Cov-2, the novel coronavirus associated with COVID-19. Pt admitted for further medical management of Covid 19 & resultant viral pneumonia    RN reports patient is medically stable for therapy treatment this date. Chart reviewed prior to treatment and patient is agreeable for therapy. Assessment   Body structures, Functions, Activity limitations: Decreased functional mobility ; Decreased ROM; Decreased strength;Decreased safe awareness;Decreased endurance;Decreased balance;Decreased posture;Decreased ADL status  Assessment: Pt with deficits of bed mobility, transfers, ambulation, balance, safety awareness and endurance this session,  & required 2 assist for transfers & gait. , & is decline compared to prior level of function. With current deficits, Pt HIGH risk for falls & requires continued PT to maximize independence with functional mobility, balance, safety awareness & activity tolerance to improve aerobic capacity & overall tolerance of I ADL's. Pt currently functioning below baseline. Would suggest additional therapy at time of discharge to maximize long term outcomes and prevent re-admission. Please refer to AM-PAC score for current level of function.   Prognosis: Good  Decision Making: High Complexity  Exam: ROM, MMT, functional mobility, activity tolerance, Balance, & [employer] AM-PAC 6 Clicks Basic Mobility  Clinical Presentation: evolving  PT Education: Goals;PT Role;Plan of Care;Precautions;Transfer Training;Energy Conservation;General Safety;Gait Training;Pressure Relief; Functional Mobility Training  Patient Education: Ed pt on functional mobility, safety awareness, importance of being up & OOB to regain strength, & prevention of sedentary complications, circulation ex's,  pressure relief & optimal breathing techniques  Activity Tolerance  Activity Tolerance: Patient limited by fatigue;Patient limited by endurance       Patient Diagnosis(es): The primary encounter diagnosis was COVID-19. Diagnoses of Acute respiratory failure with hypoxia (HCC), BLANCHE (acute kidney injury) ([address] Acute on chronic systolic CHF (congestive heart failure) ([address] Hypokalemia, Hypomagnesemia, and COPD exacerbation ([address] were also pertinent to this visit. has a past medical history of Arthritis, Atherosclerotic plaque, Cervical disc disease, Diabetes mellitus ([address] History of blood transfusion, Hx of blood clots, Hyperlipidemia, Neuropathy, Right kidney mass, Snores, and Type 2 diabetes mellitus treated with insulin ([address] has a past surgical history that includes Abdominal aortic aneurysm repair (2005); Carpal tunnel release (Bilateral); Cervical spine surgery; Inguinal hernia repair (Right); Upper gastrointestinal endoscopy; Colonoscopy; ventral hernia repair (05/10/2017); and pr repair incisional hernia,reducible (N/A, 5/10/2017). Restrictions  Restrictions/Precautions  Restrictions/Precautions: General Precautions,Fall Risk,Isolation  Required Braces or Orthoses?: No  Position Activity Restriction  Other position/activity restrictions:  Up with assist, telemetry, droplet+ isiolation 2* Covid 19, RUE IV, O2 [email or web address]  Vision/Hearing  Vision: Impaired  Vision Exceptions: Wears glasses for reading (pt reports he has had saman cataract surgery)  Hearing: Exceptions to [facility]  Hearing Exceptions: Hard of hearing/hearing concerns; No hearing aid     Subjective  General  Chart Reviewed: Yes  Patient assessed for rehabilitation services?: Yes  Additional Pertinent Hx: COPD not on oxygen at home, history of heart failure on Lasix Arthritis, Atherosclerotic plaque, Cervical disc disease, DM2,  Neuropathy, Right kidney mass  Response To Previous Treatment: Not applicable  General Comment  Comments: RN okays PT  Subjective  Subjective: Pt agreeable to PT  Pain Screening  Patient Currently in Pain: No  Vital Signs  Patient Currently in Pain: Denies       Orientation  Orientation  Overall Orientation Status: Within Functional Limits  Social/Functional History  Social/Functional History  Lives With: Spouse  Type of Home: House  Home Layout: One level  Home Access: Stairs to enter with rails  Entrance Stairs - Number of Steps: 2  Entrance Stairs - Rails: Both  Bathroom Shower/Tub: Tub/Shower unit  Bathroom Toilet: Handicap height  Bathroom Equipment: Shower chair  Home Equipment: 4 wheeled walker,Cane  ADL Assistance: Independent  Homemaking Assistance: Needs assistance (Spouse completes I ADL's)  Ambulation Assistance: Independent (no device in home, 4WW in community)  Transfer Assistance: Independent  Active : No (spouse or daughter)  Mode of Transportation: Car  Occupation: Retired  Type of occupation: building maintenance  Leisure & Hobbies: collects coins or stamps  Additional Comments: pt denies had recent fall 2 weeks ago when slid out of recliner & flipped him  Cognition   Cognition  Overall Cognitive Status: Exceptions  Arousal/Alertness: Appropriate responses to stimuli  Following Commands:  Follows one step commands with repetition  Attention Span: Appears intact  Memory: Appears intact  Safety Judgement: Decreased awareness of need for safety;Decreased awareness of need for assistance  Problem Solving: Assistance required to correct errors made;Assistance required to implement solutions  Insights: Decreased awareness of deficits  Initiation: Does not require cues  Sequencing: Requires cues for some    Objective     Observation/Palpation  Observation: pt seated EOB on 4LO2 NC, saO2 resting at 88%. With pursed lip breathing ttechniques inc to 91-93% after 2 minutes. BLE's are very edematous & erythema appearance with fragkile skin & blistering noted at lower legs. Pt has healing abrasions at left wrist/forearm caused 2* getting arm caught in closet door  Edema: BLE's & has mod pitting  Scar: old scar posterior neck from cerv fusion    AROM RLE (degrees)  RLE General AROM: cannot fully extend knee 2* edema  AROM LLE (degrees)  LLE AROM : Exceptions  LLE General AROM: cannot fully extend knee 2* edema  AROM RUE (degrees)  RUE General AROM: See OT assessment  AROM LUE (degrees)  LUE General AROM: See OT assessment  Strength RLE  Comment: 3+/5  Strength LLE  Comment: 3+/5  Strength RUE  Comment: See OT assessment  Strength LUE  Comment: See OT assessment  Tone RLE  RLE Tone: Normotonic  Tone LLE  LLE Tone: Normotonic  Coordination  Movements Are Fluid And Coordinated: Yes  Motor Control  Gross Motor?: WFL  Sensation  Overall Sensation Status: Impaired (pt has paresthesias in feet 2* neuropathy 90% of time, goes away if he gets up & walks around)  Bed mobility  Comment: Pt seated EOB upon arrival and bedside chair at exit  Transfers  Sit to Stand: Moderate Assistance;2 Person Assistance  Stand to sit: Maximum Assistance;2 Person Assistance  Bed to Chair: Moderate assistance;2 Person Assistance  Stand Pivot Transfers: Moderate Assistance;2 Person Assistance  Lateral Transfers:  Moderate Assistance;2 Person Assistance  Comment: Ed + tactile assist on correct use of upper body for safe sit/stand + to back all way back to surface with walker until he feels touch behind legs & to ensure he reaches with UB support to arms of chair & demonstrates poor eccentric control  Ambulation  Ambulation?: Yes  Ambulation 1  Surface: level tile  Device: [address]: Moderate assistance;2 Person assistance  Quality of Gait: Short & shuffle step pattern, MAX cues to keep walker close at all times & to amb inside base of walker & upright posture as well as pursed lip breathing techniques & to manage lines  Gait Deviations: Decreased step length;Decreased step height;Slow Sydnee  Distance: 10ft  Comments: Pt amb to BR for toileting, 2 Max  Assistance to sit to toilet. Pt stood with 2 Mod Assistance,stood 3 minutes for pericare & amb 3ft to sink for hand hygiene x 2 minutes then ambulated 12 ft with R/walker & sat to chair with 2 Mod Assistance     Balance  Posture: Fair  Sitting - Static: Good  Sitting - Dynamic: Good;-  Standing - Static: Fair  Standing - Dynamic: Fair;- (R/W)     Pt completed seated ant/post & lateral WS seated & completed sit to stands x 3 to promote mobility and functional pre gait activities & completed static standing weight shifts & picking feet up off floor with UB support at R/walker to improve core strength & stability    [id number]  Exercises  Comments: Ed circulation ex's, pursed lip breathing techniques & deep breathing with incentive spirometer including technique, frequency and purpose, pressure relief, & ex's to move what moves to maintain strength & joint congruency     Plan   Plan  Times per week: 1-2x/D, 5-6D/week  Current Treatment Recommendations: Strengthening,Balance Training,Functional Mobility Training,Transfer Training,ADL/Self-care Training,Endurance Training,Gait Training,Home Exercise Program,Safety Education & Training,Patient/Caregiver Education & Training  Safety Devices  Type of devices: Call light within reach,Gait belt,Chair alarm in place,Patient at risk for falls,Left in chair    G-Code       OutComes Score                                                  AM-PAC Score  AM-PAC Inpatient Mobility Raw Score : 12 (01/17/22 2090)  AM-PAC Inpatient T-Scale Score : 35.33 (01/17/22 3899)  Mobility Inpatient CMS 0-100% Score: 68.66 (01/17/22 0953)  Mobility Inpatient CMS G-Code Modifier : CL (01/17/22 6944)          Goals  Short term goals  Time Frame for Short term goals: 12 visits  Short term goal 1: Inc bed-mobility & transfers to independent to enable pt to safely get in/OOB & chair  Short term goal 2: Inc gait to amb 150ft or > indep w/ RW to enable pt to return to previous level of independence  Short term goal 3: Inc strength to [address] standing balance to good with device to facilitate pt independence for performance of ADL's & functional mobility, & reduce fall risk  Short term goal 4: Pt able to tolerate 30 min of activity to include ex, breathing techniques & endurance training with pt demonstrating ability to perform energy conservation strategies during functional activity to self-regulate pacing & breathing techniques to avoid SOB & maintain saO2 in safe range  Short term goal 5: Ed pt on home ex's, optimal breathing techniques, fall prevention, safety & energy principles, Covid 19 pt education & issue written pt education       Therapy Time   Individual Concurrent Group Co-treatment   Time In 0856 ([phone])         Time Out 0953 (1228)         Minutes [id number]              Additional 10 minutes for chart review      Treatment time: 69 minutes  Co-treatment with OT ([id number]) warranted secondary to decreased safety and independence requiring 2 skilled therapy professionals to address individual discipline's goals. PT addressing pre gait trunk strengthening, weight shifting prior to transfers, transfer training and postural control in sitting    .         [address], PT

## 2022-01-18 NOTE — PROGRESS NOTES
Occupational Therapy  Facility/Department: [facility] PROGRESSIVE CARE  Daily Treatment Note  NAME: Sammy Schrader  : 1945  MRN: [id number]    Date of Service: 2022    Discharge Recommendations:  Patient would benefit from continued therapy after discharge    Pt currently functioning below baseline. Would suggest additional therapy at time of discharge to maximize long term outcomes and prevent re-admission. Please refer to AM-PAC score for current level of function. Assessment   Performance deficits / Impairments: Decreased functional mobility ; Decreased balance;Decreased safe awareness;Decreased ADL status; Decreased endurance;Decreased strength;Decreased cognition;Decreased posture;Decreased sensation  Assessment: Pt would benefit from continued skilled OT services to address deficits in areas of functional balance, functional reach, ADL completion, safety awareness, transfers, UE strength and functional mobility, all limiting safe return home to [facility]. Prognosis: Good  OT Education: OT Role;Transfer Training;Equipment;Plan of Care;Energy Conservation; ADL Adaptive Strategies  Patient Education: OT POC, benefits of OOB activity, pacing, pursed lip breathing, call light/fall prevention  REQUIRES OT FOLLOW UP: Yes  Activity Tolerance  Activity Tolerance: Patient Tolerated treatment well  Safety Devices  Safety Devices in place: Yes  Type of devices: All fall risk precautions in place;Nurse notified; Patient at risk for falls;Gait belt;Left in chair;Chair alarm in place;Call light within reach         Patient Diagnosis(es): The primary encounter diagnosis was COVID-19. Diagnoses of Acute respiratory failure with hypoxia (HCC), BLANCHE (acute kidney injury) ([facility] Acute on chronic systolic CHF (congestive heart failure) ([address] Hypokalemia, Hypomagnesemia, and COPD exacerbation ([facility] were also pertinent to this visit.       has a past medical history of Arthritis, Atherosclerotic plaque, Cervical disc disease, Diabetes mellitus ([facility] History of blood transfusion, Hx of blood clots, Hyperlipidemia, Neuropathy, Right kidney mass, Snores, and Type 2 diabetes mellitus treated with insulin ([facility] has a past surgical history that includes Abdominal aortic aneurysm repair (2005); Carpal tunnel release (Bilateral); Cervical spine surgery; Inguinal hernia repair (Right); Upper gastrointestinal endoscopy; Colonoscopy; ventral hernia repair (05/10/2017); and pr repair incisional hernia,reducible (N/A, 5/10/2017). Restrictions  Restrictions/Precautions  Restrictions/Precautions: General Precautions,Fall Risk,Isolation  Required Braces or Orthoses?: No  Position Activity Restriction  Other position/activity restrictions: Up with assist, telemetry, COVID isolation, salter O2, IV, alarms  Subjective   General  Chart Reviewed: Yes  Patient assessed for rehabilitation services?: Yes  Response to previous treatment: Patient with no complaints from previous session  Family / Caregiver Present: No  Subjective  Subjective: \"I feel good today\"  General Comment  Comments: pt pleasant and cooperative for OT eval  Oxygen Therapy  O2 Device: High flow nasal cannula   Orientation  Orientation  Overall Orientation Status: Within Functional Limits  Objective             Balance  Sitting Balance: Stand by assistance  Standing Balance: Maximum assistance (Major posterior lean requiring maxA to correct even with VC)  Functional Mobility  Functional - Mobility Device: Rolling Walker  Assist Level: Maximum assistance  Functional Mobility Comments: 4 very small steps FW/retro with posterior lean throughout, max VC for upright posture, breathing tech with fair carryover. Pt satting 86->93 throughout tx. Difficult to assess due to poor reading/wave form. Bed mobility  Comment: Pt up in chair upon arrival  Transfers  Sit to stand:  Moderate assistance;2 Person assistance  Stand to sit: Maximum assistance;2 Person assistance  Transfer Comments: MAX VCs/tactile cues for proper hand placement on stable surface, squaring self/AD to surface, controlled sit<>stand, pacing, pursed lip breathing, and assist/awarenes of lines. Cognition  Overall Cognitive Status: Exceptions  Arousal/Alertness: Delayed responses to stimuli;Inconsistent responses to stimuli  Following Commands:  Follows one step commands consistently  Attention Span: Appears intact  Memory: Appears intact  Safety Judgement: Decreased awareness of need for safety;Decreased awareness of need for assistance  Problem Solving: Assistance required to correct errors made;Assistance required to implement solutions  Insights: Decreased awareness of deficits  Initiation: Requires cues for some  Sequencing: Requires cues for some               Plan   Plan  Times per week: 4-5x per week, 1-2x per day  Times per day: Daily  Current Treatment Recommendations: Strengthening,Endurance Training,Patient/Caregiver Education & Training,Functional Mobility Training,Balance Training,Positioning,Safety Education & Training,Equipment Evaluation, Education, & procurement,Self-Care / ADL    AM-PAC Score        AM-Merged with [facility] Inpatient Daily Activity Raw Score: 17 (01/18/22 1509)  AM-PAC Inpatient ADL T-Scale Score : 37.26 (01/18/22 1509)  ADL Inpatient CMS 0-100% Score: 50.11 (01/18/22 1509)  ADL Inpatient CMS G-Code Modifier : CK (01/18/22 1509)    Goals  Short term goals  Time Frame for Short term goals: by discharge, pt to demo  Short term goal 1: I/MI for bed mob tasks without bed rails, while maintaining an appropriate SPO2%  Short term goal 2: I/MI for UB ADLs and SBA for LB ADLs with AE as needed and Good safety, while maintaining an appropriate SPO2%  Short term goal 3: SBA for ADL transfers and functional mob with AD and Good safety, while maintaining an appropriate SPO2%  Short term goal 4: increase BUE strength by 1/2 grade to increase IND with self-care and be IND with HEP  Short term goal 5: pt to be IND with EC/WS, fall prevention tech, COVID+ education, as well as DME/AE recommendations with use of handouts as needed  Patient Goals   Patient goals : To go home       Therapy Time   Individual Concurrent Group Co-treatment   Time In 1132         Time Out 56         Minutes 24           RN reports patient is medically stable for therapy treatment this date. Chart reviewed prior to treatment and patient is agreeable for therapy. All lines intact and patient positioned comfortably at end of treatment. All patient needs addressed prior to ending therapy session. Co-treatment with PT warranted secondary to decreased safety and independence requiring 2 skilled therapy professionals to address individual discipline's goals. OT addressing preparation for ADL transfer, sitting balance for increased ADL performance, sitting/activity tolerance, functional reaching, environmental safety/scanning, fall prevention, functional mobility for ADL transfers, ability to sequence and follow directions and functional UE strength.          SANDY Lynn

## 2022-01-18 NOTE — CONSULTS
Pulmonary Medicine and Critical Care Consult    Patient - Kevin Varela   MRN -  [id number]   [facility]   - 1945      Date of Admission -  2022 11:51 AM  Date of evaluation -  2022  Room - 1015/1015-   Hospital Day - 1  Consulting - Ryland Mae MD Primary Care Physician - Winston Ramírez MD     Reason for Consult      COVID-19  Assessment   · Chronic respiratory failure  ·  COVID-19 pneumonia  ·  COPD/ emphysema  · Left lower lobe opacity versus nodule/possible worsening ILD ;CT changes or likely related to COVID superimposed on emphysema  · Pulmonary edema  · systolic heart failure status post AICD  · BLANCHE on CKD  · diabetes    Recommendations   · Oxygen by nasal cannula  · Incentive spirometry every hour awake  · Air-bone isolation   · prone as tolerated  · Albuterol  · Spiriva  ·  Decadron IV 6 mg daily  · Lasix 40 IV every 12 hrs  · Follow-up CT chest outpatient  · cardiology consult  · Infectious disease on consult  · DVT prophylaxis    Problem List      Patient Active Problem List   Diagnosis    Biventricular CHF (congestive heart failure) [facility]    CKD (chronic kidney disease) stage 3, GFR 30-59 ml/min ([facility])    Essential hypertension    Blurred vision, right eye    Type 2 diabetes mellitus treated with insulin ([address]    Atherosclerotic plaque    Weakness on left side of face    Carotid stenosis, asymptomatic, bilateral    New onset seizure ([address]    COVID       HPI     Kevin Varela is 64 y.o.,  male, previous medical history of chronic respiratory failure on nocturnal oxygen at 2 L with history of COPD, systolic heart failure status post AICD, diabetes, chronic renal insufficiency, hypertension, peripheral neuropathy, right renal mass followed by urology, cervical disc disease. Patient presented to the hospital for worsening shortness of breath ad and worsening lower extremity edema. He had diffuse body aches and sweats. .  He had associated dry cough. He tested positive for COVID-19. He had received his [employer] vaccine a month ago. He required oxygen on presentation and now is on 4 L oxygen. He feels better since admission. His sitting in chair.     PMHx   Past Medical History      Diagnosis Date    Arthritis     Atherosclerotic plaque 7/28/2019    Cervical disc disease     degenerative disc disease    Diabetes mellitus ([address]     type 2    History of blood transfusion     Hx of blood clots     left leg    Hyperlipidemia     Neuropathy     Right kidney mass     \"urologist is watching\"    Snores     Type 2 diabetes mellitus treated with insulin ([address] 7/28/2019      Past Surgical History        Procedure Laterality Date    ABDOMINAL AORTIC ANEURYSM REPAIR  2005    CARPAL TUNNEL RELEASE Bilateral     CERVICAL SPINE SURGERY      COLONOSCOPY      benign polyps removed    INGUINAL HERNIA REPAIR Right     PA REPAIR INCISIONAL HERNIA,REDUCIBLE N/A 5/10/2017    HERNIA VENTRAL REPAIR WITH MESH  performed by Kai Rodriguez DO at [address] [place] [address] Road  05/10/2017    with mesh       Meds    Current Medications    metoprolol succinate  50 mg Oral Nightly    albuterol sulfate HFA  2 puff Inhalation BID    [START ON 1/18/2022] tiotropium  2 puff Inhalation Daily    dexamethasone  6 mg IntraVENous Q24H    furosemide  40 mg IntraVENous BID    sodium chloride flush  5-40 mL IntraVENous 2 times per day    enoxaparin  40 mg SubCUTAneous Daily    levETIRAcetam  500 mg Oral BID    aspirin  325 mg Oral Daily    amLODIPine  5 mg Oral Nightly    atorvastatin  20 mg Oral Daily    ALPRAZolam  0.5 mg Oral TID    insulin glargine  40 Units SubCUTAneous BID    insulin lispro  0-18 Units SubCUTAneous TID WC    insulin lispro  0-9 Units SubCUTAneous Nightly     dextrose bolus (hypoglycemia) **OR** dextrose bolus (hypoglycemia), sodium chloride flush, sodium chloride, potassium chloride **OR** potassium alternative oral replacement **OR** potassium chloride, magnesium sulfate, ondansetron **OR** ondansetron, magnesium hydroxide, acetaminophen **OR** acetaminophen, glucose, glucagon (rDNA), dextrose, hydrALAZINE  IV Drips/Infusions   sodium chloride      dextrose       Home Medications  Medications Prior to Admission: levETIRAcetam (KEPPRA) 500 MG tablet, Take 1 tablet by mouth 2 times daily  lisinopril (PRINIVIL;ZESTRIL) 20 MG tablet, Take 1 tablet by mouth daily  venlafaxine (EFFEXOR XR) 150 MG extended release capsule, Take 1 capsule by mouth daily (with breakfast)  vitamin B-1 (THIAMINE) 100 MG tablet, Take 100 mg by mouth daily  ferrous sulfate 325 (65 Fe) MG tablet, Take 325 mg by mouth daily (with breakfast)  ALPRAZolam (XANAX) 0.5 MG tablet, Take 0.5 mg by mouth three times daily.   furosemide (LASIX) 40 MG tablet, Take 1 tablet by mouth 2 times daily  tiotropium (SPIRIVA RESPIMAT) 2.5 MCG/ACT AERS inhaler, Inhale 2 puffs into the lungs daily   albuterol sulfate  (90 Base) MCG/ACT inhaler, Inhale 2 puffs into the lungs every 6 hours as needed for Wheezing or Shortness of Breath  metoprolol succinate (TOPROL XL) 50 MG extended release tablet, Take 50 mg by mouth daily  Multiple Vitamins-Minerals (MULTIVITAMIN ADULT) TABS, Take 1 tablet by mouth daily  vitamin D (CHOLECALCIFEROL) 1000 UNIT TABS tablet, Take 1,000 Units by mouth daily  fluticasone (FLONASE) 50 MCG/ACT nasal spray, 1 spray by Each Nare route daily as needed for Rhinitis  aspirin 325 MG EC tablet, Take 325 mg by mouth daily   Omega-3 Fatty Acids (FISH OIL) 1000 MG CAPS, Take 1 capsule by mouth daily   amLODIPine (NORVASC) 5 MG tablet, Take 5 mg by mouth nightly   mirtazapine (REMERON) 45 MG tablet, Take 45 mg by mouth nightly  Iron-Vitamin C (IRON 100/C) 100-250 MG TABS, iron  HUMALOG KWIKPEN 100 UNIT/ML pen, Inject 5-15 Units into the skin 3 times daily (before meals)  insulin glargine (BASAGLAR KWIKPEN) 100 UNIT/ML injection pen, Inject 40 Units into the skin 2 times daily  atorvastatin (LIPITOR) 20 MG tablet, Take 20 mg by mouth daily    Allergies    Barbiturates, Codeine, and Sulfa antibiotics  Social History     Social History     Socioeconomic History    Marital status:      Spouse name: Not on file    Number of children: Not on file    Years of education: Not on file    Highest education level: Not on file   Occupational History    Not on file   Tobacco Use    Smoking status: Former Smoker    Smokeless tobacco: Never Used    Tobacco comment: quit 30 years ago   Substance and Sexual Activity    Alcohol use: No    Drug use: No    Sexual activity: Not on file   Other Topics Concern    Not on file   Social History Narrative    Not on file     Social Determinants of Health     Financial Resource Strain:     Difficulty of Paying Living Expenses: Not on file   Food Insecurity:     Worried About [address] in the Last Year: Not on file    [address] [employer] in the Last Year: Not on file   Transportation Needs:     Lack of Transportation (Medical): Not on file    Lack of Transportation (Non-Medical):  Not on file   Physical Activity:     Days of Exercise per Week: Not on file    Minutes of Exercise per Session: Not on file   Stress:     Feeling of Stress : Not on file   Social Connections:     Frequency of Communication with Friends and Family: Not on file    Frequency of Social Gatherings with Friends and Family: Not on file    Attends Sabianism Services: Not on file    Active Member of Clubs or Organizations: Not on file    Attends Club or Organization Meetings: Not on file    Marital Status: Not on file   Intimate Partner Violence:     Fear of Current or Ex-Partner: Not on file    Emotionally Abused: Not on file    Physically Abused: Not on file    Sexually Abused: Not on file   Housing Stability:     Unable to Pay for Housing in the Last Year: Not on file    Number of [facility] in the Last Year: Not on file    Unstable Housing in the Last Year: Not on file     Family History          Problem Relation Age of Onset    Ovarian Cancer Sister     Ovarian Cancer Sister      ROS - 11 systems   General Denies any fever or chills  HEENT Denies any diplopia, tinnitus or vertigo  Resp as above  Cardiac Denies any chest pain, palpitations, claudication or edema  GI Denies any melena, hematochezia, hematemesis or pyrosis   Denies any frequency, urgency, hesitancy or incontinence  Heme Denies bruising or bleeding easily  Endocrine Denies any history of  thyroid disease  Neuro Denies any focal motor  deficits  Psychiatric Denies anxiety, depression, suicidal ideation  Skin Denies rashes, itching, open sores    Vitals     height is 5' 10.98\" (1.803 m) and weight is 228 lb (103.4 kg). His oral temperature is 98.6 °F (37 °C). His blood pressure is 151/62 (abnormal) and his pulse is 76. His respiration is 20 and oxygen saturation is 87% (abnormal). Body mass index is 31.82 kg/m². I/O        Intake/Output Summary (Last 24 hours) at 1/17/2022 1901  Last data filed at 1/17/2022 1643  Gross per 24 hour   Intake 119.24 ml   Output 2345 ml   Net -2225.76 ml     I/O last 3 completed shifts: In: 119.2 [IV Piggyback:119.2]  Out: 8335 [[facility]]   Patient Vitals for the past 96 hrs (Last 3 readings):   Weight   01/17/22 0514 228 lb (103.4 kg)   01/16/22 1715 184 lb 15.5 oz (83.9 kg)     Exam   General Appearance  Awake, alert, oriented, in no acute distress  HEENT - Head is normocephalic, atraumatic. Pupil reactive to light  Neck - Supple, symmetrical, trachea midline and Soft, trachea midline and straight  Lungs -decreased breath sound no crackles or wheezing  Cardiovascular - Heart sounds are normal.  Regular rhythm normal rate without murmur, gallop or rub. Abdomen - Soft, nontender, nondistended, no masses or organomegaly  Neurologic - CN II-XII are grossly intact.  There are no focal motor deficits  Skin - No bruising or bleeding  Extremities - No cyanosis, clubbing or edema    Labs  - Old records and notes have been reviewed in [facility]   CBC     Lab Results   Component Value Date    WBC 1.8 01/17/2022    RBC 6.97 01/17/2022    HGB 17.7 01/17/2022    HCT 57.3 01/17/2022     01/17/2022    MCV 82.2 01/17/2022    MCH 25.4 01/17/2022    MCHC 30.9 01/17/2022    RDW 16.8 01/17/2022    LYMPHOPCT 21 01/17/2022    MONOPCT 17 01/17/2022    BASOPCT 0 01/17/2022    MONOSABS 0.31 01/17/2022    LYMPHSABS 0.38 01/17/2022    EOSABS 0.00 01/17/2022    BASOSABS 0.00 01/17/2022    DIFFTYPE NOT REPORTED 01/17/2022     BMP   Lab Results   Component Value Date     01/17/2022    K 3.6 01/17/2022    CL 94 01/17/2022    CO2 28 01/17/2022    BUN 27 01/17/2022    CREATININE 1.49 01/17/2022    GLUCOSE 195 01/17/2022    CALCIUM 8.4 01/17/2022    MG 1.5 01/16/2022     LFTS  Lab Results   Component Value Date    ALKPHOS 123 01/16/2022    ALT 24 01/16/2022    AST 45 01/16/2022    PROT 6.5 01/16/2022    BILITOT 0.68 01/16/2022    BILIDIR 0.20 12/08/2018    IBILI 0.37 12/08/2018    LABALBU 3.8 01/16/2022       Lab Results   Component Value Date    APTT 26.0 12/07/2018     INR   Lab Results   Component Value Date    INR 1.0 01/17/2022    INR 1.0 11/03/2021    INR 1.0 07/27/2019    PROTIME 13.3 01/17/2022    PROTIME 11.1 11/03/2021    PROTIME 10.5 07/27/2019     Results for Delaney Ramesh (MRN [id number]) as of 1/17/2022 19:13   Ref. Range 1/17/2022 04:35   Pro-BNP Latest Ref Range: <300 pg/mL 8,610 (H)     Radiology    CXR           CTA OF THE CHEST, 1/16/2022 2:09 pm           FINDINGS:   No evidence of pulmonary embolism.  Compared to 2008, worsening underlying emphysema, with worsening subacute or acute interstitial lung disease, best seen left upper lobe; differential includes interstitial pneumonia or pneumonitis superimposed on emphysema, DIP, HP, and other interstitial pneumonias as well as infectious or inflammatory process superimposed on emphysema disease. Findings are not typical for COVID-19 pneumonia, but an element of the latter should be considered. Thickening and distortion left major fissure with ill-defined mass-like focus left lower lobe. The latter could also be infectious or inflammatory, although neoplasm should be excluded. Underlying worsening emphysema. Nodular densities, best seen right upper lobe. Small pleural effusions, slightly larger on the left. See recommendations below. Mild mediastinal and left hilar adenopathy; see recommendations below. Worsening now moderate-severe pulmonary artery hypertension. Mild cardiomegaly. Stable mild dilatation ascending thoracic aorta. (See actual reports for details)    \"Thank you for asking us to see this patient\"    Case discussed with nurse and patient/family. Questions and concerns addressed.     Electronically signed by     Altaf Stallings MD on 1/17/2022 at 7:01 PM

## 2022-01-18 NOTE — PROGRESS NOTES
[facility]  Progress Note    Cal Steele  MRN: [id number]  Date: 1/18/2022  LOS: 2     Reason for F/U :   COVID-19 virus infection    Impression :   1. COVID-19 virus infection tested + 1/16/2022  2. Acute respiratory insufficiency on 3 L of oxygen by nasal  3. Emphysema with worsening changes on imaging  4. Worsening acute interstitial lung disease and clinically not typical of COVID-19 virus infection  5. Worsening moderate to severe pulmonary artery hypertension  6. Bilateral lower extremity edema likely related to above    Recommendations:   · COVID-19 therapy:  · The patient is on Decadron 6 mg IV daily started by the pulmonary team.  · The oxygen requirements have increased significantly up to 10 L from 3 L yesterday. · While the pneumonia changes are not typical of COVID-19 virus infection given the acute increase in oxygenation and increased pulmonary opacities I will start him on baricitinib  · The patient will also continue on antimicrobial therapy with levofloxacin. · I will follow his progress and adjust antimicrobial therapy according    Infection Control Recommendations:   Droplet plus precautions    Discharge Planning:   Estimated Length of IV antimicrobials: 5 to 7 days  Patient will need Midline Catheter Insertion/ PICC line Insertion: No  Patient will need: Home IV , [place],  SNF,  LTAC: Undetermined  Patient willneed outpatient wound care: No    Medical Decision making / Summary of Stay:   Cal Steele is a 64y.o.-year-old male who was initially admitted on 1/16/2022.    Madisyn Arriaza has a history of diabetes mellitus type 2, essential hypertension, seizure disorder, chronic kidney disease stage III, hyperlipidemia among other medical problems.     The patient reports that about 1 to 2 months ago he had his diabetes medications changed and since that time he has noticed increasing swelling in his legs, hardness in the legs, difficulty ambulating, and weight gain from 178 to 255 pounds over the last 1 to 2 months. He did not report any subjective fevers, chills, cough or shortness of breath. He denies any loss of smell or change in taste. No abdominal pain nausea vomiting or diarrhea. The patient does report that he has home oxygen that he uses as needed at home and he reports that he hardly needs it. He is vaccinated did receive the [employer] vaccine but has not yet received a booster dose.     The patient presented to the emergency department and was found to have leukopenia with a white blood cell count of 2.7 and thrombocytopenia with a platelet count of 062. Creatinine slightly elevated at 1.31 and proBNP is elevated at 9327. Chest x-ray showed hyperinflated lungs with cardiomegaly seen and diffuse increased interstitial markings in both lungs which may represent baseline chronic interstitial lung changes given that these findings were present before. A CT of the chest was done with IV contrast that showed no evidence of pulmonary embolism and compared to 2008 there is worsening underlying emphysema with worsening subacute or acute interstitial lung disease best seen in the left upper lobe. Findings were not felt typical for COVID-19 virus pneumonia. There was thickening and distortion of the left major fissure with an ill-defined masslike focus in the left lower lobe. There is worsening moderate to severe pulmonary artery hypertension     COVID testing was positive and I was asked to evaluate and help with COVID-19 virus infection    Current evaluation:2022    BP (!) 146/90   Pulse 66   Temp 100.6 °F (38.1 °C) (Oral)   Resp 22   Ht 5' 10.98\" (1.803 m)   Wt 228 lb (103.4 kg)   SpO2 91%   BMI 31.82 kg/m²     Temperature Range: Temp: 100.6 °F (38.1 °C) Temp  Av.7 °F (37.1 °C)  Min: 97.5 °F (36.4 °C)  Max: 100.6 °F (38.1 °C)  The patient is seen and evaluated at bedside he is awake and alert sitting up in the chair.   He has a low-grade fever to 100.6 degrees. The patient has increased oxygen requirements is on 10 L by nasal cannula. He saturating 90% at the time my evaluation. He does not report any subjective complaints in terms of fevers, shortness of breath, or chest pain. Review of Systems   Constitutional: Negative. Respiratory: Negative. Cardiovascular: Negative. Gastrointestinal: Negative. Genitourinary: Negative. Musculoskeletal: Negative. Allergic/Immunologic: Negative. Neurological: Negative. Physical Examination :     Physical Exam  Constitutional:       Appearance: He is well-developed. HENT:      Head: Normocephalic and atraumatic. Cardiovascular:      Rate and Rhythm: Normal rate. Heart sounds: Normal heart sounds. No friction rub. No gallop. Pulmonary:      Effort: Pulmonary effort is normal.      Breath sounds: Normal breath sounds. No wheezing. Abdominal:      General: Bowel sounds are normal.      Palpations: Abdomen is soft. There is no mass. Tenderness: There is no abdominal tenderness. Musculoskeletal:         General: Normal range of motion. Cervical back: Normal range of motion and neck supple. Lymphadenopathy:      Cervical: No cervical adenopathy. Skin:     General: Skin is warm and dry. Neurological:      Mental Status: He is alert and oriented to person, place, and time. Laboratory data:   I have independently reviewed the followinglabs:  CBC with Differential:   Recent Labs     01/17/22  0435 01/18/22  0554   WBC 1.8* 3.2*   HGB 17.7* 18.3*   HCT 57.3* 58.1*   * 107*   LYMPHOPCT 21* 19*   MONOPCT 17* 14*     BMP:   Recent Labs     01/16/22  1220 01/16/22  1220 01/17/22  0435 01/18/22  0554      < > 138 136   K 3.5*   < > 3.6* 3.3*   CL 94*   < > 94* 94*   CO2 26   < > 28 28   BUN 20   < > 27* 28*   CREATININE 1.31*   < > 1.49* 1.56*   MG 1.5*  --   --  1.7    < > = values in this interval not displayed.      Hepatic Function Panel:   Recent Labs 01/16/22  1220   PROT 6.5   LABALBU 3.8   BILITOT 0.68   ALKPHOS 123   ALT 24   AST 45*         Lab Results   Component Value Date    PROCAL 0.11 01/16/2022     Lab Results   Component Value Date    CRP 23.5 01/16/2022    CRP 2.0 07/27/2019     Lab Results   Component Value Date    SEDRATE 3 01/16/2022         Lab Results   Component Value Date    DDIMER 1.53 01/18/2022    DDIMER 1.73 01/16/2022    DDIMER 1.18 12/07/2018     Lab Results   Component Value Date    FERRITIN 569 01/16/2022    FERRITIN 50 07/23/2019    FERRITIN 18 07/08/2019     Lab Results   Component Value Date     01/16/2022     Lab Results   Component Value Date    FIBRINOGEN 402 01/16/2022       Results in Past 30 Days  Result Component Current Result Ref Range Previous Result Ref Range   SARS-CoV-2, Rapid DETECTED (A) (1/16/2022) Not Detected Not in Time Range      Lab Results   Component Value Date    COVID19 DETECTED 01/16/2022    COVID19 Not Detected 11/02/2021       No results for input(s): [facility] in the last 72 hours. Imaging Studies:     Culture, Blood 1 [[id number]] Collected: 01/16/22 1220   Order Status: Completed Specimen: Blood Updated: 01/18/22 0112    Specimen Description . BLOOD    Special Requests LAC 12ML    Culture NO GROWTH 2 DAYS   Culture, Blood 1 [[id number]] Collected: 01/16/22 1205   Order Status: Completed Specimen: Blood Updated: 01/18/22 0112    Specimen Description . BLOOD    Special Requests RAC 12ML    Culture NO GROWTH 2 DAYS   Culture, Urine [[id number]] Collected: 01/16/22 1315   Order Status: Completed Specimen: Urine, clean catch Updated: 01/17/22 2128    Specimen Description . CLEAN CATCH URINE    Special Requests NOT REPORTED    Culture NO SIGNIFICANT GROWTH   COVID-19, Rapid [[id number]] (Abnormal) Collected: 01/16/22 1230   Order Status: Completed Specimen: Nasopharyngeal Swab Updated: 01/16/22 1314    Specimen Description . NASOPHARYNGEAL SWAB    SARS-CoV-2, Rapid DETECTED Abnormal     Comment:      Rapid NAAT: The specimen is POSITIVE for SARS-Cov-2, the novel coronavirus associated with   COVID-19.         This test has been authorized by the FDA under an Emergency Use Authorization (EUA) for use   by authorized laboratories.         The ID NOW COVID-19 assay is designed to detect the virus that causes COVID-19 in patients   with signs and symptoms of infection who are suspected of COVID-19. An individual without symptoms of COVID-19 and who is not shedding SARS-CoV-2 virus would   expect to have a negative (not detected) result in this assay. Fact sheet for Healthcare Providers: BuildHer.es   Fact sheet for Patients: BuildHer.es           Methodology: Isothermal Nucleic Acid Amplification         Results reported to the appropriate Health Department           Cultures:     Culture, Blood 1 [[id number]] Collected: 01/16/22 1220   Order Status: Completed Specimen: Blood Updated: 01/18/22 0112    Specimen Description . BLOOD    Special Requests LAC 12ML    Culture NO GROWTH 2 DAYS   Culture, Blood 1 [[id number]] Collected: 01/16/22 1205   Order Status: Completed Specimen: Blood Updated: 01/18/22 0112    Specimen Description . BLOOD    Special Requests RAC 12ML    Culture NO GROWTH 2 DAYS   Culture, Urine [[id number]] Collected: 01/16/22 1315   Order Status: Completed Specimen: Urine, clean catch Updated: 01/17/22 2128    Specimen Description . CLEAN CATCH URINE    Special Requests NOT REPORTED    Culture NO SIGNIFICANT GROWTH     Flu A/B Ag Detection [[id number]] Collected: 01/16/22 1230   Order Status: Completed Specimen: Nasopharyngeal Swab Updated: 01/16/22 1313    Specimen Description . NASOPHARYNGEAL SWAB    Special Requests NOT REPORTED    Direct Exam NEGATIVE for Influenza A + B antigens.                                PCR testing to confirm this result is available upon request. Tatiana Dumont will be saved in the laboratory for 7 days. [facility] call [phone] if PCR testing is indicated. Medications:      metoprolol succinate  50 mg Oral Nightly    albuterol sulfate HFA  2 puff Inhalation BID    tiotropium  2 puff Inhalation Daily    dexamethasone  6 mg IntraVENous Q24H    furosemide  40 mg IntraVENous BID    sodium chloride flush  5-40 mL IntraVENous 2 times per day    enoxaparin  40 mg SubCUTAneous Daily    levETIRAcetam  500 mg Oral BID    aspirin  325 mg Oral Daily    amLODIPine  5 mg Oral Nightly    atorvastatin  20 mg Oral Daily    ALPRAZolam  0.5 mg Oral TID    insulin glargine  40 Units SubCUTAneous BID    insulin lispro  0-18 Units SubCUTAneous TID WC    insulin lispro  0-9 Units SubCUTAneous Nightly           [facility]  Lara Huerta MD  [employer] messaging  OFFICE: [phone]      Electronically signed by Lara Huerta MD on 1/18/2022 at 9:38 AM  Thank you for allowing us to participate in the care of this patient. Please call with questions. This note iscreated with the assistance of a speech recognition program.  While intending to generate a document that actually reflects the content of the visit, the document can still have some errors including those of syntax andsound a like substitutions which may escape proof reading. In such instances, actual meaning can be extrapolated by contextual diversion.

## 2022-01-18 NOTE — CONSULTS
VASCULAR SURGERY   CONSULT        Name: Demetria Stevenson  MRN: [id number]     Acct: [de-identified]  Room: [address] [place] [address]    Admit Date: 1/16/2022  PCP: Douglas Blackwood MD    Physician Requesting Consult: HERB Stewart    Reason for Consult: Lower extremity edema/COVID-19    Chief Complaint:     Chief Complaint   Patient presents with    Leg Swelling     bilateral, has CHF, on diuretic, EMT saw pt , assisted pt into car    Fever    Other     low SPO2         History Obtained From:     electronic medical record and nursing    History of Present Illness:      Demetria Stevenson is a 64 y.o.  male who presents with Leg Swelling (bilateral, has CHF, on diuretic, EMT saw pt , assisted pt into car), Fever, and Other (low SPO2)  Patient currently admitted with COVID-19 pneumonia. Presently confused and in CT being evaluated. Asked to evaluate for lower extremity edema with mild erythema. Patient previously underwent open aortic aneurysm repair by Dr. Radha Cummins approximately 11 years ago.     Past Medical History:     Past Medical History:   Diagnosis Date    Arthritis     Atherosclerotic plaque 7/28/2019    Cervical disc disease     degenerative disc disease    Diabetes mellitus ([address]     type 2    History of blood transfusion     Hx of blood clots     left leg    Hyperlipidemia     Neuropathy     Right kidney mass     \"urologist is watching\"    Snores     Type 2 diabetes mellitus treated with insulin ([address] 7/28/2019        Past Surgical History:     Past Surgical History:   Procedure Laterality Date    ABDOMINAL AORTIC ANEURYSM REPAIR  2005    CARPAL TUNNEL RELEASE Bilateral     CERVICAL SPINE SURGERY      COLONOSCOPY      benign polyps removed    INGUINAL HERNIA REPAIR Right     OK REPAIR INCISIONAL HERNIA,REDUCIBLE N/A 5/10/2017    HERNIA VENTRAL REPAIR WITH MESH  performed by Estelle Contreras DO at [address] [place] [address] 05/10/2017    with mesh        Medications Prior to Admission:       Prior to Admission medications    Medication Sig Start Date End Date Taking? Authorizing Provider   levETIRAcetam (KEPPRA) 500 MG tablet Take 1 tablet by mouth 2 times daily 11/3/21  Yes Jesús Lopez MD   lisinopril (PRINIVIL;ZESTRIL) 20 MG tablet Take 1 tablet by mouth daily 11/3/21  Yes Jesús Lopez MD   venlafaxine (EFFEXOR XR) 150 MG extended release capsule Take 1 capsule by mouth daily (with breakfast) 7/29/19  Yes Arabella Kiser   vitamin B-1 (THIAMINE) 100 MG tablet Take 100 mg by mouth daily   Yes Historical Provider, MD   ferrous sulfate 325 (65 Fe) MG tablet Take 325 mg by mouth daily (with breakfast)   Yes Historical Provider, MD   ALPRAZolam (XANAX) 0.5 MG tablet Take 0.5 mg by mouth three times daily.    Yes Historical Provider, MD   furosemide (LASIX) 40 MG tablet Take 1 tablet by mouth 2 times daily 12/11/18  Yes Brenda Evans MD   tiotropium (SPIRIVA RESPIMAT) 2.5 MCG/ACT AERS inhaler Inhale 2 puffs into the lungs daily    Yes Historical Provider, MD   albuterol sulfate  (90 Base) MCG/ACT inhaler Inhale 2 puffs into the lungs every 6 hours as needed for Wheezing or Shortness of Breath   Yes Historical Provider, MD   metoprolol succinate (TOPROL XL) 50 MG extended release tablet Take 50 mg by mouth daily   Yes Historical Provider, MD   Multiple Vitamins-Minerals (MULTIVITAMIN ADULT) TABS Take 1 tablet by mouth daily   Yes Historical Provider, MD   vitamin D (CHOLECALCIFEROL) 1000 UNIT TABS tablet Take 1,000 Units by mouth daily   Yes Historical Provider, MD   fluticasone (FLONASE) 50 MCG/ACT nasal spray 1 spray by Each Nare route daily as needed for Rhinitis   Yes Historical Provider, MD   aspirin 325 MG EC tablet Take 325 mg by mouth daily    Yes Historical Provider, MD   Omega-3 Fatty Acids (FISH OIL) 1000 MG CAPS Take 1 capsule by mouth daily    Yes Historical Provider, MD   amLODIPine (NORVASC) 5 MG tablet Take Av.7 °F (37.1 °C), Min:97.5 °F (36.4 °C), Max:100.6 °F (38.1 °C)      General appearance - alert, well appearing and in no acute distress  Mental status - oriented to person, place and time with normal affect  Head - normocephalic and atraumatic  Eyes - pupils equal and reactive, extraocular eye movements intact, conjunctiva clear  Ears - hearing appears to be intact  Nose - no drainage noted  Mouth - mucous membranes moist  Neck - supple, no carotid bruits, thyroid not palpable, no JVD  Chest -crackles bilaterally  Heart - normal rate, regular rhythm, no murmurs  Abdomen - soft, non-tender, non-distended, bowel sounds present all four quadrants, no masses, hepatomegaly, splenomegaly, midline incision healed  Neurological - normal speech, no focal findings or movement disorder noted, cranial nerves II through XII grossly intact  Extremities -bilateral lower extremity edema with mild erythema  Skin - no gross lesions, rashes, or induration noted      Data:     Hematology:  Recent Labs     22  1220 22  0435 22  0554   WBC 2.7* 1.8* 3.2*   RBC 6.82* 6.97* 7.12*   HGB 17.5* 17.7* 18.3*   HCT 56.6* 57.3* 58.1*   MCV 83.0 82.2* 81.6*   MCH 25.7 25.4 25.7   MCHC 30.9 30.9 31.5   RDW 16.6* 16.8* 17.2*   * 100* 107*   MPV 12.4 11.3 Abnormal   SEGS 62 62 66*   LYMPHOPCT 16* 21* 19*   MONOPCT 21* 17* 14*   EOSRELPCT 0* 0* 0*   BASOPCT 0 0 0   SEDRATE 3  --   --    CRP 23.5*  --   --    PROTIME  --  13.3  --    INR  --  1.0  --    DDIMER 1.73*  --  1.53*     Chemistry:  Recent Labs     22  1220 22  1430 22  1630 22  2225 22  0435 22  0554     --   --   --  138 136   K 3.5*  --   --   --  3.6* 3.3*   CL 94*  --   --   --  94* 94*   CO2 26  --   --   --  28 28   GLUCOSE 215*  --   --   --  195* 146*   BUN 20  --   --   --  27* 28*   CREATININE 1.31*  --   --   --  1.49* 1.56*   MG 1.5*  --   --   --   --  1.7   ANIONGAP 15  --   --   --  16 14   LABGLOM 53* --   --   --  46* 43*   GFRAA >60  --   --   --  55* 52*   CALCIUM 8.5*  --   --   --  8.4* 9.0   PROBNP 9,327*  --   --   --  8,610* 3,484*   TROPHS 36*   < > 39* 33* 37*  --    LACTA 2.0  --   --   --   --   --    PROCAL 0.11*  --   --   --   --   --     < > = values in this interval not displayed. Recent Labs     01/16/22  1220 01/16/22  1630   PROT 6.5  --    LABALBU 3.8  --    EAG  --  214   AST 45*  --    ALT 24  --    *  --    ALKPHOS 123  --    BILITOT 0.68  --      Glucose:  Recent Labs     01/16/22  1630 01/16/22  1817 01/17/22  1648 01/17/22 2011 01/17/22  2343 01/18/22  0813 01/18/22  1229 01/18/22  1653   LABA1C 9.1*  --   --   --   --   --   --   --    POCGLU  --    < > 75 80 124* 124* 94 69*    < > = values in this interval not displayed. Assessment:     Primary Problem  <principal problem not specified>  3 75-year-old male with COVID-19 pneumonia and confusion  2. Bilateral lower extremity edema  3. Status post open abdominal aortic aneurysm repair by Dr. Luba Rubio Problems    Diagnosis Date Noted   Umang Yung [U07.1] 01/16/2022       Plan:     1. Continue supportive care  2. Bilateral JEFF hose  3.  Continue antibiotics      Electronically signed by Sabina Molina MD on 1/18/2022 at 5:43 PM     Copy sent to Dr. Cassy Finn MD

## 2022-01-18 NOTE — PROGRESS NOTES
Dr. Yajaira Dominguez aware of consult, states vascular changes are patients baseline.  OK to complete venous dopplers per MD. WDL

## 2022-01-18 NOTE — PROGRESS NOTES
Writer performed bilateral doppler scans on patients feet. Patients left foot had an audible pulse, is warm to the touch, and red in color. The patients right foot had a pulse that was barely audible, was cold to the touch, and had purple discoloration. Patient is able to wiggle toes on both feet and explained that this is normal for him. Bilateral venous doppler ordered and vascular consult added.

## 2022-01-18 NOTE — PLAN OF CARE
Problem: Airway Clearance - Ineffective  Goal: Achieve or maintain patent airway  Outcome: Ongoing     Problem: Gas Exchange - Impaired  Goal: Absence of hypoxia  Outcome: Ongoing     Problem: Gas Exchange - Impaired  Goal: Promote optimal lung function  Outcome: Ongoing     Problem: Breathing Pattern - Ineffective  Goal: Ability to achieve and maintain a regular respiratory rate  Outcome: Ongoing     Problem: Body Temperature -  Risk of, Imbalanced  Goal: Will regain or maintain usual level of consciousness  Outcome: Ongoing     Problem:  Body Temperature -  Risk of, Imbalanced  Goal: Complications related to the disease process, condition or treatment will be avoided or minimized  Outcome: Ongoing     Problem: Isolation Precautions - Risk of Spread of Infection  Goal: Prevent transmission of infection  Outcome: Ongoing  Note: Proper PPE and handwashing used     Problem: Nutrition Deficits  Goal: Optimize nutritional status  Outcome: Ongoing     Problem: Risk for Fluid Volume Deficit  Goal: Maintain normal heart rhythm  Outcome: Ongoing     Problem: Risk for Fluid Volume Deficit  Goal: Maintain absence of muscle cramping  Outcome: Ongoing     Problem: Risk for Fluid Volume Deficit  Goal: Maintain normal serum potassium, sodium, calcium, phosphorus, and pH  Outcome: Ongoing     Problem: Loneliness or Risk for Loneliness  Goal: Demonstrate positive use of time alone when socialization is not possible  Outcome: Ongoing     Problem: Fatigue  Goal: Verbalize increase energy and improved vitality  Outcome: Ongoing     Problem: Patient Education: Go to Patient Education Activity  Goal: Patient/Family Education  Outcome: Ongoing     Problem: Falls - Risk of:  Goal: Will remain free from falls  Description: Will remain free from falls  Outcome: Ongoing     Problem: Falls - Risk of:  Goal: Absence of physical injury  Description: Absence of physical injury  Outcome: Ongoing     Problem: Safety:  Goal: Free from accidental

## 2022-01-18 NOTE — RT PROTOCOL NOTE
RT Inhaler-Nebulizer Bronchodilator Protocol Note    There is a bronchodilator order in the chart from a provider indicating to follow the RT Bronchodilator Protocol and there is an Initiate RT Inhaler-Nebulizer Bronchodilator Protocol order as well (see protocol at bottom of note). CXR Findings:  No results found. The findings from the last RT Protocol Assessment were as follows:   History Pulmonary Disease: Chronic pulmonary disease  Respiratory Pattern: Regular pattern and RR 12-20 bpm  Breath Sounds: Slightly diminished and/or crackles  Cough: Strong, spontaneous, non-productive  Indication for Bronchodilator Therapy: On home bronchodilators  Bronchodilator Assessment Score: 4    Aerosolized bronchodilator medication orders have been revised according to the RT Inhaler-Nebulizer Bronchodilator Protocol below. Respiratory Therapist to perform RT Therapy Protocol Assessment initially then follow the protocol. Repeat RT Therapy Protocol Assessment PRN for score 0-3 or on second treatment, BID, and PRN for scores above 3. No Indications - adjust the frequency to every 6 hours PRN wheezing or bronchospasm, if no treatments needed after 48 hours then discontinue using Per Protocol order mode. If indication present, adjust the RT bronchodilator orders based on the Bronchodilator Assessment Score as indicated below. Use Inhaler orders unless patient has one or more of the following: on home nebulizer, not able to hold breath for 10 seconds, is not alert and oriented, cannot activate and use MDI correctly, or respiratory rate 25 breaths per minute or more, then use the equivalent nebulizer order(s) with same Frequency and PRN reasons based on the score. If a patient is on this medication at home then do not decrease Frequency below that used at home.     0-3 - enter or revise RT bronchodilator order(s) to equivalent RT Bronchodilator order with Frequency of every 4 hours PRN for wheezing or increased work of breathing using Per Protocol order mode. 4-6 - enter or revise RT Bronchodilator order(s) to two equivalent RT bronchodilator orders with one order with BID Frequency and one order with Frequency of every 4 hours PRN wheezing or increased work of breathing using Per Protocol order mode. 7-10 - enter or revise RT Bronchodilator order(s) to two equivalent RT bronchodilator orders with one order with TID Frequency and one order with Frequency of every 4 hours PRN wheezing or increased work of breathing using Per Protocol order mode. 11-13 - enter or revise RT Bronchodilator order(s) to one equivalent RT bronchodilator order with QID Frequency and an Albuterol order with Frequency of every 4 hours PRN wheezing or increased work of breathing using Per Protocol order mode. Greater than 13 - enter or revise RT Bronchodilator order(s) to one equivalent RT bronchodilator order with every 4 hours Frequency and an Albuterol order with Frequency of every 2 hours PRN wheezing or increased work of breathing using Per Protocol order mode. RT to enter RT Home Evaluation for COPD & MDI Assessment order using Per Protocol order mode.     Electronically signed by Cher Carrera RCP on 1/18/2022 at 9:38 AM

## 2022-01-18 NOTE — CARE COORDINATION
Discharge planning    ran no longer following they cannot accept patient insurance. His medicare became secondary today.

## 2022-01-18 NOTE — PROGRESS NOTES
Writer rounded on this patient. Patient appearing more lethargic and drowsy from AM assessment. Patient able to respond to commands and is alert and oriented x3. No focal deficits noted. Blood sugar checked and was 69. Juice given and recheck came back to 109. Pulm rounding and ordered stroke alert was called for this patient at 1738. Ct of head and ABG's ordered. Ct of head showing new lacunar infarct of left thalamus. ABG's showing ph of 7.44, co2 of 42.1, o2 of 72.2, hco3 of 29. Pulm notified of above mentioned results. Neuro consult ordered. Dr. Brianna Figueroa rounded and reviewed imaging. Per neuro, patient's infarct is not acute. However patient is requiring more oxygen demands. Patient currently on non rebreather at 15 liters. Per pulm, orders placed for Bipap and to transfer patient back to ICU. Clin lead and house supervisor notified. Dr. Mary Ellen Meehan notified, no new orders from him at this time. Writer spoke with spouse Ajay Marquis to notify of transfer. All questions answered at this time.

## 2022-01-18 NOTE — PROGRESS NOTES
Physical Therapy  Facility/Department: [facility] PROGRESSIVE CARE  Daily Treatment Note  NAME: Maricarmen Betts  : 1945  MRN: [id number]    Date of Service: 2022    Discharge Recommendations:  Patient would benefit from continued therapy after discharge   Pt currently functioning below baseline. Would suggest additional therapy at time of discharge to maximize long term outcomes and prevent re-admission. Please refer to AM-PAC score for current level of function. PT Equipment Recommendations  Equipment Needed: No    Assessment   Body structures, Functions, Activity limitations: Decreased functional mobility ; Decreased ROM; Decreased strength;Decreased safe awareness;Decreased endurance;Decreased balance;Decreased posture;Decreased ADL status  Assessment: Patient continues to required Mod-Max x 2 A for all functional mobility/gait and is currently a HIGH fall risk. Patient would benefit from continued [place] PT services to return to [facility] and maximize long term outcomes. Prognosis: Good  Decision Making: High Complexity  PT Education: Goals;PT Role;Plan of Care;Precautions;Transfer Training;Energy Conservation;General Safety;Gait Training;Pressure Relief; Functional Mobility Training  Patient Education: Ed pt on functional mobility, safety awareness, importance of being up & OOB to regain strength, & prevention of sedentary complications, circulation ex's,  pressure relief & optimal breathing techniques  REQUIRES PT FOLLOW UP: Yes  Activity Tolerance  Activity Tolerance: Patient limited by fatigue;Patient limited by endurance     Patient Diagnosis(es): The primary encounter diagnosis was COVID-19. Diagnoses of Acute respiratory failure with hypoxia (HCC), BLANCHE (acute kidney injury) ([address] Acute on chronic systolic CHF (congestive heart failure) ([address] Hypokalemia, Hypomagnesemia, and COPD exacerbation ([facility] were also pertinent to this visit.      has a past medical history of Arthritis, Atherosclerotic plaque, Cervical disc disease, Diabetes mellitus ([facility] History of blood transfusion, Hx of blood clots, Hyperlipidemia, Neuropathy, Right kidney mass, Snores, and Type 2 diabetes mellitus treated with insulin ([facility] has a past surgical history that includes Abdominal aortic aneurysm repair (2005); Carpal tunnel release (Bilateral); Cervical spine surgery; Inguinal hernia repair (Right); Upper gastrointestinal endoscopy; Colonoscopy; ventral hernia repair (05/10/2017); and pr repair incisional hernia,reducible (N/A, 5/10/2017). Restrictions  Restrictions/Precautions  Restrictions/Precautions: General Precautions,Fall Risk,Isolation  Required Braces or Orthoses?: No  Position Activity Restriction  Other position/activity restrictions: Up with assist, telemetry, COVID isolation, salter O2, IV, alarms  Subjective   General  Chart Reviewed: Yes  Additional Pertinent Hx: COPD not on oxygen at home, history of heart failure on Lasix Arthritis, Atherosclerotic plaque, Cervical disc disease, DM2,  Neuropathy, Right kidney mass  Response To Previous Treatment: Patient with no complaints from previous session. Family / Caregiver Present: No  Subjective  Subjective: Pt agreeable to PT  General Comment  Comments: RN okays PT          Orientation  Orientation  Overall Orientation Status: Within Functional Limits  Cognition   Cognition  Overall Cognitive Status: Exceptions  Arousal/Alertness: Delayed responses to stimuli;Inconsistent responses to stimuli  Following Commands:  Follows one step commands consistently  Attention Span: Appears intact  Memory: Appears intact  Safety Judgement: Decreased awareness of need for safety;Decreased awareness of need for assistance  Problem Solving: Assistance required to correct errors made;Assistance required to implement solutions  Insights: Decreased awareness of deficits  Initiation: Requires cues for some  Sequencing: Requires cues for some  Objective   Bed mobility  Comment: Patient in recliner upon writer's arrival and exit. Regino Hatch endtx  Transfers  Sit to Stand: Moderate Assistance;2 Person Assistance  Stand to sit: Maximum Assistance;2 Person Assistance  Comment: Ed + tactile assist on correct use of upper body for safe sit/stand + to back all way back to surface with walker until he feels touch behind legs & to ensure he reaches with UB support to arms of chair & demonstrates poor eccentric control  Ambulation  Ambulation?: Yes  Ambulation 1  Surface: level tile  Device: Rolling Walker  Other Apparatus: O2 (11L [place] HFNC)  Assistance: Maximum assistance;2 Person assistance  Quality of Gait: Short & shuffle step pattern, MAX cues to keep walker close at all times & to amb inside base of walker & upright posture as well as pursed lip breathing techniques & to manage lines, Significant posterior lean during standing and gait, patient unable to correct himself. Distance: 4' x 1  Comments: 4 very small steps FW/retro with posterior lean throughout, max VC for upright posture, breathing tech with fair carryover. Pt satting 86->93 throughout tx. Difficult to assess due to poor reading/wave form. Neuromuscular Education  NDT Treatment: Gait ;Sitting;Standing  Neuromuscular Comments: .neuro  Balance  Posture: Fair  Sitting - Static: Good  Sitting - Dynamic: -;Good  Standing - Static: Fair  Standing - Dynamic: Fair;-  Comments: w/ RW for standing balance         Strength RLE  Comment: 3+/5  Strength LLE  Comment: 3+/5              AM-PAC Score  AM-PAC Inpatient Mobility Raw Score : 12 (01/18/22 1546)  AM-PAC Inpatient T-Scale Score : 35.33 (01/18/22 1546)  Mobility Inpatient CMS 0-100% Score: 68.66 (01/18/22 1546)  Mobility Inpatient CMS G-Code Modifier : CL (01/18/22 1546)          Goals  Short term goals  Time Frame for Short term goals: 12 visits  Short term goal 1: Inc bed-mobility & transfers to independent to enable pt to safely get in/OOB & chair  Short term goal 2:  Inc gait to amb 150ft or > indep w/ RW to enable pt to return to previous level of independence  Short term goal 3: Inc strength to [address] standing balance to good with device to facilitate pt independence for performance of ADL's & functional mobility, & reduce fall risk  Short term goal 4: Pt able to tolerate 30 min of activity to include ex, breathing techniques & endurance training with pt demonstrating ability to perform energy conservation strategies during functional activity to self-regulate pacing & breathing techniques to avoid SOB & maintain SpO2 in safe range  Short term goal 5: Ed pt on home ex's, optimal breathing techniques, fall prevention, safety & energy principles, Covid 19 pt education & issue written pt education    Plan    Plan  Times per week: 1-2x/D, 5-6D/week  Current Treatment Recommendations: Strengthening,Balance Training,Functional Mobility Training,Transfer Training,ADL/Self-care Training,Endurance Training,Gait Training,Home Exercise Program,Safety Education & Training,Patient/Caregiver Education & Training  Safety Devices  Type of devices: Call light within reach,Gait belt,Chair alarm in place,Patient at risk for falls,Left in chair  Restraints  Initially in place: No     Therapy Time   Individual Concurrent Group Co-treatment   Time In      1132   Time Out       1156   Minutes       24        Co-treatment with OT warranted secondary to decreased safety and independence requiring 2 skilled therapy professionals to address individual discipline's goals. PT addressing pre gait trunk strengthening, weight shifting prior to transfers, transfer training and postural control in sitting/standing.     Jacquelyn Ramirez, PTA

## 2022-01-18 NOTE — CONSULTS
Family History:       Problem Relation Age of Onset    Ovarian Cancer Sister     Ovarian Cancer Sister        Review of Systems:  All systems reviewed and are all negative except what is mentioned in history of present illness. I reviewed the patient PMH,medications, family history, social history. Physical Exam:  /65   Pulse 58   Temp 97.9 °F (36.6 °C) (Oral)   Resp 20   Ht 5' 10.98\" (1.803 m)   Wt 228 lb (103.4 kg)   SpO2 94%   BMI 31.82 kg/m²  I Body mass index is 31.82 kg/m². I   Wt Readings from Last 1 Encounters:   01/17/22 228 lb (103.4 kg)           General appearance - alert, in no distress, oriented to  person, place, and time and weight  Mental status -Level of Alertness: lethargic    Memory: normal  Language: normal. Mood is normal  Neck - supple, no neck adenopathy, carotids upstroke normal bilaterally, no carotid bruits. There is no LAP in the neck. There is no thyroid enlargement. Neurological -   Cranial [id number]:   Cranial nerve II: Normal. There is full visual fields  Cranial nerve III: Pupils: equal, round, reactive to light   Cranial nerves III, IV, VI: Extraocular Movements: intact   Cranial nerve V: Facial sensation: intact   Cranial nerve VII:Facial strength: intact   Cranial nerve VIII: Hearing: intact   Cranial nerve IX: Palate Elevation intact bilaterally  Cranial nerve XI: Shoulder shrug intact bilaterally  Cranial nerve XII: Tongue midline   neck supple without rigidity    Motor exam is 5/5 in the upper and lower extremities . Normal muscle tone. There is no muscle atrophy. There is no muscle fasciculation *  Sensory is intact   Coordination: finger to nose intact  Gait and station not tested  Abnormal movement none.   Long tracts are intact   Skin - no rashes or lesions, warm and dry to touch  Superficial temporal artery pulses are normal.   Musculoskeletal: Has no hand arthritis, no limitation of ROM in any of the four extremities, . no joint intracranial vessels appear maintained. Mild involutional changes are identified within the brain. Minimal chronic  microvascular disease is appreciated within the periventricular white matter. No mesial temporal sclerosis is appreciated. ORBITS: The visualized portion of the orbits demonstrate no acute abnormality. SINUSES: The visualized paranasal sinuses and mastoid air cells are well  aerated. BONES/SOFT TISSUES: The bone marrow signal intensity appears normal. The soft  tissues demonstrate no acute abnormality. Impression  No acute intracranial abnormality. Minimal chronic microvascular disease within the periventricular white matter  with associated mild atrophy. No results found for this or any previous visit. No results found for this or any previous visit. Results for orders placed during the hospital encounter of 01/16/22    CT HEAD WO CONTRAST    Addendum 1/18/2022  6:23 PM  ADDENDUM:  Case discussed with Dr. Sarah Workman at 6:19 p.m. Narrative  EXAMINATION:  CT OF THE HEAD WITHOUT CONTRAST  1/18/2022 5:42 pm    TECHNIQUE:  CT of the head was performed without the administration of intravenous  contrast. Dose modulation, iterative reconstruction, and/or weight based  adjustment of the mA/kV was utilized to reduce the radiation dose to as low  as reasonably achievable. COMPARISON:  CT head November 2,    HISTORY:  ORDERING SYSTEM PROVIDED HISTORY: r/o cva  TECHNOLOGIST PROVIDED HISTORY:  r/o cva    Reason for Exam: stroke alert, ams    FINDINGS:  BRAIN/VENTRICLES: There is no acute intracranial hemorrhage, mass effect or  midline shift. No abnormal extra-axial fluid collection. The gray-white  differentiation is maintained without evidence of an acute infarct. There is  no evidence of hydrocephalus. Punctate calcifications noted in the occipital  lobes bilaterally. Lacunar infarct left thalamus. This appears new since  the previous exam however.     ORBITS: The visualized portion of the orbits demonstrate no acute abnormality. SINUSES: The visualized paranasal sinuses and mastoid air cells demonstrate  no acute abnormality. SOFT TISSUES/SKULL:  No acute abnormality of the visualized skull or soft  tissues. Impression  New lacunar infarct left thalamus, age indeterminate. MRI may be helpful. We reviewed the patient records and available information in the EHR       Impression:    64year old man with hx of DVT not on anticoagulant now, vaccinated but not boosted, p/w COVID pneumonia, increasing requirement of oxygen, and p/w increasing letharginess      Recommendations:    - this is not an acute stroke, this stroke happened sometime in the last 3 months, the CT head did show a small left thalamic stroke that was not present on MRI 3 months ago, the however this stroke happened sometime ago in the last 3 months, as judging by the color, it is certainly not new, clinically, pt also does not have any symptoms related to it  - pt letharginess is due to his hypoxia and it looks pt is going to require more oxygen, bipap will help, defer to pulmonary critical care  - I don't see any acute neurological issue here, when pt is stable we can get him an MRI brain w/o contrast and MRA head/neck w/o contrast to do the stroke workup for the stroke he had sometime ago  - con't aspirin 81mg daily  - con't lovenox for DVT prophylaxis, given his 4+ edema and hx of DVT, strongly recommend DVT dopper to r/o DVT, if there is DVT pt needs to be fully anticoagulated  - will follow up                                            1. Discussed with primary service. 2. Please call if any questions. Time spent was at least 67 min of time evaluating patient, discussion with staff,  planning for treatment and evaluation.   The time was spent examining patient, reviewing the images personally, reviewing the chart, perform high complexity decision making and speaking with the nursing staff regarding recommendations.      Electronically signed by Gary Linton MD on 1/18/2022 at 6:37 PM    Bautista Watts MD  Attending Neurologist/Neurointensivist

## 2022-01-18 NOTE — CARE COORDINATION
Discharge planning    Chart reviewed. SS working on potential placement. Plan is ARU vs snf vs home with home care and home 02 evaluation ( his 02 is thru private pay)     Dual referral was sent to [facility] at [address] . will need to f.u if can accept patient.      COVID   10 L HF   COVID testing 1/16  IV decadron and IV lasix   Step down status 1/16

## 2022-01-18 NOTE — PROGRESS NOTES
Pulmonary Critical Care Progress Note    Patient seen for the follow up of respiratory failure/COVID-pneumonia    Subjective:    I was contacted by RN this morning the patient had change in mental status  with worsening oxygenation and requested ABGs. I was not contacted since and came in to evaluate. Patient sitting in chair seems to be lethargic. His O2 requirement increased from 4 to 8 L. He answers questions but seems to be confused. He denies pain. I ordered a stat CT scan of the brain. I was contacted shortly after by radiologist reporting a new left thalamic lacunar infarct. Examination:    Vitals: /65   Pulse 58   Temp 97.9 °F (36.6 °C) (Oral)   Resp 20   Ht 5' 10.98\" (1.803 m)   Wt 228 lb (103.4 kg)   SpO2 94%   BMI 31.82 kg/m²   SpO2  Av.1 %  Min: 90 %  Max: 98 %  General appearance: Lethargic not following commands  Neck: No JVD  Lungs: Decreased breath sound no crackles or wheezing  Heart: regular rate and rhythm, S1, S2 normal, no gallop  Abdomen: Soft, non tender, + BS  Extremities: no cyanosis or clubbing. No significant edema    LABs:    CBC:   Recent Labs     22  0435 22  0554   WBC 1.8* 3.2*   HGB 17.7* 18.3*   HCT 57.3* 58.1*   * 107*     BMP:   Recent Labs     22  0435 22  0554    136   K 3.6* 3.3*   CO2 28 28   BUN 27* 28*   CREATININE 1.49* 1.56*   LABGLOM 46* 43*   GLUCOSE 195* 146*     PT/INR:   Recent Labs     22  0435   PROTIME 13.3   INR 1.0     APTT:No results for input(s): APTT in the last 72 hours. LIVER PROFILE:  Recent Labs     22  1220   AST 45*   ALT 24   LABALBU 3.8   Results for Everett Jimenez (MRN [id number]) as of 2022 18:28   Ref.  Range 2022 09:02 2022 18:18   pH, [place] Latest Ref Range: 7.320 - 7.430  7.496 (H) 7.445 (H)   pCO2, [place] Latest Ref Range: 41.0 - 51.0 mm Hg 38.3 (L) 42.1   pO2, [place] Latest Ref Range: 30.0 - 50.0 mm Hg 53.1 (H) 72.2 (H)   HCO3, Venous Latest Ref Range: 22.0 - 29.0 mmol/L 29.6 (H) 29.0   Positive Base Excess, [place] Latest Ref Range: 0.0 - 3.0  6 (H) 4 (H)   Negative Base Excess, [place] Latest Ref Range: 0.0 - 2.0  NOT REPORTED NOT REPORTED   Total CO2, Venous Latest Ref Range: 23.0 - 30.0 mmol/L NOT REPORTED NOT REPORTED   O2 Sat, [place] Latest Ref Range: 60.0 - 85.0 % 90 (H) 95 (H)   Pt Temp Unknown 37.0 37.0   Sample Site Unknown NOT REPORTED NOT REPORTED   O2 Device/Flow/% Unknown HFNC NRB       Radiology:  Chest x-ray    No significant change in bilateral airspace disease.             CT brain  New lacunar infarct left thalamus, age indeterminate.  MRI may be helpful  Impression:  · Acute on chronic respiratory failure  ·  COVID-19 pneumonia  ·  COPD/ emphysema  · Acute left thalamic infarct/CVA  · Left lower lobe opacity versus nodule/possible worsening ILD ;CT changes or likely related to COVID superimposed on emphysema  · Pulmonary edema  · Elevated D-dimer  · systolic heart failure status post AICD  · BLANCHE on CKD  · diabetes    Recommendations:  · High flow oxygen by nasal cannula  · BiPAP support  · DuoNeb by nebulizer  · Pulmicort  · N.p.o.  · Move to ICU  · Stat neurology consult/stroke alert  · Neurochecks  · Discontinue Ativan 0.5 3 times daily ordered since 2 days  · Air-bone isolation   · Bedrest  · Discontinue albuterol  · Discontinue Spiriva  ·  Decadron IV 6 mg daily  · Discontinue Levaquin check procalcitonin  · Hold Lasix 40 IV every 12 hrs  · Follow-up CT chest outpatient  · Lower extremity venous Dopplers  · cardiology consult  · Infectious disease on consult  · DVT prophylaxis    Shahab Beal MD, MD, [facility]  Pulmonary Critical Care and Sleep Medicine,  [facility]  Cell: [phone]  Office: [phone]  cc35 min

## 2022-01-19 NOTE — PROGRESS NOTES
Transitions of Care Pharmacy Service   Medication Review    The patient's list of current home medications has been reviewed. UNABLE to interview patient due to his isolation status and being on biPap. Did review his prescription medications with [facility] dispensing [facility]. Unable to ascertain his use of OTC medications. Source(s) of information: [facility] ([phone])    Based on information provided by the above source(s), I have updated the patient's home med list as described below. Please review the ACTION REQUESTED section of this note below for any discrepancies on current hospital orders. I changed or updated the following medications on the patient's home medication list:  Removed Lipitor - see Crestor  Basaglar - see Humulin N  Humalog kwikpen     Added Crestor 40mg PO nightly  Humulin N 20 units BID     Adjusted   none   Other Notes Lasix was 40mg daily, increased to BID on 1/14. PROVIDER ACTION REQUESTED  Medications that need to be addressed by a physician/nurse practitioner:    Medication Action Requested   Lipitor     Please change to Crestor 40mg nightly as appropriate. Please feel free to call me with any questions about this encounter. Thank you.     Farshad Sy [facility]   Transitions of Care Pharmacy Service  Phone:  [phone]  Fax: [phone]      Electronically signed by Farshad Sy [facility] on 1/19/2022 at 3:19 PM           Medications Prior to Admission: rosuvastatin (CRESTOR) 40 MG tablet, Take 40 mg by mouth every evening  insulin NPH (HUMULIN N;NOVOLIN N) 100 UNIT/ML injection vial, Inject 20 Units into the skin 2 times daily (before meals)  levETIRAcetam (KEPPRA) 500 MG tablet, Take 1 tablet by mouth 2 times daily  venlafaxine (EFFEXOR XR) 150 MG extended release capsule, Take 1 capsule by mouth daily (with breakfast)  vitamin B-1 (THIAMINE) 100 MG tablet, Take 100 mg by mouth daily  ferrous sulfate 325 (65 Fe) MG tablet, Take 325 mg by mouth daily (with breakfast)  ALPRAZolam (XANAX) 0.5 MG tablet, Take 0.5 mg by mouth three times daily.   furosemide (LASIX) 40 MG tablet, Take 1 tablet by mouth 2 times daily  tiotropium (SPIRIVA RESPIMAT) 2.5 MCG/ACT AERS inhaler, Inhale 2 puffs into the lungs daily   albuterol sulfate  (90 Base) MCG/ACT inhaler, Inhale 2 puffs into the lungs every 6 hours as needed for Wheezing or Shortness of Breath  metoprolol succinate (TOPROL XL) 50 MG extended release tablet, Take 50 mg by mouth daily  Multiple Vitamins-Minerals (MULTIVITAMIN ADULT) TABS, Take 1 tablet by mouth daily  vitamin D (CHOLECALCIFEROL) 1000 UNIT TABS tablet, Take 1,000 Units by mouth daily  fluticasone (FLONASE) 50 MCG/ACT nasal spray, 1 spray by Each Nare route daily as needed for Rhinitis  aspirin 325 MG EC tablet, Take 325 mg by mouth daily   Omega-3 Fatty Acids (FISH OIL) 1000 MG CAPS, Take 1 capsule by mouth daily   amLODIPine (NORVASC) 5 MG tablet, Take 5 mg by mouth nightly   mirtazapine (REMERON) 45 MG tablet, Take 45 mg by mouth night

## 2022-01-19 NOTE — PLAN OF CARE
Problem: Airway Clearance - Ineffective  Goal: Achieve or maintain patent airway  Outcome: Ongoing     Problem: Gas Exchange - Impaired  Goal: Absence of hypoxia  Outcome: Ongoing  Goal: Promote optimal lung function  Outcome: Ongoing     Problem: Breathing Pattern - Ineffective  Goal: Ability to achieve and maintain a regular respiratory rate  Outcome: Ongoing     Problem:  Body Temperature -  Risk of, Imbalanced  Goal: Ability to maintain a body temperature within defined limits  Outcome: Ongoing  Goal: Will regain or maintain usual level of consciousness  Outcome: Ongoing  Goal: Complications related to the disease process, condition or treatment will be avoided or minimized  Outcome: Ongoing     Problem: Isolation Precautions - Risk of Spread of Infection  Goal: Prevent transmission of infection  Outcome: Ongoing     Problem: Nutrition Deficits  Goal: Optimize nutritional status  Outcome: Ongoing     Problem: Risk for Fluid Volume Deficit  Goal: Maintain normal heart rhythm  Outcome: Ongoing  Goal: Maintain absence of muscle cramping  Outcome: Ongoing  Goal: Maintain normal serum potassium, sodium, calcium, phosphorus, and pH  Outcome: Ongoing     Problem: Loneliness or Risk for Loneliness  Goal: Demonstrate positive use of time alone when socialization is not possible  Outcome: Ongoing     Problem: Fatigue  Goal: Verbalize increase energy and improved vitality  Outcome: Ongoing     Problem: Patient Education: Go to Patient Education Activity  Goal: Patient/Family Education  Outcome: Ongoing     Problem: Falls - Risk of:  Goal: Will remain free from falls  Description: Will remain free from falls  Outcome: Ongoing  Goal: Absence of physical injury  Description: Absence of physical injury  Outcome: Ongoing     Problem: Infection:  Goal: Will remain free from infection  Description: Will remain free from infection  Outcome: Ongoing     Problem: Safety:  Goal: Free from accidental physical injury  Description: Free from accidental physical injury  Outcome: Ongoing  Goal: Free from intentional harm  Description: Free from intentional harm  Outcome: Ongoing     Problem: Daily Care:  Goal: Daily care needs are met  Description: Daily care needs are met  Outcome: Ongoing     Problem: Pain:  Goal: Patient's pain/discomfort is manageable  Description: Patient's pain/discomfort is manageable  Outcome: Ongoing     Problem: Skin Integrity:  Goal: Skin integrity will stabilize  Description: Skin integrity will stabilize  Outcome: Ongoing     Problem: Discharge Planning:  Goal: Patients continuum of care needs are met  Description: Patients continuum of care needs are met  Outcome: Ongoing     Problem: Nutrition  Goal: Optimal nutrition therapy  Outcome: Ongoing     Problem: Skin Integrity:  Goal: Will show no infection signs and symptoms  Description: Will show no infection signs and symptoms  Outcome: Ongoing  Goal: Absence of new skin breakdown  Description: Absence of new skin breakdown  Outcome: Ongoing

## 2022-01-19 NOTE — PROGRESS NOTES
[facility]        Date:   1/18/2022  Patient name: William Krueger  Date of admission:  1/16/2022 11:51 AM  MRN:   [id number]  YOB: 1945  PCP: Iam Florez MD    Reason for Consult:       Subjective: No new cardiac issues. No overnight events. Chart reviewed. Physical Exam:   Vitals: /65   Pulse 58   Temp 97.9 °F (36.6 °C) (Oral)   Resp 20   Ht 5' 10.98\" (1.803 m)   Wt 228 lb (103.4 kg)   SpO2 94%   BMI 31.82 kg/m²   Deferred due to covid      Lab work, imaging, nursing, and chart reviewed extensively. Medications:   Scheduled Meds:   baricitinib  2 mg Oral Daily    ipratropium-albuterol  1 ampule Inhalation 4x daily    budesonide  0.5 mg Nebulization BID    metoprolol succinate  50 mg Oral Nightly    dexamethasone  6 mg IntraVENous Q24H    sodium chloride flush  5-40 mL IntraVENous 2 times per day    enoxaparin  40 mg SubCUTAneous Daily    levETIRAcetam  500 mg Oral BID    aspirin  325 mg Oral Daily    amLODIPine  5 mg Oral Nightly    atorvastatin  20 mg Oral Daily    insulin glargine  40 Units SubCUTAneous BID    insulin lispro  0-18 Units SubCUTAneous TID WC    insulin lispro  0-9 Units SubCUTAneous Nightly     Continuous Infusions:   sodium chloride      dextrose           Labs:     CBC:   Recent Labs     01/16/22  1220 01/17/22  0435 01/18/22  0554   WBC 2.7* 1.8* 3.2*   HGB 17.5* 17.7* 18.3*   * 100* 107*     BMP:    Recent Labs     01/16/22  1220 01/17/22  0435 01/18/22  0554    138 136   K 3.5* 3.6* 3.3*   CL 94* 94* 94*   CO2 26 28 28   BUN 20 27* 28*   CREATININE 1.31* 1.49* 1.56*   GLUCOSE 215* 195* 146*     Hepatic:   Recent Labs     01/16/22  1220   AST 45*   ALT 24   BILITOT 0.68   ALKPHOS 123     Troponin: No results for input(s): TROPONINI in the last 72 hours. BNP: No results for input(s): BNP in the last 72 hours. Lipids: No results for input(s): CHOL, HDL in the last 72 hours.     Invalid input(s): LDLCALCU  INR:   Recent Labs     01/17/22  0435   INR 1.0       Other active acute problems:  Patient Active Problem List   Diagnosis    Biventricular CHF (congestive heart failure) (HCC)    CKD (chronic kidney disease) stage 3, GFR 30-59 ml/min ([facility])    Essential hypertension    Blurred vision, right eye    Type 2 diabetes mellitus treated with insulin ([address]    Atherosclerotic plaque    Weakness on left side of face    Carotid stenosis, asymptomatic, bilateral    New onset seizure ([address]    COVID         IMPRESSION & Recommendations:      1. Covid 19 PNA- stable. 2. Respiratory failure- per pulmonary  2. Acute CHF, likely diastolic- stable  3. HTN- optimize meds, well controlled  4. Minimal trop elevation, demand ischemia in setting of acute CHF & COVID PNA  5. CKD3- diuresis per nephro, Creat elevation  6. Seizure disorder  7. Hypomagnesium and Hypokalemia  8. HLP      Discussed with patient, family, and Nurse. Electronically signed by Franca Rosas DO on 1/18/2022 at 7:03 PM    Franca Rosas, [id number] [facility]  [facility]. [facility]  [phone]

## 2022-01-19 NOTE — PROGRESS NOTES
VASCULAR SURGERY  PROGRESS NOTE      1/19/2022 3:50 PM  Subjective:   Admit Date: 1/16/2022  PCP: Jocelyne Goodwin MD    Chief Complaint   Patient presents with    Leg Swelling     bilateral, has CHF, on diuretic, EMT saw pt , assisted pt into car    Fever    Other     low SPO2     Interval History: Feeling better. More alert. Diet: ADULT DIET; Regular; 5 carb choices (75 gm/meal); Low Fat/Low Chol/High Fiber/2 gm Na    Medications:   Scheduled Meds:   ALPRAZolam  0.5 mg Oral TID    ipratropium-albuterol  1 ampule Inhalation 4x daily    budesonide  0.5 mg Nebulization BID    metoprolol succinate  50 mg Oral Nightly    dexamethasone  6 mg IntraVENous Q24H    sodium chloride flush  5-40 mL IntraVENous 2 times per day    enoxaparin  40 mg SubCUTAneous Daily    levETIRAcetam  500 mg Oral BID    aspirin  325 mg Oral Daily    amLODIPine  5 mg Oral Nightly    atorvastatin  20 mg Oral Daily    insulin glargine  40 Units SubCUTAneous BID    insulin lispro  0-18 Units SubCUTAneous TID WC    insulin lispro  0-9 Units SubCUTAneous Nightly     Continuous Infusions:   sodium chloride      dextrose           Labs:   CBC:   Recent Labs     01/17/22  0435 01/18/22  0554 01/19/22  0401   WBC 1.8* 3.2* 1.1*   HGB 17.7* 18.3* 16.3   * 107* 70*     BMP:    Recent Labs     01/17/22  0435 01/18/22  0554 01/19/22  0401    136 141   K 3.6* 3.3* 3.1*   CL 94* 94* 99   CO2 28 28 28   BUN 27* 28* 32*   CREATININE 1.49* 1.56* 1.30*   GLUCOSE 195* 146* 93     Hepatic: No results for input(s): AST, ALT, ALB, BILITOT, ALKPHOS in the last 72 hours. Troponin: Invalid input(s): TROPONIN  BNP: No results for input(s): BNP in the last 72 hours. Lipids: No results for input(s): CHOL, HDL in the last 72 hours.     Invalid input(s): LDLCALCU  INR:   Recent Labs     01/17/22  0435   INR 1.0       Objective:   Vitals: BP (!) 132/54   Pulse 69   Temp 99.1 °F (37.3 °C) (Oral)   Resp 22   Ht 5' 10.98\" (1.803 m) Wt 228 lb (103.4 kg)   SpO2 95%   BMI 31.82 kg/m²   General appearance: alert, cooperative and no distress  Mental Status: oriented to person, place and time with normal affect  Neck: good carotid pulses, no JVD  Lungs: clear to auscultation bilaterally, normal effort  Heart: regular rate and rhythm, no murmur,  Abdomen: soft, non-tender, non-distended, bowel sounds present all four quadrants, no masses, hepatomegaly, splenomegaly or aortic enlargement  Extremities: Moderate bilateral lower extremity edema with mild erythema. Chronic venous skin changes bilaterally    Assessment:   3 64-year-old male with COVID-19 pneumonia  2. Bilateral lower extremity edema with mild cellulitis and chronic venous skin changes  3. Status post open abdominal aortic aneurysm repair  4. Lacunar infarct left thalamus    Patient Active Problem List:     Biventricular CHF (congestive heart failure) ([facility])     CKD (chronic kidney disease) stage 3, GFR 30-59 ml/min ([facility])     Essential hypertension     Blurred vision, right eye     Type 2 diabetes mellitus treated with insulin ([address]     Atherosclerotic plaque     Weakness on left side of face     Carotid stenosis, asymptomatic, bilateral     New onset seizure ([address]     COVID      Plan:   1. Continue supportive care  2. Continue antibiotics  3.  JEFF hose for lower extremity swelling    Electronically signed by Cindy Varela MD on 1/19/2022 at 3:50 PM

## 2022-01-19 NOTE — FLOWSHEET NOTE
receives alert for stroke alert. Patient is isolated for Covid.  speaks to RN about patient, stroke alert. States patient is ok, no needs expressed. Follow up as needed.      01/18/22 2007   Encounter Summary   Services provided to: Patient not available   Referral/Consult From: Multi-disciplinary team   Continue Visiting   (1-18-22, isolated PT,)   Complexity of Encounter Low   Length of Encounter 15 minutes   Spiritual Assessment Completed Yes   Routine   Type Follow up   Assessment Unable to respond   Intervention Prayer   Crisis   Type Stroke Alert

## 2022-01-19 NOTE — PROGRESS NOTES
[facility]  Progress Note    Coleman Giang  MRN: [id number]  Date: 1/19/2022  LOS: 3     Reason for F/U :   COVID-19 virus infection    Impression :   1. COVID-19 virus infection tested + 1/16/2022  2. Acute hypoxic respiratory failure, now requiring BiPAP  3. Emphysema with worsening changes on imaging  4. Worsening acute interstitial lung disease and clinically not typical COVID-19 virus infection  5. Worsening moderate to severe pulmonary artery hypertension  6. Bilateral lower extremity edema, likely related to above  7. Lacunar infarct of the left thalamus, not felt to be acute per the neurology team    Recommendations:   · COVID-19 therapy:  · The patient continues on Decadron 6 mg IV daily, initiated by the pulmonary team  · Patient was started on baricitinib, initiated 1/18/2022  · Despite patient's rapid decline in respiratory status, he has also had a significant drop in his [address] from yesterday. At this time we will place baricitinib on hold.     · Patient continues on levofloxacin  · Patient is now requiring BiPAP at 60% FiO2  · Continue supportive care    Infection Control Recommendations:   Droplet plus precautions    Discharge Planning:   Estimated Length of IV antimicrobials: 5 to 7 days  Patient will need Midline Catheter Insertion/ PICC line Insertion: No  [facility],  LTAC: Undetermined  Patient willneed outpatient wound care: No    Medical Decision making / Summary of Stay:   Taryn Dominguez is a 64y.o.-year-old male who was initially admitted on 1/16/2022.   Arely Luis has a history of diabetes mellitus type 2, essential hypertension, seizure disorder, chronic kidney disease stage III, hyperlipidemia among other medical problems.     The patient reports that about 1 to 2 months ago he had his diabetes medications changed and since that time he has noticed increasing swelling in his legs, hardness in the legs, difficulty ambulating, and FiO2  No elevated temperatures  He denies any shortness of breath or cough  He does does complain of a dry mouth    Review of Systems   Constitutional: Negative. HENT:        Dry mouth   Respiratory: Negative. Cardiovascular: Negative. Gastrointestinal: Negative. Genitourinary: Negative. Musculoskeletal: Negative. Skin: Negative. Neurological: Negative. Psychiatric/Behavioral: Negative. Physical Examination :     Physical Exam  Constitutional:       General: He is not in acute distress. Appearance: Normal appearance. He is normal weight. HENT:      Head: Normocephalic and atraumatic. Cardiovascular:      Rate and Rhythm: Normal rate and regular rhythm. Pulmonary:      Effort: Pulmonary effort is normal. No respiratory distress. Breath sounds: Normal breath sounds. Abdominal:      General: Abdomen is flat. Bowel sounds are normal.      Palpations: Abdomen is soft. Musculoskeletal:         General: Normal range of motion. Skin:     General: Skin is warm and dry. Neurological:      General: No focal deficit present. Mental Status: He is alert and oriented to person, place, and time. Mental status is at baseline. Psychiatric:         Mood and Affect: Mood normal.         Behavior: Behavior normal.         Thought Content:  Thought content normal.         Laboratory data:   I have independently reviewed the followinglabs:  CBC with Differential:   Recent Labs     01/18/22  0554 01/19/22  0401   WBC 3.2* 1.1*   HGB 18.3* 16.3   HCT 58.1* 53.0*   * 70*   LYMPHOPCT 19* PENDING   MONOPCT 14* PENDING     BMP:   Recent Labs     01/18/22  0554 01/19/22  0401    141   K 3.3* 3.1*   CL 94* 99   CO2 28 28   BUN 28* 32*   CREATININE 1.56* 1.30*   MG 1.7 1.7     Hepatic Function Panel:   Recent Labs     01/16/22  1220   PROT 6.5   LABALBU 3.8   BILITOT 0.68   ALKPHOS 123   ALT 24   AST 45*         Lab Results   Component Value Date    PROCAL 0.11 01/16/2022 Lab Results   Component Value Date    CRP 23.5 01/16/2022    CRP 2.0 07/27/2019     Lab Results   Component Value Date    SEDRATE 3 01/16/2022         Lab Results   Component Value Date    DDIMER 1.53 01/18/2022    DDIMER 1.73 01/16/2022    DDIMER 1.18 12/07/2018     Lab Results   Component Value Date    FERRITIN 569 01/16/2022    FERRITIN 50 07/23/2019    FERRITIN 18 07/08/2019     Lab Results   Component Value Date     01/16/2022     Lab Results   Component Value Date    FIBRINOGEN 402 01/16/2022       Results in Past 30 Days  Result Component Current Result Ref Range Previous Result Ref Range   SARS-CoV-2, Rapid DETECTED (A) (1/16/2022) Not Detected Not in Time Range      Lab Results   Component Value Date    COVID19 DETECTED 01/16/2022    COVID19 Not Detected 11/02/2021       No results for input(s): VANCOTROUGH in the last 72 hours. Imaging Studies:   CT head  Impression:        New lacunar infarct left thalamus, age indeterminate.  MRI may be helpful. Cultures:     Procedure Component Value Units Date/Time   Culture, Blood 1 [[id number]] Collected: 01/16/22 1220   Order Status: Completed Specimen: Blood Updated: 01/18/22 0112    Specimen Description . BLOOD    Special Requests LAC 12ML    Culture NO GROWTH 2 DAYS   Culture, Blood 1 [[id number]] Collected: 01/16/22 1205   Order Status: Completed Specimen: Blood Updated: 01/18/22 0112    Specimen Description . BLOOD    Special Requests RAC 12ML    Culture NO GROWTH 2 DAYS   Culture, Urine [[id number]] Collected: 01/16/22 1315   Order Status: Completed Specimen: Urine, clean catch Updated: 01/17/22 2128    Specimen Description . CLEAN CATCH URINE    Special Requests NOT REPORTED    Culture NO SIGNIFICANT GROWTH   COVID-19, Rapid [[id number]] (Abnormal) Collected: 01/16/22 1230   Order Status: Completed Specimen: Nasopharyngeal Swab Updated: 01/16/22 1314    Specimen Description . NASOPHARYNGEAL SWAB    SARS-CoV-2, Rapid DETECTED Abnormal  Medications:      baricitinib  2 mg Oral Daily    ipratropium-albuterol  1 ampule Inhalation 4x daily    budesonide  0.5 mg Nebulization BID    metoprolol succinate  50 mg Oral Nightly    dexamethasone  6 mg IntraVENous Q24H    sodium chloride flush  5-40 mL IntraVENous 2 times per day    enoxaparin  40 mg SubCUTAneous Daily    levETIRAcetam  500 mg Oral BID    aspirin  325 mg Oral Daily    amLODIPine  5 mg Oral Nightly    atorvastatin  20 mg Oral Daily    insulin glargine  40 Units SubCUTAneous BID    insulin lispro  0-18 Units SubCUTAneous TID WC    insulin lispro  0-9 Units SubCUTAneous Nightly           [facility]  HERB Levy CNP  Perfect Serve messaging  OFFICE: [phone]      Electronically signed by HERB Sparks CNP on 1/19/2022 at 6:24 AM  Thank you for allowing us to participate in the care of this patient. Please call with questions. This note iscreated with the assistance of a speech recognition program.  While intending to generate a document that actually reflects the content of the visit, the document can still have some errors including those of syntax andsound a like substitutions which may escape proof reading. In such instances, actual meaning can be extrapolated by contextual diversion.

## 2022-01-19 NOTE — PROGRESS NOTES
Occupational Therapy  Facility/Department: [facility] ICU  Daily Treatment Note  NAME: Milla Gimenez  : 1945  MRN: [id number]    Date of Service: 2022    Discharge Recommendations:  Patient would benefit from continued therapy after discharge       Assessment   Performance deficits / Impairments: Decreased functional mobility ; Decreased balance;Decreased safe awareness;Decreased ADL status; Decreased endurance;Decreased strength;Decreased cognition;Decreased posture;Decreased sensation  Assessment: Pt. completed bed mobility with max assist x2 to roll side to side and sit EOB. Pt. completed sitting EOB with CGA once positioned up right. Functional transfer completed mod assist x2 to ambulate to recliner. Chair alarm in place. Skilled OT warranted to promote I/safety to return pt to prior living arrangement with assist as needed. Prognosis: Good  Patient Education: Pt. educated on importance of mobility to promote functional endurance and strength. REQUIRES OT FOLLOW UP: Yes  Activity Tolerance  Activity Tolerance: Patient limited by fatigue;Patient Tolerated treatment well  Activity Tolerance: Fair-  Safety Devices  Safety Devices in place: Yes  Type of devices: All fall risk precautions in place;Nurse notified; Patient at risk for falls;Gait belt;Left in chair;Chair alarm in place;Call light within reach         Patient Diagnosis(es): The primary encounter diagnosis was COVID-19. Diagnoses of Acute respiratory failure with hypoxia (HCC), BLANCHE (acute kidney injury) ([address] Acute on chronic systolic CHF (congestive heart failure) ([address] Hypokalemia, Hypomagnesemia, and COPD exacerbation ([address] were also pertinent to this visit. has a past medical history of Arthritis, Atherosclerotic plaque, Cervical disc disease, Diabetes mellitus ([address] History of blood transfusion, Hx of blood clots, Hyperlipidemia, Neuropathy, Right kidney mass, Snores, and Type 2 diabetes mellitus treated with insulin ([address]    has a assistance;Maximum assistance  Scootin Person assistance;Maximal assistance  Comment: Pt. required hand placement on side rails to assist with positioning and rolling side to side. Transfers  Sit to stand: Moderate assistance;2 Person assistance  Stand to sit: Moderate assistance;2 Person assistance  Transfer Comments: Mod assist x2 for sit to stand with use of RW to ambulate to recliner. Max verbal cues required to instruct on safey with use of RW. Cognition  Overall Cognitive Status: Exceptions  Arousal/Alertness: Delayed responses to stimuli;Inconsistent responses to stimuli  Following Commands: Follows one step commands with repetition; Follows one step commands with increased time  Attention Span: Attends with cues to redirect  Memory: Decreased recall of precautions  Safety Judgement: Decreased awareness of need for safety;Decreased awareness of need for assistance  Problem Solving: Assistance required to correct errors made;Assistance required to implement solutions  Insights: Decreased awareness of deficits  Initiation: Requires cues for all  Sequencing: Requires cues for all     Perception  Overall Perceptual Status: [facility]      Plan   Plan  Times per week: 4-5x per week, 1-2x per day  Times per day: Daily  Current Treatment Recommendations: Strengthening,Endurance Training,Patient/Caregiver Education & Training,Functional Mobility Training,Balance Training,Positioning,Safety Education & Training,Equipment Evaluation, Education, & procurement,Self-Care / ADL  Plan Comment: Cont with stated POC     AM-PAC Score        AM-PAC Inpatient Daily Activity Raw Score: 10 (22)  AM-PAC Inpatient ADL T-Scale Score : 27.31 (22)  ADL Inpatient CMS 0-100% Score: 74.7 (22)  ADL Inpatient CMS G-Code Modifier : CL (22)    Goals  Short term goals  Time Frame for Short term goals: by discharge, pt to demo  Short term goal 1: I/MI for bed mob tasks without bed rails, while maintaining an appropriate SPO2%  Short term goal 2: I/MI for UB ADLs and SBA for LB ADLs with AE as needed and Good safety, while maintaining an appropriate SPO2%  Short term goal 3: SBA for ADL transfers and functional mob with AD and Good safety, while maintaining an appropriate SPO2%  Short term goal 4: increase BUE strength by 1/2 grade to increase IND with self-care and be IND with HEP  Short term goal 5: pt to be IND with EC/WS, fall prevention tech, COVID+ education, as well as DME/AE recommendations with use of handouts as needed  Patient Goals   Patient goals : To go home       Therapy Time   Individual Concurrent Group Co-treatment   Time In 0215         Time Out 0254         Minutes 44              Co-treatment with PT warranted secondary to decreased safety and independence requiring 2 skilled therapy professionals to address individual discipline's goals. OT addressing preparation for ADL transfer, sitting balance for increased ADL performance, environmental safety/scanning, fall prevention, functional mobility for ADL transfers and ability to sequence and follow directions.     Marcy Aschoff, SANDY

## 2022-01-19 NOTE — PROGRESS NOTES
hours. Lipids: No results for input(s): CHOL, HDL in the last 72 hours. Invalid input(s): LDLCALCU  INR:   Recent Labs     01/17/22  0435   INR 1.0       Other active acute problems:  Patient Active Problem List   Diagnosis    Biventricular CHF (congestive heart failure) (HCC)    CKD (chronic kidney disease) stage 3, GFR 30-59 ml/min ([facility])    Essential hypertension    Blurred vision, right eye    Type 2 diabetes mellitus treated with insulin ([address]    Atherosclerotic plaque    Weakness on left side of face    Carotid stenosis, asymptomatic, bilateral    New onset seizure ([address]    COVID     ECHO 12/7/18  Summary  Moderate left ventricular hypertrophy  Global left ventricular systolic function is normal  Estimated ejection fraction is 55 % . Left atrium is mildly dilated. Right atrium is mildly dilated . No significant valvular regurgitation or stenosis seen. No pericardial effusion seen. Normal aortic root dimension. IMPRESSION & Recommendations:      1. Covid 19 PNA-  On bipap currently. Pulmonary and ID following   2. Acute CHF, likely diastolic. Will order ECHO when acute issues resolve/out of isolation. 3. HTN- Elevated today. Advised RN to give PO medications when able, Will order Lopressor IV PRN as needed for HTN   4. Minimal trop elevation, demand ischemia in setting of acute CHF & COVID PNA  5. CKD3- diuresis per nephro, Creat elevation  6. Keep K > 4 and MAG > 2       Electronically signed by HERB Berger - CNP on 1/19/2022 at 11:24 [address] [place] [address] [facility] [employer]  [phone]

## 2022-01-19 NOTE — PROGRESS NOTES
[facility].,    Adult Hospitalist      Name: Marilou Bermudez  MRN: [id number]     Acct: [de-identified]  Room: [address] [place] [address]    Admit Date: 1/16/2022 11:51 AM  PCP: Mikey Finn MD    Primary Problem  Active Problems:    COVID  Resolved Problems:    * No resolved hospital problems. *        Assesment:   Acute congestive heart failure, unspecified  COVID-19  Viral pneumonia secondary to above  Essential hypertension  Mixed hyperlipidemia  Diabetes mellitus type 2  History of seizure disorder  CKD stage III  Lung mass? Leukopenia  Polycythemia  Thrombocytopenia  Anxiety disorder      Plan:   Admit patient to intermediate floor  Oxygen to keep SPO2 more than 90%  Telemetry  Check vitals closely  CBC BMP daily    Trend troponin  Trend BMP  Echocardiogram  IV Lasix  Cardiology consult    IV Decadron  IV azithromycin  Bronchodilators  Infectious disease consult  Pulmonology consult    Continue Keppra  Continue amlodipine atorvastatin  Continue aspirin  Continue other medication as below.     Scheduled Meds:   baricitinib  2 mg Oral Daily    ipratropium-albuterol  1 ampule Inhalation 4x daily    budesonide  0.5 mg Nebulization BID    metoprolol succinate  50 mg Oral Nightly    dexamethasone  6 mg IntraVENous Q24H    sodium chloride flush  5-40 mL IntraVENous 2 times per day    enoxaparin  40 mg SubCUTAneous Daily    levETIRAcetam  500 mg Oral BID    aspirin  325 mg Oral Daily    amLODIPine  5 mg Oral Nightly    atorvastatin  20 mg Oral Daily    insulin glargine  40 Units SubCUTAneous BID    insulin lispro  0-18 Units SubCUTAneous TID WC    insulin lispro  0-9 Units SubCUTAneous Nightly     Continuous Infusions:   sodium chloride      dextrose       PRN Meds:  dextrose bolus (hypoglycemia), 125 mL, PRN   Or  dextrose bolus (hypoglycemia), 250 mL, PRN  sodium chloride flush, 10 mL, PRN  sodium chloride, 25 mL, PRN  potassium chloride, 40 mEq, PRN   Or  potassium alternative oral replacement, 40 mEq, PRN   Or  potassium chloride, 10 mEq, PRN  magnesium sulfate, 1,000 mg, PRN  ondansetron, 4 mg, Q8H PRN   Or  ondansetron, 4 mg, Q6H PRN  magnesium hydroxide, 30 mL, Daily PRN  acetaminophen, 650 mg, Q6H PRN   Or  acetaminophen, 650 mg, Q6H PRN  glucose, 15 g, PRN  glucagon (rDNA), 1 mg, PRN  dextrose, 100 mL/hr, PRN  hydrALAZINE, 10 mg, Q6H PRN        Chief Complaint:     Chief Complaint   Patient presents with    Leg Swelling     bilateral, has CHF, on diuretic, EMT saw pt , assisted pt into car    Fever    Other     low SPO2         History of Present Illness:        Patient seen   Breathing is improved   Denies any wheezing  Denies chest tightness or orthopnea  Sitting up in chair  Says pedal edema has improved  Eating better    Denies chest pain, orthopnea, nausea or vomiting  Denies headache, photophobia, diplopia or neck pain  Denies nausea, vomiting or abdominal pain  Denies diarrhea or constipation  Denies back pain or joint swelling      Initial HPI  Toy Walters is a 64 y.o.  male who presents with Leg Swelling (bilateral, has CHF, on diuretic, EMT saw pt , assisted pt into car), Fever, and Other (low SPO2)  This is a 17-year-old gentleman admitted via ER, come to ER with complaint of having shortness of breath, patient has medical history significant for COPD patient with history of cardiac failure: Patient noted that he has been having increasing swelling in his lower extremity and becoming more short of breath, further patient also been having some body aches and sweats, patient patient testing in the emergency room showed that he is positive for COVID-19 also noticed to have an elevated BNP, imaging concerning for fluid overload, admitted for further regiment    I have personally reviewed the past medical history, past surgical history, medications, social history, and family history, and summarized in the note.     Review of Systems:     All 10 point system is reviewed and negative otherwise mentioned in HPI. Past Medical History:     Past Medical History:   Diagnosis Date    Arthritis     Atherosclerotic plaque 7/28/2019    Cervical disc disease     degenerative disc disease    Diabetes mellitus ([facility]     type 2    History of blood transfusion     Hx of blood clots     left leg    Hyperlipidemia     Neuropathy     Right kidney mass     \"urologist is watching\"    Snores     Type 2 diabetes mellitus treated with insulin ([facility] 7/28/2019        Past Surgical History:     Past Surgical History:   Procedure Laterality Date    ABDOMINAL AORTIC ANEURYSM REPAIR  2005    CARPAL TUNNEL RELEASE Bilateral     CERVICAL SPINE SURGERY      COLONOSCOPY      benign polyps removed    INGUINAL HERNIA REPAIR Right     MS REPAIR INCISIONAL HERNIA,REDUCIBLE N/A 5/10/2017    HERNIA VENTRAL REPAIR WITH MESH  performed by Wiley Powers DO at [address] [place] [address] Road  05/10/2017    with mesh        Medications Prior to Admission:       Prior to Admission medications    Medication Sig Start Date End Date Taking? Authorizing Provider   levETIRAcetam (KEPPRA) 500 MG tablet Take 1 tablet by mouth 2 times daily 11/3/21  Yes Dary Zarate MD   lisinopril (PRINIVIL;ZESTRIL) 20 MG tablet Take 1 tablet by mouth daily 11/3/21  Yes Dary Zarate MD   venlafaxine (EFFEXOR XR) 150 MG extended release capsule Take 1 capsule by mouth daily (with breakfast) 7/29/19  Yes Arabella Kiser   vitamin B-1 (THIAMINE) 100 MG tablet Take 100 mg by mouth daily   Yes Historical Provider, MD   ferrous sulfate 325 (65 Fe) MG tablet Take 325 mg by mouth daily (with breakfast)   Yes Historical Provider, MD   ALPRAZolam (XANAX) 0.5 MG tablet Take 0.5 mg by mouth three times daily.    Yes Historical Provider, MD   furosemide (LASIX) 40 MG tablet Take 1 tablet by mouth 2 times daily 12/11/18  Yes Jean Vivar MD   tiotropium (SPIRIVA RESPIMAT) 2.5 MCG/ACT AERS inhaler Inhale 2 puffs into the lungs daily    Yes Historical Provider, MD   albuterol sulfate  (90 Base) MCG/ACT inhaler Inhale 2 puffs into the lungs every 6 hours as needed for Wheezing or Shortness of Breath   Yes Historical Provider, MD   metoprolol succinate (TOPROL XL) 50 MG extended release tablet Take 50 mg by mouth daily   Yes Historical Provider, MD   Multiple Vitamins-Minerals (MULTIVITAMIN ADULT) TABS Take 1 tablet by mouth daily   Yes Historical Provider, MD   vitamin D (CHOLECALCIFEROL) 1000 UNIT TABS tablet Take 1,000 Units by mouth daily   Yes Historical Provider, MD   fluticasone (FLONASE) 50 MCG/ACT nasal spray 1 spray by Each Nare route daily as needed for Rhinitis   Yes Historical Provider, MD   aspirin 325 MG EC tablet Take 325 mg by mouth daily    Yes Historical Provider, MD   Omega-3 Fatty Acids (FISH OIL) 1000 MG CAPS Take 1 capsule by mouth daily    Yes Historical Provider, MD   amLODIPine (NORVASC) 5 MG tablet Take 5 mg by mouth nightly    Yes Historical Provider, MD   mirtazapine (REMERON) 45 MG tablet Take 45 mg by mouth nightly   Yes Historical Provider, MD   Iron-Vitamin C (IRON 100/C) 100-250 MG TABS iron    Historical Provider, MD   HUMALOG KWIKPEN 100 UNIT/ML pen Inject 5-15 Units into the skin 3 times daily (before meals) 7/28/19   Arabella Kiser   insulin glargine (BASAGLAR KWIKPEN) 100 UNIT/ML injection pen Inject 40 Units into the skin 2 times daily    Historical Provider, MD   atorvastatin (LIPITOR) 20 MG tablet Take 20 mg by mouth daily    Historical Provider, MD        Allergies: Barbiturates, Codeine, and Sulfa antibiotics    Social History:     Tobacco:    reports that he has quit smoking. He has never used smokeless tobacco.  Alcohol:      reports no history of alcohol use. Drug Use:  reports no history of drug use.     Family History:     Family History   Problem Relation Age of Onset    Ovarian Cancer Sister     Ovarian Cancer Sister          Physical Exam: Vitals:  /65   Pulse 58   Temp 97.9 °F (36.6 °C) (Oral)   Resp 20   Ht 5' 10.98\" (1.803 m)   Wt 228 lb (103.4 kg)   SpO2 92%   BMI 31.82 kg/m²   Temp (24hrs), Av.6 °F (37 °C), Min:97.5 °F (36.4 °C), Max:100.6 °F (38.1 °C)      General appearance - alert, well appearing, and in no acute distress  Mental status - oriented to person, place, and time with normal affect  Head - normocephalic and atraumatic  Eyes - pupils equal and reactive, extraocular eye movements intact, conjunctiva clear  Ears - hearing appears to be intact  Nose - no drainage noted  Mouth - mucous membranes moist  Neck - supple, no carotid bruits, thyroid not palpable  Chest -sporadic coarse crackles to auscultation, normal effort  Heart - normal rate, regular rhythm, no murmur  Abdomen - soft, nontender, nondistended, bowel sounds present all four quadrants, no masses, hepatomegaly or splenomegaly  Neurological - normal speech, no focal findings or movement disorder noted, cranial nerves II through XII grossly intact  Extremities - peripheral pulses palpable, 2+ edema  Skin - no gross lesions, rashes, or induration noted        Data:     Labs:    Hematology:  Recent Labs     22  1220 22  0435 22  0554   WBC 2.7* 1.8* 3.2*   RBC 6.82* 6.97* 7.12*   HGB 17.5* 17.7* 18.3*   HCT 56.6* 57.3* 58.1*   MCV 83.0 82.2* 81.6*   MCH 25.7 25.4 25.7   MCHC 30.9 30.9 31.5   RDW 16.6* 16.8* 17.2*   * 100* 107*   MPV 12.4 11.3 Abnormal   SEDRATE 3  --   --    CRP 23.5*  --   --    INR  --  1.0  --    DDIMER 1.73*  --  1.53*     Chemistry:  Recent Labs     22  1220 22  1430 22  1630 22  2225 22  0435 22  0554     --   --   --  138 136   K 3.5*  --   --   --  3.6* 3.3*   CL 94*  --   --   --  94* 94*   CO2 26  --   --   --  28 28   GLUCOSE 215*  --   --   --  195* 146*   BUN 20  --   --   --  27* 28*   CREATININE 1.31*  --   --   --  1.49* 1.56*   MG 1.5*  --   --   --   --  1.7 ANIONGAP 15  --   --   --  16 14   LABGLOM 53*  --   --   --  46* 43*   GFRAA >60  --   --   --  55* 52*   CALCIUM 8.5*  --   --   --  8.4* 9.0   PROBNP 9,327*  --   --   --  8,610* 3,484*   TROPHS 36*   < > 39* 33* 37*  --     < > = values in this interval not displayed. Recent Labs     01/16/22  1220 01/16/22  1630 01/16/22  1817 01/17/22 2011 01/17/22  2343 01/18/22  0813 01/18/22  1229 01/18/22  1653 01/18/22  1744   PROT 6.5  --   --   --   --   --   --   --   --    LABALBU 3.8  --   --   --   --   --   --   --   --    LABA1C  --  9.1*  --   --   --   --   --   --   --    AST 45*  --   --   --   --   --   --   --   --    ALT 24  --   --   --   --   --   --   --   --    *  --   --   --   --   --   --   --   --    ALKPHOS 123  --   --   --   --   --   --   --   --    BILITOT 0.68  --   --   --   --   --   --   --   --    POCGLU  --   --    < > 80 124* 124* 94 69* 102    < > = values in this interval not displayed. Lab Results   Component Value Date    INR 1.0 01/17/2022    INR 1.0 11/03/2021    INR 1.0 07/27/2019    PROTIME 13.3 01/17/2022    PROTIME 11.1 11/03/2021    PROTIME 10.5 07/27/2019       Lab Results   Component Value Date/Time    SPECIAL NOT REPORTED 01/16/2022 01:15 PM     Lab Results   Component Value Date/Time    CULTURE NO SIGNIFICANT GROWTH 01/16/2022 01:15 PM       Lab Results   Component Value Date    POCPH 7.37 12/07/2018    POCPCO2 34 12/07/2018    POCPO2 55 12/07/2018    POCHCO3 19.8 12/07/2018    NBEA 5 12/07/2018    PBEA NOT REPORTED 12/07/2018    HUS6INO 21 12/07/2018    AKPU1ODT 88 12/07/2018    FIO2 100.0 01/18/2022       Radiology:    XR CHEST 1 VIEW    Result Date: 1/16/2022  Hypoinflated lungs. Cardiomegaly again seen, when compared to the previous study performed 07/27/2019. Diffuse, increased interstitial markings throughout both lungs, some of which can be seen on the prior study may represent baseline chronic interstitial lung changes.   The increased prominence on this exam could be secondary to the suboptimal inspiratory effort. The presence of pulmonary vascular congestion/mild CHF or bilateral interstitial pneumonia cannot be excluded. Clinical correlation is recommended. CT CHEST PULMONARY EMBOLISM W CONTRAST    Result Date: 1/16/2022  No evidence of pulmonary embolism. Compared to 2008, worsening underlying emphysema, with worsening subacute or acute interstitial lung disease, best seen left upper lobe; differential includes interstitial pneumonia or pneumonitis superimposed on emphysema, DIP, HP, and other interstitial pneumonias as well as infectious or inflammatory process superimposed on emphysema disease. Findings are not typical for COVID-19 pneumonia, but an element of the latter should be considered. Thickening and distortion left major fissure with ill-defined mass-like focus left lower lobe. The latter could also be infectious or inflammatory, although neoplasm should be excluded. Underlying worsening emphysema. Nodular densities, best seen right upper lobe. Small pleural effusions, slightly larger on the left. See recommendations below. Mild mediastinal and left hilar adenopathy; see recommendations below. Worsening now moderate-severe pulmonary artery hypertension. Mild cardiomegaly. Stable mild dilatation ascending thoracic aorta. Additional unchanged or incidental findings, as above. RECOMMENDATIONS: Clinical correlation and short-term follow-up CT chest in 1-4 months depending upon the clinical presentation/course.          All radiological studies reviewed                Code Status:  Full Code    Electronically signed by Anaya Kiran MD on 1/18/2022 at 8:32 PM     Copy sent to Dr. Cassy Finn MD    This note was created with the assistance of a speech-recognition program.  Although the intention is to generate a document that actually reflects the content of the visit, no guarantees can be provided that every mistake has been identified and corrected by editing. Note was updated later by me after  physical examination and  completion of the assessment.

## 2022-01-19 NOTE — PROGRESS NOTES
Occupational Therapy  DATE: 2022    NAME: Aaron Lockwood  MRN: [id number]   : 1945    Patient not seen this date for Occupational Therapy due to:      [] Cancel by RN or physician due to:    [] Hemodialysis    [] Critical Lab Value Level     [] Blood transfusion in progress    [] Acute or unstable cardiovascular status   _MAP < 55 or more than >115  _HR < 40 or > 130    [x] Acute or unstable pulmonary status: Pt. On bipap.             -FiO2 > 60%   _RR < 5 or >40    _O2 sats < 85%    [] Strict Bedrest    [] Off Unit for surgery or procedure    [] Off Unit for testing       [] Pending imaging to R/O fracture    [] Refusal by Patient      [] Other      [] OT being discontinued at this time. Patient independent. No further needs. [] OT being discontinued at this time as the patient has been transferred to hospice care. No further needs.       Vera Pierce, SANDY

## 2022-01-19 NOTE — PROGRESS NOTES
Pulmonary Critical Care Progress Note  Richy Rapp MD     Patient seen for the follow up of COVID-19 pneumonia, acute hypoxic respiratory failure. Subjective:  Patient sitting up in the chair. He denies any chest pain. He does have cough. His shortness of breath not much change. Examination:  Vitals: BP (!) 132/54   Pulse 69   Temp 99.1 °F (37.3 °C) (Oral)   Resp 22   Ht 5' 10.98\" (1.803 m)   Wt 228 lb (103.4 kg)   SpO2 95%   BMI 31.82 kg/m²   General appearance:  alert and cooperative with exam  Neck: No JVD  Lungs: Bilateral crackles, no wheeze, moderate air exchange. Heart: regular rate and rhythm, S1, S2 normal, no gallop  Abdomen: Soft, non tender, + BS  Extremities: no cyanosis or clubbing. No significant edema    LABs:  CBC:   Recent Labs     01/18/22  0554 01/19/22  0401   WBC 3.2* 1.1*   HGB 18.3* 16.3   HCT 58.1* 53.0*   * 70*     BMP:   Recent Labs     01/18/22  0554 01/19/22  0401    141   K 3.3* 3.1*   CO2 28 28   BUN 28* 32*   CREATININE 1.56* 1.30*   LABGLOM 43* 53*   GLUCOSE 146* 93     PT/INR:   Recent Labs     01/17/22  0435   PROTIME 13.3   INR 1.0     APTT:No results for input(s): APTT in the last 72 hours. LIVER PROFILE:No results for input(s): AST, ALT, LABALBU in the last 72 hours.   ABG:  Lab Results   Component Value Date    RGJ8OCH 21 12/07/2018    FIO2 100.0 01/18/2022       Lab Results   Component Value Date    POCPH 7.37 12/07/2018    POCPCO2 34 12/07/2018    POCPO2 55 12/07/2018    POCHCO3 19.8 12/07/2018    PBEA NOT REPORTED 12/07/2018    WYB1ZGF 21 12/07/2018    GIZZ8FBG 88 12/07/2018    FIO2 100.0 01/18/2022     Radiology:  X-ray chest: Bibasilar infiltrates      Impression:  · Acute on chronic hypoxic respiratory failure  · COVID-19 pneumonia  · COPD/pulmonary emphysema  · Acute left thalamic infarct/CVA  · Left lower lobe opacity versus nodule  · Pulmonary edema  · Elevated D-dimer, decreased platelets  · Systolic heart failure, s/p AICD placement  · BLANCHE on CKD  · Diabetes mellitus    Recommendations:  · High flow oxygen by nasal cannula, wean FiO2 as tolerated  · BiPAP while asleep and as needed  · Pulmicort aerosol treatment  · Airborne isolation  · Patient encouraged for prone positioning  · IV Decadron  · Albuterol and Ipratropium Q 4 hours and prn  · X-ray chest in am  · Labs: CBC and BMP in am  · DVT prophylaxis with low molecular weight heparin  · Will follow with you    Kylah Sevilla MD, [facility]  Pulmonary Critical Care and Sleep Medicine,  [facility]  Cell: [phone]  Office: [phone]

## 2022-01-19 NOTE — PROGRESS NOTES
Physical Therapy  DATE: 2022    NAME: Kizzy Herron  MRN: [id number]   : 1945    Patient not seen this date for Physical Therapy due to:      [] Cancel by RN or physician due to:    [] Hemodialysis    [] Critical Lab Value Level     [] Blood transfusion in progress    [] Acute or unstable cardiovascular status   _MAP < 55 or more than >115  _HR < 40 or > 130    [] Acute or unstable pulmonary status   -FiO2 > 60%   _RR < 5 or >40    _O2 sats < 85%    [] Strict Bedrest    [] Off Unit for surgery or procedure    [] Off Unit for testing       [] Pending imaging to R/O fracture    [] Refusal by Patient      [x] Other (RN requested to wait for treatment till patient was switched to HFNC from BiPap but she had to get one in his room first. Checked back around 1030 and patient still on BiPap. will continue to follow)      [] PT being discontinued at this time. Patient independent. No further needs. [] PT being discontinued at this time as the patient has been transferred to hospice care. No further needs.       Moni Elaine, PTA

## 2022-01-19 NOTE — PROGRESS NOTES
[facility]    Adult Hospitalist      Name: Kevin Varela  MRN: [id number]     Acct: [[id number]]  Room: [facility]8883-60    Admit Date: 1/16/2022 11:51 AM  PCP: Winston Ramírez MD    Primary Problem  Active Problems:    COVID  Resolved Problems:    * No resolved hospital problems. *        Assesment:   Acute congestive heart failure, unspecified  COVID-19  Viral pneumonia secondary to above  Essential hypertension  Mixed hyperlipidemia  Diabetes mellitus type 2  History of seizure disorder  CKD stage III  Lung mass? Leukopenia  Polycythemia  Thrombocytopenia  Anxiety disorder  Recent h/o lacunar infarct       Plan:   Admit patient to intermediate floor  Oxygen to keep SPO2 more than 90%  Telemetry  Check vitals closely  CBC BMP daily    Trend troponin  Trend BMP  Echocardiogram  IV Lasix  Cardiology consult    IV Decadron  IV azithromycin  Bronchodilators  Pulmicort  Infectious disease consult  Pulmonology consult    Continue Keppra  Continue amlodipine atorvastatin  Continue aspirin  Xanax as needed  Lantus  Continue other medication as below.     Scheduled Meds:   ALPRAZolam  0.5 mg Oral TID    ipratropium-albuterol  1 ampule Inhalation 4x daily    budesonide  0.5 mg Nebulization BID    metoprolol succinate  50 mg Oral Nightly    dexamethasone  6 mg IntraVENous Q24H    sodium chloride flush  5-40 mL IntraVENous 2 times per day    enoxaparin  40 mg SubCUTAneous Daily    levETIRAcetam  500 mg Oral BID    aspirin  325 mg Oral Daily    amLODIPine  5 mg Oral Nightly    atorvastatin  20 mg Oral Daily    insulin glargine  40 Units SubCUTAneous BID    insulin lispro  0-18 Units SubCUTAneous TID WC    insulin lispro  0-9 Units SubCUTAneous Nightly     Continuous Infusions:   sodium chloride      dextrose       PRN Meds:  dextrose bolus (hypoglycemia), 125 mL, PRN   Or  dextrose bolus (hypoglycemia), 250 mL, PRN  sodium chloride flush, 10 mL, PRN  sodium chloride, 25 mL, PRN  potassium chloride, 40 mEq, PRN   Or  potassium alternative oral replacement, 40 mEq, PRN   Or  potassium chloride, 10 mEq, PRN  magnesium sulfate, 1,000 mg, PRN  ondansetron, 4 mg, Q8H PRN   Or  ondansetron, 4 mg, Q6H PRN  magnesium hydroxide, 30 mL, Daily PRN  acetaminophen, 650 mg, Q6H PRN   Or  acetaminophen, 650 mg, Q6H PRN  glucose, 15 g, PRN  glucagon (rDNA), 1 mg, PRN  dextrose, 100 mL/hr, PRN  hydrALAZINE, 10 mg, Q6H PRN        Chief Complaint:     Chief Complaint   Patient presents with    Leg Swelling     bilateral, has CHF, on diuretic, EMT saw pt , assisted pt into car    Fever    Other     low SPO2         History of Present Illness:        Patient seen and examined at bedside  Stroke alert was called last evening  Patient was transferred to the ICU  Found to have a lacunar infarct  Neurology consulted  They believe it is important for    Patient says his breathing is improved   Denies any wheezing  Denies chest tightness or orthopnea  Sitting up in chair  Says pedal edema has improved  Eating better  Presently on high flow oxygen    Denies chest pain, orthopnea, nausea or vomiting  Denies headache, photophobia, diplopia or neck pain  Denies nausea, vomiting or abdominal pain  Denies diarrhea or constipation  Denies back pain or joint swelling      Initial HPI  Rogers Jensen is a 64 y.o.  male who presents with Leg Swelling (bilateral, has CHF, on diuretic, EMT saw pt , assisted pt into car), Fever, and Other (low SPO2)  This is a 66-year-old gentleman admitted via ER, come to ER with complaint of having shortness of breath, patient has medical history significant for COPD patient with history of cardiac failure: Patient noted that he has been having increasing swelling in his lower extremity and becoming more short of breath, further patient also been having some body aches and sweats, patient patient testing in the emergency room showed that he is positive for COVID-19 also noticed to have an elevated BNP, imaging concerning for fluid overload, admitted for further regiment    I have personally reviewed the past medical history, past surgical history, medications, social history, and family history, and summarized in the note. Review of Systems:     All 10 point system is reviewed and negative otherwise mentioned in HPI. Past Medical History:     Past Medical History:   Diagnosis Date    Arthritis     Atherosclerotic plaque 7/28/2019    Cervical disc disease     degenerative disc disease    Diabetes mellitus ([facility] [address]     type 2    History of blood transfusion     Hx of blood clots     left leg    Hyperlipidemia     Neuropathy     Right kidney mass     \"urologist is watching\"    Snores     Type 2 diabetes mellitus treated with insulin ([facility] [address] 7/28/2019        Past Surgical History:     Past Surgical History:   Procedure Laterality Date    ABDOMINAL AORTIC ANEURYSM REPAIR  2005    CARPAL TUNNEL RELEASE Bilateral     CERVICAL SPINE SURGERY      COLONOSCOPY      benign polyps removed    INGUINAL HERNIA REPAIR Right     DE REPAIR INCISIONAL HERNIA,REDUCIBLE N/A 5/10/2017    HERNIA VENTRAL REPAIR WITH MESH  performed by Purvi Delaney DO at [address] [place] [address] Road  05/10/2017    with mesh        Medications Prior to Admission:       Prior to Admission medications    Medication Sig Start Date End Date Taking?  Authorizing Provider   rosuvastatin (CRESTOR) 40 MG tablet Take 40 mg by mouth every evening   Yes Historical Provider, MD   insulin NPH (HUMULIN N;NOVOLIN N) 100 UNIT/ML injection vial Inject 20 Units into the skin 2 times daily (before meals)   Yes Historical Provider, MD   levETIRAcetam (KEPPRA) 500 MG tablet Take 1 tablet by mouth 2 times daily 11/3/21  Yes Nancy Batres MD   venlafaxine (EFFEXOR XR) 150 MG extended release capsule Take 1 capsule by mouth daily (with breakfast) 7/29/19  Yes Arabella Kiser   vitamin B-1 (THIAMINE) 100 MG tablet Take 100 mg by mouth daily   Yes Historical Provider, MD   ferrous sulfate 325 (65 Fe) MG tablet Take 325 mg by mouth daily (with breakfast)   Yes Historical Provider, MD   ALPRAZolam (XANAX) 0.5 MG tablet Take 0.5 mg by mouth three times daily. Yes Historical Provider, MD   furosemide (LASIX) 40 MG tablet Take 1 tablet by mouth 2 times daily 12/11/18  Yes Min Elie Evans MD   tiotropium (SPIRIVA RESPIMAT) 2.5 MCG/ACT AERS inhaler Inhale 2 puffs into the lungs daily    Yes Historical Provider, MD   albuterol sulfate  (90 Base) MCG/ACT inhaler Inhale 2 puffs into the lungs every 6 hours as needed for Wheezing or Shortness of Breath   Yes Historical Provider, MD   metoprolol succinate (TOPROL XL) 50 MG extended release tablet Take 50 mg by mouth daily   Yes Historical Provider, MD   Multiple Vitamins-Minerals (MULTIVITAMIN ADULT) TABS Take 1 tablet by mouth daily   Yes Historical Provider, MD   vitamin D (CHOLECALCIFEROL) 1000 UNIT TABS tablet Take 1,000 Units by mouth daily   Yes Historical Provider, MD   fluticasone (FLONASE) 50 MCG/ACT nasal spray 1 spray by Each Nare route daily as needed for Rhinitis   Yes Historical Provider, MD   aspirin 325 MG EC tablet Take 325 mg by mouth daily    Yes Historical Provider, MD   Omega-3 Fatty Acids (FISH OIL) 1000 MG CAPS Take 1 capsule by mouth daily    Yes Historical Provider, MD   amLODIPine (NORVASC) 5 MG tablet Take 5 mg by mouth nightly    Yes Historical Provider, MD   mirtazapine (REMERON) 45 MG tablet Take 45 mg by mouth nightly   Yes Historical Provider, MD        Allergies: Barbiturates, Codeine, and Sulfa antibiotics    Social History:     Tobacco:    reports that he has quit smoking. He has never used smokeless tobacco.  Alcohol:      reports no history of alcohol use. Drug Use:  reports no history of drug use.     Family History:     Family History   Problem Relation Age of Onset    Ovarian Cancer Sister     Ovarian Cancer Sister          Physical Exam:     Vitals:  BP (!) 130/54   Pulse 67   Temp 99.1 °F (37.3 °C) (Oral)   Resp 19   Ht 5' 10.98\" (1.803 m)   Wt 228 lb (103.4 kg)   SpO2 93%   BMI 31.82 kg/m²   Temp (24hrs), Av.9 °F (36.6 °C), Min:97.3 °F (36.3 °C), Max:99.1 °F (37.3 °C)      General appearance - alert, well appearing, and in no acute distress  Mental status - oriented to person, place, and time with normal affect  Head - normocephalic and atraumatic  Eyes - pupils equal and reactive, extraocular eye movements intact, conjunctiva clear  Ears - hearing appears to be intact  Nose - no drainage noted  Mouth - mucous membranes moist  Neck - supple, no carotid bruits, thyroid not palpable  Chest -sporadic coarse crackles to auscultation, normal effort  Heart - normal rate, regular rhythm, no murmur  Abdomen - soft, nontender, nondistended, bowel sounds present all four quadrants, no masses, hepatomegaly or splenomegaly  Neurological - normal speech, no focal findings or movement disorder noted, cranial nerves II through XII grossly intact  Extremities - peripheral pulses palpable, 2+ edema  Skin - no gross lesions, rashes, or induration noted        Data:     Labs:    Hematology:  Recent Labs     22  0554 22  0401   WBC 1.8* 3.2* 1.1*   RBC 6.97* 7.12* 6.46*   HGB 17.7* 18.3* 16.3   HCT 57.3* 58.1* 53.0*   MCV 82.2* 81.6* 82.0*   MCH 25.4 25.7 25.2   MCHC 30.9 31.5 30.8   RDW 16.8* 17.2* 16.0*   * 107* 70*   MPV 11.3 Abnormal 11.7   INR 1.0  --   --    DDIMER  --  1.53*  --      Chemistry:  Recent Labs     22  04322  0554 22  0401   NA  --  138 136 141   K  --  3.6* 3.3* 3.1*   CL  --  94* 94* 99   CO2  --  28 28 28   GLUCOSE  --  195* 146* 93   BUN  --  27* 28* 32*   CREATININE  --  1.49* 1.56* 1.30*   MG  --   --  1.7 1.7   ANIONGAP  --  16 14 14   LABGLOM  --  46* 43* 53*   GFRAA  --  55* 52* >60   CALCIUM  --  8.4* 9.0 8.8   PROBNP  --  8,610* 3,484* 4,221*   TROPHS 33* 37*  --   --      Recent Labs     01/18/22 2039 01/18/22  2333 01/19/22  0848 01/19/22  0917 01/19/22  1107 01/19/22  1730   POCGLU 83 86 67* 96 114* 190*       Lab Results   Component Value Date    INR 1.0 01/17/2022    INR 1.0 11/03/2021    INR 1.0 07/27/2019    PROTIME 13.3 01/17/2022    PROTIME 11.1 11/03/2021    PROTIME 10.5 07/27/2019       Lab Results   Component Value Date/Time    SPECIAL NOT REPORTED 01/16/2022 01:15 PM     Lab Results   Component Value Date/Time    CULTURE NO SIGNIFICANT GROWTH 01/16/2022 01:15 PM       Lab Results   Component Value Date    POCPH 7.37 12/07/2018    POCPCO2 34 12/07/2018    POCPO2 55 12/07/2018    POCHCO3 19.8 12/07/2018    NBEA 5 12/07/2018    PBEA NOT REPORTED 12/07/2018    CPN2FEV 21 12/07/2018    WVAA2IMS 88 12/07/2018    FIO2 100.0 01/18/2022       Radiology:    XR CHEST 1 VIEW    Result Date: 1/16/2022  Hypoinflated lungs. Cardiomegaly again seen, when compared to the previous study performed 07/27/2019. Diffuse, increased interstitial markings throughout both lungs, some of which can be seen on the prior study may represent baseline chronic interstitial lung changes. The increased prominence on this exam could be secondary to the suboptimal inspiratory effort. The presence of pulmonary vascular congestion/mild CHF or bilateral interstitial pneumonia cannot be excluded. Clinical correlation is recommended. CT CHEST PULMONARY EMBOLISM W CONTRAST    Result Date: 1/16/2022  No evidence of pulmonary embolism. Compared to 2008, worsening underlying emphysema, with worsening subacute or acute interstitial lung disease, best seen left upper lobe; differential includes interstitial pneumonia or pneumonitis superimposed on emphysema, DIP, HP, and other interstitial pneumonias as well as infectious or inflammatory process superimposed on emphysema disease. Findings are not typical for COVID-19 pneumonia, but an element of the latter should be considered. Thickening and distortion left major fissure with ill-defined mass-like focus left lower lobe. The latter could also be infectious or inflammatory, although neoplasm should be excluded. Underlying worsening emphysema. Nodular densities, best seen right upper lobe. Small pleural effusions, slightly larger on the left. See recommendations below. Mild mediastinal and left hilar adenopathy; see recommendations below. Worsening now moderate-severe pulmonary artery hypertension. Mild cardiomegaly. Stable mild dilatation ascending thoracic aorta. Additional unchanged or incidental findings, as above. RECOMMENDATIONS: Clinical correlation and short-term follow-up CT chest in 1-4 months depending upon the clinical presentation/course. All radiological studies reviewed                Code Status:  Full Code    Electronically signed by Jer Schwab MD on 1/19/2022 at 6:39 PM     Copy sent to Dr. Jocelyne Goodwin MD    This note was created with the assistance of a speech-recognition program.  Although the intention is to generate a document that actually reflects the content of the visit, no guarantees can be provided that every mistake has been identified and corrected by editing. Note was updated later by me after  physical examination and  completion of the assessment.

## 2022-01-19 NOTE — ADT AUTH CERT
Utilization Reviews         Viral Illness, Acute - Care Day 2 (1/17/2022) by Leah Boston RN       Review Status Review Entered   Completed 1/19/2022 10:35      Criteria Review      Care Day: 2 Care Date: 1/17/2022 Level of Care: Intermediate Care    Guideline Day 2    Level Of Care    (X) Floor    Clinical Status    (X) * Hypotension absent    (X) * No requirement for mechanical ventilation    ( ) * Oxygenation at baseline or improved    1/19/2022 10:35 AM EST by Suki Adams      87 % on 4 L/m per n/c    (X) * Mental status at baseline    Routes    (X) * Oral hydration    (X) Oral or IV medications    1/19/2022 10:35 AM EST by Deena Justice      decadron 6 mg IV qd   lasix 40 mg IV bid  lantus 40 u sc bid   keppra 500 mg po bid   toprol xl 50 mg po q hs   xanax 0.5 mg po tid   norvasc 5 mg po qd   asa 325 mg po qd    (X) Usual diet    1/19/2022 10:35 AM EST by Deena Justice      carb control diet    Interventions    (X) Possible isolation    (X) Pulse oximetry    (X) Possible oxygen    1/19/2022 10:35 AM EST by Deena Justice      6 L/m per n/c    Medications    (X) Possible DVT prophylaxis    1/19/2022 10:35 AM EST by Deena Justice      lovenox 40 mg qd    * Milestone   Additional Notes   01/17/22         VS   T-   99. 5     P-   82     R-    20      B/P-   161/75      Spo2-    87 % on 4 L/m per n/c       Baselines (lab values, vitals, O2 amount/delivery, etc):    Room air         Pertinent Updates:      Pulmonology consulted         Abnl/Pertinent Labs/Radiology/Diagnostic Studies:      1/17/2022 04:35   Sodium: 138   Potassium: 3.6 (L)   Chloride: 94 (L)   CO2: 28   BUN: 27 (H)   Creatinine: 1.49 (H)   Bun/Cre Ratio: 18   Anion Gap: 16   GFR Non-: 46 (L)   GFR : 55 (L)   Glucose: 195 (H)   CALCIUM, SERUM, 484559: 8.4 (L)   GFR Comment: Pend   GFR Staging: NOT REPORTED   Pro-BNP: 8,610 (H)   Troponin T: NOT REPORTED   Troponin, High Sensitivity: 37 (H)   Troponin Interp: NOT REPORTED BNP Interpretation: NOT REPORTED   WBC: 1.8 (L)   RBC: 6.97 (H)   Hemoglobin Quant: 17.7 (H)   Hematocrit: 57.3 (H)   MCV: 82.2 (L)   MCH: 25.4   MCHC: 30.9   MPV: 11.3   RDW: 16.8 (H)   Platelet Count: 726 (L)   Platelet Estimate: NOT REPORTED   Absolute Mono #: 0.31   Eosinophils %: 0 (L)   Basophils Absolute: 0.00   Differential Type: NOT REPORTED   Seg Neutrophils: 62   Segs Absolute: 1.11 (L)   Lymphocytes: 21 (L)   Absolute Lymph #: 0.38 (L)   Monocytes: 17 (H)   Absolute Eos #: 0.00   Basophils: 0   Immature Granulocytes: 0   WBC Morphology: NOT REPORTED   RBC Morphology: NOT REPORTED   Prothrombin Time: 13.3   INR: 1.0   Absolute Immature Granulocyte: 0.00   NRBC Automated: 0.0      1/17/2022 07:21   POC Glucose: 205 (H)      1/17/2022 12:19   POC Glucose: 114 (H)      1/17/2022 16:48   POC Glucose: 75      1/17/2022 20:11   POC Glucose: 80      1/17/2022 23:43   POC Glucose: 124 (H)         MD Consults/Assessments & Plans:      Cardiology   Consults         IMPRESSION:     Patient Active Problem List   Diagnosis   · Biventricular CHF (congestive heart failure) ([facility])   · CKD (chronic kidney disease) stage 3, GFR 30-59 ml/min ([facility])   · Essential hypertension   · Blurred vision, right eye   · Type 2 diabetes mellitus treated with insulin ([facility])   · Atherosclerotic plaque   · Weakness on left side of face   · Carotid stenosis, asymptomatic, bilateral   · New onset seizure ([facility])   · COVID       1. Covid 19 PNA   2. Acute CHF, likely diastolic   3. HTN   4. Minimal trop elevation, demand ischemia in setting of acute CHF & COVID PNA   5. CKD3   6. Seizure disorder   7. Hypomagnesium and Hypokalemia   8. HLP        RECOMMENDATIONS:   Presently stable with some improvement. On 4L NC. Continue IV Lasix through today and appreciate nephrology recommendations on diuretics.  Resume home BB and will hold off on Lisinopril until seen by nephrology   Trop elevation flat and in setting of volume overload, COVID, and known CAD. No trend to suggest ischemic. Continue PO ASA, statin, & BB. Will check echo to reassess LVEF and WMA   COVID-19 tx per primary/ID            Infectious Disease   Consults         Impression:   RNQRE-17 virus infection tested + 1/16/2022   Acute respiratory insufficiency on 3 L of oxygen by nasal   Emphysema with worsening changes on imaging   Worsening acute interstitial lung disease and clinically not typical of COVID-19 virus infection   Worsening moderate to severe pulmonary artery hypertension   Bilateral lower extremity edema likely related to above       Recommendations   COVID therapy: The patient is COVID-19 vaccinated though on boosted and has very low inflammatory markers   Given the constellation of symptoms I doubt that the patient truly has COVID pneumonia   I would hold off any antiviral therapy, monoclonal antibiotic, or immunosuppressive therapy as again COVID-19 virus infection is not the likely cause of his presentation   The patient likely has increasing interstitial changes either relating to an underlying pulmonary process versus congestive heart failure. I would recommend diuretic therapy   Continue supplemental oxygen   I will follow his clinical progress and adjust therapy accordingly            Family Medicine   Progress Notes         Assesment:   Acute congestive heart failure, unspecified   COVID-19   Viral pneumonia secondary to above   Essential hypertension   Mixed hyperlipidemia   Diabetes mellitus type 2   History of seizure disorder   CKD stage III   Lung mass?    Leukopenia   Polycythemia   Thrombocytopenia   Anxiety disorder           Plan:   Admit patient to intermediate floor   Oxygen to keep SPO2 more than 90%   Telemetry   Check vitals closely   CBC BMP daily       Trend troponin   Trend BMP   Echocardiogram   IV Lasix   Cardiology consult       IV Decadron   IV azithromycin   Bronchodilators   Infectious disease consult   Pulmonology consult       Continue Keppra Continue amlodipine atorvastatin   Continue aspirin   Continue other medication as below.                  Pulmonary Medicine and Critical Care Consult       Reason for Consult        COVID-19   Assessment   Chronic respiratory failure    COVID-19 pneumonia    COPD/ emphysema   Left lower lobe opacity versus nodule/possible worsening ILD ;CT changes or likely related to COVID superimposed on emphysema   Pulmonary edema   systolic heart failure status post AICD   BLANCHE on CKD   diabetes       Recommendations   Oxygen by nasal cannula   Incentive spirometry every hour awake   Air-bone isolation    prone as tolerated   Albuterol   Spiriva    Decadron IV 6 mg daily   Lasix 40 IV every 12 hrs   Follow-up CT chest outpatient   cardiology consult   Infectious disease on consult   DVT prophylaxis

## 2022-01-20 NOTE — PROGRESS NOTES
Pulmonary Critical Care Progress Note  Mirian Harkins MD     Patient seen for the follow up of COVID-19 pneumonia, acute hypoxic respiratory failure. Subjective:  Patient sitting up in the chair. He denies any chest pain. He does have cough. His shortness of breath not much change. His FiO2 requirement is increasing. Examination:  Vitals: BP (!) 132/57   Pulse 67   Temp 98.6 °F (37 °C) (Oral)   Resp 23   Ht 5' 10.98\" (1.803 m)   Wt 228 lb (103.4 kg)   SpO2 97%   BMI 31.82 kg/m²   General appearance:  alert and cooperative with exam  Neck: No JVD  Lungs: Bilateral crackles, no wheeze, moderate air exchange. Heart: regular rate and rhythm, S1, S2 normal, no gallop  Abdomen: Soft, non tender, + BS  Extremities: no cyanosis or clubbing. No significant edema    LABs:  CBC:   Recent Labs     01/19/22  0401 01/20/22  0418   WBC 1.1* 1.7*   HGB 16.3 16.5   HCT 53.0* 53.7*   PLT 70* 73*     BMP:   Recent Labs     01/19/22  0401 01/20/22  0418    139   K 3.1* 3.6*   CO2 28 32*   BUN 32* 28*   CREATININE 1.30* 1.60*   LABGLOM 53* 42*   GLUCOSE 93 153*     PT/INR:   No results for input(s): PROTIME, INR in the last 72 hours. APTT:No results for input(s): APTT in the last 72 hours. LIVER PROFILE:No results for input(s): AST, ALT, LABALBU in the last 72 hours.   ABG:  Lab Results   Component Value Date    QFD3LUC 21 12/07/2018    FIO2 100.0 01/18/2022       Lab Results   Component Value Date    POCPH 7.37 12/07/2018    POCPCO2 34 12/07/2018    POCPO2 55 12/07/2018    POCHCO3 19.8 12/07/2018    PBEA NOT REPORTED 12/07/2018    OWF4YSC 21 12/07/2018    LIMF1PPM 88 12/07/2018    FIO2 100.0 01/18/2022     Radiology:  X-ray chest: Bibasilar infiltrates      Impression:  · Acute on chronic hypoxic respiratory failure  · COVID-19 pneumonia  · COPD/pulmonary emphysema  · Acute left thalamic infarct/CVA  · Left lower lobe opacity versus nodule  · Pulmonary edema  · Elevated D-dimer, decreased platelets  · Systolic heart failure, s/p AICD placement  · BLANCHE on CKD  · Diabetes mellitus    Recommendations:  · High flow oxygen by nasal cannula, wean FiO2 as tolerated, watch for need for intubation  · BiPAP while asleep and as needed  · Pulmicort aerosol treatment  · Airborne isolation  · Patient encouraged for prone positioning  · IV Decadron  · Albuterol and Ipratropium Q 4 hours and prn  · X-ray chest in am  · Labs: CBC and BMP in am  · DVT prophylaxis with low molecular weight heparin  · Will follow with you    Wendy Rendon MD, [facility]  Pulmonary Critical Care and Sleep Medicine,  [facility]  Cell: [phone]  Office: [phone]

## 2022-01-20 NOTE — PROGRESS NOTES
VASCULAR SURGERY  PROGRESS NOTE      1/20/2022 2:52 PM  Subjective:   Admit Date: 1/16/2022  PCP: Douglas Blackwood MD    Chief Complaint   Patient presents with    Leg Swelling     bilateral, has CHF, on diuretic, EMT saw pt , assisted pt into car    Fever    Other     low SPO2     Interval History: No complaints. Currently on high flow. Diet: ADULT DIET; Regular; 5 carb choices (75 gm/meal); Low Fat/Low Chol/High Fiber/2 gm Na    Medications:   Scheduled Meds:   ALPRAZolam  0.5 mg Oral TID    ipratropium-albuterol  1 ampule Inhalation 4x daily    budesonide  0.5 mg Nebulization BID    metoprolol succinate  50 mg Oral Nightly    dexamethasone  6 mg IntraVENous Q24H    sodium chloride flush  5-40 mL IntraVENous 2 times per day    enoxaparin  40 mg SubCUTAneous Daily    levETIRAcetam  500 mg Oral BID    aspirin  325 mg Oral Daily    amLODIPine  5 mg Oral Nightly    atorvastatin  20 mg Oral Daily    insulin glargine  40 Units SubCUTAneous BID    insulin lispro  0-18 Units SubCUTAneous TID WC    insulin lispro  0-9 Units SubCUTAneous Nightly     Continuous Infusions:   sodium chloride      dextrose           Labs:   CBC:   Recent Labs     01/18/22  0554 01/19/22  0401 01/20/22  0418   WBC 3.2* 1.1* 1.7*   HGB 18.3* 16.3 16.5   * 70* 73*     BMP:    Recent Labs     01/18/22  0554 01/19/22  0401 01/20/22  0418    141 139   K 3.3* 3.1* 3.6*   CL 94* 99 96*   CO2 28 28 32*   BUN 28* 32* 28*   CREATININE 1.56* 1.30* 1.60*   GLUCOSE 146* 93 153*     Hepatic: No results for input(s): AST, ALT, ALB, BILITOT, ALKPHOS in the last 72 hours. Troponin: Invalid input(s): TROPONIN  BNP: No results for input(s): BNP in the last 72 hours. Lipids: No results for input(s): CHOL, HDL in the last 72 hours. Invalid input(s): LDLCALCU  INR:   No results for input(s): INR in the last 72 hours.     Objective:   Vitals: BP (!) 140/52   Pulse 65   Temp 98.6 °F (37 °C) (Oral)   Resp 18   Ht 5' 10.98\" (1.803 m)   Wt 228 lb (103.4 kg)   SpO2 90%   BMI 31.82 kg/m²   General appearance: alert, cooperative and no distress  Mental Status: oriented to person, place and time with normal affect  Neck: good carotid pulses, no JVD  Lungs: clear to auscultation bilaterally, normal effort  Heart: regular rate and rhythm, no murmur,  Abdomen: soft, non-tender, non-distended, bowel sounds present all four quadrants, no masses, hepatomegaly, splenomegaly or aortic enlargement  Extremities: Moderate bilateral lower extremity edema with mild erythema. Chronic venous skin changes bilaterally    Assessment:   3 41-year-old male with COVID-19 pneumonia  2. Bilateral lower extremity edema with mild cellulitis and chronic venous skin changes  3. Status post open abdominal aortic aneurysm repair  4. Lacunar infarct left thalamus    Patient Active Problem List:     Biventricular CHF (congestive heart failure) ([facility])     CKD (chronic kidney disease) stage 3, GFR 30-59 ml/min ([facility])     Essential hypertension     Blurred vision, right eye     Type 2 diabetes mellitus treated with insulin ([address]     Atherosclerotic plaque     Weakness on left side of face     Carotid stenosis, asymptomatic, bilateral     New onset seizure ([address]     COVID      Plan:   1. Continue supportive care  2. Continue antibiotics  3.  JEFF hose for lower extremity swelling    Electronically signed by Makenna Tabares MD on 1/20/2022 at 2:52 PM

## 2022-01-20 NOTE — PROGRESS NOTES
Patient transferred to PCU room 1026. All belongings collected and transferred with patient. Report given to [facility]. Daughter/POJACKIE Robbins notified of transfer, all questions answered at this time.

## 2022-01-20 NOTE — PROGRESS NOTES
Physical Therapy  Facility/Department: [facility] ICU  Daily Treatment Note  NAME: Gina Bansal  : 1945  MRN: [id number]    Date of Service: 2022   RN Madelin Saint reports patient is medically stable for therapy treatment this date. Chart reviewed prior to treatment and patient is agreeable for therapy. All lines intact and patient positioned comfortably at end of treatment. All patient needs addressed prior to ending therapy session. Discharge Recommendations:  Pt currently functioning below baseline. Would suggest additional therapy at time of discharge to maximize long term outcomes and prevent re-admission. Please refer to AM-PAC score for current level of function. Patient would benefit from continued therapy after discharge      Assessment   Body structures, Functions, Activity limitations: Decreased functional mobility ; Decreased ROM; Decreased strength;Decreased safe awareness;Decreased endurance;Decreased balance;Decreased posture;Decreased ADL status  Assessment: Pt tolerated session fair. Pt making progress towards STGs, most limited by decreased endurance. Pt continues to require skilled 2 person assist for safe functional mobility at this time and is a HIGH fall risk. Pt would benefit from continued skilled physical therapy to address these deficits in order to return to PLOF. Prognosis: Good  Decision Making: High Complexity  PT Education: Goals;PT Role;Plan of Care;Precautions;Transfer Training;Energy Conservation;General Safety;Gait Training;Pressure Relief; Functional Mobility Training  Patient Education: Pt educated on: pursed lip breathing, safe transfers w/ RW, importance of continued mobility throughout admission, energy conservation, activity tolerance, and PT POC. Pt verbalized fair understanding. REQUIRES PT FOLLOW UP: Yes  Activity Tolerance  Activity Tolerance: Patient limited by endurance; Patient limited by fatigue     Patient Diagnosis(es): The primary encounter diagnosis was COVID-19. Diagnoses of Acute respiratory failure with hypoxia (HCC), BLANCHE (acute kidney injury) ([facility] Acute on chronic systolic CHF (congestive heart failure) ([facility] Hypokalemia, Hypomagnesemia, and COPD exacerbation ([facility] were also pertinent to this visit. has a past medical history of Arthritis, Atherosclerotic plaque, Cervical disc disease, Diabetes mellitus ([facility] History of blood transfusion, Hx of blood clots, Hyperlipidemia, Neuropathy, Right kidney mass, Snores, and Type 2 diabetes mellitus treated with insulin ([facility] has a past surgical history that includes Abdominal aortic aneurysm repair (2005); Carpal tunnel release (Bilateral); Cervical spine surgery; Inguinal hernia repair (Right); Upper gastrointestinal endoscopy; Colonoscopy; ventral hernia repair (05/10/2017); and pr repair incisional hernia,reducible (N/A, 5/10/2017). Restrictions  Restrictions/Precautions  Restrictions/Precautions: General Precautions,Fall Risk,Isolation,Contact Precautions  Required Braces or Orthoses?: No  Position Activity Restriction  Other position/activity restrictions: Up with assist, telemetry, COVID isolation,  IV, alarms  Subjective   General  Response To Previous Treatment: Patient with no complaints from previous session. Family / Caregiver Present: No  Subjective  Subjective: Pt reporting feeling \"ok\" today. General Comment  Comments: RN and pt agreeable to therapy. Pt seated in bedside chair upon arrival.  Pt pleasant and cooperative throughout. Orientation  Orientation  Overall Orientation Status: Within Functional Limits  Cognition   Cognition  Overall Cognitive Status: WFL  Arousal/Alertness: Appropriate responses to stimuli  Following Commands: Follows one step commands consistently; Follows multistep commands with repitition  Attention Span: Appears intact  Memory: Decreased recall of precautions  Safety Judgement: Decreased awareness of need for safety;Decreased awareness of need for assistance  Problem Solving: Assistance required to correct errors made;Assistance required to implement solutions  Insights: Decreased awareness of deficits  Initiation: Requires cues for some  Sequencing: Requires cues for some  Cognition Comment: Pt. alert and very cooperative with treatment  Objective   Bed mobility  Comment: Not assessed this date. Pt seated in bedside chair upon arrival and exit. Transfers  Sit to Stand: Moderate Assistance;2 Person Assistance  Stand to sit: Moderate Assistance;2 Person Assistance  Comment: Pt performed 2 STS transfers throughout session this date w/ poor steadiness throughout. Pt requiring max verbal cueing for proper hand placement throughout transfers w/ RW w/ fair return demo. Upon standing, pt demonstrating posterior lean requiring max verbal cueing to self-correct. Throughout first stand, pt able to tolerate standing ~5 min this date while maintain SpO2 90% and above throughout. Throughout second stand, pt tolerated standing ~3 min before fatiguing and requesting to sit. Pt demonstrating poor eccentric quad control throughout stand>sit transfers this date despite max verbal cueing. Ambulation  Ambulation?: No (Pt performed pre-gait activites this date, fatiguing quickly throughout.)  More Ambulation?: No  Stairs/Curb  Stairs?: No  Neuromuscular Education  NDT Treatment: Lower extremity; Upper extremity; Sitting  Balance  Posture: Fair  Sitting - Static: Good  Sitting - Dynamic: Good;-  Standing - Static: Fair;+  Standing - Dynamic: Fair;-  Comments: Standing balance assessed w/ RW  Exercises  Hip Flexion: Seated marches x 10  Knee Long Arc Quad: x 10  Ankle Pumps: x 15  Comments: Pre-gait standing marches 2 x 15. Pt w/ fair tolerance to ther ex this date, fatiguing quickly requiring frequent rest breaks throughout.       AM-PAC Score  AM-PAC Inpatient Mobility Raw Score : 12 (01/20/22 1404)  AM-PAC Inpatient T-Scale Score : 35.33 (01/20/22 1404)  Mobility Inpatient CMS 0-100% Score: 68.66 (01/20/22 1404)  Mobility Inpatient CMS G-Code Modifier : CL (01/20/22 1404)       Functional Outcome Measure-   Single Leg Stance Test:  0 sec. (<5 sec.= fall risk)    Goals  Short term goals  Time Frame for Short term goals: 12 visits  Short term goal 1: Inc bed-mobility & transfers to independent to enable pt to safely get in/OOB & chair  Short term goal 2: Inc gait to amb 150ft or > indep w/ RW to enable pt to return to previous level of independence  Short term goal 3: Inc strength to [address] standing balance to good with device to facilitate pt independence for performance of ADL's & functional mobility, & reduce fall risk  Short term goal 4: Pt able to tolerate 30 min of activity to include ex, breathing techniques & endurance training with pt demonstrating ability to perform energy conservation strategies during functional activity to self-regulate pacing & breathing techniques to avoid SOB & maintain SpO2 in safe range  Short term goal 5: Ed pt on home ex's, optimal breathing techniques, fall prevention, safety & energy principles, Covid 19 pt education & issue written pt education    Plan    Plan  Times per week: 1-2x/D, 5-6D/week  Current Treatment Recommendations: Strengthening,Balance Training,Functional Mobility Training,Transfer Training,ADL/Self-care Training,Endurance Training,Gait Training,Home Exercise Program,Safety Education & Training,Patient/Caregiver Education & Training  Safety Devices  Type of devices: Call light within reach,Gait belt,Nurse notified,Left in chair (Palliative care RN present in room upon writer's exit.)  Restraints  Initially in place: No     Therapy Time   Individual Concurrent Group Co-treatment   Time In 1129         Time Out 1153         Minutes 24              Co-treatment with OT warranted secondary to decreased safety and independence requiring 2 skilled therapy professionals to address individual discipline's goals.     Manuela Espinoza, PT

## 2022-01-20 NOTE — PROGRESS NOTES
[facility]  Progress Note    Gela Choudhury  MRN: [id number]  Date: 1/20/2022  LOS: 4     Reason for F/U :   COVID-19 virus infection    Impression :   1. COVID-19 virus infection tested + 1/16/2022  2. Acute hypoxic respiratory failure, now requiring high flow oxygen per nasal cannula  3. Emphysema with worsening changes on imaging  4. Worsening acute interstitial lung disease and clinically not typical COVID-19 virus infection  5. Worsening moderate to severe pulmonary artery hypertension  6. Bilateral lower extremity edema, likely related to above  7.  Lacunar infarct of the left thalamus, not felt to be acute per the neurology team    Recommendations:   · COVID-19 therapy:  · The patient continues on Decadron 6 mg IV daily, initiated by the pulmonary team  · Patient was started on baricitinib, initiated 1/18/2022  · This was placed on hold 1/19/2022 due to neutropenia and ANC dropping    · Patient continues on levofloxacin  · Patient is currently requiring high flow oxygen per nasal cannula, 60 L at 80% FiO2  · Continue supportive care    Infection Control Recommendations:   Droplet plus precautions    Discharge Planning:   Estimated Length of IV antimicrobials: 5 to 7 days  Patient will need Midline Catheter Insertion/ PICC line Insertion: No  Patient will need: Home IV , [place],  SNF,  LTAC: Undetermined  Patient willneed outpatient wound care: No    Medical Decision making / Summary of Stay:   Janene Dominguez is a 64y.o.-year-old male who was initially admitted on 1/16/2022.   Cheryl Beckford has a history of diabetes mellitus type 2, essential hypertension, seizure disorder, chronic kidney disease stage III, hyperlipidemia among other medical problems.     The patient reports that about 1 to 2 months ago he had his diabetes medications changed and since that time he has noticed increasing swelling in his legs, hardness in the legs, difficulty ambulating, and weight gain from 178 to 255 pounds over the last 1 to 2 months. He did not report any subjective fevers, chills, cough or shortness of breath. He denies any loss of smell or change in taste. No abdominal pain nausea vomiting or diarrhea. The patient does report that he has home oxygen that he uses as needed at home and he reports that he hardly needs it. He is vaccinated did receive the J&J vaccine but has not yet received a booster dose.     The patient presented to the emergency department and was found to have leukopenia with a white blood cell count of 2.7 and thrombocytopenia with a platelet count of 567. Creatinine slightly elevated at 1.31 and proBNP is elevated at 9327. Chest x-ray showed hyperinflated lungs with cardiomegaly seen and diffuse increased interstitial markings in both lungs which may represent baseline chronic interstitial lung changes given that these findings were present before. A CT of the chest was done with IV contrast that showed no evidence of pulmonary embolism and compared to 2008 there is worsening underlying emphysema with worsening subacute or acute interstitial lung disease best seen in the left upper lobe.  Findings were not felt typical for COVID-19 virus pneumonia.   There was thickening and distortion of the left major fissure with an ill-defined masslike focus in the left lower lobe.  There is worsening moderate to severe pulmonary artery hypertension     COVID testing was positive and I was asked to evaluate and help with COVID-19 virus infection    Current evaluation:2022    /61   Pulse 64   Temp 98.6 °F (37 °C) (Oral)   Resp 18   Ht 5' 10.98\" (1.803 m)   Wt 228 lb (103.4 kg)   SpO2 94%   BMI 31.82 kg/m²     Temperature Range: Temp: 98.6 °F (37 °C) Temp  Av.6 °F (37 °C)  Min: 97.4 °F (36.3 °C)  Max: 99.1 °F (37.3 °C)    Patient was seen and evaluated sitting up in chair  Patient is currently on high flow oxygen per nasal cannula, 60 L at 80% FiO2  No elevated temperatures  He states he feels well, denies shortness of breath or cough    Review of Systems   Constitutional: Negative. HENT: Negative. Respiratory: Negative. Cardiovascular: Negative. Gastrointestinal: Negative. Genitourinary: Negative. Musculoskeletal: Negative. Skin: Negative. Neurological: Negative. Psychiatric/Behavioral: Negative. Physical Examination :     Physical Exam  Constitutional:       General: He is not in acute distress. Appearance: Normal appearance. He is normal weight. HENT:      Head: Normocephalic and atraumatic. Cardiovascular:      Rate and Rhythm: Normal rate and regular rhythm. Pulmonary:      Effort: Pulmonary effort is normal. No respiratory distress. Breath sounds: Normal breath sounds. Abdominal:      General: Abdomen is flat. Bowel sounds are normal.      Palpations: Abdomen is soft. Musculoskeletal:         General: Normal range of motion. Skin:     General: Skin is warm and dry. Neurological:      General: No focal deficit present. Mental Status: He is alert and oriented to person, place, and time. Mental status is at baseline. Psychiatric:         Mood and Affect: Mood normal.         Behavior: Behavior normal.         Thought Content: Thought content normal.         Laboratory data:   I have independently reviewed the followinglabs:  CBC with Differential:   Recent Labs     01/19/22  0401 01/20/22  0418   WBC 1.1* 1.7*   HGB 16.3 16.5   HCT 53.0* 53.7*   PLT 70* 73*   LYMPHOPCT 49* 30   MONOPCT 18* 14*     BMP:   Recent Labs     01/18/22  0554 01/18/22  0554 01/19/22  0401 01/20/22  0418      < > 141 139   K 3.3*   < > 3.1* 3.6*   CL 94*   < > 99 96*   CO2 28   < > 28 32*   BUN 28*   < > 32* 28*   CREATININE 1.56*   < > 1.30* 1.60*   MG 1.7  --  1.7  --     < > = values in this interval not displayed.      Hepatic Function Panel:   No results for input(s): PROT, LABALBU, BILIDIR, IBILI, BILITOT, ALKPHOS, ALT, AST in the last 72 hours. Lab Results   Component Value Date    PROCAL 0.11 01/16/2022     Lab Results   Component Value Date    CRP 23.5 01/16/2022    CRP 2.0 07/27/2019     Lab Results   Component Value Date    SEDRATE 3 01/16/2022         Lab Results   Component Value Date    DDIMER 1.53 01/18/2022    DDIMER 1.73 01/16/2022    DDIMER 1.18 12/07/2018     Lab Results   Component Value Date    FERRITIN 569 01/16/2022    FERRITIN 50 07/23/2019    FERRITIN 18 07/08/2019     Lab Results   Component Value Date     01/16/2022     Lab Results   Component Value Date    FIBRINOGEN 402 01/16/2022       Results in Past 30 Days  Result Component Current Result Ref Range Previous Result Ref Range   SARS-CoV-2, Rapid DETECTED (A) (1/16/2022) Not Detected Not in Time Range      Lab Results   Component Value Date    COVID19 DETECTED 01/16/2022    COVID19 Not Detected 11/02/2021       No results for input(s): VICTORINO in the last 72 hours. Imaging Studies:   Dopplers  Summary        No evidence of superficial or deep venous thrombosis in both lower    extremities.        Signature        ----------------------------------------------------------------    Electronically signed by Linda Vazquez RVT(Sonographer) on    01/19/2022 08:10 AM    ----------------------------------------------------------------        ----------------------------------------------------------------    Electronically signed by Regina sheth)   Bella Horne 01/19/2022 06:21 PM   Cultures:     Culture, Blood 1 [[id number]] Collected: 01/16/22 1205   Order Status: Completed Specimen: Blood Updated: 01/19/22 1516    Specimen Description . BLOOD    Special Requests RAC 12ML    Culture NO GROWTH 3 DAYS   Culture, Blood 1 [[id number]] Collected: 01/16/22 1220   Order Status: Completed Specimen: Blood Updated: 01/19/22 1514    Specimen Description . BLOOD    Special Requests LAC 12ML    Culture NO GROWTH 3 DAYS   Culture, Urine [[id number]] Collected: 01/16/22 1315   Order Status: Completed Specimen: Urine, clean catch Updated: 01/17/22 2128    Specimen Description . CLEAN CATCH URINE    Special Requests NOT REPORTED    Culture NO SIGNIFICANT GROWTH   COVID-19, Rapid [[id number]] (Abnormal) Collected: 01/16/22 1230   Order Status: Completed Specimen: Nasopharyngeal Swab Updated: 01/16/22 1314    Specimen Description . NASOPHARYNGEAL SWAB    SARS-CoV-2, Rapid DETECTED Abnormal          Medications:      ALPRAZolam  0.5 mg Oral TID    ipratropium-albuterol  1 ampule Inhalation 4x daily    budesonide  0.5 mg Nebulization BID    metoprolol succinate  50 mg Oral Nightly    dexamethasone  6 mg IntraVENous Q24H    sodium chloride flush  5-40 mL IntraVENous 2 times per day    enoxaparin  40 mg SubCUTAneous Daily    levETIRAcetam  500 mg Oral BID    aspirin  325 mg Oral Daily    amLODIPine  5 mg Oral Nightly    atorvastatin  20 mg Oral Daily    insulin glargine  40 Units SubCUTAneous BID    insulin lispro  0-18 Units SubCUTAneous TID WC    insulin lispro  0-9 Units SubCUTAneous Nightly           [facility]  HERB Mills CNP  Perfect Serve messaging  OFFICE: [phone]      Electronically signed by HERB Duarte CNP on 1/20/2022 at 9:39 AM  Thank you for allowing us to participate in the care of this patient. Please call with questions. This note iscreated with the assistance of a speech recognition program.  While intending to generate a document that actually reflects the content of the visit, the document can still have some errors including those of syntax andsound a like substitutions which may escape proof reading. In such instances, actual meaning can be extrapolated by contextual diversion.

## 2022-01-20 NOTE — PROGRESS NOTES
hours.    Invalid input(s): LDLCALCU  INR:   No results for input(s): INR in the last 72 hours. Other active acute problems:  Patient Active Problem List   Diagnosis    Biventricular [facility]congestive [facility] failure) [facility]    CKD (chronic kidney disease) stage 3, GFR 30-59 ml/min ([facility])    Essential hypertension    Blurred vision, right eye    Type 2 diabetes mellitus treated with insulin ([address]    Atherosclerotic plaque    Weakness on left side of face    Carotid stenosis, asymptomatic, bilateral    New onset seizure ([address]    COVID     ECHO 12/7/18  Summary  Moderate left ventricular hypertrophy  Global left ventricular systolic function is normal  Estimated ejection fraction is 55 % . Left atrium is mildly dilated. Right atrium is mildly dilated . No significant valvular regurgitation or stenosis seen. No pericardial effusion seen. Normal aortic root dimension. IMPRESSION & Recommendations:      1. Covid 19 PNA-  On high flow. Pulmonary and ID following   2. Acute CHF, likely diastolic  3. HTN-  Stable. Continue current medications   4. Minimal trop elevation, demand ischemia in setting of acute CHF & COVID PNA  5. CKD3- diuresis per nephro  6. Keep K > 4 and MAG > 2   7. Will sign off and follow up as outpt once acute issues resolve. Please call with further questions or concerns. Electronically signed by HERB Bridges CNP on 1/20/2022 at 11:06 AM    [facility]  [facility]  [phone]

## 2022-01-20 NOTE — CARE COORDINATION
DISCHARGE PLAN:    Patient is currently on HF 85%/60L. BiPAP at night- 75%. CT of brain was completed and shows infarct. Note acute. WBC is 1.7. PT/OT to eval. Referral made to declan

## 2022-01-20 NOTE — PROGRESS NOTES
NEUROLOGY INPATIENT PROGRESS NOTE    Cb Delong    MRN -  [id number]   [facility]      - 1945    64 y.o. Subjective: The patient is seen as follow up for incidental recent small stroke. Patient is alert at this time. He is much better today on bipap and he is comfortably sitting on a chair in the ICU    Objective:   BP (!) 130/54   Pulse 67   Temp 99.1 °F (37.3 °C) (Oral)   Resp 19   Ht 5' 10.98\" (1.803 m)   Wt 228 lb (103.4 kg)   SpO2 93%   BMI 31.82 kg/m²     Intake/Output Summary (Last 24 hours) at 2022  Last data filed at 2022 1527  Gross per 24 hour   Intake 1400 ml   Output 1150 ml   Net 250 ml       Physical Exam:  General:  Awake,  up in chair,  not  in distress. Language is intact. HEENT: pink conjunctiva, unicteric sclera, moist oral mucosa. There is no neck lymphadenopathy. Neck: There is no carotid bruits. The Neck is supple. Neuro: CN 2-12 grossly intact with no focal deficits. Power 5/5 Throughout symmetric, . Long tracts are intact. Cerebellar exam is Intact. Sensory exam is intact to light touch. Gait is not tested. No abnormal movement. Osteo: There is no LROM of any of the 4 extremity joints, no joint tenderness. Leg edema none  Skin: no lesions, no rash, warm and moist to touch. ROS:    Cardiac: no chest pain. No palpitations.   Renal : no flank pain, no hematuria  Skin: no rash    Medications:     ALPRAZolam  0.5 mg Oral TID    ipratropium-albuterol  1 ampule Inhalation 4x daily    budesonide  0.5 mg Nebulization BID    metoprolol succinate  50 mg Oral Nightly    dexamethasone  6 mg IntraVENous Q24H    sodium chloride flush  5-40 mL IntraVENous 2 times per day    enoxaparin  40 mg SubCUTAneous Daily    levETIRAcetam  500 mg Oral BID    aspirin  325 mg Oral Daily    amLODIPine  5 mg Oral Nightly    atorvastatin  20 mg Oral Daily    insulin glargine  40 Units SubCUTAneous BID    insulin lispro  0-18 Units SubCUTAneous TID WC    insulin lispro  0-9 Units SubCUTAneous Nightly       Data:   CBC:   Recent Labs     01/17/22  0435 01/18/22  0554 01/19/22  0401   WBC 1.8* 3.2* 1.1*   HGB 17.7* 18.3* 16.3   * 107* 70*     BMP:    Recent Labs     01/17/22  0435 01/18/22  0554 01/19/22  0401    136 141   K 3.6* 3.3* 3.1*   CL 94* 94* 99   CO2 28 28 28   BUN 27* 28* 32*   CREATININE 1.49* 1.56* 1.30*   GLUCOSE 195* 146* 93           No results found for this or any previous visit. No results found for this or any previous visit. No results found for this or any previous visit. No results found for this or any previous visit. Results for orders placed during the hospital encounter of 11/02/21    MRI BRAIN WO CONTRAST    Narrative  EXAMINATION:  MRI OF THE BRAIN WITHOUT CONTRAST  11/2/2021 9:54 am    TECHNIQUE:  Multiplanar multisequence MRI of the brain was performed without the  administration of intravenous contrast.    COMPARISON:  12/10/2018 MRI brain and CT head 11/02/2021. HISTORY:  ORDERING SYSTEM PROVIDED HISTORY: new onset focal sz for 1 week  TECHNOLOGIST PROVIDED HISTORY:  new onset focal sz for 1 week  Decision Support Exception - unselect if not a suspected or confirmed  emergency medical condition->Emergency Medical Condition (MA)  Reason for Exam: new onset focal sz for 1 week    FINDINGS:  INTRACRANIAL STRUCTURES/VENTRICLES: There is no acute infarct. No mass effect  or midline shift. No evidence of an acute intracranial hemorrhage. The  ventricles and sulci are normal in size and configuration. The  sellar/suprasellar regions appear unremarkable. The normal signal voids  within the major intracranial vessels appear maintained. Mild involutional changes are identified within the brain. Minimal chronic  microvascular disease is appreciated within the periventricular white matter. No mesial temporal sclerosis is appreciated.     ORBITS: The visualized portion of the orbits demonstrate no acute abnormality. SINUSES: The visualized paranasal sinuses and mastoid air cells are well  aerated. BONES/SOFT TISSUES: The bone marrow signal intensity appears normal. The soft  tissues demonstrate no acute abnormality. Impression  No acute intracranial abnormality. Minimal chronic microvascular disease within the periventricular white matter  with associated mild atrophy. No results found for this or any previous visit. No results found for this or any previous visit. Results for orders placed during the hospital encounter of 01/16/22    CT HEAD WO CONTRAST    Addendum 1/18/2022  6:23 PM  ADDENDUM:  Case discussed with Dr. Seferino Dahl at 6:19 p.m. Narrative  EXAMINATION:  CT OF THE HEAD WITHOUT CONTRAST  1/18/2022 5:42 pm    TECHNIQUE:  CT of the head was performed without the administration of intravenous  contrast. Dose modulation, iterative reconstruction, and/or weight based  adjustment of the mA/kV was utilized to reduce the radiation dose to as low  as reasonably achievable. COMPARISON:  CT head November 2,    HISTORY:  ORDERING SYSTEM PROVIDED HISTORY: r/o cva  TECHNOLOGIST PROVIDED HISTORY:  r/o cva    Reason for Exam: stroke alert, ams    FINDINGS:  BRAIN/VENTRICLES: There is no acute intracranial hemorrhage, mass effect or  midline shift. No abnormal extra-axial fluid collection. The gray-white  differentiation is maintained without evidence of an acute infarct. There is  no evidence of hydrocephalus. Punctate calcifications noted in the occipital  lobes bilaterally. Lacunar infarct left thalamus. This appears new since  the previous exam however. ORBITS: The visualized portion of the orbits demonstrate no acute abnormality. SINUSES: The visualized paranasal sinuses and mastoid air cells demonstrate  no acute abnormality. SOFT TISSUES/SKULL:  No acute abnormality of the visualized skull or soft  tissues.     Impression  New lacunar infarct left thalamus, age indeterminate. MRI may be helpful. Assessment:  Active Problems:    COVID  Resolved Problems:    * No resolved hospital problems. [de-identified]    64year old man with hx of DVT not on anticoagulant now, vaccinated but not boosted, p/w COVID pneumonia, increasing requirement of oxygen, and p/w increasing letharginess    Plan:    1. Doing much better today on bipap, pt is very awake and appropriate  2. MRI brain w/o contrast and MRA head/neck non urgently for incidental stroke workup  3. When mri done, please call neurology to follow up  4. con't aspirin 81mg daily  5. con't lipitor  6.  Lovenox for DVT prophylaxis    Harry Jordan MD 1/19/2022 7:33 PM     Jennifer Caban MD  Attending Neurologist/Neurointensivist

## 2022-01-20 NOTE — PLAN OF CARE
Free from accidental physical injury  Outcome: Ongoing  Goal: Free from intentional harm  Description: Free from intentional harm  Outcome: Ongoing     Problem: Daily Care:  Goal: Daily care needs are met  Description: Daily care needs are met  Outcome: Ongoing     Problem: Pain:  Goal: Patient's pain/discomfort is manageable  Description: Patient's pain/discomfort is manageable  Outcome: Ongoing     Problem: Skin Integrity:  Goal: Skin integrity will stabilize  Description: Skin integrity will stabilize  Outcome: Ongoing     Problem: Discharge Planning:  Goal: Patients continuum of care needs are met  Description: Patients continuum of care needs are met  Outcome: Ongoing     Problem: Nutrition  Goal: Optimal nutrition therapy  Outcome: Ongoing     Problem: Skin Integrity:  Goal: Will show no infection signs and symptoms  Description: Will show no infection signs and symptoms  Outcome: Ongoing  Goal: Absence of new skin breakdown  Description: Absence of new skin breakdown  Outcome: Ongoing

## 2022-01-20 NOTE — CONSULTS
Palliative Care Inpatient Consult    NAME:  Heri Guo  MEDICAL RECORD NUMBER:  [id number]  AGE: 64 y.o. GENDER: male  : 1945  TODAY'S DATE:  2022    Reasons for Consultation:    Provision of information regarding PC and/or hospice philosophies  Complex, time-intensive communication and interdisciplinary psychosocial support  Clarification of goals of care and/or assistance with difficult decision-making  Guidance in regards to resources and transition(s)    Code Status: Full Code    Members of PC team contributing to this consultation are :  David Montgomery Rn    History of Present Illness     The patient is a 64 y.o. Non- / non  male who presents with Leg Swelling (bilateral, has CHF, on diuretic, EMT saw pt , assisted pt into car), Fever, and Other (low SPO2)    Referred to Palliative Care by   [x] Physician   [] Nursing  [] Family Request   [] Other:       He was admitted to the primary service for Hypokalemia [E87.6]  Hypomagnesemia [E83.42]  COPD exacerbation ([address] [J44.1]  BLANCHE (acute kidney injury) ([address] [N17.9]  Acute respiratory failure with hypoxia ([address] [J96.01]  Acute on chronic systolic CHF (congestive heart failure) ([address] [I50.23]  COVID [U07.1]  COVID-19 [U07.1]. His hospital course has been associated with Hypoxic respiratory failure, covid pneumonia, Pulmonary edema, Diabetes, mellitus, elevated d dimer, left lower lobe opacity versus nodule. . The patient has a complicated medical history and has been hospitalized since 2022 11:51 AM.    Active Hospital Problems    Diagnosis Date Noted    COVID [U07.1] 2022       Data        Code Status: Full Code     ADVANCED CARE PLANNING:  Patient has capacity for medical decisions: yes  Health Care Power of : yes  Living Will: no     Personal, Social, and Family History  Marital Status:   Living situation:with family:  spouse  Selina/Spiritual History:   No spiritual requests  Psychological Distress: moderate  Does patient understand diagnosis/treatment? yes  Does family/caregiver understand diagnosis/treatment? yes    Assessment        Palliative Performance Scale:    ___100% Full ambulation; normal activity and work; no evidence of disease; able to do own self care; normal intake; fully conscious  ___90% Full ambulation; normal activity and work; some evidence of disease; able to do own self care; normal intake; fully conscious  ___80% Full ambulation; normal activity with effort; some evidence of disease; able to do own self care; normal or reduced intake; fully conscious  ___70% Ambulation reduced; unable to perform normal job/work; significant disease; able to do own self care; normal or reduced intake; fully conscious  ___60%  Ambulation reduced; cannot do hobbies/housework; significant disease; occasional assist; intake normal or reduced; fully conscious/some confusion            x    50%  Mainly sit/lie; can't do any work; extensive disease; considerable assist; intake normal or reduced; fully conscious/some confusion  ___40%  Mainly in bed; extensive disease; mainly assist; intake normal or reduced; fully conscious/ some confusion   ___30%  Bed bound; extensive disease; total care; intake reduced; fully conscious/some confusion  ___20%  Bed bound; extensive disease; total care; intake minimal; drowsy/coma  ___10%  Bed bound; extensive disease; total care; mouth care only; drowsy/coma  ___0       Death       Risk Assessments:    Nini Risk Score: [unfilled]    Readmission Risk Score: 18.4 ( )        1 Year Mortality Risk Score: @1YEARMORTALITYRISK@     Plan        Palliative Interaction:  Reviewed patient's chart. Discussed case with bedside nurse Jyothi Pringle. Met with patient in room 1115. Introduced myself and palliative care role to Franky Connell.   Let Carole Staton know that Palliative Care is an additional support to patients and families while in the hospital.  We are here to support patient's and families, we want to help them with their symptoms. Let Francesco Vinson know palliative care is for patient's with serious/chronic disease we can see patients while in the hospital and outpatient. We are here to help with symptom management. Let him know that we are here to make sure good communication happens throughout stay. Here for psychosocial support, to discuss goals of care and decision making. We discussed DPOA and living will. Patient relates he has a DPOA established his wife and paperwork is at home in his safe. I asked if I could reach out to his wife to see if that paperwork could be brought in so we can have a copy added to the chart. Let him know that as long as he is able he will be his own decision maker. Explained that only if his condition does not allow for him to make decisions will his DPOA be asked to make decisions. Explained that according to [address] his spouse would be the primary decision maker even if DPOA paperwork does not get brought in. He relates no living will. Francesco Vinson relates he is feeling better today. Francesco Vinson is up in chair and was just working with [employer]. They had him standing at two different intervals and doing some marching. Francesco Vinson is on high flow oxygen sats are 92%. Francesco Vinson is able to communicate with writer without difficulty. Encouragement given for doing therapy. Francesco Vinson denies pain. I explained covid pneumonia to patient. I spoke to Francesco Vinson about the 3 code status' that [facility] and state of [place] recognize. Explained that he is a full code at present. I explained the three code status' to Francesco Vinson. I let him know that we want to make sure that we know his wishes and make sure that we are doing what he wants. I let him know that he does not have to make any decisions right know. I wanted him to know the options. I let him know that he is a full code at present and we will do all interventions.   We will do cpr, shock if if needed and if needed will put tube into his trachea and attach him to the ventilator for breathing support. I asked Phoebe Bolivar if he would want cpr and if he would want to be on a ventilator? He relates not really, but this is all overwhelming. When asked he relates he will keep the full code status and we will do everything to keep him a live. I asked if it is ok to talk to his daughter about code status. Phoebe Bolivar relates it is. I was able to talk to him about his past.  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 I thanked him for his service. He has 5 children between him and his wife. He has 15 grand children and 2 great grandchildren. Rossana Ro know the palliative care team will continue to follow. Let him know that he can ask the nurses to contact us if he would like to talk about anything. Wished him well. Called and introduced myself and the palliative care roll to his daughter Shaq Neri know I spoke to her dad and he asked if I contacted her instead of his wife. I gave Latesha Steiner our phone number to reach us at the palliative care office. Explained why we are seeing her dad. I updated her on her dad's condition. I let her know that I talked to him about DPOA and code status. I let her know that the DPOA forms are at home in the safe and asked her if she could bring them in and we would make a copy at some point. I explained the 3 code classifications. I let her know that her dad is a full code and we would do everything unless code status change is requested. Wanted her to have the information so she could talk with her mom and dad as needed. Vero is appreciative of the call. Encouraged her to call us as needed, we are her to help her and her family with needs during hospitalization.      Education/support to staff  Education/support to family  Education/support to patient  Discharge planning/helping to coordinate care  Communications with primary service  Providing support for coping/adaptation/distress of family  Providing support for coping/adaptation/distress of patient  Discussing meaning/purpose   Continue with current plan of care  Clarification of medical condition to patient and family  Code status clarified: Full Code  Code status clarified: [facility]  Code status clarified: [facility]  Validating patient/family distress  Continued communication updates  Recognizing, reflecting, and empathizing with family members' anticipatory grief    Principle Problem/Diagnosis:  Hypokalemia [E87.6]  Hypomagnesemia [E83.42]  COPD exacerbation [address] [J44.1]  BLANCHE (acute kidney injury) ([address] [N17.9]  Acute respiratory failure with hypoxia ([facility] [J96.01]  Acute on chronic systolic CHF (congestive heart failure) ([facility] [I50.23]  COVID [U07.1]  COVID-19 [U07.1]    Goals of care evaluation:  The patient goals of care are cure, live longer, improve or maintain function/quality of life, preserve independence/autonomy/control and support for family/caregiver   Goals of care discussed with:    [] Patient independently    [x] Patient and Family    [] Family or Healthcare DPOA independently    [] Unable to discuss with patient, family/DPOA not present    Code Status  Full Code    Other recommendations:  Please call with any palliative questions or concerns. Palliative Care Team is available via perfect serve or via phone - [phone]. Palliative Care will continue to follow Mr. Dominguez's care as needed. Thank you for allowing Palliative Care to participate in the care of Mr. Manish Johnson .     Electronically signed by   Jud Poon RN  Palliative Care Team  on 1/20/2022 at 2:43 PM    Palliative care office: [phone]

## 2022-01-20 NOTE — PROGRESS NOTES
[facility].,    Adult Hospitalist      Name: Gina Bansal  MRN: [id number]     Acct: [de-identified]  Room: [address] [place] [address]    Admit Date: 1/16/2022 11:51 AM  PCP: Felisa Davison MD    Primary Problem  Active Problems:    COVID  Resolved Problems:    * No resolved hospital problems. *        Assesment:   Acute congestive heart failure, diastolic   [id number]   Viral pneumonia secondary to above  Acute respiratory failure with hypoxia   Essential hypertension  Mixed hyperlipidemia  Diabetes mellitus type 2  History of seizure disorder  CKD stage III  Lung mass? Leukopenia  Polycythemia  Thrombocytopenia  Anxiety disorder  Recent past h/o lacunar infarct left thalamus   Altered mental status       Plan:   Admit patient to intermediate floor  Oxygen to keep SPO2 more than 90%  Telemetry  Check vitals closely  CBC BMP daily    Trend troponin  Trend BMP  Echocardiogram  IV Lasix  Cardiology consult    IV Decadron  IV azithromycin  Bronchodilators  Pulmicort  Infectious disease consult  Pulmonology consult    Continue Keppra  Continue amlodipine atorvastatin  Continue aspirin  Xanax as needed  Lantus  seroquel prn   Precedex gtt prn for agitation   Continue other medication as below.     Scheduled Meds:   ALPRAZolam  0.5 mg Oral TID    ipratropium-albuterol  1 ampule Inhalation 4x daily    budesonide  0.5 mg Nebulization BID    metoprolol succinate  50 mg Oral Nightly    dexamethasone  6 mg IntraVENous Q24H    sodium chloride flush  5-40 mL IntraVENous 2 times per day    enoxaparin  40 mg SubCUTAneous Daily    levETIRAcetam  500 mg Oral BID    aspirin  325 mg Oral Daily    amLODIPine  5 mg Oral Nightly    atorvastatin  20 mg Oral Daily    insulin glargine  40 Units SubCUTAneous BID    insulin lispro  0-18 Units SubCUTAneous TID     insulin lispro  0-9 Units SubCUTAneous Nightly     Continuous Infusions:   sodium chloride      dextrose       PRN Meds:  dextrose bolus (hypoglycemia), 125 mL, PRN Or  dextrose bolus (hypoglycemia), 250 mL, PRN  sodium chloride flush, 10 mL, PRN  sodium chloride, 25 mL, PRN  potassium chloride, 40 mEq, PRN   Or  potassium alternative oral replacement, 40 mEq, PRN   Or  potassium chloride, 10 mEq, PRN  magnesium sulfate, 1,000 mg, PRN  ondansetron, 4 mg, Q8H PRN   Or  ondansetron, 4 mg, Q6H PRN  magnesium hydroxide, 30 mL, Daily PRN  acetaminophen, 650 mg, Q6H PRN   Or  acetaminophen, 650 mg, Q6H PRN  glucose, 15 g, PRN  glucagon (rDNA), 1 mg, PRN  dextrose, 100 mL/hr, PRN  hydrALAZINE, 10 mg, Q6H PRN        Chief Complaint:     Chief Complaint   Patient presents with    Leg Swelling     bilateral, has CHF, on diuretic, EMT saw pt , assisted pt into car    Fever    Other     low SPO2         History of Present Illness:        Pt was confused   Denies any wheezing  Denies chest tightness or orthopnea  Sitting up in chair  Says pedal edema has improved  Eating better  Presently on high flow oxygen    Denies chest pain, orthopnea, nausea or vomiting  Denies headache, photophobia, diplopia or neck pain  Denies nausea, vomiting or abdominal pain  Denies diarrhea or constipation  Denies back pain or joint swelling      Initial HPI  Neida Contreras is a 64 y.o.  male who presents with Leg Swelling (bilateral, has CHF, on diuretic, EMT saw pt , assisted pt into car), Fever, and Other (low SPO2)  This is a 53-year-old gentleman admitted via ER, come to ER with complaint of having shortness of breath, patient has medical history significant for COPD patient with history of cardiac failure: Patient noted that he has been having increasing swelling in his lower extremity and becoming more short of breath, further patient also been having some body aches and sweats, patient patient testing in the emergency room showed that he is positive for COVID-19 also noticed to have an elevated BNP, imaging concerning for fluid overload, admitted for further regiment    I have personally reviewed the past medical history, past surgical history, medications, social history, and family history, and summarized in the note. Review of Systems:     All 10 point system is reviewed and negative otherwise mentioned in HPI. Past Medical History:     Past Medical History:   Diagnosis Date    Arthritis     Atherosclerotic plaque 7/28/2019    Cervical disc disease     degenerative disc disease    Diabetes mellitus ([facility] [address]     type 2    History of blood transfusion     Hx of blood clots     left leg    Hyperlipidemia     Neuropathy     Right kidney mass     \"urologist is watching\"    Snores     Type 2 diabetes mellitus treated with insulin ([facility] [address] 7/28/2019        Past Surgical History:     Past Surgical History:   Procedure Laterality Date    ABDOMINAL AORTIC ANEURYSM REPAIR  2005    CARPAL TUNNEL RELEASE Bilateral     CERVICAL SPINE SURGERY      COLONOSCOPY      benign polyps removed    INGUINAL HERNIA REPAIR Right     [place] REPAIR INCISIONAL HERNIA,REDUCIBLE N/A 5/10/2017    HERNIA VENTRAL REPAIR WITH MESH  performed by Edwina Mayen DO at [address] [place] [address] Road  05/10/2017    with mesh        Medications Prior to Admission:       Prior to Admission medications    Medication Sig Start Date End Date Taking?  Authorizing Provider   rosuvastatin (CRESTOR) 40 MG tablet Take 40 mg by mouth every evening   Yes Historical Provider, MD   insulin NPH (HUMULIN N;NOVOLIN N) 100 UNIT/ML injection vial Inject 20 Units into the skin 2 times daily (before meals)   Yes Historical Provider, MD   levETIRAcetam (KEPPRA) 500 MG tablet Take 1 tablet by mouth 2 times daily 11/3/21  Yes Humera Li MD   venlafaxine (EFFEXOR XR) 150 MG extended release capsule Take 1 capsule by mouth daily (with breakfast) 7/29/19  Yes Arabella Kiser   vitamin B-1 (THIAMINE) 100 MG tablet Take 100 mg by mouth daily   Yes Historical Provider, MD   ferrous sulfate 325 (65 Fe) MG tablet Take 325 mg by mouth daily (with breakfast)   Yes Historical Provider, MD   ALPRAZolam (XANAX) 0.5 MG tablet Take 0.5 mg by mouth three times daily. Yes Historical Provider, MD   furosemide (LASIX) 40 MG tablet Take 1 tablet by mouth 2 times daily 12/11/18  Yes Jitendra Evans MD   tiotropium (SPIRIVA RESPIMAT) 2.5 MCG/ACT AERS inhaler Inhale 2 puffs into the lungs daily    Yes Historical Provider, MD   albuterol sulfate  (90 Base) MCG/ACT inhaler Inhale 2 puffs into the lungs every 6 hours as needed for Wheezing or Shortness of Breath   Yes Historical Provider, MD   metoprolol succinate (TOPROL XL) 50 MG extended release tablet Take 50 mg by mouth daily   Yes Historical Provider, MD   Multiple Vitamins-Minerals (MULTIVITAMIN ADULT) TABS Take 1 tablet by mouth daily   Yes Historical Provider, MD   vitamin D (CHOLECALCIFEROL) 1000 UNIT TABS tablet Take 1,000 Units by mouth daily   Yes Historical Provider, MD   fluticasone (FLONASE) 50 MCG/ACT nasal spray 1 spray by Each Nare route daily as needed for Rhinitis   Yes Historical Provider, MD   aspirin 325 MG EC tablet Take 325 mg by mouth daily    Yes Historical Provider, MD   Omega-3 Fatty Acids (FISH OIL) 1000 MG CAPS Take 1 capsule by mouth daily    Yes Historical Provider, MD   amLODIPine (NORVASC) 5 MG tablet Take 5 mg by mouth nightly    Yes Historical Provider, MD   mirtazapine (REMERON) 45 MG tablet Take 45 mg by mouth nightly   Yes Historical Provider, MD        Allergies: Barbiturates, Codeine, and Sulfa antibiotics    Social History:     Tobacco:    reports that he has quit smoking. He has never used smokeless tobacco.  Alcohol:      reports no history of alcohol use. Drug Use:  reports no history of drug use.     Family History:     Family History   Problem Relation Age of Onset    Ovarian Cancer Sister     Ovarian Cancer Sister          Physical Exam:     Vitals:  BP (!) 140/52   Pulse 65   Temp 98.6 °F (37 °C) (Oral)   Resp 25 Ht 5' 10.98\" (1.803 m)   Wt 228 lb (103.4 kg)   SpO2 92%   BMI 31.82 kg/m²   Temp (24hrs), Av.8 °F (37.1 °C), Min:98.5 °F (36.9 °C), Max:99.1 °F (37.3 °C)      General appearance - alert, well appearing, and in no acute distress  Mental status -confusion  Head - normocephalic and atraumatic  Eyes - pupils equal and reactive, extraocular eye movements intact, conjunctiva clear  Ears - hearing appears to be intact  Nose - no drainage noted  Mouth - mucous membranes moist  Neck - supple, no carotid bruits, thyroid not palpable  Chest -sporadic coarse crackles to auscultation, normal effort  Heart - normal rate, regular rhythm, no murmur  Abdomen - soft, nontender, nondistended, bowel sounds present all four quadrants, no masses, hepatomegaly or splenomegaly  Neurological - normal speech, no focal findings or movement disorder noted, cranial nerves II through XII grossly intact  Extremities - peripheral pulses palpable, 2+ edema  Skin - no gross lesions, rashes, or induration noted        Data:     Labs:    Hematology:  Recent Labs     22  0554 22  0401 22   WBC 3.2* 1.1* 1.7*   RBC 7.12* 6.46* 6.46*   HGB 18.3* 16.3 16.5   HCT 58.1* 53.0* 53.7*   MCV 81.6* 82.0* 83.1   MCH 25.7 25.2 25.5   MCHC 31.5 30.8 30.7   RDW 17.2* 16.0* 16.0*   * 70* 73*   MPV Abnormal 11.7 12.7   DDIMER 1.53*  --   --      Chemistry:  Recent Labs     22  0554 22  04022    141 139   K 3.3* 3.1* 3.6*   CL 94* 99 96*   CO2 28 28 32*   GLUCOSE 146* 93 153*   BUN 28* 32* 28*   CREATININE 1.56* 1.30* 1.60*   MG 1.7 1.7  --    ANIONGAP 14 14 11   LABGLOM 43* 53* 42*   GFRAA 52* >60 51*   CALCIUM 9.0 8.8 8.5*   PROBNP 3,484* 4,221*  --      Recent Labs     22  0917 22  1107 22  1730 22  2045 22  0849 22  1143   POCGLU 96 114* 190* 236* 105 141*       Lab Results   Component Value Date    INR 1.0 2022    INR 1.0 2021    INR 1.0 07/27/2019    PROTIME 13.3 01/17/2022    PROTIME 11.1 11/03/2021    PROTIME 10.5 07/27/2019       Lab Results   Component Value Date/Time    SPECIAL NOT REPORTED 01/16/2022 01:15 PM     Lab Results   Component Value Date/Time    CULTURE NO SIGNIFICANT GROWTH 01/16/2022 01:15 PM       Lab Results   Component Value Date    POCPH 7.37 12/07/2018    POCPCO2 34 12/07/2018    POCPO2 55 12/07/2018    POCHCO3 19.8 12/07/2018    NBEA 5 12/07/2018    PBEA NOT REPORTED 12/07/2018    VKV2HLD 21 12/07/2018    HYSO3LQG 88 12/07/2018    FIO2 100.0 01/18/2022       Radiology:    XR CHEST 1 VIEW    Result Date: 1/16/2022  Hypoinflated lungs. Cardiomegaly again seen, when compared to the previous study performed 07/27/2019. Diffuse, increased interstitial markings throughout both lungs, some of which can be seen on the prior study may represent baseline chronic interstitial lung changes. The increased prominence on this exam could be secondary to the suboptimal inspiratory effort. The presence of pulmonary vascular congestion/mild CHF or bilateral interstitial pneumonia cannot be excluded. Clinical correlation is recommended. CT CHEST PULMONARY EMBOLISM W CONTRAST    Result Date: 1/16/2022  No evidence of pulmonary embolism. Compared to 2008, worsening underlying emphysema, with worsening subacute or acute interstitial lung disease, best seen left upper lobe; differential includes interstitial pneumonia or pneumonitis superimposed on emphysema, DIP, HP, and other interstitial pneumonias as well as infectious or inflammatory process superimposed on emphysema disease. Findings are not typical for COVID-19 pneumonia, but an element of the latter should be considered. Thickening and distortion left major fissure with ill-defined mass-like focus left lower lobe. The latter could also be infectious or inflammatory, although neoplasm should be excluded. Underlying worsening emphysema.   Nodular densities, best seen right upper lobe.  Small pleural effusions, slightly larger on the left. See recommendations below. Mild mediastinal and left hilar adenopathy; see recommendations below. Worsening now moderate-severe pulmonary artery hypertension. Mild cardiomegaly. Stable mild dilatation ascending thoracic aorta. Additional unchanged or incidental findings, as above. RECOMMENDATIONS: Clinical correlation and short-term follow-up CT chest in 1-4 months depending upon the clinical presentation/course. All radiological studies reviewed                Code Status:  Full Code    Electronically signed by Alberto Horton MD on 1/20/2022 at 3:39 PM     Copy sent to Dr. Gilford Salisbury, MD    This note was created with the assistance of a speech-recognition program.  Although the intention is to generate a document that actually reflects the content of the visit, no guarantees can be provided that every mistake has been identified and corrected by editing. Note was updated later by me after  physical examination and  completion of the assessment.

## 2022-01-20 NOTE — PROGRESS NOTES
Occupational Therapy  Facility/Department: [facility] ICU  Daily Treatment Note  NAME: Kevin Varela  : 1945  MRN: [id number]    Date of Service: 2022    Discharge Recommendations:  Patient would benefit from continued therapy after discharge       Assessment   Performance deficits / Impairments: Decreased functional mobility ; Decreased balance;Decreased safe awareness;Decreased ADL status; Decreased endurance;Decreased strength;Decreased cognition;Decreased posture;Decreased sensation  Assessment: Pt. completed functional standing task with use of RW and UE exercise while seated in bedside chair. Pt. completed sit to stand with mod assist x2 with verbal cues on proper tech to push up from sitting surface. Pt. tolerated standing 5 min with use of RW. Pt. educated on importance of mobility and completed 2 sets of B UE exercise with success. Pt. making progress towards goals however still limited on impaired respiratory status and increased fatigue. Skilled OT warranted to promote I/safety to return pt to prior living arrangement with assist as needed. Prognosis: Good  OT Education: OT Role;Transfer Training;Equipment;Plan of Care;Energy Conservation; ADL Adaptive Strategies  Patient Education: Pt. educated on UE exercise and safety with use of RW.  REQUIRES OT FOLLOW UP: Yes  Activity Tolerance  Activity Tolerance: Patient Tolerated treatment well;Patient limited by fatigue  Activity Tolerance: Fair  Safety Devices  Safety Devices in place: Yes  Type of devices: All fall risk precautions in place;Nurse notified; Patient at risk for falls;Gait belt;Left in chair;Call light within reach; Chair alarm in place         Patient Diagnosis(es): The primary encounter diagnosis was COVID-19.  Diagnoses of Acute respiratory failure with hypoxia (HCC), BLANCHE (acute kidney injury) ([facility] [address] Acute on chronic systolic CHF (congestive heart failure) ([facility] [address] Hypokalemia, Hypomagnesemia, and COPD exacerbation ([facility] [address] were also pertinent to this visit. has a past medical history of Arthritis, Atherosclerotic plaque, Cervical disc disease, Diabetes mellitus ([facility] [address] History of blood transfusion, Hx of blood clots, Hyperlipidemia, Neuropathy, Right kidney mass, Snores, and Type 2 diabetes mellitus treated with insulin ([facility] [address] has a past surgical history that includes Abdominal aortic aneurysm repair (2005); Carpal tunnel release (Bilateral); Cervical spine surgery; Inguinal hernia repair (Right); Upper gastrointestinal endoscopy; Colonoscopy; ventral hernia repair (05/10/2017); and pr repair incisional hernia,reducible (N/A, 5/10/2017). Restrictions  Restrictions/Precautions  Restrictions/Precautions: General Precautions,Fall Risk,Isolation  Required Braces or Orthoses?: No  Position Activity Restriction  Other position/activity restrictions: Up with assist, telemetry, COVID isolation,  IV, alarms  Subjective   General  Chart Reviewed: Yes  Patient assessed for rehabilitation services?: Yes  Additional Pertinent Hx: Pt. agreeable for treatment  Response to previous treatment: Patient with no complaints from previous session  Family / Caregiver Present: No  Subjective  Subjective: Pt. stated \"I think I am feeling better\"  General Comment  Comments: Pt. sitting up in bedside chair upon arrival to room      Orientation  Orientation  Overall Orientation Status: Within Functional Limits  Objective             Balance  Sitting Balance: Supervision (Supervised in bedside chair.)  Standing Balance: Minimal assistance (CGA/min assist x1 for static standing)  Standing Balance  Time: 5 min stand  Activity: Standing task  Comment: with use of RW  Functional Mobility  Functional - Mobility Device: Rolling Walker  Activity:  (Functional standing with walker in place at bedside chair.)  Assist Level: Minimal assistance  Functional Mobility Comments: Mod assist x2 for sit to stand from bedside chair.  Once standing, CGA/min assist x1 to  place and maintain functional balance. Bed mobility  Comment: Pt. up in bedside chair for treatment  Transfers  Sit to stand: Moderate assistance;2 Person assistance  Stand to sit: Moderate assistance;2 Person assistance  Transfer Comments: Sit to stand mod assist x2 from low bedside chair to stand with RW. Cognition  Overall Cognitive Status: WFL  Arousal/Alertness: Appropriate responses to stimuli  Following Commands: Follows one step commands consistently; Follows multistep commands with repitition  Attention Span: Appears intact  Memory: Decreased recall of precautions  Safety Judgement: Decreased awareness of need for safety;Decreased awareness of need for assistance  Problem Solving: Assistance required to correct errors made;Assistance required to implement solutions  Insights: Decreased awareness of deficits  Initiation: Requires cues for some  Sequencing: Requires cues for some  Cognition Comment: Pt. alert and very cooperative with treatment     Perception  Overall Perceptual Status: WFL              Type of ROM/Therapeutic Exercise  Type of ROM/Therapeutic Exercise: AROM  Comment: Pt. completed B UE exercise while seated to promote functional strength and endurance. Exercises  Shoulder ABduction: x10  Shoulder ADduction: x10  Horizontal ABduction: x10  Horizontal ADduction: x10  Other: O2 monitored and levels remained in healthy levels of 92-97% saturation.        Plan   Plan  Times per week: 4-5x per week, 1-2x per day  Times per day: Daily  Current Treatment Recommendations: Strengthening,Endurance Training,Patient/Caregiver Education & Training,Functional Mobility Training,Balance Training,Positioning,Safety Education & Training,Equipment Evaluation, Education, & procurement,Self-Care / ADL  Plan Comment: Cont with stated POC      AM-PAC Score        AM-PAC Inpatient Daily Activity Raw Score: 13 (01/20/22 1201)  AM-PAC Inpatient ADL T-Scale Score : 32.03 (01/20/22 1201)  ADL Inpatient CMS 0-100% Score: 63.03 (01/20/22 1201)  ADL Inpatient CMS G-Code Modifier : CL (01/20/22 1201)    Goals  Short term goals  Time Frame for Short term goals: by discharge, pt to demo  Short term goal 1: I/MI for bed mob tasks without bed rails, while maintaining an appropriate SPO2%  Short term goal 2: I/MI for UB ADLs and SBA for LB ADLs with AE as needed and Good safety, while maintaining an appropriate SPO2%  Short term goal 3: SBA for ADL transfers and functional mob with AD and Good safety, while maintaining an appropriate SPO2%  Short term goal 4: increase BUE strength by 1/2 grade to increase IND with self-care and be IND with HEP  Short term goal 5: pt to be IND with EC/WS, fall prevention tech, COVID+ education, as well as DME/AE recommendations with use of handouts as needed  Patient Goals   Patient goals : To go home       Therapy Time   Individual Concurrent Group Co-treatment   Time In 1129         Time Out 1153         Minutes 24              Co-treatment with PT warranted secondary to decreased safety and independence requiring 2 skilled therapy professionals to address individual discipline's goals. OT addressing preparation for ADL transfer, sitting balance for increased ADL performance, sitting/activity tolerance, functional mobility for ADL transfers and functional UE strength.     SANDY Mccoy

## 2022-01-21 NOTE — PROGRESS NOTES
Physical Therapy  DATE: 2022    NAME: Maurizio Crawford  MRN: [id number]   : 1945    Patient not seen this date for Physical Therapy due to:      [x] Cancel by RN or physician due to:    [] Hemodialysis    [] Critical Lab Value Level     [] Blood transfusion in progress    [] Acute or unstable cardiovascular status   _MAP < 55 or more than >115  _HR < 40 or > 130    [x] Acute or unstable pulmonary status (Patient BiPap dependent and desat with minimal mobility in bed per RN. Patient on close watch for intubation.)   -FiO2 > 60%   _RR < 5 or >40    _O2 sats < 85%    [] Strict Bedrest    [] Off Unit for surgery or procedure    [] Off Unit for testing       [] Pending imaging to R/O fracture    [] Refusal by Patient      [] Other      [] PT being discontinued at this time. Patient independent. No further needs. [] PT being discontinued at this time as the patient has been transferred to hospice care. No further needs.       Rony Artist, PTA

## 2022-01-21 NOTE — PLAN OF CARE
Problem: Falls - Risk of:  Goal: Will remain free from falls  Description: Will remain free from falls  Outcome: Ongoing  Note: Bed locked in lowest position, non skid footwear on, call light within reach, side rails up x2.  Hourly rounding

## 2022-01-21 NOTE — PROGRESS NOTES
[facility]  Progress Note    Gina Bansal  MRN: [id number]  Date: 1/21/2022  LOS: 5     Reason for F/U :   COVID-19 virus infection    Impression :   COVID-19 virus infection tested + 1/16/2022  Acute respiratory failure  Emphysema with worsening changes on imaging  Worsening acute interstitial lung disease and clinically not typical of COVID-19 virus infection  Worsening moderate to severe pulmonary artery hypertension  Bilateral lower extremity edema likely related to above  Leukopenia and thrombocytopenia  Lacunar l infarct on the left thalamus    Recommendations:   COVID-19 therapy: The patient is on Decadron 6 mg IV daily   The patient has been started on baricitinib 1/17/2022 but it was discontinued 1/18/2022 due to cytopenias. Actemra had been considered but again due to the cytopenias and thrombocytopenia this has been held. Antimicrobial therapy: The patient received Rocephin 1000 mg and azithromycin 500 mg on 1/16/2022  The patient received a dose of levofloxacin 500 mg on 1/18/2020. The chest x-ray remained stable with scattered bilateral interstitial and hazy left basilar opacity. MRI/MRA of the head and neck have been ordered by the neurology service  Consider hematology oncology evaluation for the cytopenias as the etiology is not entirely clear-typically viral infections the cytopenias are short-lived. Infection Control Recommendations:   Droplet plus precautions    Discharge Planning:   Estimated Length of IV antimicrobials: 5 to 7 days  Patient will need Midline Catheter Insertion/ PICC line Insertion: No  Patient will need: Home IV , [place],  SNF,  LTAC: Undetermined  Patient willneed outpatient wound care: No    Medical Decision making / Summary of Stay:   Gina Bansal is a 64y.o.-year-old male who was initially admitted on 1/16/2022.    Fabricio Wheat has a history of diabetes mellitus type 2, essential hypertension, seizure disorder, chronic kidney disease stage III, hyperlipidemia among other medical problems.     The patient reports that about 1 to 2 months ago he had his diabetes medications changed and since that time he has noticed increasing swelling in his legs, hardness in the legs, difficulty ambulating, and weight gain from 178 to 255 pounds over the last 1 to 2 months. He did not report any subjective fevers, chills, cough or shortness of breath. He denies any loss of smell or change in taste. No abdominal pain nausea vomiting or diarrhea. The patient does report that he has home oxygen that he uses as needed at home and he reports that he hardly needs it. He is vaccinated did receive the J&J vaccine but has not yet received a booster dose.     The patient presented to the emergency department and was found to have leukopenia with a white blood cell count of 2.7 and thrombocytopenia with a platelet count of 058. Creatinine slightly elevated at 1.31 and proBNP is elevated at 9327. Chest x-ray showed hyperinflated lungs with cardiomegaly seen and diffuse increased interstitial markings in both lungs which may represent baseline chronic interstitial lung changes given that these findings were present before. A CT of the chest was done with IV contrast that showed no evidence of pulmonary embolism and compared to 2008 there is worsening underlying emphysema with worsening subacute or acute interstitial lung disease best seen in the left upper lobe. Findings were not felt typical for COVID-19 virus pneumonia. There was thickening and distortion of the left major fissure with an ill-defined masslike focus in the left lower lobe.   There is worsening moderate to severe pulmonary artery hypertension     COVID testing was positive and I was asked to evaluate and help with COVID-19 virus infection    Current evaluation:1/21/2022    BP (!) 144/63   Pulse 58   Temp 98.7 °F (37.1 °C)   Resp 22 Comment: pt trig 97%, ti/tot 32%  Ht 5' 10.98\" (1.803 m)   Wt 228 lb (103.4 kg)   SpO2 92%   BMI 31.82 kg/m²     Temperature Range: Temp: 98.7 °F (37.1 °C) Temp  Av.6 °F (37 °C)  Min: 98.6 °F (37 °C)  Max: 98.7 °F (37.1 °C)  The patient is seen and evaluated at bedside he is awake and alert sitting up in the chair. He has a low-grade fever to 100.6 degrees. The patient has increased oxygen requirements is on 10 L by nasal cannula. He saturating 90% at the time my evaluation. He does not report any subjective complaints in terms of fevers, shortness of breath, or chest pain. Review of Systems   Constitutional: Negative. Respiratory: Negative. Cardiovascular: Negative. Gastrointestinal: Negative. Genitourinary: Negative. Musculoskeletal: Negative. Allergic/Immunologic: Negative. Neurological: Negative. Physical Examination :     Physical Exam  Constitutional:       Appearance: He is well-developed. HENT:      Head: Normocephalic and atraumatic. Cardiovascular:      Rate and Rhythm: Normal rate. Heart sounds: Normal heart sounds. No friction rub. No gallop. Pulmonary:      Effort: Pulmonary effort is normal.      Breath sounds: Normal breath sounds. No wheezing. Abdominal:      General: Bowel sounds are normal.      Palpations: Abdomen is soft. There is no mass. Tenderness: There is no abdominal tenderness. Musculoskeletal:         General: Normal range of motion. Cervical back: Normal range of motion and neck supple. Lymphadenopathy:      Cervical: No cervical adenopathy. Skin:     General: Skin is warm and dry. Neurological:      Mental Status: He is alert and oriented to person, place, and time.          Laboratory data:   I have independently reviewed the followinglabs:  CBC with Differential:   Recent Labs     22  0418 22  0552   WBC 1.7* 1.8*   HGB 16.5 16.4   HCT 53.7* 54.0*   PLT 73* 71*   LYMPHOPCT 30 30   MONOPCT 14* 18*     BMP:   Recent Labs     22  0401 22  0401 01/20/22  0418 01/21/22  0552      < > 139 140   K 3.1*   < > 3.6* 3.4*   CL 99   < > 96* 98   CO2 28   < > 32* 32*   BUN 32*   < > 28* 24*   CREATININE 1.30*   < > 1.60* 1.28*   MG 1.7  --   --  1.7    < > = values in this interval not displayed. Hepatic Function Panel:   No results for input(s): PROT, LABALBU, BILIDIR, IBILI, BILITOT, ALKPHOS, ALT, AST in the last 72 hours. Lab Results   Component Value Date    PROCAL 0.11 01/19/2022    PROCAL 0.11 01/16/2022     Lab Results   Component Value Date    CRP 23.5 01/16/2022    CRP 2.0 07/27/2019     Lab Results   Component Value Date    SEDRATE 3 01/16/2022         Lab Results   Component Value Date    DDIMER 1.53 01/18/2022    DDIMER 1.73 01/16/2022    DDIMER 1.18 12/07/2018     Lab Results   Component Value Date    FERRITIN 569 01/16/2022    FERRITIN 50 07/23/2019    FERRITIN 18 07/08/2019     Lab Results   Component Value Date     01/16/2022     Lab Results   Component Value Date    FIBRINOGEN 402 01/16/2022       Results in Past 30 Days  Result Component Current Result Ref Range Previous Result Ref Range   SARS-CoV-2, Rapid DETECTED (A) (1/16/2022) Not Detected Not in Time Range      Lab Results   Component Value Date    COVID19 DETECTED 01/16/2022    COVID19 Not Detected 11/02/2021       No results for input(s): VANCOTROUGH in the last 72 hours. Imaging Studies:   ONE XRAY VIEW OF THE CHEST 1/21/2022 5:30 am  Impression   No significant interval change.  Scattered bilateral interstitial and hazy   left basilar opacity. Veins: Lower Extremities DVT Study, Venous Scan Lower Bilateral.  Indications for Study: Leg Swelling . Patient Status: In Patient. Technical Quality: Limited visualization. Limitation reason: Edema.   Comments: Simultaneous real time imaging utilizing B-Mode, color doppler and spectral waveform analysis was performed on the  bilateral lower extremities for venous examination of the deep and superficial systems. Conclusions  Summary  No evidence of superficial or deep venous thrombosis in both lower extremities. Signature  Electronically signed by Linda Vazquez RVT(Sonographer) on 01/19/2022 08:10 AM  Electronically signed by Regina Gonzalez physician) on 01/19/2022 06:21 PM      CT OF THE HEAD WITHOUT CONTRAST  1/18/2022 5:42 pm  Impression   New lacunar infarct left thalamus, age indeterminate. Kaleen Lips may be helpful.           Cultures:     Culture, Blood 2 [[id number]]    Order Status: Sent Specimen: Blood    Culture, Blood 1 [[id number]]    Order Status: Sent Specimen: Blood    Culture, Blood 1 [[id number]] Collected: 01/16/22 1205   Order Status: Completed Specimen: Blood Updated: 01/20/22 1516    Specimen Description . BLOOD    Special Requests RAC 12ML    Culture NO GROWTH 4 DAYS   Culture, Blood 1 [[id number]] Collected: 01/16/22 1220   Order Status: Completed Specimen: Blood Updated: 01/20/22 1514    Specimen Description . BLOOD    Special Requests LAC 12ML    Culture NO GROWTH 4 DAYS   Culture, Urine [[id number]] Collected: 01/16/22 1315   Order Status: Completed Specimen: Urine, clean catch Updated: 01/17/22 2128    Specimen Description . CLEAN CATCH URINE    Special Requests NOT REPORTED    Culture NO SIGNIFICANT GROWTH       COVID-19, Rapid [[id number]] (Abnormal) Collected: 01/16/22 1230   Order Status: Completed Specimen: Nasopharyngeal Swab Updated: 01/16/22 1314    Specimen Description . NASOPHARYNGEAL SWAB    SARS-CoV-2, Rapid DETECTED Abnormal     Comment:        Rapid NAAT: The specimen is POSITIVE for SARS-Cov-2, the novel coronavirus associated with   COVID-19.         This test has been authorized by the FDA under an Emergency Use Authorization (EUA) for use   by authorized laboratories.         The ID NOW COVID-19 assay is designed to detect the virus that causes COVID-19 in patients   with signs and symptoms of infection who are suspected of COVID-19.    An individual without symptoms of COVID-19 and who is not shedding SARS-CoV-2 virus would   expect to have a negative (not detected) result in this assay. Fact sheet for Healthcare Providers: Cee   Fact sheet for Patients: Natacha.sangeetha           Methodology: Isothermal Nucleic Acid Amplification         Results reported to the appropriate Health Department         Flu A/B Ag Detection [[id number]] Collected: 01/16/22 1230   Order Status: Completed Specimen: Nasopharyngeal Swab Updated: 01/16/22 1313    Specimen Description . NASOPHARYNGEAL SWAB    Special Requests NOT REPORTED    Direct Exam NEGATIVE for Influenza A + B antigens.                                PCR testing to confirm this result is available upon request. Quinton Gill will be saved in the laboratory for 7 days.  Please call [phone] if PCR testing is indicated. Medications:      QUEtiapine  50 mg Oral Once    ALPRAZolam  0.5 mg Oral TID    ipratropium-albuterol  1 ampule Inhalation 4x daily    budesonide  0.5 mg Nebulization BID    metoprolol succinate  50 mg Oral Nightly    dexamethasone  6 mg IntraVENous Q24H    sodium chloride flush  5-40 mL IntraVENous 2 times per day    enoxaparin  40 mg SubCUTAneous Daily    levETIRAcetam  500 mg Oral BID    aspirin  325 mg Oral Daily    amLODIPine  5 mg Oral Nightly    atorvastatin  20 mg Oral Daily    insulin glargine  40 Units SubCUTAneous BID    insulin lispro  0-18 Units SubCUTAneous TID WC    insulin lispro  0-9 Units SubCUTAneous Nightly           [facility]  Eyad Ellis MD  Perfect [employer] messaging  OFFICE: [phone]      Electronically signed by Eyad Ellis MD on 1/21/2022 at 6:59 AM  Thank you for allowing us to participate in the care of this patient. Please call with questions.     This note iscreated with the assistance of a speech recognition program.  While intending to generate a

## 2022-01-21 NOTE — PROGRESS NOTES
Occupational Therapy  DATE: 2022    NAME: Odette Freeman  MRN: [id number]   : 1945    Patient not seen this date for Occupational Therapy due to:      [] Cancel by RN or physician due to:    [] Hemodialysis    [] Critical Lab Value Level     [] Blood transfusion in progress    [] Acute or unstable cardiovascular status   _MAP < 55 or more than >115  _HR < 40 or > 130    [x] Acute or unstable pulmonary status: Desat with min movement. -FiO2 > 60%   _RR < 5 or >40    _O2 sats < 85%    [] Strict Bedrest    [] Off Unit for surgery or procedure    [] Off Unit for testing       [] Pending imaging to R/O fracture    [] Refusal by Patient      [] Other      [] OT being discontinued at this time. Patient independent. No further needs. [] OT being discontinued at this time as the patient has been transferred to hospice care. No further needs.       Orlan Closs, SANDY

## 2022-01-21 NOTE — PROGRESS NOTES
Pt has been confused. He has pulled his oxygen out 5 times since 7p. He keeps trying to get out of chair saying he is leaving. Xanax was given with no relief. Contacted Dr German Mcelroy he ordered precedex drip. precedex drip started, pt put back in bed. Will continue to monitor.

## 2022-01-21 NOTE — PROGRESS NOTES
NEUROLOGY INPATIENT PROGRESS NOTE    Jack Cherry    MRN -  [id number]   Acct # - [de-identified]      - 1945    64 y.o. Subjective: The patient is seen as follow up for incidental recent small stroke. Patient is sedated at this time. Earlier nursing staff reported pt was confused and agitated, precedex was started, pt is now resting comfortably on the bed    Objective:   BP (!) 150/73   Pulse 75   Temp 98.6 °F (37 °C) (Oral)   Resp 21   Ht 5' 10.98\" (1.803 m)   Wt 228 lb (103.4 kg)   SpO2 92%   BMI 31.82 kg/m²       Intake/Output Summary (Last 24 hours) at 2022  Last data filed at 2022 1800  Gross per 24 hour   Intake --   Output 900 ml   Net -900 ml       Physical Exam:  General:  Sleeping in bed,  not  in distress. Given the agitation, will not wake pt up at this time        ROS:    Cardiac: no chest pain. No palpitations.   Renal : no flank pain, no hematuria  Skin: no rash    Medications:     QUEtiapine  50 mg Oral Once    ALPRAZolam  0.5 mg Oral TID    ipratropium-albuterol  1 ampule Inhalation 4x daily    budesonide  0.5 mg Nebulization BID    metoprolol succinate  50 mg Oral Nightly    dexamethasone  6 mg IntraVENous Q24H    sodium chloride flush  5-40 mL IntraVENous 2 times per day    enoxaparin  40 mg SubCUTAneous Daily    levETIRAcetam  500 mg Oral BID    aspirin  325 mg Oral Daily    amLODIPine  5 mg Oral Nightly    atorvastatin  20 mg Oral Daily    insulin glargine  40 Units SubCUTAneous BID    insulin lispro  0-18 Units SubCUTAneous TID WC    insulin lispro  0-9 Units SubCUTAneous Nightly       Data:   CBC:   Recent Labs     22  0554 22  0401 22  0418   WBC 3.2* 1.1* 1.7*   HGB 18.3* 16.3 16.5   * 70* 73*     BMP:    Recent Labs     22  0554 22  0401 22  0418    141 139   K 3.3* 3.1* 3.6*   CL 94* 99 96*   CO2 28 28 32*   BUN 28* 32* 28*   CREATININE 1.56* 1.30* 1.60*   GLUCOSE 146* 93 153*           No results found for this or any previous visit. No results found for this or any previous visit. No results found for this or any previous visit. No results found for this or any previous visit. Results for orders placed during the hospital encounter of 11/02/21    MRI BRAIN WO CONTRAST    Narrative  EXAMINATION:  MRI OF THE BRAIN WITHOUT CONTRAST  11/2/2021 9:54 am    TECHNIQUE:  Multiplanar multisequence MRI of the brain was performed without the  administration of intravenous contrast.    COMPARISON:  12/10/2018 MRI brain and CT head 11/02/2021. HISTORY:  ORDERING SYSTEM PROVIDED HISTORY: new onset focal sz for 1 week  TECHNOLOGIST PROVIDED HISTORY:  new onset focal sz for 1 week  Decision Support Exception - unselect if not a suspected or confirmed  emergency medical condition->Emergency Medical Condition (MA)  Reason for Exam: new onset focal sz for 1 week    FINDINGS:  INTRACRANIAL STRUCTURES/VENTRICLES: There is no acute infarct. No mass effect  or midline shift. No evidence of an acute intracranial hemorrhage. The  ventricles and sulci are normal in size and configuration. The  sellar/suprasellar regions appear unremarkable. The normal signal voids  within the major intracranial vessels appear maintained. Mild involutional changes are identified within the brain. Minimal chronic  microvascular disease is appreciated within the periventricular white matter. No mesial temporal sclerosis is appreciated. ORBITS: The visualized portion of the orbits demonstrate no acute abnormality. SINUSES: The visualized paranasal sinuses and mastoid air cells are well  aerated. BONES/SOFT TISSUES: The bone marrow signal intensity appears normal. The soft  tissues demonstrate no acute abnormality. Impression  No acute intracranial abnormality.     Minimal chronic microvascular disease within the periventricular white matter  with associated mild atrophy. No results found for this or any previous visit. No results found for this or any previous visit. Results for orders placed during the hospital encounter of 01/16/22    CT HEAD WO CONTRAST    Addendum 1/18/2022  6:23 PM  ADDENDUM:  Case discussed with Dr. Syeda Worthington at 6:19 p.m. Narrative  EXAMINATION:  CT OF THE HEAD WITHOUT CONTRAST  1/18/2022 5:42 pm    TECHNIQUE:  CT of the head was performed without the administration of intravenous  contrast. Dose modulation, iterative reconstruction, and/or weight based  adjustment of the mA/kV was utilized to reduce the radiation dose to as low  as reasonably achievable. COMPARISON:  CT head November 2,    HISTORY:  ORDERING SYSTEM PROVIDED HISTORY: r/o cva  TECHNOLOGIST PROVIDED HISTORY:  r/o cva    Reason for Exam: stroke alert, ams    FINDINGS:  BRAIN/VENTRICLES: There is no acute intracranial hemorrhage, mass effect or  midline shift. No abnormal extra-axial fluid collection. The gray-white  differentiation is maintained without evidence of an acute infarct. There is  no evidence of hydrocephalus. Punctate calcifications noted in the occipital  lobes bilaterally. Lacunar infarct left thalamus. This appears new since  the previous exam however. ORBITS: The visualized portion of the orbits demonstrate no acute abnormality. SINUSES: The visualized paranasal sinuses and mastoid air cells demonstrate  no acute abnormality. SOFT TISSUES/SKULL:  No acute abnormality of the visualized skull or soft  tissues. Impression  New lacunar infarct left thalamus, age indeterminate. MRI may be helpful. Assessment:  Active Problems:    COVID  Resolved Problems:    * No resolved hospital problems. [de-identified]    64year old man with hx of DVT not on anticoagulant now, vaccinated but not boosted, p/w COVID pneumonia, increasing requirement of oxygen, and p/w increasing letharginess    Plan:    1.  Pt is most likely sundowning given his age, his COVID pneumonia exacerbated it  2. MRI brain w/o contrast and MRA head/neck non urgently for incidental stroke workup  3. When mri done, please call neurology to follow up  4. con't aspirin 81mg daily  5. con't lipitor  6. Ok to use low dose precedex, also ok to use seroquel 50mg qnightly PRN to keep him calm  7.  Lovenox for DVT prophylaxis    Manuel Stewart MD 1/20/2022 10:07 PM     Nikia Carty MD  Attending Neurologist/Neurointensivist

## 2022-01-21 NOTE — PROGRESS NOTES
Pulmonary Critical Care Progress Note  Charlene Roldan MD     Patient seen for the follow up of COVID-19 pneumonia, acute hypoxic respiratory failure. Subjective:  He is eating his lunch tolerating his soft diet. He is on high flow 60 L / 90% FiO2. He will wear nonrebreather on top of high flow if needed. He denies any chest pain. He has a cough, mostly nonproductive. He does not have any chest pain. He is on small amount of Precedex. Examination:  Vitals: BP (!) 154/70   Pulse 62   Temp 98.3 °F (36.8 °C) (Oral)   Resp 29   Ht 5' 10.98\" (1.803 m)   Wt 228 lb (103.4 kg)   SpO2 90%   BMI 31.82 kg/m²   General appearance:  alert and cooperative with exam, eating lunch  Neck: No JVD  Lungs: Bilateral crackles, no wheeze, moderate air exchange. Heart: regular rate and rhythm, S1, S2 normal, no gallop  Abdomen: Soft, non tender, + BS  Extremities: no cyanosis or clubbing.  No significant edema    LABs:  CBC:   Recent Labs     01/20/22  0418 01/21/22  0552   WBC 1.7* 1.8*   HGB 16.5 16.4   HCT 53.7* 54.0*   PLT 73* 71*     BMP:   Recent Labs     01/20/22  0418 01/21/22  0552    140   K 3.6* 3.4*   CO2 32* 32*   BUN 28* 24*   CREATININE 1.60* 1.28*   LABGLOM 42* 54*   GLUCOSE 153* 52*     ABG:  Lab Results   Component Value Date    AYO5QKA 21 12/07/2018    FIO2 100.0 01/18/2022       Lab Results   Component Value Date    POCPH 7.37 12/07/2018    POCPCO2 34 12/07/2018    POCPO2 55 12/07/2018    POCHCO3 19.8 12/07/2018    PBEA NOT REPORTED 12/07/2018    WVS7TMV 21 12/07/2018    HYLJ8GFL 88 12/07/2018    FIO2 100.0 01/18/2022     Radiology:  X-ray chest 1/21/2022      Impression:  · Acute on chronic hypoxic respiratory failure  · COVID-19 pneumonia  · COPD/pulmonary emphysema  · Acute left thalamic infarct/CVA  · Left lower lobe opacity versus nodule  · Pulmonary edema  · Elevated D-dimer, decreased platelets  · Systolic heart failure, s/p AICD placement  · BLANCHE on CKD  · Diabetes mellitus    Recommendations:  · High flow oxygen by nasal cannula, wean FiO2 as tolerated, watch for need for intubation  · BiPAP while asleep and as needed  · Pulmicort aerosol treatment  · Airborne isolation  · Patient encouraged for prone positioning  · IV Decadron 6 mg daily  · Not a candidate for Actemra/baricitinib stopped secondary to cytopenias  · Neurology following  · Albuterol and Ipratropium Q 4 hours and prn  · X-ray chest in am  · Labs: CBC and BMP in am  · DVT prophylaxis with low molecular weight heparin  · Will follow with you    Mihir Burgos MD, [facility]  Pulmonary Critical Care and Sleep Medicine,  [facility]  Cell: [phone]  Office: [phone]

## 2022-01-21 NOTE — PROGRESS NOTES
VASCULAR SURGERY  PROGRESS NOTE      1/21/2022 3:15 PM  Subjective:   Admit Date: 1/16/2022  PCP: Gilford Salisbury, MD    Chief Complaint   Patient presents with    Leg Swelling     bilateral, has CHF, on diuretic, EMT saw pt , assisted pt into car    Fever    Other     low SPO2     Interval History: No complaints. Remains on high flow. Diet: ADULT DIET; Regular; 5 carb choices (75 gm/meal); Low Fat/Low Chol/High Fiber/2 gm Na    Medications:   Scheduled Meds:   QUEtiapine  50 mg Oral Once    ALPRAZolam  0.5 mg Oral TID    ipratropium-albuterol  1 ampule Inhalation 4x daily    budesonide  0.5 mg Nebulization BID    metoprolol succinate  50 mg Oral Nightly    dexamethasone  6 mg IntraVENous Q24H    sodium chloride flush  5-40 mL IntraVENous 2 times per day    enoxaparin  40 mg SubCUTAneous Daily    levETIRAcetam  500 mg Oral BID    aspirin  325 mg Oral Daily    amLODIPine  5 mg Oral Nightly    atorvastatin  20 mg Oral Daily    insulin glargine  40 Units SubCUTAneous BID    insulin lispro  0-18 Units SubCUTAneous TID WC    insulin lispro  0-9 Units SubCUTAneous Nightly     Continuous Infusions:   dexmedetomidine (PRECEDEX) IV infusion 0.2 mcg/kg/hr (01/21/22 0720)    sodium chloride      dextrose           Labs:   CBC:   Recent Labs     01/19/22  0401 01/20/22  0418 01/21/22  0552   WBC 1.1* 1.7* 1.8*   HGB 16.3 16.5 16.4   PLT 70* 73* 71*     BMP:    Recent Labs     01/19/22  0401 01/20/22  0418 01/21/22  0552    139 140   K 3.1* 3.6* 3.4*   CL 99 96* 98   CO2 28 32* 32*   BUN 32* 28* 24*   CREATININE 1.30* 1.60* 1.28*   GLUCOSE 93 153* 52*     Hepatic: No results for input(s): AST, ALT, ALB, BILITOT, ALKPHOS in the last 72 hours. Troponin: Invalid input(s): TROPONIN  BNP: No results for input(s): BNP in the last 72 hours. Lipids: No results for input(s): CHOL, HDL in the last 72 hours.     Invalid input(s): LDLCALCU  INR:   No results for input(s): INR in the last 72 hours.    Objective:   Vitals: BP (!) 154/70   Pulse 62   Temp 98.3 °F (36.8 °C) (Oral)   Resp 18   Ht 5' 10.98\" (1.803 m)   Wt 228 lb (103.4 kg)   SpO2 (!) 85%   BMI 31.82 kg/m²   General appearance: alert, cooperative and no distress  Mental Status: oriented to person, place and time with normal affect  Neck: good carotid pulses, no JVD  Lungs: clear to auscultation bilaterally, normal effort  Heart: regular rate and rhythm, no murmur,  Abdomen: soft, non-tender, non-distended, bowel sounds present all four quadrants, no masses, hepatomegaly, splenomegaly or aortic enlargement  Extremities: Moderate bilateral lower extremity edema with mild erythema. Chronic venous skin changes bilaterally    Assessment:   3 68-year-old male with COVID-19 pneumonia  2. Bilateral lower extremity edema with mild cellulitis and chronic venous skin changes  3. Status post open abdominal aortic aneurysm repair  4. Lacunar infarct left thalamus    Patient Active Problem List:     Biventricular CHF (congestive heart failure) ([facility])     CKD (chronic kidney disease) stage 3, GFR 30-59 ml/min ([facility])     Essential hypertension     Blurred vision, right eye     Type 2 diabetes mellitus treated with insulin ([address]     Atherosclerotic plaque     Weakness on left side of face     Carotid stenosis, asymptomatic, bilateral     New onset seizure ([address]     COVID      Plan:   1. Continue supportive care  2. Continue antibiotics  3.  JEFF hose for lower extremity swelling    Electronically signed by Nubia Fontana MD on 1/21/2022 at 3:15 PM

## 2022-01-21 NOTE — PROGRESS NOTES
Pt sats maintaining <90% on HFNC+NRB, RT placed pt back on bipap 90%. Writer asked pt about wishes if MD decides intubation is the best next step, pt initially stated that he does not want intubation, and that family also knows his wishes. Pt called writer back into room shortly thereafter, and stated that he \"wants to live. \" Writer asked if the pt is indicating that he does want to be intubated if his vitals drop to the point of needing it, the pt said yes.

## 2022-01-22 NOTE — PLAN OF CARE
Problem: Falls - Risk of:  Goal: Will remain free from falls  Description: Will remain free from falls  Outcome: Ongoing  Note: Bed locked in lowest position, non skid footwear on, call light within reach, side rails up x3.  Hourly rounding

## 2022-01-22 NOTE — PROGRESS NOTES
Pt panicking on precedex 1.0, 100% bipap, no PRN sedative IVP available, pt states wants to go to sleep or otherwise stop being fully aware, states okay with intubation if MD believes it is the best option. Contacted on-call Kimberlyberg Dewane Habermann, left phone message.

## 2022-01-22 NOTE — PROGRESS NOTES
[facility]  Progress Note    Maurizio Crawford  MRN: [id number]  Date: 1/22/2022  LOS: 6     Reason for F/U :   COVID-19 virus infection    Impression :   COVID-19 virus infection tested + 1/16/2022  Acute respiratory failure  Emphysema with worsening changes on imaging  Worsening acute interstitial lung disease and clinically not typical of COVID-19 virus infection  Worsening moderate to severe pulmonary artery hypertension  Bilateral lower extremity edema likely related to above  Leukopenia and thrombocytopenia  Lacunar l infarct on the left thalamus    Recommendations:   COVID-19 therapy: The patient is on Decadron 6 mg IV daily   The patient has been started on baricitinib 1/17/2022 but it was discontinued 1/18/2022 due to cytopenias. Actemra had been considered but again due to the cytopenias and thrombocytopenia this has been held. Antimicrobial therapy: The patient received Rocephin 1000 mg and azithromycin 500 mg on 1/16/2022  The patient received a dose of levofloxacin 500 mg on 1/18/2020. Monitor off antibiotics for now    MRI/MRA of the head and neck pending  Continue supportive care    Infection Control Recommendations:   Droplet plus precautions    Discharge Planning:   Estimated Length of IV antimicrobials:  Patient will need Midline Catheter Insertion/ PICC line Insertion: No  [facility],  LTAC: Undetermined  Patient willneed outpatient wound care: No    Medical Decision making / Summary of Stay:   Maurizio Crawford is a 64y.o.-year-old male who was initially admitted on 1/16/2022.    Jame Robertson has a history of diabetes mellitus type 2, essential hypertension, seizure disorder, chronic kidney disease stage III, hyperlipidemia among other medical problems.     The patient reports that about 1 to 2 months ago he had his diabetes medications changed and since that time he has noticed increasing swelling in his legs, hardness in the legs, difficulty ambulating, and weight gain from 178 to 255 pounds over the last 1 to 2 months. He did not report any subjective fevers, chills, cough or shortness of breath. He denies any loss of smell or change in taste. No abdominal pain nausea vomiting or diarrhea. The patient does report that he has home oxygen that he uses as needed at home and he reports that he hardly needs it. He is vaccinated did receive the J&J vaccine but has not yet received a booster dose.     The patient presented to the emergency department and was found to have leukopenia with a white blood cell count of 2.7 and thrombocytopenia with a platelet count of 645. Creatinine slightly elevated at 1.31 and proBNP is elevated at 9327. Chest x-ray showed hyperinflated lungs with cardiomegaly seen and diffuse increased interstitial markings in both lungs which may represent baseline chronic interstitial lung changes given that these findings were present before. A CT of the chest was done with IV contrast that showed no evidence of pulmonary embolism and compared to 2008 there is worsening underlying emphysema with worsening subacute or acute interstitial lung disease best seen in the left upper lobe. Findings were not felt typical for COVID-19 virus pneumonia. There was thickening and distortion of the left major fissure with an ill-defined masslike focus in the left lower lobe.   There is worsening moderate to severe pulmonary artery hypertension     COVID testing was positive and I was asked to evaluate and help with COVID-19 virus infection    Current evaluation:2022    BP (!) 171/87   Pulse 69   Temp 97.5 °F (36.4 °C) (Axillary)   Resp 25   Ht 5' 10.98\" (1.803 m)   Wt 228 lb (103.4 kg)   SpO2 (!) 86%   BMI 31.82 kg/m²     Temperature Range: Temp: 97.5 °F (36.4 °C) Temp  Av.8 °F (36.6 °C)  Min: 97.5 °F (36.4 °C)  Max: 98 °F (36.7 °C)  The patient is seen and evaluated at bedside he is awake and alert sitting up in the chair.  The patient is afebrile, on high flow oxygen/BiPAP, no acute events  WBC 2.1, platelets 75  Review of Systems   Constitutional: Negative. Respiratory: Negative. Cardiovascular: Negative. Gastrointestinal: Negative. Genitourinary: Negative. Musculoskeletal: Negative. Allergic/Immunologic: Negative. Neurological: Negative. All other systems reviewed and are negative. Physical Examination :     Physical Exam  Constitutional:       Appearance: He is well-developed. HENT:      Head: Normocephalic and atraumatic. Cardiovascular:      Rate and Rhythm: Normal rate. Heart sounds: Normal heart sounds. No friction rub. No gallop. Pulmonary:      Effort: Pulmonary effort is normal.      Breath sounds: Normal breath sounds. No wheezing. Abdominal:      General: Bowel sounds are normal.      Palpations: Abdomen is soft. There is no mass. Tenderness: There is no abdominal tenderness. Musculoskeletal:         General: Normal range of motion. Cervical back: Normal range of motion and neck supple. Lymphadenopathy:      Cervical: No cervical adenopathy. Skin:     General: Skin is warm and dry. Neurological:      Mental Status: He is alert and oriented to person, place, and time. Laboratory data:   I have independently reviewed the followinglabs:  CBC with Differential:   Recent Labs     01/21/22  0552 01/22/22  0526   WBC 1.8* 2.1*   HGB 16.4 17.7*   HCT 54.0* 57.7*   PLT 71* 75*   LYMPHOPCT 30 21*   MONOPCT 18* 11     BMP:   Recent Labs     01/21/22  0552 01/22/22  0526    139   K 3.4* 3.9   CL 98 102   CO2 32* 25   BUN 24* 23   CREATININE 1.28* 0.97   MG 1.7  --      Hepatic Function Panel:   No results for input(s): PROT, LABALBU, BILIDIR, IBILI, BILITOT, ALKPHOS, ALT, AST in the last 72 hours.       Lab Results   Component Value Date    PROCAL 0.11 01/19/2022    PROCAL 0.11 01/16/2022     Lab Results   Component Value Date    CRP 23.5 01/16/2022 [[id number]]    Order Status: Sent Specimen: Blood    Culture, Blood 1 [[id number]] Collected: 01/16/22 1205   Order Status: Completed Specimen: Blood Updated: 01/20/22 1516    Specimen Description . BLOOD    Special Requests RAC 12ML    Culture NO GROWTH 4 DAYS   Culture, Blood 1 [[id number]] Collected: 01/16/22 1220   Order Status: Completed Specimen: Blood Updated: 01/20/22 1514    Specimen Description . BLOOD    Special Requests LAC 12ML    Culture NO GROWTH 4 DAYS   Culture, Urine [[id number]] Collected: 01/16/22 1315   Order Status: Completed Specimen: Urine, clean catch Updated: 01/17/22 2128    Specimen Description . CLEAN CATCH URINE    Special Requests NOT REPORTED    Culture NO SIGNIFICANT GROWTH       COVID-19, Rapid [[id number]] (Abnormal) Collected: 01/16/22 1230   Order Status: Completed Specimen: Nasopharyngeal Swab Updated: 01/16/22 1314    Specimen Description . NASOPHARYNGEAL SWAB    SARS-CoV-2, Rapid DETECTED Abnormal     Comment:        Rapid NAAT: The specimen is POSITIVE for SARS-Cov-2, the novel coronavirus associated with   COVID-19.         This test has been authorized by the FDA under an Emergency Use Authorization (EUA) for use   by authorized laboratories.         The ID NOW COVID-19 assay is designed to detect the virus that causes COVID-19 in patients   with signs and symptoms of infection who are suspected of COVID-19. An individual without symptoms of COVID-19 and who is not shedding SARS-CoV-2 virus would   expect to have a negative (not detected) result in this assay.    Fact sheet for Healthcare Providers: Cee   Fact sheet for Patients: Cee           Methodology: Isothermal Nucleic Acid Amplification         Results reported to the appropriate Health Department         Flu A/B Ag Detection [[id number]] Collected: 01/16/22 1230   Order Status: Completed Specimen: Nasopharyngeal Swab Updated: 01/16/22 1313 Specimen Description . NASOPHARYNGEAL SWAB    Special Requests NOT REPORTED    Direct Exam NEGATIVE for Influenza A + B antigens.                                PCR testing to confirm this result is available upon request. Zheng Herman will be saved in the laboratory for 7 days.  Please call [phone] if PCR testing is indicated. Medications:      enoxaparin  30 mg SubCUTAneous BID    QUEtiapine  50 mg Oral Once    ALPRAZolam  0.5 mg Oral TID    ipratropium-albuterol  1 ampule Inhalation 4x daily    budesonide  0.5 mg Nebulization BID    metoprolol succinate  50 mg Oral Nightly    dexamethasone  6 mg IntraVENous Q24H    sodium chloride flush  5-40 mL IntraVENous 2 times per day    levETIRAcetam  500 mg Oral BID    aspirin  325 mg Oral Daily    amLODIPine  5 mg Oral Nightly    atorvastatin  20 mg Oral Daily    insulin glargine  40 Units SubCUTAneous BID    insulin lispro  0-18 Units SubCUTAneous TID WC    insulin lispro  0-9 Units SubCUTAneous Nightly           [facility]  Saba Eldridge MD  [employer] messaging  OFFICE: [phone]      Electronically signed by Saba Eldridge MD on 1/22/2022 at 12:02 PM  Thank you for allowing us to participate in the care of this patient. Please call with questions. This note iscreated with the assistance of a speech recognition program.  While intending to generate a document that actually reflects the content of the visit, the document can still have some errors including those of syntax andsound a like substitutions which may escape proof reading. In such instances, actual meaning can be extrapolated by contextual diversion.

## 2022-01-22 NOTE — PLAN OF CARE
Problem: Gas Exchange - Impaired  Goal: Promote optimal lung function  Outcome: Ongoing     Problem: Breathing Pattern - Ineffective  Goal: Ability to achieve and maintain a regular respiratory rate  Outcome: Ongoing     Problem: Nutrition Deficits  Goal: Optimize nutritional status  Outcome: Ongoing

## 2022-01-22 NOTE — PROGRESS NOTES
Crit care MD Dav Cardoso called back, ordered to increase precedex directly to 1.4, and add an order for 1mg ativan q4 prn, with first dose okay to give right now.

## 2022-01-22 NOTE — PROGRESS NOTES
Pt informed writer that he wishes to die, does not want to be intubated, and he wants to be taken off bipap to start the dying process. Pt initially did not want writer to contact family whatsoever, believing they will not allow him to withdraw care, but acquiesced after an explanation of his legally-impaired status due to IV precedex. Writer called the wife to inform her of the pt stated wishes, heard from same that she intends to come in to speak with the pt in person. Daughter called right after for a different matter, writer updated daughter on the ongoing matter. Both intend to come by within the next hour. Updated Dr Jenny Alva on situation.

## 2022-01-22 NOTE — PROGRESS NOTES
Pt has experienced 3 panic attacks this evening, first two before administration of ativan and increased precedex, this third panic resulted in the pt tearing their gown, BP cuff, and bipap mask off, with a desat to 50s%. Mask reapplied, pt calmed down, denies any particular instigating thought or event for the panic, resumed eye-closed and quiet affect. Pt does not seem to be fully oriented at this time, confused to situation, though not new to pt, known hx of evening confusion. Pt and family confirmed prior that pt has intermittent evening situational confusion, typically starting in the late afternoon, with a return to a&ox4 the following morning.

## 2022-01-22 NOTE — CARE COORDINATION
Social work: [facility] called to get updates. Advised of pt not being currently ready for rehab. He will continue to follow. Will need rachna,  and discharge order at discharge.  Jf tenorio

## 2022-01-22 NOTE — PROGRESS NOTES
Physical Therapy  DATE: 2022    NAME: Odette Freeman  MRN: [id number]   : 1945    Patient not seen this date for Physical Therapy due to:      [x] Cancel by RN or physician due to:    [] Hemodialysis    [] Critical Lab Value Level     [] Blood transfusion in progress    [] Acute or unstable cardiovascular status   _MAP < 55 or more than >115  _HR < 40 or > 130    [x] Acute or unstable pulmonary status   -FiO2 > 60%   _RR < 5 or >40    _O2 sats < 85%    [] Strict Bedrest    [] Off Unit for surgery or procedure    [] Off Unit for testing       [] Pending imaging to R/O fracture    [] Refusal by Patient      [] Other      [] PT being discontinued at this time. Patient independent. No further needs. [] PT being discontinued at this time as the patient has been transferred to hospice care. No further needs.       Shantelle Murphy, PTA

## 2022-01-22 NOTE — PROGRESS NOTES
Pulmonary Critical Care Progress Note       Patient seen for the follow up of COVID-19 pneumonia, acute hypoxic respiratory failure. Subjective:   He is on bipap 20/12 100% FI02 . Examination:  Vitals: BP (!) 170/81   Pulse 71   Temp 97.7 °F (36.5 °C) (Axillary)   Resp 28   Ht 5' 10.98\" (1.803 m)   Wt 228 lb (103.4 kg)   SpO2 91%   BMI 31.82 kg/m²   General appearance:  alert and cooperative with exam  Lungs: poor to fair AE, diminished in bases, 91-94% 02 sat  Heart: regular rate and rhythm, S1, S2 normal, no gallop  Abdomen: Soft, non tender, + BS  Extremities: no cyanosis or clubbing.  No significant edema    LABs:  CBC:   Recent Labs     01/21/22  0552 01/22/22  0526   WBC 1.8* 2.1*   HGB 16.4 17.7*   HCT 54.0* 57.7*   PLT 71* 75*     BMP:   Recent Labs     01/21/22  0552 01/22/22  0526    139   K 3.4* 3.9   CO2 32* 25   BUN 24* 23   CREATININE 1.28* 0.97   LABGLOM 54* >60   GLUCOSE 52* 205*     ABG:  Lab Results   Component Value Date    JSD3QRK 21 12/07/2018    FIO2 100.0 01/18/2022       Lab Results   Component Value Date    POCPH 7.37 12/07/2018    POCPCO2 34 12/07/2018    POCPO2 55 12/07/2018    POCHCO3 19.8 12/07/2018    PBEA NOT REPORTED 12/07/2018    WQJ8RQL 21 12/07/2018    AKNB3UPQ 88 12/07/2018    FIO2 100.0 01/18/2022     Radiology:  X-ray chest 1/21/2022      Impression:  · Acute on chronic hypoxic respiratory failure  · COVID-19 pneumonia  · COPD/pulmonary emphysema  · Acute left thalamic infarct/CVA  · Left lower lobe opacity versus nodule  · Pulmonary edema  · Elevated D-dimer, decreased platelets  · Systolic heart failure, s/p AICD placement  · BLANCHE on CKD  · Diabetes mellitus    Recommendations:  · High flow oxygen by nasal cannula, wean FiO2 as tolerated, watch for need for intubation  · BiPAP while asleep and as needed  · Pulmicort aerosol treatment  · Airborne isolation  · Patient encouraged for prone positioning  · IV Decadron 6 mg daily  · Not a candidate for Actemra/baricitinib stopped secondary to cytopenias  · Neurology following  · Albuterol and Ipratropium Q 4 hours and prn  · DVT prophylaxis with low molecular weight heparin  · CXR in am 1-23-22    Plan of care discussed with Dr Kate Diego, CNP

## 2022-01-22 NOTE — PROGRESS NOTES
[facility]    Adult Hospitalist      Name: Fermin Coates  MRN: [id number]     Acct: [de-identified]  Room: [address] [place] [address]    Admit Date: 1/16/2022 11:51 AM  PCP: Janey Layne MD    Primary Problem  Active Problems:    COVID  Resolved Problems:    * No resolved hospital problems. *        Assesment:   Acute congestive heart failure, diastolic   [id number]   Viral pneumonia secondary to above  Acute respiratory failure with hypoxia   Essential hypertension  Mixed hyperlipidemia  Diabetes mellitus type 2  History of seizure disorder  CKD stage III  Lung mass? Leukopenia  Polycythemia  Thrombocytopenia  Anxiety disorder  Recent past h/o lacunar infarct left thalamus   Altered mental status/agitation      Plan:   Admit patient to intermediate floor  Oxygen to keep SPO2 more than 90%  Telemetry  Check vitals closely  CBC BMP daily    Trend troponin  Trend BMP  Echocardiogram  IV Lasix  Cardiology consult    IV Decadron  IV azithromycin  Bronchodilators  Pulmicort  Infectious disease consult  Pulmonology consult    Continue Keppra  Continue amlodipine atorvastatin  Continue aspirin  Xanax as needed  Lantus  seroquel prn   Precedex gtt prn for agitation   Continue other medication as below.     Scheduled Meds:   QUEtiapine  50 mg Oral Once    ALPRAZolam  0.5 mg Oral TID    ipratropium-albuterol  1 ampule Inhalation 4x daily    budesonide  0.5 mg Nebulization BID    metoprolol succinate  50 mg Oral Nightly    dexamethasone  6 mg IntraVENous Q24H    sodium chloride flush  5-40 mL IntraVENous 2 times per day    enoxaparin  40 mg SubCUTAneous Daily    levETIRAcetam  500 mg Oral BID    aspirin  325 mg Oral Daily    amLODIPine  5 mg Oral Nightly    atorvastatin  20 mg Oral Daily    insulin glargine  40 Units SubCUTAneous BID    insulin lispro  0-18 Units SubCUTAneous TID     insulin lispro  0-9 Units SubCUTAneous Nightly     Continuous Infusions:   dexmedetomidine (PRECEDEX) IV infusion 0.5 mcg/kg/hr (01/21/22 1730)    sodium chloride      dextrose       PRN Meds:  dextrose bolus (hypoglycemia), 125 mL, PRN   Or  dextrose bolus (hypoglycemia), 250 mL, PRN  sodium chloride flush, 10 mL, PRN  sodium chloride, 25 mL, PRN  potassium chloride, 40 mEq, PRN   Or  potassium alternative oral replacement, 40 mEq, PRN   Or  potassium chloride, 10 mEq, PRN  magnesium sulfate, 1,000 mg, PRN  ondansetron, 4 mg, Q8H PRN   Or  ondansetron, 4 mg, Q6H PRN  magnesium hydroxide, 30 mL, Daily PRN  acetaminophen, 650 mg, Q6H PRN   Or  acetaminophen, 650 mg, Q6H PRN  glucose, 15 g, PRN  glucagon (rDNA), 1 mg, PRN  dextrose, 100 mL/hr, PRN  hydrALAZINE, 10 mg, Q6H PRN        Chief Complaint:     Chief Complaint   Patient presents with    Leg Swelling     bilateral, has CHF, on diuretic, EMT saw pt , assisted pt into car    Fever    Other     low SPO2         History of Present Illness:        Pt was confused   Denies any wheezing  Denies chest tightness or orthopnea  Sitting up in chair  Says pedal edema has improved  Eating better  Presently on high flow oxygen    Denies chest pain, orthopnea, nausea or vomiting  Denies headache, photophobia, diplopia or neck pain  Denies nausea, vomiting or abdominal pain  Denies diarrhea or constipation  Denies back pain or joint swelling      Initial HPI  Floy Prom is a 64 y.o.  male who presents with Leg Swelling (bilateral, has CHF, on diuretic, EMT saw pt , assisted pt into car), Fever, and Other (low SPO2)  This is a 66-year-old gentleman admitted via ER, come to ER with complaint of having shortness of breath, patient has medical history significant for COPD patient with history of cardiac failure: Patient noted that he has been having increasing swelling in his lower extremity and becoming more short of breath, further patient also been having some body aches and sweats, patient patient testing in the emergency room showed that he is positive for COVID-19 also noticed to have an elevated BNP, imaging concerning for fluid overload, admitted for further regiment    I have personally reviewed the past medical history, past surgical history, medications, social history, and family history, and summarized in the note. Review of Systems:     All 10 point system is reviewed and negative otherwise mentioned in HPI. Past Medical History:     Past Medical History:   Diagnosis Date    Arthritis     Atherosclerotic plaque 7/28/2019    Cervical disc disease     degenerative disc disease    Diabetes mellitus ([facility]     type 2    History of blood transfusion     Hx of blood clots     left leg    Hyperlipidemia     Neuropathy     Right kidney mass     \"urologist is watching\"    Snores     Type 2 diabetes mellitus treated with insulin ([facility] 7/28/2019        Past Surgical History:     Past Surgical History:   Procedure Laterality Date    ABDOMINAL AORTIC ANEURYSM REPAIR  2005    CARPAL TUNNEL RELEASE Bilateral     CERVICAL SPINE SURGERY      COLONOSCOPY      benign polyps removed    INGUINAL HERNIA REPAIR Right     [place] REPAIR INCISIONAL HERNIA,REDUCIBLE N/A 5/10/2017    HERNIA VENTRAL REPAIR WITH MESH  performed by Loretta Santana DO at [address] [place] [address] Road  05/10/2017    with mesh        Medications Prior to Admission:       Prior to Admission medications    Medication Sig Start Date End Date Taking?  Authorizing Provider   rosuvastatin (CRESTOR) 40 MG tablet Take 40 mg by mouth every evening   Yes Historical Provider, MD   insulin NPH (HUMULIN N;NOVOLIN N) 100 UNIT/ML injection vial Inject 20 Units into the skin 2 times daily (before meals)   Yes Historical Provider, MD   levETIRAcetam (KEPPRA) 500 MG tablet Take 1 tablet by mouth 2 times daily 11/3/21  Yes Kathy Lala MD   venlafaxine (EFFEXOR XR) 150 MG extended release capsule Take 1 capsule by mouth daily (with breakfast) 7/29/19  Yes [address]   vitamin B-1 (THIAMINE) 100 MG tablet Take 100 mg by mouth daily   Yes Historical Provider, MD   ferrous sulfate 325 (65 Fe) MG tablet Take 325 mg by mouth daily (with breakfast)   Yes Historical Provider, MD   ALPRAZolam (XANAX) 0.5 MG tablet Take 0.5 mg by mouth three times daily. Yes Historical Provider, MD   furosemide (LASIX) 40 MG tablet Take 1 tablet by mouth 2 times daily 12/11/18  Yes Monica Evans MD   tiotropium (SPIRIVA RESPIMAT) 2.5 MCG/ACT AERS inhaler Inhale 2 puffs into the lungs daily    Yes Historical Provider, MD   albuterol sulfate  (90 Base) MCG/ACT inhaler Inhale 2 puffs into the lungs every 6 hours as needed for Wheezing or Shortness of Breath   Yes Historical Provider, MD   metoprolol succinate (TOPROL XL) 50 MG extended release tablet Take 50 mg by mouth daily   Yes Historical Provider, MD   Multiple Vitamins-Minerals (MULTIVITAMIN ADULT) TABS Take 1 tablet by mouth daily   Yes Historical Provider, MD   vitamin D (CHOLECALCIFEROL) 1000 UNIT TABS tablet Take 1,000 Units by mouth daily   Yes Historical Provider, MD   fluticasone (FLONASE) 50 MCG/ACT nasal spray 1 spray by Each Nare route daily as needed for Rhinitis   Yes Historical Provider, MD   aspirin 325 MG EC tablet Take 325 mg by mouth daily    Yes Historical Provider, MD   Omega-3 Fatty Acids (FISH OIL) 1000 MG CAPS Take 1 capsule by mouth daily    Yes Historical Provider, MD   amLODIPine (NORVASC) 5 MG tablet Take 5 mg by mouth nightly    Yes Historical Provider, MD   mirtazapine (REMERON) 45 MG tablet Take 45 mg by mouth nightly   Yes Historical Provider, MD        Allergies: Barbiturates, Codeine, and Sulfa antibiotics    Social History:     Tobacco:    reports that he has quit smoking. He has never used smokeless tobacco.  Alcohol:      reports no history of alcohol use. Drug Use:  reports no history of drug use.     Family History:     Family History   Problem Relation Age of Onset    Ovarian Cancer Sister     Ovarian Cancer Sister          Physical Exam:     Vitals:  BP (!) 154/75   Pulse 64   Temp 98.3 °F (36.8 °C) (Oral)   Resp 26   Ht 5' 10.98\" (1.803 m)   Wt 228 lb (103.4 kg)   SpO2 91%   BMI 31.82 kg/m²   Temp (24hrs), Av.2 °F (36.8 °C), Min:97.8 °F (36.6 °C), Max:98.7 °F (37.1 °C)      General appearance - alert, well appearing, and in no acute distress  Mental status -confusion  Head - normocephalic and atraumatic  Eyes - pupils equal and reactive, extraocular eye movements intact, conjunctiva clear  Ears - hearing appears to be intact  Nose - no drainage noted  Mouth - mucous membranes moist  Neck - supple, no carotid bruits, thyroid not palpable  Chest -sporadic coarse crackles to auscultation, normal effort  Heart - normal rate, regular rhythm, no murmur  Abdomen - soft, nontender, nondistended, bowel sounds present all four quadrants, no masses, hepatomegaly or splenomegaly  Neurological - normal speech, no focal findings or movement disorder noted, cranial nerves II through XII grossly intact  Extremities - peripheral pulses palpable, 2+ edema  Skin - no gross lesions, rashes, or induration noted        Data:     Labs:    Hematology:  Recent Labs     22  0552   WBC 1.1* 1.7* 1.8*   RBC 6.46* 6.46* 6.58*   HGB 16.3 16.5 16.4   HCT 53.0* 53.7* 54.0*   MCV 82.0* 83.1 82.1*   MCH 25.2 25.5 24.9*   MCHC 30.8 30.7 30.4   RDW 16.0* 16.0* 15.9*   PLT 70* 73* 71*   MPV 11.7 12.7 12.0     Chemistry:  Recent Labs     22  0552    139 140   K 3.1* 3.6* 3.4*   CL 99 96* 98   CO2 28 32* 32*   GLUCOSE 93 153* 52*   BUN 32* 28* 24*   CREATININE 1.30* 1.60* 1.28*   MG 1.7  --  1.7   ANIONGAP 14 11 10   LABGLOM 53* 42* 54*   GFRAA >60 51* >60   CALCIUM 8.8 8.5* 8.8   PROBNP 4,221*  --   --      Recent Labs     22  0827 22  0941 22  1154 22  1732 22  1820 22  1946   POCGLU 62* 88 91 51* 84 148*       Lab Results Component Value Date    INR 1.0 01/17/2022    INR 1.0 11/03/2021    INR 1.0 07/27/2019    PROTIME 13.3 01/17/2022    PROTIME 11.1 11/03/2021    PROTIME 10.5 07/27/2019       Lab Results   Component Value Date/Time    SPECIAL LEFT HAND 5ML 01/21/2022 07:50 AM     Lab Results   Component Value Date/Time    CULTURE NO GROWTH <24 HRS 01/21/2022 07:50 AM       Lab Results   Component Value Date    POCPH 7.37 12/07/2018    POCPCO2 34 12/07/2018    POCPO2 55 12/07/2018    POCHCO3 19.8 12/07/2018    NBEA 5 12/07/2018    PBEA NOT REPORTED 12/07/2018    EVB3AHK 21 12/07/2018    YYWG9ASU 88 12/07/2018    FIO2 100.0 01/18/2022       Radiology:    XR CHEST 1 VIEW    Result Date: 1/16/2022  Hypoinflated lungs. Cardiomegaly again seen, when compared to the previous study performed 07/27/2019. Diffuse, increased interstitial markings throughout both lungs, some of which can be seen on the prior study may represent baseline chronic interstitial lung changes. The increased prominence on this exam could be secondary to the suboptimal inspiratory effort. The presence of pulmonary vascular congestion/mild CHF or bilateral interstitial pneumonia cannot be excluded. Clinical correlation is recommended. CT CHEST PULMONARY EMBOLISM W CONTRAST    Result Date: 1/16/2022  No evidence of pulmonary embolism. Compared to 2008, worsening underlying emphysema, with worsening subacute or acute interstitial lung disease, best seen left upper lobe; differential includes interstitial pneumonia or pneumonitis superimposed on emphysema, DIP, HP, and other interstitial pneumonias as well as infectious or inflammatory process superimposed on emphysema disease. Findings are not typical for COVID-19 pneumonia, but an element of the latter should be considered. Thickening and distortion left major fissure with ill-defined mass-like focus left lower lobe. The latter could also be infectious or inflammatory, although neoplasm should be excluded. Underlying worsening emphysema. Nodular densities, best seen right upper lobe. Small pleural effusions, slightly larger on the left. See recommendations below. Mild mediastinal and left hilar adenopathy; see recommendations below. Worsening now moderate-severe pulmonary artery hypertension. Mild cardiomegaly. Stable mild dilatation ascending thoracic aorta. Additional unchanged or incidental findings, as above. RECOMMENDATIONS: Clinical correlation and short-term follow-up CT chest in 1-4 months depending upon the clinical presentation/course. All radiological studies reviewed                Code Status:  Full Code    Electronically signed by Mihir Myers MD on 1/21/2022 at 8:47 PM     Copy sent to Dr. Yesica Dominguez MD    This note was created with the assistance of a speech-recognition program.  Although the intention is to generate a document that actually reflects the content of the visit, no guarantees can be provided that every mistake has been identified and corrected by editing. Note was updated later by me after  physical examination and  completion of the assessment.

## 2022-01-23 NOTE — PROGRESS NOTES
[id number] Pharmacokinetic Monitoring Service - Vancomycin     Coleman Giang is a 64 y.o. male starting on vancomycin therapy for sepsis of unknown origin (RN noted new fever of 103 up from 99.5, requiring more levophed, called Dr. Jersey Oliveira who ordered respiratory/sputum culture as well as cefepime and vanco)    Target Concentration: Goal AUC/PRIYA 400-600 mg*hr/L    Additional Antimicrobials: Cefepime    Pertinent Laboratory Values: Wt Readings from Last 1 Encounters:   01/17/22 228 lb (103.4 kg)     Temp Readings from Last 1 Encounters:   01/23/22 102.6 °F (39.2 °C) (Oral)     Estimated Creatinine Clearance: 72 mL/min (based on SCr of 1.04 mg/dL). Recent Labs     01/22/22  0526 01/23/22  0358   CREATININE 0.97 1.04   WBC 2.1* 3.6     Procalcitonin: 0.24    Pertinent Cultures:  Culture Date Source Results   1/16/22 blood No growth   MRSA Nasal Swab: was ordered by provider, awaiting results.     Plan:  Dosing recommendations based on Bayesian software  Start vancomycin 2500mg x 1 then 750mg every 12 hours  Anticipated AUC of 439 and trough concentration of 15.1 at steady state  Renal labs as indicated   Vancomycin concentration ordered for 1/24/22 @ 1300   Pharmacy will continue to monitor patient and adjust therapy as indicated    Thank you for the consult,  Ashanti Alegre, [facility]  1/23/2022 5:33 PM

## 2022-01-23 NOTE — PROGRESS NOTES
Merged with [facility].,    Adult Hospitalist      Name: William Krueger  MRN: [id number]     Acct: [de-identified]  Room: [address] [place] [address]    Admit Date: 1/16/2022 11:51 AM  PCP: Iam Florez MD    Primary Problem  Active Problems:    COVID  Resolved Problems:    * No resolved hospital problems. *        Assesment:   Acute congestive heart failure, diastolic   [id number]   Viral pneumonia secondary to above  Acute respiratory failure with hypoxia   Essential hypertension  Mixed hyperlipidemia  Diabetes mellitus type 2  History of seizure disorder  CKD stage III  Lung mass? Leukopenia  Polycythemia  Thrombocytopenia  Anxiety disorder  Recent past h/o lacunar infarct left thalamus   Altered mental status/agitation      Plan:   Admit patient to intermediate floor  Oxygen to keep SPO2 more than 90%  Telemetry  Check vitals closely  CBC BMP daily    Trend troponin  Trend BMP  Echocardiogram  IV Lasix  Cardiology consult    IV Decadron  IV azithromycin  Bronchodilators  Pulmicort  Infectious disease consult  Pulmonology consult    Continue Keppra  Continue amlodipine atorvastatin  Continue aspirin  Xanax as needed  Lantus  seroquel prn   Precedex gtt needed   Continue other medication as below.     Scheduled Meds:   enoxaparin  30 mg SubCUTAneous BID    QUEtiapine  50 mg Oral Once    ALPRAZolam  0.5 mg Oral TID    ipratropium-albuterol  1 ampule Inhalation 4x daily    budesonide  0.5 mg Nebulization BID    metoprolol succinate  50 mg Oral Nightly    dexamethasone  6 mg IntraVENous Q24H    sodium chloride flush  5-40 mL IntraVENous 2 times per day    levETIRAcetam  500 mg Oral BID    aspirin  325 mg Oral Daily    amLODIPine  5 mg Oral Nightly    atorvastatin  20 mg Oral Daily    insulin glargine  40 Units SubCUTAneous BID    insulin lispro  0-18 Units SubCUTAneous TID     insulin lispro  0-9 Units SubCUTAneous Nightly     Continuous Infusions:   dexmedetomidine HCl in NaCl 0.8 mcg/kg/hr (01/22/22 9613)    sodium chloride      dextrose       PRN Meds:  LORazepam, 1 mg, Q4H PRN  dextrose bolus (hypoglycemia), 125 mL, PRN   Or  dextrose bolus (hypoglycemia), 250 mL, PRN  sodium chloride flush, 10 mL, PRN  sodium chloride, 25 mL, PRN  potassium chloride, 40 mEq, PRN   Or  potassium alternative oral replacement, 40 mEq, PRN   Or  potassium chloride, 10 mEq, PRN  magnesium sulfate, 1,000 mg, PRN  ondansetron, 4 mg, Q8H PRN   Or  ondansetron, 4 mg, Q6H PRN  magnesium hydroxide, 30 mL, Daily PRN  acetaminophen, 650 mg, Q6H PRN   Or  acetaminophen, 650 mg, Q6H PRN  glucose, 15 g, PRN  glucagon (rDNA), 1 mg, PRN  dextrose, 100 mL/hr, PRN  hydrALAZINE, 10 mg, Q6H PRN        Chief Complaint:     Chief Complaint   Patient presents with    Leg Swelling     bilateral, has CHF, on diuretic, EMT saw pt , assisted pt into car    Fever    Other     low SPO2         History of Present Illness:      Pt seen and examined at bedside  D/w RN  Pt accidentally pulled off his BiPAP  O2 requirement has increased  Pt was confused earlier too  Denies any wheezing  Denies chest tightness or orthopnea    Denies chest pain, orthopnea, nausea or vomiting  Denies headache, photophobia, diplopia or neck pain  Denies nausea, vomiting or abdominal pain  Denies diarrhea or constipation  Denies back pain or joint swelling      Initial HPI  William Krueger is a 64 y.o.  male who presents with Leg Swelling (bilateral, has CHF, on diuretic, EMT saw pt , assisted pt into car), Fever, and Other (low SPO2)  This is a 66-year-old gentleman admitted via ER, come to ER with complaint of having shortness of breath, patient has medical history significant for COPD patient with history of cardiac failure: Patient noted that he has been having increasing swelling in his lower extremity and becoming more short of breath, further patient also been having some body aches and sweats, patient patient testing in the emergency room showed that he is positive for COVID-19 also noticed to have an elevated BNP, imaging concerning for fluid overload, admitted for further regiment    I have personally reviewed the past medical history, past surgical history, medications, social history, and family history, and summarized in the note. Review of Systems:     All 10 point system is reviewed and negative otherwise mentioned in HPI. Past Medical History:     Past Medical History:   Diagnosis Date    Arthritis     Atherosclerotic plaque 7/28/2019    Cervical disc disease     degenerative disc disease    Diabetes mellitus ([facility] [address]     type 2    History of blood transfusion     Hx of blood clots     left leg    Hyperlipidemia     Neuropathy     Right kidney mass     \"urologist is watching\"    Snores     Type 2 diabetes mellitus treated with insulin ([facility] [address] 7/28/2019        Past Surgical History:     Past Surgical History:   Procedure Laterality Date    ABDOMINAL AORTIC ANEURYSM REPAIR  2005    CARPAL TUNNEL RELEASE Bilateral     CERVICAL SPINE SURGERY      COLONOSCOPY      benign polyps removed    INGUINAL HERNIA REPAIR Right     NH REPAIR INCISIONAL HERNIA,REDUCIBLE N/A 5/10/2017    HERNIA VENTRAL REPAIR WITH MESH  performed by Purvi Delaney DO at [address] [place] [address] Road  05/10/2017    with mesh        Medications Prior to Admission:       Prior to Admission medications    Medication Sig Start Date End Date Taking?  Authorizing Provider   rosuvastatin (CRESTOR) 40 MG tablet Take 40 mg by mouth every evening   Yes Historical Provider, MD   insulin NPH (HUMULIN N;NOVOLIN N) 100 UNIT/ML injection vial Inject 20 Units into the skin 2 times daily (before meals)   Yes Historical Provider, MD   levETIRAcetam (KEPPRA) 500 MG tablet Take 1 tablet by mouth 2 times daily 11/3/21  Yes Nancy Batres MD   venlafaxine (EFFEXOR XR) 150 MG extended release capsule Take 1 capsule by mouth daily (with breakfast) 7/29/19  Yes Meredith Gonzalez Oceano   vitamin B-1 (THIAMINE) 100 MG tablet Take 100 mg by mouth daily   Yes Historical Provider, MD   ferrous sulfate 325 (65 Fe) MG tablet Take 325 mg by mouth daily (with breakfast)   Yes Historical Provider, MD   ALPRAZolam (XANAX) 0.5 MG tablet Take 0.5 mg by mouth three times daily. Yes Historical Provider, MD   furosemide (LASIX) 40 MG tablet Take 1 tablet by mouth 2 times daily 12/11/18  Yes Kasie Evans MD   tiotropium (SPIRIVA RESPIMAT) 2.5 MCG/ACT AERS inhaler Inhale 2 puffs into the lungs daily    Yes Historical Provider, MD   albuterol sulfate  (90 Base) MCG/ACT inhaler Inhale 2 puffs into the lungs every 6 hours as needed for Wheezing or Shortness of Breath   Yes Historical Provider, MD   metoprolol succinate (TOPROL XL) 50 MG extended release tablet Take 50 mg by mouth daily   Yes Historical Provider, MD   Multiple Vitamins-Minerals (MULTIVITAMIN ADULT) TABS Take 1 tablet by mouth daily   Yes Historical Provider, MD   vitamin D (CHOLECALCIFEROL) 1000 UNIT TABS tablet Take 1,000 Units by mouth daily   Yes Historical Provider, MD   fluticasone (FLONASE) 50 MCG/ACT nasal spray 1 spray by Each Nare route daily as needed for Rhinitis   Yes Historical Provider, MD   aspirin 325 MG EC tablet Take 325 mg by mouth daily    Yes Historical Provider, MD   Omega-3 Fatty Acids (FISH OIL) 1000 MG CAPS Take 1 capsule by mouth daily    Yes Historical Provider, MD   amLODIPine (NORVASC) 5 MG tablet Take 5 mg by mouth nightly    Yes Historical Provider, MD   mirtazapine (REMERON) 45 MG tablet Take 45 mg by mouth nightly   Yes Historical Provider, MD        Allergies: Barbiturates, Codeine, and Sulfa antibiotics    Social History:     Tobacco:    reports that he has quit smoking. He has never used smokeless tobacco.  Alcohol:      reports no history of alcohol use. Drug Use:  reports no history of drug use.     Family History:     Family History   Problem Relation Age of Onset    Ovarian Cancer Sister     Ovarian Cancer Sister          Physical Exam:     Vitals:  BP (!) 158/86   Pulse 77   Temp 97.7 °F (36.5 °C) (Axillary)   Resp 16   Ht 5' 10.98\" (1.803 m)   Wt 228 lb (103.4 kg)   SpO2 96%   BMI 31.82 kg/m²   Temp (24hrs), Av.6 °F (36.4 °C), Min:97.5 °F (36.4 °C), Max:97.7 °F (36.5 °C)      General appearance - alert, well appearing, and in no acute distress  Mental status -confusion  Head - normocephalic and atraumatic  Eyes - pupils equal and reactive, extraocular eye movements intact, conjunctiva clear  Ears - hearing appears to be intact  Nose - no drainage noted  Mouth - mucous membranes moist  Neck - supple, no carotid bruits, thyroid not palpable  Chest -sporadic coarse crackles to auscultation, normal effort  Heart - normal rate, regular rhythm, no murmur  Abdomen - soft, nontender, nondistended, bowel sounds present all four quadrants, no masses, hepatomegaly or splenomegaly  Neurological - normal speech, no focal findings or movement disorder noted, cranial nerves II through XII grossly intact  Extremities - peripheral pulses palpable, no edema, distal extremity discoloration   Skin - no gross lesions, rashes, or induration noted        Data:     Labs:    Hematology:  Recent Labs     22  0526   WBC 1.7* 1.8* 2.1*   RBC 6.46* 6.58* 7.03*   HGB 16.5 16.4 17.7*   HCT 53.7* 54.0* 57.7*   MCV 83.1 82.1* 82.1*   MCH 25.5 24.9* 25.2   MCHC 30.7 30.4 30.7   RDW 16.0* 15.9* 16.9*   PLT 73* 71* 75*   MPV 12.7 12.0 9.9     Chemistry:  Recent Labs     22  0552 22  0526    140 139   K 3.6* 3.4* 3.9   CL 96* 98 102   CO2 32* 32* 25   GLUCOSE 153* 52* 205*   BUN 28* 24* 23   CREATININE 1.60* 1.28* 0.97   MG  --  1.7  --    ANIONGAP 11 10 12   LABGLOM 42* 54* >60   GFRAA 51* >60 >60   CALCIUM 8.5* 8.8 8.6     Recent Labs     22  2315 22  0423 22  0722 22  1138 22  1623 22   POCGLU 187* 214* although neoplasm should be excluded. Underlying worsening emphysema. Nodular densities, best seen right upper lobe. Small pleural effusions, slightly larger on the left. See recommendations below. Mild mediastinal and left hilar adenopathy; see recommendations below. Worsening now moderate-severe pulmonary artery hypertension. Mild cardiomegaly. Stable mild dilatation ascending thoracic aorta. Additional unchanged or incidental findings, as above. RECOMMENDATIONS: Clinical correlation and short-term follow-up CT chest in 1-4 months depending upon the clinical presentation/course. All radiological studies reviewed                Code Status:  Full Code    Electronically signed by Darren Chan MD on 1/22/2022 at 9:25 PM     Copy sent to Dr. Kaveh Cano MD    This note was created with the assistance of a speech-recognition program.  Although the intention is to generate a document that actually reflects the content of the visit, no guarantees can be provided that every mistake has been identified and corrected by editing. Note was updated later by me after  physical examination and  completion of the assessment.

## 2022-01-23 NOTE — PROGRESS NOTES
[facility].,    Adult Hospitalist      Name: Gina Bansal  MRN: [id number]     Acct: [[id number]]  Room: [facility]    Admit Date: 1/16/2022 11:51 AM  PCP: Felisa Davison MD    Primary Problem  Active Problems:    COVID  Resolved Problems:    * No resolved hospital problems. *        Assesment:   Acute congestive heart failure, diastolic   [id number]   Viral pneumonia secondary to above  Acute respiratory failure with hypoxia   Essential hypertension  Mixed hyperlipidemia  Diabetes mellitus type 2  History of seizure disorder  History of CKD stage III, presently normal renal function  Lung mass? Leukopenia  Polycythemia  Thrombocytopenia  Anxiety disorder  Recent past h/o lacunar infarct left thalamus   Altered mental status/agitation  Endotracheal intubation secondary to hypoxia dated 1/23/2022  Supraventricular tachycardia/elevated troponin  Fever      Plan:   Admit patient to intermediate floor  Oxygen to keep SPO2 more than 90%  Telemetry  Check vitals closely  CBC BMP daily    Trend troponin  Trend BMP  Echocardiogram  IV Lasix  Cardiology consult  Levophed infusion    IV Decadron  IV azithromycin-DC  Bronchodilators  Pulmicort  Infectious disease consult  Pulmonology consult  Vanco/cefepime per ID    Continue Keppra  Continue amlodipine, atorvastatin  Continue aspirin  Xanax as needed  Lantus  seroquel prn   Precedex gtt needed   Continue other medication as below.     Scheduled Meds:   chlorhexidine  15 mL Mouth/Throat BID    enoxaparin  30 mg SubCUTAneous BID    QUEtiapine  50 mg Oral Once    ALPRAZolam  0.5 mg Oral TID    ipratropium-albuterol  1 ampule Inhalation 4x daily    budesonide  0.5 mg Nebulization BID    metoprolol succinate  50 mg Oral Nightly    dexamethasone  6 mg IntraVENous Q24H    sodium chloride flush  5-40 mL IntraVENous 2 times per day    levETIRAcetam  500 mg Oral BID    aspirin  325 mg Oral Daily    amLODIPine  5 mg Oral Nightly    atorvastatin  20 mg Oral Daily    insulin glargine  40 Units SubCUTAneous BID    insulin lispro  0-18 Units SubCUTAneous TID WC    insulin lispro  0-9 Units SubCUTAneous Nightly     Continuous Infusions:   propofol 50 mcg/kg/min (01/23/22 1129)    midazolam (VERSED) 1 mg/mL in D5W infusion 6 mg/hr (01/23/22 0957)    fentaNYL 75 mcg/hr (01/23/22 0825)    norepinephrine 3 mcg/min (01/23/22 1219)    dexmedetomidine (PRECEDEX) IV infusion 1.4 mcg/kg/hr (01/23/22 0600)    sodium chloride      dextrose       PRN Meds:  LORazepam, 1 mg, Q4H PRN  dextrose bolus (hypoglycemia), 125 mL, PRN   Or  dextrose bolus (hypoglycemia), 250 mL, PRN  sodium chloride flush, 10 mL, PRN  sodium chloride, 25 mL, PRN  potassium chloride, 40 mEq, PRN   Or  potassium alternative oral replacement, 40 mEq, PRN   Or  potassium chloride, 10 mEq, PRN  magnesium sulfate, 1,000 mg, PRN  ondansetron, 4 mg, Q8H PRN   Or  ondansetron, 4 mg, Q6H PRN  magnesium hydroxide, 30 mL, Daily PRN  acetaminophen, 650 mg, Q6H PRN   Or  acetaminophen, 650 mg, Q6H PRN  glucose, 15 g, PRN  glucagon (rDNA), 1 mg, PRN  dextrose, 100 mL/hr, PRN  hydrALAZINE, 10 mg, Q6H PRN        Chief Complaint:     Chief Complaint   Patient presents with    Leg Swelling     bilateral, has CHF, on diuretic, EMT saw pt , assisted pt into car    Fever    Other     low SPO2         History of Present Illness:      Pt seen and examined at bedside  Patient continues to have panic attacks  He was having hypoxic spells  He agreed to get intubated earlier today    Had 2 runs of SVT  Cardiac enzymes ordered  Cardiology consulted    Fever quickly spiked later in the day  Blood cultures x2, 20 minutes apart  Antibiotics per ID  D/w RN    Unable to conduct ROS secondary to patient being intubated      Initial HPI  Aaron Lockwood is a 64 y.o.  male who presents with Leg Swelling (bilateral, has CHF, on diuretic, EMT saw pt , assisted pt into car), Fever, and Other (low SPO2)  This is a 77-year-old gentleman admitted via ER, come to ER with complaint of having shortness of breath, patient has medical history significant for COPD patient with history of cardiac failure: Patient noted that he has been having increasing swelling in his lower extremity and becoming more short of breath, further patient also been having some body aches and sweats, patient patient testing in the emergency room showed that he is positive for COVID-19 also noticed to have an elevated BNP, imaging concerning for fluid overload, admitted for further regiment    I have personally reviewed the past medical history, past surgical history, medications, social history, and family history, and summarized in the note. Review of Systems:       Unable to conduct ROS secondary to patient being intubated      Past Medical History:     Past Medical History:   Diagnosis Date    Arthritis     Atherosclerotic plaque 7/28/2019    Cervical disc disease     degenerative disc disease    Diabetes mellitus ([address]     type 2    History of blood transfusion     Hx of blood clots     left leg    Hyperlipidemia     Neuropathy     Right kidney mass     \"urologist is watching\"    Snores     Type 2 diabetes mellitus treated with insulin ([address] 7/28/2019        Past Surgical History:     Past Surgical History:   Procedure Laterality Date    ABDOMINAL AORTIC ANEURYSM REPAIR  2005    CARPAL TUNNEL RELEASE Bilateral     CERVICAL SPINE SURGERY      COLONOSCOPY      benign polyps removed    INGUINAL HERNIA REPAIR Right     [place] REPAIR INCISIONAL HERNIA,REDUCIBLE N/A 5/10/2017    HERNIA VENTRAL REPAIR WITH MESH  performed by Asmita Shah DO at [address] [place] [address] Road  05/10/2017    with mesh        Medications Prior to Admission:       Prior to Admission medications    Medication Sig Start Date End Date Taking?  Authorizing Provider   rosuvastatin (CRESTOR) 40 MG tablet Take 40 mg by mouth every evening   Yes Historical Provider, MD   insulin NPH (HUMULIN N;NOVOLIN N) 100 UNIT/ML injection vial Inject 20 Units into the skin 2 times daily (before meals)   Yes Historical Provider, MD   levETIRAcetam (KEPPRA) 500 MG tablet Take 1 tablet by mouth 2 times daily 11/3/21  Yes Uzair Meyer MD   venlafaxine (EFFEXOR XR) 150 MG extended release capsule Take 1 capsule by mouth daily (with breakfast) 7/29/19  Yes Arabella DALTON Kiser   vitamin B-1 (THIAMINE) 100 MG tablet Take 100 mg by mouth daily   Yes Historical Provider, MD   ferrous sulfate 325 (65 Fe) MG tablet Take 325 mg by mouth daily (with breakfast)   Yes Historical Provider, MD   ALPRAZolam (XANAX) 0.5 MG tablet Take 0.5 mg by mouth three times daily.    Yes Historical Provider, MD   furosemide (LASIX) 40 MG tablet Take 1 tablet by mouth 2 times daily 12/11/18  Yes Shaheed Evans MD   tiotropium (SPIRIVA RESPIMAT) 2.5 MCG/ACT AERS inhaler Inhale 2 puffs into the lungs daily    Yes Historical Provider, MD   albuterol sulfate  (90 Base) MCG/ACT inhaler Inhale 2 puffs into the lungs every 6 hours as needed for Wheezing or Shortness of Breath   Yes Historical Provider, MD   metoprolol succinate (TOPROL XL) 50 MG extended release tablet Take 50 mg by mouth daily   Yes Historical Provider, MD   Multiple Vitamins-Minerals (MULTIVITAMIN ADULT) TABS Take 1 tablet by mouth daily   Yes Historical Provider, MD   vitamin D (CHOLECALCIFEROL) 1000 UNIT TABS tablet Take 1,000 Units by mouth daily   Yes Historical Provider, MD   fluticasone (FLONASE) 50 MCG/ACT nasal spray 1 spray by Each Nare route daily as needed for Rhinitis   Yes Historical Provider, MD   aspirin 325 MG EC tablet Take 325 mg by mouth daily    Yes Historical Provider, MD   Omega-3 Fatty Acids (FISH OIL) 1000 MG CAPS Take 1 capsule by mouth daily    Yes Historical Provider, MD   amLODIPine (NORVASC) 5 MG tablet Take 5 mg by mouth nightly    Yes Historical Provider, MD   mirtazapine (REMERON) 45 MG tablet Take 45 mg by mouth nightly   Yes Historical Provider, MD        Allergies: Barbiturates, Codeine, and Sulfa antibiotics    Social History:     Tobacco:    reports that he has quit smoking. He has never used smokeless tobacco.  Alcohol:      reports no history of alcohol use. Drug Use:  reports no history of drug use.     Family History:     Family History   Problem Relation Age of Onset    Ovarian Cancer Sister     Ovarian Cancer Sister          Physical Exam:     Vitals:  /68   Pulse 111   Temp 102.6 °F (39.2 °C) (Oral)   Resp 21   Ht 5' 10.98\" (1.803 m)   Wt 228 lb (103.4 kg)   SpO2 96%   BMI 31.82 kg/m²   Temp (24hrs), Av.3 °F (37.9 °C), Min:98.3 °F (36.8 °C), Max:103 °F (39.4 °C)      General appearance -on ventilator  Mental status -sedated  Head - normocephalic and atraumatic  Eyes - conjunctiva clear  Ears -external ears normal  Nose - no drainage noted  Mouth - mucous membranes moist  Neck - supple, no carotid bruits, thyroid not palpable  Chest -sporadic coarse crackles to auscultation, increased effort  Heart - normal rate, regular rhythm, no murmur  Abdomen - soft, nontender, nondistended, bowel sounds present all four quadrants, no masses, hepatomegaly or splenomegaly  Neurological -sedated on vent  Extremities - no edema, distal extremity discoloration    Skin - no gross lesions, rashes, or induration noted        Data:     Labs:    Hematology:  Recent Labs     22  0552 22  0526 22  0358 22  1118   WBC 1.8* 2.1* 3.6  --    RBC 6.58* 7.03* 7.24*  --    HGB 16.4 17.7* 18.2*  --    HCT 54.0* 57.7* 59.3*  --    MCV 82.1* 82.1* 81.9*  --    MCH 24.9* 25.2 25.1*  --    MCHC 30.4 30.7 30.7  --    RDW 15.9* 16.9* 17.0*  --    PLT 71* 75* 95*  --    MPV 12.0 9.9 Abnormal  --    DDIMER  --   --   --  5.84*     Chemistry:  Recent Labs     22  0552 22  0526 22  0358 22  1118 22  1604    139 139  --   --    K 3.4* 3.9 4.6  --   -- CL 98 102 103  --   --    CO2 32* 25 21  --   --    GLUCOSE 52* 205* 214*  --   --    BUN 24* 23 28*  --   --    CREATININE 1.28* 0.97 1.04  --   --    MG 1.7  --   --   --   --    ANIONGAP 10 12 15  --   --    LABGLOM 54* >60 >60  --   --    GFRAA >60 >60 >60  --   --    CALCIUM 8.8 8.6 8.4*  --   --    PROBNP  --   --   --  3,642*  --    TROPHS  --   --   --   --  63*     Recent Labs     01/22/22  0722 01/22/22  1138 01/22/22  1623 01/22/22 2000 01/23/22  0912 01/23/22  1124   POCGLU 194* 177* 145* 172* 217* 202*       Lab Results   Component Value Date    INR 1.0 01/17/2022    INR 1.0 11/03/2021    INR 1.0 07/27/2019    PROTIME 13.3 01/17/2022    PROTIME 11.1 11/03/2021    PROTIME 10.5 07/27/2019       Lab Results   Component Value Date/Time    SPECIAL LEFT HAND 5ML 01/21/2022 07:50 AM     Lab Results   Component Value Date/Time    CULTURE NO GROWTH 2 DAYS 01/21/2022 07:50 AM       Lab Results   Component Value Date    POCPH 7.337 01/23/2022    POCPCO2 52.3 01/23/2022    POCPO2 113.4 01/23/2022    POCHCO3 28.0 01/23/2022    NBEA NOT REPORTED 01/23/2022    PBEA 1 01/23/2022    BLP2HNO NOT REPORTED 01/23/2022    TCWK2YIZ 98 01/23/2022    FIO2 95.0 01/23/2022       Radiology:    XR CHEST 1 VIEW    Result Date: 1/16/2022  Hypoinflated lungs. Cardiomegaly again seen, when compared to the previous study performed 07/27/2019. Diffuse, increased interstitial markings throughout both lungs, some of which can be seen on the prior study may represent baseline chronic interstitial lung changes. The increased prominence on this exam could be secondary to the suboptimal inspiratory effort. The presence of pulmonary vascular congestion/mild CHF or bilateral interstitial pneumonia cannot be excluded. Clinical correlation is recommended. CT CHEST PULMONARY EMBOLISM W CONTRAST    Result Date: 1/16/2022  No evidence of pulmonary embolism.  Compared to 2008, worsening underlying emphysema, with worsening subacute or acute interstitial lung disease, best seen left upper lobe; differential includes interstitial pneumonia or pneumonitis superimposed on emphysema, DIP, HP, and other interstitial pneumonias as well as infectious or inflammatory process superimposed on emphysema disease. Findings are not typical for COVID-19 pneumonia, but an element of the latter should be considered. Thickening and distortion left major fissure with ill-defined mass-like focus left lower lobe. The latter could also be infectious or inflammatory, although neoplasm should be excluded. Underlying worsening emphysema. Nodular densities, best seen right upper lobe. Small pleural effusions, slightly larger on the left. See recommendations below. Mild mediastinal and left hilar adenopathy; see recommendations below. Worsening now moderate-severe pulmonary artery hypertension. Mild cardiomegaly. Stable mild dilatation ascending thoracic aorta. Additional unchanged or incidental findings, as above. RECOMMENDATIONS: Clinical correlation and short-term follow-up CT chest in 1-4 months depending upon the clinical presentation/course. All radiological studies reviewed                Code Status:  Full Code    Electronically signed by Marquis Delaney MD on 1/23/2022 at 4:40 PM     Copy sent to Dr. Edilma Bolanos MD    This note was created with the assistance of a speech-recognition program.  Although the intention is to generate a document that actually reflects the content of the visit, no guarantees can be provided that every mistake has been identified and corrected by editing. Note was updated later by me after  physical examination and  completion of the assessment.

## 2022-01-23 NOTE — PROCEDURES
Right IJ triple lumen catheter inserted via seldinger technique. All ports flushed. Patient tolerated procedure well.     CXR pending    Electronically signed by Stephen Deluca MD on 01/23/22 at 11:20 AM

## 2022-01-23 NOTE — PLAN OF CARE
Nutrition Problem #1: Inadequate protein-energy intake  Intervention: Food and/or Nutrient Delivery: Continue NPO,Start Tube Feeding  Nutritional Goals: EN support to provide >75% of estimated nutritional needs

## 2022-01-23 NOTE — PROGRESS NOTES
Pulmonary Critical Care Progress Note       Patient seen for the follow up of COVID-19 pneumonia, acute hypoxic respiratory failure. Subjective:   He was intubated earlier today due to O2 desat despite BIPAP + 100% O2 and inability to keep BIPAP on. Examination:  Vitals: BP (!) 111/55   Pulse 72   Temp 99.5 °F (37.5 °C) (Oral)   Resp 29   Ht 5' 10.98\" (1.803 m)   Wt 228 lb (103.4 kg)   SpO2 95%   BMI 31.82 kg/m²   General appearance: sedated, orally intubated. Lungs: poor to fair AE, diminished in bases, 91-94% 02 sat  Heart: regular rate and rhythm, S1, S2 normal, no gallop  Abdomen: Soft, non tender, + BS  Extremities: no cyanosis or clubbing.  No significant edema    LABs:  CBC:   Recent Labs     01/22/22  0526 01/23/22  0358   WBC 2.1* 3.6   HGB 17.7* 18.2*   HCT 57.7* 59.3*   PLT 75* 95*     BMP:   Recent Labs     01/22/22  0526 01/23/22  0358    139   K 3.9 4.6   CO2 25 21   BUN 23 28*   CREATININE 0.97 1.04   LABGLOM >60 >60   GLUCOSE 205* 214*     ABG:  Lab Results   Component Value Date    EXZ8JPJ 21 12/07/2018    FIO2 100.0 01/18/2022       Lab Results   Component Value Date    POCPH 7.37 12/07/2018    POCPCO2 34 12/07/2018    POCPO2 55 12/07/2018    POCHCO3 19.8 12/07/2018    PBEA NOT REPORTED 12/07/2018    TZQ2BPU 21 12/07/2018    ELTU3ORO 88 12/07/2018    FIO2 100.0 01/18/2022     Radiology:  X-ray chest 1/21/2022      Impression:  · Acute on chronic hypoxic respiratory failure  · COVID-19 pneumonia  · COPD/pulmonary emphysema  · Acute left thalamic infarct/CVA  · Left lower lobe opacity versus nodule  · Pulmonary edema  · Elevated D-dimer, decreased platelets  · Systolic heart failure, s/p AICD placement  · BLANCHE on CKD  · Diabetes mellitus    Recommendations:  · Intubation/ mechanical ventilation - intubated 1/23/22  · Pulmicort aerosol treatment  · Airborne isolation  · Patient encouraged for prone positioning  · IV Decadron 6 mg daily  · Not a candidate for Actemra/baricitinib

## 2022-01-23 NOTE — PROGRESS NOTES
[facility]  Progress Note    Betsy Corcoran  MRN: [id number]  Date: 1/23/2022  LOS: 7     Reason for F/U :   COVID-19 virus infection    Impression :   COVID-19 virus infection tested + 1/16/2022  Acute respiratory failure  Emphysema   Worsening acute interstitial lung disease and clinically not typical of COVID-19 virus infection  Worsening moderate to severe pulmonary artery hypertension  Bilateral lower extremity edema likely related to above  Leukopenia and thrombocytopenia  Lacunar l infarct on the left thalamus    Recommendations:   COVID-19 therapy: The patient is on Decadron 6 mg IV daily   The patient has been started on baricitinib 1/17/2022 but it was discontinued 1/18/2022 due to cytopenias. Antimicrobial therapy: The patient received Rocephin 1000 mg and azithromycin 500 mg on 1/16/2022  The patient received a dose of levofloxacin 500 mg on 1/18/2020. Monitor off antibiotics for now    MRI/MRA of the head and neck pending  Continue supportive care    Infection Control Recommendations:   Droplet plus precautions    Discharge Planning:   Estimated Length of IV antimicrobials:  Patient will need Midline Catheter Insertion/ PICC line Insertion: No  Patient will need: Home IV , [place],  SNF,  LTAC: Undetermined  Patient willneed outpatient wound care: No    Medical Decision making / Summary of Stay:   Betsy Corcoran is a 64y.o.-year-old male who was initially admitted on 1/16/2022. Sidra Peabody has a history of diabetes mellitus type 2, essential hypertension, seizure disorder, chronic kidney disease stage III, hyperlipidemia among other medical problems.     The patient reports that about 1 to 2 months ago he had his diabetes medications changed and since that time he has noticed increasing swelling in his legs, hardness in the legs, difficulty ambulating, and weight gain from 178 to 255 pounds over the last 1 to 2 months.   He did not report any subjective fevers, chills, cough or shortness of breath. He denies any loss of smell or change in taste. No abdominal pain nausea vomiting or diarrhea. The patient does report that he has home oxygen that he uses as needed at home and he reports that he hardly needs it. He is vaccinated did receive the J&J vaccine but has not yet received a booster dose.     The patient presented to the emergency department and was found to have leukopenia with a white blood cell count of 2.7 and thrombocytopenia with a platelet count of 648. Creatinine slightly elevated at 1.31 and proBNP is elevated at 9327. Chest x-ray showed hyperinflated lungs with cardiomegaly seen and diffuse increased interstitial markings in both lungs which may represent baseline chronic interstitial lung changes given that these findings were present before. A CT of the chest was done with IV contrast that showed no evidence of pulmonary embolism and compared to 2008 there is worsening underlying emphysema with worsening subacute or acute interstitial lung disease best seen in the left upper lobe. Findings were not felt typical for COVID-19 virus pneumonia. There was thickening and distortion of the left major fissure with an ill-defined masslike focus in the left lower lobe. There is worsening moderate to severe pulmonary artery hypertension     COVID testing was positive and I was asked to evaluate and help with COVID-19 virus infection    Current evaluation:2022    BP (!) 111/55   Pulse 72   Temp 99.5 °F (37.5 °C) (Oral)   Resp 29   Ht 5' 10.98\" (1.803 m)   Wt 228 lb (103.4 kg)   SpO2 95%   BMI 31.82 kg/m²     Temperature Range: Temp: 99.5 °F (37.5 °C) Temp  Av.4 °F (36.9 °C)  Min: 97.7 °F (36.5 °C)  Max: 99.5 °F (37.5 °C)  The patient is seen and evaluated at bedside   The pt became agitated ,confused on BIPAP ,afebrile   WBC 3.6   Blood cultures from 22 no growth to date.       ROS : unable to obtain due to confusion  Physical Examination : Physical Exam  Constitutional:       Appearance: He is well-developed. HENT:      Head: Normocephalic and atraumatic. Cardiovascular:      Rate and Rhythm: Normal rate. Heart sounds: Normal heart sounds. No friction rub. No gallop. Pulmonary:      Effort: Pulmonary effort is normal.      Breath sounds: Normal breath sounds. No wheezing. Abdominal:      General: Bowel sounds are normal.      Palpations: Abdomen is soft. There is no mass. Tenderness: There is no abdominal tenderness. Musculoskeletal:         General: Normal range of motion. Cervical back: Normal range of motion and neck supple. Lymphadenopathy:      Cervical: No cervical adenopathy. Skin:     General: Skin is warm and dry. Neurological:      Mental Status: He is alert. Laboratory data:   I have independently reviewed the followinglabs:  CBC with Differential:   Recent Labs     01/22/22  0526 01/23/22  0358   WBC 2.1* 3.6   HGB 17.7* 18.2*   HCT 57.7* 59.3*   PLT 75* 95*   LYMPHOPCT 21* 12*   MONOPCT 11 10*     BMP:   Recent Labs     01/21/22  0552 01/21/22  0552 01/22/22  0526 01/23/22  0358      < > 139 139   K 3.4*   < > 3.9 4.6   CL 98   < > 102 103   CO2 32*   < > 25 21   BUN 24*   < > 23 28*   CREATININE 1.28*   < > 0.97 1.04   MG 1.7  --   --   --     < > = values in this interval not displayed. Hepatic Function Panel:   No results for input(s): PROT, LABALBU, BILIDIR, IBILI, BILITOT, ALKPHOS, ALT, AST in the last 72 hours.       Lab Results   Component Value Date    PROCAL 0.11 01/19/2022    PROCAL 0.11 01/16/2022     Lab Results   Component Value Date    CRP 23.5 01/16/2022    CRP 2.0 07/27/2019     Lab Results   Component Value Date    SEDRATE 3 01/16/2022         Lab Results   Component Value Date    DDIMER 1.53 01/18/2022    DDIMER 1.73 01/16/2022    DDIMER 1.18 12/07/2018     Lab Results   Component Value Date    FERRITIN 569 01/16/2022    FERRITIN 50 07/23/2019    FERRITIN 18 07/08/2019 Lab Results   Component Value Date     01/16/2022     Lab Results   Component Value Date    FIBRINOGEN 402 01/16/2022       Results in Past 30 Days  Result Component Current Result Ref Range Previous Result Ref Range   SARS-CoV-2, Rapid DETECTED (A) (1/16/2022) Not Detected Not in Time Range      Lab Results   Component Value Date    COVID19 DETECTED 01/16/2022    COVID19 Not Detected 11/02/2021       No results for input(s): VANCOTROUGH in the last 72 hours. Imaging Studies:   ONE XRAY VIEW OF THE CHEST 1/21/2022 5:30 am  Impression   No significant interval change.  Scattered bilateral interstitial and hazy   left basilar opacity. Veins: Lower Extremities DVT Study, Venous Scan Lower Bilateral.  Indications for Study: Leg Swelling . Patient Status: In Patient. Technical Quality: Limited visualization. Limitation reason: Edema. Comments: Simultaneous real time imaging utilizing B-Mode, color doppler and spectral waveform analysis was performed on the  bilateral lower extremities for venous examination of the deep and superficial systems. Conclusions  Summary  No evidence of superficial or deep venous thrombosis in both lower extremities. Signature  Electronically signed by Mik Ordaz RVT(Sonographer) on 01/19/2022 08:10 AM  Electronically signed by Jessica Nguyen physician) on 01/19/2022 06:21 PM      CT OF THE HEAD WITHOUT CONTRAST  1/18/2022 5:42 pm  Impression   New lacunar infarct left thalamus, age indeterminate. Adelene Stephen may be helpful.           Cultures:     Culture, Blood 2 [[id number]]    Order Status: Sent Specimen: Blood    Culture, Blood 1 [[id number]]    Order Status: Sent Specimen: Blood    Culture, Blood 1 [[id number]] Collected: 01/16/22 1205   Order Status: Completed Specimen: Blood Updated: 01/20/22 1516    Specimen Description . BLOOD    Special Requests RAC 12ML    Culture NO GROWTH 4 DAYS   Culture, Blood 1 [[id number]] Collected: 01/16/22 1220   Order Status: Completed Specimen: Blood Updated: 01/20/22 1514    Specimen Description . BLOOD    Special Requests LAC 12ML    Culture NO GROWTH 4 DAYS   Culture, Urine [[id number]] Collected: 01/16/22 1315   Order Status: Completed Specimen: Urine, clean catch Updated: 01/17/22 2128    Specimen Description . CLEAN CATCH URINE    Special Requests NOT REPORTED    Culture NO SIGNIFICANT GROWTH       COVID-19, Rapid [[id number]] (Abnormal) Collected: 01/16/22 1230   Order Status: Completed Specimen: Nasopharyngeal Swab Updated: 01/16/22 1314    Specimen Description . NASOPHARYNGEAL SWAB    SARS-CoV-2, Rapid DETECTED Abnormal     Comment:        Rapid NAAT: The specimen is POSITIVE for SARS-Cov-2, the novel coronavirus associated with   COVID-19.         This test has been authorized by the FDA under an Emergency Use Authorization (EUA) for use   by authorized laboratories.         The ID NOW COVID-19 assay is designed to detect the virus that causes COVID-19 in patients   with signs and symptoms of infection who are suspected of COVID-19. An individual without symptoms of COVID-19 and who is not shedding SARS-CoV-2 virus would   expect to have a negative (not detected) result in this assay. Fact sheet for Healthcare Providers: BuildHer.es   Fact sheet for Patients: BuildHer.es           Methodology: Isothermal Nucleic Acid Amplification         Results reported to the appropriate Health Department         Flu A/B Ag Detection [[id number]] Collected: 01/16/22 1230   Order Status: Completed Specimen: Nasopharyngeal Swab Updated: 01/16/22 1313    Specimen Description . NASOPHARYNGEAL SWAB    Special Requests NOT REPORTED    Direct Exam NEGATIVE for Influenza A + B antigens.                                PCR testing to confirm this result is available upon request. Shanon Nielson will be saved in the laboratory for 7 days.  Please call [phone] if PCR testing is indicated. Medications:      enoxaparin  30 mg SubCUTAneous BID    QUEtiapine  50 mg Oral Once    ALPRAZolam  0.5 mg Oral TID    ipratropium-albuterol  1 ampule Inhalation 4x daily    budesonide  0.5 mg Nebulization BID    metoprolol succinate  50 mg Oral Nightly    dexamethasone  6 mg IntraVENous Q24H    sodium chloride flush  5-40 mL IntraVENous 2 times per day    levETIRAcetam  500 mg Oral BID    aspirin  325 mg Oral Daily    amLODIPine  5 mg Oral Nightly    atorvastatin  20 mg Oral Daily    insulin glargine  40 Units SubCUTAneous BID    insulin lispro  0-18 Units SubCUTAneous TID WC    insulin lispro  0-9 Units SubCUTAneous Nightly           [facility]  Narciso Salinas MD  [employer] messaging  OFFICE: [phone]      Electronically signed by Narciso Salinas MD on 1/23/2022 at 11:12 AM  Thank you for allowing us to participate in the care of this patient. Please call with questions. This note iscreated with the assistance of a speech recognition program.  While intending to generate a document that actually reflects the content of the visit, the document can still have some errors including those of syntax andsound a like substitutions which may escape proof reading. In such instances, actual meaning can be extrapolated by contextual diversion.

## 2022-01-23 NOTE — PROGRESS NOTES
Pt has a new fever 103 @ 1400 today, up from 99.5 at 1200. Additional cardiac ST changes thought to be seen on 4-lead, plus x2 runs of SVT @~180HR, and levo needs slowly rising. Contacted crit care, received orders for consult to Dr Brian Singh if ok with primary MD, 12-lead stat, troponin, and suppository tylenol    Spoke to attending Dr Ceci Houser with updates, received ok to consult Dr Brian Singh, added orders for q6 troponin, and asked writer to contact ID about pt new fever and to consider abx. Contacted on-call for ID Nav with infection concerns. Received verbal order for a respiratory/sputum culture, IV vancomycin w/pharmacy dosing, and cefepime 2g q12 starting now.

## 2022-01-23 NOTE — PROGRESS NOTES
Pt become highly agitated this early morning, continued to attempt to tear bipap off, successfully twice, and indicated that he was tired of fighting his breathing. Contacted crit care MD Lillian Gerardo, received ok to intubate pt. Called pt wife Mar Harry, received tentative consent to intubate provided that JULISA Abad also okayed it, called Reshma Gallardo and updated pt status and MD recommendation to intubate, received consent from Reshma Gallardo, called wife Mar Harry back and confirmed consent to intubate. Intubation went well, pt currently restful with moderate rates of sedation, no evidence of distress or similar concerns at this time.

## 2022-01-23 NOTE — PROGRESS NOTES
Continue to monitor while inpatient    Goals:  EN support to provide >75% of estimated nutritional needs       Nutrition Monitoring and Evaluation:   Behavioral-Environmental Outcomes:  None Identified   Food/Nutrient Intake Outcomes:  Enteral Nutrition Intake/Tolerance  Physical Signs/Symptoms Outcomes:  Biochemical Data,Fluid Status or Edema,GI Status,Skin,Weight     Discharge Planning:     Too soon to determine     Velia Hall, [address] [place] [address], [address] [place] [address]  Office Number: [phone]

## 2022-01-23 NOTE — PLAN OF CARE
Problem: Falls - Risk of:  Goal: Will remain free from falls  Description: Will remain free from falls  1/22/2022 2220 by Leonor Taylor, RN  Note: Bed locked in lowest position, side rails up x3, call light within reach, hourly rounding  1/22/2022 1106 by Hayder Mckeon RN  Outcome: Ongoing

## 2022-01-23 NOTE — PROGRESS NOTES
Pt's wife and daughter at bedside to discuss code status. Pt and pt's wife and daughter both have decided to stay a full code and to do everything we can do to save him. Dr Aneesh Warner was notified of decision. Pt still on bipap.  Will continue to monitor

## 2022-01-24 NOTE — PROGRESS NOTES
Physical Therapy  DATE: 2022    NAME: Aliya Tamez  MRN: [id number]   : 1945    Patient not seen this date for Physical Therapy due to:      [x] Cancel by RN or physician due to: (RN asked PT to hold treatment as patient's saman LE are cold, dark color and poor pulse noted, possible doppler to be performed soon.)    [] Hemodialysis    [] Critical Lab Value Level     [] Blood transfusion in progress    [] Acute or unstable cardiovascular status   _MAP < 55 or more than >115  _HR < 40 or > 130    [] Acute or unstable pulmonary status   -FiO2 > 60%   _RR < 5 or >40    _O2 sats < 85%    [] Strict Bedrest    [] Off Unit for surgery or procedure    [] Off Unit for testing       [] Pending imaging to R/O fracture    [] Refusal by Patient      [] Other      [] PT being discontinued at this time. Patient independent. No further needs. [] PT being discontinued at this time as the patient has been transferred to hospice care. No further needs.       Karo Stacy, PTA

## 2022-01-24 NOTE — PROGRESS NOTES
Pulmonary Critical Care Progress Note  Wendy Rendon MD     Patient seen for the follow up of COVID-19 pneumonia, acute hypoxic respiratory failure. Subjective:  He sedated, intubated on ventilator, FiO2 55%/PEEP 16. He had SVT on Levophed so he has been switched to Luke-Synephrine. He is also on amiodarone drip. He is sedated with propofol and Versed. He is also on fentanyl drip. Examination:  Vitals: /72   Pulse 92   Temp 100.1 °F (37.8 °C) (Axillary)   Resp (!) 32   Ht 5' 10.98\" (1.803 m)   Wt 215 lb (97.5 kg)   SpO2 97%   BMI 30.00 kg/m²   General appearance: Sedated, intubated on ventilator  Neck: No JVD  Lungs: Bilateral crackles, no wheeze, moderate air exchange. Heart: regular rate and rhythm, S1, S2 normal, no gallop  Abdomen: Soft, non tender, + BS  Extremities: no cyanosis or clubbing.  No significant edema    LABs:  CBC:   Recent Labs     01/23/22 0358 01/24/22 0428   WBC 3.6 15.2*   HGB 18.2* 18.8*   HCT 59.3* 64.4*   PLT 95* 170     BMP:   Recent Labs     01/23/22 0358 01/24/22 0428    137   K 4.6 4.9   CO2 21 21   BUN 28* 46*   CREATININE 1.04 1.84*   LABGLOM >60 36*   GLUCOSE 214* 274*     ABG:  Lab Results   Component Value Date    AUY7RBH NOT REPORTED 01/24/2022    FIO2 70.0 01/24/2022       Lab Results   Component Value Date    POCPH 7.279 01/24/2022    POCPCO2 49.2 01/24/2022    POCPO2 125.1 01/24/2022    POCHCO3 23.1 01/24/2022    PBEA NOT REPORTED 01/24/2022    EKG1KBZ NOT REPORTED 01/24/2022    BMVU8PVB 98 01/24/2022    FIO2 70.0 01/24/2022     Radiology:  X-ray chest 1/24/2022      Impression:  · Acute on chronic hypoxic respiratory failure  · COVID-19 pneumonia  · COPD/pulmonary emphysema  · Acute left thalamic infarct/CVA  · Left lower lobe opacity versus nodule  · Pulmonary edema  · Elevated D-dimer, decreased platelets  · Systolic heart failure, s/p AICD placement  · BLANCHE on CKD  · Diabetes mellitus    Recommendations:  · Continue vent support, wean FiO2 as tolerated.   · Fentanyl drip  · Propofol for sedation  · Versed for sedation  · Luke-Synephrine, titrate for map 65 or greater  · Amiodarone per cardiology  · Pulmicort aerosol treatment  · Airborne isolation  · IV Decadron 6 mg daily  · Not a candidate for Actemra/baricitinib stopped secondary to cytopenias  · Neurology following  · Albuterol and Ipratropium Q 4 hours and prn  · X-ray chest in am  · Labs: CBC and BMP in am  · DVT prophylaxis with low molecular weight heparin  · Will follow with you    Eriberto Bunn MD, [facility]  Pulmonary Critical Care and Sleep Medicine,  [facility]  Cell: [phone]  Office: [phone]  CC: 35 minutes

## 2022-01-24 NOTE — CONSULTS
[facility] Cardiology   Consult Note             Date:   1/24/2022  Patient name: Aliya Tamez  Date of admission:  1/16/2022 11:51 AM  MRN:   [id number]  YOB: 1945    Reason for Admission:  Covid 19 Pneumonia  Indication for cardiology consult. Paroxysmal atrial fibrillation and flutter. History Obtained From:  patient, electronic medical record    HISTORY OF PRESENT ILLNESS:    This is a 78-year-old gentleman admitted via ER, come to ER with complaint of having shortness of breath, patient has medical history significant for COPD patient with history of cardiac failure: Patient noted that he has been having increasing swelling in his lower extremity and becoming more short of breath, further patient also been having some body aches and sweats, patient patient testing in the emergency room showed that he is positive for COVID-19  He was diagnosed with covid 19 pneumonia and got intubated for respiratory failure. He developed atrial fibrillation overnight and I was consulted to help manage him. I did recommend iv amiodarone due to his hypotension and he received a 150mg bolus. He was then put on a drip. He has converted to sinus rhythm now. Remains intubated and sedated. Past Medical History:   has a past medical history of Arthritis, Atherosclerotic plaque, Cervical disc disease, Diabetes mellitus ([address] History of blood transfusion, Hx of blood clots, Hyperlipidemia, Neuropathy, Right kidney mass, Snores, and Type 2 diabetes mellitus treated with insulin ([address] Past Surgical History:   has a past surgical history that includes Abdominal aortic aneurysm repair (2005); Carpal tunnel release (Bilateral); Cervical spine surgery; Inguinal hernia repair (Right); Upper gastrointestinal endoscopy; Colonoscopy; ventral hernia repair (05/10/2017); and pr repair incisional hernia,reducible (N/A, 5/10/2017).      Home Medications:    Prior to Admission medications    Medication Sig Start Date End Date Taking? Authorizing Provider   rosuvastatin (CRESTOR) 40 MG tablet Take 40 mg by mouth every evening   Yes Historical Provider, MD   insulin NPH (HUMULIN N;NOVOLIN N) 100 UNIT/ML injection vial Inject 20 Units into the skin 2 times daily (before meals)   Yes Historical Provider, MD   levETIRAcetam (KEPPRA) 500 MG tablet Take 1 tablet by mouth 2 times daily 11/3/21  Yes Salvador Rutledge MD   venlafaxine (EFFEXOR XR) 150 MG extended release capsule Take 1 capsule by mouth daily (with breakfast) 7/29/19  Yes Arabella S Margi   vitamin B-1 (THIAMINE) 100 MG tablet Take 100 mg by mouth daily   Yes Historical Provider, MD   ferrous sulfate 325 (65 Fe) MG tablet Take 325 mg by mouth daily (with breakfast)   Yes Historical Provider, MD   ALPRAZolam (XANAX) 0.5 MG tablet Take 0.5 mg by mouth three times daily.    Yes Historical Provider, MD   furosemide (LASIX) 40 MG tablet Take 1 tablet by mouth 2 times daily 12/11/18  Yes Kasie Evans MD   tiotropium (SPIRIVA RESPIMAT) 2.5 MCG/ACT AERS inhaler Inhale 2 puffs into the lungs daily    Yes Historical Provider, MD   albuterol sulfate  (90 Base) MCG/ACT inhaler Inhale 2 puffs into the lungs every 6 hours as needed for Wheezing or Shortness of Breath   Yes Historical Provider, MD   metoprolol succinate (TOPROL XL) 50 MG extended release tablet Take 50 mg by mouth daily   Yes Historical Provider, MD   Multiple Vitamins-Minerals (MULTIVITAMIN ADULT) TABS Take 1 tablet by mouth daily   Yes Historical Provider, MD   vitamin D (CHOLECALCIFEROL) 1000 UNIT TABS tablet Take 1,000 Units by mouth daily   Yes Historical Provider, MD   fluticasone (FLONASE) 50 MCG/ACT nasal spray 1 spray by Each Nare route daily as needed for Rhinitis   Yes Historical Provider, MD   aspirin 325 MG EC tablet Take 325 mg by mouth daily    Yes Historical Provider, MD   Omega-3 Fatty Acids (FISH OIL) 1000 MG CAPS Take 1 capsule by mouth daily    Yes Historical Provider, MD   amLODIPine (NORVASC) 5 MG tablet Take 5 mg by mouth nightly    Yes Historical Provider, MD   mirtazapine (REMERON) 45 MG tablet Take 45 mg by mouth nightly   Yes Historical Provider, MD       Allergies: Barbiturates, Codeine, and Sulfa antibiotics    Social History:   reports that he has quit smoking. He has never used smokeless tobacco. He reports that he does not drink alcohol and does not use drugs. Family History: family history includes Ovarian Cancer in his sister and sister. REVIEW OF SYSTEMS:    Unable to do  PHYSICAL EXAM:    Physical Examination:    /72   Pulse 164   Temp 100.1 °F (37.8 °C) (Axillary)   Resp (!) 32   Ht 5' 10.98\" (1.803 m)   Wt 215 lb (97.5 kg)   SpO2 96%   BMI 30.00 kg/m²    Constitutional and General Appearance: intubated and sedated. · Full physical exam was not done but patient had bilateral discolored toes. DATA:    Diagnostics:      EKG:atrial flutter with 2:1 conduction. Labs:     CBC:   Recent Labs     01/23/22  0358 01/24/22  0428   WBC 3.6 15.2*   HGB 18.2* 18.8*   HCT 59.3* 64.4*   PLT 95* 170     BMP:   Recent Labs     01/23/22  0358 01/24/22  0428    137   K 4.6 4.9   CO2 21 21   BUN 28* 46*   CREATININE 1.04 1.84*   LABGLOM >60 36*   GLUCOSE 214* 274*     BNP: No results for input(s): BNP in the last 72 hours. PT/INR: No results for input(s): PROTIME, INR in the last 72 hours. APTT:No results for input(s): APTT in the last 72 hours. CARDIAC ENZYMES:No results for input(s): CKTOTAL, CKMB, CKMBINDEX, TROPONINI in the last 72 hours. FASTING LIPID PANEL:  Lab Results   Component Value Date    HDL 30 11/03/2021    TRIG 181 11/03/2021     LIVER PROFILE:No results for input(s): AST, ALT, LABALBU in the last 72 hours.       IMPRESSION:    Patient Active Problem List   Diagnosis    Biventricular CHF (congestive heart failure) (HCC)    CKD (chronic kidney disease) stage 3, GFR 30-59 ml/min (HCC)    Essential hypertension    Blurred vision, right eye    Type 2 diabetes mellitus treated with insulin (HCC)    Atherosclerotic plaque    Weakness on left side of face    Carotid stenosis, asymptomatic, bilateral    New onset seizure ([facility]    COVID       RECOMMENDATIONS:  1. Paroxysmal atrial fibrillation  2. covid 19 pneumonia  3. CHF ? 4. Respiratory failure on vent  Continue amiodarone drip  Wean and extubate as tolerated. Treat covid 19 pneumonia     Discussed with  and nursing.     Pat Kurtz MD, MD  [facility] cardiology

## 2022-01-24 NOTE — PROGRESS NOTES
Occupational Therapy    DATE: 2022    NAME: Sergio Aparicio  MRN: [id number]   : 1945    Patient not seen this date for Occupational Therapy due to:      [] Cancel by RN or physician due to:    [] Hemodialysis    [] Critical Lab Value Level     [] Blood transfusion in progress    [] Acute or unstable cardiovascular status   _MAP < 55 or more than >115  _HR < 40 or > 130    [] Acute or unstable pulmonary status   -FiO2 > 60%   _RR < 5 or >40    _O2 sats < 85%    [] Strict Bedrest    [] Off Unit for surgery or procedure    [] Off Unit for testing       [] Pending imaging to R/O fracture    [] Refusal by Patient      [] Other: Pt intubated, not appropriate for OT this date. Will continue to follow.     Juliann Alexis OTR/L

## 2022-01-24 NOTE — PROGRESS NOTES
Pt is having runs of tachycardia with heart rate reaching the 180's. An EKG was done and and results sent via secure message to cardiology. Pt is now in Afib with RVR according to monitor with HR reaching 190's at times. Cardiology notified orders placed. Will continue to monitor pt.

## 2022-01-24 NOTE — PROGRESS NOTES
[facility].,    Adult Hospitalist      Name: Heri Guo  MRN: [id number]     Acct: [de-identified]  Room: [address] [place] [address]    Admit Date: 1/16/2022 11:51 AM  PCP: Martha Cespedes MD    Primary Problem  Active Problems:    COVID  Resolved Problems:    * No resolved hospital problems. *        Assesment:   Acute congestive heart failure, diastolic   [id number]   Viral pneumonia secondary to above  Acute respiratory failure with hypoxia   Essential hypertension  Mixed hyperlipidemia  Diabetes mellitus type 2  History of seizure disorder  History of CKD stage III, presently normal renal function  Lung mass? Leukopenia  Polycythemia  Thrombocytopenia  Anxiety disorder  Recent past h/o lacunar infarct left thalamus   Altered mental status/agitation  Endotracheal intubation secondary to hypoxia dated 1/23/2022  Supraventricular tachycardia/elevated troponin  Fever      Plan:   Admit patient to intermediate floor  Oxygen to keep SPO2 more than 90%  Telemetry  Check vitals closely  CBC BMP daily    Trend troponin  Trend BMP  Echocardiogram  IV Lasix  Cardiology consult  Luke-Synephrine    IVF @ 75 ml/ hr  Monitor urine output    Amiodarone  Cardiology following    IV Decadron  IV azithromycin-DC  Bronchodilators  Pulmicort  Infectious disease consult  Pulmonology consult  Vanco/cefepime per ID    Continue Keppra  Continue amlodipine, atorvastatin  Continue aspirin  Xanax as needed  Lantus  seroquel prn   Precedex gtt needed   Continue other medication as below.     Scheduled Meds:   pantoprazole  40 mg IntraVENous Daily    And    sodium chloride (PF)  10 mL IntraVENous Daily    aspirin  324 mg Oral Daily    levETIRAcetam  500 mg Oral BID    cefepime  2,000 mg IntraVENous Q12H    chlorhexidine  15 mL Mouth/Throat BID    enoxaparin  30 mg SubCUTAneous BID    QUEtiapine  50 mg Oral Once    ALPRAZolam  0.5 mg Oral TID    ipratropium-albuterol  1 ampule Inhalation 4x daily    budesonide  0.5 mg Nebulization BID  metoprolol succinate  50 mg Oral Nightly    dexamethasone  6 mg IntraVENous Q24H    sodium chloride flush  5-40 mL IntraVENous 2 times per day    amLODIPine  5 mg Oral Nightly    atorvastatin  20 mg Oral Daily    insulin glargine  40 Units SubCUTAneous BID    insulin lispro  0-18 Units SubCUTAneous TID WC    insulin lispro  0-9 Units SubCUTAneous Nightly     Continuous Infusions:   amiodarone 0.5 mg/min (01/24/22 1720)    phenylephrine (KEARA-SYNEPHRINE) 50mg/250mL infusion 180 mcg/min (01/24/22 1720)    propofol 30 mcg/kg/min (01/24/22 1720)    midazolam (VERSED) 1 mg/mL in D5W infusion 10 mg/hr (01/24/22 1720)    fentaNYL 75 mcg/hr (01/24/22 1014)    norepinephrine Stopped (01/24/22 1046)    dexmedetomidine (PRECEDEX) IV infusion Stopped (01/23/22 1450)    sodium chloride      dextrose       PRN Meds:  LORazepam, 1 mg, Q4H PRN  dextrose bolus (hypoglycemia), 125 mL, PRN   Or  dextrose bolus (hypoglycemia), 250 mL, PRN  sodium chloride flush, 10 mL, PRN  sodium chloride, 25 mL, PRN  potassium chloride, 40 mEq, PRN   Or  potassium alternative oral replacement, 40 mEq, PRN   Or  potassium chloride, 10 mEq, PRN  magnesium sulfate, 1,000 mg, PRN  ondansetron, 4 mg, Q8H PRN   Or  ondansetron, 4 mg, Q6H PRN  magnesium hydroxide, 30 mL, Daily PRN  acetaminophen, 650 mg, Q6H PRN   Or  acetaminophen, 650 mg, Q6H PRN  glucose, 15 g, PRN  glucagon (rDNA), 1 mg, PRN  dextrose, 100 mL/hr, PRN  hydrALAZINE, 10 mg, Q6H PRN        Chief Complaint:     Chief Complaint   Patient presents with    Leg Swelling     bilateral, has CHF, on diuretic, EMT saw pt , assisted pt into car    Fever    Other     low SPO2         History of Present Illness:      Patient seen and examined at the bedside.   Nonverbal/intubated  Currently on [place]-Synephrine due to SVT on Levophed  Also on amiodarone drip  Decreased urine output  Afebrile      Review of systems:  Unable to conduct ROS secondary to patient being intubated      Initial HPI  Donal Cortes is a 64 y.o.  male who presents with Leg Swelling (bilateral, has CHF, on diuretic, EMT saw pt , assisted pt into car), Fever, and Other (low SPO2)  This is a 57-year-old gentleman admitted via ER, come to ER with complaint of having shortness of breath, patient has medical history significant for COPD patient with history of cardiac failure: Patient noted that he has been having increasing swelling in his lower extremity and becoming more short of breath, further patient also been having some body aches and sweats, patient patient testing in the emergency room showed that he is positive for COVID-19 also noticed to have an elevated BNP, imaging concerning for fluid overload, admitted for further regiment    I have personally reviewed the past medical history, past surgical history, medications, social history, and family history, and summarized in the note.     Review of Systems:       Unable to conduct ROS secondary to patient being intubated      Past Medical History:     Past Medical History:   Diagnosis Date    Arthritis     Atherosclerotic plaque 7/28/2019    Cervical disc disease     degenerative disc disease    Diabetes mellitus ([address]     type 2    History of blood transfusion     Hx of blood clots     left leg    Hyperlipidemia     Neuropathy     Right kidney mass     \"urologist is watching\"    Snores     Type 2 diabetes mellitus treated with insulin ([address] 7/28/2019        Past Surgical History:     Past Surgical History:   Procedure Laterality Date    ABDOMINAL AORTIC ANEURYSM REPAIR  2005    CARPAL TUNNEL RELEASE Bilateral     CERVICAL SPINE SURGERY      COLONOSCOPY      benign polyps removed    INGUINAL HERNIA REPAIR Right     TN REPAIR INCISIONAL HERNIA,REDUCIBLE N/A 5/10/2017    HERNIA VENTRAL REPAIR WITH MESH  performed by Wilfred Gann DO at [facility] [address]  05/10/2017    with mesh        Medications Prior to Admission:       Prior to Admission medications    Medication Sig Start Date End Date Taking? Authorizing Provider   rosuvastatin (CRESTOR) 40 MG tablet Take 40 mg by mouth every evening   Yes Historical Provider, MD   insulin NPH (HUMULIN N;NOVOLIN N) 100 UNIT/ML injection vial Inject 20 Units into the skin 2 times daily (before meals)   Yes Historical Provider, MD   levETIRAcetam (KEPPRA) 500 MG tablet Take 1 tablet by mouth 2 times daily 11/3/21  Yes Jenn Garber MD   venlafaxine (EFFEXOR XR) 150 MG extended release capsule Take 1 capsule by mouth daily (with breakfast) 7/29/19  Yes Arabella Kiser   vitamin B-1 (THIAMINE) 100 MG tablet Take 100 mg by mouth daily   Yes Historical Provider, MD   ferrous sulfate 325 (65 Fe) MG tablet Take 325 mg by mouth daily (with breakfast)   Yes Historical Provider, MD   ALPRAZolam (XANAX) 0.5 MG tablet Take 0.5 mg by mouth three times daily.    Yes Historical Provider, MD   furosemide (LASIX) 40 MG tablet Take 1 tablet by mouth 2 times daily 12/11/18  Yes Malik Evans MD   tiotropium (SPIRIVA RESPIMAT) 2.5 MCG/ACT AERS inhaler Inhale 2 puffs into the lungs daily    Yes Historical Provider, MD   albuterol sulfate  (90 Base) MCG/ACT inhaler Inhale 2 puffs into the lungs every 6 hours as needed for Wheezing or Shortness of Breath   Yes Historical Provider, MD   metoprolol succinate (TOPROL XL) 50 MG extended release tablet Take 50 mg by mouth daily   Yes Historical Provider, MD   Multiple Vitamins-Minerals (MULTIVITAMIN ADULT) TABS Take 1 tablet by mouth daily   Yes Historical Provider, MD   vitamin D (CHOLECALCIFEROL) 1000 UNIT TABS tablet Take 1,000 Units by mouth daily   Yes Historical Provider, MD   fluticasone (FLONASE) 50 MCG/ACT nasal spray 1 spray by Each Nare route daily as needed for Rhinitis   Yes Historical Provider, MD   aspirin 325 MG EC tablet Take 325 mg by mouth daily    Yes Historical Provider, MD   Omega-3 Fatty Acids (FISH OIL) 1000 MG CAPS Take 1 capsule by mouth daily    Yes Historical Provider, MD   amLODIPine (NORVASC) 5 MG tablet Take 5 mg by mouth nightly    Yes Historical Provider, MD   mirtazapine (REMERON) 45 MG tablet Take 45 mg by mouth nightly   Yes Historical Provider, MD        Allergies: Barbiturates, Codeine, and Sulfa antibiotics    Social History:     Tobacco:    reports that he has quit smoking. He has never used smokeless tobacco.  Alcohol:      reports no history of alcohol use. Drug Use:  reports no history of drug use.     Family History:     Family History   Problem Relation Age of Onset    Ovarian Cancer Sister     Ovarian Cancer Sister          Physical Exam:     Vitals:  BP (!) 145/65   Pulse 84   Temp 100.8 °F (38.2 °C) (Axillary)   Resp (!) 32   Ht 5' 10.98\" (1.803 m)   Wt 215 lb (97.5 kg)   SpO2 95%   BMI 30.00 kg/m²   Temp (24hrs), Av.3 °F (37.9 °C), Min:99.4 °F (37.4 °C), Max:101.6 °F (38.7 °C)      General appearance -on ventilator  Mental status -sedated  Head - normocephalic and atraumatic  Eyes - conjunctiva clear  Ears -external ears normal  Nose - no drainage noted  Mouth - mucous membranes moist  Neck - supple, no carotid bruits, thyroid not palpable  Chest -sporadic coarse crackles to auscultation, increased effort  Heart - normal rate, regular rhythm, no murmur  Abdomen - soft, nontender, nondistended, bowel sounds present all four quadrants, no masses, hepatomegaly or splenomegaly  Neurological -sedated on vent  Extremities - no edema, distal extremity discoloration    Skin - no gross lesions, rashes, or induration noted        Data:     Labs:    Hematology:  Recent Labs     22  0526 22  0358 22  1118 22  0428   WBC 2.1* 3.6  --  15.2*   RBC 7.03* 7.24*  --  7.59*   HGB 17.7* 18.2*  --  18.8*   HCT 57.7* 59.3*  --  64.4*   MCV 82.1* 81.9*  --  84.8   MCH 25.2 25.1*  --  24.8*   MCHC 30.7 30.7  --  29.2   RDW 16.9* 17.0*  --  17.7*   PLT 75* 95*  --  170   MPV 9.9 Abnormal  --  11.2   DDIMER  --   --  5.84*  --      Chemistry:  Recent Labs     01/22/22  0526 01/23/22  0358 01/23/22  1118 01/23/22  1604 01/23/22  2258 01/24/22  0428 01/24/22  1046    139  --   --   --  137  --    K 3.9 4.6  --   --   --  4.9  --     103  --   --   --  99  --    CO2 25 21  --   --   --  21  --    GLUCOSE 205* 214*  --   --   --  274*  --    BUN 23 28*  --   --   --  46*  --    CREATININE 0.97 1.04  --   --   --  1.84*  --    ANIONGAP 12 15  --   --   --  17  --    LABGLOM >60 >60  --   --   --  36*  --    GFRAA >60 >60  --   --   --  43*  --    CALCIUM 8.6 8.4*  --   --   --  8.8  --    PROBNP  --   --  6,057*  --   --   --   --    TROPHS  --   --   --    < > 68* 82* 129*    < > = values in this interval not displayed. Recent Labs     01/23/22  1124 01/23/22  1655 01/23/22  2138 01/24/22  0839 01/24/22  1123 01/24/22  1711   POCGLU 202* 168* 252* 284* 261* 318*       Lab Results   Component Value Date    INR 1.0 01/17/2022    INR 1.0 11/03/2021    INR 1.0 07/27/2019    PROTIME 13.3 01/17/2022    PROTIME 11.1 11/03/2021    PROTIME 10.5 07/27/2019       Lab Results   Component Value Date/Time    SPECIAL RT HAND 19ML 01/23/2022 05:59 PM    SPECIAL ART LINE 10ML 01/23/2022 05:59 PM     Lab Results   Component Value Date/Time    CULTURE NO GROWTH 12 HOURS 01/23/2022 05:59 PM    CULTURE NO GROWTH 12 HOURS 01/23/2022 05:59 PM       Lab Results   Component Value Date    POCPH 7.279 01/24/2022    POCPCO2 49.2 01/24/2022    POCPO2 125.1 01/24/2022    POCHCO3 23.1 01/24/2022    NBEA 4 01/24/2022    PBEA NOT REPORTED 01/24/2022    JGZ2ZBU NOT REPORTED 01/24/2022    YUGH3QOJ 98 01/24/2022    FIO2 70.0 01/24/2022       Radiology:    XR CHEST 1 VIEW    Result Date: 1/16/2022  Hypoinflated lungs. Cardiomegaly again seen, when compared to the previous study performed 07/27/2019.   Diffuse, increased interstitial markings throughout both lungs, some of which can be seen on the prior study may represent baseline chronic interstitial lung changes. The increased prominence on this exam could be secondary to the suboptimal inspiratory effort. The presence of pulmonary vascular congestion/mild CHF or bilateral interstitial pneumonia cannot be excluded. Clinical correlation is recommended. CT CHEST PULMONARY EMBOLISM W CONTRAST    Result Date: 1/16/2022  No evidence of pulmonary embolism. Compared to 2008, worsening underlying emphysema, with worsening subacute or acute interstitial lung disease, best seen left upper lobe; differential includes interstitial pneumonia or pneumonitis superimposed on emphysema, DIP, HP, and other interstitial pneumonias as well as infectious or inflammatory process superimposed on emphysema disease. Findings are not typical for COVID-19 pneumonia, but an element of the latter should be considered. Thickening and distortion left major fissure with ill-defined mass-like focus left lower lobe. The latter could also be infectious or inflammatory, although neoplasm should be excluded. Underlying worsening emphysema. Nodular densities, best seen right upper lobe. Small pleural effusions, slightly larger on the left. See recommendations below. Mild mediastinal and left hilar adenopathy; see recommendations below. Worsening now moderate-severe pulmonary artery hypertension. Mild cardiomegaly. Stable mild dilatation ascending thoracic aorta. Additional unchanged or incidental findings, as above. RECOMMENDATIONS: Clinical correlation and short-term follow-up CT chest in 1-4 months depending upon the clinical presentation/course.          All radiological studies reviewed                Code Status:  Full Code    Electronically signed by Shaheen Zhang MD on 1/24/2022 at 5:49 PM     Copy sent to Dr. Byron Miller MD    This note was created with the assistance of a speech-recognition program.  Although the intention is to generate a document that actually reflects the content of the visit, no guarantees can be provided that every mistake has been identified and corrected by editing. Note was updated later by me after  physical examination and  completion of the assessment.

## 2022-01-24 NOTE — PROGRESS NOTES
[facility]  Progress Note    Zoey Alan  MRN: [id number]  Date: 1/24/2022  LOS: 8     Reason for F/U :   COVID-19 virus infection    Impression :   COVID-19 virus infection tested + 1/16/2022  Acute respiratory failure currently ventilator dependent  Intubated 1/23/2022  Emphysema with worsening changes on imaging  Worsening acute interstitial lung disease and clinically not typical of COVID-19 virus infection  Worsening moderate to severe pulmonary artery hypertension  Bilateral lower extremity edema likely related to above  Leukopenia and thrombocytopenia  Lacunar l infarct on the left thalamus  Fever to 103 degrees 1/23/2022  Acute kidney injury    Recommendations:   COVID-19 therapy: The patient is on Decadron 6 mg IV daily   The patient has been started on baricitinib 1/17/2022 but it was discontinued 1/18/2022 due to cytopenias. Actemra had been considered but again due to the cytopenias and thrombocytopenia this has been held. Antimicrobial therapy: The patient received Rocephin 1000 mg and azithromycin 500 mg on 1/16/2022  The patient received a dose of levofloxacin 500 mg on 1/18/2020. Patient started on cefepime and vancomycin 1/23/2022  Vancomycin discontinued due to acute kidney injury 1/24/2022  The patient continues on Luke-Synephrine for blood pressure support. Continue supportive care and chest x-ray continues show bilateral airspace disease and does not seem to have any findings to suggest a pneumonia. Infection Control Recommendations:   Droplet plus precautions    Discharge Planning:   Estimated Length of IV antimicrobials: 5 to 7 days  Patient will need Midline Catheter Insertion/ PICC line Insertion: No  Patient will need: Home IV , [facility],  SNF,  LTAC: Undetermined  Patient willneed outpatient wound care: No    Medical Decision making / Summary of Stay:   Zoey Alan is a 64y.o.-year-old male who was initially admitted on 1/16/2022.    Ilya Franco has a Temp 100.1 °F (37.8 °C) (Axillary)   Resp (!) 32   Ht 5' 10.98\" (1.803 m)   Wt 215 lb (97.5 kg)   SpO2 97%   BMI 30.00 kg/m²     Temperature Range: Temp: 100.1 °F (37.8 °C) Temp  Av.1 °F (38.4 °C)  Min: 99.5 °F (37.5 °C)  Max: 103 °F (39.4 °C)  The patient is seen and evaluated at bedside he is intubated on the ventilator at 55% FiO2 and 10 of PEEP. He is on propofol, fentanyl, and Versed drips. He is on Luke-Synephrine for blood pressure support. The patient did have atrial fibrillation was started on amiodarone. Laboratory studies today show acute kidney injury with creatinine increasing to 1.84 and white blood cell count increasing to 15.2. Review of Systems   Unable to perform ROS: Intubated       Physical Examination :     Physical Exam  Constitutional:       Appearance: He is well-developed. Interventions: He is sedated and intubated. HENT:      Head: Normocephalic and atraumatic. Cardiovascular:      Rate and Rhythm: Normal rate. Heart sounds: Normal heart sounds. No friction rub. No gallop. Pulmonary:      Effort: Pulmonary effort is normal. He is intubated. Breath sounds: Normal breath sounds. No wheezing. Abdominal:      General: Bowel sounds are normal.      Palpations: Abdomen is soft. There is no mass. Tenderness: There is no abdominal tenderness. Musculoskeletal:      Cervical back: Neck supple. Lymphadenopathy:      Cervical: No cervical adenopathy. Skin:     General: Skin is warm and dry.          Laboratory data:   I have independently reviewed the followinglabs:  CBC with Differential:   Recent Labs     22  0358 22   WBC 3.6 15.2*   HGB 18.2* 18.8*   HCT 59.3* 64.4*   PLT 95* 170   LYMPHOPCT 12* 8*   MONOPCT 10* 7     BMP:   Recent Labs     22  0358 22    137   K 4.6 4.9    99   CO2 21 21   BUN 28* 46*   CREATININE 1.04 1.84*     Hepatic Function Panel:   No results for input(s): PROT, LABALBU, BILIDIR, IBILI, BILITOT, ALKPHOS, ALT, AST in the last 72 hours. Lab Results   Component Value Date    PROCAL 0.24 01/23/2022    PROCAL 0.11 01/19/2022    PROCAL 0.11 01/16/2022     Lab Results   Component Value Date    CRP 23.5 01/16/2022    CRP 2.0 07/27/2019     Lab Results   Component Value Date    SEDRATE 3 01/16/2022         Lab Results   Component Value Date    DDIMER 5.84 01/23/2022    DDIMER 1.53 01/18/2022    DDIMER 1.73 01/16/2022    DDIMER 1.18 12/07/2018     Lab Results   Component Value Date    FERRITIN 569 01/16/2022    FERRITIN 50 07/23/2019    FERRITIN 18 07/08/2019     Lab Results   Component Value Date     01/16/2022     Lab Results   Component Value Date    FIBRINOGEN 402 01/16/2022       Results in Past 30 Days  Result Component Current Result Ref Range Previous Result Ref Range   SARS-CoV-2, Rapid DETECTED (A) (1/16/2022) Not Detected Not in Time Range      Lab Results   Component Value Date    COVID19 DETECTED 01/16/2022    COVID19 Not Detected 11/02/2021       No results for input(s): VANCOTROUGH in the last 72 hours. Imaging Studies:   ONE XRAY VIEW OF THE CHEST 1/24/2022 6:09 am    Impression   1.  Right IJ central venous catheter, endotracheal tube, and enteric tube   remain in place.       2.  No change in bilateral diffuse interstitial and hazy airspace opacity. Veins: Lower Extremities DVT Study, Venous Scan Lower Bilateral.  Indications for Study: Leg Swelling . Patient Status: In Patient. Technical Quality: Limited visualization. Limitation reason: Edema. Comments: Simultaneous real time imaging utilizing B-Mode, color doppler and spectral waveform analysis was performed on the  bilateral lower extremities for venous examination of the deep and superficial systems. Conclusions  Summary  No evidence of superficial or deep venous thrombosis in both lower extremities.   Signature  Electronically signed by Rahul Miner RVT(Sonographer) on 01/19/2022 08:10 AM  Electronically signed by Eduard Schulz(Interpreting physician) on 01/19/2022 06:21 PM      CT OF THE HEAD WITHOUT CONTRAST  1/18/2022 5:42 pm  Impression   New lacunar infarct left thalamus, age indeterminate. Recardo Levels may be helpful.           Cultures:     Culture, Blood 2 [[id number]] Collected: 01/21/22 0742   Order Status: Completed Specimen: Blood Updated: 01/24/22 0954    Specimen Description . BLOOD    Special Requests LEFT AC 20ML    Culture NO GROWTH 3 DAYS   Culture, Blood 1 [[id number]] Collected: 01/21/22 0750   Order Status: Completed Specimen: Blood Updated: 01/24/22 0954    Specimen Description . BLOOD    Special Requests LEFT HAND 5ML    Culture NO GROWTH 3 DAYS   Culture, Blood 1 [[id number]] Collected: 01/23/22 1759   Order Status: Completed Specimen: Blood Updated: 01/24/22 0910    Specimen Description . BLOOD    Special Requests RT HAND 19ML    Culture NO GROWTH 12 HOURS   Culture, Blood 1 [[id number]] Collected: 01/23/22 1759   Order Status: Completed Specimen: Blood Updated: 01/24/22 0909    Specimen Description . BLOOD    Special Requests ART LINE 10ML    Culture NO GROWTH 12 HOURS     Culture, Blood 1 [[id number]] Collected: 01/16/22 1220   Order Status: Completed Specimen: Blood Updated: 01/21/22 1521    Specimen Description . BLOOD    Special Requests LAC 12ML    Culture NO GROWTH 5 DAYS   Culture, Blood 1 [[id number]] Collected: 01/16/22 1205   Order Status: Completed Specimen: Blood Updated: 01/21/22 1521    Specimen Description . BLOOD    Special Requests RAC 12ML    Culture NO GROWTH 5 DAYS       Culture, Urine [[id number]] Collected: 01/16/22 1315   Order Status: Completed Specimen: Urine, clean catch Updated: 01/17/22 2128    Specimen Description . CLEAN CATCH URINE    Special Requests NOT REPORTED    Culture NO SIGNIFICANT GROWTH       COVID-19, Rapid [[id number]] (Abnormal) Collected: 01/16/22 1230   Order Status: Completed Specimen: Nasopharyngeal Swab Updated: 01/16/22 1314    Specimen Description . NASOPHARYNGEAL SWAB    SARS-CoV-2, Rapid DETECTED Abnormal     Comment:        Rapid NAAT: The specimen is POSITIVE for SARS-Cov-2, the novel coronavirus associated with   COVID-19.         This test has been authorized by the FDA under an Emergency Use Authorization (EUA) for use   by authorized laboratories.         The ID NOW COVID-19 assay is designed to detect the virus that causes COVID-19 in patients   with signs and symptoms of infection who are suspected of COVID-19. An individual without symptoms of COVID-19 and who is not shedding SARS-CoV-2 virus would   expect to have a negative (not detected) result in this assay. Fact sheet for Healthcare Providers: BuildHer.es   Fact sheet for Patients: BuildHer.es           Methodology: Isothermal Nucleic Acid Amplification         Results reported to the appropriate Health Department         Flu A/B Ag Detection [[id number]] Collected: 01/16/22 1230   Order Status: Completed Specimen: Nasopharyngeal Swab Updated: 01/16/22 1313    Specimen Description . NASOPHARYNGEAL SWAB    Special Requests NOT REPORTED    Direct Exam NEGATIVE for Influenza A + B antigens.                                PCR testing to confirm this result is available upon request. Paige Greene will be saved in the laboratory for 7 days.  Please call [phone] if PCR testing is indicated.      Medications:      chlorhexidine  15 mL Mouth/Throat BID    cefepime  2,000 mg IntraVENous Q8H    vancomycin (VANCOCIN) intermittent dosing (placeholder)   Other RX Placeholder    vancomycin  750 mg IntraVENous Q12H    enoxaparin  30 mg SubCUTAneous BID    QUEtiapine  50 mg Oral Once    ALPRAZolam  0.5 mg Oral TID    ipratropium-albuterol  1 ampule Inhalation 4x daily    budesonide  0.5 mg Nebulization BID    metoprolol succinate  50 mg Oral Nightly    dexamethasone  6 mg IntraVENous Q24H    sodium chloride flush  5-40 mL IntraVENous 2 times per day    levETIRAcetam  500 mg Oral BID    aspirin  325 mg Oral Daily    amLODIPine  5 mg Oral Nightly    atorvastatin  20 mg Oral Daily    insulin glargine  40 Units SubCUTAneous BID    insulin lispro  0-18 Units SubCUTAneous TID WC    insulin lispro  0-9 Units SubCUTAneous Nightly           [facility]  Araceli Oh MD  [email or web address] messaging  OFFICE: [phone]      Electronically signed by Araceli Oh MD on 1/24/2022 at 10:39 AM  Thank you for allowing us to participate in the care of this patient. Please call with questions. This note iscreated with the assistance of a speech recognition program.  While intending to generate a document that actually reflects the content of the visit, the document can still have some errors including those of syntax andsound a like substitutions which may escape proof reading. In such instances, actual meaning can be extrapolated by contextual diversion.

## 2022-01-24 NOTE — PROGRESS NOTES
Comprehensive Nutrition Assessment    Type and Reason for Visit:  Reassess,Calorie Count (tube feeding ordering and monitoring)    Nutrition Recommendations/Plan:   1. Continue NPO diet  2. Start Glucerna 1.5 Jean Carlos at 10 mL/hr and advance by 10 mL every 4 hours until goal rate 43 mL/hr (1032 mL total volume)  3. Monitor tube feeding rate, tube feeding tolerance, labs, vent status and GI function    Nutrition Assessment:  Patient remains intubated and sedated. Nutrition consult received for tube feeding ordering and management. Recommend Glucerna 1.5 Jean Carlos goal rate 43 mL/hr (1032 mL total volume). Will monitor tube feeding rate and tolerance. Malnutrition Assessment:  Malnutrition Status: At risk for malnutrition (Comment)      Estimated Daily Nutrient Needs:  Energy (kcal):  8837-0520 kcal (MSJ 1.2, 1.3 factor); Weight Used for Energy Requirements:  Current     Protein (g):   gm protein (0.8-1.0 g/kg) d/t renal status; Weight Used for Protein Requirements:  Ideal          Nutrition Related Findings:  Edema: +1 pitting BLE. Hypoactive bowel sounds. Fever. Wounds:  None       Current Nutrition Therapies:    Diet NPO  ADULT TUBE FEEDING; Orogastric; Diabetic; Continuous; 10; Yes; 10; Q 4 hours; 43; 30; Q 4 hours  Current Tube Feeding (TF) Orders:  · Feeding Route: Orogastric  · Formula: Diabetic  · Schedule: Continuous  · Current TF & Flush Orders Provides: 1548 kcal and 85 gm of protein  · Goal TF & Flush Orders Provides: 3724-5469 kcal and  gm of protein    Additional Calorie Sources:   Propofol at 24.8 mL/hr (655 kcal)    Anthropometric Measures:  · Height: 5' 10.98\" (180.3 cm)  · Current Body Weight: 215 lb (97.5 kg)   · Admission Body Weight: 184 lb 15.5 oz (83.9 kg)    · Usual Body Weight: 255 lb (115.7 kg) (Fluid retention)     · Ideal Body Weight: 172 lbs; % Ideal Body Weight 125 %   · BMI: 30  · Adjusted Body Weight:  ; No Adjustment   · BMI Categories: Obese Class 1 (BMI 30.0-34. 9) Nutrition Diagnosis:   · Inadequate protein-energy intake related to inadequate protein-energy intake,impaired respiratory function as evidenced by NPO or clear liquid status due to medical condition,intubation,nutrition support - enteral nutrition      Nutrition Interventions:   Food and/or Nutrient Delivery:  Continue NPO,Start Tube Feeding  Nutrition Education/Counseling:  Education not indicated   Coordination of Nutrition Care:  Continue to monitor while inpatient    Goals:  EN support to provide >75% of estimated nutritional needs       Nutrition Monitoring and Evaluation:   Behavioral-Environmental Outcomes:  None Identified   Food/Nutrient Intake Outcomes:  Enteral Nutrition Intake/Tolerance  Physical Signs/Symptoms Outcomes:  Biochemical Data,Fluid Status or Edema,GI Status,Skin,Weight     Discharge Planning:     Too soon to determine           Marilyn Pulido  MFN, RDN, LDN  Lead Clinical Dietitian  RD Office Phone [phone]

## 2022-01-24 NOTE — CARE COORDINATION
DC Planning    Chart review    Pt was intubated yesterday. Cardio consulted Afib/flutter/SVT Amio gtt-pt is on low dose Lovenox will need to follow AC recs. Watch for LTACH-has been here 8 days. Neuro consult-Patient being followed for age-indeterminate small left thalamic infarct. Likely chronic to subacute in nature. Doubt this is acute or causing any patient symptoms. Patient is currently intubated on sedation. MRI is not urgent by any means at this point in time and would not change any management. Further treatment of COVID-19 pneumonia, respiratory failure as per pulmonology and infectious disease. No further neurologic recommendations at this time. We will sign off, please do not hesitate to reconsult as needed. Per pulm:   He sedated, intubated on ventilator, FiO2 55%/PEEP 16. He had SVT on Levophed so he has been switched to Luke-Synephrine. He is also on amiodarone drip. He is sedated with propofol and Versed.   He is also on fentanyl drip.

## 2022-01-24 NOTE — FLOWSHEET NOTE
Patient is in droplet isolation. Patient is intubated and unresponsive. No family is present. Writer provides prayer from the hallway. Patient does not respond. Spiritual Care will follow as needed.        01/24/22 1115   Encounter Summary   Services provided to: Patient not available   Referral/Consult From: [facility]   (1/24/22 COVID-19/intubated)   Complexity of Encounter Low   Length of Encounter 15 minutes   Routine   Type Follow up   Assessment Unable to respond   Intervention Prayer   Outcome Did not respond

## 2022-01-24 NOTE — PROGRESS NOTES
NEUROLOGY INPATIENT PROGRESS NOTE    Gilma Ruiz    MRN -  [id number]   Acct # - [de-identified]      - 1945    64 y.o. Subjective: The patient is seen as follow up for incidental recent small stroke. Pt got worse today while eating and was intubated due to desaturation. Patient is sedated with propofol and versed at this time to help ventilation. Objective:   BP (!) 145/76   Pulse 118   Temp 100.2 °F (37.9 °C) (Oral)   Resp 24   Ht 5' 10.98\" (1.803 m)   Wt 228 lb (103.4 kg)   SpO2 97%   BMI 31.82 kg/m²       Intake/Output Summary (Last 24 hours) at 2022 2349  Last data filed at 2022 1900  Gross per 24 hour   Intake 1443.44 ml   Output --   Net 1443.44 ml       Physical Exam:  General:  Intubated, sedated with propofol and versed         ROS:    Cardiac: no chest pain. No palpitations.   Renal : no flank pain, no hematuria  Skin: no rash    Medications:     chlorhexidine  15 mL Mouth/Throat BID    [START ON 2022] cefepime  2,000 mg IntraVENous Q8H    vancomycin (VANCOCIN) intermittent dosing (placeholder)   Other RX Placeholder    [START ON 2022] vancomycin  750 mg IntraVENous Q12H    enoxaparin  30 mg SubCUTAneous BID    QUEtiapine  50 mg Oral Once    ALPRAZolam  0.5 mg Oral TID    ipratropium-albuterol  1 ampule Inhalation 4x daily    budesonide  0.5 mg Nebulization BID    metoprolol succinate  50 mg Oral Nightly    dexamethasone  6 mg IntraVENous Q24H    sodium chloride flush  5-40 mL IntraVENous 2 times per day    levETIRAcetam  500 mg Oral BID    aspirin  325 mg Oral Daily    amLODIPine  5 mg Oral Nightly    atorvastatin  20 mg Oral Daily    insulin glargine  40 Units SubCUTAneous BID    insulin lispro  0-18 Units SubCUTAneous TID WC    insulin lispro  0-9 Units SubCUTAneous Nightly       Data:   CBC:   Recent Labs     22  0552 22  0526 22  0358   WBC 1.8* 2.1* 3.6   HGB 16.4 17.7* 18.2* PLT 71* 75* 95*     BMP:    Recent Labs     01/21/22  0552 01/22/22  0526 01/23/22  0358    139 139   K 3.4* 3.9 4.6   CL 98 102 103   CO2 32* 25 21   BUN 24* 23 28*   CREATININE 1.28* 0.97 1.04   GLUCOSE 52* 205* 214*           No results found for this or any previous visit. No results found for this or any previous visit. No results found for this or any previous visit. No results found for this or any previous visit. Results for orders placed during the hospital encounter of 11/02/21    MRI BRAIN WO CONTRAST    Narrative  EXAMINATION:  MRI OF THE BRAIN WITHOUT CONTRAST  11/2/2021 9:54 am    TECHNIQUE:  Multiplanar multisequence MRI of the brain was performed without the  administration of intravenous contrast.    COMPARISON:  12/10/2018 MRI brain and CT head 11/02/2021. HISTORY:  ORDERING SYSTEM PROVIDED HISTORY: new onset focal sz for 1 week  TECHNOLOGIST PROVIDED HISTORY:  new onset focal sz for 1 week  Decision Support Exception - unselect if not a suspected or confirmed  emergency medical condition->Emergency Medical Condition (MA)  Reason for Exam: new onset focal sz for 1 week    FINDINGS:  INTRACRANIAL STRUCTURES/VENTRICLES: There is no acute infarct. No mass effect  or midline shift. No evidence of an acute intracranial hemorrhage. The  ventricles and sulci are normal in size and configuration. The  sellar/suprasellar regions appear unremarkable. The normal signal voids  within the major intracranial vessels appear maintained. Mild involutional changes are identified within the brain. Minimal chronic  microvascular disease is appreciated within the periventricular white matter. No mesial temporal sclerosis is appreciated. ORBITS: The visualized portion of the orbits demonstrate no acute abnormality. SINUSES: The visualized paranasal sinuses and mastoid air cells are well  aerated.     BONES/SOFT TISSUES: The bone marrow signal intensity appears normal. The soft  tissues demonstrate no acute abnormality. Impression  No acute intracranial abnormality. Minimal chronic microvascular disease within the periventricular white matter  with associated mild atrophy. No results found for this or any previous visit. No results found for this or any previous visit. Results for orders placed during the hospital encounter of 01/16/22    CT HEAD WO CONTRAST    Addendum 1/18/2022  6:23 PM  ADDENDUM:  Case discussed with Dr. Kathryn Quintanilla at 6:19 p.m. Narrative  EXAMINATION:  CT OF THE HEAD WITHOUT CONTRAST  1/18/2022 5:42 pm    TECHNIQUE:  CT of the head was performed without the administration of intravenous  contrast. Dose modulation, iterative reconstruction, and/or weight based  adjustment of the mA/kV was utilized to reduce the radiation dose to as low  as reasonably achievable. COMPARISON:  CT head November 2,    HISTORY:  ORDERING SYSTEM PROVIDED HISTORY: r/o cva  TECHNOLOGIST PROVIDED HISTORY:  r/o cva    Reason for Exam: stroke alert, ams    FINDINGS:  BRAIN/VENTRICLES: There is no acute intracranial hemorrhage, mass effect or  midline shift. No abnormal extra-axial fluid collection. The gray-white  differentiation is maintained without evidence of an acute infarct. There is  no evidence of hydrocephalus. Punctate calcifications noted in the occipital  lobes bilaterally. Lacunar infarct left thalamus. This appears new since  the previous exam however. ORBITS: The visualized portion of the orbits demonstrate no acute abnormality. SINUSES: The visualized paranasal sinuses and mastoid air cells demonstrate  no acute abnormality. SOFT TISSUES/SKULL:  No acute abnormality of the visualized skull or soft  tissues. Impression  New lacunar infarct left thalamus, age indeterminate. MRI may be helpful. Assessment:  Active Problems:    COVID  Resolved Problems:    * No resolved hospital problems.  *    66 year old man with hx of DVT not on anticoagulant now, vaccinated but not boosted, p/w COVID pneumonia, now intubated. Neurology is following up his incidental small left thalamic stroke finding on CT    Plan:      1. MRI brain w/o contrast and MRA head/neck non urgently when patient is stable for incidental stroke workup  2. When mri done, please call neurology to follow up  3. con't aspirin 81mg daily  4. con't lipitor  5.  Lovenox for DVT prophylaxis    Harry Jordan MD 1/23/2022 11:49 PM     Jennifer Caban MD  Attending Neurologist/Neurointensivist

## 2022-01-24 NOTE — PROGRESS NOTES
Pts OG advanced 5cm. Now currently at 60cm at the lip. Air bolus performed and successfully osculated. Gastric contents also noted. Will continue to monitor pt.

## 2022-01-25 NOTE — PROGRESS NOTES
Located within [facility].,    Adult Hospitalist      Name: Madyson Roberts  MRN: [id number]     Acct: [de-identified]  Room: [address] [place], [place] [address]    Admit Date: 1/16/2022 11:51 AM  PCP: Archana Perez MD    Primary Problem  Active Problems:    COVID  Resolved Problems:    * No resolved hospital problems. *        Assesment:   Acute congestive heart failure, diastolic   [id number]   Viral pneumonia secondary to above  Acute respiratory failure with hypoxia intubated on 1/23/2022  Paroxysmal atrial fibrillation  Essential hypertension  Mixed hyperlipidemia  Diabetes mellitus type 2  History of seizure disorder  History of CKD stage III, presently normal renal function  Lung mass? Leukopenia  Polycythemia  Thrombocytopenia  Anxiety disorder  Recent past h/o lacunar infarct left thalamus   Altered mental status/agitation  Endotracheal intubation secondary to hypoxia dated 1/23/2022  Supraventricular tachycardia/elevated troponin  Fever      Plan:   Admit patient to intermediate floor  Oxygen to keep SPO2 more than 90%  Telemetry  Check vitals closely  CBC BMP daily    Echocardiogram  IV amiodarone drip  Cardiology consult  Luke-Synephrine    IVF @ 75 ml/ hr  Monitor urine output    Amiodarone  Cardiology following    IV Decadron  IV azithromycin-DC  Bronchodilators  Pulmicort  Patient not a candidate for Actemra or baricitinib secondary to cytopenia  Infectious disease consult  Pulmonology consult  Vanco/cefepime per ID    Continue Keppra  Continue amlodipine, atorvastatin  Continue aspirin  Xanax as needed  Lantus  seroquel prn   Precedex gtt needed   Continue other medication as below.     Scheduled Meds:   pantoprazole  40 mg IntraVENous Daily    And    sodium chloride (PF)  10 mL IntraVENous Daily    aspirin  324 mg Oral Daily    levETIRAcetam  500 mg Oral BID    cefepime  2,000 mg IntraVENous Q12H    chlorhexidine  15 mL Mouth/Throat BID    enoxaparin  30 mg SubCUTAneous BID    QUEtiapine  50 mg Oral Once    ALPRAZolam 0.5 mg Oral TID    ipratropium-albuterol  1 ampule Inhalation 4x daily    budesonide  0.5 mg Nebulization BID    metoprolol succinate  50 mg Oral Nightly    dexamethasone  6 mg IntraVENous Q24H    sodium chloride flush  5-40 mL IntraVENous 2 times per day    amLODIPine  5 mg Oral Nightly    atorvastatin  20 mg Oral Daily    insulin glargine  40 Units SubCUTAneous BID    insulin lispro  0-18 Units SubCUTAneous TID WC    insulin lispro  0-9 Units SubCUTAneous Nightly     Continuous Infusions:   amiodarone 0.5 mg/min (01/25/22 1719)    phenylephrine (KEARA-SYNEPHRINE) 50mg/250mL infusion 100 mcg/min (01/25/22 1721)    sodium chloride 75 mL/hr at 01/25/22 1719    propofol Stopped (01/25/22 0758)    midazolam (VERSED) 1 mg/mL in D5W infusion 10 mg/hr (01/25/22 1719)    fentaNYL 75 mcg/hr (01/25/22 1136)    norepinephrine Stopped (01/24/22 1046)    dexmedetomidine (PRECEDEX) IV infusion Stopped (01/23/22 1450)    sodium chloride      dextrose       PRN Meds:  LORazepam, 1 mg, Q4H PRN  dextrose bolus (hypoglycemia), 125 mL, PRN   Or  dextrose bolus (hypoglycemia), 250 mL, PRN  sodium chloride flush, 10 mL, PRN  sodium chloride, 25 mL, PRN  potassium chloride, 40 mEq, PRN   Or  potassium alternative oral replacement, 40 mEq, PRN   Or  potassium chloride, 10 mEq, PRN  magnesium sulfate, 1,000 mg, PRN  ondansetron, 4 mg, Q8H PRN   Or  ondansetron, 4 mg, Q6H PRN  magnesium hydroxide, 30 mL, Daily PRN  acetaminophen, 650 mg, Q6H PRN   Or  acetaminophen, 650 mg, Q6H PRN  glucose, 15 g, PRN  glucagon (rDNA), 1 mg, PRN  dextrose, 100 mL/hr, PRN  hydrALAZINE, 10 mg, Q6H PRN        Chief Complaint:     Chief Complaint   Patient presents with    Leg Swelling     bilateral, has CHF, on diuretic, EMT saw pt , assisted pt into car    Fever    Other     low SPO2         History of Present Illness:     I have seen and examined patient today, patient last 24 hours events were reviewed also discussed with nursing REPAIR INCISIONAL HERNIA,REDUCIBLE N/A 5/10/2017    HERNIA VENTRAL REPAIR WITH MESH  performed by Lita Apley, DO at [facility] [address]  05/10/2017    with mesh        Medications Prior to Admission:       Prior to Admission medications    Medication Sig Start Date End Date Taking? Authorizing Provider   rosuvastatin (CRESTOR) 40 MG tablet Take 40 mg by mouth every evening   Yes Historical Provider, MD   insulin NPH (HUMULIN N;NOVOLIN N) 100 UNIT/ML injection vial Inject 20 Units into the skin 2 times daily (before meals)   Yes Historical Provider, MD   levETIRAcetam (KEPPRA) 500 MG tablet Take 1 tablet by mouth 2 times daily 11/3/21  Yes Hudson Gomez MD   venlafaxine (EFFEXOR XR) 150 MG extended release capsule Take 1 capsule by mouth daily (with breakfast) 7/29/19  Yes Arabella Kiser   vitamin B-1 (THIAMINE) 100 MG tablet Take 100 mg by mouth daily   Yes Historical Provider, MD   ferrous sulfate 325 (65 Fe) MG tablet Take 325 mg by mouth daily (with breakfast)   Yes Historical Provider, MD   ALPRAZolam (XANAX) 0.5 MG tablet Take 0.5 mg by mouth three times daily.    Yes Historical Provider, MD   furosemide (LASIX) 40 MG tablet Take 1 tablet by mouth 2 times daily 12/11/18  Yes Royce Evans MD   tiotropium (SPIRIVA RESPIMAT) 2.5 MCG/ACT AERS inhaler Inhale 2 puffs into the lungs daily    Yes Historical Provider, MD   albuterol sulfate  (90 Base) MCG/ACT inhaler Inhale 2 puffs into the lungs every 6 hours as needed for Wheezing or Shortness of Breath   Yes Historical Provider, MD   metoprolol succinate (TOPROL XL) 50 MG extended release tablet Take 50 mg by mouth daily   Yes Historical Provider, MD   Multiple Vitamins-Minerals (MULTIVITAMIN ADULT) TABS Take 1 tablet by mouth daily   Yes Historical Provider, MD   vitamin D (CHOLECALCIFEROL) 1000 UNIT TABS tablet Take 1,000 Units by mouth daily   Yes Historical Provider, MD   fluticasone (FLONASE) 50 MCG/ACT nasal spray 1 spray by Each Nare route daily as needed for Rhinitis   Yes Historical Provider, MD   aspirin 325 MG EC tablet Take 325 mg by mouth daily    Yes Historical Provider, MD   Omega-3 Fatty Acids (FISH OIL) 1000 MG CAPS Take 1 capsule by mouth daily    Yes Historical Provider, MD   amLODIPine (NORVASC) 5 MG tablet Take 5 mg by mouth nightly    Yes Historical Provider, MD   mirtazapine (REMERON) 45 MG tablet Take 45 mg by mouth nightly   Yes Historical Provider, MD        Allergies: Barbiturates, Codeine, and Sulfa antibiotics    Social History:     Tobacco:    reports that he has quit smoking. He has never used smokeless tobacco.  Alcohol:      reports no history of alcohol use. Drug Use:  reports no history of drug use.     Family History:     Family History   Problem Relation Age of Onset    Ovarian Cancer Sister     Ovarian Cancer Sister          Physical Exam:     Vitals:  /67   Pulse 76   Temp 99.4 °F (37.4 °C) (Axillary)   Resp (!) 32   Ht 5' 10.98\" (1.803 m)   Wt 216 lb (98 kg)   SpO2 96%   BMI 30.14 kg/m²   Temp (24hrs), Av.3 °F (37.4 °C), Min:98.4 °F (36.9 °C), Max:101 °F (38.3 °C)      General appearance -on ventilator  Mental status -sedated  Head - normocephalic and atraumatic  Eyes - conjunctiva clear  Ears -external ears normal  Nose - no drainage noted  Mouth - mucous membranes moist  Neck - supple, no carotid bruits, thyroid not palpable  Chest -basal crackles with decreased air entry bilaterally to auscultation, increased effort  Heart - normal rate, regular rhythm, no murmur  Abdomen - soft, nontender, nondistended, bowel sounds present all four quadrants, no masses, hepatomegaly or splenomegaly  Neurological -sedated on vent  Extremities - no edema, distal extremity discoloration    Skin - no gross lesions, rashes, or induration noted        Data:     Labs:    Hematology:  Recent Labs     22  0358 22  1118 22  8265 01/25/22  0515   WBC 3.6  --  15.2* 9.9   RBC 7.24*  --  7.59* 6.98*   HGB 18.2*  --  18.8* 17.3*   HCT 59.3*  --  64.4* 57.1*   MCV 81.9*  --  84.8 81.8*   MCH 25.1*  --  24.8* 24.8*   MCHC 30.7  --  29.2 30.3   RDW 17.0*  --  17.7* 17.6*   PLT 95*  --  170 183   MPV Abnormal  --  11.2 12.2   DDIMER  --  5.84*  --   --      Chemistry:  Recent Labs     01/23/22  0358 01/23/22  1118 01/23/22  1604 01/23/22  2258 01/24/22  0428 01/24/22  1046 01/25/22  0515     --   --   --  137  --  133*   K 4.6  --   --   --  4.9  --  4.6     --   --   --  99  --  100   CO2 21  --   --   --  21  --  20   GLUCOSE 214*  --   --   --  274*  --  315*   BUN 28*  --   --   --  46*  --  61*   CREATININE 1.04  --   --   --  1.84*  --  2.43*   ANIONGAP 15  --   --   --  17  --  13   LABGLOM >60  --   --   --  36*  --  26*   GFRAA >60  --   --   --  43*  --  31*   CALCIUM 8.4*  --   --   --  8.8  --  8.6   PROBNP  --  3,642*  --   --   --   --   --    TROPHS  --   --    < > 68* 82* 129*  --     < > = values in this interval not displayed.      Recent Labs     01/24/22  1123 01/24/22  1711 01/24/22  1934 01/25/22  0726 01/25/22  1120 01/25/22  1638   POCGLU 261* 318* 296* 299* 280* 238*       Lab Results   Component Value Date    INR 1.0 01/17/2022    INR 1.0 11/03/2021    INR 1.0 07/27/2019    PROTIME 13.3 01/17/2022    PROTIME 11.1 11/03/2021    PROTIME 10.5 07/27/2019       Lab Results   Component Value Date/Time    SPECIAL RT HAND 19ML 01/23/2022 05:59 PM    SPECIAL ART LINE 10ML 01/23/2022 05:59 PM     Lab Results   Component Value Date/Time    CULTURE NO GROWTH 1 DAY 01/23/2022 05:59 PM    CULTURE NO GROWTH 1 DAY 01/23/2022 05:59 PM       Lab Results   Component Value Date    POCPH 7.351 01/25/2022    POCPCO2 41.4 01/25/2022    POCPO2 83.4 01/25/2022    POCHCO3 22.9 01/25/2022    NBEA 3 01/25/2022    PBEA NOT REPORTED 01/25/2022    QGX7NPH NOT REPORTED 01/25/2022    LQIB4EOG 96 01/25/2022    FIO2 50.0 01/25/2022 Radiology:    XR CHEST 1 VIEW    Result Date: 1/16/2022  Hypoinflated lungs. Cardiomegaly again seen, when compared to the previous study performed 07/27/2019. Diffuse, increased interstitial markings throughout both lungs, some of which can be seen on the prior study may represent baseline chronic interstitial lung changes. The increased prominence on this exam could be secondary to the suboptimal inspiratory effort. The presence of pulmonary vascular congestion/mild CHF or bilateral interstitial pneumonia cannot be excluded. Clinical correlation is recommended. CT CHEST PULMONARY EMBOLISM W CONTRAST    Result Date: 1/16/2022  No evidence of pulmonary embolism. Compared to 2008, worsening underlying emphysema, with worsening subacute or acute interstitial lung disease, best seen left upper lobe; differential includes interstitial pneumonia or pneumonitis superimposed on emphysema, DIP, HP, and other interstitial pneumonias as well as infectious or inflammatory process superimposed on emphysema disease. Findings are not typical for COVID-19 pneumonia, but an element of the latter should be considered. Thickening and distortion left major fissure with ill-defined mass-like focus left lower lobe. The latter could also be infectious or inflammatory, although neoplasm should be excluded. Underlying worsening emphysema. Nodular densities, best seen right upper lobe. Small pleural effusions, slightly larger on the left. See recommendations below. Mild mediastinal and left hilar adenopathy; see recommendations below. Worsening now moderate-severe pulmonary artery hypertension. Mild cardiomegaly. Stable mild dilatation ascending thoracic aorta. Additional unchanged or incidental findings, as above. RECOMMENDATIONS: Clinical correlation and short-term follow-up CT chest in 1-4 months depending upon the clinical presentation/course.          All radiological studies reviewed                Code Status: Full Code    Electronically signed by Chelle Whitman MD on 1/25/2022 at 6:40 PM     Copy sent to Dr. Jenna Garcia MD    This note was created with the assistance of a speech-recognition program.  Although the intention is to generate a document that actually reflects the content of the visit, no guarantees can be provided that every mistake has been identified and corrected by editing. Note was updated later by me after  physical examination and  completion of the assessment.

## 2022-01-25 NOTE — PROGRESS NOTES
[facility].,   Section of Cardiology  Progress Note      Date:  1/25/2022  Patient: Toy Walters  Admission:  1/16/2022 11:51 AM  Admit DX: Hypokalemia [E87.6]  Hypomagnesemia [E83.42]  COPD exacerbation (HCC) [J44.1]  BLANCHE (acute kidney injury) ([facility] [address] [N17.9]  Acute respiratory failure with hypoxia (HCC) [J96.01]  Acute on chronic systolic CHF (congest[facility]) [facility] [I50.23]  COVID [U07.1]  COVID-19 [U07.1]  Age:  64 y.o., 1945                           LOS: 9 days     Reason for evaluation:   Paroxysmal atrial fibrillation. SUBJECTIVE:     The patient was seen and examined. Notes and labs reviewed. There were complications over night. Had an episode of atrial fibrillation which was treated with digoxin. Unable to obtain history from the patient. OBJECTIVE:    /64   Pulse 79   Temp 99.9 °F (37.7 °C) (Axillary)   Resp (!) 32   Ht 5' 10.98\" (1.803 m)   Wt 216 lb (98 kg)   SpO2 96%   BMI 30.14 kg/m²     Intake/Output Summary (Last 24 hours) at 1/25/2022 1610  Last data filed at 1/25/2022 0800  Gross per 24 hour   Intake 4248.3 ml   Output 800 ml   Net 3448. 3 ml       EXAM:   Intubated and sedated  Full physical exam was not done due to covid 19 infection.     Current Inpatient Medications:   pantoprazole  40 mg IntraVENous Daily    And    sodium chloride (PF)  10 mL IntraVENous Daily    aspirin  324 mg Oral Daily    levETIRAcetam  500 mg Oral BID    cefepime  2,000 mg IntraVENous Q12H    chlorhexidine  15 mL Mouth/Throat BID    enoxaparin  30 mg SubCUTAneous BID    QUEtiapine  50 mg Oral Once    ALPRAZolam  0.5 mg Oral TID    ipratropium-albuterol  1 ampule Inhalation 4x daily    budesonide  0.5 mg Nebulization BID    metoprolol succinate  50 mg Oral Nightly    dexamethasone  6 mg IntraVENous Q24H    sodium chloride flush  5-40 mL IntraVENous 2 times per day    amLODIPine  5 mg Oral Nightly    atorvastatin  20 mg Oral Daily    insulin glargine  40 Units SubCUTAneous BID    insulin lispro  0-18 Units SubCUTAneous TID WC    insulin lispro  0-9 Units SubCUTAneous Nightly       IV Infusions (if any):   amiodarone 0.5 mg/min (01/25/22 0528)    phenylephrine (KEARA-SYNEPHRINE) 50mg/250mL infusion 110 mcg/min (01/25/22 1128)    sodium chloride 75 mL/hr at 01/25/22 0546    propofol 15 mcg/kg/min (01/25/22 0528)    midazolam (VERSED) 1 mg/mL in D5W infusion 10 mg/hr (01/25/22 1128)    fentaNYL 75 mcg/hr (01/25/22 1136)    norepinephrine Stopped (01/24/22 1046)    dexmedetomidine (PRECEDEX) IV infusion Stopped (01/23/22 1450)    sodium chloride      dextrose         Diagnostics:   Telemetry: sinus rhythm with pacs. CXR:    Labs:   CBC:   Recent Labs     01/24/22 0428 01/25/22  0515   WBC 15.2* 9.9   HGB 18.8* 17.3*   HCT 64.4* 57.1*    183     BMP:   Recent Labs     01/24/22 0428 01/25/22  0515    133*   K 4.9 4.6   CO2 21 20   BUN 46* 61*   CREATININE 1.84* 2.43*   LABGLOM 36* 26*   GLUCOSE 274* 315*     BNP: No results for input(s): BNP in the last 72 hours. PT/INR: No results for input(s): PROTIME, INR in the last 72 hours. APTT:No results for input(s): APTT in the last 72 hours. CARDIAC ENZYMES:No results for input(s): CKTOTAL, CKMB, CKMBINDEX, TROPONINI in the last 72 hours. FASTING LIPID PANEL:  Lab Results   Component Value Date    HDL 30 11/03/2021    TRIG 181 11/03/2021     LIVER PROFILE:No results for input(s): AST, ALT, LABALBU in the last 72 hours. ASSESSMENT:    Patient Active Problem List   Diagnosis    Biventricular CHF (congestive heart failure) (HCC)    CKD (chronic kidney disease) stage 3, GFR 30-59 ml/min (HCC)    Essential hypertension    Blurred vision, right eye    Type 2 diabetes mellitus treated with insulin ([facility] [address]    Atherosclerotic plaque    Weakness on left side of face    Carotid stenosis, asymptomatic, bilateral    New onset seizure ([address]    COVID       PLAN:    1.  Paroxysmal atrial fibrillation  2. covid 19 pneumonia  3. Respiratory failure  4. Chronic kidney disease  Continue supportive therapy. Continue amiodarone drip for rhythm control  Will continue to monitor. Please see orders. Discussed with nursing.     Chantal Lucero MD, MD

## 2022-01-25 NOTE — PROGRESS NOTES
Pulmonary Critical Care Progress Note  Bronwyn Aguilera MD     Patient seen for the follow up of COVID-19 pneumonia, acute hypoxic respiratory failure. Subjective:  He sedated, intubated on ventilator, FiO2 50%/PEEP 15. He had SVT on Levophed so he has been switched to Luke-Synephrine. He is also on amiodarone drip. He is sedated with propofol and Versed. He is also on fentanyl drip. Examination:  Vitals: /67   Pulse 90   Temp 101 °F (38.3 °C) (Axillary)   Resp (!) 32   Ht 5' 10.98\" (1.803 m)   Wt 216 lb (98 kg)   SpO2 95%   BMI 30.14 kg/m²   General appearance: Sedated, intubated on ventilator  Neck: No JVD  Lungs: Bilateral crackles, no wheeze, moderate air exchange. Heart: regular rate and rhythm, S1, S2 normal, no gallop  Abdomen: Soft, non tender, + BS  Extremities: no cyanosis or clubbing.  No significant edema    LABs:  CBC:   Recent Labs     01/24/22 0428 01/25/22  0515   WBC 15.2* 9.9   HGB 18.8* 17.3*   HCT 64.4* 57.1*    183     BMP:   Recent Labs     01/24/22 0428 01/25/22 0515    133*   K 4.9 4.6   CO2 21 20   BUN 46* 61*   CREATININE 1.84* 2.43*   LABGLOM 36* 26*   GLUCOSE 274* 315*     ABG:  Lab Results   Component Value Date    HWL4HJS NOT REPORTED 01/25/2022    FIO2 50.0 01/25/2022       Lab Results   Component Value Date    POCPH 7.351 01/25/2022    POCPCO2 41.4 01/25/2022    POCPO2 83.4 01/25/2022    POCHCO3 22.9 01/25/2022    PBEA NOT REPORTED 01/25/2022    ZXF6XYI NOT REPORTED 01/25/2022    KWVV2ZHD 96 01/25/2022    FIO2 50.0 01/25/2022     Radiology:  X-ray chest 1/25/2022:  Bilateral pulmonary infiltrates      Impression:  · Acute on chronic hypoxic respiratory failure  · COVID-19 pneumonia  · COPD/pulmonary emphysema  · Acute left thalamic infarct/CVA  · Left lower lobe opacity versus nodule  · Pulmonary edema  · Elevated D-dimer, decreased platelets  · Systolic heart failure, s/p AICD placement  · BLANCHE on CKD  · Diabetes mellitus    Recommendations:  · Continue vent support, wean FiO2 as tolerated.   50% FiO2, PEEP at 15  · Fentanyl drip, 75 mics  · Propofol for sedation, for now off  · Versed at 10 mg/h for sedation  · Luke-Synephrine, titrate for map 65 or greater  · Amiodarone, 0.5 mg/h per cardiology  · Pulmicort aerosol treatment  · Airborne isolation  · IV Decadron 6 mg daily  · Not a candidate for Actemra/baricitinib stopped secondary to cytopenias  · Neurology following  · Albuterol and Ipratropium Q 4 hours and prn  · Tube feeds  · X-ray chest in am  · Labs: CBC and BMP in am  · DVT prophylaxis with low molecular weight heparin  · Will follow with you    Kylah Sevilla MD, [facility]  Pulmonary Critical Care and Sleep Medicine,  [facility]  Cell: [phone]  Office: [phone]  CC: 35 minutes

## 2022-01-25 NOTE — PROGRESS NOTES
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 Actemra had been considered but again due to the cytopenias and thrombocytopenia this has been held. Antimicrobial therapy: The patient received Rocephin 1000 mg and azithromycin 500 mg on 1/16/2022  The patient received a dose of levofloxacin 500 mg on 1/18/2020. Patient started on cefepime and vancomycin 1/23/2022  Vancomycin discontinued due to acute kidney injury 1/24/2022    The leukocytosis overall is improved and the blood culture data thus far has remained negative. He continues on cefepime for antibiotic coverage and vancomycin remains on hold  Continue supportive care for now    Infection Control Recommendations:   Droplet plus precautions    Discharge Planning:   Estimated Length of IV antimicrobials: 5 to 7 days  Patient will need Midline Catheter Insertion/ PICC line Insertion: No  Patient will need: Home IV , [place],  [facility],  LTAC: Undetermined  Patient willneed outpatient wound care: No    Medical Decision making / Summary of Stay:   Fermin Coates is a 64y.o.-year-old male who was initially admitted on 1/16/2022.    Mj Leigh has a history of diabetes mellitus type 2, essential hypertension, seizure disorder, chronic kidney disease stage III, hyperlipidemia among other medical problems.     The patient reports that about 1 to 2 months ago he had his diabetes medications changed and since that time he has noticed increasing swelling in his legs, hardness in the legs, difficulty ambulating, and weight gain from 178 to 255 pounds over the last 1 to 2 months. He did not report any subjective fevers, chills, cough or shortness of breath. He denies any loss of smell or change in taste. No abdominal pain nausea vomiting or diarrhea. The patient does report that he has home oxygen that he uses as needed at home and he reports that he hardly needs it. He is vaccinated did receive the J&J vaccine but has not yet received a booster dose.     The patient presented to the emergency department and was found to have leukopenia with a white blood cell count of 2.7 and thrombocytopenia with a platelet count of 118. Creatinine slightly elevated at 1.31 and proBNP is elevated at 9327. Chest x-ray showed hyperinflated lungs with cardiomegaly seen and diffuse increased interstitial markings in both lungs which may represent baseline chronic interstitial lung changes given that these findings were present before. A CT of the chest was done with IV contrast that showed no evidence of pulmonary embolism and compared to 2008 there is worsening underlying emphysema with worsening subacute or acute interstitial lung disease best seen in the left upper lobe. Findings were not felt typical for COVID-19 virus pneumonia. There was thickening and distortion of the left major fissure with an ill-defined masslike focus in the left lower lobe.   There is worsening moderate to severe pulmonary artery hypertension     COVID testing was positive and I was asked to evaluate and help with COVID-19 virus infection    Current evaluation:1/25/2022    /63   Pulse 81 Temp 98.5 °F (36.9 °C) (Axillary)   Resp (!) 32   Ht 5' 10.98\" (1.803 m)   Wt 215 lb (97.5 kg)   SpO2 96%   BMI 30.00 kg/m²     Temperature Range: Temp: 98.5 °F (36.9 °C) Temp  Av.3 °F (37.4 °C)  Min: 98.4 °F (36.9 °C)  Max: 100.8 °F (38.2 °C)     The patient continues on ventilatory support and sedation with fentanyl, Versed and propofol. The patient is on 50% FiO2 on the ventilator and 16 of PEEP. He continues to require Luke-Synephrine for blood pressure support. He continues to have high-grade fevers. The renal parameters continue to worsen though the urine output is improved    Review of Systems   Unable to perform ROS: Intubated       Physical Examination :     Physical Exam  Constitutional:       Appearance: He is well-developed. Interventions: He is sedated and intubated. HENT:      Head: Normocephalic and atraumatic. Cardiovascular:      Rate and Rhythm: Normal rate. Heart sounds: Normal heart sounds. No friction rub. No gallop. Pulmonary:      Effort: Pulmonary effort is normal. He is intubated. Breath sounds: Normal breath sounds. No wheezing. Abdominal:      General: Bowel sounds are normal.      Palpations: Abdomen is soft. There is no mass. Tenderness: There is no abdominal tenderness. Musculoskeletal:      Cervical back: Neck supple. Lymphadenopathy:      Cervical: No cervical adenopathy. Skin:     General: Skin is warm and dry. Laboratory data:   I have independently reviewed the followinglabs:  CBC with Differential:   Recent Labs     22  0428 22  0515   WBC 15.2* 9.9   HGB 18.8* 17.3*   HCT 64.4* 57.1*    183   LYMPHOPCT 8* 7*   MONOPCT 7 7     BMP:   Recent Labs     22  0428 22  0515    133*   K 4.9 4.6   CL 99 100   CO2 21 20   BUN 46* 61*   CREATININE 1.84* 2.43*     Hepatic Function Panel:   No results for input(s): PROT, LABALBU, BILIDIR, IBILI, BILITOT, ALKPHOS, ALT, AST in the last 72 hours.       Lab Results   Component Value Date    PROCAL 0.24 01/23/2022    PROCAL 0.11 01/19/2022    PROCAL 0.11 01/16/2022     Lab Results   Component Value Date    CRP 23.5 01/16/2022    CRP 2.0 07/27/2019     Lab Results   Component Value Date    SEDRATE 3 01/16/2022         Lab Results   Component Value Date    DDIMER 5.84 01/23/2022    DDIMER 1.53 01/18/2022    DDIMER 1.73 01/16/2022    DDIMER 1.18 12/07/2018     Lab Results   Component Value Date    FERRITIN 569 01/16/2022    FERRITIN 50 07/23/2019    FERRITIN 18 07/08/2019     Lab Results   Component Value Date     01/16/2022     Lab Results   Component Value Date    FIBRINOGEN 402 01/16/2022       Results in Past 30 Days  Result Component Current Result Ref Range Previous Result Ref Range   SARS-CoV-2, Rapid DETECTED (A) (1/16/2022) Not Detected Not in Time Range      Lab Results   Component Value Date    COVID19 DETECTED 01/16/2022    COVID19 Not Detected 11/02/2021       No results for input(s): VANCOTROUGH in the last 72 hours. Imaging Studies:   ONE XRAY VIEW OF THE CHEST 1/24/2022 6:09 am    Impression   1.  Right IJ central venous catheter, endotracheal tube, and enteric tube   remain in place.       2.  No change in bilateral diffuse interstitial and hazy airspace opacity. Veins: Lower Extremities DVT Study, Venous Scan Lower Bilateral.  Indications for Study: Leg Swelling . Patient Status: In Patient. Technical Quality: Limited visualization. Limitation reason: Edema. Comments: Simultaneous real time imaging utilizing B-Mode, color doppler and spectral waveform analysis was performed on the  bilateral lower extremities for venous examination of the deep and superficial systems. Conclusions  Summary  No evidence of superficial or deep venous thrombosis in both lower extremities.   Signature  Electronically signed by Severiano Saris, RVT(Sonographer) on 01/19/2022 08:10 AM  Electronically signed by Rand Soulier physician) on 01/19/2022 06:21 PM      CT OF THE HEAD WITHOUT CONTRAST  1/18/2022 5:42 pm  Impression   New lacunar infarct left thalamus, age indeterminate. Vonzell Albino may be helpful.           Cultures:     Culture, Blood 1 [[id number]] Collected: 01/23/22 1759   Order Status: Completed Specimen: Blood Updated: 01/24/22 2110    Specimen Description . BLOOD    Special Requests RT HAND 19ML    Culture NO GROWTH 1 DAY   Culture, Blood 1 [[id number]] Collected: 01/23/22 1759   Order Status: Completed Specimen: Blood Updated: 01/24/22 2109    Specimen Description . BLOOD    Special Requests ART LINE 10ML    Culture NO GROWTH 1 DAY   Culture, Blood 2 [[id number]] Collected: 01/21/22 0742   Order Status: Completed Specimen: Blood Updated: 01/24/22 0954    Specimen Description . BLOOD    Special Requests LEFT AC 20ML    Culture NO GROWTH 3 DAYS   Culture, Blood 1 [[id number]] Collected: 01/21/22 0750   Order Status: Completed Specimen: Blood Updated: 01/24/22 0954    Specimen Description . BLOOD    Special Requests LEFT HAND 5ML    Culture NO GROWTH 3 DAYS     Culture, Blood 1 [[id number]] Collected: 01/16/22 1220   Order Status: Completed Specimen: Blood Updated: 01/21/22 1521    Specimen Description . BLOOD    Special Requests LAC 12ML    Culture NO GROWTH 5 DAYS   Culture, Blood 1 [[id number]] Collected: 01/16/22 1205   Order Status: Completed Specimen: Blood Updated: 01/21/22 1521    Specimen Description . BLOOD    Special Requests RAC 12ML    Culture NO GROWTH 5 DAYS       Culture, Urine [[id number]] Collected: 01/16/22 1315   Order Status: Completed Specimen: Urine, clean catch Updated: 01/17/22 2128    Specimen Description . CLEAN CATCH URINE    Special Requests NOT REPORTED    Culture NO SIGNIFICANT GROWTH       COVID-19, Rapid [[id number]] (Abnormal) Collected: 01/16/22 1230   Order Status: Completed Specimen: Nasopharyngeal Swab Updated: 01/16/22 1314    Specimen Description . NASOPHARYNGEAL SWAB    SARS-CoV-2, Rapid DETECTED Abnormal  Comment:        Rapid NAAT: The specimen is POSITIVE for SARS-Cov-2, the novel coronavirus associated with   COVID-19.         This test has been authorized by the FDA under an Emergency Use Authorization (EUA) for use   by authorized laboratories.         The ID NOW COVID-19 assay is designed to detect the virus that causes COVID-19 in patients   with signs and symptoms of infection who are suspected of COVID-19. An individual without symptoms of COVID-19 and who is not shedding SARS-CoV-2 virus would   expect to have a negative (not detected) result in this assay. Fact sheet for Healthcare Providers: BuildHer.es   Fact sheet for Patients: BuildHer.es           Methodology: Isothermal Nucleic Acid Amplification         Results reported to the appropriate Health Department         Flu A/B Ag Detection [[id number]] Collected: 01/16/22 1230   Order Status: Completed Specimen: Nasopharyngeal Swab Updated: 01/16/22 1313    Specimen Description . NASOPHARYNGEAL SWAB    Special Requests NOT REPORTED    Direct Exam NEGATIVE for Influenza A + B antigens.                                PCR testing to confirm this result is available upon request. Yana Neves will be saved in the laboratory for 7 days.  Please call [phone] if PCR testing is indicated.      Medications:      pantoprazole  40 mg IntraVENous Daily    And    sodium chloride (PF)  10 mL IntraVENous Daily    aspirin  324 mg Oral Daily    levETIRAcetam  500 mg Oral BID    cefepime  2,000 mg IntraVENous Q12H    chlorhexidine  15 mL Mouth/Throat BID    enoxaparin  30 mg SubCUTAneous BID    QUEtiapine  50 mg Oral Once    ALPRAZolam  0.5 mg Oral TID    ipratropium-albuterol  1 ampule Inhalation 4x daily    budesonide  0.5 mg Nebulization BID    metoprolol succinate  50 mg Oral Nightly    dexamethasone  6 mg IntraVENous Q24H    sodium chloride flush  5-40 mL IntraVENous 2 times per day    amLODIPine  5 mg Oral Nightly    atorvastatin  20 mg Oral Daily    insulin glargine  40 Units SubCUTAneous BID    insulin lispro  0-18 Units SubCUTAneous TID WC    insulin lispro  0-9 Units SubCUTAneous Nightly           [facility]  Panda Tong MD  [employer]  OFFICE: [phone]      Electronically signed by Panda Tong MD on 1/25/2022 at 7:25 AM  Thank you for allowing us to participate in the care of this patient. Please call with questions. This note iscreated with the assistance of a speech recognition program.  While intending to generate a document that actually reflects the content of the visit, the document can still have some errors including those of syntax andsound a like substitutions which may escape proof reading. In such instances, actual meaning can be extrapolated by contextual diversion.

## 2022-01-26 NOTE — PROGRESS NOTES
[facility].,    Adult Hospitalist      Name: Maurizio Crawford  MRN: [id number]     Acct: [de-identified]  Room: [address] [place] [address]-    Admit Date: 1/16/2022 11:51 AM  PCP: Marly Porras MD    Primary Problem  Active Problems:    COVID-19    Acute on chronic systolic CHF (congestive heart failure) (HCC)    Acute respiratory failure with hypoxia (HCC)    BLANCHE (acute kidney injury) ([address]    COPD exacerbation ([facility] [address]    Hypokalemia    Hypomagnesemia    Palliative care encounter    Goals of care, counseling/discussion    DNR (do not resuscitate) discussion    ACP (advance care planning)  Resolved Problems:    * No resolved hospital problems. *        Assesment:   Acute congestive heart failure, diastolic   [id number]   Viral pneumonia secondary to above  Acute respiratory failure with hypoxia intubated on 1/23/2022  Paroxysmal atrial fibrillation  Essential hypertension  Mixed hyperlipidemia  Diabetes mellitus type 2  History of seizure disorder  History of CKD stage III, presently normal renal function  Lung mass?   Leukopenia  Polycythemia  Thrombocytopenia  Anxiety disorder  Recent past h/o lacunar infarct left thalamus   Altered mental status/agitation  Endotracheal intubation secondary to hypoxia dated 1/23/2022  Supraventricular tachycardia/elevated troponin  Fever      Plan:   Admit patient to intermediate floor  Mechanical ventilation sedation as per critical care  Telemetry  Check vitals closely  CBC BMP daily    Echocardiogram  IV amiodarone drip  Cardiology consult  Luke-Synephrine    IVF @ 75 ml/ hr  Monitor urine output    Amiodarone  Cardiology following    IV Decadron  IV azithromycin-DC  Bronchodilators  Pulmicort  Patient not a candidate for Actemra or baricitinib secondary to cytopenia  Infectious disease consult  Pulmonology consult  Vanco/cefepime per ID    Continue Keppra  Continue amlodipine, atorvastatin  Continue aspirin  Xanax as needed  Lantus  seroquel prn   Precedex gtt needed   Continue other medication as below.     Scheduled Meds:   pantoprazole  40 mg IntraVENous Daily    And    sodium chloride (PF)  10 mL IntraVENous Daily    aspirin  324 mg Oral Daily    levETIRAcetam  500 mg Oral BID    cefepime  2,000 mg IntraVENous Q12H    chlorhexidine  15 mL Mouth/Throat BID    enoxaparin  30 mg SubCUTAneous BID    QUEtiapine  50 mg Oral Once    ALPRAZolam  0.5 mg Oral TID    ipratropium-albuterol  1 ampule Inhalation 4x daily    budesonide  0.5 mg Nebulization BID    metoprolol succinate  50 mg Oral Nightly    dexamethasone  6 mg IntraVENous Q24H    sodium chloride flush  5-40 mL IntraVENous 2 times per day    amLODIPine  5 mg Oral Nightly    atorvastatin  20 mg Oral Daily    insulin glargine  40 Units SubCUTAneous BID    insulin lispro  0-18 Units SubCUTAneous TID WC    insulin lispro  0-9 Units SubCUTAneous Nightly     Continuous Infusions:   amiodarone 0.5 mg/min (01/26/22 1230)    phenylephrine (KEARA-SYNEPHRINE) 50mg/250mL infusion 10 mcg/min (01/26/22 1540)    sodium chloride 75 mL/hr at 01/26/22 1038    propofol Stopped (01/25/22 0758)    midazolam (VERSED) 1 mg/mL in D5W infusion 7 mg/hr (01/26/22 1541)    fentaNYL 75 mcg/hr (01/26/22 1215)    norepinephrine Stopped (01/24/22 1046)    dexmedetomidine (PRECEDEX) IV infusion Stopped (01/23/22 1450)    sodium chloride      dextrose       PRN Meds:  LORazepam, 1 mg, Q4H PRN  dextrose bolus (hypoglycemia), 125 mL, PRN   Or  dextrose bolus (hypoglycemia), 250 mL, PRN  sodium chloride flush, 10 mL, PRN  sodium chloride, 25 mL, PRN  potassium chloride, 40 mEq, PRN   Or  potassium alternative oral replacement, 40 mEq, PRN   Or  potassium chloride, 10 mEq, PRN  magnesium sulfate, 1,000 mg, PRN  ondansetron, 4 mg, Q8H PRN   Or  ondansetron, 4 mg, Q6H PRN  magnesium hydroxide, 30 mL, Daily PRN  acetaminophen, 650 mg, Q6H PRN   Or  acetaminophen, 650 mg, Q6H PRN  glucose, 15 g, PRN  glucagon (rDNA), 1 mg, PRN  dextrose, 100 mL/hr, PRN  hydrALAZINE, 10 mg, Q6H PRN        Chief Complaint:     Chief Complaint   Patient presents with    Leg Swelling     bilateral, has CHF, on diuretic, EMT saw pt , assisted pt into car    Fever    Other     low SPO2         History of Present Illness:     I have seen and examined patient today, patient last 24 hours events were reviewed also discussed with nursing staff  No new complaints  Currently on propofol, Versed and fentanyl  Overnight patient did not had any acute issues  Patient remains in medical ICU, patient remains intubated sedated      Review of systems:  Unable to conduct ROS secondary to patient being intubated      Initial HPI  Leonie Morales is a 64 y.o.  male who presents with Leg Swelling (bilateral, has CHF, on diuretic, EMT saw pt , assisted pt into car), Fever, and Other (low SPO2)  This is a 64-year-old gentleman admitted via ER, come to ER with complaint of having shortness of breath, patient has medical history significant for COPD patient with history of cardiac failure: Patient noted that he has been having increasing swelling in his lower extremity and becoming more short of breath, further patient also been having some body aches and sweats, patient patient testing in the emergency room showed that he is positive for COVID-19 also noticed to have an elevated BNP, imaging concerning for fluid overload, admitted for further regiment    I have personally reviewed the past medical history, past surgical history, medications, social history, and family history, and summarized in the note.     Review of Systems:       Unable to conduct ROS secondary to patient being intubated      Past Medical History:     Past Medical History:   Diagnosis Date    Arthritis     Atherosclerotic plaque 7/28/2019    Cervical disc disease     degenerative disc disease    Diabetes mellitus ([facility] [address]     type 2    History of blood transfusion     Hx of blood clots     left leg    Hyperlipidemia     Neuropathy     Right kidney mass     \"urologist is watching\"    Snores     Type 2 diabetes mellitus treated with insulin ([facility] [address] 7/28/2019        Past Surgical History:     Past Surgical History:   Procedure Laterality Date    ABDOMINAL AORTIC ANEURYSM REPAIR  2005    CARPAL TUNNEL RELEASE Bilateral     CERVICAL SPINE SURGERY      COLONOSCOPY      benign polyps removed    INGUINAL HERNIA REPAIR Right     MD REPAIR INCISIONAL HERNIA,REDUCIBLE N/A 5/10/2017    HERNIA VENTRAL REPAIR WITH MESH  performed by Brenda Robison DO at [facility] [address]  05/10/2017    with mesh        Medications Prior to Admission:       Prior to Admission medications    Medication Sig Start Date End Date Taking? Authorizing Provider   rosuvastatin (CRESTOR) 40 MG tablet Take 40 mg by mouth every evening   Yes Historical Provider, MD   insulin NPH (HUMULIN N;NOVOLIN N) 100 UNIT/ML injection vial Inject 20 Units into the skin 2 times daily (before meals)   Yes Historical Provider, MD   levETIRAcetam (KEPPRA) 500 MG tablet Take 1 tablet by mouth 2 times daily 11/3/21  Yes Inge Valdez MD   venlafaxine (EFFEXOR XR) 150 MG extended release capsule Take 1 capsule by mouth daily (with breakfast) 7/29/19  Yes Arabella Kiser   vitamin B-1 (THIAMINE) 100 MG tablet Take 100 mg by mouth daily   Yes Historical Provider, MD   ferrous sulfate 325 (65 Fe) MG tablet Take 325 mg by mouth daily (with breakfast)   Yes Historical Provider, MD   ALPRAZolam (XANAX) 0.5 MG tablet Take 0.5 mg by mouth three times daily.    Yes Historical Provider, MD   furosemide (LASIX) 40 MG tablet Take 1 tablet by mouth 2 times daily 12/11/18  Yes Julien Evans MD   tiotropium (SPIRIVA RESPIMAT) 2.5 MCG/ACT AERS inhaler Inhale 2 puffs into the lungs daily    Yes Historical Provider, MD   albuterol sulfate  (90 Base) MCG/ACT inhaler Inhale 2 puffs into the lungs every 6 hours as needed for Wheezing or Shortness of Breath   Yes Historical Provider, MD   metoprolol succinate (TOPROL XL) 50 MG extended release tablet Take 50 mg by mouth daily   Yes Historical Provider, MD   Multiple Vitamins-Minerals (MULTIVITAMIN ADULT) TABS Take 1 tablet by mouth daily   Yes Historical Provider, MD   vitamin D (CHOLECALCIFEROL) 1000 UNIT TABS tablet Take 1,000 Units by mouth daily   Yes Historical Provider, MD   fluticasone (FLONASE) 50 MCG/ACT nasal spray 1 spray by Each Nare route daily as needed for Rhinitis   Yes Historical Provider, MD   aspirin 325 MG EC tablet Take 325 mg by mouth daily    Yes Historical Provider, MD   Omega-3 Fatty Acids (FISH OIL) 1000 MG CAPS Take 1 capsule by mouth daily    Yes Historical Provider, MD   amLODIPine (NORVASC) 5 MG tablet Take 5 mg by mouth nightly    Yes Historical Provider, MD   mirtazapine (REMERON) 45 MG tablet Take 45 mg by mouth nightly   Yes Historical Provider, MD        Allergies: Barbiturates, Codeine, and Sulfa antibiotics    Social History:     Tobacco:    reports that he has quit smoking. He has never used smokeless tobacco.  Alcohol:      reports no history of alcohol use. Drug Use:  reports no history of drug use.     Family History:     Family History   Problem Relation Age of Onset    Ovarian Cancer Sister     Ovarian Cancer Sister          Physical Exam:     Vitals:  /66   Pulse 70   Temp 98.8 °F (37.1 °C) (Oral)   Resp (!) 32   Ht 5' 10.98\" (1.803 m)   Wt 216 lb (98 kg)   SpO2 (!) 86%   BMI 30.14 kg/m²   Temp (24hrs), Av.7 °F (37.1 °C), Min:98 °F (36.7 °C), Max:99.7 °F (37.6 °C)      General appearance -on ventilator  Mental status -sedated  Head - normocephalic and atraumatic  Eyes - conjunctiva clear  Ears -external ears normal  Nose - no drainage noted  Mouth - mucous membranes moist  Neck - supple, no carotid bruits, thyroid not palpable  Chest -basal crackles with decreased air entry bilaterally to auscultation, increased effort  Heart - normal rate, regular rhythm, no murmur  Abdomen - soft, nontender, nondistended, bowel sounds present all four quadrants, no masses, hepatomegaly or splenomegaly  Neurological -sedated on vent  Extremities - no edema, distal extremity discoloration    Skin - no gross lesions, rashes, or induration noted        Data:     Labs:    Hematology:  Recent Labs     01/24/22  0428 01/25/22  0515 01/26/22  0515   WBC 15.2* 9.9 8.6   RBC 7.59* 6.98* 6.67*   HGB 18.8* 17.3* 16.4   HCT 64.4* 57.1* 55.3*   MCV 84.8 81.8* 82.9   MCH 24.8* 24.8* 24.6*   MCHC 29.2 30.3 29.7   RDW 17.7* 17.6* 17.3*    183 210   MPV 11.2 12.2 11.9     Chemistry:  Recent Labs     01/23/22 2258 01/24/22  0428 01/24/22  1046 01/25/22  0515 01/26/22  0515   NA  --  137  --  133* 132*   K  --  4.9  --  4.6 4.8   CL  --  99  --  100 102   CO2  --  21  --  20 19*   GLUCOSE  --  274*  --  315* 335*   BUN  --  46*  --  61* 77*   CREATININE  --  1.84*  --  2.43* 2.29*   ANIONGAP  --  17  --  13 11   LABGLOM  --  36*  --  26* 28*   GFRAA  --  43*  --  31* 34*   CALCIUM  --  8.8  --  8.6 8.3*   TROPHS 68* 82* 129*  --   --      Recent Labs     01/25/22  1120 01/25/22  1638 01/25/22  1950 01/26/22  0720 01/26/22  1232 01/26/22  1600   POCGLU 280* 238* 225* 320* 315* 303*       Lab Results   Component Value Date    INR 1.0 01/17/2022    INR 1.0 11/03/2021    INR 1.0 07/27/2019    PROTIME 13.3 01/17/2022    PROTIME 11.1 11/03/2021    PROTIME 10.5 07/27/2019       Lab Results   Component Value Date/Time    SPECIAL RT HAND 19ML 01/23/2022 05:59 PM    SPECIAL ART LINE 10ML 01/23/2022 05:59 PM     Lab Results   Component Value Date/Time    CULTURE NO GROWTH 3 DAYS 01/23/2022 05:59 PM    CULTURE NO GROWTH 3 DAYS 01/23/2022 05:59 PM       Lab Results   Component Value Date    POCPH 7.321 01/26/2022    POCPCO2 42.6 01/26/2022    POCPO2 86.0 01/26/2022    POCHCO3 22.0 01/26/2022    NBEA 4 01/26/2022    PBEA NOT REPORTED 01/26/2022    ZMI2DOS NOT REPORTED 01/26/2022 [id number] 01/26/2022    FIO2 55.0 01/26/2022       Radiology:    XR CHEST 1 VIEW    Result Date: 1/16/2022  Hypoinflated lungs. Cardiomegaly again seen, when compared to the previous study performed 07/27/2019. Diffuse, increased interstitial markings throughout both lungs, some of which can be seen on the prior study may represent baseline chronic interstitial lung changes. The increased prominence on this exam could be secondary to the suboptimal inspiratory effort. The presence of pulmonary vascular congestion/mild CHF or bilateral interstitial pneumonia cannot be excluded. Clinical correlation is recommended. CT CHEST PULMONARY EMBOLISM W CONTRAST    Result Date: 1/16/2022  No evidence of pulmonary embolism. Compared to 2008, worsening underlying emphysema, with worsening subacute or acute interstitial lung disease, best seen left upper lobe; differential includes interstitial pneumonia or pneumonitis superimposed on emphysema, DIP, HP, and other interstitial pneumonias as well as infectious or inflammatory process superimposed on emphysema disease. Findings are not typical for COVID-19 pneumonia, but an element of the latter should be considered. Thickening and distortion left major fissure with ill-defined mass-like focus left lower lobe. The latter could also be infectious or inflammatory, although neoplasm should be excluded. Underlying worsening emphysema. Nodular densities, best seen right upper lobe. Small pleural effusions, slightly larger on the left. See recommendations below. Mild mediastinal and left hilar adenopathy; see recommendations below. Worsening now moderate-severe pulmonary artery hypertension. Mild cardiomegaly. Stable mild dilatation ascending thoracic aorta. Additional unchanged or incidental findings, as above. RECOMMENDATIONS: Clinical correlation and short-term follow-up CT chest in 1-4 months depending upon the clinical presentation/course.          All radiological studies reviewed                Code Status:  Full Code    Electronically signed by Aamir Powell MD on 1/26/2022 at 4:23 PM     Copy sent to Dr. Iam Florez MD    This note was created with the assistance of a speech-recognition program.  Although the intention is to generate a document that actually reflects the content of the visit, no guarantees can be provided that every mistake has been identified and corrected by editing. Note was updated later by me after  physical examination and  completion of the assessment.

## 2022-01-26 NOTE — CONSULTS
Palliative Care Inpatient Consult    NAME:  Hans Garcia  MEDICAL RECORD NUMBER:  [id number]  AGE: 64 y.o. GENDER: male  : 1945  TODAY'S DATE:  2022    Reasons for Consultation:    Symptom and/or pain management  Provision of information regarding PC and/or hospice philosophies  Complex, time-intensive communication and interdisciplinary psychosocial support  Clarification of goals of care and/or assistance with difficult decision-making  Guidance in regards to resources and transition(s)    Members of PC team contributing to this consultation are :  Dmitri Raman, Palliative Care APRN-CNP  History of Present Illness     The patient is a 64 y.o. Non- / non  male who presents with Leg Swelling (bilateral, has CHF, on diuretic, EMT saw pt , assisted pt into car), Fever, and Other (low SPO2)      Referred to Palliative Care by   [x] Physician   [] Nursing  [] Family Request   [] Other:       He was admitted to the primary service for Hypokalemia [E87.6]  Hypomagnesemia [E83.42]  COPD exacerbation ([facility] [J44.1]  BLANCHE (acute kidney injury) ([address] [N17.9]  Acute respiratory failure with hypoxia ([address] [J96.01]  Acute on chronic systolic CHF (congestive heart failure) ([facility] [address] [I50.23]  COVID [U07.1]  COVID-19 [U07.1]. His hospital course has been associated with COPD exacerbation, worsening acute interstitial lung disease, COVID-19 pneumonia, acute hypoxic respiratory failure, BLANCHE, acute on chronic systolic CHF s/p AICD, hypokalemia, hypomagnesemia, elevated D-dimer, elevated troponin, elevated inflammatory markers, subacute small left thalamic stroke, and worsening moderate to severe pulmonary artery hypertension. The patient has a complicated medical history and has been hospitalized since 2022 11:51 AM.  I reviewed patient's EMR, spoke to RN, and patient's spouse to collect history.   Patient has a history of diabetes, HLD, right kidney mass, blood clots left leg, blood transfusion, neuropathy, AAA repair, COPD, CHF, ? Lung mass, seizures, and cervical disc disease. Patient was brought to the ED with complaints of bilateral leg swelling, fever, and low SPO2. It appeared that EMT saw patient, and assisted patient into his car. Patient was unaware of fever on admission. He denied any recent cough, congestion, runny nose, and/or body aches. He denies any sick contacts, and reports receiving both doses of COVID-19 but did not get the booster. On arrival to the ED patient was noted to be mildly hypertensive, febrile, tachypneic, and hypoxic on room air requiring 4 L nasal cannula. Patient tested positive for COVID-19. Patient takes 40 of Lasix oral at home, and was given this via IV in the ED. Potassium and magnesium were replaced, and patient was started on Decadron. Patient's admitting pertinent labs included; WBC 2.7, hemoglobin 17.5, platelets 376, creatinine 1.31, troponin 36, BNP 9327, D-dimer 1.73, ferritin 569, pro-Jean Carlos 0.11, , CRP 23.5, magnesium 1.5, and UA negative. Chest x-ray revealed hypoinflated lungs, cardiomegaly, and diffuse increased interstitial markings throughout both lungs. CTA chest revealed no PE, worsening underlying emphysema, with worsening subacute or acute interstitial lung disease, small pleural effusions bilaterally, mild mediastinal/left hilar adenopathy, worsening now moderate to severe pulmonary artery hypertension, mild cardiomegaly, nodular densities best seen in the right upper lobe, thickening and distortion left major fissure with ill-defined masslike focus left lower lobe. EKG revealed SR with first-degree AV block with occasional PVCs, and nonspecific T wave abnormality now evident in inferior leads. Multiple consults including cardiology, ID, and pulmonary. Cardiology recommended continuing IV Lasix.   ID felt that patient did not truly have Covid pneumonia, and held off on antiviral therapy/medical antibiotic, or immunosuppressive therapy. It was noted that patient had received J&J vaccine a month ago. Vascular consulted on the 18th due to lower extremity edema with mild erythema. He recommended bilateral JEFF hose, and continued antibiotics. Venous scan of BLE was negative. Neurology consulted on the 18th due to lethargy, and code stroke was called. CT head showed a new small left thalamic infarct of undetermined time that did not present on the MRI 3 months ago. Patient did not have any focal deficits or strokelike symptoms. Patient takes [address] for seizure, but has not had a seizure on this admission. Neurology reports that the stroke is not acute, and happens within the last 3 months. Patient required intubation on the 23rd due to worsening respiratory status. Cardiology consulted on the 24th due to proximal atrial fibrillation and flutter. Patient was given amiodarone bolus/drip, and converted. Patient is a full code. Palliative care consulted for review of goals, CODE STATUS, and support. Chest x-ray yesterday revealed  Impression   No significant interval change.  Diffuse interstitial and hazy airspace   opacities.      1/26 pertinent labs include; pH 7.321, glucose 335, BUN 77, creatinine 2.29, sodium 132,    Active Hospital Problems    Diagnosis Date Noted    COVID [U07.1] 01/16/2022       PAST MEDICAL HISTORY      Diagnosis Date    Arthritis     Atherosclerotic plaque 7/28/2019    Cervical disc disease     degenerative disc disease    Diabetes mellitus ([address]     type 2    History of blood transfusion     Hx of blood clots     left leg    Hyperlipidemia     Neuropathy     Right kidney mass     \"urologist is watching\"    Snores     Type 2 diabetes mellitus treated with insulin ([address] 7/28/2019       PAST SURGICAL HISTORY  Past Surgical History:   Procedure Laterality Date    ABDOMINAL AORTIC ANEURYSM REPAIR  2005    CARPAL TUNNEL RELEASE Bilateral     CERVICAL SPINE SURGERY      COLONOSCOPY      benign polyps removed    INGUINAL HERNIA REPAIR Right     OH REPAIR INCISIONAL HERNIA,REDUCIBLE N/A 5/10/2017    HERNIA VENTRAL REPAIR WITH MESH  performed by Lita Apley, DO at [address]  05/10/2017    with mesh       SOCIAL HISTORY  Social History     Tobacco Use    Smoking status: Former Smoker    Smokeless tobacco: Never Used    Tobacco comment: quit 30 years ago   Substance Use Topics    Alcohol use: No    Drug use: No       ALLERGIES  Allergies   Allergen Reactions    Barbiturates Anxiety     Other reaction(s): Unknown    Codeine Palpitations    Sulfa Antibiotics Rash     Other reaction(s): Unknown         MEDICATIONS  Current Medications    pantoprazole  40 mg IntraVENous Daily    And    sodium chloride (PF)  10 mL IntraVENous Daily    aspirin  324 mg Oral Daily    levETIRAcetam  500 mg Oral BID    cefepime  2,000 mg IntraVENous Q12H    chlorhexidine  15 mL Mouth/Throat BID    enoxaparin  30 mg SubCUTAneous BID    QUEtiapine  50 mg Oral Once    ALPRAZolam  0.5 mg Oral TID    ipratropium-albuterol  1 ampule Inhalation 4x daily    budesonide  0.5 mg Nebulization BID    metoprolol succinate  50 mg Oral Nightly    dexamethasone  6 mg IntraVENous Q24H    sodium chloride flush  5-40 mL IntraVENous 2 times per day    amLODIPine  5 mg Oral Nightly    atorvastatin  20 mg Oral Daily    insulin glargine  40 Units SubCUTAneous BID    insulin lispro  0-18 Units SubCUTAneous TID WC    insulin lispro  0-9 Units SubCUTAneous Nightly     LORazepam, dextrose bolus (hypoglycemia) **OR** dextrose bolus (hypoglycemia), sodium chloride flush, sodium chloride, potassium chloride **OR** potassium alternative oral replacement **OR** potassium chloride, magnesium sulfate, ondansetron **OR** ondansetron, magnesium hydroxide, acetaminophen **OR** acetaminophen, glucose, glucagon (rDNA), dextrose, hydrALAZINE  IV Drips/Infusions   amiodarone 0.5 mg/min (01/26/22 0508)    phenylephrine (KEARA-SYNEPHRINE) 50mg/250mL infusion 40 mcg/min (01/26/22 1044)    sodium chloride 75 mL/hr at 01/26/22 1038    propofol Stopped (01/25/22 0758)    midazolam (VERSED) 1 mg/mL in D5W infusion 10 mg/hr (01/26/22 1047)    fentaNYL 75 mcg/hr (01/25/22 2201)    norepinephrine Stopped (01/24/22 1046)    dexmedetomidine (PRECEDEX) IV infusion Stopped (01/23/22 1450)    sodium chloride      dextrose       Home Medications  No current facility-administered medications on file prior to encounter. Current Outpatient Medications on File Prior to Encounter   Medication Sig Dispense Refill    rosuvastatin (CRESTOR) 40 MG tablet Take 40 mg by mouth every evening      insulin NPH (HUMULIN N;NOVOLIN N) 100 UNIT/ML injection vial Inject 20 Units into the skin 2 times daily (before meals)      levETIRAcetam (KEPPRA) 500 MG tablet Take 1 tablet by mouth 2 times daily 60 tablet 3    venlafaxine (EFFEXOR XR) 150 MG extended release capsule Take 1 capsule by mouth daily (with breakfast) 30 capsule 3    vitamin B-1 (THIAMINE) 100 MG tablet Take 100 mg by mouth daily      ferrous sulfate 325 (65 Fe) MG tablet Take 325 mg by mouth daily (with breakfast)      ALPRAZolam (XANAX) 0.5 MG tablet Take 0.5 mg by mouth three times daily.       furosemide (LASIX) 40 MG tablet Take 1 tablet by mouth 2 times daily 60 tablet 3    tiotropium (SPIRIVA RESPIMAT) 2.5 MCG/ACT AERS inhaler Inhale 2 puffs into the lungs daily       albuterol sulfate  (90 Base) MCG/ACT inhaler Inhale 2 puffs into the lungs every 6 hours as needed for Wheezing or Shortness of Breath      metoprolol succinate (TOPROL XL) 50 MG extended release tablet Take 50 mg by mouth daily      Multiple Vitamins-Minerals (MULTIVITAMIN ADULT) TABS Take 1 tablet by mouth daily      vitamin D (CHOLECALCIFEROL) 1000 UNIT TABS tablet Take 1,000 Units by mouth daily      fluticasone (FLONASE) 50 MCG/ACT nasal spray 1 spray by Each Nare route daily as needed for Rhinitis      aspirin 325 MG EC tablet Take 325 mg by mouth daily       Omega-3 Fatty Acids (FISH OIL) 1000 MG CAPS Take 1 capsule by mouth daily       amLODIPine (NORVASC) 5 MG tablet Take 5 mg by mouth nightly       mirtazapine (REMERON) 45 MG tablet Take 45 mg by mouth nightly         Data         BP (!) 140/67   Pulse 66   Temp 98.2 °F (36.8 °C)   Resp 28   Ht 5' 10.98\" (1.803 m)   Wt 216 lb (98 kg)   SpO2 97%   BMI 30.14 kg/m²     Wt Readings from Last 3 Encounters:   01/25/22 216 lb (98 kg)   11/02/21 185 lb (83.9 kg)   07/27/19 245 lb (111.1 kg)        Code Status: Full Code     ADVANCED CARE PLANNING:  Patient has capacity for medical decisions: no  Health Care Power of : yes  Living Will: not asked     Personal, Social, and Family History  Marital Status:   Living situation:with family:  spouse  Importance of gustavo/Jehovah's witness/spiritual beliefs: [] Very [] Somewhat [] Not   Psychological Distress: sedated on vent  Does patient understand diagnosis/treatment? not asked  Does caregiver understand diagnosis/treatment? yes      Assessment        REVIEW OF SYSTEMS  Unable to assess-patient is intubated and sedated. PHYSICAL ASSESSMENT: Patient seen from door due to strict COVID-19 isolation, and in efforts to conserve PPE during global pandemic. Constitutional: On vent and appears critically ill   Cardiovascular: Normal rate and regular rhythm,  Pulmonary/Chest: On vent FiO2 55%/PEEP 16.  RN had recently increased FiO2 to 100% d/t desaturations, and patient then was decreased to 70%   Musculoskeletal: +BLE edema  Neurological: On sedation   Skin: Color WNL    Palliative Performance Scale:  ___60%  Ambulation reduced; Significant disease; Can't do hobbies/housework; intake normal or reduced; occasional assist; LOC full/confusion  ___50%  Mainly sit/lie;  Extensive disease; Can't do any work; Considerable assist; intake normal or reduced; LOC full/confusion  ___40%  Mainly in bed; Extensive disease; Mainly assist; intake normal or reduced; LOC full/confusion   ___30%  Bed Bound; Extensive disease; Total care; intake reduced; LOCfull/confusion  ___20%  Bed Bound; Extensive disease; Total care; intake minimal; Drowsy/coma  _x__10%  Bed Bound; Extensive disease; Total care; Mouth care only; Drowsy/coma  ___0       Death      Plan      Palliative Interaction: I went to see patient, and he was sedated on vent. RN was at bedside, and provided update. She reports that patient is desaturating, and FiO2 increased to 100% from 55%. Patient is weaning down on Luke-Synephrine pressor support. IV antibiotics and amio drip continue. I informed her that I would reach out to family today. I reached out to patient's wife, and received voicemail. I left return contact information. I reached out to patient's daughter, and introduced myself to her and the palliative role. I informed her that our palliative nurse previously spoke with her. I asked her if she is receiving updates on her fathers condition, and she reported yes. We discussed how patient is day # 4 on vent, settings, and that he requiring pressor support. I explained COVID-19 pneumonia in detail, and allowed time for questioning. I discussed typical days on ventilator for non-Covid patients, and risk that come with prolonged intubation. Daughter reports being a stepdaughter, but states that patient raised her since she was 1years of age. She reports being the closest to patient, as he has 3 biological children from previous marriage and another stepchild. I confirmed that wife is decision-maker. I discussed how CODE STATUS was discussed prior to intubation, and patient was hesitant about intubation and CPR but then decided to remain a full code. I discussed CPR, and COVID-19.   I explained DNR CCA CODE STATUS, and encouraged daughter to speak with her mother if patient's condition worsened and he had cardiac arrest if he would want CPR. Daughter reports having a dream, and feels like this is a sign that patient may not make it. She is tearful, and support was provided. I asked if there was any spiritual needs, and she reported no. I offered zoom call, and she reports that they have been able to come up to visit and will hold off for now. Education/support to staff  Education/support to family  Communications with primary service  Providing support for coping/adaptation/distress of family  Discussing meaning/purpose   Decision making regarding life prolonging treatment  Continue with current plan of care  Clarification of medical condition to patient and family  Code status clarified: Full Code  Code status clarified: [facility]  Code status clarified: [facility]  Palliative care orders introduced  Validating patient/family distress  Continued communication updates  Recognizing, reflecting, and empathizing with family members' anticipatory grief  Principle Problem/Diagnosis:  Covid-19    Additional Assessments:   Active Problems:    COVID-19    Acute on chronic systolic CHF (congestive heart failure) ([facility] [address]    Acute respiratory failure with hypoxia ([facility] [address]    BLANCHE (acute kidney injury) ([facility] [address]    COPD exacerbation ([facility] [address]    Hypokalemia    Hypomagnesemia    Palliative care encounter    Goals of care, counseling/discussion    DNR (do not resuscitate) discussion    ACP (advance care planning)  Resolved Problems:    * No resolved hospital problems. *    1- Symptom management/ pain control     Pain Assessment:  The patient is not having any pain. Anxiety:  none                          Dyspnea:  acute dyspnea - on vent                           Fatigue:  Sedated on vent    Other: We feel the patient symptoms are being controlled. his current regimen is reviewed by myself and discussed with the staff.      2- Goals of care evaluation   The patient goals of care are live longer, improve or maintain function/quality of life and support for family/caregiver   Goals of care discussed with:    [] Patient independently    [] Patient and Family    [x] Family or Healthcare DPOA independently    [] Unable to discuss with patient, family/DPOA not present    3- Code Status  Full Code    4- Other recommendations   - We will continue to provide comfort and support to the patient and the family  Please call with any palliative questions or concerns. Palliative Care Team is available via perfect serve or via phone. Palliative Care will continue to follow Mr. Dominguez's care as needed. Thank you for allowing Palliative Care to participate in the care of Mr. Heena Guillermo . This note has been dictated by dragon, typing errors may be a possibility. The total time I spent in seeing the patient, discussing goals of care, advanced directives, code status and other major issues was more than 60 minutes      Electronically signed by   HERB De Oliveira - CNP  Palliative Care Team  on 1/26/2022 at 11:02 AM    Palliative Care can be reached via Freebase.

## 2022-01-26 NOTE — PROGRESS NOTES
Last BM documented was 10 days ago on 1/16/2022. Milk of magnesia ordered PRN and given. Pharmacy ok with dose since CrCl greater than 30. Tube feeding infusing and adjusted rate to goal of 43ml/hr per order.

## 2022-01-26 NOTE — PROGRESS NOTES
Pulmonary Critical Care Progress Note  Fernando Ormond, MD     Patient seen for the follow up of COVID-19 pneumonia, acute hypoxic respiratory failure. Subjective:  He sedated, intubated on ventilator, FiO2 70%/PEEP 15. He had SVT on Levophed so he has been switched to Luke-Synephrine. He is also on amiodarone drip. He is sedated with propofol and Versed. He is also on fentanyl drip. Examination:  Vitals: /63   Pulse 69   Temp 98 °F (36.7 °C) (Oral)   Resp (!) 32   Ht 5' 10.98\" (1.803 m)   Wt 216 lb (98 kg)   SpO2 90%   BMI 30.14 kg/m²   General appearance: Sedated, intubated on ventilator  Neck: No JVD  Lungs: Bilateral crackles, no wheeze, moderate air exchange. Heart: regular rate and rhythm, S1, S2 normal, no gallop  Abdomen: Soft, non tender, + BS  Extremities: no cyanosis or clubbing.  No significant edema    LABs:  CBC:   Recent Labs     01/25/22 0515 01/26/22 0515   WBC 9.9 8.6   HGB 17.3* 16.4   HCT 57.1* 55.3*    210     BMP:   Recent Labs     01/25/22 0515 01/26/22 0515   * 132*   K 4.6 4.8   CO2 20 19*   BUN 61* 77*   CREATININE 2.43* 2.29*   LABGLOM 26* 28*   GLUCOSE 315* 335*     ABG:  Lab Results   Component Value Date    JAQ7ZPH NOT REPORTED 01/26/2022    FIO2 55.0 01/26/2022       Lab Results   Component Value Date    POCPH 7.321 01/26/2022    POCPCO2 42.6 01/26/2022    POCPO2 86.0 01/26/2022    POCHCO3 22.0 01/26/2022    PBEA NOT REPORTED 01/26/2022    XLE9GTG NOT REPORTED 01/26/2022    TXHG5DZG 96 01/26/2022    FIO2 55.0 01/26/2022     Radiology:  X-ray chest 1/25/2022:  Bilateral pulmonary infiltrates      Impression:  · Acute on chronic hypoxic respiratory failure  · COVID-19 pneumonia  · COPD/pulmonary emphysema  · Acute left thalamic infarct/CVA  · Left lower lobe opacity versus nodule  · Pulmonary edema  · Elevated D-dimer, decreased platelets  · Systolic heart failure, s/p AICD placement  · BLANCHE on CKD  · Diabetes mellitus    Recommendations:  · Continue vent support, wean FiO2 as tolerated.   70% FiO2, PEEP at 16  · Fentanyl drip, 75 mics  · Propofol for sedation, for now off  · Versed at 8 mg/h for sedation  · Luke-Synephrine, titrate for map 65 or greater  · Amiodarone, 0.5 mg/h per cardiology  · Pulmicort aerosol treatment  · Airborne isolation  · IV Decadron 6 mg daily  · Not a candidate for Actemra/baricitinib stopped secondary to cytopenias  · Neurology following  · Albuterol and Ipratropium Q 4 hours and prn  · Tube feeds  · X-ray chest in am  · Labs: CBC and BMP in am  · DVT prophylaxis with low molecular weight heparin  · Discussed with RN  · Will follow with you    Mateus Agrawal MD, [facility]  Pulmonary Critical Care and Sleep Medicine,  [facility]  Cell: [phone]  Office: [phone]  CC: 35 minutes

## 2022-01-26 NOTE — PROGRESS NOTES
Updated pt daughter Dalila Ardonotoniel. Answered questions about blood pressure. Daughter satisfied with answers.

## 2022-01-26 NOTE — PROGRESS NOTES
[facility]  Progress Note    Toy Walters  MRN: [id number]  Date: 1/26/2022  LOS: 8     Reason for F/U :   COVID-19 virus infection    Impression :   1. COVID-19 virus infection tested + 1/16/2022  2. Acute hypoxic respiratory failure, currently ventilator dependent  · Intubated 1/23/2022  3. Emphysema with worsening changes on imaging  4. Worsening acute interstitial lung disease and clinically not typical COVID-19 virus infection  5. Worsening moderate to severe pulmonary artery hypertension  6. Bilateral lower extremity edema, likely related to above  7. Leukopenia and thrombocytopenia  8. 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 Fever up to 103 degrees 1/22/2023  10. Paroxysmal atrial fibrillation  11. Acute kidney injury    Recommendations:   · COVID-19 therapy:  · The patient continues on Decadron 6 mg IV daily, initiated by the pulmonary team  · Patient was started on baricitinib, initiated 1/17/2022, but was discontinued 1/18/2022 due to cytopenias  · Actemra has been considered but again, due to cytopenias and thrombocytopenia, this has not been given    · Antimicrobial therapy:  · Patient received ceftriaxone and azithromycin 1/16/2022  · He received a dose of levofloxacin 1/18/2022  · He was started on cefepime and vancomycin 1/23/2022  · Vancomycin was discontinued 1/24/2022 due to the acute kidney injury    · Patient is currently ventilator dependent, FiO2 55%  · He continues on cefepime for antibiotic coverage  · Continue supportive care    Infection Control Recommendations:   Droplet plus precautions    Discharge Planning:   Estimated Length of IV antimicrobials:  To be determined  Patient will need Midline Catheter Insertion/ PICC line Insertion: No  Patient will need: Home IV , [place],  SNF,  LTAC: Undetermined  Patient willneed outpatient wound care: No    Medical Decision making / Summary of Stay:   Herson Dominguez is a 64y.o.-year-old male who was initially admitted on 1/16/2022.   Roddy Henriquez has a history of diabetes mellitus type 2, essential hypertension, seizure disorder, chronic kidney disease stage III, hyperlipidemia among other medical problems.     The patient reports that about 1 to 2 months ago he had his diabetes medications changed and since that time he has noticed increasing swelling in his legs, hardness in the legs, difficulty ambulating, and weight gain from 178 to 255 pounds over the last 1 to 2 months. He did not report any subjective fevers, chills, cough or shortness of breath. He denies any loss of smell or change in taste. No abdominal pain nausea vomiting or diarrhea. The patient does report that he has home oxygen that he uses as needed at home and he reports that he hardly needs it. He is vaccinated did receive the J&J vaccine but has not yet received a booster dose.     The patient presented to the emergency department and was found to have leukopenia with a white blood cell count of 2.7 and thrombocytopenia with a platelet count of 648. Creatinine slightly elevated at 1.31 and proBNP is elevated at 9327. Chest x-ray showed hyperinflated lungs with cardiomegaly seen and diffuse increased interstitial markings in both lungs which may represent baseline chronic interstitial lung changes given that these findings were present before. A CT of the chest was done with IV contrast that showed no evidence of pulmonary embolism and compared to 2008 there is worsening underlying emphysema with worsening subacute or acute interstitial lung disease best seen in the left upper lobe.  Findings were not felt typical for COVID-19 virus pneumonia.   There was thickening and distortion of the left major fissure with an ill-defined masslike focus in the left lower lobe. Eulalio Janene is worsening moderate to severe pulmonary artery hypertension     COVID testing was positive and I was asked to evaluate and help with COVID-19 virus infection    Current evaluation:2022    /62   Pulse 67   Temp 99.1 °F (37.3 °C) (Oral)   Resp (!) 32   Ht 5' 10.98\" (1.803 m)   Wt 216 lb (98 kg)   SpO2 96%   BMI 30.14 kg/m²     Temperature Range: Temp: 99.1 °F (37.3 °C) Temp  Av.8 °F (37.7 °C)  Min: 99.1 °F (37.3 °C)  Max: 101 °F (38.3 °C)    Patient was seen and evaluated the bedside  He remains sedated and ventilator dependent, FiO2 55%  Patient continues to have elevated temperatures, T-max in 24 hours 38.3      Review of Systems   Unable to perform ROS: Intubated       Physical Examination :     Physical Exam  Constitutional:       General: He is not in acute distress. Appearance: Normal appearance. He is normal weight. Interventions: He is sedated and intubated. HENT:      Head: Normocephalic and atraumatic. Cardiovascular:      Rate and Rhythm: Normal rate and regular rhythm. Pulmonary:      Effort: Pulmonary effort is normal. No respiratory distress. He is intubated. Breath sounds: Normal breath sounds. Abdominal:      General: Abdomen is flat. Bowel sounds are normal.      Palpations: Abdomen is soft. Skin:     General: Skin is warm and dry. Laboratory data:   I have independently reviewed the followinglabs:  CBC with Differential:   Recent Labs     22  0515 22  0515   WBC 9.9 8.6   HGB 17.3* 16.4   HCT 57.1* 55.3*    210   LYMPHOPCT 7* PENDING   MONOPCT 7 PENDING     BMP:   Recent Labs     22  0515 22  0515   * 132*   K 4.6 4.8    102   CO2 20 19*   BUN 61* 77*   CREATININE 2.43* 2.29*     Hepatic Function Panel:   No results for input(s): PROT, LABALBU, BILIDIR, IBILI, BILITOT, ALKPHOS, ALT, AST in the last 72 hours.       Lab Results   Component Value Date    PROCAL 0.24 2022    PROCAL 0.11 2022    PROCAL 0.11 2022     Lab Results   Component Value Date    CRP 23.5 2022    CRP 2.0 2019     Lab Results   Component Value Date    SEDRATE 3 2022 Lab Results   Component Value Date    DDIMER 5.84 01/23/2022    DDIMER 1.53 01/18/2022    DDIMER 1.73 01/16/2022    DDIMER 1.18 12/07/2018     Lab Results   Component Value Date    FERRITIN 569 01/16/2022    FERRITIN 50 07/23/2019    FERRITIN 18 07/08/2019     Lab Results   Component Value Date     01/16/2022     Lab Results   Component Value Date    FIBRINOGEN 402 01/16/2022       Results in Past 30 Days  Result Component Current Result Ref Range Previous Result Ref Range   SARS-CoV-2, Rapid DETECTED (A) (1/16/2022) Not Detected Not in Time Range      Lab Results   Component Value Date    COVID19 DETECTED 01/16/2022    COVID19 Not Detected 11/02/2021       No results for input(s): VANCOTROUGH in the last 72 hours. Imaging Studies:   No new imaging c  Cultures:     Culture, Blood 1 [[id number]] Collected: 01/23/22 1759   Order Status: Completed Specimen: Blood Updated: 01/26/22 0006    Specimen Description . BLOOD    Special Requests RT HAND 19ML    Culture NO GROWTH 3 DAYS   Culture, Blood 1 [[id number]] Collected: 01/23/22 1759   Order Status: Completed Specimen: Blood Updated: 01/26/22 0006    Specimen Description . BLOOD    Special Requests ART LINE 10ML    Culture NO GROWTH 3 DAYS         Medications:      pantoprazole  40 mg IntraVENous Daily    And    sodium chloride (PF)  10 mL IntraVENous Daily    aspirin  324 mg Oral Daily    levETIRAcetam  500 mg Oral BID    cefepime  2,000 mg IntraVENous Q12H    chlorhexidine  15 mL Mouth/Throat BID    enoxaparin  30 mg SubCUTAneous BID    QUEtiapine  50 mg Oral Once    ALPRAZolam  0.5 mg Oral TID    ipratropium-albuterol  1 ampule Inhalation 4x daily    budesonide  0.5 mg Nebulization BID    metoprolol succinate  50 mg Oral Nightly    dexamethasone  6 mg IntraVENous Q24H    sodium chloride flush  5-40 mL IntraVENous 2 times per day    amLODIPine  5 mg Oral Nightly    atorvastatin  20 mg Oral Daily    insulin glargine  40 Units SubCUTAneous BID    insulin lispro  0-18 Units SubCUTAneous TID WC    insulin lispro  0-9 Units SubCUTAneous Nightly           [facility]  HERB Odell CNP  Perfect [employer] messaging  OFFICE: [phone]      Electronically signed by HERB Odell CNP on 1/26/2022 at 6:03 AM  Thank you for allowing us to participate in the care of this patient. Please call with questions. This note iscreated with the assistance of a speech recognition program.  While intending to generate a document that actually reflects the content of the visit, the document can still have some errors including those of syntax andsound a like substitutions which may escape proof reading. In such instances, actual meaning can be extrapolated by contextual diversion.

## 2022-01-26 NOTE — PROGRESS NOTES
Physical Therapy  DATE: 2022    NAME: Donal Cortes  MRN: [id number]   : 1945    Patient not seen this date for Physical Therapy due to:      [x] Cancel by RN or physician due to: Patient with discolored legs and nurse reports having trouble finding pedal pulse and is having a doppler done. [] Hemodialysis    [] Critical Lab Value Level     [] Blood transfusion in progress    [x] Acute or unstable cardiovascular status   _MAP < 55 or more than >115  _HR < 40 or > 130    [] Acute or unstable pulmonary status   -FiO2 > 60%   _RR < 5 or >40    _O2 sats < 85%    [] Strict Bedrest    [] Off Unit for surgery or procedure    [] Off Unit for testing       [] Pending imaging to R/O fracture    [] Refusal by Patient      [] Other      [] PT being discontinued at this time. Patient independent. No further needs. [] PT being discontinued at this time as the patient has been transferred to hospice care. No further needs.       Adelita Almanza, PTA

## 2022-01-26 NOTE — PROGRESS NOTES
[facility]   Section of Cardiology  Progress Note      Date:  1/26/2022  Patient: Maricarmen Betts  Admission:  1/16/2022 11:51 AM  Admit DX: Hypokalemia [E87.6]  Hypomagnesemia [E83.42]  COPD exacerbation (HCC) [J44.1]  BLANCHE (acute kidney injury) ([facility] [address] [N17.9]  Acute respiratory failure with hypoxia (HCC) [J96.01]  Acute on chronic systolic CHF (congestive heart failure) ([facility] [address] [I50.23]  COVID [U07.1]  COVID-19 [U07.1]  Age:  64 y.o., 1945                           LOS: 10 days     Reason for evaluation:   Paroxysmal atrial fibrillation. covid 19 pneumonia      SUBJECTIVE:     The patient was seen and examined. Notes and labs reviewed. There were complications over night. Still intubated and sedated. OBJECTIVE:    /65   Pulse 69   Temp 98.8 °F (37.1 °C) (Oral)   Resp (!) 32   Ht 5' 10.98\" (1.803 m)   Wt 216 lb (98 kg)   SpO2 95%   BMI 30.14 kg/m²     Intake/Output Summary (Last 24 hours) at 1/26/2022 1703  Last data filed at 1/26/2022 1601  Gross per 24 hour   Intake 4014.51 ml   Output 1350 ml   Net 2664.51 ml       EXAM:   Intubated and sedated  Full physical exam was not done due to covid 19 infection.     Current Inpatient Medications:   pantoprazole  40 mg IntraVENous Daily    And    sodium chloride (PF)  10 mL IntraVENous Daily    aspirin  324 mg Oral Daily    levETIRAcetam  500 mg Oral BID    cefepime  2,000 mg IntraVENous Q12H    chlorhexidine  15 mL Mouth/Throat BID    enoxaparin  30 mg SubCUTAneous BID    QUEtiapine  50 mg Oral Once    ALPRAZolam  0.5 mg Oral TID    ipratropium-albuterol  1 ampule Inhalation 4x daily    budesonide  0.5 mg Nebulization BID    metoprolol succinate  50 mg Oral Nightly    dexamethasone  6 mg IntraVENous Q24H    sodium chloride flush  5-40 mL IntraVENous 2 times per day    amLODIPine  5 mg Oral Nightly    atorvastatin  20 mg Oral Daily    insulin glargine  40 Units SubCUTAneous BID    insulin lispro  0-18 Units SubCUTAneous TID WC    insulin lispro  0-9 Units SubCUTAneous Nightly       IV Infusions (if any):   amiodarone 0.5 mg/min (01/26/22 1230)    phenylephrine (KEARA-SYNEPHRINE) 50mg/250mL infusion 10 mcg/min (01/26/22 1540)    sodium chloride 75 mL/hr at 01/26/22 1038    propofol Stopped (01/25/22 0758)    midazolam (VERSED) 1 mg/mL in D5W infusion 7 mg/hr (01/26/22 1541)    fentaNYL 75 mcg/hr (01/26/22 1215)    norepinephrine Stopped (01/24/22 1046)    dexmedetomidine (PRECEDEX) IV infusion Stopped (01/23/22 1450)    sodium chloride      dextrose         Diagnostics:   Telemetry: sinus rhythm with pacs. CXR:    Labs:   CBC:   Recent Labs     01/25/22  0515 01/26/22  0515   WBC 9.9 8.6   HGB 17.3* 16.4   HCT 57.1* 55.3*    210     BMP:   Recent Labs     01/25/22  0515 01/26/22  0515   * 132*   K 4.6 4.8   CO2 20 19*   BUN 61* 77*   CREATININE 2.43* 2.29*   LABGLOM 26* 28*   GLUCOSE 315* 335*     BNP: No results for input(s): BNP in the last 72 hours. PT/INR: No results for input(s): PROTIME, INR in the last 72 hours. APTT:No results for input(s): APTT in the last 72 hours. CARDIAC ENZYMES:No results for input(s): CKTOTAL, CKMB, CKMBINDEX, TROPONINI in the last 72 hours. FASTING LIPID PANEL:  Lab Results   Component Value Date    HDL 30 11/03/2021    TRIG 181 11/03/2021     LIVER PROFILE:No results for input(s): AST, ALT, LABALBU in the last 72 hours.     ASSESSMENT:    Patient Active Problem List   Diagnosis    Biventricular CHF (congestive heart failure) (HCC)    CKD (chronic kidney disease) stage 3, GFR 30-59 ml/min (HCC)    Essential hypertension    Blurred vision, right eye    Type 2 diabetes mellitus treated with insulin ([address]    Atherosclerotic plaque    Weakness on left side of face    Carotid stenosis, asymptomatic, bilateral    New onset seizure ([address]    COVID-19    Acute on chronic systolic CHF (congestive heart failure) (HCC)    Acute respiratory failure with hypoxia (HCC)    [facility]ANCHE [facility]) ([facility] [address]    COPD exacerbation ([facility] [address]    Hypokalemia    Hypomagnesemia    Palliative care encounter    Goals of care, counseling/discussion    DNR (do not resuscitate) discussion    ACP (advance care planning)       PLAN:    1. Paroxysmal atrial fibrillation  2. covid 19 pneumonia  3. Respiratory failure  4. Chronic kidney disease  Continue supportive therapy. Continue amiodarone drip for rhythm control  Will continue to monitor. Plans for comfort care noted. Please see orders. Discussed with nursing.     Panda Calvillo MD, MD

## 2022-01-27 NOTE — PROGRESS NOTES
Pulmonary Critical Care Progress Note  Mihir Burgos MD     Patient seen for the follow up of COVID-19 pneumonia, acute hypoxic respiratory failure. Subjective:  He sedated, intubated on ventilator, FiO2 70%/PEEP 16. He had SVT on Levophed so he has been switched to Luke-Synephrine. He is also on amiodarone drip. He is sedated with propofol and Versed. He is also on fentanyl drip. Examination:  Vitals: /64   Pulse 73   Temp 98.2 °F (36.8 °C) (Axillary)   Resp 23   Ht 5' 10.98\" (1.803 m)   Wt 216 lb (98 kg)   SpO2 94%   BMI 30.14 kg/m²   General appearance: Sedated, intubated on ventilator  Neck: No JVD  Lungs: Bilateral crackles, no wheeze, moderate air exchange. Heart: regular rate and rhythm, S1, S2 normal, no gallop  Abdomen: Soft, non tender, + BS  Extremities: no cyanosis or clubbing.  No significant edema    LABs:  CBC:   Recent Labs     01/26/22  0515 01/27/22 0417   WBC 8.6 6.9   HGB 16.4 15.7   HCT 55.3* 51.6*    162     BMP:   Recent Labs     01/26/22  0515 01/27/22  0417   * 134*   K 4.8 5.4*   CO2 19* 22   BUN 77* 88*   CREATININE 2.29* 2.04*   LABGLOM 28* 32*   GLUCOSE 335* 250*     ABG:  Lab Results   Component Value Date    ULG9TWD NOT REPORTED 01/27/2022    FIO2 70.0 01/27/2022       Lab Results   Component Value Date    POCPH 7.311 01/27/2022    POCPCO2 46.8 01/27/2022    POCPO2 66.6 01/27/2022    POCHCO3 23.6 01/27/2022    PBEA NOT REPORTED 01/27/2022    WME4LBP NOT REPORTED 01/27/2022    RYOV7BIJ 91 01/27/2022    FIO2 70.0 01/27/2022     Radiology:  X-ray chest 1/27/2022:  Bilateral pulmonary infiltrates      Impression:  · Acute on chronic hypoxic respiratory failure  · COVID-19 pneumonia  · COPD/pulmonary emphysema  · Acute left thalamic infarct/CVA  · Left lower lobe opacity versus nodule  · Pulmonary edema  · Elevated D-dimer, decreased platelets  · Systolic heart failure, s/p AICD placement  · BLANCHE on CKD  · Diabetes mellitus    Recommendations:  · Continue vent support, wean FiO2 as tolerated.   70% FiO2, PEEP at 16  · Fentanyl drip, 75 mics  · Propofol for sedation, for now off  · Versed at 8 mg/h for sedation  · Watch off of Luke-Synephrine  · Amiodarone, 0.5 mg/h per cardiology  · Pulmicort aerosol treatment  · Airborne isolation  · IV Decadron 6 mg daily  · Not a candidate for Actemra/baricitinib stopped secondary to cytopenias  · Neurology following  · Albuterol and Ipratropium Q 4 hours and prn  · Tube feeds  · Dulcolax suppository  · X-ray chest in am  · Labs: CBC and BMP in am  · DVT prophylaxis with low molecular weight heparin  · Discussed with RN  · Will follow with you    Michelle Beckett MD, [facility]  Pulmonary Critical Care and Sleep Medicine,  [facility]  Cell: [phone]  Office: [phone]  CC: 35 minutes

## 2022-01-27 NOTE — PROGRESS NOTES
Progress note  [facility]    Adult Hospitalist      Name: Agapito Mann  MRN: [id number]     Acct: [de-identified]  Room: [address] [place], [place] [address]-    Admit Date: 1/16/2022 11:51 AM  PCP: Alexis Pompa MD    Primary Problem  Active Problems:    COVID-19    Acute on chronic systolic CHF (congestive heart failure) (HCC)    Acute respiratory failure with hypoxia (HCC)    BLANCHE (acute kidney injury) ([address]    COPD exacerbation ([facility] [address]    Hypokalemia    Hypomagnesemia    Palliative care encounter    Goals of care, counseling/discussion    DNR (do not resuscitate) discussion    ACP (advance care planning)  Resolved Problems:    * No resolved hospital problems. *        Assesment:   Acute congestive heart failure, diastolic   [id number]   Viral pneumonia secondary to above  Acute respiratory failure with hypoxia intubated on 1/23/2022  Paroxysmal atrial fibrillation  Essential hypertension  Mixed hyperlipidemia  Diabetes mellitus type 2  History of seizure disorder  History of CKD stage III, presently normal renal function  Lung mass?   Leukopenia  Polycythemia  Thrombocytopenia  Anxiety disorder  Recent past h/o lacunar infarct left thalamus   Altered mental status/agitation  Endotracheal intubation secondary to hypoxia dated 1/23/2022  Supraventricular tachycardia/elevated troponin  Fever      Plan:   Admit patient to intermediate floor  Mechanical ventilation sedation as per critical care, patient continues to require 70% FiO2 with PEEP of 16  Telemetry  Check vitals closely  CBC BMP daily    Echocardiogram  IV amiodarone drip  Cardiology consult  IV pressors    IVF @ 75 ml/ hr  Monitor urine output    Amiodarone  Cardiology following    IV Decadron  IV azithromycin-DC  Bronchodilators  Pulmicort  Patient not a candidate for Actemra or baricitinib secondary to cytopenia  Infectious disease consult  Pulmonology consult  Vanco/cefepime per ID    Continue Keppra  Continue amlodipine, atorvastatin  Continue aspirin  Xanax as needed  Lantus  seroquel prn   Precedex gtt needed   Continue other medication as below.     Scheduled Meds:   insulin glargine  45 Units SubCUTAneous BID    pantoprazole  40 mg IntraVENous Daily    And    sodium chloride (PF)  10 mL IntraVENous Daily    aspirin  324 mg Oral Daily    levETIRAcetam  500 mg Oral BID    cefepime  2,000 mg IntraVENous Q12H    chlorhexidine  15 mL Mouth/Throat BID    enoxaparin  30 mg SubCUTAneous BID    QUEtiapine  50 mg Oral Once    ALPRAZolam  0.5 mg Oral TID    ipratropium-albuterol  1 ampule Inhalation 4x daily    budesonide  0.5 mg Nebulization BID    metoprolol succinate  50 mg Oral Nightly    dexamethasone  6 mg IntraVENous Q24H    sodium chloride flush  5-40 mL IntraVENous 2 times per day    amLODIPine  5 mg Oral Nightly    atorvastatin  20 mg Oral Daily    insulin lispro  0-18 Units SubCUTAneous TID WC    insulin lispro  0-9 Units SubCUTAneous Nightly     Continuous Infusions:   amiodarone 0.5 mg/min (01/27/22 9385)    phenylephrine (KEARA-SYNEPHRINE) 50mg/250mL infusion Stopped (01/26/22 1820)    sodium chloride 75 mL/hr at 01/27/22 1354    propofol 8.059 mcg/kg/min (01/27/22 4756)    midazolam (VERSED) 1 mg/mL in D5W infusion 6 mg/hr (01/27/22 1625)    fentaNYL 75 mcg/hr (01/27/22 1349)    norepinephrine Stopped (01/24/22 1046)    dexmedetomidine (PRECEDEX) IV infusion Stopped (01/23/22 1450)    sodium chloride      dextrose       PRN Meds:  bisacodyl, 10 mg, Daily PRN  LORazepam, 1 mg, Q4H PRN  dextrose bolus (hypoglycemia), 125 mL, PRN   Or  dextrose bolus (hypoglycemia), 250 mL, PRN  sodium chloride flush, 10 mL, PRN  sodium chloride, 25 mL, PRN  potassium chloride, 40 mEq, PRN   Or  potassium alternative oral replacement, 40 mEq, PRN   Or  potassium chloride, 10 mEq, PRN  magnesium sulfate, 1,000 mg, PRN  ondansetron, 4 mg, Q8H PRN   Or  ondansetron, 4 mg, Q6H PRN  magnesium hydroxide, 30 mL, Daily PRN  acetaminophen, 650 mg, Q6H PRN Or  acetaminophen, 650 mg, Q6H PRN  glucose, 15 g, PRN  glucagon (rDNA), 1 mg, PRN  dextrose, 100 mL/hr, PRN  hydrALAZINE, 10 mg, Q6H PRN        Chief Complaint:     Chief Complaint   Patient presents with    Leg Swelling     bilateral, has CHF, on diuretic, EMT saw pt , assisted pt into car    Fever    Other     low SPO2         History of Present Illness:     Patient seen and examined at bedside and reviewed  last 24 hrs events with nursing staff  No acute events overnight. Patient remains intubated  He does require PEEP of 16 with 70% FiO2  Patient is continued on pressors  Patient is continued on amiodarone drip          Review of systems:  Unable to conduct ROS secondary to patient being intubated      Initial HPI  Jennifer Colorado is a 64 y.o.  male who presents with Leg Swelling (bilateral, has CHF, on diuretic, EMT saw pt , assisted pt into car), Fever, and Other (low SPO2)  This is a 68-year-old gentleman admitted via ER, come to ER with complaint of having shortness of breath, patient has medical history significant for COPD patient with history of cardiac failure: Patient noted that he has been having increasing swelling in his lower extremity and becoming more short of breath, further patient also been having some body aches and sweats, patient patient testing in the emergency room showed that he is positive for COVID-19 also noticed to have an elevated BNP, imaging concerning for fluid overload, admitted for further regiment    I have personally reviewed the past medical history, past surgical history, medications, social history, and family history, and summarized in the note.     Review of Systems:       Unable to conduct ROS secondary to patient being intubated      Past Medical History:     Past Medical History:   Diagnosis Date    Arthritis     Atherosclerotic plaque 7/28/2019    Cervical disc disease     degenerative disc disease    Diabetes mellitus ([facility] [address]     type 2    History of blood transfusion     Hx of blood clots     left leg    Hyperlipidemia     Neuropathy     Right kidney mass     \"urologist is watching\"    Snores     Type 2 diabetes mellitus treated with insulin ([facility] [address] 7/28/2019        Past Surgical History:     Past Surgical History:   Procedure Laterality Date    ABDOMINAL AORTIC ANEURYSM REPAIR  2005    CARPAL TUNNEL RELEASE Bilateral     CERVICAL SPINE SURGERY      COLONOSCOPY      benign polyps removed    INGUINAL HERNIA REPAIR Right     [place] REPAIR INCISIONAL HERNIA,REDUCIBLE N/A 5/10/2017    HERNIA VENTRAL REPAIR WITH MESH  performed by Rachell Herbert DO at [address] [place] [address] Road  05/10/2017    with mesh        Medications Prior to Admission:       Prior to Admission medications    Medication Sig Start Date End Date Taking? Authorizing Provider   rosuvastatin (CRESTOR) 40 MG tablet Take 40 mg by mouth every evening   Yes Historical Provider, MD   insulin NPH (HUMULIN N;NOVOLIN N) 100 UNIT/ML injection vial Inject 20 Units into the skin 2 times daily (before meals)   Yes Historical Provider, MD   levETIRAcetam (KEPPRA) 500 MG tablet Take 1 tablet by mouth 2 times daily 11/3/21  Yes Mildred Mitchell MD   venlafaxine (EFFEXOR XR) 150 MG extended release capsule Take 1 capsule by mouth daily (with breakfast) 7/29/19  Yes Arabella S Margi   vitamin B-1 (THIAMINE) 100 MG tablet Take 100 mg by mouth daily   Yes Historical Provider, MD   ferrous sulfate 325 (65 Fe) MG tablet Take 325 mg by mouth daily (with breakfast)   Yes Historical Provider, MD   ALPRAZolam (XANAX) 0.5 MG tablet Take 0.5 mg by mouth three times daily.    Yes Historical Provider, MD   furosemide (LASIX) 40 MG tablet Take 1 tablet by mouth 2 times daily 12/11/18  Yes Aislinn Evans MD   tiotropium (SPIRIVA RESPIMAT) 2.5 MCG/ACT AERS inhaler Inhale 2 puffs into the lungs daily    Yes Historical Provider, MD   albuterol sulfate  (90 Base) MCG/ACT inhaler Inhale 2 puffs into the lungs every 6 hours as needed for Wheezing or Shortness of Breath   Yes Historical Provider, MD   metoprolol succinate (TOPROL XL) 50 MG extended release tablet Take 50 mg by mouth daily   Yes Historical Provider, MD   Multiple Vitamins-Minerals (MULTIVITAMIN ADULT) TABS Take 1 tablet by mouth daily   Yes Historical Provider, MD   vitamin D (CHOLECALCIFEROL) 1000 UNIT TABS tablet Take 1,000 Units by mouth daily   Yes Historical Provider, MD   fluticasone (FLONASE) 50 MCG/ACT nasal spray 1 spray by Each Nare route daily as needed for Rhinitis   Yes Historical Provider, MD   aspirin 325 MG EC tablet Take 325 mg by mouth daily    Yes Historical Provider, MD   Omega-3 Fatty Acids (FISH OIL) 1000 MG CAPS Take 1 capsule by mouth daily    Yes Historical Provider, MD   amLODIPine (NORVASC) 5 MG tablet Take 5 mg by mouth nightly    Yes Historical Provider, MD   mirtazapine (REMERON) 45 MG tablet Take 45 mg by mouth nightly   Yes Historical Provider, MD        Allergies: Barbiturates, Codeine, and Sulfa antibiotics    Social History:     Tobacco:    reports that he has quit smoking. He has never used smokeless tobacco.  Alcohol:      reports no history of alcohol use. Drug Use:  reports no history of drug use.     Family History:     Family History   Problem Relation Age of Onset    Ovarian Cancer Sister     Ovarian Cancer Sister          Physical Exam:     Vitals:  /61   Pulse 75   Temp 98.5 °F (36.9 °C) (Oral)   Resp 20   Ht 5' 10.98\" (1.803 m)   Wt 216 lb (98 kg)   SpO2 (!) 89%   BMI 30.14 kg/m²   Temp (24hrs), Av.3 °F (36.8 °C), Min:98 °F (36.7 °C), Max:98.6 °F (37 °C)      General appearance -on ventilator  Mental status -sedated  Head - normocephalic and atraumatic  Eyes - conjunctiva clear  Ears -external ears normal  Nose - no drainage noted  Mouth - mucous membranes moist  Neck - supple, no carotid bruits, thyroid not palpable  Chest -basal crackles with decreased air entry bilaterally to auscultation, increased effort  Heart - normal rate, regular rhythm, no murmur  Abdomen - soft, nontender, nondistended, bowel sounds present all four quadrants, no masses, hepatomegaly or splenomegaly  Neurological -sedated on vent  Extremities - no edema, distal extremity discoloration    Skin - no gross lesions, rashes, or induration noted        Data:     Labs:    Hematology:  Recent Labs     01/25/22  0515 01/26/22  0515 01/27/22 0417   WBC 9.9 8.6 6.9   RBC 6.98* 6.67* 6.28*   HGB 17.3* 16.4 15.7   HCT 57.1* 55.3* 51.6*   MCV 81.8* 82.9 82.2*   MCH 24.8* 24.6* 25.0*   MCHC 30.3 29.7 30.4   RDW 17.6* 17.3* 15.8*    210 162   MPV 12.2 11.9 12.1     Chemistry:  Recent Labs     01/25/22  0515 01/26/22  0515 01/27/22 0417   * 132* 134*   K 4.6 4.8 5.4*    102 104   CO2 20 19* 22   GLUCOSE 315* 335* 250*   BUN 61* 77* 88*   CREATININE 2.43* 2.29* 2.04*   ANIONGAP 13 11 8*   LABGLOM 26* 28* 32*   GFRAA 31* 34* 38*   CALCIUM 8.6 8.3* 8.3*     Recent Labs     01/26/22  0720 01/26/22  1232 01/26/22  1600 01/26/22  1901 01/27/22  0731 01/27/22  1123   POCGLU 320* 315* 303* 293* 191* 204*       Lab Results   Component Value Date    INR 1.0 01/17/2022    INR 1.0 11/03/2021    INR 1.0 07/27/2019    PROTIME 13.3 01/17/2022    PROTIME 11.1 11/03/2021    PROTIME 10.5 07/27/2019       Lab Results   Component Value Date/Time    SPECIAL RT HAND 19ML 01/23/2022 05:59 PM    SPECIAL ART LINE 10ML 01/23/2022 05:59 PM     Lab Results   Component Value Date/Time    CULTURE NO GROWTH 4 DAYS 01/23/2022 05:59 PM    CULTURE NO GROWTH 4 DAYS 01/23/2022 05:59 PM       Lab Results   Component Value Date    POCPH 7.311 01/27/2022    POCPCO2 46.8 01/27/2022    POCPO2 66.6 01/27/2022    POCHCO3 23.6 01/27/2022    NBEA 3 01/27/2022    PBEA NOT REPORTED 01/27/2022    KJS3EKV NOT REPORTED 01/27/2022    USXN1VEK 91 01/27/2022    FIO2 70.0 01/27/2022       Radiology:    XR CHEST 1 VIEW    Result Date: 1/16/2022  Hypoinflated lungs. Cardiomegaly again seen, when compared to the previous study performed 07/27/2019. Diffuse, increased interstitial markings throughout both lungs, some of which can be seen on the prior study may represent baseline chronic interstitial lung changes. The increased prominence on this exam could be secondary to the suboptimal inspiratory effort. The presence of pulmonary vascular congestion/mild CHF or bilateral interstitial pneumonia cannot be excluded. Clinical correlation is recommended. CT CHEST PULMONARY EMBOLISM W CONTRAST    Result Date: 1/16/2022  No evidence of pulmonary embolism. Compared to 2008, worsening underlying emphysema, with worsening subacute or acute interstitial lung disease, best seen left upper lobe; differential includes interstitial pneumonia or pneumonitis superimposed on emphysema, DIP, HP, and other interstitial pneumonias as well as infectious or inflammatory process superimposed on emphysema disease. Findings are not typical for COVID-19 pneumonia, but an element of the latter should be considered. Thickening and distortion left major fissure with ill-defined mass-like focus left lower lobe. The latter could also be infectious or inflammatory, although neoplasm should be excluded. Underlying worsening emphysema. Nodular densities, best seen right upper lobe. Small pleural effusions, slightly larger on the left. See recommendations below. Mild mediastinal and left hilar adenopathy; see recommendations below. Worsening now moderate-severe pulmonary artery hypertension. Mild cardiomegaly. Stable mild dilatation ascending thoracic aorta. Additional unchanged or incidental findings, as above. RECOMMENDATIONS: Clinical correlation and short-term follow-up CT chest in 1-4 months depending upon the clinical presentation/course.          All radiological studies reviewed                Code Status:  Full Code    Electronically signed by Jin Vazquez

## 2022-01-27 NOTE — PROGRESS NOTES
[facility] Neurology   IN-PATIENT SERVICE      NEUROLOGY PROGRESS  NOTE                Interval History:     Patient continues to be intubated, sedated on Versed. Neurology has been reconsulted as patient has been on lower dose of Versed but continues to show no neurologic improvement. He has not been off sedation totally at any point in time. Patient is also on Levophed and amiodarone. BUN/creatinine 88/2.04    History of Present Illness: The patient is a 64 y.o. male who presents with Leg Swelling (bilateral, has CHF, on diuretic, EMT saw pt , assisted pt into car), Fever, and Other (low SPO2)  . The patient was seen and examined and the chart was reviewed. Patient is intubated in the COVID-19 ICU with pneumonia, BLANCHE, A. fib. Physical Exam:   /65   Pulse 71   Temp 98.1 °F (36.7 °C) (Oral)   Resp (!) 32   Ht 5' 10.98\" (1.803 m)   Wt 216 lb (98 kg)   SpO2 94%   BMI 30.14 kg/m²   Temp (24hrs), Av.3 °F (36.8 °C), Min:98 °F (36.7 °C), Max:98.8 °F (37.1 °C)      Neurological examination:    Mental status   Patient is intubated. On sedation. Comatose. No spontaneous eye opening to voice or painful stimulation. Not following commands.       Cranial nerves   Pupil response:            Present     Oculocephalic reflex:   Present     Corneal Reflex:            Present     Facial grimace to pin:  Present     Gag/Cough reflex:       Present     Breathing above vent:  Present        Motor and sensory function  No spontaneous limb movements    Absent withdrawal to pin, deep nail bed pressure in all extremities  Tone: Decreased      DTR  0/4 throughout  Plantar response: Downgoing  (-) Mendiola's sign bilaterally    Gait  Not tested            Diagnostics:      Laboratory Testing:  CBC: Recent Labs     22  0515 22  0515 01/27/22  0417   WBC 9.9 8.6 6.9   HGB 17.3* 16.4 15.7    210 162     BMP:    Recent Labs     22  0515 22  0515 22   * 132* 134* K 4.6 4.8 5.4*    102 104   CO2 20 19* 22   BUN 61* 77* 88*   CREATININE 2.43* 2.29* 2.04*   GLUCOSE 315* 335* 250*         Lab Results   Component Value Date    CHOL 129 11/03/2021    LDLCHOLESTEROL 63 11/03/2021    HDL 30 (L) 11/03/2021    TRIG 181 (H) 11/03/2021    ALT 24 01/16/2022    AST 45 (H) 01/16/2022    TSH 5.22 (H) 12/07/2018    INR 1.0 01/17/2022    LABA1C 9.1 (H) 01/16/2022    [id number] 516 07/08/2019         Impression:      Acute toxic metabolic encephalopathy secondary to COVID-19 acute ventilatory dependent respiratory failure, pneumonia, BLANCHE and medication effect  Chronic left thalamic lacunar infarct    Plan:     Would suggest initiating Precedex drip and weaning Versed as tolerated. This will give us a better interpretation of patient's neurologic status. At this point in time his brainstem function is intact. With his BLANCHE he will likely need to be off sedation for a much longer period of time to get a better assessment of his neurologic status. Although I do not see any reason why he would have had any significant brain insult during his hospitalization. We will hold off any further neuroimaging at this time.   Discussed with nurse at bedside  We will follow      Electronically signed by Kristen Evans DO on 1/27/2022 at [address] [facility], [address] [place] [address]  Neurology

## 2022-01-27 NOTE — PROGRESS NOTES
Pts daughter Sarah Robe here to  belongings and provided with 3 bags 1. Shirt pants and croc shoes, 2nd camo personal bag and 3rd pop snacks and chocolates in [place] bag.

## 2022-01-27 NOTE — PLAN OF CARE
Problem: Gas Exchange - Impaired  Goal: Absence of hypoxia  Outcome: Ongoing     Problem: Falls - Risk of:  Goal: Will remain free from falls  Description: Will remain free from falls  Outcome: Ongoing     Problem: Pain:  Goal: Patient's pain/discomfort is manageable  Description: Patient's pain/discomfort is manageable  Outcome: Ongoing     Problem: Skin Integrity:  Goal: Skin integrity will stabilize  Description: Skin integrity will stabilize  Outcome: Ongoing     Problem: Non-Violent Restraints  Goal: Removal from restraints as soon as assessed to be safe  Outcome: Ongoing   Pt will maintain SpO2 GT 92%. Fall precautions in place will continue to monitor. Patient will have progressive improvement with pain scale with interventions. Continue to monitor skin integrity and IV sites. Pt will remain safe while on ventilator.

## 2022-01-27 NOTE — PROGRESS NOTES
[facility].,   Section of Cardiology  Progress Note      Date:  1/27/2022  Patient: Emilee Wheeler  Admission:  1/16/2022 11:51 AM  Admit DX: Hypokalemia [E87.6]  Hypomagnesemia [E83.42]  COPD exacerbation (HCC) [J44.1]  BLANCHE (acute kidney injury) ([facility] [address] [N17.9]  Acute respiratory failure with hypoxia (HCC) [J96.01]  Acute on chronic systolic CHF (congestive heart failure) ([facility] [address] [I50.23]  COVID [U07.1]  COVID-19 [U07.1]  Age:  64 y.o., 1945                           LOS: 11 days     Reason for evaluation:   Paroxysmal atrial fibrillation. covid 19 pneumonia      SUBJECTIVE:     The patient was seen and examined. Notes and labs reviewed. There were complications over night. Patient had an episode of bradycardia with heart rate in the 30's. This quickly improved. May be due to hypoxia or hypervagotonia. OBJECTIVE:    /64   Pulse 66   Temp 98.2 °F (36.8 °C) (Axillary)   Resp 19   Ht 5' 10.98\" (1.803 m)   Wt 216 lb (98 kg)   SpO2 95%   BMI 30.14 kg/m²     Intake/Output Summary (Last 24 hours) at 1/27/2022 0859  Last data filed at 1/27/2022 1576  Gross per 24 hour   Intake 3950.39 ml   Output 1050 ml   Net 2900.39 ml       EXAM:   Intubated and sedated  Full physical exam was not done due to covid 19 infection.     Current Inpatient Medications:   pantoprazole  40 mg IntraVENous Daily    And    sodium chloride (PF)  10 mL IntraVENous Daily    aspirin  324 mg Oral Daily    levETIRAcetam  500 mg Oral BID    cefepime  2,000 mg IntraVENous Q12H    chlorhexidine  15 mL Mouth/Throat BID    enoxaparin  30 mg SubCUTAneous BID    QUEtiapine  50 mg Oral Once    ALPRAZolam  0.5 mg Oral TID    ipratropium-albuterol  1 ampule Inhalation 4x daily    budesonide  0.5 mg Nebulization BID    metoprolol succinate  50 mg Oral Nightly    dexamethasone  6 mg IntraVENous Q24H    sodium chloride flush  5-40 mL IntraVENous 2 times per day    amLODIPine  5 mg Oral Nightly    atorvastatin  20 mg Oral Daily    insulin glargine  40 Units SubCUTAneous BID    insulin lispro  0-18 Units SubCUTAneous TID WC    insulin lispro  0-9 Units SubCUTAneous Nightly       IV Infusions (if any):   amiodarone 0.5 mg/min (01/27/22 9870)    phenylephrine (KEARA-SYNEPHRINE) 50mg/250mL infusion Stopped (01/26/22 1820)    sodium chloride 75 mL/hr at 01/27/22 0613    propofol 8.059 mcg/kg/min (01/27/22 0455)    midazolam (VERSED) 1 mg/mL in D5W infusion 9 mg/hr (01/27/22 0613)    fentaNYL 75 mcg/hr (01/27/22 0129)    norepinephrine Stopped (01/24/22 1046)    dexmedetomidine (PRECEDEX) IV infusion Stopped (01/23/22 1450)    sodium chloride      dextrose         Diagnostics:   Telemetry: sinus rhythm  CXR:    Labs:   CBC:   Recent Labs     01/26/22  0515 01/27/22  0417   WBC 8.6 6.9   HGB 16.4 15.7   HCT 55.3* 51.6*    162     BMP:   Recent Labs     01/26/22  0515 01/27/22 0417   * 134*   K 4.8 5.4*   CO2 19* 22   BUN 77* 88*   CREATININE 2.29* 2.04*   LABGLOM 28* 32*   GLUCOSE 335* 250*     BNP: No results for input(s): BNP in the last 72 hours. PT/INR: No results for input(s): PROTIME, INR in the last 72 hours. APTT:No results for input(s): APTT in the last 72 hours. CARDIAC ENZYMES:No results for input(s): CKTOTAL, CKMB, CKMBINDEX, TROPONINI in the last 72 hours. FASTING LIPID PANEL:  Lab Results   Component Value Date    HDL 30 11/03/2021    TRIG 181 11/03/2021     LIVER PROFILE:No results for input(s): AST, ALT, LABALBU in the last 72 hours.     ASSESSMENT:    Patient Active Problem List   Diagnosis    Biventricular CHF (congestive heart failure) (HCC)    CKD (chronic kidney disease) stage 3, GFR 30-59 ml/min (HCC)    Essential hypertension    Blurred vision, right eye    Type 2 diabetes mellitus treated with insulin ([address]    Atherosclerotic plaque    Weakness on left side of face    Carotid stenosis, asymptomatic, bilateral    New onset seizure ([address]    COVID-19    Acute on chronic systolic CHF (congestive heart failure) (HCC)    Acute respiratory failure with hypoxia (HCC)    [facility]    COPD exacerbation (HCC)    Hypokalemia    Hypomagnesemia    Palliative care encounter    Goals of care, counseling/discussion    DNR (do not resuscitate) discussion    ACP (advance care planning)       PLAN:    1. Paroxysmal atrial fibrillation  2. covid 19 pneumonia  3. Respiratory failure  4. Chronic kidney disease  Continue supportive therapy. Continue amiodarone drip for rhythm control. Will monitor for bradycardia  Will continue to monitor. Plans for comfort care noted. Please see orders. Discussed with nursing.     Shilo Haji MD, MD

## 2022-01-27 NOTE — PLAN OF CARE
Problem: Gas Exchange - Impaired  Goal: Absence of hypoxia  Outcome: Ongoing     Problem: Falls - Risk of:  Goal: Will remain free from falls  Description: Will remain free from falls  Outcome: Ongoing     Problem: Pain:  Goal: Patient's pain/discomfort is manageable  Description: Patient's pain/discomfort is manageable  Outcome: Ongoing     Problem: Skin Integrity:  Goal: Will show no infection signs and symptoms  Description: Will show no infection signs and symptoms  Outcome: Ongoing     Problem: Non-Violent Restraints  Goal: Removal from restraints as soon as assessed to be safe  Outcome: Ongoing   Pt will maintain SpO2 GT 92%. Fall precautions in place will continue to monitor. Patient will have progressive improvement with pain scale with interventions. Continue to monitor skin integrity and IV sites.

## 2022-01-27 NOTE — PROGRESS NOTES
Physician Progress Note      PATIENT:               Richie Varghese  CSN #:                  [id number]  :                       1945  ADMIT DATE:       2022 11:51 AM  DISCH DATE:  RESPONDING  PROVIDER #:        Facundo Shah MD          QUERY TEXT:    Patient admitted with Covid-19. Documentation reflects sepsis due to Covid-19   pneumonia in progress note on . If possible, please document in the   progress notes and discharge summary if sepsis was: The medical record reflects the following:  Risk Factors: + Covid-19  Clinical Indicators:  T on arrival 37.9 and SaO2 82% on room air; CRP   23.5,  WBC 2.7 on  then 1.8 on ;  lactic acid 2.0, procalcitonin 0.11;   ;   08:12 am T 38.1, pt with increasing oxygen needs from initial 4L/min   to 10l/min HHF; Treatment: Decadron, intubation with mechanical vent support and SpO2   monitoring, prn Tylenol, Olumiant  Options provided:  -- Sepsis due to Covid-19 present on admission  -- Sepsis due to Covid-19 treated and resolved  -- Sepsis due to Covid-19 ruled out after study  -- Other - I will add my own diagnosis  -- Disagree - Not applicable / Not valid  -- Disagree - Clinically unable to determine / Unknown  -- Refer to Clinical Documentation Reviewer    PROVIDER RESPONSE TEXT:    Sepsis due to Covid-19 present on admission.     Query created by: Amelie Longoria on 2022 10:23 AM      Electronically signed by:  Facundo Shah MD 2022 12:32 PM

## 2022-01-27 NOTE — PROGRESS NOTES
[facility]  Progress Note    Hans Garcia  MRN: [id number]  Date: 1/27/2022  LOS: 6     Reason for F/U :   COVID-19 virus infection    Impression :   COVID-19 virus infection tested + 1/16/2022  Acute respiratory failure currently ventilator dependent  Intubated 1/23/2022  Emphysema with worsening changes on imaging  Worsening acute interstitial lung disease and clinically not typical of COVID-19 virus infection  Worsening moderate to severe pulmonary artery hypertension  Bilateral lower extremity edema likely related to above  Leukopenia and thrombocytopenia  Lacunar l infarct on the left thalamus  Fever to 103 degrees 1/23/2022  Acute kidney injury    Recommendations:   COVID-19 therapy: The patient is on Decadron 6 mg IV daily   The patient has been started on baricitinib 1/17/2022 but it was discontinued 1/18/2022 due to cytopenias. Actemra had been considered but again due to the cytopenias and thrombocytopenia this has been held. Antimicrobial therapy: The patient received Rocephin 1000 mg and azithromycin 500 mg on 1/16/2022  The patient received a dose of levofloxacin 500 mg on 1/18/2020. Patient started on cefepime and vancomycin 1/23/2022  Vancomycin discontinued due to acute kidney injury 1/24/2022    The leukocytosis overall is improved and the blood culture data thus far has remained negative. He continues on cefepime for antibiotic coverage and vancomycin remains on hold  Continue supportive care    Infection Control Recommendations:   Droplet plus precautions    Discharge Planning:   Estimated Length of IV antimicrobials: 5 to 7 days  Patient will need Midline Catheter Insertion/ PICC line Insertion: No  [facility]  [facility],  LTAC: Undetermined  Patient willneed outpatient wound care: No    Medical Decision making / Summary of Stay:   Hans Garcia is a 64y.o.-year-old male who was initially admitted on 1/16/2022.    Cheney has a history of diabetes mellitus type 2, essential hypertension, seizure disorder, chronic kidney disease stage III, hyperlipidemia among other medical problems.     The patient reports that about 1 to 2 months ago he had his diabetes medications changed and since that time he has noticed increasing swelling in his legs, hardness in the legs, difficulty ambulating, and weight gain from 178 to 255 pounds over the last 1 to 2 months. He did not report any subjective fevers, chills, cough or shortness of breath. He denies any loss of smell or change in taste. No abdominal pain nausea vomiting or diarrhea. The patient does report that he has home oxygen that he uses as needed at home and he reports that he hardly needs it. He is vaccinated did receive the J&J vaccine but has not yet received a booster dose.     The patient presented to the emergency department and was found to have leukopenia with a white blood cell count of 2.7 and thrombocytopenia with a platelet count of 201. Creatinine slightly elevated at 1.31 and proBNP is elevated at 9327. Chest x-ray showed hyperinflated lungs with cardiomegaly seen and diffuse increased interstitial markings in both lungs which may represent baseline chronic interstitial lung changes given that these findings were present before. A CT of the chest was done with IV contrast that showed no evidence of pulmonary embolism and compared to 2008 there is worsening underlying emphysema with worsening subacute or acute interstitial lung disease best seen in the left upper lobe. Findings were not felt typical for COVID-19 virus pneumonia. There was thickening and distortion of the left major fissure with an ill-defined masslike focus in the left lower lobe.   There is worsening moderate to severe pulmonary artery hypertension     COVID testing was positive and I was asked to evaluate and help with COVID-19 virus infection    Current evaluation:1/27/2022    /64   Pulse 66   Temp 98.2 in the last 72 hours. Lab Results   Component Value Date    PROCAL 0.24 01/23/2022    PROCAL 0.11 01/19/2022    PROCAL 0.11 01/16/2022     Lab Results   Component Value Date    CRP 23.5 01/16/2022    CRP 2.0 07/27/2019     Lab Results   Component Value Date    SEDRATE 3 01/16/2022         Lab Results   Component Value Date    DDIMER 5.84 01/23/2022    DDIMER 1.53 01/18/2022    DDIMER 1.73 01/16/2022    DDIMER 1.18 12/07/2018     Lab Results   Component Value Date    FERRITIN 569 01/16/2022    FERRITIN 50 07/23/2019    FERRITIN 18 07/08/2019     Lab Results   Component Value Date     01/16/2022     Lab Results   Component Value Date    FIBRINOGEN 402 01/16/2022       Results in Past 30 Days  Result Component Current Result Ref Range Previous Result Ref Range   SARS-CoV-2, Rapid DETECTED (A) (1/16/2022) Not Detected Not in Time Range      Lab Results   Component Value Date    COVID19 DETECTED 01/16/2022    COVID19 Not Detected 11/02/2021       No results for input(s): VANCOTROUGH in the last 72 hours. Imaging Studies:   ONE XRAY VIEW OF THE CHEST 1/27/2022 6:28 am  Impression   1. Unchanged position of support devices.       2. Bilateral airspace disease appears more confluent in the lung bases.             Veins: Lower Extremities DVT Study, Venous Scan Lower Bilateral.  Indications for Study: Leg Swelling . Patient Status: In Patient. Technical Quality: Limited visualization. Limitation reason: Edema. Comments: Simultaneous real time imaging utilizing B-Mode, color doppler and spectral waveform analysis was performed on the  bilateral lower extremities for venous examination of the deep and superficial systems. Conclusions  Summary  No evidence of superficial or deep venous thrombosis in both lower extremities.   Signature  Electronically signed by Navi Finn RVT(Sonographer) on 01/19/2022 08:10 AM  Electronically signed by Greta Dunne physician) on 01/19/2022 06:21 PM      CT OF THE HEAD WITHOUT CONTRAST  1/18/2022 5:42 pm  Impression   New lacunar infarct left thalamus, age indeterminate. [facility] may be helpful.           Cultures:     Culture, Blood 1 [[id number]] Collected: 01/23/22 1759   Order Status: Completed Specimen: Blood Updated: 01/27/22 0051    Specimen Description . BLOOD    Special Requests ART LINE 10ML    Culture NO GROWTH 4 DAYS   Culture, Blood 1 [[id number]] Collected: 01/23/22 1759   Order Status: Completed Specimen: Blood Updated: 01/27/22 0051    Specimen Description . BLOOD    Special Requests RT HAND 19ML    Culture NO GROWTH 4 DAYS   Culture, Blood 2 [[id number]] Collected: 01/21/22 0742   Order Status: Completed Specimen: Blood Updated: 01/26/22 1001    Specimen Description . BLOOD    Special Requests LEFT AC 20ML    Culture NO GROWTH 5 DAYS   Culture, Blood 1 [[id number]] Collected: 01/21/22 0750   Order Status: Completed Specimen: Blood Updated: 01/26/22 1000    Specimen Description . BLOOD    Special Requests LEFT HAND 5ML    Culture NO GROWTH 5 DAYS   MRSA DNA Probe, Nasal [[id number]] Collected: 01/23/22 2258   Order Status: Completed Specimen: Nasal Updated: 01/25/22 1038    Specimen Description . NASAL SWAB    MRSA, DNA, Nasal NEGATIVE    Comment: NEGATIVE:  MRSA DNA not detected by nucleic acid amplification.                                                     Results should be used as an adjunct to nosocomial control efforts to identify patients   needing enhanced precautions.     The test is not intended to identify patients with staphylococcal infections.  Results   should not be used to guide or monitor treatment for MRSA infections. Culture, Blood 1 [[id number]] Collected: 01/16/22 1220   Order Status: Completed Specimen: Blood Updated: 01/21/22 1521    Specimen Description . BLOOD    Special Requests LAC 12ML    Culture NO GROWTH 5 DAYS   Culture, Blood 1 [[id number]] Collected: 01/16/22 1205   Order Status: Completed Specimen: Blood Updated: 01/21/22 1521    Specimen Description . BLOOD    Special Requests RAC 12ML    Culture NO GROWTH 5 DAYS       Culture, Urine [[id number]] Collected: 01/16/22 1315   Order Status: Completed Specimen: Urine, clean catch Updated: 01/17/22 2128    Specimen Description . CLEAN CATCH URINE    Special Requests NOT REPORTED    Culture NO SIGNIFICANT GROWTH       COVID-19, Rapid [[id number]] (Abnormal) Collected: 01/16/22 1230   Order Status: Completed Specimen: Nasopharyngeal Swab Updated: 01/16/22 1314    Specimen Description . NASOPHARYNGEAL SWAB    SARS-CoV-2, Rapid DETECTED Abnormal     Comment:        Rapid NAAT: The specimen is POSITIVE for SARS-Cov-2, the novel coronavirus associated with   COVID-19.         This test has been authorized by the FDA under an Emergency Use Authorization (EUA) for use   by authorized laboratories.         The ID NOW COVID-19 assay is designed to detect the virus that causes COVID-19 in patients   with signs and symptoms of infection who are suspected of COVID-19. An individual without symptoms of COVID-19 and who is not shedding SARS-CoV-2 virus would   expect to have a negative (not detected) result in this assay. Fact sheet for Healthcare Providers: BagFit.fi   Fact sheet for Patients: BagFit.fi           Methodology: Isothermal Nucleic Acid Amplification         Results reported to the appropriate Health Department         Flu A/B Ag Detection [[id number]] Collected: 01/16/22 1230   Order Status: Completed Specimen: Nasopharyngeal Swab Updated: 01/16/22 1313    Specimen Description . NASOPHARYNGEAL SWAB    Special Requests NOT REPORTED    Direct Exam NEGATIVE for Influenza A + B antigens.                                PCR testing to confirm this result is available upon request. Hardeep Martin will be saved in the laboratory for 7 days.  Please call [phone] if PCR testing is indicated.      Medications:      pantoprazole  40 mg IntraVENous Daily    And    sodium chloride (PF)  10 mL IntraVENous Daily    aspirin  324 mg Oral Daily    levETIRAcetam  500 mg Oral BID    cefepime  2,000 mg IntraVENous Q12H    chlorhexidine  15 mL Mouth/Throat BID    enoxaparin  30 mg SubCUTAneous BID    QUEtiapine  50 mg Oral Once    ALPRAZolam  0.5 mg Oral TID    ipratropium-albuterol  1 ampule Inhalation 4x daily    budesonide  0.5 mg Nebulization BID    metoprolol succinate  50 mg Oral Nightly    dexamethasone  6 mg IntraVENous Q24H    sodium chloride flush  5-40 mL IntraVENous 2 times per day    amLODIPine  5 mg Oral Nightly    atorvastatin  20 mg Oral Daily    insulin glargine  40 Units SubCUTAneous BID    insulin lispro  0-18 Units SubCUTAneous TID WC    insulin lispro  0-9 Units SubCUTAneous Nightly           [facility]  Lara Huerta MD  Perfect [employer] messaging  OFFICE: [phone]      Electronically signed by Lara Huerta MD on 1/27/2022 at 7:51 AM  Thank you for allowing us to participate in the care of this patient. Please call with questions. This note iscreated with the assistance of a speech recognition program.  While intending to generate a document that actually reflects the content of the visit, the document can still have some errors including those of syntax andsound a like substitutions which may escape proof reading. In such instances, actual meaning can be extrapolated by contextual diversion.

## 2022-01-28 NOTE — PROGRESS NOTES
A correlation is noted between pt having increased labored breathing SpO2 80s and SBP elevating to 180s. When sedation is increased pt relaxes SBP decreases and SPO2 increases to 90s. Will monitor and notify Dr Duyen Lerma when he rounds.

## 2022-01-28 NOTE — PROGRESS NOTES
Pulmonary Critical Care Progress Note  Argenis Stuart MD     Patient seen for the follow up of COVID-19 pneumonia, acute hypoxic respiratory failure. Subjective:  He sedated, intubated on ventilator, FiO2 85%/PEEP 16. He is still on amiodarone drip. He is sedated with Precedex and Versed. He is also on fentanyl drip. He went for CT abdomen and pelvis, no evidence of bowel obstruction but does have moderate stool burden. Examination:  Vitals: BP (!) 122/54   Pulse 59   Temp 98.6 °F (37 °C) (Axillary)   Resp 19   Ht 5' 10.98\" (1.803 m)   Wt 216 lb (98 kg)   SpO2 91%   BMI 30.14 kg/m²   General appearance: Sedated, intubated on ventilator  Neck: No JVD  Lungs: Bilateral crackles, no wheeze, moderate air exchange. Heart: regular rate and rhythm, S1, S2 normal, no gallop  Abdomen: Soft, non tender, + BS  Extremities: no cyanosis or clubbing.  No significant edema    LABs:  CBC:   Recent Labs     01/27/22  0417 01/28/22  0507   WBC 6.9 6.9   HGB 15.7 14.6   HCT 51.6* 48.8    157     BMP:   Recent Labs     01/27/22  0417 01/28/22  0507   * 136   K 5.4* 5.8*   CO2 22 22   BUN 88* 87*   CREATININE 2.04* 1.71*   LABGLOM 32* 39*   GLUCOSE 250* 153*     ABG:  Lab Results   Component Value Date    JWU9UVV NOT REPORTED 01/28/2022    FIO2 85.0 01/28/2022       Lab Results   Component Value Date    POCPH 7.319 01/28/2022    POCPCO2 46.5 01/28/2022    POCPO2 105.3 01/28/2022    POCHCO3 23.9 01/28/2022    PBEA NOT REPORTED 01/28/2022    ITO9LFC NOT REPORTED 01/28/2022    VELD4CFR 98 01/28/2022    FIO2 85.0 01/28/2022     Radiology:  X-ray chest 1/28/2022:        Impression:  · Acute on chronic hypoxic respiratory failure  · COVID-19 pneumonia  · COPD/pulmonary emphysema  · Acute left thalamic infarct/CVA  · Left lower lobe opacity versus nodule  · Pulmonary edema  · Elevated D-dimer, decreased platelets  · Systolic heart failure, s/p AICD placement  · BLANCHE on CKD  · Diabetes mellitus    Recommendations:  · Continue vent support, wean FiO2 as tolerated.   85% FiO2, PEEP at 16  · Fentanyl drip  · Off propofol for now   · Precedex  · Versed at 4mg/h for sedation  · Watch off of Luke-Synephrine  · Amiodarone, 0.5 mg/h per cardiology  · Pulmicort aerosol treatment  · Airborne isolation  · IV Decadron 6 mg daily  · Not a candidate for Actemra/baricitinib stopped secondary to cytopenias  · Neurology following, would like us to back off Versed and primarily use Precedex if able for sedation  · Albuterol and Ipratropium Q 4 hours and prn  · Tube feeds  · Dulcolax suppository  · X-ray chest in am  · Labs: CBC and BMP in am  · DVT prophylaxis with low molecular weight heparin  · Discussed with RN  · Will follow with you    Poncho Whitney MD, [facility]  Pulmonary Critical Care and Sleep Medicine,  [facility]  Cell: [phone]  Office: [phone]  CC: 35 minutes

## 2022-01-28 NOTE — PROGRESS NOTES
[facility] Neurology   IN-PATIENT SERVICE      NEUROLOGY PROGRESS  NOTE                Interval History:     No significant changes. Patient is currently on 4 mg of Versed. Had to be turned up due to increased respiratory rate. Currently on Precedex drip as well. BUN/creatinine slowly improving. History of Present Illness: The patient is a 64 y.o. male who presents with Leg Swelling (bilateral, has CHF, on diuretic, EMT saw pt , assisted pt into car), Fever, and Other (low SPO2)  . The patient was seen and examined and the chart was reviewed. Patient is intubated in the COVID-19 ICU with pneumonia, BLANCHE, A. fib. Physical Exam:   BP (!) 122/54   Pulse 59   Temp 98.6 °F (37 °C) (Axillary)   Resp 19   Ht 5' 10.98\" (1.803 m)   Wt 216 lb (98 kg)   SpO2 91%   BMI 30.14 kg/m²   Temp (24hrs), Av.5 °F (36.9 °C), Min:98.1 °F (36.7 °C), Max:98.6 °F (37 °C)      Neurological examination:    Mental status    Patient is intubated. On sedation. Comatose. No spontaneous eye opening to voice or painful stimulation.    Not following commands.       Cranial nerves    Pupil response:            Present     Oculocephalic reflex:   Present     Corneal Reflex:            Present     Facial grimace to pin:  Present     Gag/Cough reflex:       Present     Breathing above vent:  Present         Motor and sensory function  No spontaneous limb movements     Absent withdrawal to pin, deep nail bed pressure in all extremities  Tone: Decreased      DTR  0/4 throughout  Plantar response: Downgoing  (-) Mendiola's sign bilaterally    Gait  Not tested         Diagnostics:      Laboratory Testing:  CBC: Recent Labs     22  0515 227 22  0507   WBC 8.6 6.9 6.9   HGB 16.4 15.7 14.6    162 157     BMP:    Recent Labs     22  0515 22  0417 22  0507   * 134* 136   K 4.8 5.4* 5.8*    104 106   CO2    BUN 77* 88* 87*   CREATININE 2.29* 2.04* 1.71*   GLUCOSE 335* 250* 153*         Lab Results   Component Value Date    CHOL 129 11/03/2021    LDLCHOLESTEROL 63 11/03/2021    HDL 30 (L) 11/03/2021    TRIG 181 (H) 11/03/2021    ALT 24 01/16/2022    AST 45 (H) 01/16/2022    TSH 5.22 (H) 12/07/2018    INR 1.0 01/17/2022    LABA1C 9.1 (H) 01/16/2022    TFSLZAAR60 516 07/08/2019         Impression:      Acute toxic metabolic encephalopathy secondary to COVID-19 acute ventilatory dependent respiratory failure, pneumonia, BLANCHE and medication effect  Chronic left thalamic lacunar infarct    Plan:     Recommend increasing Precedex drip and attempting to wean Versed to see if this would hopefully control patient's respiratory rate and not affect his mentation as much. Brainstem reflexes are all intact and it does not appear that patient has suffered any significant hypoxic events during his ICU stay. I have low suspicion that there has been any significant CNS insult at this time. Would suggest patient get through his COVID-19 pneumonia before totally weaning off of sedation. It is apparent his respiratory status will not tolerate being totally off sedation at this point in time.    Further treatment recommendations as per pulmonology      Electronically signed by Woo Thompson DO on 1/28/2022 at 10:34 AM      [facility]  Neurology

## 2022-01-28 NOTE — PROGRESS NOTES
[facility]   Section of Cardiology  Progress Note      Date:  1/28/2022  Patient: William Krueger  Admission:  1/16/2022 11:51 AM  Admit DX: Hypokalemia [E87.6]  Hypomagnesemia [E83.42]  COPD exacerbation (HCC) [J44.1]  BLANCHE (acute kidney injury) ([facility] [N17.9]  Acute respiratory failure with hypoxia (HCC) [J96.01]  Acute on chronic systolic CHF (congestive heart failure) ([facility] [I50.23]  COVID [U07.1]  COVID-19 [U07.1]  Age:  64 y.o., 1945                           LOS: 12 days     Reason for evaluation:   Paroxysmal atrial fibrillation. covid 19 pneumonia      SUBJECTIVE:     The patient was seen and examined. Notes and labs reviewed. He continues to be intubated and sedated. He had laboured breathing with oxygen in the 80's. He is on precedex drip. OBJECTIVE:    BP (!) 135/54   Pulse 61   Temp 98.7 °F (37.1 °C) (Oral)   Resp (!) 32   Ht 5' 10.98\" (1.803 m)   Wt 216 lb (98 kg)   SpO2 94%   BMI 30.14 kg/m²     Intake/Output Summary (Last 24 hours) at 1/28/2022 1314  Last data filed at 1/28/2022 1200  Gross per 24 hour   Intake 4190.8 ml   Output 1525 ml   Net 2665.8 ml       EXAM:   Intubated and sedated  Full physical exam was not done due to covid 19 infection.     Current Inpatient Medications:   sodium zirconium cyclosilicate  10 g Oral TID    furosemide  20 mg IntraVENous BID    milk and molasses  240 mL Rectal Once    insulin glargine  45 Units SubCUTAneous BID    pantoprazole  40 mg IntraVENous Daily    And    sodium chloride (PF)  10 mL IntraVENous Daily    aspirin  324 mg Oral Daily    levETIRAcetam  500 mg Oral BID    cefepime  2,000 mg IntraVENous Q12H    chlorhexidine  15 mL Mouth/Throat BID    enoxaparin  30 mg SubCUTAneous BID    QUEtiapine  50 mg Oral Once    ALPRAZolam  0.5 mg Oral TID    ipratropium-albuterol  1 ampule Inhalation 4x daily    budesonide  0.5 mg Nebulization BID    metoprolol succinate  50 mg Oral Nightly    dexamethasone  6 mg IntraVENous Q24H    sodium chloride flush  5-40 mL IntraVENous 2 times per day    amLODIPine  5 mg Oral Nightly    atorvastatin  20 mg Oral Daily    insulin lispro  0-18 Units SubCUTAneous TID WC    insulin lispro  0-9 Units SubCUTAneous Nightly       IV Infusions (if any):   amiodarone 0.5 mg/min (01/28/22 1015)    phenylephrine (KEARA-SYNEPHRINE) 50mg/250mL infusion Stopped (01/26/22 1820)    propofol 8.059 mcg/kg/min (01/28/22 0610)    midazolam (VERSED) 1 mg/mL in D5W infusion 4 mg/hr (01/28/22 0939)    fentaNYL 75 mcg/hr (01/28/22 0200)    norepinephrine Stopped (01/24/22 1046)    dexmedetomidine (PRECEDEX) IV infusion 0.6 mcg/kg/hr (01/28/22 1227)    sodium chloride      dextrose         Diagnostics:   Telemetry: sinus rhythm  CXR:    Labs:   CBC:   Recent Labs     01/27/22  0417 01/28/22  0507   WBC 6.9 6.9   HGB 15.7 14.6   HCT 51.6* 48.8    157     BMP:   Recent Labs     01/27/22  0417 01/27/22  0417 01/28/22  0507 01/28/22  1134   *  --  136  --    K 5.4*   < > 5.8* 5.5*   CO2 22  --  22  --    BUN 88*  --  87*  --    CREATININE 2.04*  --  1.71*  --    LABGLOM 32*  --  39*  --    GLUCOSE 250*  --  153*  --     < > = values in this interval not displayed. BNP: No results for input(s): BNP in the last 72 hours. PT/INR: No results for input(s): PROTIME, INR in the last 72 hours. APTT:No results for input(s): APTT in the last 72 hours. CARDIAC ENZYMES:No results for input(s): CKTOTAL, CKMB, CKMBINDEX, TROPONINI in the last 72 hours. FASTING LIPID PANEL:  Lab Results   Component Value Date    HDL 30 11/03/2021    TRIG 181 11/03/2021     LIVER PROFILE:No results for input(s): AST, ALT, LABALBU in the last 72 hours.     ASSESSMENT:    Patient Active Problem List   Diagnosis    Biventricular [facility]congestive heart failure) ([facility]    CKD (chronic kidney disease) stage 3, GFR 30-59 ml/min ([facility])    Essential hypertension    Blurred vision, right eye    Type 2 diabetes mellitus treated with insulin (HCC)    Atherosclerotic plaque    Weakness on left side of face    Carotid stenosis, asymptomatic, bilateral    [facility] onset seizure ([facility]    COVID-19    Acute on chronic systolic CHF (congestive heart failure) (HCC)    Acute respiratory failure with hypoxia (HCC)    BLANCHE (acute kidney injury) ([facility]    COPD exacerbation (HCC)    Hypokalemia    Hypomagnesemia    Palliative care encounter    Goals of care, counseling/discussion    DNR (do not resuscitate) discussion    ACP (advance care planning)       PLAN:    1. Paroxysmal atrial fibrillation  2. covid 19 pneumonia  3. Respiratory failure  4. Chronic kidney disease  Continue supportive therapy. I will discontinue amiodarone drip at this time. Will continue to monitor. Plans for comfort care noted. Please see orders. Discussed with nursing.     Zafar Pat MD, MD

## 2022-01-28 NOTE — PLAN OF CARE
Nutrition Problem #1: Inadequate protein-energy intake  Intervention: Food and/or Nutrient Delivery: Continue NPO,Modify Tube Feeding  Nutritional Goals: EN support to provide >75% of estimated nutritional needs

## 2022-01-28 NOTE — CONSULTS
.  Nephrology Consult Note    Reason for Consult: Hyperkalemia    Requesting Physician:  Lizbeth Hamilton MD    HISTORY OF PRESENT ILLNESS:    The patient is a 64 y.o. male who presented with to the hospital for worsening shortness of breath ad and worsening lower extremity edema on 1/16. He had diffuse body aches and sweats and a dry cough. He tested positive for COVID-19. He has steadily declined since admission. On 1/18 a CT head found New lacunar infarct left thalamus. He had to be intubated on 1/23. He has a significant past medical history of COPD, hyperlipidemia, Type 2 DM, Right kidney mass, and abdominal aortic aneurysm repair in 2005. His potassium has been steadily rising since 1/24/22. The most current Potassium level is 5.8. His creatine is improved to 1.71. This information was retrieved through chart review and nursing. Patient was unable to provide information due to current status on mechanical ventilation and sedation. Review Of Systems:  Unable to obtain. Patient sedated on ventilator.     Past Medical History:   Diagnosis Date    Arthritis     Atherosclerotic plaque 7/28/2019    Cervical disc disease     degenerative disc disease    Diabetes mellitus ([address]     type 2    History of blood transfusion     Hx of blood clots     left leg    Hyperlipidemia     Neuropathy     Right kidney mass     \"urologist is watching\"    Snores     Type 2 diabetes mellitus treated with insulin ([address] 7/28/2019       Past Surgical History:   Procedure Laterality Date    ABDOMINAL AORTIC ANEURYSM REPAIR  2005    CARPAL TUNNEL RELEASE Bilateral     CERVICAL SPINE SURGERY      COLONOSCOPY      benign polyps removed    INGUINAL HERNIA REPAIR Right     IN REPAIR INCISIONAL HERNIA,REDUCIBLE N/A 5/10/2017    HERNIA VENTRAL REPAIR WITH MESH  performed by Anya Betancourt DO at [address] [place] [address] Road  05/10/2017    with mesh       Prior to Admission medications    Medication Sig Start Date End Date Taking? Authorizing Provider   rosuvastatin (CRESTOR) 40 MG tablet Take 40 mg by mouth every evening   Yes Historical Provider, MD   insulin NPH (HUMULIN N;NOVOLIN N) 100 UNIT/ML injection vial Inject 20 Units into the skin 2 times daily (before meals)   Yes Historical Provider, MD   levETIRAcetam (KEPPRA) 500 MG tablet Take 1 tablet by mouth 2 times daily 11/3/21  Yes Pradip Zendejas MD   venlafaxine (EFFEXOR XR) 150 MG extended release capsule Take 1 capsule by mouth daily (with breakfast) 7/29/19  Yes Arabella S Margi   vitamin B-1 (THIAMINE) 100 MG tablet Take 100 mg by mouth daily   Yes Historical Provider, MD   ferrous sulfate 325 (65 Fe) MG tablet Take 325 mg by mouth daily (with breakfast)   Yes Historical Provider, MD   ALPRAZolam (XANAX) 0.5 MG tablet Take 0.5 mg by mouth three times daily.    Yes Historical Provider, MD   furosemide (LASIX) 40 MG tablet Take 1 tablet by mouth 2 times daily 12/11/18  Yes Federico Evans MD   tiotropium (SPIRIVA RESPIMAT) 2.5 MCG/ACT AERS inhaler Inhale 2 puffs into the lungs daily    Yes Historical Provider, MD   albuterol sulfate  (90 Base) MCG/ACT inhaler Inhale 2 puffs into the lungs every 6 hours as needed for Wheezing or Shortness of Breath   Yes Historical Provider, MD   metoprolol succinate (TOPROL XL) 50 MG extended release tablet Take 50 mg by mouth daily   Yes Historical Provider, MD   Multiple Vitamins-Minerals (MULTIVITAMIN ADULT) TABS Take 1 tablet by mouth daily   Yes Historical Provider, MD   vitamin D (CHOLECALCIFEROL) 1000 UNIT TABS tablet Take 1,000 Units by mouth daily   Yes Historical Provider, MD   fluticasone (FLONASE) 50 MCG/ACT nasal spray 1 spray by Each Nare route daily as needed for Rhinitis   Yes Historical Provider, MD   aspirin 325 MG EC tablet Take 325 mg by mouth daily    Yes Historical Provider, MD   Omega-3 Fatty Acids (FISH OIL) 1000 MG CAPS Take 1 capsule by mouth daily    Yes Historical Provider, MD   amLODIPine (NORVASC) 5 MG tablet Take 5 mg by mouth nightly    Yes Historical Provider, MD   mirtazapine (REMERON) 45 MG tablet Take 45 mg by mouth nightly   Yes Historical Provider, MD       Scheduled Meds:   sodium zirconium cyclosilicate  10 g Oral TID    furosemide  20 mg IntraVENous BID    insulin glargine  45 Units SubCUTAneous BID    pantoprazole  40 mg IntraVENous Daily    And    sodium chloride (PF)  10 mL IntraVENous Daily    aspirin  324 mg Oral Daily    levETIRAcetam  500 mg Oral BID    cefepime  2,000 mg IntraVENous Q12H    chlorhexidine  15 mL Mouth/Throat BID    enoxaparin  30 mg SubCUTAneous BID    QUEtiapine  50 mg Oral Once    ALPRAZolam  0.5 mg Oral TID    ipratropium-albuterol  1 ampule Inhalation 4x daily    budesonide  0.5 mg Nebulization BID    metoprolol succinate  50 mg Oral Nightly    dexamethasone  6 mg IntraVENous Q24H    sodium chloride flush  5-40 mL IntraVENous 2 times per day    amLODIPine  5 mg Oral Nightly    atorvastatin  20 mg Oral Daily    insulin lispro  0-18 Units SubCUTAneous TID WC    insulin lispro  0-9 Units SubCUTAneous Nightly     Continuous Infusions:   amiodarone 0.5 mg/min (01/28/22 1015)    phenylephrine (KEARA-SYNEPHRINE) 50mg/250mL infusion Stopped (01/26/22 1820)    propofol 8.059 mcg/kg/min (01/28/22 0610)    midazolam (VERSED) 1 mg/mL in D5W infusion 4 mg/hr (01/28/22 0939)    fentaNYL 75 mcg/hr (01/28/22 0200)    norepinephrine Stopped (01/24/22 1046)    dexmedetomidine (PRECEDEX) IV infusion 0.4 mcg/kg/hr (01/28/22 0935)    sodium chloride      dextrose       PRN Meds:bisacodyl, LORazepam, dextrose bolus (hypoglycemia) **OR** dextrose bolus (hypoglycemia), sodium chloride flush, sodium chloride, potassium chloride **OR** potassium alternative oral replacement **OR** potassium chloride, magnesium sulfate, ondansetron **OR** ondansetron, magnesium hydroxide, acetaminophen **OR** acetaminophen, glucose, glucagon (rDNA), dextrose, hydrALAZINE    Allergies   Allergen Reactions    Barbiturates Anxiety     Other reaction(s): Unknown    Codeine Palpitations    Sulfa Antibiotics Rash     Other reaction(s): Unknown       Social History     Socioeconomic History    Marital status:      Spouse name: Not on file    Number of children: Not on file    Years of education: Not on file    Highest education level: Not on file   Occupational History    Not on file   Tobacco Use    Smoking status: Former Smoker    Smokeless tobacco: Never Used    Tobacco comment: quit 30 years ago   Substance and Sexual Activity    Alcohol use: No    Drug use: No    Sexual activity: Not on file   Other Topics Concern    Not on file   Social History Narrative    Not on file     Social Determinants of Health     Financial Resource Strain:     Difficulty of Paying Living Expenses: Not on file   Food Insecurity:     Worried About Running Out of Food in the Last Year: Not on file    Dona of Food in the Last Year: Not on file   Transportation Needs:     Lack of Transportation (Medical): Not on file    Lack of Transportation (Non-Medical):  Not on file   Physical Activity:     Days of Exercise per Week: Not on file    Minutes of Exercise per Session: Not on file   Stress:     Feeling of Stress : Not on file   Social Connections:     Frequency of Communication with Friends and Family: Not on file    Frequency of Social Gatherings with Friends and Family: Not on file    Attends Temple Services: Not on file    Active Member of Clubs or Organizations: Not on file    Attends Club or Organization Meetings: Not on file    Marital Status: Not on file   Intimate Partner Violence:     Fear of Current or Ex-Partner: Not on file    Emotionally Abused: Not on file    Physically Abused: Not on file    Sexually Abused: Not on file   Housing Stability:     Unable to Pay for Housing in the Last Year: Not on file    Number of Places Lived in the Last Year: Not on file    Unstable Housing in the Last Year: Not on file       Family History   Problem Relation Age of Onset    Ovarian Cancer Sister     Ovarian Cancer Sister          Physical Exam:  Vitals:    01/28/22 0610 01/28/22 0700 01/28/22 0844 01/28/22 1026   BP:  (!) 122/54     Pulse: 62 57  59   Resp: 16 16  19   Temp:       TempSrc:       SpO2: 91% 94%  91%   Weight:       Height:   5' 10.98\" (1.803 m)      I/O last 3 completed shifts: In: 6022.6 [I.V.:3979.6; NG/GT:1893; IV Piggyback:150]  Out: 2025 [Urine:2025]    Deferred due to [address] isolation.     Data:  CBC:   Lab Results   Component Value Date    WBC 6.9 01/28/2022    HGB 14.6 01/28/2022    HCT 48.8 01/28/2022    MCV 83.4 01/28/2022     01/28/2022     BMP:    Lab Results   Component Value Date     01/28/2022     (L) 01/27/2022     (L) 01/26/2022    K 5.8 (H) 01/28/2022    K 5.4 (H) 01/27/2022    K 4.8 01/26/2022     01/28/2022     01/27/2022     01/26/2022    CO2 22 01/28/2022    CO2 22 01/27/2022    CO2 19 (L) 01/26/2022    BUN 87 (H) 01/28/2022    BUN 88 (H) 01/27/2022    BUN 77 (H) 01/26/2022    CREATININE 1.71 (H) 01/28/2022    CREATININE 2.04 (H) 01/27/2022    CREATININE 2.29 (H) 01/26/2022    GLUCOSE 153 (H) 01/28/2022    GLUCOSE 250 (H) 01/27/2022    GLUCOSE 335 (H) 01/26/2022     CMP:   Lab Results   Component Value Date     01/28/2022    K 5.8 01/28/2022     01/28/2022    CO2 22 01/28/2022    BUN 87 01/28/2022    CREATININE 1.71 01/28/2022    GLUCOSE 153 01/28/2022    CALCIUM 8.6 01/28/2022    PROT 6.5 01/16/2022    LABALBU 3.8 01/16/2022    BILITOT 0.68 01/16/2022    ALKPHOS 123 01/16/2022    AST 45 01/16/2022    ALT 24 01/16/2022      Hepatic:   Lab Results   Component Value Date    AST 45 (H) 01/16/2022    AST 16 11/03/2021    AST 30 11/02/2021    ALT 24 01/16/2022    ALT 13 11/03/2021    ALT 22 11/02/2021    BILITOT 0.68 01/16/2022    BILITOT 0.56 11/03/2021    BILITOT 0.31 11/02/2021    ALKPHOS 123 01/16/2022    ALKPHOS 90 11/03/2021    ALKPHOS 140 (H) 11/02/2021     BNP: No results found for: BNP  Lipids:   Lab Results   Component Value Date    CHOL 129 11/03/2021    HDL 30 (L) 11/03/2021     INR:   Lab Results   Component Value Date    INR 1.0 01/17/2022    INR 1.0 11/03/2021    INR 1.0 07/27/2019     PTH: No results found for: PTH  Phosphorus:    Lab Results   Component Value Date    PHOS 2.9 01/28/2022     Ionized Calcium: No results found for: IONCA  Magnesium:   Lab Results   Component Value Date    MG 2.6 01/28/2022     Albumin:   Lab Results   Component Value Date    LABALBU 3.8 01/16/2022     Last 3 CK, CKMB, Troponin: @LABRCNT(CKTOTAL:3,CKMB:3,TROPONINI:3)       URINE:)No results found for: Darline Rodriguez    Radiology:  Reviewed. Assessment:  Hyperkalemia  BLANCHE on CKD  Diabetes Mellitis   COVID19 Pneumonia  COPD  Elevated Ddimer      Plan:  Creatinine is improving  Insulin and Dextrose given this morning. Recheck K level at 1100. Will start Lokelma 10 g TID for 3 doses. Discontinue IVF  Will start Lasix 20 mg IV BID  CT abdomen results noted. Recommend Renal tube feed with low potassium. Avoid hypotension, nephrotoxic drugs, Lovenox, Fleets enema and IV contrast exposure. Follow up labs ordered for AM.     Thank you for the consultation. Please do not hesitate to call with questions. We will follow with you. Patient seen in collaboration with Dr. Colt Kim. Electronically signed by HERB Ríos CNP  on 1/28/2022 at 10:46 AM   [facility].   Ph: [phone]    Physician Addendum  I evaluated the patient with CNP   Agree with above assessment and plan   CT report noted, no obstruction  Change tube feeding to low potassium formula  Ordered insulin and D50  Will give Lokelma 10 g 3 times daily for 3 doses  Recheck potassium is pending  Start Lasix 20 mg IV twice daily  Discontinue IV fluid  We will continue to follow     Electronically signed by Keith Maurice MD on 01/28/22 11:29 AM

## 2022-01-28 NOTE — PROGRESS NOTES
..    Palliative Care Progress Note    NAME:  Maricarmen Betts  MEDICAL RECORD NUMBER:  [id number]  AGE: 64 y.o. GENDER: male  : 1945  TODAY'S DATE:  2022    Reason for Consult:  goals of care, code status, and support. History of Present Illness     The patient is a 64 y.o. Non- / non  male who presents with Leg Swelling (bilateral, has CHF, on diuretic, EMT saw pt , assisted pt into car), Fever, and Other (low SPO2)      Referred to Palliative Care by  [x] Physician   [] Nursing  [] Family Request   [] Other:       He was admitted to the primary service for Hypokalemia [E87.6]  Hypomagnesemia [E83.42]  COPD exacerbation ([facility] [J44.1]  BLANCHE (acute kidney injury) ([facility] [N17.9]  Acute respiratory failure with hypoxia ([facility] [J96.01]  Acute on chronic systolic CHF (congestive [facility] failure) [facility] [I50.23]  COVID [U07.1]  COVID-19 [U07.1]. His hospital course has been associated with COPD exacerbation, worsening acute interstitial lung disease, COVID-19 pneumonia, acute hypoxic respiratory failure, BLANCHE, acute on chronic systolic CHF s/p AICD, hypokalemia, hypomagnesemia, elevated D-dimer, elevated troponin, elevated inflammatory markers, subacute small left thalamic stroke, and worsening moderate to severe pulmonary artery hypertension. The patient has a complicated medical history and has been hospitalized since 2022 11:51 AM.  Nephrology consulted today for hyperkalemia. Planning to change tube feed to low potassium formula. Insulin, D50, and low, ordered. Lasix 20 mg twice daily also ordered, and IV fluids discontinued. Patient is day #6 on vent. Patient continues on amiodarone drip for rhythm control. Patient did have an episode of bradycardia with heart rate in the 30s. Is off of pressor support. Patient is a full code. Palliative care following for review goals, CODE STATUS, and support. OVERNIGHT EVENTS: Sedation had to be increased due to worsening respiratory rate. Patient is on Versed and Precedex. Neurology recommended increasing Precedex drip and weaning Versed. Neurology reports that all brainstem reflexes are intact.     1/28 pertinent labs include; potassium 5.5, pH 7.319, BUN 87, creatinine 1.71,     BP (!) 135/54   Pulse 61   Temp 98.7 °F (37.1 °C) (Oral)   Resp (!) 32   Ht 5' 10.98\" (1.803 m)   Wt 216 lb (98 kg)   SpO2 94%   BMI 30.14 kg/m²     Assessment        REVIEW OF SYSTEMS    [x]   UNABLE TO OBTAIN:  Intubated and sedated    Constitutional:  []   Chills   []  Fatigue   []  Fevers   []  Malaise   []  Weight loss   [] Other:     Respiratory:   []  Cough    []  Shortness of breath    []  Chest pain    [] Other:     Cardiovascular:   []  Chest pain  []  Dyspnea    []  Exertional chest pressure/discomfort     [] Fatigue      []  Palpitations    []  Syncope   [] Other:     Gastrointestinal:   []  Abdominal pain   []  Constipation    []  Diarrhea    []   Dysphagia   []  Reflux             []  Vomiting   [] Other:     Genitourinary:  []  Dysuria     []  Frequency   []  Hematuria   [] Nocturia   []  Urinary incontinence   [] Other:     Musculoskeletal:   [] Back pain    []  Muscle weakness   []  Myalgias    []  Neck pain   []  Stiff joints   []  Other:     Behavioral/Psych:   [] Anxiety    []  Depression     []  Mood swings   [] Other:     PHYSICAL ASSESSMENT:  Patient     General: []  Oriented x3      [] well appearing      [] Intubated      [x] ill appearing      [] Other:    Mental Status: [] normal mental status exam      [x] drowsy      [] Confused      [] Other:     Cardiovascular: [x]  Regular rate/rhythm      [] Arrhythmia      [] Other:     Chest: [] Effort normal      [] lungs clear      [] respiratory distress      [] Tachypnea      [x]  Other: Fi02 85% and peep 16    Abdomen: [] Soft/non-tender      []  Normal appearance      [] Distended      [] Ascites      [] Other:    Neurological: [] Normal Speech      [] Normal Sensation      [x]  Deficits present:  sedated    Extremity:  [x] normal skin color/temp      [] clubbing/cyanosis      []  No edema      [] Other:     Palliative Performance Scale:  ___60%  Ambulation reduced; Significant disease; Can't do hobbies/housework; intake normal or reduced; occasional assist; LOC full/confusion  ___50%  Mainly sit/lie; Extensive disease; Can't do any work; Considerable assist; intake normal or reduced; LOC full/confusion  ___40%  Mainly in bed; Extensive disease; Mainly assist; intake normal or reduced; LOC full/confusion   ___30%  Bed Bound; Extensive disease; Total care; intake reduced; LOCfull/confusion  ___20%  Bed Bound; Extensive disease; Total care; intake minimal; Drowsy/coma  _x__10%  Bed Bound; Extensive disease; Total care; Mouth care only; Drowsy/coma  ___0       Death      Plan      Palliative Interaction: I went to see patient, and received update from RN. RN reports pressor support is off, but sedation is unable to be weaned due to worsening symptoms. She reports that venous scan of right arm today due to worsening swelling. She also reports elevated potassium, and nephrology consult. She is doing bowel regimen due to no BM x12 days. CT abdomen planned. I reached out to patient's wife, and introduced myself to her and the palliative role. I informed her that I was unable to reach her the other day and provided her daughter an update. She reports speaking with her daughter, and is questioning if patient is suffering at this point. I provided her with detailed update, and allowed time for questioning. I updated her on nephrology consult due to elevated potassium, and neurology's assessment. I discussed patient's CODE STATUS, and explained all code classifications in detail. I discussed CPR and COVID-19 in more detail. Wife reports that patient would not want CPR if he continues to decline at this point.   She reports wanting to change CODE STATUS to DNR CCA, and give patient a little more time to see if he improves or worsens. She reports considering comfort care, and would like providers to call if they feel patient is planning/suffering over the weekend. I had DNR CCA CODE STATUS witnessed over the phone with Guadalupe Carrizales and patient's wife Alvenia Apley. I updated RN, and placed DNR form on the chart. I discussed spiritual needs, and offered wife support. Wife denied any current needs. She was appreciative of call. Education/support to staff  Education/support to family  Communications with primary service  Providing support for coping/adaptation/distress of family  Discussing meaning/purpose   Specific spiritual beliefs/practices  Decision making regarding life prolonging treatment  Continue with current plan of care  Clarification of medical condition to patient and family  Code status clarified: Full Code  Code status clarified: [facility]  Code status clarified: [facility]  Validating patient/family distress  Continued communication updates  Recognizing, reflecting, and empathizing with family members' anticipatory grief  Principle Problem/Diagnosis:  <principal problem not specified>    Additional Assessments:  Active Problems:    COVID-19    Acute on chronic systolic CHF (congestive heart failure) ([address]    Acute respiratory failure with hypoxia ([address]    BLANCHE (acute kidney injury) ([address]    COPD exacerbation ([address]    Hypokalemia    Hypomagnesemia    Palliative care encounter    Goals of care, counseling/discussion    DNR (do not resuscitate) discussion    ACP (advance care planning)  Resolved Problems:    * No resolved hospital problems. *    1- Symptom management/ pain control     Pain Assessment:  Pain is controlled with current analgesics. Medication(s) being used: narcotic analgesics including Fentanyl drip. Anxiety:  on sedation                          Dyspnea:  acute dyspnea- on vent                          Fatigue:  sedated on vent    Other:     We feel the patient symptoms are being controlled. his current regimen is reviewed by myself and discussed with the staff. 2- Goals of care evaluation   The patient goals of care are improve or maintain function/quality of life and support for family/caregiver   Goals of care discussed with:    [] Patient independently    [] Patient and Family    [x] Family or Healthcare DPOA independently    [] Unable to discuss with patient, family/DPOA not present    3- Code Status  Full Code    4- Other recommendations  - We will continue to provide comfort and support to the patient and the family    Please call with any palliative questions or concerns. Palliative Care Team is available via perfect serve or via phone. Palliative Care will continue to follow Mr. Dominguez's care as needed. The note has been dictated by dragon, typing errors may be a possibility     Thank you for allowing Palliative Care to participate in the care of Mr. Elis Agosto . Electronically signed by   HERB [facility]  Palliative Care Team  on 1/28/2022 at 1:00 PM    Palliative care can be reached via [employer].

## 2022-01-28 NOTE — PROGRESS NOTES
amlodipine, atorvastatin  Continue aspirin  Patient noticed to be constipated, also noticed to be hyperkalemic, CT abdomen done today did not show any evidence of bowel obstruction, shows moderate stool burden, will compresses enema  Consult nephrology for hyperkalemia patient will receive Lokelma  Continue other medication as below.     Scheduled Meds:   sodium zirconium cyclosilicate  10 g Oral TID    furosemide  20 mg IntraVENous BID    insulin glargine  45 Units SubCUTAneous BID    pantoprazole  40 mg IntraVENous Daily    And    sodium chloride (PF)  10 mL IntraVENous Daily    aspirin  324 mg Oral Daily    levETIRAcetam  500 mg Oral BID    cefepime  2,000 mg IntraVENous Q12H    chlorhexidine  15 mL Mouth/Throat BID    enoxaparin  30 mg SubCUTAneous BID    QUEtiapine  50 mg Oral Once    ALPRAZolam  0.5 mg Oral TID    ipratropium-albuterol  1 ampule Inhalation 4x daily    budesonide  0.5 mg Nebulization BID    metoprolol succinate  50 mg Oral Nightly    dexamethasone  6 mg IntraVENous Q24H    sodium chloride flush  5-40 mL IntraVENous 2 times per day    amLODIPine  5 mg Oral Nightly    atorvastatin  20 mg Oral Daily    insulin lispro  0-18 Units SubCUTAneous TID WC    insulin lispro  0-9 Units SubCUTAneous Nightly     Continuous Infusions:   phenylephrine (KEARA-SYNEPHRINE) 50mg/250mL infusion Stopped (01/26/22 1820)    propofol 8.059 mcg/kg/min (01/28/22 0610)    midazolam (VERSED) 1 mg/mL in D5W infusion 4 mg/hr (01/28/22 0939)    fentaNYL 75 mcg/hr (01/28/22 1412)    norepinephrine Stopped (01/24/22 1046)    dexmedetomidine (PRECEDEX) IV infusion 0.7 mcg/kg/hr (01/28/22 1610)    sodium chloride      dextrose       PRN Meds:  bisacodyl, 10 mg, Daily PRN  LORazepam, 1 mg, Q4H PRN  dextrose bolus (hypoglycemia), 125 mL, PRN   Or  dextrose bolus (hypoglycemia), 250 mL, PRN  sodium chloride flush, 10 mL, PRN  sodium chloride, 25 mL, PRN  potassium chloride, 40 mEq, PRN   Or  potassium alternative oral replacement, 40 mEq, PRN   Or  potassium chloride, 10 mEq, PRN  magnesium sulfate, 1,000 mg, PRN  ondansetron, 4 mg, Q8H PRN   Or  ondansetron, 4 mg, Q6H PRN  magnesium hydroxide, 30 mL, Daily PRN  acetaminophen, 650 mg, Q6H PRN   Or  acetaminophen, 650 mg, Q6H PRN  glucose, 15 g, PRN  glucagon (rDNA), 1 mg, PRN  dextrose, 100 mL/hr, PRN  hydrALAZINE, 10 mg, Q6H PRN        Chief Complaint:     Chief Complaint   Patient presents with    Leg Swelling     bilateral, has CHF, on diuretic, EMT saw pt , assisted pt into car    Fever    Other     low SPO2         History of Present Illness:   Patient seen examined at bedside  Patient remains in medical ICU  Patient remains intubated sedated, patient is requiring full ventilatory support, patient is requiring 85% FiO2 with PEEP of 16  Patient is continued on Precedex and Versed, also continued on amiodarone drip  Patient is also receiving fentanyl drip          Review of systems:  Unable to conduct ROS secondary to patient being intubated      Initial HPI  Vianey Walker is a 64 y.o.  male who presents with Leg Swelling (bilateral, has CHF, on diuretic, EMT saw pt , assisted pt into car), Fever, and Other (low SPO2)  This is a 73-year-old gentleman admitted via ER, come to ER with complaint of having shortness of breath, patient has medical history significant for COPD patient with history of cardiac failure: Patient noted that he has been having increasing swelling in his lower extremity and becoming more short of breath, further patient also been having some body aches and sweats, patient patient testing in the emergency room showed that he is positive for COVID-19 also noticed to have an elevated BNP, imaging concerning for fluid overload, admitted for further regiment    I have personally reviewed the past medical history, past surgical history, medications, social history, and family history, and summarized in the note.     Review of Systems: Unable to conduct ROS secondary to patient being intubated      Past Medical History:     Past Medical History:   Diagnosis Date    Arthritis     Atherosclerotic plaque 7/28/2019    Cervical disc disease     degenerative disc disease    Diabetes mellitus ([facility] [address]     type 2    History of blood transfusion     Hx of blood clots     left leg    Hyperlipidemia     Neuropathy     Right kidney mass     \"urologist is watching\"    Snores     Type 2 diabetes mellitus treated with insulin ([facility] [address] 7/28/2019        Past Surgical History:     Past Surgical History:   Procedure Laterality Date    ABDOMINAL AORTIC ANEURYSM REPAIR  2005    CARPAL TUNNEL RELEASE Bilateral     CERVICAL SPINE SURGERY      COLONOSCOPY      benign polyps removed    INGUINAL HERNIA REPAIR Right     OR REPAIR INCISIONAL HERNIA,REDUCIBLE N/A 5/10/2017    HERNIA VENTRAL REPAIR WITH MESH  performed by Alfa Sheridan DO at [address] [place] [address] Road  05/10/2017    with mesh        Medications Prior to Admission:       Prior to Admission medications    Medication Sig Start Date End Date Taking? Authorizing Provider   rosuvastatin (CRESTOR) 40 MG tablet Take 40 mg by mouth every evening   Yes Historical Provider, MD   insulin NPH (HUMULIN N;NOVOLIN N) 100 UNIT/ML injection vial Inject 20 Units into the skin 2 times daily (before meals)   Yes Historical Provider, MD   levETIRAcetam (KEPPRA) 500 MG tablet Take 1 tablet by mouth 2 times daily 11/3/21  Yes Austin Holloway MD   venlafaxine (EFFEXOR XR) 150 MG extended release capsule Take 1 capsule by mouth daily (with breakfast) 7/29/19  Yes Arabella Kiser   vitamin B-1 (THIAMINE) 100 MG tablet Take 100 mg by mouth daily   Yes Historical Provider, MD   ferrous sulfate 325 (65 Fe) MG tablet Take 325 mg by mouth daily (with breakfast)   Yes Historical Provider, MD   ALPRAZolam (XANAX) 0.5 MG tablet Take 0.5 mg by mouth three times daily.    Yes Historical Provider, MD   furosemide (LASIX) 40 MG tablet Take 1 tablet by mouth 2 times daily 18  Yes Taylor Evans MD   tiotropium (SPIRIVA RESPIMAT) 2.5 MCG/ACT AERS inhaler Inhale 2 puffs into the lungs daily    Yes Historical Provider, MD   albuterol sulfate  (90 Base) MCG/ACT inhaler Inhale 2 puffs into the lungs every 6 hours as needed for Wheezing or Shortness of Breath   Yes Historical Provider, MD   metoprolol succinate (TOPROL XL) 50 MG extended release tablet Take 50 mg by mouth daily   Yes Historical Provider, MD   Multiple Vitamins-Minerals (MULTIVITAMIN ADULT) TABS Take 1 tablet by mouth daily   Yes Historical Provider, MD   vitamin D (CHOLECALCIFEROL) 1000 UNIT TABS tablet Take 1,000 Units by mouth daily   Yes Historical Provider, MD   fluticasone (FLONASE) 50 MCG/ACT nasal spray 1 spray by Each Nare route daily as needed for Rhinitis   Yes Historical Provider, MD   aspirin 325 MG EC tablet Take 325 mg by mouth daily    Yes Historical Provider, MD   Omega-3 Fatty Acids (FISH OIL) 1000 MG CAPS Take 1 capsule by mouth daily    Yes Historical Provider, MD   amLODIPine (NORVASC) 5 MG tablet Take 5 mg by mouth nightly    Yes Historical Provider, MD   mirtazapine (REMERON) 45 MG tablet Take 45 mg by mouth nightly   Yes Historical Provider, MD        Allergies: Barbiturates, Codeine, and Sulfa antibiotics    Social History:     Tobacco:    reports that he has quit smoking. He has never used smokeless tobacco.  Alcohol:      reports no history of alcohol use. Drug Use:  reports no history of drug use.     Family History:     Family History   Problem Relation Age of Onset    Ovarian Cancer Sister     Ovarian Cancer Sister          Physical Exam:     Vitals:  BP (!) 126/58   Pulse 59   Temp 99.9 °F (37.7 °C) (Oral)   Resp (!) 32   Ht 5' 10.98\" (1.803 m)   Wt 216 lb (98 kg)   SpO2 93%   BMI 30.14 kg/m²   Temp (24hrs), Av.9 °F (37.2 °C), Min:98.6 °F (37 °C), Max:99.9 °F (37.7 °C)      General appearance -on ventilator  Mental status -sedated  Head - normocephalic and atraumatic  Eyes - conjunctiva clear  Ears -external ears normal  Nose - no drainage noted  Mouth - mucous membranes moist  Neck - supple, no carotid bruits, thyroid not palpable  Chest -basal crackles with decreased air entry bilaterally to auscultation, increased effort  Heart - normal rate, regular rhythm, no murmur  Abdomen - soft, nontender, nondistended, bowel sounds present all four quadrants, no masses, hepatomegaly or splenomegaly  Neurological -sedated on vent  Extremities - no edema, distal extremity discoloration    Skin - no gross lesions, rashes, or induration noted        Data:     Labs:    Hematology:  Recent Labs     01/26/22  0515 01/27/22  0417 01/28/22  0507   WBC 8.6 6.9 6.9   RBC 6.67* 6.28* 5.85*   HGB 16.4 15.7 14.6   HCT 55.3* 51.6* 48.8   MCV 82.9 82.2* 83.4   MCH 24.6* 25.0* 25.0*   MCHC 29.7 30.4 29.9   RDW 17.3* 15.8* 15.3*    162 157   MPV 11.9 12.1 12.9     Chemistry:  Recent Labs     01/26/22  0515 01/26/22  0515 01/27/22 0417 01/28/22  0507 01/28/22  1134   *  --  134* 136  --    K 4.8   < > 5.4* 5.8* 5.5*     --  104 106  --    CO2 19*  --  22 22  --    GLUCOSE 335*  --  250* 153*  --    BUN 77*  --  88* 87*  --    CREATININE 2.29*  --  2.04* 1.71*  --    MG  --   --   --  2.6  --    ANIONGAP 11  --  8* 8*  --    LABGLOM 28*  --  32* 39*  --    GFRAA 34*  --  38* 47*  --    CALCIUM 8.3*  --  8.3* 8.6  --    PHOS  --   --   --  2.9  --     < > = values in this interval not displayed.      Recent Labs     01/27/22  1123 01/27/22  1630 01/27/22  1948 01/27/22  2334 01/28/22  0800 01/28/22  1118   POCGLU 204* 157* 142* 146* 122* 119*       Lab Results   Component Value Date    INR 1.0 01/17/2022    INR 1.0 11/03/2021    INR 1.0 07/27/2019    PROTIME 13.3 01/17/2022    PROTIME 11.1 11/03/2021    PROTIME 10.5 07/27/2019       Lab Results   Component Value Date/Time    SPECIAL RT HAND 19ML 01/23/2022 05:59 PM    SPECIAL ART LINE 10ML 01/23/2022 05:59 PM     Lab Results   Component Value Date/Time    CULTURE NO GROWTH 4 DAYS 01/23/2022 05:59 PM    CULTURE NO GROWTH 4 DAYS 01/23/2022 05:59 PM       Lab Results   Component Value Date    POCPH 7.319 01/28/2022    POCPCO2 46.5 01/28/2022    POCPO2 105.3 01/28/2022    POCHCO3 23.9 01/28/2022    NBEA 3 01/28/2022    PBEA NOT REPORTED 01/28/2022    EYD5RVI NOT REPORTED 01/28/2022    RLCY2UGX 98 01/28/2022    FIO2 85.0 01/28/2022       Radiology:    XR CHEST 1 VIEW    Result Date: 1/16/2022  Hypoinflated lungs. Cardiomegaly again seen, when compared to the previous study performed 07/27/2019. Diffuse, increased interstitial markings throughout both lungs, some of which can be seen on the prior study may represent baseline chronic interstitial lung changes. The increased prominence on this exam could be secondary to the suboptimal inspiratory effort. The presence of pulmonary vascular congestion/mild CHF or bilateral interstitial pneumonia cannot be excluded. Clinical correlation is recommended. CT CHEST PULMONARY EMBOLISM W CONTRAST    Result Date: 1/16/2022  No evidence of pulmonary embolism. Compared to 2008, worsening underlying emphysema, with worsening subacute or acute interstitial lung disease, best seen left upper lobe; differential includes interstitial pneumonia or pneumonitis superimposed on emphysema, DIP, HP, and other interstitial pneumonias as well as infectious or inflammatory process superimposed on emphysema disease. Findings are not typical for COVID-19 pneumonia, but an element of the latter should be considered. Thickening and distortion left major fissure with ill-defined mass-like focus left lower lobe. The latter could also be infectious or inflammatory, although neoplasm should be excluded. Underlying worsening emphysema. Nodular densities, best seen right upper lobe.   Small pleural effusions, slightly larger on the left. See recommendations below. Mild mediastinal and left hilar adenopathy; see recommendations below. Worsening now moderate-severe pulmonary artery hypertension. Mild cardiomegaly. Stable mild dilatation ascending thoracic aorta. Additional unchanged or incidental findings, as above. RECOMMENDATIONS: Clinical correlation and short-term follow-up CT chest in 1-4 months depending upon the clinical presentation/course. All radiological studies reviewed                Code Status:  DNR-CCA    Electronically signed by Goi Baptiste MD on 1/28/2022 at 4:44 PM     Copy sent to Dr. Cassy Finn MD    This note was created with the assistance of a speech-recognition program.  Although the intention is to generate a document that actually reflects the content of the visit, no guarantees can be provided that every mistake has been identified and corrected by editing. Note was updated later by me after  physical examination and  completion of the assessment.

## 2022-01-28 NOTE — PROGRESS NOTES
[facility]  Progress Note    Maricarmen Betts  MRN: [id number]  Date: 1/28/2022  LOS: 15     Reason for F/U :   COVID-19 virus infection    Impression :   COVID-19 virus infection tested + 1/16/2022  Acute respiratory failure currently ventilator dependent  Intubated 1/23/2022  Emphysema with worsening changes on imaging  Worsening acute interstitial lung disease and clinically not typical of COVID-19 virus infection  Worsening moderate to severe pulmonary artery hypertension  Bilateral lower extremity edema likely related to above  Leukopenia and thrombocytopenia  Lacunar l infarct on the left thalamus  Fever to 103 degrees 1/23/2022  Acute kidney injury    Recommendations:   COVID-19 therapy: The patient is on Decadron 6 mg IV daily   The patient has been started on baricitinib 1/17/2022 but it was discontinued 1/18/2022 due to cytopenias. Actemra had been considered but again due to the cytopenias and thrombocytopenia this has been held. Antimicrobial therapy: The patient received Rocephin 1000 mg and azithromycin 500 mg on 1/16/2022  The patient received a dose of levofloxacin 500 mg on 1/18/2020. Patient started on cefepime and vancomycin 1/23/2022  Vancomycin discontinued due to acute kidney injury 1/24/2022    Today's day #7 of the cefepime and this will be discontinued after today's doses. Clinically there has been no change he remains on high FiO2 requirements. Continue supportive therapy    Infection Control Recommendations:   Droplet plus precautions    Discharge Planning:   Estimated Length of IV antimicrobials: 5 to 7 days  Patient will need Midline Catheter Insertion/ PICC line Insertion: No  [facility],  LTAC: Undetermined  Patient willneed outpatient wound care: No    Medical Decision making / Summary of Stay:   Maricarmen Betts is a 64y.o.-year-old male who was initially admitted on 1/16/2022.    Franky Becki has a history of diabetes mellitus type 2, essential hypertension, seizure disorder, chronic kidney disease stage III, hyperlipidemia among other medical problems.     The patient reports that about 1 to 2 months ago he had his diabetes medications changed and since that time he has noticed increasing swelling in his legs, hardness in the legs, difficulty ambulating, and weight gain from 178 to 255 pounds over the last 1 to 2 months. He did not report any subjective fevers, chills, cough or shortness of breath. He denies any loss of smell or change in taste. No abdominal pain nausea vomiting or diarrhea. The patient does report that he has home oxygen that he uses as needed at home and he reports that he hardly needs it. He is vaccinated did receive the J&J vaccine but has not yet received a booster dose.     The patient presented to the emergency department and was found to have leukopenia with a white blood cell count of 2.7 and thrombocytopenia with a platelet count of 609. Creatinine slightly elevated at 1.31 and proBNP is elevated at 9327. Chest x-ray showed hyperinflated lungs with cardiomegaly seen and diffuse increased interstitial markings in both lungs which may represent baseline chronic interstitial lung changes given that these findings were present before. A CT of the chest was done with IV contrast that showed no evidence of pulmonary embolism and compared to 2008 there is worsening underlying emphysema with worsening subacute or acute interstitial lung disease best seen in the left upper lobe. Findings were not felt typical for COVID-19 virus pneumonia. There was thickening and distortion of the left major fissure with an ill-defined masslike focus in the left lower lobe.   There is worsening moderate to severe pulmonary artery hypertension     COVID testing was positive and I was asked to evaluate and help with COVID-19 virus infection    Current evaluation:1/28/2022    BP (!) 135/54   Pulse 61   Temp 98.7 °F (37.1 °C) (Oral)   Resp (!) 32   Ht 5' 10.98\" (1.803 m)   Wt 216 lb (98 kg)   SpO2 94%   BMI 30.14 kg/m²     Temperature Range: Temp: 98.7 °F (37.1 °C) Temp  Av.7 °F (37.1 °C)  Min: 98.5 °F (36.9 °C)  Max: 98.9 °F (37.2 °C)   Patient remains intubated and sedated on the ventilator and is on 85% FiO2 and 16 of PEEP  Patient is on fentanyl, Precedex and Versed drips. Patient has been able to wean off the Levophed. The patient did have a CT scan of the abdomen pelvis done earlier today which was a limited study with no evidence of bowel obstruction and moderate stool burden noted felt related to constipation. Tiny amount of free fluid scattered in the abdomen, moderate pleural effusions and left basilar consolidation and basilar interstitial thickening increased from 2022    Review of Systems   Unable to perform ROS: Intubated       Physical Examination :     Physical Exam  Constitutional:       Appearance: He is well-developed. Interventions: He is sedated and intubated. HENT:      Head: Normocephalic and atraumatic. Cardiovascular:      Rate and Rhythm: Normal rate. Heart sounds: Normal heart sounds. No friction rub. No gallop. Pulmonary:      Effort: Pulmonary effort is normal. He is intubated. Breath sounds: Normal breath sounds. No wheezing. Abdominal:      General: Bowel sounds are normal.      Palpations: Abdomen is soft. There is no mass. Tenderness: There is no abdominal tenderness. Musculoskeletal:      Cervical back: Neck supple. Lymphadenopathy:      Cervical: No cervical adenopathy. Skin:     General: Skin is warm and dry. Comments: The skin does not have cyanotic appearing changes in the feet bilaterally as the patient has been taken off pressors.          Laboratory data:   I have independently reviewed the followinglabs:  CBC with Differential:   Recent Labs     22  0417 22  0507   WBC 6.9 6.9   HGB 15.7 14.6   HCT 51.6* 48.8   PLT 162 157   LYMPHOPCT 7* 5*   MONOPCT 7 9     BMP:   Recent Labs     01/27/22  0417 01/27/22  0417 01/28/22  0507 01/28/22  1134   *  --  136  --    K 5.4*   < > 5.8* 5.5*     --  106  --    CO2 22  --  22  --    BUN 88*  --  87*  --    CREATININE 2.04*  --  1.71*  --    MG  --   --  2.6  --     < > = values in this interval not displayed. Hepatic Function Panel:   No results for input(s): PROT, LABALBU, BILIDIR, IBILI, BILITOT, ALKPHOS, ALT, AST in the last 72 hours. Lab Results   Component Value Date    PROCAL 0.24 01/23/2022    PROCAL 0.11 01/19/2022    PROCAL 0.11 01/16/2022     Lab Results   Component Value Date    CRP 23.5 01/16/2022    CRP 2.0 07/27/2019     Lab Results   Component Value Date    SEDRATE 3 01/16/2022         Lab Results   Component Value Date    DDIMER 5.84 01/23/2022    DDIMER 1.53 01/18/2022    DDIMER 1.73 01/16/2022    DDIMER 1.18 12/07/2018     Lab Results   Component Value Date    FERRITIN 569 01/16/2022    FERRITIN 50 07/23/2019    FERRITIN 18 07/08/2019     Lab Results   Component Value Date     01/16/2022     Lab Results   Component Value Date    FIBRINOGEN 402 01/16/2022       Results in Past 30 Days  Result Component Current Result Ref Range Previous Result Ref Range   SARS-CoV-2, Rapid DETECTED (A) (1/16/2022) Not Detected Not in Time Range      Lab Results   Component Value Date    COVID19 DETECTED 01/16/2022    COVID19 Not Detected 11/02/2021       No results for input(s): VANCOTROUGH in the last 72 hours. Imaging Studies:   ONE XRAY VIEW OF THE CHEST 1/28/2022 6:13 am    Impression   1.  Stable bilateral lung infiltrates with small bilateral pleural effusions,   most compatible with COVID pneumonia.             CT OF THE ABDOMEN AND PELVIS WITHOUT CONTRAST 1/28/2022 8:34 am    Impression   1.  Overall mildly limited study due to motion and lack of contrast.       2.  No evidence of bowel obstruction.  Moderate stool burden is noted,   possibly related to constipation.  Enteric tube is in place with distal tip   in the proximal stomach.       3.  Tiny amount of free fluid scattered in the abdomen, including   presacral/perirectal fluid, most likely related to volume overload.  No   definite findings of rectal wall thickening or fecal impaction.       4.  Moderate pleural effusions, left basilar consolidation, and bibasilar   interstitial thickening, increased when compared to 01/16/2022.       RECOMMENDATIONS:   Unavailable         Veins: Lower Extremities DVT Study, Venous Scan Lower Bilateral.  Indications for Study: Leg Swelling . Patient Status: In Patient. Technical Quality: Limited visualization. Limitation reason: Edema. Comments: Simultaneous real time imaging utilizing B-Mode, color doppler and spectral waveform analysis was performed on the  bilateral lower extremities for venous examination of the deep and superficial systems. Conclusions  Summary  No evidence of superficial or deep venous thrombosis in both lower extremities. Signature  Electronically signed by Navi Finn RVT(Sonographer) on 01/19/2022 08:10 AM  Electronically signed by Greta Hodgesbles physician) on 01/19/2022 06:21 PM      CT OF THE HEAD WITHOUT CONTRAST  1/18/2022 5:42 pm  Impression   New lacunar infarct left thalamus, age indeterminate. Mary Organ may be helpful.           Cultures:     Culture, Blood 1 [[id number]] Collected: 01/23/22 1759   Order Status: Completed Specimen: Blood Updated: 01/28/22 2111    Specimen Description . BLOOD    Special Requests RT HAND 19ML    Culture NO GROWTH 5 DAYS   Culture, Blood 1 [[id number]] Collected: 01/23/22 1759   Order Status: Completed Specimen: Blood Updated: 01/28/22 2110    Specimen Description . BLOOD    Special Requests ART LINE 10ML    Culture NO GROWTH 5 DAYS     Culture, Blood 2 [[id number]] Collected: 01/21/22 0742   Order Status: Completed Specimen: Blood Updated: 01/26/22 1001    Specimen Description . BLOOD Special Requests LEFT AC 20ML    Culture NO GROWTH 5 DAYS   Culture, Blood 1 [[id number]] Collected: 01/21/22 0750   Order Status: Completed Specimen: Blood Updated: 01/26/22 1000    Specimen Description . BLOOD    Special Requests LEFT HAND 5ML    Culture NO GROWTH 5 DAYS   MRSA DNA Probe, Nasal [[id number]] Collected: 01/23/22 2258   Order Status: Completed Specimen: Nasal Updated: 01/25/22 1038    Specimen Description . NASAL SWAB    MRSA, DNA, Nasal NEGATIVE    Comment: NEGATIVE:  MRSA DNA not detected by nucleic acid amplification.                                                     Results should be used as an adjunct to nosocomial control efforts to identify patients   needing enhanced precautions.     The test is not intended to identify patients with staphylococcal infections.  Results   should not be used to guide or monitor treatment for MRSA infections. Culture, Blood 1 [[id number]] Collected: 01/16/22 1220   Order Status: Completed Specimen: Blood Updated: 01/21/22 1521    Specimen Description . BLOOD    Special Requests LAC 12ML    Culture NO GROWTH 5 DAYS   Culture, Blood 1 [[id number]] Collected: 01/16/22 1205   Order Status: Completed Specimen: Blood Updated: 01/21/22 1521    Specimen Description . BLOOD    Special Requests RAC 12ML    Culture NO GROWTH 5 DAYS       Culture, Urine [[id number]] Collected: 01/16/22 1315   Order Status: Completed Specimen: Urine, clean catch Updated: 01/17/22 2128    Specimen Description . CLEAN CATCH URINE    Special Requests NOT REPORTED    Culture NO SIGNIFICANT GROWTH       COVID-19, Rapid [[id number]] (Abnormal) Collected: 01/16/22 1230   Order Status: Completed Specimen: Nasopharyngeal Swab Updated: 01/16/22 1314    Specimen Description . NASOPHARYNGEAL SWAB    SARS-CoV-2, Rapid DETECTED Abnormal     Comment:        Rapid NAAT: The specimen is POSITIVE for SARS-Cov-2, the novel coronavirus associated with   COVID-19.         This test has been authorized by the FDA under an Emergency Use Authorization (EUA) for use   by authorized laboratories.         The ID NOW COVID-19 assay is designed to detect the virus that causes COVID-19 in patients   with signs and symptoms of infection who are suspected of COVID-19. An individual without symptoms of COVID-19 and who is not shedding SARS-CoV-2 virus would   expect to have a negative (not detected) result in this assay. Fact sheet for Healthcare Providers: Cee   Fact sheet for Patients: Cee           Methodology: Isothermal Nucleic Acid Amplification         Results reported to the appropriate Health Department         Flu A/B Ag Detection [[id number]] Collected: 01/16/22 1230   Order Status: Completed Specimen: Nasopharyngeal Swab Updated: 01/16/22 1313    Specimen Description . NASOPHARYNGEAL SWAB    Special Requests NOT REPORTED    Direct Exam NEGATIVE for Influenza A + B antigens.                                PCR testing to confirm this result is available upon request. Mary Mixon will be saved in the laboratory for 7 days.  Please call [phone] if PCR testing is indicated.      Medications:      sodium zirconium cyclosilicate  10 g Oral TID    furosemide  20 mg IntraVENous BID    milk and molasses  240 mL Rectal Once    insulin glargine  45 Units SubCUTAneous BID    pantoprazole  40 mg IntraVENous Daily    And    sodium chloride (PF)  10 mL IntraVENous Daily    aspirin  324 mg Oral Daily    levETIRAcetam  500 mg Oral BID    cefepime  2,000 mg IntraVENous Q12H    chlorhexidine  15 mL Mouth/Throat BID    enoxaparin  30 mg SubCUTAneous BID    QUEtiapine  50 mg Oral Once    ALPRAZolam  0.5 mg Oral TID    ipratropium-albuterol  1 ampule Inhalation 4x daily    budesonide  0.5 mg Nebulization BID    metoprolol succinate  50 mg Oral Nightly    dexamethasone  6 mg IntraVENous Q24H    sodium chloride flush  5-40 mL IntraVENous 2 times per day    amLODIPine  5 mg Oral Nightly    atorvastatin  20 mg Oral Daily    insulin lispro  0-18 Units SubCUTAneous TID WC    insulin lispro  0-9 Units SubCUTAneous Nightly           [facility]  Cornelia Melendrez MD  Perfect [employer] messaging  OFFICE: [phone]      Electronically signed by Cornelia Melendrez MD on 1/28/2022 at 12:46 PM  Thank you for allowing us to participate in the care of this patient. Please call with questions. This note iscreated with the assistance of a speech recognition program.  While intending to generate a document that actually reflects the content of the visit, the document can still have some errors including those of syntax andsound a like substitutions which may escape proof reading. In such instances, actual meaning can be extrapolated by contextual diversion.

## 2022-01-28 NOTE — CARE COORDINATION
Discharge planning. Palliative reached out to the spouse. Code status changed to [facility]. Spouse is considering comfort care.

## 2022-01-28 NOTE — PROGRESS NOTES
Physical Therapy  Facility/Department: [facility] ICU  Daily Treatment Note  NAME: Neida Contreras  : 1945  MRN: [id number]    Date of Service: 2022    Discharge Recommendations:  Patient would benefit from continued therapy after discharge     Pt currently functioning below baseline. Would suggest additional therapy at time of discharge to maximize long term outcomes and prevent re-admission. Please refer to AM-PAC score for current level of function. Assessment   Body structures, Functions, Activity limitations: Decreased functional mobility ; Decreased ROM; Decreased strength;Decreased safe awareness;Decreased endurance;Decreased balance;Decreased posture;Decreased ADL status  Assessment: Due to intubated and sedated only appropriate for PROM. Await results of R UE US to r/o DVT prior to resuming PROM to UE. No adverse reaction to PROM to LEs observed. Specific instructions for Next Treatment: patient will need re-assess for mobilty when medically stable  Activity Tolerance  Activity Tolerance: Other  Activity Tolerance: patient sedated and intubated,  vitals remained stable throughout treatment     Patient Diagnosis(es): The primary encounter diagnosis was COVID-19. Diagnoses of Acute respiratory failure with hypoxia (HCC), BLANCHE (acute kidney injury) ([address] Acute on chronic systolic CHF (congestive heart failure) ([address] Hypokalemia, Hypomagnesemia, and COPD exacerbation ([address] were also pertinent to this visit. has a past medical history of Arthritis, Atherosclerotic plaque, Cervical disc disease, Diabetes mellitus ([address] History of blood transfusion, Hx of blood clots, Hyperlipidemia, Neuropathy, Right kidney mass, Snores, and Type 2 diabetes mellitus treated with insulin ([address] has a past surgical history that includes Abdominal aortic aneurysm repair (); Carpal tunnel release (Bilateral); Cervical spine surgery; Inguinal hernia repair (Right); Upper gastrointestinal endoscopy;  Colonoscopy; ventral hernia repair (05/10/2017); and pr repair incisional hernia,reducible (N/A, 5/10/2017). Restrictions  Restrictions/Precautions  Restrictions/Precautions: General Precautions,Fall Risk,Isolation,Contact Precautions  Required Braces or Orthoses?: No  Position Activity Restriction  Other position/activity restrictions: Up with assist, telemetry, COVID isolation,  IV, alarms  Subjective   Subjective  Subjective: patient intubated and sedated  General Comment  Comments: RN states appropriate for PROM, has US ordered for R UE edema so advised to not do range on  RUE, therapist stated would only do LE PROM. Orientation     Cognition      Objective                  Exercises  Comments: PROM B LE x10 all planes, patient sedated and not assisting. LEs are bright red from feet to mid shin and moderate edema                        AM-PAC Score  AM-PAC Inpatient Mobility Raw Score : 6 (01/28/22 1350)  AM-PAC Inpatient T-Scale Score : 23.55 (01/28/22 1350)  Mobility Inpatient CMS 0-100% Score: 100 (01/28/22 1350)  Mobility Inpatient CMS G-Code Modifier : CN (01/28/22 1350)          Goals  Short term goals  Time Frame for Short term goals: 12 visits  Short term goal 1: Inc bed-mobility & transfers to independent to enable pt to safely get in/OOB & chair  Short term goal 2: Inc gait to amb 150ft or > indep w/ RW to enable pt to return to previous level of independence  Short term goal 3:  Inc strength to [address] standing balance to good with device to facilitate pt independence for performance of ADL's & functional mobility, & reduce fall risk  Short term goal 4: Pt able to tolerate 30 min of activity to include ex, breathing techniques & endurance training with pt demonstrating ability to perform energy conservation strategies during functional activity to self-regulate pacing & breathing techniques to avoid SOB & maintain SpO2 in safe range  Short term goal 5: Ed pt on home ex's, optimal breathing techniques, fall prevention, safety & energy principles, Covid 19 pt education & issue written pt education    Plan    Plan  Times per week: 1x/day 3days/week  Specific instructions for Next Treatment: patient will need re-assess for mobilty when medically stable  Current Treatment Recommendations: Strengthening,Balance Training,Functional Mobility Training,Transfer Training,ADL/Self-care Training,Endurance Training,Gait Training,Home Exercise Program,Safety Education & Training,Patient/Caregiver Education & Training  Safety Devices  Type of devices: Nurse notified,Left in bed,Call light within reach  Restraints  Initially in place: No     Therapy Time   Individual Concurrent Group Co-treatment   Time In 1252         Time Out 1302         Minutes 10                 Mamta Wyatt, PT

## 2022-01-28 NOTE — DISCHARGE INSTR - COC
Continuity of Care Form    Patient Name: William Krueger   :  1945  MRN:  [id number]    Admit date:  2022  Discharge date:  ***    Code Status Order: DNR-CCA   Advance Directives:      Admitting Physician:  Adrienne Alejo MD  PCP: Iam Florez MD    Discharging Nurse: [facility] Unit/Room#: 7491/7731-00  Discharging Unit Phone Number: ***    Emergency Contact:   Extended Emergency Contact Information  Primary Emergency Contact: Daina Dominguez  Address: [address] [place] [address] Phone: [phone]  Mobile Phone: [phone]  Relation: Spouse  Secondary Emergency Contact: Vero Aiken   [address] Phone: [phone]  Relation: Child    Past Surgical History:  Past Surgical History:   Procedure Laterality Date    ABDOMINAL AORTIC ANEURYSM REPAIR  2005    CARPAL TUNNEL RELEASE Bilateral     CERVICAL SPINE SURGERY      COLONOSCOPY      benign polyps removed    INGUINAL HERNIA REPAIR Right     IN REPAIR INCISIONAL HERNIA,REDUCIBLE N/A 5/10/2017    HERNIA VENTRAL REPAIR WITH MESH  performed by Brianda Cordon DO at [place] [address]  05/10/2017    with mesh       Immunization History:   Immunization History   Administered Date(s) Administered    COVID-19, J&J, PF, 0.5 mL 2021       Active Problems:  Patient Active Problem List   Diagnosis Code    Biventricular CHF (congestive heart failure) ([facility]) I50.82    CKD (chronic kidney disease) stage 3, GFR 30-59 ml/min ([facility]) N18.30    Essential hypertension I10    Blurred vision, right eye H53.8    Type 2 diabetes mellitus treated with insulin ([address] E11.9, Z79.4    Atherosclerotic plaque I70.90    Weakness on left side of face R29.810    Carotid stenosis, asymptomatic, bilateral I65.23    New onset seizure ([address] R56.9    COVID-19 U07.1    Acute on chronic systolic CHF (congestive heart failure) ([address] I50.23    Acute respiratory failure with hypoxia (HCC) J96.01    BLANCHE (acute kidney injury) ([facility] N17.9    COPD exacerbation ([facility] J44.1    Hypokalemia E87.6    Hypomagnesemia E83.42    Palliative care encounter Z51.5    Goals of care, counseling/discussion Z71.89    DNR (do not resuscitate) discussion Z71.89    ACP (advance care planning) Z71.89       Isolation/Infection:   Isolation            Droplet Plus          Patient Infection Status       Infection Onset Added Last Indicated Last Indicated By Review Planned Expiration Resolved Resolved By    COVID-19 01/15/22 01/16/22 01/16/22 COVID-19, Rapid 01/25/22 01/30/22      S/s 1/15    Resolved    COVID-19 (Rule Out) 01/16/22 01/16/22 01/16/22 COVID-19, Rapid (Ordered)   01/16/22 Rule-Out Test Resulted    COVID-19 (Rule Out) 11/02/21 11/02/21 11/02/21 COVID-19, Rapid (Ordered)   11/02/21 Rule-Out Test Resulted            Nurse Assessment:  Last Vital Signs: BP (!) 126/58   Pulse 59   Temp 99.9 °F (37.7 °C) (Oral)   Resp (!) 32   Ht 5' 10.98\" (1.803 m)   Wt 216 lb (98 kg)   SpO2 93%   BMI 30.14 kg/m²     Last documented pain score (0-10 scale): Pain Level: 0  Last Weight:   Wt Readings from Last 1 Encounters:   01/25/22 216 lb (98 kg)     Mental Status:  {IP PT MENTAL STATUS:22185}    IV Access:  { AMELIE IV ACCESS:861071136}    Nursing Mobility/ADLs:  Walking   {[facility] DME ZPTB:142556760}  Transfer  {[facility] DME KNDD:130433338}  Bathing  {[facility] DME RYBC:097090062}  Dressing  {[facility] DME UYVN:829964438}  Toileting  {[facility] DME KVWE:482479976}  Feeding  {[facility] DME OFAE:414089254}  Med Admin  {[facility] DME UBJI:714242825}  Med Delivery   {[facility] [place] Delivery:795607004}    Wound Care Documentation and Therapy:        Elimination:  Continence:    Bowel: {YES / GO:08432}  Bladder: {YES / JH:20667}  Urinary Catheter: {Urinary Catheter:942229947}   Colostomy/Ileostomy/Ileal Conduit: {YES / MY:71033}       Date of Last BM: ***    Intake/Output Summary (Last 24 hours) at 1/28/2022 1628  Last data filed at 2022 1600  Gross per 24 hour   Intake 4210.8 ml   Output 1525 ml   Net 2685.8 ml     I/O last 3 completed shifts:   In: 6022.6 [I.V.:3979.6; NG/GT:1893; IV Piggyback:150]  Out:  [Urine:]    Safety Concerns:     [address] KINSEY Safety Concerns:560411020}    Impairments/Disabilities:      [address] AMELIE Impairments/Disabilities:429113222}    Nutrition Therapy:  Current Nutrition Therapy:   [address] Giang [facility] Diet List:648497349}    Routes of Feeding: {CHP DME Other Feedings:976542519}  Liquids: {Slp liquid thickness:26102}  Daily Fluid Restriction: {CHP DME Yes amt example:626623444}  Last Modified Barium Swallow with Video (Video Swallowing Test): {Done Not Done LYXB:765224411}    Treatments at the Time of Hospital Discharge:   Respiratory Treatments: ***  Oxygen Therapy:  {Therapy; copd oxygen:46770}  Ventilator:    { CC Vent IMSM:486123412}    Rehab Therapies: {THERAPEUTIC INTERVENTION:9519255416}  Weight Bearing Status/Restrictions: [address]  Weight Bearin}  Other Medical Equipment (for information only, NOT a DME order):  {EQUIPMENT:282148528}  Other Treatments: ***    Patient's personal belongings (please select all that are sent with patient):  {CHP DME Belongings:251697805}    RN SIGNATURE:  {Esignature:985699486}    CASE MANAGEMENT/SOCIAL WORK SECTION    Inpatient Status Date: ***    Readmission Risk Assessment Score:  Readmission Risk              Risk of Unplanned Readmission:  40           Discharging to Facility/ Agency   Name:   Address:  Phone:  Fax:    Dialysis Facility (if applicable)   Name:  Address:  Dialysis Schedule:  Phone:  Fax:    / signature: {Esignature:243110932}    PHYSICIAN SECTION    Prognosis: {Prognosis:7790948324}    Condition at Discharge: [address] Giang Patient Condition:520793378}    Rehab Potential (if transferring to Rehab): {Prognosis:0490736471}    Recommended Labs or Other Treatments After Discharge: ***    Physician Certification: I certify the above information and transfer of Sammy Schrader  is necessary for the continuing treatment of the diagnosis listed and that he requires {Admit to Appropriate Level of Care:20410} for {GREATER/LESS:812023463} 30 days.      Update Admission H&P: {CHP DME Changes in UPZER:205936106}    PHYSICIAN SIGNATURE:  {Esignature:779398060}

## 2022-01-28 NOTE — PROGRESS NOTES
Comprehensive Nutrition Assessment    Type and Reason for Visit:  Reassess    Nutrition Recommendations/Plan:   1. Continue NPO diet  2. Modify tube feeding to Nepro at goal rate 50 mL/hr (1200 mL total volume)  3. Monitor tube feeding rate/ tolerance, labs, GI function and vent status. Nutrition Assessment:  Patient remains intubated and sedated. Glucerna 1.5 Jean Carlos tube feeding was tolerated well. Patient's potassium levels are trending up. Tube feeding rate will be changed to Nepro goal rate 50 mL/hr (1200 mL total volume). Monitor tube feeding tolerance and rate. Malnutrition Assessment:  Malnutrition Status: At risk for malnutrition (Comment)      Estimated Daily Nutrient Needs:  Energy (kcal):  1707-6620 kcal (MSJ 1.2, 1.3 factor); Weight Used for Energy Requirements:  Current     Protein (g):   gm protein (0.8-1.0 g/kg) d/t renal status; Weight Used for Protein Requirements:  Ideal          Nutrition Related Findings:  Edema: +2 non-pitting LUE, +3 non-pitting RUE, +2 BLE. Hypoactive bowel sounds      Wounds:  None       Current Nutrition Therapies:    Diet NPO  ADULT TUBE FEEDING; Orogastric; Renal Formula; Continuous; 10; Yes; 10; Q 4 hours; 50; 30; Q 4 hours  Current Tube Feeding (TF) Orders:  · Feeding Route: Orogastric  · Formula: Renal Formula  · Schedule: Continuous  · Current TF & Flush Orders Provides: 2160 kcal and 97 gm of protein  · Goal TF & Flush Orders Provides: 2813-3280 kcal and  gm of protein      Anthropometric Measures:  · Height: 5' 10.98\" (180.3 cm)  · Current Body Weight: 216 lb (98 kg)   · Admission Body Weight: 184 lb 15.5 oz (83.9 kg)    · Usual Body Weight: 255 lb (115.7 kg) (Fluid retention)     · Ideal Body Weight: 172 lbs; % Ideal Body Weight 125.6 %   · BMI: 30.1  · Adjusted Body Weight:  ; No Adjustment   · BMI Categories: Obese Class 1 (BMI 30.0-34. 9)       Nutrition Diagnosis:   · Inadequate protein-energy intake related to inadequate protein-energy intake,impaired respiratory function as evidenced by NPO or clear liquid status due to medical condition,intubation,nutrition support - enteral nutrition      Nutrition Interventions:   Food and/or Nutrient Delivery:  Continue NPO,Modify Tube Feeding  Nutrition Education/Counseling:  Education not indicated   Coordination of Nutrition Care:  Continue to monitor while inpatient    Goals:  EN support to provide >75% of estimated nutritional needs       Nutrition Monitoring and Evaluation:   Behavioral-Environmental Outcomes:  None Identified   Food/Nutrient Intake Outcomes:  Enteral Nutrition Intake/Tolerance  Physical Signs/Symptoms Outcomes:  Biochemical Data,Fluid Status or Edema,Skin,Weight,GI Status     Discharge Planning:     Too soon to determine           Romana Hough  MFN, RDN, LDN  Lead Clinical Dietitian  RD Office Phone [phone]

## 2022-01-29 NOTE — PROGRESS NOTES
Progress note  [facility]    Adult Hospitalist      Name: Fermin Coates  MRN: [id number]     Acct: [de-identified]  Room: [address] [place] [address]-    Admit Date: 1/16/2022 11:51 AM  PCP: Janey Layne MD    Primary Problem  Active Problems:    COVID-19    Acute on chronic systolic CHF (congestive heart failure) (HCC)    Acute respiratory failure with hypoxia (HCC)    BLANCHE (acute kidney injury) ([facility]    COPD exacerbation ([facility]    Hypokalemia    Hypomagnesemia    Palliative care encounter    Goals of care, counseling/discussion    DNR (do not resuscitate) discussion    ACP (advance care planning)    Constipation  Resolved Problems:    * No resolved hospital problems. *        Assesment:   Acute congestive heart failure, diastolic   [id number]   Viral pneumonia secondary to above  Acute respiratory failure with hypoxia intubated on 1/23/2022  Paroxysmal atrial fibrillation  Essential hypertension  Mixed hyperlipidemia  Diabetes mellitus type 2  History of seizure disorder  History of CKD stage III, presently normal renal function  Lung mass?   Leukopenia  Polycythemia  Thrombocytopenia  Anxiety disorder  Recent past h/o lacunar infarct left thalamus   Altered mental status/agitation  Endotracheal intubation secondary to hypoxia dated 1/23/2022  Supraventricular tachycardia/elevated troponin  Fever      Plan:   Admit patient to intermediate floor  Mechanical ventilation sedation as per critical care, patient continues to require 85% FiO2 with PEEP of 16  Discussed with patient primary care Dr. Dio Aggarwal vitals closely  CBC BMP daily    Echocardiogram  Cardiology consult  IV pressors    Amiodarone  Cardiology following    IV Decadron  IV azithromycin-DC  Bronchodilators  Pulmicort  Patient is deemed not a candidate for Actemra or baricitinib secondary to cytopenia  Infectious disease consult  Pulmonology consult  Vanco/cefepime per ID    Continue Keppra  Continue amlodipine, atorvastatin  Continue aspirin  Hold insulin  Patient seen by gastroenterology started on daily [facility]  Consult nephrology appreciated managing patient hyperkalemia  Continue other medication as below.     Scheduled Meds:   sodium zirconium cyclosilicate  10 g Oral TID    polyethylene glycol  17 g Per NG tube Daily    [START ON 1/30/2022] bisacodyl  10 mg Rectal Daily    furosemide  20 mg IntraVENous BID    insulin glargine  45 Units SubCUTAneous BID    pantoprazole  40 mg IntraVENous Daily    And    sodium chloride (PF)  10 mL IntraVENous Daily    aspirin  324 mg Oral Daily    levETIRAcetam  500 mg Oral BID    chlorhexidine  15 mL Mouth/Throat BID    enoxaparin  30 mg SubCUTAneous BID    QUEtiapine  50 mg Oral Once    ALPRAZolam  0.5 mg Oral TID    ipratropium-albuterol  1 ampule Inhalation 4x daily    budesonide  0.5 mg Nebulization BID    metoprolol succinate  50 mg Oral Nightly    dexamethasone  6 mg IntraVENous Q24H    sodium chloride flush  5-40 mL IntraVENous 2 times per day    amLODIPine  5 mg Oral Nightly    atorvastatin  20 mg Oral Daily    insulin lispro  0-18 Units SubCUTAneous TID WC    insulin lispro  0-9 Units SubCUTAneous Nightly     Continuous Infusions:   phenylephrine (KEARA-SYNEPHRINE) 50mg/250mL infusion Stopped (01/26/22 1820)    propofol 8.059 mcg/kg/min (01/28/22 0610)    midazolam (VERSED) 1 mg/mL in D5W infusion 5 mg/hr (01/29/22 1033)    fentaNYL 75 mcg/hr (01/29/22 1518)    norepinephrine Stopped (01/24/22 1046)    dexmedetomidine (PRECEDEX) IV infusion 0.7 mcg/kg/hr (01/29/22 1033)    sodium chloride      dextrose       PRN Meds:  LORazepam, 1 mg, Q4H PRN  dextrose bolus (hypoglycemia), 125 mL, PRN   Or  dextrose bolus (hypoglycemia), 250 mL, PRN  sodium chloride flush, 10 mL, PRN  sodium chloride, 25 mL, PRN  potassium chloride, 40 mEq, PRN   Or  potassium alternative oral replacement, 40 mEq, PRN   Or  potassium chloride, 10 mEq, PRN  magnesium sulfate, 1,000 mg, PRN  ondansetron, 4 mg, Q8H PRN   Or  ondansetron, 4 mg, Q6H PRN  magnesium hydroxide, 30 mL, Daily PRN  acetaminophen, 650 mg, Q6H PRN   Or  acetaminophen, 650 mg, Q6H PRN  glucose, 15 g, PRN  glucagon (rDNA), 1 mg, PRN  dextrose, 100 mL/hr, PRN  hydrALAZINE, 10 mg, Q6H PRN        Chief Complaint:     Chief Complaint   Patient presents with    Leg Swelling     bilateral, has CHF, on diuretic, EMT saw pt , assisted pt into car    Fever    Other     low SPO2         History of Present Illness:     I have seen and examined patient today, patient last 24 hours events were reviewed also discussed with nursing staff  Overnight patient did not had any acute issues  He is continued on ventilator  Patient continues to require PEEP of 16 with FiO2 85%  Patient has been having fevers  Seen by gastroenterology patient is started on daily GlycoLax      Review of systems:  Unable to conduct ROS secondary to patient being intubated      Initial HPI  Shirley Dior is a 64 y.o.  male who presents with Leg Swelling (bilateral, has CHF, on diuretic, EMT saw pt , assisted pt into car), Fever, and Other (low SPO2)  This is a 64-year-old gentleman admitted via ER, come to ER with complaint of having shortness of breath, patient has medical history significant for COPD patient with history of cardiac failure: Patient noted that he has been having increasing swelling in his lower extremity and becoming more short of breath, further patient also been having some body aches and sweats, patient patient testing in the emergency room showed that he is positive for COVID-19 also noticed to have an elevated BNP, imaging concerning for fluid overload, admitted for further regiment    I have personally reviewed the past medical history, past surgical history, medications, social history, and family history, and summarized in the note.     Review of Systems:       Unable to conduct ROS secondary to patient being intubated      Past Medical History:     Past Medical History:   Diagnosis Date    Arthritis     Atherosclerotic plaque 7/28/2019    Cervical disc disease     degenerative disc disease    Diabetes mellitus ([facility]     type 2    History of blood transfusion     Hx of blood clots     left leg    Hyperlipidemia     Neuropathy     Right kidney mass     \"urologist is watching\"    Snores     Type 2 diabetes mellitus treated with insulin ([facility] 7/28/2019        Past Surgical History:     Past Surgical History:   Procedure Laterality Date    ABDOMINAL AORTIC ANEURYSM REPAIR  2005    CARPAL TUNNEL RELEASE Bilateral     CERVICAL SPINE SURGERY      COLONOSCOPY      benign polyps removed    INGUINAL HERNIA REPAIR Right     CA REPAIR INCISIONAL HERNIA,REDUCIBLE N/A 5/10/2017    HERNIA VENTRAL REPAIR WITH MESH  performed by Purvi Delaney DO at [address] [place] [address] Road  05/10/2017    with mesh        Medications Prior to Admission:       Prior to Admission medications    Medication Sig Start Date End Date Taking? Authorizing Provider   rosuvastatin (CRESTOR) 40 MG tablet Take 40 mg by mouth every evening   Yes Historical Provider, MD   insulin NPH (HUMULIN N;NOVOLIN N) 100 UNIT/ML injection vial Inject 20 Units into the skin 2 times daily (before meals)   Yes Historical Provider, MD   levETIRAcetam (KEPPRA) 500 MG tablet Take 1 tablet by mouth 2 times daily 11/3/21  Yes Nancy Batres MD   venlafaxine (EFFEXOR XR) 150 MG extended release capsule Take 1 capsule by mouth daily (with breakfast) 7/29/19  Yes Arabella Kiser   vitamin B-1 (THIAMINE) 100 MG tablet Take 100 mg by mouth daily   Yes Historical Provider, MD   ferrous sulfate 325 (65 Fe) MG tablet Take 325 mg by mouth daily (with breakfast)   Yes Historical Provider, MD   ALPRAZolam (XANAX) 0.5 MG tablet Take 0.5 mg by mouth three times daily.    Yes Historical Provider, MD   furosemide (LASIX) 40 MG tablet Take 1 tablet by mouth 2 times daily 18  Yes Michael Waldrop MD   tiotropium (SPIRIVA RESPIMAT) 2.5 MCG/ACT AERS inhaler Inhale 2 puffs into the lungs daily    Yes Historical Provider, MD   albuterol sulfate  (90 Base) MCG/ACT inhaler Inhale 2 puffs into the lungs every 6 hours as needed for Wheezing or Shortness of Breath   Yes Historical Provider, MD   metoprolol succinate (TOPROL XL) 50 MG extended release tablet Take 50 mg by mouth daily   Yes Historical Provider, MD   Multiple Vitamins-Minerals (MULTIVITAMIN ADULT) TABS Take 1 tablet by mouth daily   Yes Historical Provider, MD   vitamin D (CHOLECALCIFEROL) 1000 UNIT TABS tablet Take 1,000 Units by mouth daily   Yes Historical Provider, MD   fluticasone (FLONASE) 50 MCG/ACT nasal spray 1 spray by Each Nare route daily as needed for Rhinitis   Yes Historical Provider, MD   aspirin 325 MG EC tablet Take 325 mg by mouth daily    Yes Historical Provider, MD   Omega-3 Fatty Acids (FISH OIL) 1000 MG CAPS Take 1 capsule by mouth daily    Yes Historical Provider, MD   amLODIPine (NORVASC) 5 MG tablet Take 5 mg by mouth nightly    Yes Historical Provider, MD   mirtazapine (REMERON) 45 MG tablet Take 45 mg by mouth nightly   Yes Historical Provider, MD        Allergies: Barbiturates, Codeine, and Sulfa antibiotics    Social History:     Tobacco:    reports that he has quit smoking. He has never used smokeless tobacco.  Alcohol:      reports no history of alcohol use. Drug Use:  reports no history of drug use.     Family History:     Family History   Problem Relation Age of Onset    Ovarian Cancer Sister     Ovarian Cancer Sister          Physical Exam:     Vitals:  BP (!) 139/54   Pulse 67   Temp 99.8 °F (37.7 °C) (Oral)   Resp 20   Ht 5' 10.98\" (1.803 m)   Wt 216 lb (98 kg)   SpO2 97%   BMI 30.14 kg/m²   Temp (24hrs), Av.8 °F (37.7 °C), Min:99 °F (37.2 °C), Max:100.7 °F (38.2 °C)      General appearance -on ventilator  Mental status -sedated  Head - normocephalic and atraumatic  Eyes - conjunctiva clear  Ears -external ears normal  Nose - no drainage noted  Mouth - mucous membranes moist  Neck - supple, no carotid bruits, thyroid not palpable  Chest -basal crackles with decreased air entry bilaterally to auscultation, increased effort  Heart - normal rate, regular rhythm, no murmur  Abdomen - soft, nontender, nondistended, bowel sounds present all four quadrants, no masses, hepatomegaly or splenomegaly  Neurological -sedated on vent  Extremities - no edema, distal extremity discoloration    Skin - no gross lesions, rashes, or induration noted        Data:     Labs:    Hematology:  Recent Labs     01/27/22  0417 01/28/22  0507 01/29/22  0600   WBC 6.9 6.9 9.3   RBC 6.28* 5.85* 5.95*   HGB 15.7 14.6 15.0   HCT 51.6* 48.8 49.5   MCV 82.2* 83.4 83.2   MCH 25.0* 25.0* 25.2   MCHC 30.4 29.9 30.3   RDW 15.8* 15.3* 15.1*    157 138   MPV 12.1 12.9 12.4     Chemistry:  Recent Labs     01/28/22  0507 01/28/22  1134 01/29/22  0600 01/29/22  1015 01/29/22  1530     --  139  --  138   K 5.8*   < > 6.0* 5.4* 5.6*     --  109*  --  105   CO2 22  --  22  --  24   GLUCOSE 153*  --  83  --  80   BUN 87*  --  88*  --  92*   CREATININE 1.71*  --  1.47*  --  1.46*   MG 2.6  --   --   --   --    ANIONGAP 8*  --  8*  --  9   LABGLOM 39*  --  46*  --  47*   GFRAA 47*  --  56*  --  57*   CALCIUM 8.6  --  9.3  --  8.7   PHOS 2.9  --   --   --   --     < > = values in this interval not displayed.      Recent Labs     01/28/22  1629 01/28/22  1955 01/28/22  2049 01/29/22  0750 01/29/22  0934 01/29/22  1113   POCGLU 97 68* 102 63* 95 83       Lab Results   Component Value Date    INR 1.0 01/17/2022    INR 1.0 11/03/2021    INR 1.0 07/27/2019    PROTIME 13.3 01/17/2022    PROTIME 11.1 11/03/2021    PROTIME 10.5 07/27/2019       Lab Results   Component Value Date/Time    SPECIAL NOT REPORTED 01/28/2022 10:30 AM     Lab Results   Component Value Date/Time    CULTURE NORMAL RESPIRATORY PO HEAVY GROWTH 01/28/2022 10:30 AM       Lab Results   Component Value Date    POCPH 7.340 01/29/2022    POCPCO2 47.4 01/29/2022    POCPO2 96.9 01/29/2022    POCHCO3 25.6 01/29/2022    NBEA 1 01/29/2022    PBEA NOT REPORTED 01/29/2022    DUP5EMS NOT REPORTED 01/29/2022    PYDL2VWZ 97 01/29/2022    FIO2 85.0 01/29/2022       Radiology:    XR CHEST 1 VIEW    Result Date: 1/16/2022  Hypoinflated lungs. Cardiomegaly again seen, when compared to the previous study performed 07/27/2019. Diffuse, increased interstitial markings throughout both lungs, some of which can be seen on the prior study may represent baseline chronic interstitial lung changes. The increased prominence on this exam could be secondary to the suboptimal inspiratory effort. The presence of pulmonary vascular congestion/mild CHF or bilateral interstitial pneumonia cannot be excluded. Clinical correlation is recommended. CT CHEST PULMONARY EMBOLISM W CONTRAST    Result Date: 1/16/2022  No evidence of pulmonary embolism. Compared to 2008, worsening underlying emphysema, with worsening subacute or acute interstitial lung disease, best seen left upper lobe; differential includes interstitial pneumonia or pneumonitis superimposed on emphysema, DIP, HP, and other interstitial pneumonias as well as infectious or inflammatory process superimposed on emphysema disease. Findings are not typical for COVID-19 pneumonia, but an element of the latter should be considered. Thickening and distortion left major fissure with ill-defined mass-like focus left lower lobe. The latter could also be infectious or inflammatory, although neoplasm should be excluded. Underlying worsening emphysema. Nodular densities, best seen right upper lobe. Small pleural effusions, slightly larger on the left. See recommendations below. Mild mediastinal and left hilar adenopathy; see recommendations below. Worsening now moderate-severe pulmonary artery hypertension.  Mild

## 2022-01-29 NOTE — PROGRESS NOTES
.  Nephrology Consult Note    Reason for Consult: Hyperkalemia    Requesting Physician:  Nya Lucas MD    INTERVAL HISTORY:  K 6 this am.  Creatinine 1.47. HISTORY OF PRESENT ILLNESS:    The patient is a 64 y.o. male who presented with to the hospital for worsening shortness of breath ad and worsening lower extremity edema on 1/16. He had diffuse body aches and sweats and a dry cough. He tested positive for COVID-19. He has steadily declined since admission. On 1/18 a CT head found New lacunar infarct left thalamus. He had to be intubated on 1/23. He has a significant past medical history of COPD, hyperlipidemia, Type 2 DM, Right kidney mass, and abdominal aortic aneurysm repair in 2005. His potassium has been steadily rising since 1/24/22. The most current Potassium level is 5.8. His creatine is improved to 1.71. This information was retrieved through chart review and nursing. Patient was unable to provide information due to current status on mechanical ventilation and sedation. Review Of Systems:  Unable to obtain. Patient sedated on ventilator.     Past Medical History:   Diagnosis Date    Arthritis     Atherosclerotic plaque 7/28/2019    Cervical disc disease     degenerative disc disease    Diabetes mellitus ([facility]     type 2    History of blood transfusion     Hx of blood clots     left leg    Hyperlipidemia     Neuropathy     Right kidney mass     \"urologist is watching\"    Snores     Type 2 diabetes mellitus treated with insulin ([facility] 7/28/2019       Past Surgical History:   Procedure Laterality Date    ABDOMINAL AORTIC ANEURYSM REPAIR  2005    CARPAL TUNNEL RELEASE Bilateral     CERVICAL SPINE SURGERY      COLONOSCOPY      benign polyps removed    INGUINAL HERNIA REPAIR Right     OH REPAIR INCISIONAL HERNIA,REDUCIBLE N/A 5/10/2017    HERNIA VENTRAL REPAIR WITH MESH  performed by Estelle Contreras DO at [address] [place] [address] 05/10/2017    with mesh       Prior to Admission medications    Medication Sig Start Date End Date Taking? Authorizing Provider   rosuvastatin (CRESTOR) 40 MG tablet Take 40 mg by mouth every evening   Yes Historical Provider, MD   insulin NPH (HUMULIN N;NOVOLIN N) 100 UNIT/ML injection vial Inject 20 Units into the skin 2 times daily (before meals)   Yes Historical Provider, MD   levETIRAcetam (KEPPRA) 500 MG tablet Take 1 tablet by mouth 2 times daily 11/3/21  Yes Dary Zarate MD   venlafaxine (EFFEXOR XR) 150 MG extended release capsule Take 1 capsule by mouth daily (with breakfast) 7/29/19  Yes Arabella Kiser   vitamin B-1 (THIAMINE) 100 MG tablet Take 100 mg by mouth daily   Yes Historical Provider, MD   ferrous sulfate 325 (65 Fe) MG tablet Take 325 mg by mouth daily (with breakfast)   Yes Historical Provider, MD   ALPRAZolam (XANAX) 0.5 MG tablet Take 0.5 mg by mouth three times daily.    Yes Historical Provider, MD   furosemide (LASIX) 40 MG tablet Take 1 tablet by mouth 2 times daily 12/11/18  Yes Jean Evans MD   tiotropium (SPIRIVA RESPIMAT) 2.5 MCG/ACT AERS inhaler Inhale 2 puffs into the lungs daily    Yes Historical Provider, MD   albuterol sulfate  (90 Base) MCG/ACT inhaler Inhale 2 puffs into the lungs every 6 hours as needed for Wheezing or Shortness of Breath   Yes Historical Provider, MD   metoprolol succinate (TOPROL XL) 50 MG extended release tablet Take 50 mg by mouth daily   Yes Historical Provider, MD   Multiple Vitamins-Minerals (MULTIVITAMIN ADULT) TABS Take 1 tablet by mouth daily   Yes Historical Provider, MD   vitamin D (CHOLECALCIFEROL) 1000 UNIT TABS tablet Take 1,000 Units by mouth daily   Yes Historical Provider, MD   fluticasone (FLONASE) 50 MCG/ACT nasal spray 1 spray by Each Nare route daily as needed for Rhinitis   Yes Historical Provider, MD   aspirin 325 MG EC tablet Take 325 mg by mouth daily    Yes Historical Provider, MD   Omega-3 Fatty Acids (FISH OIL) 1000 MG CAPS Take 1 capsule by mouth daily    Yes Historical Provider, MD   amLODIPine (NORVASC) 5 MG tablet Take 5 mg by mouth nightly    Yes Historical Provider, MD   mirtazapine (REMERON) 45 MG tablet Take 45 mg by mouth nightly   Yes Historical Provider, MD       Scheduled Meds:   furosemide  20 mg IntraVENous BID    insulin glargine  45 Units SubCUTAneous BID    pantoprazole  40 mg IntraVENous Daily    And    sodium chloride (PF)  10 mL IntraVENous Daily    aspirin  324 mg Oral Daily    levETIRAcetam  500 mg Oral BID    chlorhexidine  15 mL Mouth/Throat BID    enoxaparin  30 mg SubCUTAneous BID    QUEtiapine  50 mg Oral Once    ALPRAZolam  0.5 mg Oral TID    ipratropium-albuterol  1 ampule Inhalation 4x daily    budesonide  0.5 mg Nebulization BID    metoprolol succinate  50 mg Oral Nightly    dexamethasone  6 mg IntraVENous Q24H    sodium chloride flush  5-40 mL IntraVENous 2 times per day    amLODIPine  5 mg Oral Nightly    atorvastatin  20 mg Oral Daily    insulin lispro  0-18 Units SubCUTAneous TID WC    insulin lispro  0-9 Units SubCUTAneous Nightly     Continuous Infusions:   phenylephrine (KEARA-SYNEPHRINE) 50mg/250mL infusion Stopped (01/26/22 1820)    propofol 8.059 mcg/kg/min (01/28/22 0610)    midazolam (VERSED) 1 mg/mL in D5W infusion 4 mg/hr (01/29/22 0712)    fentaNYL 75 mcg/hr (01/29/22 0330)    norepinephrine Stopped (01/24/22 1046)    dexmedetomidine (PRECEDEX) IV infusion 0.7 mcg/kg/hr (01/29/22 2546)    sodium chloride      dextrose       PRN Meds:bisacodyl, LORazepam, dextrose bolus (hypoglycemia) **OR** dextrose bolus (hypoglycemia), sodium chloride flush, sodium chloride, potassium chloride **OR** potassium alternative oral replacement **OR** potassium chloride, magnesium sulfate, ondansetron **OR** ondansetron, magnesium hydroxide, acetaminophen **OR** acetaminophen, glucose, glucagon (rDNA), dextrose, hydrALAZINE    Allergies   Allergen Reactions    Barbiturates Anxiety     Other reaction(s): Unknown    Codeine Palpitations    Sulfa Antibiotics Rash     Other reaction(s): Unknown       Social History     Socioeconomic History    Marital status:      Spouse name: Not on file    Number of children: Not on file    Years of education: Not on file    Highest education level: Not on file   Occupational History    Not on file   Tobacco Use    Smoking status: Former Smoker    Smokeless tobacco: Never Used    Tobacco comment: quit 30 years ago   Substance and Sexual Activity    Alcohol use: No    Drug use: No    Sexual activity: Not on file   Other Topics Concern    Not on file   Social History Narrative    Not on file     Social Determinants of Health     Financial Resource Strain:     Difficulty of Paying Living Expenses: Not on file   Food Insecurity:     Worried About [address] in the Last Year: Not on file    [address] [employer] in the Last Year: Not on file   Transportation Needs:     Lack of Transportation (Medical): Not on file    Lack of Transportation (Non-Medical):  Not on file   Physical Activity:     Days of Exercise per Week: Not on file    Minutes of Exercise per Session: Not on file   Stress:     Feeling of Stress : Not on file   Social Connections:     Frequency of Communication with Friends and Family: Not on file    Frequency of Social Gatherings with Friends and Family: Not on file    Attends Mosque Services: Not on file    Active Member of [address] [place] [address] or Organizations: Not on file    Attends Club or Organization Meetings: Not on file    Marital Status: Not on file   Intimate Partner Violence:     Fear of Current or Ex-Partner: Not on file    Emotionally Abused: Not on file    Physically Abused: Not on file    Sexually Abused: Not on file   Housing Stability:     Unable to Pay for Housing in the Last Year: Not on file    Number of [place] in the Last Year: Not on file    Unstable Housing in the Last Year: Not on file       Family History   Problem Relation Age of Onset    Ovarian Cancer Sister     Ovarian Cancer Sister          Physical Exam:  Vitals:    01/29/22 0500 01/29/22 0600 01/29/22 0613 01/29/22 0752   BP: (!) 148/52 (!) 154/59     Pulse: 55 58 60 60   Resp: 19 22 18 28   Temp:       TempSrc:       SpO2:   96% 97%   Weight:       Height:         I/O last 3 completed shifts: In: 3539.8 [I.V.:2340.8; NG/GT:1099; IV Piggyback:100]  Out: 1600 [Urine:1600]    Deferred due to [address] isolation.     Data:  CBC:   Lab Results   Component Value Date    WBC 9.3 01/29/2022    HGB 15.0 01/29/2022    HCT 49.5 01/29/2022    MCV 83.2 01/29/2022     01/29/2022     BMP:    Lab Results   Component Value Date     01/29/2022     01/28/2022     (L) 01/27/2022    K 6.0 (HH) 01/29/2022    K 5.8 (H) 01/28/2022    K 6.0 (HH) 01/28/2022     (H) 01/29/2022     01/28/2022     01/27/2022    CO2 22 01/29/2022    CO2 22 01/28/2022    CO2 22 01/27/2022    BUN 88 (H) 01/29/2022    BUN 87 (H) 01/28/2022    BUN 88 (H) 01/27/2022    CREATININE 1.47 (H) 01/29/2022    CREATININE 1.71 (H) 01/28/2022    CREATININE 2.04 (H) 01/27/2022    GLUCOSE 83 01/29/2022    GLUCOSE 153 (H) 01/28/2022    GLUCOSE 250 (H) 01/27/2022     CMP:   Lab Results   Component Value Date     01/29/2022    K 6.0 01/29/2022     01/29/2022    CO2 22 01/29/2022    BUN 88 01/29/2022    CREATININE 1.47 01/29/2022    GLUCOSE 83 01/29/2022    CALCIUM 9.3 01/29/2022    PROT 6.5 01/16/2022    LABALBU 3.8 01/16/2022    BILITOT 0.68 01/16/2022    ALKPHOS 123 01/16/2022    AST 45 01/16/2022    ALT 24 01/16/2022      Hepatic:   Lab Results   Component Value Date    AST 45 (H) 01/16/2022    AST 16 11/03/2021    AST 30 11/02/2021    ALT 24 01/16/2022    ALT 13 11/03/2021    ALT 22 11/02/2021    BILITOT 0.68 01/16/2022    BILITOT 0.56 11/03/2021    BILITOT 0.31 11/02/2021    ALKPHOS 123 01/16/2022    ALKPHOS 90 11/03/2021    ALKPHOS 140 (H) 11/02/2021     BNP: No results found for: BNP  Lipids:   Lab Results   Component Value Date    CHOL 129 11/03/2021    HDL 30 (L) 11/03/2021     INR:   Lab Results   Component Value Date    INR 1.0 01/17/2022    INR 1.0 11/03/2021    INR 1.0 07/27/2019     PTH: No results found for: PTH  Phosphorus:    Lab Results   Component Value Date    PHOS 2.9 01/28/2022     Ionized Calcium: No results found for: IONCA  Magnesium:   Lab Results   Component Value Date    MG 2.6 01/28/2022     Albumin:   Lab Results   Component Value Date    LABALBU 3.8 01/16/2022     Last 3 CK, CKMB, Troponin: @LABRCNT(CKTOTAL:3,CKMB:3,TROPONINI:3)       URINE:)No results found for: Tali Hogan    Radiology:  Reviewed. Assessment:  Hyperkalemia  BLANCHE on CKD  Diabetes Mellitis   COVID19 Pneumonia  COPD  Elevated Ddimer      Plan:  Creatinine is improving. Insulin and Dextrose given this morning. Recheck K level later today. Give additional Lokelma 10 g TID for 3 doses. Continue Lasix 20 mg IV BID  On Renal tube feed with low potassium. Avoid hypotension, nephrotoxic drugs, Lovenox, Fleets enema and IV contrast exposure. Follow up labs ordered for AM.     Please do not hesitate to call with questions. We will follow with you. Patient seen in collaboration with Dr. Nahed Omer. Electronically signed by HERB Cervantes CNP  on 1/29/2022 at 9:29 AM   [facility]. Ph: [phone]       Physician Addendum  I evaluated the patient with CNP   Agree with above assessment and plan    serum potassium improved to 5.4   potassium levels were not communicated overnight,  Instead I received hold the pages and texts  At 7:00 a.m. since they were sent  As non statt,  I asked the nursing staff to send information like this as stat to be reviewed at the appropriate time.      Electronically signed by Shabana Leo MD on 01/29/22 2:06 PM

## 2022-01-29 NOTE — PROGRESS NOTES
[facility] Neurology   IN-PATIENT SERVICE      NEUROLOGY PROGRESS  NOTE                Interval History:     Patient was weaned off of Versed overnight, was just on Precedex. Was struggling with his breathing and respirations so Versed has been currently turned back on up to 4 mg. Continues to be on Precedex as well. BUN/creatinine slowly improving. History of Present Illness: The patient is a 64 y. o. male who presents with Leg Swelling (bilateral, has CHF, on diuretic, EMT saw pt , assisted pt into car), Fever, and Other (low SPO2)  .  The patient was seen and examined and the chart was reviewed. Domonique Elise is intubated in the COVID-19 ICU with pneumonia, BLANCHE, A. fib. Physical Exam:   BP (!) 143/53   Pulse 63   Temp 99.8 °F (37.7 °C) (Oral)   Resp 20   Ht 5' 10.98\" (1.803 m)   Wt 216 lb (98 kg)   SpO2 97%   BMI 30.14 kg/m²   Temp (24hrs), Av.8 °F (37.7 °C), Min:99 °F (37.2 °C), Max:100.7 °F (38.2 °C)      Neurological examination:    Mental status    Patient is intubated. On sedation. Comatose. No spontaneous eye opening to voice or painful stimulation.    Not following commands.       Cranial nerves    Pupil response:            Present     Oculocephalic reflex:   Present     Corneal Reflex:            Present     Facial grimace to pin:  Present     Gag/Cough reflex:       Present     Breathing above vent:  Present         Motor and sensory function  No spontaneous limb movements     Absent withdrawal to pin, deep nail bed pressure in all extremities  Tone: Decreased      DTR  0/4 throughout  Plantar response: Downgoing  (-) Mendiola's sign bilaterally    Gait  Not tested             Diagnostics:      Laboratory Testing:  CBC: Recent Labs     22  0417 22  0507 22  0600   WBC 6.9 6.9 9.3   HGB 15.7 14.6 15.0    157 138     BMP:    Recent Labs     22  0417 22  0417 22  0507 22  1134 22  2300 22  0600 22  1015   *  --  136 --   --  139  --    K 5.4*   < > 5.8*   < > 5.8* 6.0* 5.4*     --  106  --   --  109*  --    CO2 22  --  22  --   --  22  --    BUN 88*  --  87*  --   --  88*  --    CREATININE 2.04*  --  1.71*  --   --  1.47*  --    GLUCOSE 250*  --  153*  --   --  83  --     < > = values in this interval not displayed. Lab Results   Component Value Date    CHOL 129 11/03/2021    LDLCHOLESTEROL 63 11/03/2021    HDL 30 (L) 11/03/2021    TRIG 181 (H) 11/03/2021    ALT 24 01/16/2022    AST 45 (H) 01/16/2022    TSH 5.22 (H) 12/07/2018    INR 1.0 01/17/2022    LABA1C 9.1 (H) 01/16/2022    [id number] 07/08/2019         Impression:      Acute toxic metabolic encephalopathy secondary to COVID-19 acute ventilatory dependent respiratory failure, pneumonia, BLANCHE and medication effect  Chronic left thalamic lacunar infarct    Plan:     Patient's pulmonary status currently does not allow for weaning of sedation. I would recommend using as much sedation as needed to get him through his COVID-19 pneumonia  I have low suspicion that there has been any significant CNS insult at this time. Would suggest patient get through his COVID-19 pneumonia before totally weaning off of sedation. It is apparent his respiratory status will not tolerate being totally off sedation at this point in time. Further treatment recommendations as per pulmonology  We will sign off at this time, please do not hesitate to reconsult as needed.       Electronically signed by Makenna Kirby DO on 1/29/2022 at 2:58 PM      Makenna Kirby, [address] [place] [address]  Neurology

## 2022-01-29 NOTE — PROGRESS NOTES
[facility]   Section of Cardiology  Progress Note      Date:  1/29/2022  Patient: Shirley Dior  Admission:  1/16/2022 11:51 AM  Admit DX: Hypokalemia [E87.6]  Hypomagnesemia [E83.42]  COPD exacerbation (HCC) [J44.1]  BLANCHE (acute kidney injury) ([facility] [N17.9]  Acute respiratory failure with hypoxia (HCC) [J96.01]  Acute on chronic systolic CHF (congestive heart failure) ([facility] [I50.23]  COVID [U07.1]  COVID-19 [U07.1]  Age:  64 y.o., 1945                           LOS: 13 days     Reason for evaluation:   Paroxysmal atrial fibrillation. covid 19 pneumonia      SUBJECTIVE:     The patient was seen and examined. Notes and labs reviewed. He continues to be intubated and sedated. He is hemodynamically stable and does not require any pressors  OBJECTIVE:    BP (!) 154/59   Pulse 60   Temp 99.5 °F (37.5 °C) (Oral)   Resp 28   Ht 5' 10.98\" (1.803 m)   Wt 216 lb (98 kg)   SpO2 97%   BMI 30.14 kg/m²     Intake/Output Summary (Last 24 hours) at 1/29/2022 0911  Last data filed at 1/29/2022 0848  Gross per 24 hour   Intake 2268.39 ml   Output 2250 ml   Net 18.39 ml       EXAM:   Intubated and sedated  Chest. Bilateral crepitations  Cvs. s1s2 no murmurs  Abd. Soft bs present  Ext. Bilateral resolving edema. Pedal pulses decreased.     Current Inpatient Medications:   furosemide  20 mg IntraVENous BID    insulin glargine  45 Units SubCUTAneous BID    pantoprazole  40 mg IntraVENous Daily    And    sodium chloride (PF)  10 mL IntraVENous Daily    aspirin  324 mg Oral Daily    levETIRAcetam  500 mg Oral BID    chlorhexidine  15 mL Mouth/Throat BID    enoxaparin  30 mg SubCUTAneous BID    QUEtiapine  50 mg Oral Once    ALPRAZolam  0.5 mg Oral TID    ipratropium-albuterol  1 ampule Inhalation 4x daily    budesonide  0.5 mg Nebulization BID    metoprolol succinate  50 mg Oral Nightly    dexamethasone  6 mg IntraVENous Q24H    sodium chloride flush  5-40 mL IntraVENous 2 times per day    amLODIPine  5 mg Oral Nightly    atorvastatin  20 mg Oral Daily    insulin lispro  0-18 Units SubCUTAneous TID WC    insulin lispro  0-9 Units SubCUTAneous Nightly       IV Infusions (if any):   phenylephrine (KEARA-SYNEPHRINE) 50mg/250mL infusion Stopped (01/26/22 1820)    propofol 8.059 mcg/kg/min (01/28/22 0610)    midazolam (VERSED) 1 mg/mL in D5W infusion 4 mg/hr (01/29/22 0712)    fentaNYL 75 mcg/hr (01/29/22 0330)    norepinephrine Stopped (01/24/22 1046)    dexmedetomidine (PRECEDEX) IV infusion 0.7 mcg/kg/hr (01/29/22 9459)    sodium chloride      dextrose         Diagnostics:   Telemetry: sinus rhythm  Echo. Summary  Moderate left ventricular hypertrophy  Global left ventricular systolic function is normal  Estimated ejection fraction is 55 % . Left atrium is mildly dilated. Right atrium is mildly dilated . No significant valvular regurgitation or stenosis seen. No pericardial effusion seen. Labs:   CBC:   Recent Labs     01/28/22  0507 01/29/22  0600   WBC 6.9 9.3   HGB 14.6 15.0   HCT 48.8 49.5    138     BMP:   Recent Labs     01/28/22  0507 01/28/22  1134 01/28/22  2300 01/29/22  0600     --   --  139   K 5.8*   < > 5.8* 6.0*   CO2 22  --   --  22   BUN 87*  --   --  88*   CREATININE 1.71*  --   --  1.47*   LABGLOM 39*  --   --  46*   GLUCOSE 153*  --   --  83    < > = values in this interval not displayed. BNP: No results for input(s): BNP in the last 72 hours. PT/INR: No results for input(s): PROTIME, INR in the last 72 hours. APTT:No results for input(s): APTT in the last 72 hours. CARDIAC ENZYMES:No results for input(s): CKTOTAL, CKMB, CKMBINDEX, TROPONINI in the last 72 hours. FASTING LIPID PANEL:  Lab Results   Component Value Date    HDL 30 11/03/2021    TRIG 181 11/03/2021     LIVER PROFILE:No results for input(s): AST, ALT, LABALBU in the last 72 hours.     ASSESSMENT:    Patient Active Problem List   Diagnosis    [facility]    CKD (chronic kidney disease) stage 3, GFR 30-59 ml/min ([facility])    Essential hypertension    Blurred vision, right eye    Type 2 diabetes mellitus treated with insulin (HCC)    Atherosclerotic plaque    Weakness on left side of face    Carotid stenosis, asymptomatic, bilateral    New onset seizure ([facility]    COVID-19    Acute on chronic systolic CHF (congestive heart failure) ([facility])    Acute respiratory failure with hypoxia ([facility])    BLANCHE (acute kidney injury) [facility]    COPD exacerbation ([facility])    Hypokalemia    Hypomagnesemia    Palliative care encounter    Goals of care, counseling/discussion    DNR (do not resuscitate) discussion    ACP (advance care planning)       PLAN:    1. Paroxysmal atrial fibrillation  2. covid 19 pneumonia  3. Respiratory failure  4. Chronic kidney disease  Continue supportive therapy. Will continue to monitor. Please see orders. Discussed with nursing.     Fidencio Kayser, MD, MD

## 2022-01-29 NOTE — PROGRESS NOTES
Pulmonary Critical Care Progress Note       Patient seen for the follow up of COVID-19 pneumonia, acute hypoxic respiratory failure. Subjective:  He sedated, intubated on ventilator, FiO2 85%/PEEP 16 tidal volume 550 respiratory rate of thirty. He is off amiodarone drip. He is sedated with Precedex and has been weaned off Versed. He is off fentanyl drip. He had CT abdomen and pelvis. He is tolerating tube feeds at 40 mL an hour. He is labored. He is having fevers he had increased potassium and received D50 insulin and Cletis Getting was increased  Examination:  Vitals: BP (!) 163/54   Pulse 66   Temp 100.7 °F (38.2 °C) (Oral)   Resp 22   Ht 5' 10.98\" (1.803 m)   Wt 216 lb (98 kg)   SpO2 95%   BMI 30.14 kg/m²   General appearance: Sedated, intubated on ventilator  Neck: No JVD  Lungs: Bilateral decreased breath sound no crackles or wheeze  Heart: regular rate and rhythm, S1, S2 normal, no gallop  Abdomen: Soft, non tender, + BS  Extremities: no cyanosis or clubbing. No significant edema    LABs:  CBC:   Recent Labs     01/28/22  0507 01/29/22  0600   WBC 6.9 9.3   HGB 14.6 15.0   HCT 48.8 49.5    138     BMP:   Recent Labs     01/28/22  0507 01/28/22  1134 01/29/22  0600 01/29/22  1015     --  139  --    K 5.8*   < > 6.0* 5.4*   CO2 22  --  22  --    BUN 87*  --  88*  --    CREATININE 1.71*  --  1.47*  --    LABGLOM 39*  --  46*  --    GLUCOSE 153*  --  83  --     < > = values in this interval not displayed.      ABG:  Lab Results   Component Value Date    DIR5KEZ NOT REPORTED 01/29/2022    FIO2 85.0 01/29/2022       Lab Results   Component Value Date    POCPH 7.340 01/29/2022    POCPCO2 47.4 01/29/2022    POCPO2 96.9 01/29/2022    POCHCO3 25.6 01/29/2022    PBEA NOT REPORTED 01/29/2022    DSR2LUU NOT REPORTED 01/29/2022    VWBZ4VCY 97 01/29/2022    FIO2 85.0 01/29/2022     Radiology:  Chest x-ray 1/29    Trace effusions with scattered infiltrates.       Support tubes as described above.     X-ray chest 1/28/2022:    CT abdomen pelvis 1/28    1.  Overall mildly limited study due to motion and lack of contrast.       2.  No evidence of bowel obstruction.  Moderate stool burden is noted,   possibly related to constipation.  Enteric tube is in place with distal tip   in the proximal stomach.       3.  Tiny amount of free fluid scattered in the abdomen, including   presacral/perirectal fluid, most likely related to volume overload.  No   definite findings of rectal wall thickening or fecal impaction.       4.  small  pleural effusions, left basilar consolidation, and bibasilar   interstitial thickening, increased when compared to 01/16/2022.               Impression:  · Acute on chronic hypoxic respiratory failure  · COVID-19 pneumonia  · COPD/pulmonary emphysema  · Acute left thalamic infarct/CVA  · Left lower lobe opacity versus nodule  · Pulmonary edema  · Acute renal insufficiency/hyperkalemia  · Elevated D-dimer, decreased platelets  · Systolic heart failure, s/p AICD placement  · BLANCHE on CKD  · Diabetes mellitus    Recommendations:  · Continue vent support, wean FiO2 as tolerated.   85% FiO2, PEEP at 16  · Fentanyl drip  · Off propofol for now   · Precedex  · On Ativan 0.5 3 times daily  · Off Versed  · DuoNeb by nebulizer  · Pulmicort 0.5 every 12 by nebulizer  · Watch off of Luke-Synephrine  · Airborne isolation  · IV Decadron 6 mg daily  · Lasix 20 mg IV twice daily  · Follow-up potassium/Lokelma per nephrology  · Not a candidate for Actemra/baricitinib stopped secondary to cytopenias  · Neurology on consult/monitor mental status/decrease sedation  · Tube feeds  · Dulcolax suppository  · DVT prophylaxis with low molecular weight heparin      Supriya Garcia MD, [facility]  Pulmonary Critical Care and Sleep Medicine,  [facility]: [phone]  CC: 35 minutes

## 2022-01-29 NOTE — PROGRESS NOTES
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 Actemra had been considered but again due to the cytopenias and thrombocytopenia this has been held. Antimicrobial therapy: The patient received Rocephin 1000 mg and azithromycin 500 mg on 1/16/2022  The patient received a dose of levofloxacin 500 mg on 1/18/2020. Patient started on cefepime and vancomycin 1/23/2022 plan completed a 7-day course of cefepime on 1/28/2022  Vancomycin discontinued due to acute kidney injury 1/24/2022    Today's day #7 of the cefepime and this will be discontinued after today's doses. Clinically there has been no change he remains on high FiO2 requirements.   Continue supportive therapy    Infection Control Recommendations:   Droplet plus precautions    Discharge Planning:   Estimated Length of IV antimicrobials: 5 to 7 days  Patient will need Midline Catheter Insertion/ PICC line Insertion: No  Patient will need: Home IV , [place],  SNF,  LTAC: Undetermined  Patient willneed outpatient wound care: No    Medical Decision making / Summary of Stay:   Stacey Al is a 64y.o.-year-old male who was initially admitted on 1/16/2022. Madisyn Santizo has a history of diabetes mellitus type 2, essential hypertension, seizure disorder, chronic kidney disease stage III, hyperlipidemia among other medical problems.     The patient reports that about 1 to 2 months ago he had his diabetes medications changed and since that time he has noticed increasing swelling in his legs, hardness in the legs, difficulty ambulating, and weight gain from 178 to 255 pounds over the last 1 to 2 months. He did not report any subjective fevers, chills, cough or shortness of breath. He denies any loss of smell or change in taste. No abdominal pain nausea vomiting or diarrhea. The patient does report that he has home oxygen that he uses as needed at home and he reports that he hardly needs it. He is vaccinated did receive the J&J vaccine but has not yet received a booster dose.     The patient presented to the emergency department and was found to have leukopenia with a white blood cell count of 2.7 and thrombocytopenia with a platelet count of 019. Creatinine slightly elevated at 1.31 and proBNP is elevated at 9327. Chest x-ray showed hyperinflated lungs with cardiomegaly seen and diffuse increased interstitial markings in both lungs which may represent baseline chronic interstitial lung changes given that these findings were present before. A CT of the chest was done with IV contrast that showed no evidence of pulmonary embolism and compared to 2008 there is worsening underlying emphysema with worsening subacute or acute interstitial lung disease best seen in the left upper lobe. Findings were not felt typical for COVID-19 virus pneumonia. There was thickening and distortion of the left major fissure with an ill-defined masslike focus in the left lower lobe.   There is worsening moderate to severe pulmonary artery hypertension     COVID testing was positive and I was asked to evaluate and help with COVID-19 virus infection    The patient did have a CT scan of the abdomen pelvis done 2022 which was a limited study with no evidence of bowel obstruction and moderate stool burden noted felt related to constipation. Tiny amount of free fluid scattered in the abdomen, moderate pleural effusions and left basilar consolidation and basilar interstitial thickening increased from 2022      Current evaluation:2022    BP (!) 154/59   Pulse 60   Temp 99.5 °F (37.5 °C) (Oral)   Resp 28   Ht 5' 10.98\" (1.803 m)   Wt 216 lb (98 kg)   SpO2 97%   BMI 30.14 kg/m²     Temperature Range: Temp: 99.5 °F (37.5 °C) Temp  Av.3 °F (37.4 °C)  Min: 98.7 °F (37.1 °C)  Max: 99.9 °F (37.7 °C)   The patient is seen and evaluated at bedside and he is on 80% FiO2 16 of PEEP  The patient is on Versed fentanyl and Precedex drips. The patient remains off pressors. Review of Systems   Unable to perform ROS: Intubated       Physical Examination :     Physical Exam  Constitutional:       Appearance: He is well-developed. Interventions: He is sedated and intubated. HENT:      Head: Normocephalic and atraumatic. Cardiovascular:      Rate and Rhythm: Normal rate. Heart sounds: Normal heart sounds. No friction rub. No gallop. Pulmonary:      Effort: Pulmonary effort is normal. He is intubated. Breath sounds: Normal breath sounds. No wheezing. Abdominal:      General: Bowel sounds are normal.      Palpations: Abdomen is soft. There is no mass. Tenderness: There is no abdominal tenderness. Musculoskeletal:      Cervical back: Neck supple. Lymphadenopathy:      Cervical: No cervical adenopathy. Skin:     General: Skin is warm and dry. Comments: The skin does not have cyanotic appearing changes in the feet bilaterally as the patient has been taken off pressors.          Laboratory data:   I have independently reviewed the followinglabs:  CBC with Differential:   Recent Labs     22  0507 22  0600   WBC 6.9 9.3   HGB 14.6 15.0 HCT 48.8 49.5    138   LYMPHOPCT 5* PENDING   MONOPCT 9 PENDING     BMP:   Recent Labs     01/28/22  0507 01/28/22  1134 01/28/22  2300 01/29/22  0600     --   --  139   K 5.8*   < > 5.8* 6.0*     --   --  109*   CO2 22  --   --  22   BUN 87*  --   --  88*   CREATININE 1.71*  --   --  1.47*   MG 2.6  --   --   --     < > = values in this interval not displayed. Hepatic Function Panel:   No results for input(s): PROT, LABALBU, BILIDIR, IBILI, BILITOT, ALKPHOS, ALT, AST in the last 72 hours. Lab Results   Component Value Date    PROCAL 0.24 01/23/2022    PROCAL 0.11 01/19/2022    PROCAL 0.11 01/16/2022     Lab Results   Component Value Date    CRP 23.5 01/16/2022    CRP 2.0 07/27/2019     Lab Results   Component Value Date    SEDRATE 3 01/16/2022         Lab Results   Component Value Date    DDIMER 5.84 01/23/2022    DDIMER 1.53 01/18/2022    DDIMER 1.73 01/16/2022    DDIMER 1.18 12/07/2018     Lab Results   Component Value Date    FERRITIN 569 01/16/2022    FERRITIN 50 07/23/2019    FERRITIN 18 07/08/2019     Lab Results   Component Value Date     01/16/2022     Lab Results   Component Value Date    FIBRINOGEN 402 01/16/2022       Results in Past 30 Days  Result Component Current Result Ref Range Previous Result Ref Range   SARS-CoV-2, Rapid DETECTED (A) (1/16/2022) Not Detected Not in Time Range      Lab Results   Component Value Date    COVID19 DETECTED 01/16/2022    COVID19 Not Detected 11/02/2021       No results for input(s): VANCOTROUGH in the last 72 hours. Imaging Studies:   ONE XRAY VIEW OF THE CHEST 1/29/2022 6:33 am  Impression   Trace effusions with scattered infiltrates.       Support tubes as described above.          CT OF THE ABDOMEN AND PELVIS WITHOUT CONTRAST 1/28/2022 8:34 am    Impression   1.  Overall mildly limited study due to motion and lack of contrast.       2.  No evidence of bowel obstruction.  Moderate stool burden is noted,   possibly related to constipation.  Enteric tube is in place with distal tip   in the proximal stomach.       3.  Tiny amount of free fluid scattered in the abdomen, including   presacral/perirectal fluid, most likely related to volume overload.  No   definite findings of rectal wall thickening or fecal impaction.       4.  Moderate pleural effusions, left basilar consolidation, and bibasilar   interstitial thickening, increased when compared to 01/16/2022.       RECOMMENDATIONS:   Unavailable         Veins: Lower Extremities DVT Study, Venous Scan Lower Bilateral.  Indications for Study: Leg Swelling . Patient Status: In Patient. Technical Quality: Limited visualization. Limitation reason: Edema. Comments: Simultaneous real time imaging utilizing B-Mode, color doppler and spectral waveform analysis was performed on the  bilateral lower extremities for venous examination of the deep and superficial systems. Conclusions  Summary  No evidence of superficial or deep venous thrombosis in both lower extremities. Signature  Electronically signed by Ana Conrad RVT(Sonographer) on 01/19/2022 08:10 AM  Electronically signed by Tirso Tovar physician) on 01/19/2022 06:21 PM      CT OF THE HEAD WITHOUT CONTRAST  1/18/2022 5:42 pm  Impression   New lacunar infarct left thalamus, age indeterminate. Spencer Part may be helpful.           Cultures:     Culture, Respiratory [[id number]] Collected: 01/28/22 1030   Order Status: Completed Specimen: Sputum, Suctioned Updated: 01/28/22 4006    Specimen Description . SUCTIONED SPUTUM    Special Requests NOT REPORTED    Direct Exam < 10 EPITHELIAL CELLS/LPF     <10 NEUTROPHILS/LPF     NO SIGNIFICANT PATHOGENS SEEN    Culture PENDING       Culture, Blood 1 [[id number]] Collected: 01/23/22 1759   Order Status: Completed Specimen: Blood Updated: 01/28/22 2111    Specimen Description . BLOOD    Special Requests RT HAND 19ML    Culture NO GROWTH 5 DAYS   Culture, Blood 1 [[id number]] Collected: 01/23/22 1759   Order Status: Completed Specimen: Blood Updated: 01/28/22 2110    Specimen Description . BLOOD    Special Requests ART LINE 10ML    Culture NO GROWTH 5 DAYS     Culture, Blood 2 [[id number]] Collected: 01/21/22 0742   Order Status: Completed Specimen: Blood Updated: 01/26/22 1001    Specimen Description . BLOOD    Special Requests LEFT AC 20ML    Culture NO GROWTH 5 DAYS   Culture, Blood 1 [[id number]] Collected: 01/21/22 0750   Order Status: Completed Specimen: Blood Updated: 01/26/22 1000    Specimen Description . BLOOD    Special Requests LEFT HAND 5ML    Culture NO GROWTH 5 DAYS   MRSA DNA Probe, Nasal [[id number]] Collected: 01/23/22 2258   Order Status: Completed Specimen: Nasal Updated: 01/25/22 1038    Specimen Description . NASAL SWAB    MRSA, DNA, Nasal NEGATIVE    Comment: NEGATIVE:  MRSA DNA not detected by nucleic acid amplification.                                                     Results should be used as an adjunct to nosocomial control efforts to identify patients   needing enhanced precautions.     The test is not intended to identify patients with staphylococcal infections.  Results   should not be used to guide or monitor treatment for MRSA infections. Culture, Blood 1 [[id number]] Collected: 01/16/22 1220   Order Status: Completed Specimen: Blood Updated: 01/21/22 1521    Specimen Description . BLOOD    Special Requests LAC 12ML    Culture NO GROWTH 5 DAYS   Culture, Blood 1 [[id number]] Collected: 01/16/22 1205   Order Status: Completed Specimen: Blood Updated: 01/21/22 1521    Specimen Description . BLOOD    Special Requests RAC 12ML    Culture NO GROWTH 5 DAYS       Culture, Urine [[id number]] Collected: 01/16/22 1315   Order Status: Completed Specimen: Urine, clean catch Updated: 01/17/22 2128    Specimen Description . CLEAN CATCH URINE    Special Requests NOT REPORTED    Culture NO SIGNIFICANT GROWTH       COVID-19, Rapid [[id number]] (Abnormal) Collected: 01/16/22 1230 Order Status: Completed Specimen: Nasopharyngeal Swab Updated: 01/16/22 1314    Specimen Description . NASOPHARYNGEAL SWAB    SARS-CoV-2, Rapid DETECTED Abnormal     Comment:        Rapid NAAT: The specimen is POSITIVE for SARS-Cov-2, the novel coronavirus associated with   COVID-19.         This test has been authorized by the FDA under an Emergency Use Authorization (EUA) for use   by authorized laboratories.         The ID NOW COVID-19 assay is designed to detect the virus that causes COVID-19 in patients   with signs and symptoms of infection who are suspected of COVID-19. An individual without symptoms of COVID-19 and who is not shedding SARS-CoV-2 virus would   expect to have a negative (not detected) result in this assay. Fact sheet for Healthcare Providers: Cee   Fact sheet for Patients: [email or web address].[email or web address]           Methodology: Isothermal Nucleic Acid Amplification         Results reported to the appropriate Health Department         Flu A/B Ag Detection [[id number]] Collected: 01/16/22 1230   Order Status: Completed Specimen: Nasopharyngeal Swab Updated: 01/16/22 1313    Specimen Description . NASOPHARYNGEAL SWAB    Special Requests NOT REPORTED    Direct Exam NEGATIVE for Influenza A + B antigens.                                PCR testing to confirm this result is available upon request. Og Coulter will be saved in the laboratory for 7 days.  Please call [phone] if PCR testing is indicated.      Medications:      dextrose bolus (hypoglycemia)  250 mL IntraVENous Once    furosemide  20 mg IntraVENous BID    insulin glargine  45 Units SubCUTAneous BID    pantoprazole  40 mg IntraVENous Daily    And    sodium chloride (PF)  10 mL IntraVENous Daily    aspirin  324 mg Oral Daily    levETIRAcetam  500 mg Oral BID    chlorhexidine  15 mL Mouth/Throat BID    enoxaparin  30 mg SubCUTAneous BID    QUEtiapine  50 mg Oral Once    ALPRAZolam  0.5 mg Oral TID    ipratropium-albuterol  1 ampule Inhalation 4x daily    budesonide  0.5 mg Nebulization BID    metoprolol succinate  50 mg Oral Nightly    dexamethasone  6 mg IntraVENous Q24H    sodium chloride flush  5-40 mL IntraVENous 2 times per day    amLODIPine  5 mg Oral Nightly    atorvastatin  20 mg Oral Daily    insulin lispro  0-18 Units SubCUTAneous TID WC    insulin lispro  0-9 Units SubCUTAneous Nightly           [facility]  Sunita Haynes MD  [employer]  OFFICE: [phone]      Electronically signed by Sunita Haynes MD on 1/29/2022 at 8:56 AM  Thank you for allowing us to participate in the care of this patient. Please call with questions. This note iscreated with the assistance of a speech recognition program.  While intending to generate a document that actually reflects the content of the visit, the document can still have some errors including those of syntax andsound a like substitutions which may escape proof reading. In such instances, actual meaning can be extrapolated by contextual diversion.

## 2022-01-30 NOTE — PROGRESS NOTES
Progress note  [facility]    Adult Hospitalist      Name: Jennifer Colorado  MRN: [id number]     Acct: [de-identified]  Room: [address] [place] [address]    Admit Date: 1/16/2022 11:51 AM  PCP: Portia Garg MD    Primary Problem  Active Problems:    COVID-19    Acute on chronic systolic CHF (congestive heart failure) (HCC)    Acute respiratory failure with hypoxia (HCC)    BLANCHE (acute kidney injury) ([facility]    COPD exacerbation ([facility]    Hypokalemia    Hypomagnesemia    Palliative care encounter    Goals of care, counseling/discussion    DNR (do not resuscitate) discussion    ACP (advance care planning)    Constipation    Abdominal pain, generalized  Resolved Problems:    * No resolved hospital problems. *        Assesment:   Acute congestive heart failure, diastolic   [id number]   Viral pneumonia secondary to above  Acute respiratory failure with hypoxia intubated on 1/23/2022  Hyperkalemia  Paroxysmal atrial fibrillation  Essential hypertension  Mixed hyperlipidemia  Diabetes mellitus type 2  History of seizure disorder  History of CKD stage III, presently normal renal function  Lung mass?   Leukopenia  Polycythemia  Thrombocytopenia  Anxiety disorder  Recent past h/o lacunar infarct left thalamus   Altered mental status/agitation  Endotracheal intubation secondary to hypoxia dated 1/23/2022  Supraventricular tachycardia/elevated troponin  Fever      Plan:   Admit patient to intermediate floor  Mechanical ventilation sedation as per critical care, patient continues to require 75% FiO2 with PEEP of 16  Telemetry  Check vitals closely  CBC BMP daily    Echocardiogram  Cardiology consult  IV pressors    Amiodarone  Cardiology following    IV Decadron  Patient completed a course of cefepime for 7 days  Bronchodilators  Pulmicort  Patient is deemed not a candidate for Actemra or baricitinib secondary to cytopenia  Infectious disease consult  Pulmonology consult    Continue Keppra  Continue amlodipine, atorvastatin  Continue aspirin  Hold insulin  Patient seen by gastroenterology started on daily GlycoLax, also continued on suppositories, GlycoLax if not helping can be changed to milk of magnesia  Consult nephrology appreciated managing patient hyperkalemia, patient is continued on [address] other medication as below.     Scheduled Meds:   sodium zirconium cyclosilicate  10 g Oral Daily    polyethylene glycol  17 g Per NG tube Daily    bisacodyl  10 mg Rectal Daily    furosemide  20 mg IntraVENous BID    [Held by provider] insulin glargine  45 Units SubCUTAneous BID    pantoprazole  40 mg IntraVENous Daily    And    sodium chloride (PF)  10 mL IntraVENous Daily    aspirin  324 mg Oral Daily    levETIRAcetam  500 mg Oral BID    chlorhexidine  15 mL Mouth/Throat BID    enoxaparin  30 mg SubCUTAneous BID    QUEtiapine  50 mg Oral Once    ipratropium-albuterol  1 ampule Inhalation 4x daily    budesonide  0.5 mg Nebulization BID    metoprolol succinate  50 mg Oral Nightly    dexamethasone  6 mg IntraVENous Q24H    sodium chloride flush  5-40 mL IntraVENous 2 times per day    amLODIPine  5 mg Oral Nightly    atorvastatin  20 mg Oral Daily    insulin lispro  0-18 Units SubCUTAneous TID WC    insulin lispro  0-9 Units SubCUTAneous Nightly     Continuous Infusions:   phenylephrine (KEARA-SYNEPHRINE) 50mg/250mL infusion Stopped (01/26/22 1820)    propofol 8.059 mcg/kg/min (01/28/22 0610)    midazolam (VERSED) 1 mg/mL in D5W infusion 6 mg/hr (01/30/22 0600)    fentaNYL 75 mcg/hr (01/30/22 0434)    norepinephrine Stopped (01/24/22 1046)    dexmedetomidine (PRECEDEX) IV infusion 0.7 mcg/kg/hr (01/30/22 0600)    sodium chloride      dextrose       PRN Meds:  LORazepam, 1 mg, Q4H PRN  dextrose bolus (hypoglycemia), 125 mL, PRN   Or  dextrose bolus (hypoglycemia), 250 mL, PRN  sodium chloride flush, 10 mL, PRN  sodium chloride, 25 mL, PRN  potassium chloride, 40 mEq, PRN   Or  potassium alternative oral replacement, 40 mEq, PRN   Or  potassium chloride, 10 mEq, PRN  magnesium sulfate, 1,000 mg, PRN  ondansetron, 4 mg, Q8H PRN   Or  ondansetron, 4 mg, Q6H PRN  magnesium hydroxide, 30 mL, Daily PRN  acetaminophen, 650 mg, Q6H PRN   Or  acetaminophen, 650 mg, Q6H PRN  glucose, 15 g, PRN  glucagon (rDNA), 1 mg, PRN  dextrose, 100 mL/hr, PRN  hydrALAZINE, 10 mg, Q6H PRN        Chief Complaint:     Chief Complaint   Patient presents with    Leg Swelling     bilateral, has CHF, on diuretic, EMT saw pt , assisted pt into car    Fever    Other     low SPO2         History of Present Illness:   Patient seen examined at bedside  Patient remains in medical ICU  Patient remains intubated sedated  Patient is been having fever overnight low-grade  Patient requiring 75% of FiO2 with PEEP of 16  Off amiodarone drip  Remains sedated  Tolerating tube feeding        Review of systems:  Unable to conduct ROS secondary to patient being intubated      Initial HPI  Sergio Aparicio is a 64 y.o.  male who presents with Leg Swelling (bilateral, has CHF, on diuretic, EMT saw pt , assisted pt into car), Fever, and Other (low SPO2)  This is a 64-year-old gentleman admitted via ER, come to ER with complaint of having shortness of breath, patient has medical history significant for COPD patient with history of cardiac failure: Patient noted that he has been having increasing swelling in his lower extremity and becoming more short of breath, further patient also been having some body aches and sweats, patient patient testing in the emergency room showed that he is positive for COVID-19 also noticed to have an elevated BNP, imaging concerning for fluid overload, admitted for further regiment    I have personally reviewed the past medical history, past surgical history, medications, social history, and family history, and summarized in the note.     Review of Systems:       Unable to conduct ROS secondary to patient being intubated      Past Medical History: Past Medical History:   Diagnosis Date    Arthritis     Atherosclerotic plaque 7/28/2019    Cervical disc disease     degenerative disc disease    Diabetes mellitus ([facility]     type 2    History of blood transfusion     Hx of blood clots     left leg    Hyperlipidemia     Neuropathy     Right kidney mass     \"urologist is watching\"    Snores     Type 2 diabetes mellitus treated with insulin ([facility] 7/28/2019        Past Surgical History:     Past Surgical History:   Procedure Laterality Date    ABDOMINAL AORTIC ANEURYSM REPAIR  2005    CARPAL TUNNEL RELEASE Bilateral     CERVICAL SPINE SURGERY      COLONOSCOPY      benign polyps removed    INGUINAL HERNIA REPAIR Right     ME REPAIR INCISIONAL HERNIA,REDUCIBLE N/A 5/10/2017    HERNIA VENTRAL REPAIR WITH MESH  performed by Sarah Rosa DO at [address] [place] [address] Road  05/10/2017    with mesh        Medications Prior to Admission:       Prior to Admission medications    Medication Sig Start Date End Date Taking? Authorizing Provider   rosuvastatin (CRESTOR) 40 MG tablet Take 40 mg by mouth every evening   Yes Historical Provider, MD   insulin NPH (HUMULIN N;NOVOLIN N) 100 UNIT/ML injection vial Inject 20 Units into the skin 2 times daily (before meals)   Yes Historical Provider, MD   levETIRAcetam (KEPPRA) 500 MG tablet Take 1 tablet by mouth 2 times daily 11/3/21  Yes Deborah Andersen MD   venlafaxine (EFFEXOR XR) 150 MG extended release capsule Take 1 capsule by mouth daily (with breakfast) 7/29/19  Yes Arabella Kiser   vitamin B-1 (THIAMINE) 100 MG tablet Take 100 mg by mouth daily   Yes Historical Provider, MD   ferrous sulfate 325 (65 Fe) MG tablet Take 325 mg by mouth daily (with breakfast)   Yes Historical Provider, MD   ALPRAZolam (XANAX) 0.5 MG tablet Take 0.5 mg by mouth three times daily.    Yes Historical Provider, MD   furosemide (LASIX) 40 MG tablet Take 1 tablet by mouth 2 times daily 18  Yes Dwight Little MD   tiotropium (SPIRIVA RESPIMAT) 2.5 MCG/ACT AERS inhaler Inhale 2 puffs into the lungs daily    Yes Historical Provider, MD   albuterol sulfate  (90 Base) MCG/ACT inhaler Inhale 2 puffs into the lungs every 6 hours as needed for Wheezing or Shortness of Breath   Yes Historical Provider, MD   metoprolol succinate (TOPROL XL) 50 MG extended release tablet Take 50 mg by mouth daily   Yes Historical Provider, MD   Multiple Vitamins-Minerals (MULTIVITAMIN ADULT) TABS Take 1 tablet by mouth daily   Yes Historical Provider, MD   vitamin D (CHOLECALCIFEROL) 1000 UNIT TABS tablet Take 1,000 Units by mouth daily   Yes Historical Provider, MD   fluticasone (FLONASE) 50 MCG/ACT nasal spray 1 spray by Each Nare route daily as needed for Rhinitis   Yes Historical Provider, MD   aspirin 325 MG EC tablet Take 325 mg by mouth daily    Yes Historical Provider, MD   Omega-3 Fatty Acids (FISH OIL) 1000 MG CAPS Take 1 capsule by mouth daily    Yes Historical Provider, MD   amLODIPine (NORVASC) 5 MG tablet Take 5 mg by mouth nightly    Yes Historical Provider, MD   mirtazapine (REMERON) 45 MG tablet Take 45 mg by mouth nightly   Yes Historical Provider, MD        Allergies: Barbiturates, Codeine, and Sulfa antibiotics    Social History:     Tobacco:    reports that he has quit smoking. He has never used smokeless tobacco.  Alcohol:      reports no history of alcohol use. Drug Use:  reports no history of drug use.     Family History:     Family History   Problem Relation Age of Onset    Ovarian Cancer Sister     Ovarian Cancer Sister          Physical Exam:     Vitals:  BP (!) 139/58   Pulse 84   Temp 99.9 °F (37.7 °C) (Axillary)   Resp 21   Ht 5' 10.98\" (1.803 m)   Wt 232 lb 9.6 oz (105.5 kg)   SpO2 95%   BMI 32.46 kg/m²   Temp (24hrs), Av.4 °F (37.4 °C), Min:98 °F (36.7 °C), Max:99.9 °F (37.7 °C)      General appearance -on ventilator  Mental status -sedated  Head - normocephalic and atraumatic  Eyes - conjunctiva clear  Ears -external ears normal  Nose - no drainage noted  Mouth - mucous membranes moist  Neck - supple, no carotid bruits, thyroid not palpable  Chest -basal crackles with decreased air entry bilaterally to auscultation, increased effort  Heart - normal rate, regular rhythm, no murmur  Abdomen - soft, nontender, nondistended, bowel sounds present all four quadrants, no masses, hepatomegaly or splenomegaly  Neurological -sedated on vent  Extremities - no edema, distal extremity discoloration    Skin - no gross lesions, rashes, or induration noted        Data:     Labs:    Hematology:  Recent Labs     01/28/22  0507 01/29/22  0600 01/30/22  0515 01/30/22  1250   WBC 6.9 9.3 11.9*  --    RBC 5.85* 5.95* 5.75  --    HGB 14.6 15.0 14.2  --    HCT 48.8 49.5 47.7  --    MCV 83.4 83.2 83.0  --    MCH 25.0* 25.2 24.7*  --    MCHC 29.9 30.3 29.8  --    RDW 15.3* 15.1* 15.6*  --     138 125*  --    MPV 12.9 12.4 11.7  --    DDIMER  --   --   --  2.96*     Chemistry:  Recent Labs     01/28/22  0507 01/28/22  1134 01/29/22  0600 01/29/22  1015 01/29/22  1530 01/30/22  0515 01/30/22  1140     --  139  --  138 138  --    K 5.8*   < > 6.0*   < > 5.6* 5.8* 5.4*     --  109*  --  105 106  --    CO2 22  --  22  --  24 23  --    GLUCOSE 153*  --  83  --  80 211*  --    BUN 87*  --  88*  --  92* 91*  --    CREATININE 1.71*  --  1.47*  --  1.46* 1.47*  --    MG 2.6  --   --   --   --   --   --    ANIONGAP 8*  --  8*  --  9 9  --    LABGLOM 39*  --  46*  --  47* 46*  --    GFRAA 47*  --  56*  --  57* 56*  --    CALCIUM 8.6  --  9.3  --  8.7 9.3  --    PHOS 2.9  --   --   --   --   --   --     < > = values in this interval not displayed.      Recent Labs     01/29/22  1943/22  2043 01/29/22  2142 01/29/22  2359 01/30/22  0735 01/30/22  1128   POCGLU 115* 96 115* 144* 196* 213*       Lab Results   Component Value Date    INR 1.0 01/17/2022    INR 1.0 11/03/2021 INR 1.0 07/27/2019    PROTIME 13.3 01/17/2022    PROTIME 11.1 11/03/2021    PROTIME 10.5 07/27/2019       Lab Results   Component Value Date/Time    SPECIAL NOT REPORTED 01/28/2022 10:30 AM     Lab Results   Component Value Date/Time    CULTURE NORMAL RESPIRATORY PO HEAVY GROWTH 01/28/2022 10:30 AM       Lab Results   Component Value Date    POCPH 7.359 01/30/2022    POCPCO2 46.7 01/30/2022    POCPO2 99.2 01/30/2022    POCHCO3 26.3 01/30/2022    NBEA NOT REPORTED 01/30/2022    PBEA 0 01/30/2022    OSY3CNQ NOT REPORTED 01/30/2022    HZYR0BWI 97 01/30/2022    FIO2 80.0 01/30/2022       Radiology:    XR CHEST 1 VIEW    Result Date: 1/16/2022  Hypoinflated lungs. Cardiomegaly again seen, when compared to the previous study performed 07/27/2019. Diffuse, increased interstitial markings throughout both lungs, some of which can be seen on the prior study may represent baseline chronic interstitial lung changes. The increased prominence on this exam could be secondary to the suboptimal inspiratory effort. The presence of pulmonary vascular congestion/mild CHF or bilateral interstitial pneumonia cannot be excluded. Clinical correlation is recommended. CT CHEST PULMONARY EMBOLISM W CONTRAST    Result Date: 1/16/2022  No evidence of pulmonary embolism. Compared to 2008, worsening underlying emphysema, with worsening subacute or acute interstitial lung disease, best seen left upper lobe; differential includes interstitial pneumonia or pneumonitis superimposed on emphysema, DIP, HP, and other interstitial pneumonias as well as infectious or inflammatory process superimposed on emphysema disease. Findings are not typical for COVID-19 pneumonia, but an element of the latter should be considered. Thickening and distortion left major fissure with ill-defined mass-like focus left lower lobe. The latter could also be infectious or inflammatory, although neoplasm should be excluded. Underlying worsening emphysema. Nodular densities, best seen right upper lobe. Small pleural effusions, slightly larger on the left. See recommendations below. Mild mediastinal and left hilar adenopathy; see recommendations below. Worsening now moderate-severe pulmonary artery hypertension. Mild cardiomegaly. Stable mild dilatation ascending thoracic aorta. Additional unchanged or incidental findings, as above. RECOMMENDATIONS: Clinical correlation and short-term follow-up CT chest in 1-4 months depending upon the clinical presentation/course. All radiological studies reviewed                Code Status:  DNR-CCA    Electronically signed by Pretty Arrington MD on 1/30/2022 at 3:53 PM     Copy sent to Dr. Galen Dangelo MD    This note was created with the assistance of a speech-recognition program.  Although the intention is to generate a document that actually reflects the content of the visit, no guarantees can be provided that every mistake has been identified and corrected by editing. Note was updated later by me after  physical examination and  completion of the assessment.

## 2022-01-30 NOTE — CONSULTS
Gastroenterology Consult Note      Patient: Koko Melchor  : 1945  Acct#:  [de-identified]     Date:  2022    Subjective:       History of Present Illness  Patient is a 64 y.o.  male admitted with Hypokalemia [E87.6]  Hypomagnesemia [E83.42]  COPD exacerbation ([facility] [J44.1]  BLANCHE (acute kidney injury) ([facility] [N17.9]  Acute respiratory failure with hypoxia (HCC) [J96.01]  Acute on chronic systolic CHF (congestive heart failure) ([facility] [I50.23]  COVID [U07.1]  COVID-19 [U07.1] who is seen in consult for abdominal pain    This is a 68-year-old gentleman with multiple medical issues including COPD, hyperlipidemia, renal mass,  Came with shortness of breath and being treated right now for COVID-19 pneumonia with hypoxia  He also have acute left thalamic CVA  He did have some abdominal distention and pain for which we are consulted  The patient is intubated  He is in isolation we took most of the history from the nursing staff and from the chart  The patient being constipated and underwent a CAT scan yesterday which showed stool burden with no obstruction  He was given enemas, with resultant peripheral nonbloody BMs  His abdominal distention improved much    Is tolerating tube feeding with no problems  His labs showed AST of 45  Creatinine 1.47  He is on aspirin on on Lovenox  His abdomen is less distended  And there is bowel sounds which are hypoactive according to the nurse    No known endoscopy        Past Medical History:   Diagnosis Date    Arthritis     Atherosclerotic plaque 2019    Cervical disc disease     degenerative disc disease    Diabetes mellitus ([facility]     type 2    History of blood transfusion     Hx of blood clots     left leg    Hyperlipidemia     Neuropathy     Right kidney mass     \"urologist is watching\"    Snores     Type 2 diabetes mellitus treated with insulin ([facility] 2019      Past Surgical History:   Procedure Laterality Date    ABDOMINAL AORTIC ANEURYSM REPAIR  2005    CARPAL TUNNEL RELEASE Bilateral     CERVICAL SPINE SURGERY      COLONOSCOPY      benign polyps removed    INGUINAL HERNIA REPAIR Right     AR REPAIR INCISIONAL HERNIA,REDUCIBLE N/A 5/10/2017    HERNIA VENTRAL REPAIR WITH MESH  performed by Shayla Remy DO at [facility] [address]  05/10/2017    with mesh      Past Endoscopic History none    Admission Meds  No current facility-administered medications on file prior to encounter. Current Outpatient Medications on File Prior to Encounter   Medication Sig Dispense Refill    rosuvastatin (CRESTOR) 40 MG tablet Take 40 mg by mouth every evening      insulin NPH (HUMULIN N;NOVOLIN N) 100 UNIT/ML injection vial Inject 20 Units into the skin 2 times daily (before meals)      levETIRAcetam (KEPPRA) 500 MG tablet Take 1 tablet by mouth 2 times daily 60 tablet 3    venlafaxine (EFFEXOR XR) 150 MG extended release capsule Take 1 capsule by mouth daily (with breakfast) 30 capsule 3    vitamin B-1 (THIAMINE) 100 MG tablet Take 100 mg by mouth daily      ferrous sulfate 325 (65 Fe) MG tablet Take 325 mg by mouth daily (with breakfast)      ALPRAZolam (XANAX) 0.5 MG tablet Take 0.5 mg by mouth three times daily.       furosemide (LASIX) 40 MG tablet Take 1 tablet by mouth 2 times daily 60 tablet 3    tiotropium (SPIRIVA RESPIMAT) 2.5 MCG/ACT AERS inhaler Inhale 2 puffs into the lungs daily       albuterol sulfate  (90 Base) MCG/ACT inhaler Inhale 2 puffs into the lungs every 6 hours as needed for Wheezing or Shortness of Breath      metoprolol succinate (TOPROL XL) 50 MG extended release tablet Take 50 mg by mouth daily      Multiple Vitamins-Minerals (MULTIVITAMIN ADULT) TABS Take 1 tablet by mouth daily      vitamin D (CHOLECALCIFEROL) 1000 UNIT TABS tablet Take 1,000 Units by mouth daily      fluticasone (FLONASE) 50 MCG/ACT nasal spray 1 spray by Each Nare route daily as needed for Rhinitis      aspirin 325 MG EC tablet Take 325 mg by mouth daily       Omega-3 Fatty Acids (FISH OIL) 1000 MG CAPS Take 1 capsule by mouth daily       amLODIPine (NORVASC) 5 MG tablet Take 5 mg by mouth nightly       mirtazapine (REMERON) 45 MG tablet Take 45 mg by mouth nightly         Patient   Does Use ASA, NSAID No  Allergies  Allergies   Allergen Reactions    Barbiturates Anxiety     Other reaction(s): Unknown    Codeine Palpitations    Sulfa Antibiotics Rash     Other reaction(s): Unknown        Social   Social History     Tobacco Use    Smoking status: Former Smoker    Smokeless tobacco: Never Used    Tobacco comment: quit 30 years ago   Substance Use Topics    Alcohol use: No        PSYCH HISTORY:  Depression No  Anxiety No  Suicide No       Family History   Problem Relation Age of Onset    Ovarian Cancer Sister     Ovarian Cancer Sister       No family history of colon cancer, Crohn's disease, or ulcerative colitis. Review of Systems  Constitutional: negative  Eyes: negative  Ears, nose, mouth, throat, and face: negative  Respiratory: negative  Cardiovascular: negative  Gastrointestinal: negative  Genitourinary:negative  Integument/breast: negative  Hematologic/lymphatic: negative  Musculoskeletal:negative  Endocrine: negative           Physical Exam  Blood pressure (!) 153/64, pulse 67, temperature 99.8 °F (37.7 °C), temperature source Oral, resp. rate 28, height 5' 10.98\" (1.803 m), weight 216 lb (98 kg), SpO2 96 %. Quinn Punt .Due to the current efforts to prevent transmission of COVID-19 and also the need to preserve PPE for other caregivers, a face-to-face encounter with the patient was not performed. That being said, all relevant records and diagnostic tests were reviewed, including laboratory results and imaging. Please reference any relevant documentation elsewhere. Care will be coordinated with the primary service.   Data Review:    Recent Labs     01/27/22  0417 01/28/22  5827 01/29/22  0600   WBC 6.9 6.9 9.3   HGB 15.7 14.6 15.0   HCT 51.6* 48.8 49.5   MCV 82.2* 83.4 83.2    157 138     Recent Labs     01/28/22  0507 01/28/22  1134 01/29/22  0600 01/29/22  1015 01/29/22  1530     --  139  --  138   K 5.8*   < > 6.0* 5.4* 5.6*     --  109*  --  105   CO2 22  --  22  --  24   PHOS 2.9  --   --   --   --    BUN 87*  --  88*  --  92*   CREATININE 1.71*  --  1.47*  --  1.46*    < > = values in this interval not displayed. No results for input(s): AST, ALT, ALB, BILIDIR, BILITOT, ALKPHOS in the last 72 hours. No results for input(s): LIPASE, AMYLASE in the last 72 hours. No results for input(s): PROTIME, INR in the last 72 hours. No results for input(s): PTT in the last 72 hours. No results for input(s): OCCULTBLD in the last 72 hours. CEA:  No results found for: CEA  Ca 125:  No results found for:   Ca 19-9:  No results found for:   Ca 15-3:  No results found for:   AFP:  No components found for: AFAFP  Beta HCG:  No components found for: BHCG  Neuron Specific Enolase:  No results found for: NSE  Imaging Studies:                           All appropriate imaging studies and reports reviewed: Yes                 Assessment:     Active Problems:    COVID-19    Acute on chronic systolic CHF (congestive heart failure) (HCC)    Acute respiratory failure with hypoxia (HCC)    BLANCHE (acute kidney injury) (HCC)    COPD exacerbation (HCC)    Hypokalemia    Hypomagnesemia    Palliative care encounter    Goals of care, counseling/discussion    DNR (do not resuscitate) discussion    ACP (advance care planning)    Constipation  Resolved Problems:    * No resolved hospital problems. *    *Abdominal pain and distention most likely related to constipation and stool burden  Elevated AST  COVID-19 pneumonia with hypoxia  Acute left thalamic CVA  Acute renal failure    Recommendations:    We will continue the GlycoLax the patient cerebellomedullary possibility  Continue tube feeding as long as tolerated and low residual call if there is any changes  No plans for any work-up at this time from the GI standpoint continue with the same treatment for the pulmonary and nephrology problems  Elective GI work-up as an outpatient  PPI                    Thank you for allowing me to participate in the care of your patient. Please feel free to contact me with any questions or concerns.      Risa Brownlee MD

## 2022-01-30 NOTE — PROGRESS NOTES
.  Nephrology  Progress Note    Reason for Consult: Hyperkalemia    Requesting Physician:  Cordell Anderson MD    INTERVAL HISTORY:  K 5.8 this am.  Creatinine 1.47. HISTORY OF PRESENT ILLNESS:    The patient is a 64 y.o. male who presented with to the hospital for worsening shortness of breath ad and worsening lower extremity edema on 1/16. He had diffuse body aches and sweats and a dry cough. He tested positive for COVID-19. He has steadily declined since admission. On 1/18 a CT head found New lacunar infarct left thalamus. He had to be intubated on 1/23. He has a significant past medical history of COPD, hyperlipidemia, Type 2 DM, Right kidney mass, and abdominal aortic aneurysm repair in 2005. His potassium has been steadily rising since 1/24/22. The most current Potassium level is 5.8. His creatine is improved to 1.71. This information was retrieved through chart review and nursing. Patient was unable to provide information due to current status on mechanical ventilation and sedation. Review Of Systems:  Unable to obtain. Patient sedated on ventilator.     Past Medical History:   Diagnosis Date    Arthritis     Atherosclerotic plaque 7/28/2019    Cervical disc disease     degenerative disc disease    Diabetes mellitus ([facility]     type 2    History of blood transfusion     Hx of blood clots     left leg    Hyperlipidemia     Neuropathy     Right kidney mass     \"urologist is watching\"    Snores     Type 2 diabetes mellitus treated with insulin ([facility] 7/28/2019       Past Surgical History:   Procedure Laterality Date    ABDOMINAL AORTIC ANEURYSM REPAIR  2005    CARPAL TUNNEL RELEASE Bilateral     CERVICAL SPINE SURGERY      COLONOSCOPY      benign polyps removed    INGUINAL HERNIA REPAIR Right     GA REPAIR INCISIONAL HERNIA,REDUCIBLE N/A 5/10/2017    HERNIA VENTRAL REPAIR WITH MESH  performed by Zoey Martin DO at [facility] REPAIR  05/10/2017    with mesh       Prior to Admission medications    Medication Sig Start Date End Date Taking? Authorizing Provider   rosuvastatin (CRESTOR) 40 MG tablet Take 40 mg by mouth every evening   Yes Historical Provider, MD   insulin NPH (HUMULIN N;NOVOLIN N) 100 UNIT/ML injection vial Inject 20 Units into the skin 2 times daily (before meals)   Yes Historical Provider, MD   levETIRAcetam (KEPPRA) 500 MG tablet Take 1 tablet by mouth 2 times daily 11/3/21  Yes Alejandra Landin MD   venlafaxine (EFFEXOR XR) 150 MG extended release capsule Take 1 capsule by mouth daily (with breakfast) 7/29/19  Yes Arabella Kiser   vitamin B-1 (THIAMINE) 100 MG tablet Take 100 mg by mouth daily   Yes Historical Provider, MD   ferrous sulfate 325 (65 Fe) MG tablet Take 325 mg by mouth daily (with breakfast)   Yes Historical Provider, MD   ALPRAZolam (XANAX) 0.5 MG tablet Take 0.5 mg by mouth three times daily.    Yes Historical Provider, MD   furosemide (LASIX) 40 MG tablet Take 1 tablet by mouth 2 times daily 12/11/18  Yes Delaney Evans MD   tiotropium (SPIRIVA RESPIMAT) 2.5 MCG/ACT AERS inhaler Inhale 2 puffs into the lungs daily    Yes Historical Provider, MD   albuterol sulfate  (90 Base) MCG/ACT inhaler Inhale 2 puffs into the lungs every 6 hours as needed for Wheezing or Shortness of Breath   Yes Historical Provider, MD   metoprolol succinate (TOPROL XL) 50 MG extended release tablet Take 50 mg by mouth daily   Yes Historical Provider, MD   Multiple Vitamins-Minerals (MULTIVITAMIN ADULT) TABS Take 1 tablet by mouth daily   Yes Historical Provider, MD   vitamin D (CHOLECALCIFEROL) 1000 UNIT TABS tablet Take 1,000 Units by mouth daily   Yes Historical Provider, MD   fluticasone (FLONASE) 50 MCG/ACT nasal spray 1 spray by Each Nare route daily as needed for Rhinitis   Yes Historical Provider, MD   aspirin 325 MG EC tablet Take 325 mg by mouth daily    Yes Historical Provider, MD   Omega-3 Fatty Acids ([address] OIL) 1000 MG CAPS Take 1 capsule by mouth daily    Yes Historical Provider, MD   amLODIPine (NORVASC) 5 MG tablet Take 5 mg by mouth nightly    Yes Historical Provider, MD   mirtazapine (REMERON) 45 MG tablet Take 45 mg by mouth nightly   Yes Historical Provider, MD       Scheduled Meds:   polyethylene glycol  17 g Per NG tube Daily    bisacodyl  10 mg Rectal Daily    furosemide  20 mg IntraVENous BID    [Held by provider] insulin glargine  45 Units SubCUTAneous BID    pantoprazole  40 mg IntraVENous Daily    And    sodium chloride (PF)  10 mL IntraVENous Daily    aspirin  324 mg Oral Daily    levETIRAcetam  500 mg Oral BID    chlorhexidine  15 mL Mouth/Throat BID    enoxaparin  30 mg SubCUTAneous BID    QUEtiapine  50 mg Oral Once    ALPRAZolam  0.5 mg Oral TID    ipratropium-albuterol  1 ampule Inhalation 4x daily    budesonide  0.5 mg Nebulization BID    metoprolol succinate  50 mg Oral Nightly    dexamethasone  6 mg IntraVENous Q24H    sodium chloride flush  5-40 mL IntraVENous 2 times per day    amLODIPine  5 mg Oral Nightly    atorvastatin  20 mg Oral Daily    insulin lispro  0-18 Units SubCUTAneous TID WC    insulin lispro  0-9 Units SubCUTAneous Nightly     Continuous Infusions:   phenylephrine (KEARA-SYNEPHRINE) 50mg/250mL infusion Stopped (01/26/22 1820)    propofol 8.059 mcg/kg/min (01/28/22 0610)    midazolam (VERSED) 1 mg/mL in D5W infusion 6 mg/hr (01/30/22 0600)    fentaNYL 75 mcg/hr (01/30/22 0434)    norepinephrine Stopped (01/24/22 1046)    dexmedetomidine (PRECEDEX) IV infusion 0.7 mcg/kg/hr (01/30/22 0600)    sodium chloride      dextrose       PRN Meds:LORazepam, dextrose bolus (hypoglycemia) **OR** dextrose bolus (hypoglycemia), sodium chloride flush, sodium chloride, potassium chloride **OR** potassium alternative oral replacement **OR** potassium chloride, magnesium sulfate, ondansetron **OR** ondansetron, magnesium hydroxide, acetaminophen **OR** acetaminophen, glucose, glucagon (rDNA), dextrose, hydrALAZINE    Allergies   Allergen Reactions    Barbiturates Anxiety     Other reaction(s): Unknown    Codeine Palpitations    Sulfa Antibiotics Rash     Other reaction(s): Unknown       Social History     Socioeconomic History    Marital status:      Spouse name: Not on file    Number of children: Not on file    Years of education: Not on file    Highest education level: Not on file   Occupational History    Not on file   Tobacco Use    Smoking status: Former Smoker    Smokeless tobacco: Never Used    Tobacco comment: quit 30 years ago   Substance and Sexual Activity    Alcohol use: No    Drug use: No    Sexual activity: Not on file   Other Topics Concern    Not on file   Social History Narrative    Not on file     Social Determinants of Health     Financial Resource Strain:     Difficulty of Paying Living Expenses: Not on file   Food Insecurity:     Worried About Running Out of Food in the Last Year: Not on file    Dona of Food in the Last Year: Not on file   Transportation Needs:     Lack of Transportation (Medical): Not on file    Lack of Transportation (Non-Medical):  Not on file   Physical Activity:     Days of Exercise per Week: Not on file    Minutes of Exercise per Session: Not on file   Stress:     Feeling of Stress : Not on file   Social Connections:     Frequency of Communication with Friends and Family: Not on file    Frequency of Social Gatherings with Friends and Family: Not on file    Attends Synagogue Services: Not on file    Active Member of Clubs or Organizations: Not on file    Attends Club or Organization Meetings: Not on file    Marital Status: Not on file   Intimate Partner Violence:     Fear of Current or Ex-Partner: Not on file    Emotionally Abused: Not on file    Physically Abused: Not on file    Sexually Abused: Not on file   Housing Stability:     Unable to Pay for Housing in the Last Year: Not on file    Number of Places Lived in the Last Year: Not on file    Unstable Housing in the Last Year: Not on file       Family History   Problem Relation Age of Onset    Ovarian Cancer Sister     Ovarian Cancer Sister          Physical Exam:  Vitals:    01/30/22 0513 01/30/22 0600 01/30/22 0747 01/30/22 0800   BP:  (!) 149/64  (!) 147/71   Pulse:  71 71 65   Resp:  29 29 26   Temp:    99.4 °F (37.4 °C)   TempSrc:    Axillary   SpO2:   96% 96%   Weight: 232 lb 9.6 oz (105.5 kg)      Height:         I/O last 3 completed shifts: In: 3121.9 [I.V.:1536.8; NG/GT:1579; IV Piggyback:6]  Out: 3877 [Urine:3050]    Deferred due to [address] isolation.     Data:  CBC:   Lab Results   Component Value Date    WBC 11.9 (H) 01/30/2022    HGB 14.2 01/30/2022    HCT 47.7 01/30/2022    MCV 83.0 01/30/2022     (L) 01/30/2022     BMP:    Lab Results   Component Value Date     01/30/2022     01/29/2022     01/29/2022    K 5.8 (H) 01/30/2022    K 5.6 (H) 01/29/2022    K 5.4 (H) 01/29/2022     01/30/2022     01/29/2022     (H) 01/29/2022    CO2 23 01/30/2022    CO2 24 01/29/2022    CO2 22 01/29/2022    BUN 91 (HH) 01/30/2022    BUN 92 (HH) 01/29/2022    BUN 88 (H) 01/29/2022    CREATININE 1.47 (H) 01/30/2022    CREATININE 1.46 (H) 01/29/2022    CREATININE 1.47 (H) 01/29/2022    GLUCOSE 211 (H) 01/30/2022    GLUCOSE 80 01/29/2022    GLUCOSE 83 01/29/2022     CMP:   Lab Results   Component Value Date     01/30/2022    K 5.8 01/30/2022     01/30/2022    CO2 23 01/30/2022    BUN 91 01/30/2022    CREATININE 1.47 01/30/2022    GLUCOSE 211 01/30/2022    CALCIUM 9.3 01/30/2022    PROT 6.5 01/16/2022    LABALBU 3.8 01/16/2022    BILITOT 0.68 01/16/2022    ALKPHOS 123 01/16/2022    AST 45 01/16/2022    ALT 24 01/16/2022      Hepatic:   Lab Results   Component Value Date    AST 45 (H) 01/16/2022    AST 16 11/03/2021    AST 30 11/02/2021    ALT 24 01/16/2022    ALT 13 11/03/2021    ALT 22 11/02/2021 BILITOT 0.68 01/16/2022    BILITOT 0.56 11/03/2021    BILITOT 0.31 11/02/2021    ALKPHOS 123 01/16/2022    ALKPHOS 90 11/03/2021    ALKPHOS 140 (H) 11/02/2021     BNP: No results found for: BNP  Lipids:   Lab Results   Component Value Date    CHOL 129 11/03/2021    HDL 30 (L) 11/03/2021     INR:   Lab Results   Component Value Date    INR 1.0 01/17/2022    INR 1.0 11/03/2021    INR 1.0 07/27/2019     PTH: No results found for: PTH  Phosphorus:    Lab Results   Component Value Date    PHOS 2.9 01/28/2022     Ionized Calcium: No results found for: IONCA  Magnesium:   Lab Results   Component Value Date    MG 2.6 01/28/2022     Albumin:   Lab Results   Component Value Date    LABALBU 3.8 01/16/2022     Last 3 CK, CKMB, Troponin: @LABRCNT(CKTOTAL:3,CKMB:3,TROPONINI:3)       URINE:)No results found for: Bharath Brandon    Radiology:  Reviewed. Assessment:  Hyperkalemia  BLANCHE on CKD  Diabetes Mellitis   COVID19 Pneumonia  COPD  Elevated Ddimer      Plan:  Creatinine is improving. U/O >3L yesterday. Insulin and Dextrose given this morning. Recheck K level later today. Start Lokelma 10 g daily. Continue Lasix 20 mg IV BID. On Renal tube feed  Bicarb level ok at 23. No recent BM. On miralax and dulcalax. Avoid hypotension, nephrotoxic drugs, Lovenox, Fleets enema and IV contrast exposure. Follow up labs ordered for AM.     Please do not hesitate to call with questions. We will follow with you. Patient seen in collaboration with Dr. Deborah Mitchell. Electronically signed by HERB Martin CNP  on 1/30/2022 at 10:49 AM   [facility].   Ph: [phone]       Physician Addendum  I evaluated the patient with CNP   Agree with above assessment and plan   potassium improved to 5.4  continue Nahid Odell   Electronically signed by Zaki Rodriguez MD on 01/30/22 2:36 PM

## 2022-01-30 NOTE — PROGRESS NOTES
Pt converted into a Trigemeny/Bigemeny cardiac rhythm around 1730 this evening. It continues into 1830 hour. Dr. Juan Daniel Olivas Cardiologist notified of change to patient cardiac rhythm. Dr. Juan Daniel Olivas said to monitor for now, now medications or treatments ordered.

## 2022-01-30 NOTE — PROGRESS NOTES
[facility]    Gastroenterology Daily Progress Note      Patient:   Kizzy Herron   :    1945   Facility[facility]   [facility]  Date:     2022  Consultant:   HERB Barber CNP, CNP      SUBJECTIVE  64 y.o. male admitted 2022 with Hypokalemia [E87.6]  Hypomagnesemia [E83.42]  COPD exacerbation ([facility] [J44.1]  BLANCHE (acute kidney injury) ([facility] [address] [N17.9]  Acute respiratory failure with hypoxia (HCC) [J96.01]  Acute on chronic systolic CHF (congestive heart failure) ([facility] [address] [I50.23]  COVID [U07.1]  COVID-19 [U07.1] and seen for abdominal distention. The pt was discussed with the primary rn. Remains sedated on the vent. Per the RN no BM last night or today. Pt has hypoactive bs with distention. Tolerating tube feeds. OBJECTIVE  Scheduled Meds:   polyethylene glycol  17 g Per NG tube Daily    bisacodyl  10 mg Rectal Daily    furosemide  20 mg IntraVENous BID    [Held by provider] insulin glargine  45 Units SubCUTAneous BID    pantoprazole  40 mg IntraVENous Daily    And    sodium chloride (PF)  10 mL IntraVENous Daily    aspirin  324 mg Oral Daily    levETIRAcetam  500 mg Oral BID    chlorhexidine  15 mL Mouth/Throat BID    enoxaparin  30 mg SubCUTAneous BID    QUEtiapine  50 mg Oral Once    ALPRAZolam  0.5 mg Oral TID    ipratropium-albuterol  1 ampule Inhalation 4x daily    budesonide  0.5 mg Nebulization BID    metoprolol succinate  50 mg Oral Nightly    dexamethasone  6 mg IntraVENous Q24H    sodium chloride flush  5-40 mL IntraVENous 2 times per day    amLODIPine  5 mg Oral Nightly    atorvastatin  20 mg Oral Daily    insulin lispro  0-18 Units SubCUTAneous TID WC    insulin lispro  0-9 Units SubCUTAneous Nightly       Vital Signs:  BP (!) 147/71   Pulse 65   Temp 99.4 °F (37.4 °C) (Axillary)   Resp 26   Ht 5' 10.98\" (1.803 m)   Wt 232 lb 9.6 oz (105.5 kg)   SpO2 96%   BMI 32.46 kg/m²      Physical Exam:   Physical Exam  .. Due to the current efforts to prevent transmission of COVID-19 and also the need to preserve PPE for other caregivers, a face-to-face encounter with the patient was not performed. That being said, all relevant records and diagnostic tests were reviewed, including laboratory results and imaging. Please reference any relevant documentation elsewhere. Care will be coordinated with the primary service. Lab and Imaging Review     CBC  Recent Labs     01/28/22  0507 01/29/22  0600 01/30/22  0515   WBC 6.9 9.3 11.9*   HGB 14.6 15.0 14.2   HCT 48.8 49.5 47.7   MCV 83.4 83.2 83.0    138 125*       BMP  Recent Labs     01/29/22  0600 01/29/22  0600 01/29/22  1015 01/29/22  1530 01/30/22  0515     --   --  138 138   K 6.0*   < > 5.4* 5.6* 5.8*   *  --   --  105 106   CO2 22  --   --  24 23   BUN 88*  --   --  92* 91*   CREATININE 1.47*  --   --  1.46* 1.47*   GLUCOSE 83  --   --  80 211*   CALCIUM 9.3  --   --  8.7 9.3    < > = values in this interval not displayed. FINDINGS:ct abd 1/28/22   Lower Chest: There are small to moderate bilateral pleural effusions,   increased from previous CT dated 01/16/2022.  Significant respiratory motion   limits assessment of the lung bases.  There is increased consolidation at the   left base.  There is increased interstitial thickening at both lung bases,   possibly accentuated by the degree of motion.       Organs: Limited unenhanced liver and gallbladder are within normal limits. Motion is limiting assessment of the upper abdominal organs as well.  Spleen,   pancreas, and adrenal glands are unremarkable.       Kidneys are symmetric in size and attenuation.  No renal or ureteral   calculus.  No hydronephrosis or perinephric inflammation.       GI/Bowel: Enteric tube is in place with distal tip in the proximal stomach.    There is a moderate amount of fecal material in the large bowel.  No abnormal   bowel distention or focal pericolonic inflammation.  No free air. Donavon Lopez is a tiny amount of free fluid adjacent to the liver, in the pericolic gutters,   and in the pelvis. Aram Simpson is a tiny amount of presacral fluid and perirectal   stranding.  No significant rectal wall thickening appreciated.       Pelvis: Roberts catheter is in place.  Urinary bladder is nondistended.  There   is no pelvic lymphadenopathy.       Peritoneum/Retroperitoneum: No evidence of abdominal aortic aneurysm. Abdominal aortic stent graft noted.  No retroperitoneal or mesenteric   lymphadenopathy.       Bones/Soft Tissues: There is mild scattered body wall edema.  There are   extensive degenerative changes in the lumbar spine.  No acute or suspicious   osseous abnormality.           Impression   1.  Overall mildly limited study due to motion and lack of contrast.       2.  No evidence of bowel obstruction.  Moderate stool burden is noted,   possibly related to constipation.  Enteric tube is in place with distal tip   in the proximal stomach.       3.  Tiny amount of free fluid scattered in the abdomen, including   presacral/perirectal fluid, most likely related to volume overload.  No   definite findings of rectal wall thickening or fecal impaction.       4.  Moderate pleural effusions, left basilar consolidation, and bibasilar   interstitial thickening, increased when compared to 01/16/2022.       RECOMMENDATIONS:   Unavailable             ASSESSMENT/plan  Abdominal distention, no BM overnight  -continue the suppositories and glycolax  -recheck kub in am  -serial abd exams    2. covid 19 pneumonia with hypoxic failure    3. BLANCHE    4.acute left thalamic CVA      This plan was formulated in collaboration with Dr. Clarisa Sun    Electronically signed by: HERB Mattson - CNP on 1/30/2022 at 9:09 AM     Attending Physician Statement  I have discussed the care of Aaron Lockwood and   I have examined the patient myselft independently, and taken ros and hpi , including pertinent history and exam findings,  with the author of this note . I have reviewed the key elements of all parts of the encounter with the nurse practitioner/resident. I agree with the assessment, plan and orders as documented by the above health care provider     Abdomen still distended but not worse than before, no bowel movement, positive bowel sounds but hypoactive.   Tolerating tube feeding well  We will continue to watch we will get a KUB in the morning    Electronically signed by Ravi Castellanos MD

## 2022-01-30 NOTE — PROGRESS NOTES
Pulmonary Critical Care Progress Note       Patient seen for the follow up of COVID-19 pneumonia, acute hypoxic respiratory failure. Subjective:  He sedated, intubated on ventilator, FiO2 75%/PEEP 16 tidal volume 550 respiratory rate of 32/min  He is off amiodarone drip. He is sedated with Precedex and has been and is back on Versed and fentanyl drip. He is tolerating tube feeds at 30 mL an hour. He is labored. He is having fevers he had increased potassium and received insulin per nephrology      Examination:  Vitals: BP (!) 147/71   Pulse 67   Temp 99.4 °F (37.4 °C) (Axillary)   Resp 28   Ht 5' 10.98\" (1.803 m)   Wt 232 lb 9.6 oz (105.5 kg)   SpO2 95%   BMI 32.46 kg/m²   General appearance: Sedated, intubated on ventilator  Neck: No JVD  Lungs: Bilateral decreased breath sound no crackles or wheeze  Heart: regular rate and rhythm, S1, S2 normal, no gallop  Abdomen: Soft, non tender, + BS  Extremities: no cyanosis or clubbing.  No significant edema    LABs:  CBC:   Recent Labs     01/29/22  0600 01/30/22  0515   WBC 9.3 11.9*   HGB 15.0 14.2   HCT 49.5 47.7    125*     BMP:   Recent Labs     01/29/22  1530 01/30/22  0515    138   K 5.6* 5.8*   CO2 24 23   BUN 92* 91*   CREATININE 1.46* 1.47*   LABGLOM 47* 46*   GLUCOSE 80 211*     ABG:  Lab Results   Component Value Date    XLA7NMW NOT REPORTED 01/30/2022    FIO2 80.0 01/30/2022       Lab Results   Component Value Date    POCPH 7.359 01/30/2022    POCPCO2 46.7 01/30/2022    POCPO2 99.2 01/30/2022    POCHCO3 26.3 01/30/2022    PBEA 0 01/30/2022    JJL4JNP NOT REPORTED 01/30/2022    FVYU5BCK 97 01/30/2022    FIO2 80.0 01/30/2022     Radiology:  Chest x-ray 1/30  Bilateral infiltrates appear slightly worsened.  Continued follow-up   recommended         Chest x-ray 1/29                CT abdomen pelvis 1/28    1.  Overall mildly limited study due to motion and lack of contrast.       2.  No evidence of bowel obstruction.  Moderate stool burden is noted,   possibly related to constipation.  Enteric tube is in place with distal tip   in the proximal stomach.       3.  Tiny amount of free fluid scattered in the abdomen, including   presacral/perirectal fluid, most likely related to volume overload.  No   definite findings of rectal wall thickening or fecal impaction.       4.  small  pleural effusions, left basilar consolidation, and bibasilar   interstitial thickening, increased when compared to 01/16/2022.               Impression:  · Acute on chronic hypoxic respiratory failure  · COVID-19 pneumonia  · COPD/pulmonary emphysema  · Acute left thalamic infarct/CVA  · Left lower lobe opacity versus nodule  · Pulmonary edema  · Acute renal insufficiency/hyperkalemia  · Elevated D-dimer, decreased platelets  · Systolic heart failure, s/p AICD placement  · BLANCHE on CKD  · Diabetes mellitus    Recommendations:    · Continue vent support, wean FiO2 as tolerated.   85% FiO2, PEEP at 16  · Fentanyl drip  · Versed for sedation  · Discontinue Precedex  · Sedation vacation as tolerated  · Discontinue Ativan 0.5 3 times daily  · DuoNeb by nebulizer  · Pulmicort 0.5 every 12 by nebulizer  · Watch off of Luke-Synephrine  · Airborne isolation  · IV Decadron 6 mg daily  · Lasix 20 mg IV twice daily  · Check Dimer   · Follow-up potassium/Lokelma per nephrology  · Not a candidate for Actemra/baricitinib stopped secondary to cytopenias  · Neurology on consult/monitor mental status/decrease sedation  · Tube feeds  · Dulcolax suppository  · Poor prognosis  · Palliative care consult  · DVT prophylaxis with low molecular weight heparin      Naz Clark MD, [facility]  Pulmonary Critical Care and Sleep Medicine,  [facility]: [phone]  CC: 35 minutes

## 2022-01-30 NOTE — PROGRESS NOTES
Pt is sedated and intubated, but does respond to pain with breathing over vent. Hand flinches when finger tips pinched. Pt currently running slight low grade fever of 99 degrees. Covers removed and room temperature lowered. Pt had Coarse crackles in left lower/lateral lobes ad crackles in rt lower/lateral lung lobes that did decrease slightly after receiving Lasix 20 mg IV. Now has Crackles in left lower/lateral lung lobes and fine crackle in right lower/lateral lung lobes. Patient vent settings are PRVC at 75% FIO2, Peep 16, Rate of 32 and Tidal volume of 550. Bowel sound hypoactive. No bowel movement. Miralax and Dulcolax suppository given this am. No stool noted in rectum when suppository was inserted. Tube residuals have been 0 at 8 am and 12:30pm. Renal tube feed rate increased to 50 ml/hr which is goal. Radial pulses 2+, with 1+ edema noted to left arm and 2+ edema to right arm. Pedal pulses on left food doppler weak with 1+ edema to lower legs and 2+ edema to upper legs/thigh. Pedal pulses on right food doppler moderate with 1+ edema to lower legs and 2+ edema to upper legs/thigh.

## 2022-01-30 NOTE — PROGRESS NOTES
[facility]   Section of Cardiology  Progress Note      Date:  1/30/2022  Patient: Maurizio Crawford  Admission:  1/16/2022 11:51 AM  Admit DX: Hypokalemia [E87.6]  Hypomagnesemia [E83.42]  COPD exacerbation (HCC) [J44.1]  BLANCHE (acute kidney injury) ([facility] [address] [N17.9]  Acute respiratory failure with hypoxia (HCC) [J96.01]  Acute on chronic systolic CHF (congestive heart failure) ([facility] [address] [I50.23]  COVID [U07.1]  COVID-19 [U07.1]  Age:  64 y.o., 1945                           LOS: 14 days     Reason for evaluation:   Paroxysmal atrial fibrillation. covid 19 pneumonia      SUBJECTIVE:     The patient was seen and examined. Notes and labs reviewed. No cardiac events overnight. Patient had issues with abdominal distention and that is being evaluated by the gastroenterology team.  He has not had any more episodes of atrial fibrillation. OBJECTIVE:    BP (!) 147/71   Pulse 65   Temp 99.4 °F (37.4 °C) (Axillary)   Resp 26   Ht 5' 10.98\" (1.803 m)   Wt 232 lb 9.6 oz (105.5 kg)   SpO2 96%   BMI 32.46 kg/m²     Intake/Output Summary (Last 24 hours) at 1/30/2022 0856  Last data filed at 1/30/2022 0810  Gross per 24 hour   Intake 1898.82 ml   Output 1700 ml   Net 198.82 ml       EXAM:   Intubated and sedated  Chest. Bilateral crepitations  Cvs. s1s2 no murmurs  Abd. distended  Ext. Bilateral resolving edema. Pedal pulses decreased.     Current Inpatient Medications:   polyethylene glycol  17 g Per NG tube Daily    bisacodyl  10 mg Rectal Daily    furosemide  20 mg IntraVENous BID    [Held by provider] insulin glargine  45 Units SubCUTAneous BID    pantoprazole  40 mg IntraVENous Daily    And    sodium chloride (PF)  10 mL IntraVENous Daily    aspirin  324 mg Oral Daily    levETIRAcetam  500 mg Oral BID    chlorhexidine  15 mL Mouth/Throat BID    enoxaparin  30 mg SubCUTAneous BID    QUEtiapine  50 mg Oral Once    ALPRAZolam  0.5 mg Oral TID    ipratropium-albuterol  1 ampule Inhalation 4x daily    budesonide  0.5 mg Nebulization BID    metoprolol succinate  50 mg Oral Nightly    dexamethasone  6 mg IntraVENous Q24H    sodium chloride flush  5-40 mL IntraVENous 2 times per day    amLODIPine  5 mg Oral Nightly    atorvastatin  20 mg Oral Daily    insulin lispro  0-18 Units SubCUTAneous TID WC    insulin lispro  0-9 Units SubCUTAneous Nightly       IV Infusions (if any):   phenylephrine (KEARA-SYNEPHRINE) 50mg/250mL infusion Stopped (01/26/22 1820)    propofol 8.059 mcg/kg/min (01/28/22 0610)    midazolam (VERSED) 1 mg/mL in D5W infusion 6 mg/hr (01/30/22 0600)    fentaNYL 75 mcg/hr (01/30/22 0434)    norepinephrine Stopped (01/24/22 1046)    dexmedetomidine (PRECEDEX) IV infusion 0.7 mcg/kg/hr (01/30/22 0600)    sodium chloride      dextrose         Diagnostics:   Telemetry: sinus rhythm  Echo. Summary  Moderate left ventricular hypertrophy  Global left ventricular systolic function is normal  Estimated ejection fraction is 55 % . Left atrium is mildly dilated. Right atrium is mildly dilated . No significant valvular regurgitation or stenosis seen. No pericardial effusion seen. Labs:   CBC:   Recent Labs     01/29/22  0600 01/30/22  0515   WBC 9.3 11.9*   HGB 15.0 14.2   HCT 49.5 47.7    125*     BMP:   Recent Labs     01/29/22  1530 01/30/22  0515    138   K 5.6* 5.8*   CO2 24 23   BUN 92* 91*   CREATININE 1.46* 1.47*   LABGLOM 47* 46*   GLUCOSE 80 211*     BNP: No results for input(s): BNP in the last 72 hours. PT/INR: No results for input(s): PROTIME, INR in the last 72 hours. APTT:No results for input(s): APTT in the last 72 hours. CARDIAC ENZYMES:No results for input(s): CKTOTAL, CKMB, CKMBINDEX, TROPONINI in the last 72 hours. FASTING LIPID PANEL:  Lab Results   Component Value Date    HDL 30 11/03/2021    TRIG 181 11/03/2021     LIVER PROFILE:No results for input(s): AST, ALT, LABALBU in the last 72 hours.     ASSESSMENT:    Patient Active Problem List   Diagnosis    Biventricular CHF (congestive heart failure) (HCC)    CKD (chronic kidney disease) stage 3, GFR 30-59 ml/min (HCC)    Essential hypertension    Blurred vision, right eye    Type 2 diabetes mellitus treated with insulin (HCC)    Atherosclerotic plaque    Weakness on left side of face    Carotid stenosis, asymptomatic, bilateral    New onset seizure ([facility] [address]    COVID-19    Acute on chronic systolic CHF (congestive heart failure) (HCC)    [facility] with hypoxia ([facility])    BLANCHE (acute kidney injury) ([facility] [address]    COPD exacerbation ([facility])    Hypokalemia    Hypomagnesemia    Palliative care encounter    Goals of care, counseling/discussion    DNR (do not resuscitate) discussion    ACP (advance care planning)    Constipation    Abdominal pain, generalized       PLAN:    1. Paroxysmal atrial fibrillation  2. covid 19 pneumonia  3. Respiratory failure  4. Chronic kidney disease  Continue supportive therapy. No cardiac intervention at this time  Will continue to monitor. Please see orders. Discussed with nursing.     Louise Chappell MD, MD

## 2022-01-31 NOTE — PROGRESS NOTES
Pulmonary Critical Care Progress Note       Patient seen for the follow up of COVID-19 pneumonia, acute hypoxic respiratory failure. Subjective:  He sedated, intubated on ventilator, FiO2 75%/PEEP 16 tidal volume 550 respiratory rate of 32/min  He is off amiodarone drip. He is sedated with Precedex and has been and is back on Versed and fentanyl drip. He is tolerating tube feeds at 30 mL an hour. He is labored. He is having fevers he had increased potassium and received insulin Kayexalate and lokelma  per nephrology. He had MOM and no BM. He still has low-grade fevers. He has increased blood sugars       Examination:  Vitals: BP (!) 132/50   Pulse 96   Temp 99.1 °F (37.3 °C) (Axillary)   Resp 30   Ht 5' 10.98\" (1.803 m)   Wt 234 lb 12.8 oz (106.5 kg)   SpO2 93%   BMI 32.77 kg/m²   General appearance: Sedated, intubated on ventilator  Neck: No JVD  Lungs: Bilateral decreased breath sound no crackles or wheeze  Heart: regular rate and rhythm, S1, S2 normal, no gallop  Abdomen: Soft, non tender, + BS  Extremities: no cyanosis or clubbing. No significant edema erythema lower extremity right upper extremity    LABs:  CBC:   Recent Labs     01/30/22  0515 01/31/22  0530   WBC 11.9* 10.7   HGB 14.2 13.7   HCT 47.7 45.6   * 171     BMP:   Recent Labs     01/30/22  0515 01/30/22  1140 01/31/22  0530 01/31/22  1050     --  140  --    K 5.8*   < > 5.9* 5.4*   CO2 23  --  23  --    BUN 91*  --  93*  --    CREATININE 1.47*  --  1.54*  --    LABGLOM 46*  --  44*  --    GLUCOSE 211*  --  419*  --     < > = values in this interval not displayed.      ABG:  Lab Results   Component Value Date    SXA6WOR NOT REPORTED 01/31/2022    FIO2 75.0 01/31/2022       Lab Results   Component Value Date    POCPH 7.369 01/31/2022    POCPCO2 45.4 01/31/2022    POCPO2 104.4 01/31/2022    POCHCO3 26.2 01/31/2022    PBEA 0 01/31/2022    DAO0INF NOT REPORTED 01/31/2022    SUTE5GAI 98 01/31/2022    FIO2 75.0 01/31/2022 Results for Mario Alberto Zendejas (MRN [id number]) as of 1/31/2022 15:47   Ref. Range 1/18/2022 05:54 1/23/2022 11:18 1/30/2022 12:50   D-Dimer, Julita Barton Latest Ref Range: 0.00 - 0.59 mg/L FEU 1.53 (H) 5.84 (H) 2.96 (H)     Radiology:  Chest x-ray 1/31  Minimal improvement in bilateral infiltrates.  Continued follow-up   recommended                   Xray abdomen 1/31/21    Nonspecific abdomen with apparent moderate stool burden      CT abdomen pelvis 1/28    1.  Overall mildly limited study due to motion and lack of contrast.       2.  No evidence of bowel obstruction.  Moderate stool burden is noted,   possibly related to constipation.  Enteric tube is in place with distal tip   in the proximal stomach.       3.  Tiny amount of free fluid scattered in the abdomen, including   presacral/perirectal fluid, most likely related to volume overload.  No   definite findings of rectal wall thickening or fecal impaction.       4.  small  pleural effusions, left basilar consolidation, and bibasilar   interstitial thickening, increased when compared to 01/16/2022.               Impression:  · Acute on chronic hypoxic respiratory failure  · COVID-19 pneumonia  · COPD/pulmonary emphysema  · Acute left thalamic infarct/CVA  · Left lower lobe opacity versus nodule  · Pulmonary edema  · Acute renal insufficiency/hyperkalemia  · Elevated D-dimer, decreased platelets  · Systolic heart failure, s/p AICD placement  · BLANCHE on CKD  · Diabetes mellitus    Recommendations:    · Continue vent support, wean FiO2 as tolerated.   85% FiO2, PEEP at 16  · Fentanyl drip  · Versed for sedation  · Sedation vacation as tolerated  · Discontinue Ativan 0.5 3 times daily  · DuoNeb by nebulizer  · Pulmicort 0.5 every 12 by nebulizer  · Watch off of Luke-Synephrine  · Airborne isolation  · LFT Lipase lactate   · Blood cultures x2   · IV Decadron 6 mg daily  · Lasix 20 mg IV twice daily  · Start insulin drip   · Follow-up potassium/Lokelma per nephrology  · Not a candidate for Actemra/baricitinib stopped secondary to cytopenias  · Finished vanco and cefepime  · Neurology on consult/monitor mental status/decrease sedation  · Tube feeds  · Dulcolax suppository  · Poor prognosis  · Palliative care consult  · DVT prophylaxis with low molecular weight heparin      Sil Cristobal MD, [facility]  Pulmonary Critical Care and Sleep Medicine,  [facility] [place]: [phone]  CC: 35 minutes

## 2022-01-31 NOTE — PROGRESS NOTES
Comprehensive Nutrition Assessment    Type and Reason for Visit:  Reassess    Nutrition Recommendations/Plan:   1. Continue NPO status. 2. Continue current tube feeding, See below current nutrition therapies for details. 3. Monitor tube feeding tolerance, labs, BM status, intubation, sedation and fluid status,     Nutrition Assessment:  Patient remains intubated and sedated. Potassium increased from 5.7 to 5.8, increasing since 1/24. On Lokelma, per Nephrology note give Kayexalate 45 gm today and rechack potassium. Recent potassium 5.4. No BM for sevaeral days, noted physician ordered dulcolax subpository and lactulose. On tube feeding with Renal formula, at goal and tolerated. Will continue current tube feeding. Malnutrition Assessment:  Malnutrition Status: At risk for malnutrition (Comment)    Context:  Chronic Illness     Findings of the 6 clinical characteristics of malnutrition:  Energy Intake:  No significant decrease in energy intake  Weight Loss:  1 - Mild weight loss (specify amount and time period) (Fluid retention/loss -- 178# to 255# to now 228 #)     Body Fat Loss:  Unable to assess     Muscle Mass Loss:  Unable to assess    Fluid Accumulation:  1 - Mild Extremities   Strength:  Not Performed    Estimated Daily Nutrient Needs:  Energy (kcal):  4248-5535 kcal (MSJ 1.2, 1.3 factor); Weight Used for Energy Requirements:  Current     Protein (g):   gm protein (0.8-1.0 g/kg) d/t renal status; Weight Used for Protein Requirements:  Ideal          Nutrition Related Findings:  Edema: +2 pitting BLE, RUE, +1 pitting LUE. Hypoactive bowel sounds. Laxatives. Labs and meds reviewed. , .       Wounds:  None       Current Nutrition Therapies:    Diet NPO  ADULT TUBE FEEDING; Orogastric; Renal Formula; Continuous; 10; Yes; 10; Q 4 hours; 50; 30; Q 4 hours   · Current Tube Feeding (TF) Orders:   · Feeding Route: Orogastric  · Formula: Renal Formula  · Schedule: Continuous  · Current TF & Flush Orders Provides: 2160 kcal and 97 gm of protein  · Goal TF & Flush Orders Provides: 1708-7968 kcal and  gm of protein    Anthropometric Measures:  · Height: 5' 10.98\" (180.3 cm)  · Current Body Weight: 234 lb 12.8 oz (106.5 kg) (Edema present)   · Admission Body Weight: 184 lb 15.5 oz (83.9 kg)    · Usual Body Weight: 255 lb (115.7 kg) (Fluid retention)     · Ideal Body Weight: 172 lbs; % Ideal Body Weight 125.6 %   · BMI: 32.8  · Adjusted Body Weight:  ; No Adjustment   · BMI Categories: Obese Class 1 (BMI 30.0-34. 9)       Nutrition Diagnosis:   · Inadequate protein-energy intake related to inadequate protein-energy intake,impaired respiratory function as evidenced by NPO or clear liquid status due to medical condition,intubation,nutrition support - enteral nutrition    Nutrition Interventions:   Food and/or Nutrient Delivery:  Continue NPO,Continue Current Tube Feeding  Nutrition Education/Counseling:  Education not indicated   Coordination of Nutrition Care:  Continue to monitor while inpatient    Goals:  EN support to provide >75% of estimated nutritional needs       Nutrition Monitoring and Evaluation:   Behavioral-Environmental Outcomes:  None Identified   Food/Nutrient Intake Outcomes:  Enteral Nutrition Intake/Tolerance  Physical Signs/Symptoms Outcomes:  Biochemical Data,Fluid Status or Edema,Skin,Weight,GI Status     Discharge Planning:     Too soon to determine       Some areas of assessment may be incomplete due to COVID-19 precautions    Paige Neal RD, LD  Office phone [phone]

## 2022-01-31 NOTE — PROGRESS NOTES
[facility].,   Section of Cardiology  Progress Note      Date:  1/31/2022  Patient: Koko Melchor  Admission:  1/16/2022 11:51 AM  Admit DX: Hypokalemia [E87.6]  Hypomagnesemia [E83.42]  COPD exacerbation (HCC) [J44.1]  BLANCHE (acute kidney injury) ([facility] [address] [N17.9]  Acute respiratory failure with hypoxia (HCC) [J96.01]  Acute on chronic systolic CHF (congestive heart failure) ([facility] [address] [I50.23]  COVID [U07.1]  COVID-19 [U07.1]  Age:  64 y.o., 1945                           LOS: 15 days     Reason for evaluation:   Paroxysmal atrial fibrillation. covid 19 pneumonia  Atrial  bigeminy      SUBJECTIVE:     The patient was seen and examined. Notes and labs reviewed. Nursing said he was in ventricular bigeminy yesterday but I reviewed the strips and it is actually premature atrial complexes. This of course affects his bp. He is still intubated and sedated and on pressors. .  OBJECTIVE:    BP (!) 159/54   Pulse 82   Temp 99.2 °F (37.3 °C) (Oral)   Resp (!) 32   Ht 5' 10.98\" (1.803 m)   Wt 234 lb 12.8 oz (106.5 kg)   SpO2 96%   BMI 32.77 kg/m²     Intake/Output Summary (Last 24 hours) at 1/31/2022 1006  Last data filed at 1/31/2022 0600  Gross per 24 hour   Intake 1652.28 ml   Output 3075 ml   Net -1422.72 ml       EXAM:   Intubated and sedated  Chest. Bilateral crepitations  Cvs. s1s2 no murmurs  Abd. distended  Ext. Bilateral resolving edema. Pedal pulses decreased.     Current Inpatient Medications:   sodium zirconium cyclosilicate  15 g Oral Daily    insulin lispro  15 Units SubCUTAneous Once    magnesium hydroxide  30 mL Per NG tube Daily    insulin glargine  9 Units SubCUTAneous Daily    bisacodyl  10 mg Rectal Daily    furosemide  20 mg IntraVENous BID    pantoprazole  40 mg IntraVENous Daily    And    sodium chloride (PF)  10 mL IntraVENous Daily    aspirin  324 mg Oral Daily    levETIRAcetam  500 mg Oral BID    chlorhexidine  15 mL Mouth/Throat BID    enoxaparin  30 mg SubCUTAneous BID  QUEtiapine  50 mg Oral Once    ipratropium-albuterol  1 ampule Inhalation 4x daily    budesonide  0.5 mg Nebulization BID    metoprolol succinate  50 mg Oral Nightly    dexamethasone  6 mg IntraVENous Q24H    sodium chloride flush  5-40 mL IntraVENous 2 times per day    amLODIPine  5 mg Oral Nightly    atorvastatin  20 mg Oral Daily    insulin lispro  0-18 Units SubCUTAneous TID WC    insulin lispro  0-9 Units SubCUTAneous Nightly       IV Infusions (if any):   phenylephrine (KEARA-SYNEPHRINE) 50mg/250mL infusion Stopped (01/26/22 1820)    propofol 8.059 mcg/kg/min (01/28/22 0610)    midazolam (VERSED) 1 mg/mL in D5W infusion 9 mg/hr (01/31/22 0600)    fentaNYL 100 mcg/hr (01/31/22 0306)    norepinephrine Stopped (01/24/22 1046)    sodium chloride      dextrose         Diagnostics:   Telemetry: sinus rhythm  Echo. Summary  Moderate left ventricular hypertrophy  Global left ventricular systolic function is normal  Estimated ejection fraction is 55 % . Left atrium is mildly dilated. Right atrium is mildly dilated . No significant valvular regurgitation or stenosis seen. No pericardial effusion seen. Labs:   CBC:   Recent Labs     01/30/22  0515 01/31/22  0530   WBC 11.9* 10.7   HGB 14.2 13.7   HCT 47.7 45.6   * 171     BMP:   Recent Labs     01/30/22  0515 01/30/22  0515 01/30/22  1140 01/31/22  0530     --   --  140   K 5.8*   < > 5.4* 5.9*   CO2 23  --   --  23   BUN 91*  --   --  93*   CREATININE 1.47*  --   --  1.54*   LABGLOM 46*  --   --  44*   GLUCOSE 211*  --   --  419*    < > = values in this interval not displayed. BNP: No results for input(s): BNP in the last 72 hours. PT/INR: No results for input(s): PROTIME, INR in the last 72 hours. APTT:No results for input(s): APTT in the last 72 hours. CARDIAC ENZYMES:No results for input(s): CKTOTAL, CKMB, CKMBINDEX, TROPONINI in the last 72 hours.   FASTING LIPID PANEL:  Lab Results   Component Value Date    HDL 30 11/03/2021    TRIG 181 11/03/2021     LIVER PROFILE:No results for input(s): AST, ALT, LABALBU in the last 72 hours. ASSESSMENT:    Patient Active Problem List   Diagnosis    Biventricular CHF (congestive heart failure) (HCC)    CKD (chronic kidney disease) stage 3, GFR 30-59 ml/min (HCC)    Essential hypertension    Blurred vision, right eye    Type 2 diabetes mellitus treated with insulin ([address]    Atherosclerotic plaque    Weakness on left side of face    Carotid stenosis, asymptomatic, bilateral    New onset seizure ([address]    COVID-19    Acute on chronic systolic CHF (congestive heart failure) (HCC)    Acute respiratory failure with hypoxia ([address]    BLANCHE (acute kidney injury) ([address]    COPD exacerbation ([address]    Hypokalemia    Hypomagnesemia    Palliative care encounter    Goals of care, counseling/discussion    DNR (do not resuscitate) discussion    ACP (advance care planning)    Constipation    Abdominal pain, generalized       PLAN:    1. Paroxysmal atrial fibrillation now in sinus rhythm with atrial bigeminy  2. covid 19 pneumonia  3. Respiratory failure  4. Chronic kidney disease  Continue supportive therapy. No cardiac intervention at this time  Will continue to monitor atrial arrhythmia for now. If it sustains or affects bp then amiodarone drip will be started. No indication to treat at this time. Please see orders. Discussed with nursing.     Chema Barron MD, MD

## 2022-01-31 NOTE — PROGRESS NOTES
Physical Therapy  DATE: 2022    NAME: Heri Guo  MRN: [id number]   : 1945    Patient not seen this date for Physical Therapy due to:      [] Cancel by RN or physician due to:    [] Hemodialysis    [] Critical Lab Value Level     [] Blood transfusion in progress    [] Acute or unstable cardiovascular status   _MAP < 55 or more than >115  _HR < 40 or > 130    [x] Acute or unstable pulmonary status   -FiO2 > 60%   _RR < 5 or >40    _O2 sats < 85%    [] Strict Bedrest    [] Off Unit for surgery or procedure    [] Off Unit for testing       [] Pending imaging to R/O fracture    [] Refusal by Patient      [] Other      [] PT being discontinued at this time. Patient independent. No further needs. [] PT being discontinued at this time as the patient has been transferred to hospice care. No further needs.       [facility], PT

## 2022-01-31 NOTE — PROGRESS NOTES
Progress note  [facility]    Adult Hospitalist      Name: William Krueger  MRN: [id number]     Acct: [de-identified]  Room: [facility]-01    Admit Date: 1/16/2022 11:51 AM  PCP: Iam Florez MD    Primary Problem  Active Problems:    COVID-19    Acute on chronic systolic CHF (congestive heart failure) (HCC)    Acute respiratory failure with hypoxia (HCC)    BLANCHE (acute kidney injury) ([facility]    COPD exacerbation ([facility]    Hypokalemia    Hypomagnesemia    Palliative care encounter    Goals of care, counseling/discussion    DNR (do not resuscitate) discussion    ACP (advance care planning)    Constipation    Abdominal pain, generalized  Resolved Problems:    * No resolved hospital problems. *        Assesment:   Acute congestive heart failure, diastolic   [id number]   Viral pneumonia secondary to above  Acute respiratory failure with hypoxia intubated on 1/23/2022  Hyperkalemia  Paroxysmal atrial fibrillation  Essential hypertension  Mixed hyperlipidemia  Diabetes mellitus type 2  History of seizure disorder  History of CKD stage III, presently normal renal function  Lung mass?   Leukopenia  Polycythemia  Thrombocytopenia  Anxiety disorder  Recent past h/o lacunar infarct left thalamus   Altered mental status/agitation  Endotracheal intubation secondary to hypoxia dated 1/23/2022  Supraventricular tachycardia/elevated troponin  Fever      Plan:   Admit patient to intermediate floor  Mechanical ventilation sedation as per critical care, patient continues to require 75% FiO2 with PEEP of 16  Telemetry  Check vitals closely  CBC BMP daily    Echocardiogram  Cardiology consult  IV pressors    Amiodarone  Cardiology following    IV Decadron  Patient completed a course of cefepime for 7 days  Bronchodilators  Pulmicort  Patient is deemed not a candidate for Actemra or baricitinib secondary to cytopenia  Infectious disease consult  Pulmonology consult    Continue Keppra  Continue amlodipine, atorvastatin  Continue aspirin  Hold insulin-resume at 6 units today  Patient seen by gastroenterology started on daily GlycoLax, also continued on suppositories, GlycoLax if not helping can be changed to milk of magnesia  Consult nephrology appreciated managing patient hyperkalemia, patient is continued on Lokelma  Increase free water if okay with nephrology  Continue other medication as below.     Scheduled Meds:   sodium zirconium cyclosilicate  15 g Oral Daily    insulin glargine  9 Units SubCUTAneous Daily    lactulose  20 g Oral BID    sodium polystyrene        bisacodyl  10 mg Rectal Daily    furosemide  20 mg IntraVENous BID    pantoprazole  40 mg IntraVENous Daily    And    sodium chloride (PF)  10 mL IntraVENous Daily    aspirin  324 mg Oral Daily    levETIRAcetam  500 mg Oral BID    chlorhexidine  15 mL Mouth/Throat BID    enoxaparin  30 mg SubCUTAneous BID    QUEtiapine  50 mg Oral Once    ipratropium-albuterol  1 ampule Inhalation 4x daily    budesonide  0.5 mg Nebulization BID    metoprolol succinate  50 mg Oral Nightly    dexamethasone  6 mg IntraVENous Q24H    sodium chloride flush  5-40 mL IntraVENous 2 times per day    amLODIPine  5 mg Oral Nightly    atorvastatin  20 mg Oral Daily    insulin lispro  0-18 Units SubCUTAneous TID WC    insulin lispro  0-9 Units SubCUTAneous Nightly     Continuous Infusions:   phenylephrine (KEARA-SYNEPHRINE) 50mg/250mL infusion Stopped (01/26/22 1820)    propofol 8.059 mcg/kg/min (01/28/22 0610)    midazolam (VERSED) 1 mg/mL in D5W infusion 9 mg/hr (01/31/22 0600)    fentaNYL 100 mcg/hr (01/31/22 0306)    norepinephrine Stopped (01/24/22 1046)    sodium chloride      dextrose       PRN Meds:  LORazepam, 1 mg, Q4H PRN  dextrose bolus (hypoglycemia), 125 mL, PRN   Or  dextrose bolus (hypoglycemia), 250 mL, PRN  sodium chloride flush, 10 mL, PRN  sodium chloride, 25 mL, PRN  ondansetron, 4 mg, Q8H PRN   Or  ondansetron, 4 mg, Q6H PRN  acetaminophen, 650 mg, Q6H PRN Or  acetaminophen, 650 mg, Q6H PRN  glucose, 15 g, PRN  glucagon (rDNA), 1 mg, PRN  dextrose, 100 mL/hr, PRN  hydrALAZINE, 10 mg, Q6H PRN        Chief Complaint:     Chief Complaint   Patient presents with    Leg Swelling     bilateral, has CHF, on diuretic, EMT saw pt , assisted pt into car    Fever    Other     low SPO2         History of Present Illness:   Patient seen examined at bedside  Patient remains in medical ICU  Patient remains intubated sedated  Patient has been having fever overnight low-grade  Patient requiring 75% of FiO2 with PEEP of 16  Off amiodarone drip  Remains sedated  Tolerating tube feeding  Has been constipated  Note edema over extremities  Blood glucose increasing         Review of systems:  Unable to conduct ROS secondary to patient being intubated      Initial HPI  Gela Choudhury is a 64 y.o.  male who presents with Leg Swelling (bilateral, has CHF, on diuretic, EMT saw pt , assisted pt into car), Fever, and Other (low SPO2)  This is a 47-year-old gentleman admitted via ER, come to ER with complaint of having shortness of breath, patient has medical history significant for COPD patient with history of cardiac failure: Patient noted that he has been having increasing swelling in his lower extremity and becoming more short of breath, further patient also been having some body aches and sweats, patient patient testing in the emergency room showed that he is positive for COVID-19 also noticed to have an elevated BNP, imaging concerning for fluid overload, admitted for further regiment    I have personally reviewed the past medical history, past surgical history, medications, social history, and family history, and summarized in the note.     Review of Systems:       Unable to conduct ROS secondary to patient being intubated      Past Medical History:     Past Medical History:   Diagnosis Date    Arthritis     Atherosclerotic plaque 7/28/2019    Cervical disc disease     degenerative disc disease    Diabetes mellitus ([facility]     type 2    History of blood transfusion     Hx of blood clots     left leg    Hyperlipidemia     Neuropathy     Right kidney mass     \"urologist is watching\"    Snores     Type 2 diabetes mellitus treated with insulin ([facility] [address] 7/28/2019        Past Surgical History:     Past Surgical History:   Procedure Laterality Date    ABDOMINAL AORTIC ANEURYSM REPAIR  2005    CARPAL TUNNEL RELEASE Bilateral     CERVICAL SPINE SURGERY      COLONOSCOPY      benign polyps removed    INGUINAL HERNIA REPAIR Right     SD REPAIR INCISIONAL HERNIA,REDUCIBLE N/A 5/10/2017    HERNIA VENTRAL REPAIR WITH MESH  performed by Lita Apley, DO at [address] [place] [address] Road  05/10/2017    with mesh        Medications Prior to Admission:       Prior to Admission medications    Medication Sig Start Date End Date Taking? Authorizing Provider   rosuvastatin (CRESTOR) 40 MG tablet Take 40 mg by mouth every evening   Yes Historical Provider, MD   insulin NPH (HUMULIN N;NOVOLIN N) 100 UNIT/ML injection vial Inject 20 Units into the skin 2 times daily (before meals)   Yes Historical Provider, MD   levETIRAcetam (KEPPRA) 500 MG tablet Take 1 tablet by mouth 2 times daily 11/3/21  Yes Hudson Gomez MD   venlafaxine (EFFEXOR XR) 150 MG extended release capsule Take 1 capsule by mouth daily (with breakfast) 7/29/19  Yes Arabella S [place]   vitamin B-1 (THIAMINE) 100 MG tablet Take 100 mg by mouth daily   Yes Historical Provider, MD   ferrous sulfate 325 (65 Fe) MG tablet Take 325 mg by mouth daily (with breakfast)   Yes Historical Provider, MD   ALPRAZolam (XANAX) 0.5 MG tablet Take 0.5 mg by mouth three times daily.    Yes Historical Provider, MD   furosemide (LASIX) 40 MG tablet Take 1 tablet by mouth 2 times daily 12/11/18  Yes Royce Evans MD   tiotropium (SPIRIVA RESPIMAT) 2.5 MCG/ACT AERS inhaler Inhale 2 puffs into the lungs daily    Yes Historical Provider, MD   albuterol sulfate  (90 Base) MCG/ACT inhaler Inhale 2 puffs into the lungs every 6 hours as needed for Wheezing or Shortness of Breath   Yes Historical Provider, MD   metoprolol succinate (TOPROL XL) 50 MG extended release tablet Take 50 mg by mouth daily   Yes Historical Provider, MD   Multiple Vitamins-Minerals (MULTIVITAMIN ADULT) TABS Take 1 tablet by mouth daily   Yes Historical Provider, MD   vitamin D (CHOLECALCIFEROL) 1000 UNIT TABS tablet Take 1,000 Units by mouth daily   Yes Historical Provider, MD   fluticasone (FLONASE) 50 MCG/ACT nasal spray 1 spray by Each Nare route daily as needed for Rhinitis   Yes Historical Provider, MD   aspirin 325 MG EC tablet Take 325 mg by mouth daily    Yes Historical Provider, MD   Omega-3 Fatty Acids (FISH OIL) 1000 MG CAPS Take 1 capsule by mouth daily    Yes Historical Provider, MD   amLODIPine (NORVASC) 5 MG tablet Take 5 mg by mouth nightly    Yes Historical Provider, MD   mirtazapine (REMERON) 45 MG tablet Take 45 mg by mouth nightly   Yes Historical Provider, MD        Allergies: Barbiturates, Codeine, and Sulfa antibiotics    Social History:     Tobacco:    reports that he has quit smoking. He has never used smokeless tobacco.  Alcohol:      reports no history of alcohol use. Drug Use:  reports no history of drug use.     Family History:     Family History   Problem Relation Age of Onset    Ovarian Cancer Sister     Ovarian Cancer Sister          Physical Exam:     Vitals:  BP (!) 132/50   Pulse 96   Temp 99.1 °F (37.3 °C) (Axillary)   Resp 30   Ht 5' 10.98\" (1.803 m)   Wt 234 lb 12.8 oz (106.5 kg)   SpO2 93%   BMI 32.77 kg/m²   Temp (24hrs), Av.9 °F (37.2 °C), Min:98.5 °F (36.9 °C), Max:99.2 °F (37.3 °C)      General appearance -on ventilator  Mental status -sedated  Head - normocephalic and atraumatic  Eyes - conjunctiva clear  Ears -external ears normal  Nose - no drainage noted  Mouth - mucous membranes moist  Neck - supple, no carotid bruits, thyroid not palpable  Chest -basal crackles with decreased air entry bilaterally to auscultation, increased effort  Heart - normal rate, regular rhythm, no murmur  Abdomen - soft, nontender, nondistended, bowel sounds present all four quadrants, no masses, hepatomegaly or splenomegaly  Neurological -sedated on vent  Extremities - extremity edema, lower distal extremity discoloration    Skin - no gross lesions, rashes, or induration noted        Data:     Labs:    Hematology:  Recent Labs     01/29/22  0600 01/30/22  0515 01/30/22  1250 01/31/22  0530   WBC 9.3 11.9*  --  10.7   RBC 5.95* 5.75  --  5.45   HGB 15.0 14.2  --  13.7   HCT 49.5 47.7  --  45.6   MCV 83.2 83.0  --  83.7   MCH 25.2 24.7*  --  25.1*   MCHC 30.3 29.8  --  30.0   RDW 15.1* 15.6*  --  15.8*    125*  --  171   MPV 12.4 11.7  --  12.6   DDIMER  --   --  2.96*  --      Chemistry:  Recent Labs     01/29/22  1530 01/29/22  1530 01/30/22  0515 01/30/22  0515 01/30/22  1140 01/31/22  0530 01/31/22  1050     --  138  --   --  140  --    K 5.6*   < > 5.8*   < > 5.4* 5.9* 5.4*     --  106  --   --  107  --    CO2 24  --  23  --   --  23  --    GLUCOSE 80  --  211*  --   --  419*  --    BUN 92*  --  91*  --   --  93*  --    CREATININE 1.46*  --  1.47*  --   --  1.54*  --    ANIONGAP 9  --  9  --   --  10  --    LABGLOM 47*  --  46*  --   --  44*  --    GFRAA 57*  --  56*  --   --  53*  --    CALCIUM 8.7  --  9.3  --   --  9.4  --     < > = values in this interval not displayed.      Recent Labs     01/30/22  1128 01/30/22  1622 01/30/22  1900 01/31/22  0705 01/31/22  1102 01/31/22  1516   POCGLU 213* 227* 267* 353* 318* 299*       Lab Results   Component Value Date    INR 1.0 01/17/2022    INR 1.0 11/03/2021    INR 1.0 07/27/2019    PROTIME 13.3 01/17/2022    PROTIME 11.1 11/03/2021    PROTIME 10.5 07/27/2019       Lab Results   Component Value Date/Time    SPECIAL NOT REPORTED 01/28/2022 10:30 AM Lab Results   Component Value Date/Time    CULTURE NORMAL RESPIRATORY PO HEAVY GROWTH 01/28/2022 10:30 AM       Lab Results   Component Value Date    POCPH 7.369 01/31/2022    POCPCO2 45.4 01/31/2022    POCPO2 104.4 01/31/2022    POCHCO3 26.2 01/31/2022    NBEA NOT REPORTED 01/31/2022    PBEA 0 01/31/2022    EXY0HCG NOT REPORTED 01/31/2022    HJAF8ZZL 98 01/31/2022    FIO2 75.0 01/31/2022       Radiology:    XR CHEST 1 VIEW    Result Date: 1/16/2022  Hypoinflated lungs. Cardiomegaly again seen, when compared to the previous study performed 07/27/2019. Diffuse, increased interstitial markings throughout both lungs, some of which can be seen on the prior study may represent baseline chronic interstitial lung changes. The increased prominence on this exam could be secondary to the suboptimal inspiratory effort. The presence of pulmonary vascular congestion/mild CHF or bilateral interstitial pneumonia cannot be excluded. Clinical correlation is recommended. CT CHEST PULMONARY EMBOLISM W CONTRAST    Result Date: 1/16/2022  No evidence of pulmonary embolism. Compared to 2008, worsening underlying emphysema, with worsening subacute or acute interstitial lung disease, best seen left upper lobe; differential includes interstitial pneumonia or pneumonitis superimposed on emphysema, DIP, HP, and other interstitial pneumonias as well as infectious or inflammatory process superimposed on emphysema disease. Findings are not typical for COVID-19 pneumonia, but an element of the latter should be considered. Thickening and distortion left major fissure with ill-defined mass-like focus left lower lobe. The latter could also be infectious or inflammatory, although neoplasm should be excluded. Underlying worsening emphysema. Nodular densities, best seen right upper lobe. Small pleural effusions, slightly larger on the left. See recommendations below.  Mild mediastinal and left hilar adenopathy; see recommendations below. Worsening now moderate-severe pulmonary artery hypertension. Mild cardiomegaly. Stable mild dilatation ascending thoracic aorta. Additional unchanged or incidental findings, as above. RECOMMENDATIONS: Clinical correlation and short-term follow-up CT chest in 1-4 months depending upon the clinical presentation/course. All radiological studies reviewed                Code Status:  DNR-CCA    Electronically signed by Zaki Singh MD on 1/31/2022 at 3:45 PM     Copy sent to Dr. Derek Deleon MD    This note was created with the assistance of a speech-recognition program.  Although the intention is to generate a document that actually reflects the content of the visit, no guarantees can be provided that every mistake has been identified and corrected by editing. Note was updated later by me after  physical examination and  completion of the assessment.

## 2022-01-31 NOTE — PROGRESS NOTES
[facility]  Progress Note    Coleman Giang  MRN: [id number]  Date: 1/31/2022  LOS: 13     Reason for F/U :   COVID-19 virus infection    Impression :   COVID-19 virus infection tested + 1/16/2022  Acute respiratory failure currently ventilator dependent  Intubated 1/23/2022  Emphysema with worsening changes on imaging  Worsening acute interstitial lung disease and clinically not typical of COVID-19 virus infection  Worsening moderate to severe pulmonary artery hypertension  Bilateral lower extremity edema likely related to above  Leukopenia and thrombocytopenia  Lacunar l infarct on the left thalamus  Fever to 103 degrees 1/23/2022  Acute kidney injury    Recommendations:   COVID-19 therapy: The patient is on Decadron 6 mg IV daily   The patient has been started on baricitinib 1/17/2022 but it was discontinued 1/18/2022 due to cytopenias. Actemra had been considered but again due to the cytopenias and thrombocytopenia this has been held. Antimicrobial therapy: The patient received Rocephin 1000 mg and azithromycin 500 mg on 1/16/2022  The patient received a dose of levofloxacin 500 mg on 1/18/2020. Patient started on cefepime and vancomycin 1/23/2022 plan completed a 7-day course of cefepime on 1/28/2022  Vancomycin discontinued due to acute kidney injury 1/24/2022    The patient remains ventilator dependent and on some sedation. Clinically remained stable off antimicrobial therapy. Continue supportive care    Infection Control Recommendations:   Droplet plus precautions    Discharge Planning:   Estimated Length of IV antimicrobials: 5 to 7 days  Patient will need Midline Catheter Insertion/ PICC line Insertion: No  Patient will need: Home IV , [facility],  SNF,  LTAC: Undetermined  Patient willneed outpatient wound care: No    Medical Decision making / Summary of Stay:   Coleman Giang is a 64y.o.-year-old male who was initially admitted on 1/16/2022.    Arely Luis has a history of diabetes mellitus type 2, essential hypertension, seizure disorder, chronic kidney disease stage III, hyperlipidemia among other medical problems.     The patient reports that about 1 to 2 months ago he had his diabetes medications changed and since that time he has noticed increasing swelling in his legs, hardness in the legs, difficulty ambulating, and weight gain from 178 to 255 pounds over the last 1 to 2 months. He did not report any subjective fevers, chills, cough or shortness of breath. He denies any loss of smell or change in taste. No abdominal pain nausea vomiting or diarrhea. The patient does report that he has home oxygen that he uses as needed at home and he reports that he hardly needs it. He is vaccinated did receive the J&J vaccine but has not yet received a booster dose.     The patient presented to the emergency department and was found to have leukopenia with a white blood cell count of 2.7 and thrombocytopenia with a platelet count of 208. Creatinine slightly elevated at 1.31 and proBNP is elevated at 9327. Chest x-ray showed hyperinflated lungs with cardiomegaly seen and diffuse increased interstitial markings in both lungs which may represent baseline chronic interstitial lung changes given that these findings were present before. A CT of the chest was done with IV contrast that showed no evidence of pulmonary embolism and compared to 2008 there is worsening underlying emphysema with worsening subacute or acute interstitial lung disease best seen in the left upper lobe. Findings were not felt typical for COVID-19 virus pneumonia. There was thickening and distortion of the left major fissure with an ill-defined masslike focus in the left lower lobe.   There is worsening moderate to severe pulmonary artery hypertension     COVID testing was positive and I was asked to evaluate and help with COVID-19 virus infection    The patient did have a CT scan of the abdomen pelvis done 1/28/2022 which was a limited study with no evidence of bowel obstruction and moderate stool burden noted felt related to constipation. Tiny amount of free fluid scattered in the abdomen, moderate pleural effusions and left basilar consolidation and basilar interstitial thickening increased from 2022      Current evaluation:2022    BP (!) 159/54   Pulse 82   Temp 99.2 °F (37.3 °C) (Oral)   Resp (!) 32   Ht 5' 10.98\" (1.803 m)   Wt 234 lb 12.8 oz (106.5 kg)   SpO2 96%   BMI 32.77 kg/m²     Temperature Range: Temp: 99.2 °F (37.3 °C) Temp  Av °F (37.2 °C)  Min: 98.5 °F (36.9 °C)  Max: 99.9 °F (37.7 °C)   The patient is seen and evaluated at bedside he is on the ventilator 75% FiO2 and 16 of PEEP  The patient is on fentanyl and Versed for sedation. Remains off pressors. Review of Systems   Unable to perform ROS: Intubated       Physical Examination :     Physical Exam  Constitutional:       Appearance: He is well-developed. Interventions: He is sedated and intubated. HENT:      Head: Normocephalic and atraumatic. Cardiovascular:      Rate and Rhythm: Normal rate. Heart sounds: Normal heart sounds. No friction rub. No gallop. Pulmonary:      Effort: Pulmonary effort is normal. He is intubated. Breath sounds: Normal breath sounds. No wheezing. Abdominal:      General: Bowel sounds are normal.      Palpations: Abdomen is soft. There is no mass. Tenderness: There is no abdominal tenderness. Musculoskeletal:         General: Swelling (Right upper extremity swelling) present. Cervical back: Neck supple. Lymphadenopathy:      Cervical: No cervical adenopathy. Skin:     General: Skin is warm and dry. Comments:  There is erythroderma in the legs and feet bilaterally which is not infectious         Laboratory data:   I have independently reviewed the followinglabs:  CBC with Differential:   Recent Labs     22  0515 22  0530   WBC 11.9* 10.7   HGB 14.2 13.7 HCT 47.7 45.6   * 171   LYMPHOPCT 3* 2*   MONOPCT 6 5     BMP:   Recent Labs     01/30/22  0515 01/30/22  0515 01/30/22  1140 01/31/22  0530     --   --  140   K 5.8*   < > 5.4* 5.9*     --   --  107   CO2 23  --   --  23   BUN 91*  --   --  93*   CREATININE 1.47*  --   --  1.54*    < > = values in this interval not displayed. Hepatic Function Panel:   No results for input(s): PROT, LABALBU, BILIDIR, IBILI, BILITOT, ALKPHOS, ALT, AST in the last 72 hours. Lab Results   Component Value Date    PROCAL 0.24 01/23/2022    PROCAL 0.11 01/19/2022    PROCAL 0.11 01/16/2022     Lab Results   Component Value Date    CRP 23.5 01/16/2022    CRP 2.0 07/27/2019     Lab Results   Component Value Date    SEDRATE 3 01/16/2022         Lab Results   Component Value Date    DDIMER 2.96 01/30/2022    DDIMER 5.84 01/23/2022    DDIMER 1.53 01/18/2022    DDIMER 1.73 01/16/2022    DDIMER 1.18 12/07/2018     Lab Results   Component Value Date    FERRITIN 569 01/16/2022    FERRITIN 50 07/23/2019    FERRITIN 18 07/08/2019     Lab Results   Component Value Date     01/16/2022     Lab Results   Component Value Date    FIBRINOGEN 402 01/16/2022       Results in Past 30 Days  Result Component Current Result Ref Range Previous Result Ref Range   SARS-CoV-2, Rapid DETECTED (A) (1/16/2022) Not Detected Not in Time Range      Lab Results   Component Value Date    COVID19 DETECTED 01/16/2022    COVID19 Not Detected 11/02/2021       No results for input(s): VANCOTROUGH in the last 72 hours. Imaging Studies:   ONE XRAY VIEW OF THE CHEST 1/29/2022 6:33 am  Impression   Trace effusions with scattered infiltrates.       Support tubes as described above.          CT OF THE ABDOMEN AND PELVIS WITHOUT CONTRAST 1/28/2022 8:34 am    Impression   1.  Overall mildly limited study due to motion and lack of contrast.       2.  No evidence of bowel obstruction.  Moderate stool burden is noted,   possibly related to Blood 1 [[id number]] Collected: 01/23/22 1759   Order Status: Completed Specimen: Blood Updated: 01/28/22 2110    Specimen Description . BLOOD    Special Requests ART LINE 10ML    Culture NO GROWTH 5 DAYS     Culture, Blood 2 [[id number]] Collected: 01/21/22 0742   Order Status: Completed Specimen: Blood Updated: 01/26/22 1001    Specimen Description . BLOOD    Special Requests LEFT AC 20ML    Culture NO GROWTH 5 DAYS   Culture, Blood 1 [[id number]] Collected: 01/21/22 0750   Order Status: Completed Specimen: Blood Updated: 01/26/22 1000    Specimen Description . BLOOD    Special Requests LEFT HAND 5ML    Culture NO GROWTH 5 DAYS   MRSA DNA Probe, Nasal [[id number]] Collected: 01/23/22 2258   Order Status: Completed Specimen: Nasal Updated: 01/25/22 1038    Specimen Description . NASAL SWAB    MRSA, DNA, Nasal NEGATIVE    Comment: NEGATIVE:  MRSA DNA not detected by nucleic acid amplification.                                                     Results should be used as an adjunct to nosocomial control efforts to identify patients   needing enhanced precautions.     The test is not intended to identify patients with staphylococcal infections.  Results   should not be used to guide or monitor treatment for MRSA infections. Culture, Blood 1 [[id number]] Collected: 01/16/22 1220   Order Status: Completed Specimen: Blood Updated: 01/21/22 1521    Specimen Description . BLOOD    Special Requests LAC 12ML    Culture NO GROWTH 5 DAYS   Culture, Blood 1 [[id number]] Collected: 01/16/22 1205   Order Status: Completed Specimen: Blood Updated: 01/21/22 1521    Specimen Description . BLOOD    Special Requests RAC 12ML    Culture NO GROWTH 5 DAYS       Culture, Urine [[id number]] Collected: 01/16/22 1315   Order Status: Completed Specimen: Urine, clean catch Updated: 01/17/22 2128    Specimen Description . CLEAN CATCH URINE    Special Requests NOT REPORTED    Culture NO SIGNIFICANT GROWTH       COVID-19, Rapid [[id number]] (Abnormal) Collected: 01/16/22 1230   Order Status: Completed Specimen: Nasopharyngeal Swab Updated: 01/16/22 1314    Specimen Description . NASOPHARYNGEAL SWAB    SARS-CoV-2, Rapid DETECTED Abnormal     Comment:        Rapid NAAT: The specimen is POSITIVE for SARS-Cov-2, the novel coronavirus associated with   COVID-19.         This test has been authorized by the FDA under an Emergency Use Authorization (EUA) for use   by authorized laboratories.         The ID NOW COVID-19 assay is designed to detect the virus that causes COVID-19 in patients   with signs and symptoms of infection who are suspected of COVID-19. An individual without symptoms of COVID-19 and who is not shedding SARS-CoV-2 virus would   expect to have a negative (not detected) result in this assay. Fact sheet for Healthcare Providers: [email or web address]   Fact sheet for Patients: [email or web address]           Methodology: Isothermal Nucleic Acid Amplification         Results reported to the appropriate Health Department         Flu A/B Ag Detection [[id number]] Collected: 01/16/22 1230   Order Status: Completed Specimen: Nasopharyngeal Swab Updated: 01/16/22 1313    Specimen Description . NASOPHARYNGEAL SWAB    Special Requests NOT REPORTED    Direct Exam NEGATIVE for Influenza A + B antigens.                                PCR testing to confirm this result is available upon request. Shirley Maddox will be saved in the laboratory for 7 days.  Please call [phone] if PCR testing is indicated.      Medications:      sodium zirconium cyclosilicate  15 g Oral Daily    insulin lispro  15 Units SubCUTAneous Once    magnesium hydroxide  30 mL Per NG tube Daily    insulin glargine  9 Units SubCUTAneous Daily    bisacodyl  10 mg Rectal Daily    furosemide  20 mg IntraVENous BID    pantoprazole  40 mg IntraVENous Daily    And    sodium chloride (PF)  10 mL IntraVENous Daily    aspirin  324 mg Oral Daily  levETIRAcetam  500 mg Oral BID    chlorhexidine  15 mL Mouth/Throat BID    enoxaparin  30 mg SubCUTAneous BID    QUEtiapine  50 mg Oral Once    ipratropium-albuterol  1 ampule Inhalation 4x daily    budesonide  0.5 mg Nebulization BID    metoprolol succinate  50 mg Oral Nightly    dexamethasone  6 mg IntraVENous Q24H    sodium chloride flush  5-40 mL IntraVENous 2 times per day    amLODIPine  5 mg Oral Nightly    atorvastatin  20 mg Oral Daily    insulin lispro  0-18 Units SubCUTAneous TID WC    insulin lispro  0-9 Units SubCUTAneous Nightly           [facility]  Toro Quintana MD  [employer] messaging  OFFICE: [phone]      Electronically signed by Toro Quintana MD on 1/31/2022 at 10:06 AM  Thank you for allowing us to participate in the care of this patient. Please call with questions. This note iscreated with the assistance of a speech recognition program.  While intending to generate a document that actually reflects the content of the visit, the document can still have some errors including those of syntax andsound a like substitutions which may escape proof reading. In such instances, actual meaning can be extrapolated by contextual diversion.

## 2022-01-31 NOTE — PLAN OF CARE
Nutrition Problem #1: Inadequate protein-energy intake  Intervention: Food and/or Nutrient Delivery: Continue NPO,Continue Current Tube Feeding  Nutritional Goals: EN support to provide >75% of estimated nutritional needs

## 2022-01-31 NOTE — PROGRESS NOTES
[facility]    Gastroenterology Daily Progress Note      Patient:   Emilee Wheeler   :    1945   [facility]  Date:     2022  Consultant:   HERB Dominguez - CNP, CNP      SUBJECTIVE  64 y.o. male admitted 2022 with Hypokalemia [E87.6]  Hypomagnesemia [E83.42]  COPD exacerbation ([facility] [address] [J44.1]  BLANCHE (acute kidney injury) ([facility] [address] [N17.9]  Acute respiratory failure with hypoxia (HCC) [J96.01]  Acute on chronic systolic CHF (congestive heart failure) ([facility] [address] [I50.23]  COVID [U07.1]  COVID-19 [U07.1] and seen for abdominal distention. The pt was discussed with the primary rn. No BM yesterday or today. kub this am shows stool but no obstruction. Per rn he has hypoactive BS in the upper quadrants and normal BS in the lower quadrants still has some abdominal distention. He is tolerating tube feeding.  .        OBJECTIVE  Scheduled Meds:   sodium zirconium cyclosilicate  15 g Oral Daily    insulin lispro  15 Units SubCUTAneous Once    polyethylene glycol  17 g Per NG tube Daily    bisacodyl  10 mg Rectal Daily    furosemide  20 mg IntraVENous BID    [Held by provider] insulin glargine  45 Units SubCUTAneous BID    pantoprazole  40 mg IntraVENous Daily    And    sodium chloride (PF)  10 mL IntraVENous Daily    aspirin  324 mg Oral Daily    levETIRAcetam  500 mg Oral BID    chlorhexidine  15 mL Mouth/Throat BID    enoxaparin  30 mg SubCUTAneous BID    QUEtiapine  50 mg Oral Once    ipratropium-albuterol  1 ampule Inhalation 4x daily    budesonide  0.5 mg Nebulization BID    metoprolol succinate  50 mg Oral Nightly    dexamethasone  6 mg IntraVENous Q24H    sodium chloride flush  5-40 mL IntraVENous 2 times per day    amLODIPine  5 mg Oral Nightly    atorvastatin  20 mg Oral Daily    insulin lispro  0-18 Units SubCUTAneous TID WC    insulin lispro  0-9 Units SubCUTAneous Nightly       Vital Signs:  BP (!) 159/54   Pulse 82   Temp 99.2 °F (37.3 °C) (Oral)   Resp (!) 32   Ht 5' 10.98\" (1.803 m)   Wt 234 lb 12.8 oz (106.5 kg)   SpO2 96%   BMI 32.77 kg/m²      Physical Exam:   . Baudilio Dawn Due to the current efforts to prevent transmission of COVID-19 and also the need to preserve PPE for other caregivers, a face-to-face encounter with the patient was not performed. That being said, all relevant records and diagnostic tests were reviewed, including laboratory results and imaging. Please reference any relevant documentation elsewhere. Care will be coordinated with the primary service. Lab and Imaging Review     CBC  Recent Labs     01/29/22  0600 01/30/22  0515 01/31/22  0530   WBC 9.3 11.9* 10.7   HGB 15.0 14.2 13.7   HCT 49.5 47.7 45.6   MCV 83.2 83.0 83.7    125* 171       BMP  Recent Labs     01/29/22  1530 01/29/22  1530 01/30/22  0515 01/30/22  1140 01/31/22  0530     --  138  --  140   K 5.6*   < > 5.8* 5.4* 5.9*     --  106  --  107   CO2 24  --  23  --  23   BUN 92*  --  91*  --  93*   CREATININE 1.46*  --  1.47*  --  1.54*   GLUCOSE 80  --  211*  --  419*   CALCIUM 8.7  --  9.3  --  9.4    < > = values in this interval not displayed. FINDINGS:ct abd 1/28/22   Lower Chest: There are small to moderate bilateral pleural effusions,   increased from previous CT dated 01/16/2022.  Significant respiratory motion   limits assessment of the lung bases.  There is increased consolidation at the   left base.  There is increased interstitial thickening at both lung bases,   possibly accentuated by the degree of motion.       Organs: Limited unenhanced liver and gallbladder are within normal limits. Motion is limiting assessment of the upper abdominal organs as well.  Spleen,   pancreas, and adrenal glands are unremarkable.       Kidneys are symmetric in size and attenuation.  No renal or ureteral   calculus.  No hydronephrosis or perinephric inflammation.       GI/Bowel: Enteric tube is in place with distal tip in the proximal stomach.    There is a moderate amount of fecal material in the large bowel.  No abnormal   bowel distention or focal pericolonic inflammation.  No free air.  There is a   tiny amount of free fluid adjacent to the liver, in the pericolic gutters,   and in the pelvis. Rachel Mettle is a tiny amount of presacral fluid and perirectal   stranding.  No significant rectal wall thickening appreciated.       Pelvis: Roberts catheter is in place.  Urinary bladder is nondistended.  There   is no pelvic lymphadenopathy.       Peritoneum/Retroperitoneum: No evidence of abdominal aortic aneurysm. Abdominal aortic stent graft noted.  No retroperitoneal or mesenteric   lymphadenopathy.       Bones/Soft Tissues:  There is mild scattered body wall edema.  There are   extensive degenerative changes in the lumbar spine.  No acute or suspicious   osseous abnormality.           Impression   1.  Overall mildly limited study due to motion and lack of contrast.       2.  No evidence of bowel obstruction.  Moderate stool burden is noted,   possibly related to constipation.  Enteric tube is in place with distal tip   in the proximal stomach.       3.  Tiny amount of free fluid scattered in the abdomen, including   presacral/perirectal fluid, most likely related to volume overload.  No   definite findings of rectal wall thickening or fecal impaction.       4.  Moderate pleural effusions, left basilar consolidation, and bibasilar   interstitial thickening, increased when compared to 01/16/2022.       RECOMMENDATIONS:   Unavailable             FINDINGS:kub 1/31/22   Nasogastric tube terminates in the mid stomach.  There is some density within   the stomach which could represent oral contrast.  Nonspecific bowel gas   pattern without identified obstruction.  There does appear to be a moderate   colonic stool burden.  Metallic densities from previous ventral hernia   repair.  Degenerative changes are scattered in the spine.  Catheter in the   midline of the lower pelvis likely represents a Roberts catheter.           Impression   Nonspecific abdomen with apparent moderate stool burden.             ASSESSMENT/plan  Abdominal distention, no BM overnight  -continue dulcolax supp daily and lactulose  -d/w md will order two tap water enemas      2. covid 19 pneumonia with acute hypoxic failure     3. BLANCHE with hyperkalemia     4.acute left thalamic CVA        This plan was formulated in collaboration with Dr. Sierra Osborne . Electronically signed by: HERB Sharp CNP on 1/31/2022 at 8:33 AM     Attending Physician Statement  I have discussed the care of William Krueger and   I have examined the patient myselft independently, and taken ros and hpi , including pertinent history and exam findings,  with the author of this note . I have reviewed the key elements of all parts of the encounter with the nurse practitioner/resident.     I agree with the assessment, plan and orders as documented by the above health care provider       Enemas  Dulcolax suppository  Lactulose      Electronically signed by Ashanti Salinas MD

## 2022-01-31 NOTE — FLOWSHEET NOTE
Writer rounding on the unit. Patient in droplet isolation and intubated. Patient is unresponsive. No family is present. Writer provides prayer from the hallway. Patient does not respond. Spiritual care will follow as needed. 01/31/22 1315   Encounter Summary   Services provided to: Patient not available   Referral/Consult From: [address] [place] [address]; Children   Continue Visiting   (1/31/22 COVID-19/Intubated)   Complexity of Encounter Low   Length of Encounter 15 minutes   Routine   Type Follow up   Assessment Unable to respond   Intervention Prayer   Outcome Did not respond

## 2022-01-31 NOTE — PROGRESS NOTES
.  Nephrology  Progress Note    Reason for Consult: Hyperkalemia    Requesting Physician:  Sahra Guallpa MD    INTERVAL HISTORY:  K 5.9 this am.  Creatinine 1.54. Remains intubated. HISTORY OF PRESENT ILLNESS:    The patient is a 64 y.o. male who presented with to the hospital for worsening shortness of breath ad and worsening lower extremity edema on 1/16. He had diffuse body aches and sweats and a dry cough. He tested positive for COVID-19. He has steadily declined since admission. On 1/18 a CT head found New lacunar infarct left thalamus. He had to be intubated on 1/23. He has a significant past medical history of COPD, hyperlipidemia, Type 2 DM, Right kidney mass, and abdominal aortic aneurysm repair in 2005. His potassium has been steadily rising since 1/24/22. The most current Potassium level is 5.8. His creatine is improved to 1.71. This information was retrieved through chart review and nursing. Patient was unable to provide information due to current status on mechanical ventilation and sedation. Review Of Systems:  Unable to obtain. Patient sedated on ventilator.     Past Medical History:   Diagnosis Date    Arthritis     Atherosclerotic plaque 7/28/2019    Cervical disc disease     degenerative disc disease    Diabetes mellitus ([facility] [address]     type 2    History of blood transfusion     Hx of blood clots     left leg    Hyperlipidemia     Neuropathy     Right kidney mass     \"urologist is watching\"    Snores     Type 2 diabetes mellitus treated with insulin ([facility] [address] 7/28/2019       Past Surgical History:   Procedure Laterality Date    ABDOMINAL AORTIC ANEURYSM REPAIR  2005    CARPAL TUNNEL RELEASE Bilateral     CERVICAL SPINE SURGERY      COLONOSCOPY      benign polyps removed    INGUINAL HERNIA REPAIR Right     IN REPAIR INCISIONAL HERNIA,REDUCIBLE N/A 5/10/2017    HERNIA VENTRAL REPAIR WITH MESH  performed by Wilfred Gann DO at [address] [place] [address]  VENTRAL HERNIA REPAIR  05/10/2017    with mesh       Prior to Admission medications    Medication Sig Start Date End Date Taking? Authorizing Provider   rosuvastatin (CRESTOR) 40 MG tablet Take 40 mg by mouth every evening   Yes Historical Provider, MD   insulin NPH (HUMULIN N;NOVOLIN N) 100 UNIT/ML injection vial Inject 20 Units into the skin 2 times daily (before meals)   Yes Historical Provider, MD   levETIRAcetam (KEPPRA) 500 MG tablet Take 1 tablet by mouth 2 times daily 11/3/21  Yes Nicole Chavez MD   venlafaxine (EFFEXOR XR) 150 MG extended release capsule Take 1 capsule by mouth daily (with breakfast) 7/29/19  Yes Arabella Kiser   vitamin B-1 (THIAMINE) 100 MG tablet Take 100 mg by mouth daily   Yes Historical Provider, MD   ferrous sulfate 325 (65 Fe) MG tablet Take 325 mg by mouth daily (with breakfast)   Yes Historical Provider, MD   ALPRAZolam (XANAX) 0.5 MG tablet Take 0.5 mg by mouth three times daily.    Yes Historical Provider, MD   furosemide (LASIX) 40 MG tablet Take 1 tablet by mouth 2 times daily 12/11/18  Yes Teo Evans MD   tiotropium (SPIRIVA RESPIMAT) 2.5 MCG/ACT AERS inhaler Inhale 2 puffs into the lungs daily    Yes Historical Provider, MD   albuterol sulfate  (90 Base) MCG/ACT inhaler Inhale 2 puffs into the lungs every 6 hours as needed for Wheezing or Shortness of Breath   Yes Historical Provider, MD   metoprolol succinate (TOPROL XL) 50 MG extended release tablet Take 50 mg by mouth daily   Yes Historical Provider, MD   Multiple Vitamins-Minerals (MULTIVITAMIN ADULT) TABS Take 1 tablet by mouth daily   Yes Historical Provider, MD   vitamin D (CHOLECALCIFEROL) 1000 UNIT TABS tablet Take 1,000 Units by mouth daily   Yes Historical Provider, MD   fluticasone (FLONASE) 50 MCG/ACT nasal spray 1 spray by Each Nare route daily as needed for Rhinitis   Yes Historical Provider, MD   aspirin 325 MG EC tablet Take 325 mg by mouth daily    Yes Historical Provider, MD   Omega-3 Fatty Acids (FISH OIL) 1000 MG CAPS Take 1 capsule by mouth daily    Yes Historical Provider, MD   amLODIPine (NORVASC) 5 MG tablet Take 5 mg by mouth nightly    Yes Historical Provider, MD   mirtazapine (REMERON) 45 MG tablet Take 45 mg by mouth nightly   Yes Historical Provider, MD       Scheduled Meds:   sodium zirconium cyclosilicate  15 g Oral Daily    insulin lispro  15 Units SubCUTAneous Once    magnesium hydroxide  30 mL Per NG tube Daily    insulin glargine  9 Units SubCUTAneous Daily    bisacodyl  10 mg Rectal Daily    furosemide  20 mg IntraVENous BID    pantoprazole  40 mg IntraVENous Daily    And    sodium chloride (PF)  10 mL IntraVENous Daily    aspirin  324 mg Oral Daily    levETIRAcetam  500 mg Oral BID    chlorhexidine  15 mL Mouth/Throat BID    enoxaparin  30 mg SubCUTAneous BID    QUEtiapine  50 mg Oral Once    ipratropium-albuterol  1 ampule Inhalation 4x daily    budesonide  0.5 mg Nebulization BID    metoprolol succinate  50 mg Oral Nightly    dexamethasone  6 mg IntraVENous Q24H    sodium chloride flush  5-40 mL IntraVENous 2 times per day    amLODIPine  5 mg Oral Nightly    atorvastatin  20 mg Oral Daily    insulin lispro  0-18 Units SubCUTAneous TID WC    insulin lispro  0-9 Units SubCUTAneous Nightly     Continuous Infusions:   phenylephrine (KEARA-SYNEPHRINE) 50mg/250mL infusion Stopped (01/26/22 1820)    propofol 8.059 mcg/kg/min (01/28/22 0610)    midazolam (VERSED) 1 mg/mL in D5W infusion 9 mg/hr (01/31/22 0600)    fentaNYL 100 mcg/hr (01/31/22 0306)    norepinephrine Stopped (01/24/22 1046)    sodium chloride      dextrose       PRN Meds:LORazepam, dextrose bolus (hypoglycemia) **OR** dextrose bolus (hypoglycemia), sodium chloride flush, sodium chloride, potassium chloride **OR** potassium alternative oral replacement **OR** potassium chloride, magnesium sulfate, ondansetron **OR** ondansetron, magnesium hydroxide, acetaminophen **OR** acetaminophen, glucose, glucagon (rDNA), dextrose, hydrALAZINE    Allergies   Allergen Reactions    Barbiturates Anxiety     Other reaction(s): Unknown    Codeine Palpitations    Sulfa Antibiotics Rash     Other reaction(s): Unknown       Social History     Socioeconomic History    Marital status:      Spouse name: Not on file    Number of children: Not on file    Years of education: Not on file    Highest education level: Not on file   Occupational History    Not on file   Tobacco Use    Smoking status: Former Smoker    Smokeless tobacco: Never Used    Tobacco comment: quit 30 years ago   Substance and Sexual Activity    Alcohol use: No    Drug use: No    Sexual activity: Not on file   Other Topics Concern    Not on file   Social History Narrative    Not on file     Social Determinants of Health     Financial Resource Strain:     Difficulty of Paying Living Expenses: Not on file   Food Insecurity:     Worried About Running Out of Food in the Last Year: Not on file    Dona of Food in the Last Year: Not on file   Transportation Needs:     Lack of Transportation (Medical): Not on file    Lack of Transportation (Non-Medical):  Not on file   Physical Activity:     Days of Exercise per Week: Not on file    Minutes of Exercise per Session: Not on file   Stress:     Feeling of Stress : Not on file   Social Connections:     Frequency of Communication with Friends and Family: Not on file    Frequency of Social Gatherings with Friends and Family: Not on file    Attends Evangelical Services: Not on file    Active Member of Clubs or Organizations: Not on file    Attends Club or Organization Meetings: Not on file    Marital Status: Not on file   Intimate Partner Violence:     Fear of Current or Ex-Partner: Not on file    Emotionally Abused: Not on file    Physically Abused: Not on file    Sexually Abused: Not on file   Housing Stability:     Unable to Pay for Housing in the Last Year: Not on file    Number of Places Lived in the Last Year: Not on file    Unstable Housing in the Last Year: Not on file       Family History   Problem Relation Age of Onset    Ovarian Cancer Sister     Ovarian Cancer Sister          Physical Exam:  Vitals:    01/31/22 0500 01/31/22 0517 01/31/22 0600 01/31/22 0818   BP: (!) 165/48  (!) 159/54    Pulse: 82  81 82   Resp: 8  (!) 32 (!) 32   Temp:       TempSrc:       SpO2:    96%   Weight:  234 lb 12.8 oz (106.5 kg)     Height:         I/O last 3 completed shifts: In: 2719.3 [I.V.:1092.3; NG/GT:1627]  Out: 9622 [Urine:3625]    Deferred due to [address] isolation.     Data:  CBC:   Lab Results   Component Value Date    WBC 10.7 01/31/2022    HGB 13.7 01/31/2022    HCT 45.6 01/31/2022    MCV 83.7 01/31/2022     01/31/2022     BMP:    Lab Results   Component Value Date     01/31/2022     01/30/2022     01/29/2022    K 5.9 (H) 01/31/2022    K 5.4 (H) 01/30/2022    K 5.8 (H) 01/30/2022     01/31/2022     01/30/2022     01/29/2022    CO2 23 01/31/2022    CO2 23 01/30/2022    CO2 24 01/29/2022    BUN 93 (HH) 01/31/2022    BUN 91 (HH) 01/30/2022    BUN 92 (HH) 01/29/2022    CREATININE 1.54 (H) 01/31/2022    CREATININE 1.47 (H) 01/30/2022    CREATININE 1.46 (H) 01/29/2022    GLUCOSE 419 (HH) 01/31/2022    GLUCOSE 211 (H) 01/30/2022    GLUCOSE 80 01/29/2022     CMP:   Lab Results   Component Value Date     01/31/2022    K 5.9 01/31/2022     01/31/2022    CO2 23 01/31/2022    BUN 93 01/31/2022    CREATININE 1.54 01/31/2022    GLUCOSE 419 01/31/2022    CALCIUM 9.4 01/31/2022    PROT 6.5 01/16/2022    LABALBU 3.8 01/16/2022    BILITOT 0.68 01/16/2022    ALKPHOS 123 01/16/2022    AST 45 01/16/2022    ALT 24 01/16/2022      Hepatic:   Lab Results   Component Value Date    AST 45 (H) 01/16/2022    AST 16 11/03/2021    AST 30 11/02/2021    ALT 24 01/16/2022    ALT 13 11/03/2021    ALT 22 11/02/2021    BILITOT 0.68 01/16/2022    BILITOT 0.56 11/03/2021    BILITOT 0.31 11/02/2021    ALKPHOS 123 01/16/2022    ALKPHOS 90 11/03/2021    ALKPHOS 140 (H) 11/02/2021     BNP: No results found for: BNP  Lipids:   Lab Results   Component Value Date    CHOL 129 11/03/2021    HDL 30 (L) 11/03/2021     INR:   Lab Results   Component Value Date    INR 1.0 01/17/2022    INR 1.0 11/03/2021    INR 1.0 07/27/2019     PTH: No results found for: PTH  Phosphorus:    Lab Results   Component Value Date    PHOS 2.9 01/28/2022     Ionized Calcium: No results found for: IONCA  Magnesium:   Lab Results   Component Value Date    MG 2.6 01/28/2022     Albumin:   Lab Results   Component Value Date    LABALBU 3.8 01/16/2022     Last 3 CK, CKMB, Troponin: @LABRCNT(CKTOTAL:3,CKMB:3,TROPONINI:3)       URINE:)No results found for: Nicholas Rocha    Radiology:  Reviewed. Assessment:  BLANCHE, hemodynamically related, Cr is better  CKD 3A  Hyperkalemia  Diabetes Mellitis   COVID19 Pneumonia  COPD      Plan:  Lokelma given this morning. Will give Kayexalate 45 gm today. Recheck K level later today. On Lokelma 15 g daily. Continue Lasix 20 mg IV BID. On Renal tube feed  No recent BM. Will start him on Lactulose. Avoid hypotension, nephrotoxic drugs, Lovenox, Fleets enema and IV contrast exposure. Follow up labs ordered for later today and daily in AM.     Please do not hesitate to call with questions. We will follow with you. Electronically signed by Davis Lopez MD  on 1/31/2022 at 10:52 AM   [facility].   Ph: [phone]

## 2022-02-01 NOTE — PROGRESS NOTES
[facility]   Section of Cardiology  Progress Note      Date:  2/1/2022  Patient: Aliya Tamez  Admission:  1/16/2022 11:51 AM  Admit DX: Hypokalemia [E87.6]  Hypomagnesemia [E83.42]  COPD exacerbation (HCC) [J44.1]  BLANCHE (acute kidney injury) ([facility] [address] [N17.9]  Acute respiratory failure with hypoxia (HCC) [J96.01]  Acute on chronic systolic CHF (congestive heart failure) ([facility] [address] [I50.23]  COVID [U07.1]  COVID-19 [U07.1]  Age:  64 y.o., 1945                           LOS: 16 days     Reason for evaluation:   Paroxysmal atrial fibrillation. covid 19 pneumonia  Atrial  bigeminy      SUBJECTIVE:     The patient was seen and examined. Notes and labs reviewed. Patient went back into atrial fibrillation with rapid ventricular response. He is still intubated and sedated. .  OBJECTIVE:    BP (!) 140/68   Pulse 124   Temp 100.1 °F (37.8 °C) (Oral)   Resp 30   Ht 5' 10.98\" (1.803 m)   Wt 234 lb 12.8 oz (106.5 kg)   SpO2 94%   BMI 32.77 kg/m²     Intake/Output Summary (Last 24 hours) at 2/1/2022 1606  Last data filed at 2/1/2022 1200  Gross per 24 hour   Intake 486.63 ml   Output 2400 ml   Net -1913.37 ml       EXAM:   Intubated and sedated  Chest. Bilateral crepitations  Cvs. s1s2 no murmurs,irregularly irregular  Abd. distended  Ext. Bilateral resolving edema. Pedal pulses decreased.     Current Inpatient Medications:   polyethylene glycol  17 g Per NG tube Daily    sodium phosphate IVPB  10 mmol IntraVENous Once    sodium zirconium cyclosilicate  15 g Oral Daily    insulin glargine  9 Units SubCUTAneous Daily    lactulose  20 g Oral BID    bisacodyl  10 mg Rectal Daily    furosemide  20 mg IntraVENous BID    pantoprazole  40 mg IntraVENous Daily    And    sodium chloride (PF)  10 mL IntraVENous Daily    aspirin  324 mg Oral Daily    levETIRAcetam  500 mg Oral BID    chlorhexidine  15 mL Mouth/Throat BID    enoxaparin  30 mg SubCUTAneous BID    QUEtiapine  50 mg Oral Once    ipratropium-albuterol  1 ampule Inhalation 4x daily    budesonide  0.5 mg Nebulization BID    metoprolol succinate  50 mg Oral Nightly    dexamethasone  6 mg IntraVENous Q24H    sodium chloride flush  5-40 mL IntraVENous 2 times per day    amLODIPine  5 mg Oral Nightly    atorvastatin  20 mg Oral Daily       IV Infusions (if any):   insulin (HUMAN R) non-weight based infusion 5.1 Units/hr (02/01/22 1518)    phenylephrine (KEARA-SYNEPHRINE) 50mg/250mL infusion Stopped (01/26/22 1820)    propofol 8.059 mcg/kg/min (01/28/22 0610)    midazolam (VERSED) 1 mg/mL in D5W infusion 9 mg/hr (02/01/22 0911)    fentaNYL 100 mcg/hr (02/01/22 0651)    norepinephrine Stopped (01/24/22 1046)    sodium chloride      dextrose         Diagnostics:   Telemetry: atrial fibrillation with rapid ventricular response. Echo. Summary  Moderate left ventricular hypertrophy  Global left ventricular systolic function is normal  Estimated ejection fraction is 55 % . Left atrium is mildly dilated. Right atrium is mildly dilated . No significant valvular regurgitation or stenosis seen. No pericardial effusion seen. Labs:   CBC:   Recent Labs     01/31/22  0530 02/01/22  0332   WBC 10.7 11.6*   HGB 13.7 13.9   HCT 45.6 47.0    203     BMP:   Recent Labs     02/01/22  0332 02/01/22  1504    142   K 4.9 5.6*   CO2 28 29   BUN 88* 90*   CREATININE 1.35* 1.28*   LABGLOM 51* 54*   GLUCOSE 347* 127*     BNP: No results for input(s): BNP in the last 72 hours. PT/INR: No results for input(s): PROTIME, INR in the last 72 hours. APTT:No results for input(s): APTT in the last 72 hours. CARDIAC ENZYMES:No results for input(s): CKTOTAL, CKMB, CKMBINDEX, TROPONINI in the last 72 hours.   FASTING LIPID PANEL:  Lab Results   Component Value Date    HDL 30 11/03/2021    TRIG 181 11/03/2021     LIVER PROFILE:  Recent Labs     01/31/22  1635 02/01/22  1504   AST 28 46*   ALT 26 43*   LABALBU 2.4* 2.5*       ASSESSMENT:    Patient Active Problem List   Diagnosis    Biventricular CHF (congestive heart failure) ([facility]    CKD (chronic kidney disease) stage 3, GFR 30-59 ml/min ([facility])    Essential hypertension    Blurred vision, right eye    Type 2 diabetes mellitus treated with insulin ([facility] [address]    Atherosclerotic plaque    Weakness on left side of face    Carotid stenosis, asymptomatic, bilateral    New onset seizure ([address]    COVID-19    Acute on chronic systolic CHF (congestive heart failure) ([facility])    Acute respiratory failure with hypoxia ([facility] [address]    BLANCHE (acute kidney injury) ([facility] [address]    COPD exacerbation ([address]    Hypokalemia    Hypomagnesemia    Palliative care encounter    Goals of care, counseling/discussion    DNR (do not resuscitate) discussion    ACP (advance care planning)    Constipation    Abdominal pain, generalized       PLAN:    1. Paroxysmal atrial fibrillation now in sinus rhythm with atrial bigeminy  2. covid 19 pneumonia  3. Respiratory failure  4. Chronic kidney disease  Continue supportive therapy. No cardiac intervention at this time  Start amiodarone 150mg bolus and then run a drip at 1mg/min. Please see orders. Discussed with nursing.     Louise Chappell MD, MD

## 2022-02-01 NOTE — PROGRESS NOTES
Pulmonary Critical Care Progress Note       Patient seen for the follow up of COVID-19 pneumonia, acute hypoxic respiratory failure. Subjective:  He sedated, intubated on ventilator, FiO2 75%/PEEP 16 tidal volume 550 respiratory rate of 32/min  He had increased heart rate yesterday but is still off amiodarone drip. He is sedated with Precedex and has been and is back on Versed and fentanyl drip. He is tolerating tube feeds at 30 mL an hour. He is labored. He is having fevers    Examination:  Vitals: BP (!) 140/68   Pulse 124   Temp 100.1 °F (37.8 °C) (Oral)   Resp 30   Ht 5' 10.98\" (1.803 m)   Wt 234 lb 12.8 oz (106.5 kg)   SpO2 94%   BMI 32.77 kg/m²   General appearance: Sedated, intubated on ventilator  Neck: No JVD  Lungs: Bilateral decreased breath sound no crackles or wheeze  Heart: regular rate and rhythm, S1, S2 normal, no gallop  Abdomen: Soft, non tender, + BS  Extremities: no cyanosis or clubbing.  significant edema right upper extremity erythema lower extremity right upper extremity    LABs:  CBC:   Recent Labs     01/31/22  0530 02/01/22 0332   WBC 10.7 11.6*   HGB 13.7 13.9   HCT 45.6 47.0    203     BMP:   Recent Labs     02/01/22  0332 02/01/22  1504    142   K 4.9 5.6*   CO2 28 29   BUN 88* 90*   CREATININE 1.35* 1.28*   LABGLOM 51* 54*   GLUCOSE 347* 127*     ABG:  Lab Results   Component Value Date    AYD3YXJ NOT REPORTED 02/01/2022    FIO2 75.0 02/01/2022       Lab Results   Component Value Date    POCPH 7.342 02/01/2022    POCPCO2 59.1 02/01/2022    POCPO2 87.0 02/01/2022    POCHCO3 32.0 02/01/2022    PBEA 4 02/01/2022    ROT9QNE NOT REPORTED 02/01/2022    TQGE9SDQ 96 02/01/2022    FIO2 75.0 02/01/2022   Results for Omaira Lund (MRN [id number]) as of 1/31/2022 15:47   Ref.  Range 1/18/2022 05:54 1/23/2022 11:18 1/30/2022 12:50   D-Dimer, Quant Latest Ref Range: 0.00 - 0.59 mg/L FEU 1.53 (H) 5.84 (H) 2.96 (H)   Results for Omaira Lund (MRN [id number]) as of 2/1/2022 16:10   Ref. Range 2/1/2022 15:04   Pro-BNP Latest Ref Range: <300 pg/mL 12,242 (H)     Radiology:  Chest x-ray 2/1    Subtle bilateral infiltrates consistent with pneumonia.       Trace effusions                 Xray abdomen 1/31/21    Nonspecific abdomen with apparent moderate stool burden      CT abdomen pelvis 1/28    1.  Overall mildly limited study due to motion and lack of contrast.       2.  No evidence of bowel obstruction.  Moderate stool burden is noted,   possibly related to constipation.  Enteric tube is in place with distal tip   in the proximal stomach.       3.  Tiny amount of free fluid scattered in the abdomen, including   presacral/perirectal fluid, most likely related to volume overload.  No   definite findings of rectal wall thickening or fecal impaction.       4.  small  pleural effusions, left basilar consolidation, and bibasilar   interstitial thickening, increased when compared to 01/16/2022.               Impression:  · Acute on chronic hypoxic respiratory failure  · COVID-19 pneumonia  · COPD/pulmonary emphysema  · Acute left thalamic infarct/CVA  · Left lower lobe opacity versus nodule  · Pulmonary edema  · Acute renal insufficiency/hyperkalemia  · Elevated D-dimer, decreased platelets  · Systolic heart failure, s/p AICD placement  · BLANCHE on CKD  · Diabetes mellitus    Recommendations:    · Continue vent support, wean FiO2 as tolerated.   75% FiO2, PEEP at 16  · Fentanyl drip  · Versed for sedation  · Sedation vacation as tolerated  · Stop DuoNeb by nebulizer  · Xopenex nebs  · Pulmicort 0.5 every 12 by nebulizer  · consider veletri   · Watch off of Luke-Synephrine  · Airborne isolation  · HR control /Discussed with cardiology Dr Abdullahi Negron / Amiodarone drip   · IV Decadron 6 mg daily  · Lasix 20 mg IV twice daily per nephrology ; consider increasing diuresis  · pro calcitonin /off antibiotics per ID   ·  insulin drip   · Follow-up potassium/Lokelma per nephrology  · Not a candidate for Actemra/baricitinib stopped secondary to cytopenias  · Finished vanco and cefepime  · Neurology on consult/monitor mental status/decrease sedation  · Tube feeds  · Dulcolax suppository  · Poor prognosis  · Palliative care consult  · Discussed with RN   · DVT prophylaxis with low molecular weight heparin      Kobi Tabares MD, [facility]  Pulmonary Critical Care and Sleep Medicine,  [facility] AT [place]: [phone]  CC: 35 minutes

## 2022-02-01 NOTE — PLAN OF CARE
Problem: Airway Clearance - Ineffective  Goal: Achieve or maintain patent airway  Outcome: Ongoing     Problem: Gas Exchange - Impaired  Goal: Absence of hypoxia  Outcome: Ongoing  Goal: Promote optimal lung function  Outcome: Ongoing     Problem: Breathing Pattern - Ineffective  Goal: Ability to achieve and maintain a regular respiratory rate  Outcome: Ongoing     Problem:  Body Temperature -  Risk of, Imbalanced  Goal: Ability to maintain a body temperature within defined limits  Outcome: Ongoing  Goal: Will regain or maintain usual level of consciousness  Outcome: Ongoing  Goal: Complications related to the disease process, condition or treatment will be avoided or minimized  Outcome: Ongoing     Problem: Isolation Precautions - Risk of Spread of Infection  Goal: Prevent transmission of infection  Outcome: Ongoing     Problem: Nutrition Deficits  Goal: Optimize nutritional status  Outcome: Ongoing     Problem: Risk for Fluid Volume Deficit  Goal: Maintain normal heart rhythm  Outcome: Ongoing  Goal: Maintain absence of muscle cramping  Outcome: Ongoing  Goal: Maintain normal serum potassium, sodium, calcium, phosphorus, and pH  Outcome: Ongoing     Problem: Falls - Risk of:  Goal: Will remain free from falls  Description: Will remain free from falls  Outcome: Ongoing  Goal: Absence of physical injury  Description: Absence of physical injury  Outcome: Ongoing     Problem: Infection:  Goal: Will remain free from infection  Description: Will remain free from infection  Outcome: Ongoing     Problem: Safety:  Goal: Free from accidental physical injury  Description: Free from accidental physical injury  Outcome: Ongoing  Goal: Free from intentional harm  Description: Free from intentional harm  Outcome: Ongoing     Problem: Pain:  Goal: Patient's pain/discomfort is manageable  Description: Patient's pain/discomfort is manageable  Outcome: Ongoing     Problem: Skin Integrity:  Goal: Skin integrity will

## 2022-02-01 NOTE — PLAN OF CARE
Problem: Safety:  Goal: Free from accidental physical injury  Description: Free from accidental physical injury  2/1/2022 1856 by Tim Wren RN  Outcome: Ongoing     Problem: Non-Violent Restraints  Goal: No harm/injury to patient while restraints in use  2/1/2022 1856 by Tim Wren RN  Outcome: Ongoing     Problem: Nutrition  Goal: Optimal nutrition therapy  Outcome: Ongoing

## 2022-02-01 NOTE — PROGRESS NOTES
[facility]    Gastroenterology Daily Progress Note      Patient:   Kizzy Herron   :    1945   Facility:   [facility]. annes  Date:     2022  Consultant:   HERB Barber CNP, CNP      SUBJECTIVE  64 y.o. male admitted 2022 with Hypokalemia [E87.6]  Hypomagnesemia [E83.42]  COPD exacerbation ([address] [J44.1]  BLANCHE (acute kidney injury) ([address] [N17.9]  Acute respiratory failure with hypoxia ([address] [J96.01]  Acute on chronic systolic CHF (congestive heart failure) ([address] [I50.23]  COVID [U07.1]  COVID-19 [U07.1] and seen for abdominal distention, constipation. The pt was discussed with the rn. Still no BM despite multiple medications and enemas yesterday. rn today states that the dulcolax suppository was not given today due to extreme fluctuations in the heart rate. He is tolerating the tube feeding. And has bowel sounds. OBJECTIVE  Scheduled Meds:   sodium zirconium cyclosilicate  15 g Oral Daily    insulin glargine  9 Units SubCUTAneous Daily    lactulose  20 g Oral BID    bisacodyl  10 mg Rectal Daily    furosemide  20 mg IntraVENous BID    pantoprazole  40 mg IntraVENous Daily    And    sodium chloride (PF)  10 mL IntraVENous Daily    aspirin  324 mg Oral Daily    levETIRAcetam  500 mg Oral BID    chlorhexidine  15 mL Mouth/Throat BID    enoxaparin  30 mg SubCUTAneous BID    QUEtiapine  50 mg Oral Once    ipratropium-albuterol  1 ampule Inhalation 4x daily    budesonide  0.5 mg Nebulization BID    metoprolol succinate  50 mg Oral Nightly    dexamethasone  6 mg IntraVENous Q24H    sodium chloride flush  5-40 mL IntraVENous 2 times per day    amLODIPine  5 mg Oral Nightly    atorvastatin  20 mg Oral Daily       Vital Signs:  /84   Pulse 122   Temp 98.7 °F (37.1 °C) (Oral)   Resp 26   Ht 5' 10.98\" (1.803 m)   Wt 234 lb 12.8 oz (106.5 kg)   SpO2 95%   BMI 32.77 kg/m²      Physical Exam:   . Karthik Andressa Due to the current efforts to prevent transmission of COVID-19 and also the need to preserve PPE for other caregivers, a face-to-face encounter with the patient was not performed. That being said, all relevant records and diagnostic tests were reviewed, including laboratory results and imaging. Please reference any relevant documentation elsewhere. Care will be coordinated with the primary service. Lab and Imaging Review     CBC  Recent Labs     01/30/22  0515 01/31/22  0530 02/01/22  0332   WBC 11.9* 10.7 11.6*   HGB 14.2 13.7 13.9   HCT 47.7 45.6 47.0   MCV 83.0 83.7 84.7   * 171 203       BMP  Recent Labs     01/31/22  0530 01/31/22  0530 01/31/22  1050 01/31/22  1635 02/01/22  0332     --   --  140 141   K 5.9*   < > 5.4* 5.4* 4.9     --   --  105 105   CO2 23  --   --  27 28   BUN 93*  --   --  96* 88*   CREATININE 1.54*  --   --  1.51* 1.35*   GLUCOSE 419*  --   --  390* 347*   CALCIUM 9.4  --   --  9.6 9.7    < > = values in this interval not displayed. LFTS  Recent Labs     01/31/22  1635   ALKPHOS 99   ALT 26   AST 28   PROT 6.0*   BILITOT 0.32   BILIDIR 0.16   LABALBU 2.4*       AMYLASE/LIPASE/AMMONIA  Recent Labs     01/31/22  1635   LIPASE 34     FINDINGS:ct abd 1/28/22   Lower Chest: There are small to moderate bilateral pleural effusions,   increased from previous CT dated 01/16/2022.  Significant respiratory motion   limits assessment of the lung bases.  There is increased consolidation at the   left base.  There is increased interstitial thickening at both lung bases,   possibly accentuated by the degree of motion.       Organs: Limited unenhanced liver and gallbladder are within normal limits.    Motion is limiting assessment of the upper abdominal organs as well.  Spleen,   pancreas, and adrenal glands are unremarkable.       Kidneys are symmetric in size and attenuation.  No renal or ureteral   calculus.  No hydronephrosis or perinephric inflammation.       GI/Bowel: Enteric tube is in place with distal tip in the proximal stomach. There is a moderate amount of fecal material in the large bowel.  No abnormal   bowel distention or focal pericolonic inflammation.  No free air.  There is a   tiny amount of free fluid adjacent to the liver, in the pericolic gutters,   and in the pelvis. [place] Shown is a tiny amount of presacral fluid and perirectal   stranding.  No significant rectal wall thickening appreciated.       Pelvis: Roberts catheter is in place.  Urinary bladder is nondistended.  There   is no pelvic lymphadenopathy.       Peritoneum/Retroperitoneum: No evidence of abdominal aortic aneurysm. Abdominal aortic stent graft noted.  No retroperitoneal or mesenteric   lymphadenopathy.       Bones/Soft Tissues:  There is mild scattered body wall edema.  There are   extensive degenerative changes in the lumbar spine.  No acute or suspicious   osseous abnormality.           Impression   1.  Overall mildly limited study due to motion and lack of contrast.       2.  No evidence of bowel obstruction.  Moderate stool burden is noted,   possibly related to constipation.  Enteric tube is in place with distal tip   in the proximal stomach.       3.  Tiny amount of free fluid scattered in the abdomen, including   presacral/perirectal fluid, most likely related to volume overload.  No   definite findings of rectal wall thickening or fecal impaction.       4.  Moderate pleural effusions, left basilar consolidation, and bibasilar   interstitial thickening, increased when compared to 01/16/2022.       RECOMMENDATIONS:   Unavailable             FINDINGS:kub 1/31/22   Nasogastric tube terminates in the mid stomach.  There is some density within   the stomach which could represent oral contrast.  Nonspecific bowel gas   pattern without identified obstruction.  There does appear to be a moderate   colonic stool burden.  Metallic densities from previous ventral hernia   repair.  Degenerative changes are scattered in the spine.  Catheter in the   midline of the lower pelvis likely represents a Roberts catheter.           Impression   Nonspecific abdomen with apparent moderate stool burden.             ASSESSMENT/plan  1. Abdominal distention, no BM yet  -continue dulcolax supp daily and lactulose will add daily glycolax  -tube feeding as tolerated  D/w md    2. covid 19 pneumonia with acute hypoxic failure     3. BLANCHE with hyperkalemia     4.acute left thalamic CVA       This plan was formulated in collaboration with Dr. Laura Rivera . Electronically signed by: HERB Blancas CNP on 2/1/2022 at 9:15 AM   Attending Physician Statement  I have discussed the care of Odette Freeman and   I have examined the patient myselft independently, and taken ros and hpi , including pertinent history and exam findings,  with the author of this note . I have reviewed the key elements of all parts of the encounter with the nurse practitioner/resident.     I agree with the assessment, plan and orders as documented by the above health care provider     Still without bowel movement  But tolerating tube feeding well  We will continue Dulcolax suppository and lactulose p.o. we will add GlycoLax  Continue tube feeding as long it is tolerated      Electronically signed by Obdulio Xiao MD

## 2022-02-01 NOTE — PROGRESS NOTES
.  Nephrology  Progress Note    Reason for Consult: Hyperkalemia    Requesting Physician:  Cornelius Root MD    INTERVAL HISTORY:  K improved to 4.9 this morning but worse again at 5.6 in PM.  Creatinine improved to 1.28 from 1.51. Remains sedated and on the ventilator FiO2 75%. BUN improved to 88 from 96. Patient has not had BM. HISTORY OF PRESENT ILLNESS:    The patient is a 64 y.o. male who presented with to the hospital for worsening shortness of breath ad and worsening lower extremity edema on 1/16. He had diffuse body aches and sweats and a dry cough. He tested positive for COVID-19. He has steadily declined since admission. On 1/18 a CT head found New lacunar infarct left thalamus. He had to be intubated on 1/23. He has a significant past medical history of COPD, hyperlipidemia, Type 2 DM, Right kidney mass, and abdominal aortic aneurysm repair in 2005. His potassium has been steadily rising since 1/24/22. The most current Potassium level is 5.8. His creatine is improved to 1.71. This information was retrieved through chart review and nursing. Patient was unable to provide information due to current status on mechanical ventilation and sedation. Review Of Systems:  Unable to obtain. Patient sedated on ventilator.     Past Medical History:   Diagnosis Date    Arthritis     Atherosclerotic plaque 7/28/2019    Cervical disc disease     degenerative disc disease    Diabetes mellitus ([address]     type 2    History of blood transfusion     Hx of blood clots     left leg    Hyperlipidemia     Neuropathy     Right kidney mass     \"urologist is watching\"    Snores     Type 2 diabetes mellitus treated with insulin ([address] 7/28/2019       Past Surgical History:   Procedure Laterality Date    ABDOMINAL AORTIC ANEURYSM REPAIR  2005    CARPAL TUNNEL RELEASE Bilateral     CERVICAL SPINE SURGERY      COLONOSCOPY      benign polyps removed    INGUINAL HERNIA REPAIR Right     MA REPAIR INCISIONAL HERNIA,REDUCIBLE N/A 5/10/2017    HERNIA VENTRAL REPAIR WITH MESH  performed by Napoleon Gipson DO at [address] [place] [address] Road  05/10/2017    with mesh       Prior to Admission medications    Medication Sig Start Date End Date Taking? Authorizing Provider   rosuvastatin (CRESTOR) 40 MG tablet Take 40 mg by mouth every evening   Yes Historical Provider, MD   insulin NPH (HUMULIN N;NOVOLIN N) 100 UNIT/ML injection vial Inject 20 Units into the skin 2 times daily (before meals)   Yes Historical Provider, MD   levETIRAcetam (KEPPRA) 500 MG tablet Take 1 tablet by mouth 2 times daily 11/3/21  Yes Sameer Harrison MD   venlafaxine (EFFEXOR XR) 150 MG extended release capsule Take 1 capsule by mouth daily (with breakfast) 7/29/19  Yes Arabella Kiser   vitamin B-1 (THIAMINE) 100 MG tablet Take 100 mg by mouth daily   Yes Historical Provider, MD   ferrous sulfate 325 (65 Fe) MG tablet Take 325 mg by mouth daily (with breakfast)   Yes Historical Provider, MD   ALPRAZolam (XANAX) 0.5 MG tablet Take 0.5 mg by mouth three times daily.    Yes Historical Provider, MD   furosemide (LASIX) 40 MG tablet Take 1 tablet by mouth 2 times daily 12/11/18  Yes Rosendo Evans MD   tiotropium (SPIRIVA RESPIMAT) 2.5 MCG/ACT AERS inhaler Inhale 2 puffs into the lungs daily    Yes Historical Provider, MD   albuterol sulfate  (90 Base) MCG/ACT inhaler Inhale 2 puffs into the lungs every 6 hours as needed for Wheezing or Shortness of Breath   Yes Historical Provider, MD   metoprolol succinate (TOPROL XL) 50 MG extended release tablet Take 50 mg by mouth daily   Yes Historical Provider, MD   Multiple Vitamins-Minerals (MULTIVITAMIN ADULT) TABS Take 1 tablet by mouth daily   Yes Historical Provider, MD   vitamin D (CHOLECALCIFEROL) 1000 UNIT TABS tablet Take 1,000 Units by mouth daily   Yes Historical Provider, MD   fluticasone (FLONASE) 50 MCG/ACT nasal spray 1 spray by Each Nare route daily as needed for Rhinitis   Yes Historical Provider, MD   aspirin 325 MG EC tablet Take 325 mg by mouth daily    Yes Historical Provider, MD   Omega-3 Fatty Acids (FISH OIL) 1000 MG CAPS Take 1 capsule by mouth daily    Yes Historical Provider, MD   amLODIPine (NORVASC) 5 MG tablet Take 5 mg by mouth nightly    Yes Historical Provider, MD   mirtazapine (REMERON) 45 MG tablet Take 45 mg by mouth nightly   Yes Historical Provider, MD       Scheduled Meds:   polyethylene glycol  17 g Per NG tube Daily    sodium zirconium cyclosilicate  15 g Oral Daily    insulin glargine  9 Units SubCUTAneous Daily    lactulose  20 g Oral BID    bisacodyl  10 mg Rectal Daily    furosemide  20 mg IntraVENous BID    pantoprazole  40 mg IntraVENous Daily    And    sodium chloride (PF)  10 mL IntraVENous Daily    aspirin  324 mg Oral Daily    levETIRAcetam  500 mg Oral BID    chlorhexidine  15 mL Mouth/Throat BID    enoxaparin  30 mg SubCUTAneous BID    QUEtiapine  50 mg Oral Once    ipratropium-albuterol  1 ampule Inhalation 4x daily    budesonide  0.5 mg Nebulization BID    metoprolol succinate  50 mg Oral Nightly    dexamethasone  6 mg IntraVENous Q24H    sodium chloride flush  5-40 mL IntraVENous 2 times per day    amLODIPine  5 mg Oral Nightly    atorvastatin  20 mg Oral Daily     Continuous Infusions:   insulin (HUMAN R) non-weight based infusion 4.56 Units/hr (02/01/22 1330)    phenylephrine (KEARA-SYNEPHRINE) 50mg/250mL infusion Stopped (01/26/22 1820)    propofol 8.059 mcg/kg/min (01/28/22 0610)    midazolam (VERSED) 1 mg/mL in D5W infusion 9 mg/hr (02/01/22 0911)    fentaNYL 100 mcg/hr (02/01/22 0651)    norepinephrine Stopped (01/24/22 1046)    sodium chloride      dextrose       PRN Meds:LORazepam, dextrose bolus (hypoglycemia) **OR** dextrose bolus (hypoglycemia), sodium chloride flush, sodium chloride, ondansetron **OR** ondansetron, acetaminophen **OR** acetaminophen, glucose, glucagon (rDNA), dextrose, hydrALAZINE    Allergies   Allergen Reactions    Barbiturates Anxiety     Other reaction(s): Unknown    Codeine Palpitations    Sulfa Antibiotics Rash     Other reaction(s): Unknown       Social History     Socioeconomic History    Marital status:      Spouse name: Not on file    Number of children: Not on file    Years of education: Not on file    Highest education level: Not on file   Occupational History    Not on file   Tobacco Use    Smoking status: Former Smoker    Smokeless tobacco: Never Used    Tobacco comment: quit 30 years ago   Substance and Sexual Activity    Alcohol use: No    Drug use: No    Sexual activity: Not on file   Other Topics Concern    Not on file   Social History Narrative    Not on file     Social Determinants of Health     Financial Resource Strain:     Difficulty of Paying Living Expenses: Not on file   Food Insecurity:     Worried About Running Out of Food in the Last Year: Not on file    Dona of Food in the Last Year: Not on file   Transportation Needs:     Lack of Transportation (Medical): Not on file    Lack of Transportation (Non-Medical):  Not on file   Physical Activity:     Days of Exercise per Week: Not on file    Minutes of Exercise per Session: Not on file   Stress:     Feeling of Stress : Not on file   Social Connections:     Frequency of Communication with Friends and Family: Not on file    Frequency of Social Gatherings with Friends and Family: Not on file    Attends Evangelical Services: Not on file    Active Member of Clubs or Organizations: Not on file    Attends Club or Organization Meetings: Not on file    Marital Status: Not on file   Intimate Partner Violence:     Fear of Current or Ex-Partner: Not on file    Emotionally Abused: Not on file    Physically Abused: Not on file    Sexually Abused: Not on file   Housing Stability:     Unable to Pay for Housing in the Last Year: Not on file    Number of [place] in the Last Year: Not on file    Unstable Housing in the Last Year: Not on file       Family History   Problem Relation Age of Onset    Ovarian Cancer Sister     Ovarian Cancer Sister          Physical Exam:  Vitals:    02/01/22 1200 02/01/22 1230 02/01/22 1300 02/01/22 1330   BP: 130/70 112/65 (!) 118/59    Pulse: 129 130 137 123   Resp: 26 30 29 (!) 31   Temp: 100.1 °F (37.8 °C)      TempSrc: Oral      SpO2: 94% 94% 95% 95%   Weight:       Height:         I/O last 3 completed shifts: In: 1084.5 [I.V.:236.5; NG/GT:848]  Out: 8404 [QULHJ:4030]    Deferred due to [address] isolation.     Data:  CBC:   Lab Results   Component Value Date    WBC 11.6 (H) 02/01/2022    HGB 13.9 02/01/2022    HCT 47.0 02/01/2022    MCV 84.7 02/01/2022     02/01/2022     BMP:    Lab Results   Component Value Date     02/01/2022     01/31/2022     01/31/2022    K 4.9 02/01/2022    K 5.4 (H) 01/31/2022    K 5.4 (H) 01/31/2022     02/01/2022     01/31/2022     01/31/2022    CO2 28 02/01/2022    CO2 27 01/31/2022    CO2 23 01/31/2022    BUN 88 (H) 02/01/2022    BUN 96 (HH) 01/31/2022    BUN 93 (HH) 01/31/2022    CREATININE 1.35 (H) 02/01/2022    CREATININE 1.51 (H) 01/31/2022    CREATININE 1.54 (H) 01/31/2022    GLUCOSE 347 (H) 02/01/2022    GLUCOSE 390 (H) 01/31/2022    GLUCOSE 419 (HH) 01/31/2022     CMP:   Lab Results   Component Value Date     02/01/2022    K 4.9 02/01/2022     02/01/2022    CO2 28 02/01/2022    BUN 88 02/01/2022    CREATININE 1.35 02/01/2022    GLUCOSE 347 02/01/2022    CALCIUM 9.7 02/01/2022    PROT 6.0 01/31/2022    LABALBU 2.4 01/31/2022    BILITOT 0.32 01/31/2022    ALKPHOS 99 01/31/2022    AST 28 01/31/2022    ALT 26 01/31/2022      Hepatic:   Lab Results   Component Value Date    AST 28 01/31/2022    AST 45 (H) 01/16/2022    AST 16 11/03/2021    ALT 26 01/31/2022    ALT 24 01/16/2022    ALT 13 11/03/2021    BILITOT 0.32 01/31/2022    BILITOT 0.68 01/16/2022 BILITOT 0.56 11/03/2021    ALKPHOS 99 01/31/2022    ALKPHOS 123 01/16/2022    ALKPHOS 90 11/03/2021     BNP: No results found for: BNP  Lipids:   Lab Results   Component Value Date    CHOL 129 11/03/2021    HDL 30 (L) 11/03/2021     INR:   Lab Results   Component Value Date    INR 1.0 01/17/2022    INR 1.0 11/03/2021    INR 1.0 07/27/2019     PTH: No results found for: PTH  Phosphorus:    Lab Results   Component Value Date    PHOS 2.3 02/01/2022     Ionized Calcium: No results found for: IONCA  Magnesium:   Lab Results   Component Value Date    MG 2.4 02/01/2022     Albumin:   Lab Results   Component Value Date    LABALBU 2.4 01/31/2022     Last 3 CK, CKMB, Troponin: @LABRCNT(CKTOTAL:3,CKMB:3,TROPONINI:3)       URINE:)No results found for: Dannie Floridalmayolande    Radiology:  Reviewed. Assessment:  BLANCHE, hemodynamically related, Cr is better  CKD 3A  Hyperkalemia. Diabetes mellitis   COVID19 pneumonia  COPD  Hypophosphatemia      Plan:  Renal function have improved today. We will check vitamin D level. Will replete phosphorus with 10 mmol sodium phosphate. Will discontinue Lokelma. Will give Kayexalate 45 gm today x 1. Continue Lasix 20 mg IV BID. Continue Lactulose 2 times a day. Renal tube feed. Avoid hypotension, nephrotoxic drugs, Lovenox, Fleets enema and IV contrast exposure. Follow-up labs ordered for the morning. We will follow with you. Patient seen in collaboration with Dr. Dewey Fournier. Electronically signed by HERB Perez CNP  on 2/1/2022 at 1:35 PM   [facility]. Ph: [phone]         Reviewed with the NP. Agree with above note. Above note was modified. We will continue to follow up with you. Electronically signed by Shelly Connelly MD on 2/1/2022 at 5:26 PM  [facility].   Ph: [phone]

## 2022-02-01 NOTE — CARE COORDINATION
Discharge planning:    Patient intubated FIO2 at 75%, peep of 16. Pt. Is a DNRCCA. Palliative following. Poor prognosis. Spouse may consider comfort care. Continue to follow.

## 2022-02-01 NOTE — PROGRESS NOTES
[facility]  Progress Note    Betsy Corcoran  MRN: [id number]  Date: 2/1/2022  LOS: 12     Reason for F/U :   COVID-19 virus infection    Impression :   1. COVID-19 virus infection tested + 1/16/2022  2. Acute hypoxic respiratory failure, currently ventilator dependent  · Intubated 1/23/2022  3. Emphysema with worsening changes on imaging  4. Worsening acute interstitial lung disease and clinically not typical COVID-19 virus infection  5. Worsening moderate to severe pulmonary artery hypertension  6. Bilateral lower extremity edema, likely related to above  7. Leukopenia and thrombocytopenia  8. 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 Fever up to 103 degrees 1/22/2023  10. Paroxysmal atrial fibrillation  11. Acute kidney injury    Recommendations:   · COVID-19 therapy:  · The patient continues on Decadron 6 mg IV daily  · Patient was started on baricitinib, initiated 1/17/2022, but was discontinued 1/18/2022 due to cytopenias  · Actemra has been considered but again, due to cytopenias and thrombocytopenia, this has not been given    · Antimicrobial therapy:  · Patient received ceftriaxone and azithromycin 1/16/2022  · He received a dose of levofloxacin 1/18/2022  · He was started on cefepime and vancomycin 1/23/2022  · Completed a 7 day course of cefepime 1/28/2022  · Vancomycin was discontinued 1/24/2022 due to the acute kidney injury    · Patient is currently ventilator dependent, FiO2 75% PEEP of 16  · Continue supportive care    Infection Control Recommendations:   Droplet plus precautions    Discharge Planning:   Estimated Length of IV antimicrobials:  To be determined  Patient will need Midline Catheter Insertion/ PICC line Insertion: No  Patient will need: Home IV , [place],  SNF,  LTAC: Undetermined  Patient willneed outpatient wound care: No    Medical Decision making / Summary of Stay:   Ponce Dominguez is a 64y.o.-year-old male who was initially admitted on 1/16/2022.   Sidra Peabody has a history of diabetes mellitus type 2, essential hypertension, seizure disorder, chronic kidney disease stage III, hyperlipidemia among other medical problems.     The patient reports that about 1 to 2 months ago he had his diabetes medications changed and since that time he has noticed increasing swelling in his legs, hardness in the legs, difficulty ambulating, and weight gain from 178 to 255 pounds over the last 1 to 2 months. He did not report any subjective fevers, chills, cough or shortness of breath. He denies any loss of smell or change in taste. No abdominal pain nausea vomiting or diarrhea. The patient does report that he has home oxygen that he uses as needed at home and he reports that he hardly needs it. He is vaccinated did receive the J&J vaccine but has not yet received a booster dose.     The patient presented to the emergency department and was found to have leukopenia with a white blood cell count of 2.7 and thrombocytopenia with a platelet count of 100. Creatinine slightly elevated at 1.31 and proBNP is elevated at 9327. Chest x-ray showed hyperinflated lungs with cardiomegaly seen and diffuse increased interstitial markings in both lungs which may represent baseline chronic interstitial lung changes given that these findings were present before. A CT of the chest was done with IV contrast that showed no evidence of pulmonary embolism and compared to 2008 there is worsening underlying emphysema with worsening subacute or acute interstitial lung disease best seen in the left upper lobe.  Findings were not felt typical for COVID-19 virus pneumonia.   There was thickening and distortion of the left major fissure with an ill-defined masslike focus in the left lower lobe. Garret Goddard is worsening moderate to severe pulmonary artery hypertension     COVID testing was positive and I was asked to evaluate and help with COVID-19 virus infection    Current evaluation:2/1/2022    /84   Pulse 122   Temp 97.5 °F (36.4 °C) (Axillary)   Resp 26   Ht 5' 10.98\" (1.803 m)   Wt 234 lb 12.8 oz (106.5 kg)   SpO2 95%   BMI 32.77 kg/m²     Temperature Range: Temp: 97.5 °F (36.4 °C) Temp  Av.5 °F (36.9 °C)  Min: 97.3 °F (36.3 °C)  Max: 100 °F (37.8 °C)    The patient remains ventilator dependent, FiO2 75%, PEEP 16  Intermittent low grade temperatures  He is sedated  Tachycardic    Review of Systems   Unable to perform ROS: Intubated       Physical Examination :     Physical Exam  Constitutional:       General: He is not in acute distress. Appearance: Normal appearance. He is normal weight. Interventions: He is sedated and intubated. HENT:      Head: Normocephalic and atraumatic. Cardiovascular:      Rate and Rhythm: Tachycardia present. Rhythm irregularly irregular. Pulmonary:      Effort: Pulmonary effort is normal. No respiratory distress. He is intubated. Breath sounds: Normal breath sounds. Abdominal:      General: Abdomen is flat. Bowel sounds are normal.      Palpations: Abdomen is soft. Skin:     General: Skin is warm and dry.          Laboratory data:   I have independently reviewed the followinglabs:  CBC with Differential:   Recent Labs     22  0530 22  033   WBC 10.7 11.6*   HGB 13.7 13.9   HCT 45.6 47.0    203   LYMPHOPCT 2* 5*   MONOPCT 5 5     BMP:   Recent Labs     22  1635 22  0332    141   K 5.4* 4.9    105   CO2 27 28   BUN 96* 88*   CREATININE 1.51* 1.35*   MG  --  2.4     Hepatic Function Panel:   Recent Labs     22  1635   PROT 6.0*   LABALBU 2.4*   BILIDIR 0.16   IBILI 0.16   BILITOT 0.32   ALKPHOS 99   ALT 26   AST 28         Lab Results   Component Value Date    PROCAL 0.24 2022    PROCAL 0.11 2022    PROCAL 0.11 2022     Lab Results   Component Value Date    CRP 23.5 2022    CRP 2.0 2019     Lab Results   Component Value Date    SEDRATE 3 2022         Lab Results Component Value Date    DDIMER 2.96 01/30/2022    DDIMER 5.84 01/23/2022    DDIMER 1.53 01/18/2022    DDIMER 1.73 01/16/2022    DDIMER 1.18 12/07/2018     Lab Results   Component Value Date    FERRITIN 569 01/16/2022    FERRITIN 50 07/23/2019    FERRITIN 18 07/08/2019     Lab Results   Component Value Date     01/16/2022     Lab Results   Component Value Date    FIBRINOGEN 402 01/16/2022       Results in Past 30 Days  Result Component Current Result Ref Range Previous Result Ref Range   SARS-CoV-2, Rapid DETECTED (A) (1/16/2022) Not Detected Not in Time Range      Lab Results   Component Value Date    COVID19 DETECTED 01/16/2022    COVID19 Not Detected 11/02/2021       No results for input(s): VANCOTROUGH in the last 72 hours. Imaging Studies:     Chest xray   Impression:       Subtle bilateral infiltrates consistent with pneumonia. Trace effusions. Cultures:     Culture, Blood 1 [[id number]] Collected: 02/01/22 0003   Order Status: Completed Specimen: Blood Updated: 02/01/22 4016    Specimen Description . BLOOD    Special Requests ART LINE 20ML    Culture NO GROWTH <24 HRS   Culture, Blood 1 [[id number]] Collected: 01/31/22 1853   Order Status: Completed Specimen: Blood Updated: 01/31/22 2312    Specimen Description . BLOOD    Special Requests RT HAND,10ML    Culture NO GROWTH <24 HRS   Culture, Respiratory [[id number]] Collected: 01/28/22 1030   Order Status: Completed Specimen: Sputum, Suctioned Updated: 01/30/22 0932    Specimen Description . SUCTIONED SPUTUM    Special Requests NOT REPORTED    Direct Exam < 10 EPITHELIAL CELLS/LPF     <10 NEUTROPHILS/LPF     NO SIGNIFICANT PATHOGENS SEEN    Culture NORMAL RESPIRATORY PO HEAVY GROWTH         Medications:      sodium zirconium cyclosilicate  15 g Oral Daily    insulin glargine  9 Units SubCUTAneous Daily    lactulose  20 g Oral BID    bisacodyl  10 mg Rectal Daily    furosemide  20 mg IntraVENous BID    pantoprazole  40 mg IntraVENous Daily    And    sodium chloride (PF)  10 mL IntraVENous Daily    aspirin  324 mg Oral Daily    levETIRAcetam  500 mg Oral BID    chlorhexidine  15 mL Mouth/Throat BID    enoxaparin  30 mg SubCUTAneous BID    QUEtiapine  50 mg Oral Once    ipratropium-albuterol  1 ampule Inhalation 4x daily    budesonide  0.5 mg Nebulization BID    metoprolol succinate  50 mg Oral Nightly    dexamethasone  6 mg IntraVENous Q24H    sodium chloride flush  5-40 mL IntraVENous 2 times per day    amLODIPine  5 mg Oral Nightly    atorvastatin  20 mg Oral Daily           [facility]  [place] [place] [address], APRN - CNP  [employer] messaging  OFFICE: [phone]      Electronically signed by [address] [place] [address], APRN - CNP on 2/1/2022 at 8:44 AM  Thank you for allowing us to participate in the care of this patient. Please call with questions. This note iscreated with the assistance of a speech recognition program.  While intending to generate a document that actually reflects the content of the visit, the document can still have some errors including those of syntax andsound a like substitutions which may escape proof reading. In such instances, actual meaning can be extrapolated by contextual diversion.

## 2022-02-01 NOTE — PROGRESS NOTES
Patient converting from [place]em[place] to NSR cardiac rhythms frequently. Recent lab drawn at 32 61 16 show increased K with no change from this morning. Started an Insulin drip at 2021. Will continue to monitor.

## 2022-02-02 NOTE — PROGRESS NOTES
Physical Therapy  DATE: 2022    NAME: Vianey Walker  MRN: [id number]   : 1945    Patient not seen this date for Physical Therapy due to:      [] Cancel by RN or physician due to:    [] Hemodialysis    [] Critical Lab Value Level     [] Blood transfusion in progress    [] Acute or unstable cardiovascular status   _MAP < 55 or more than >115  _HR < 40 or > 130    [x] Acute or unstable pulmonary status   -FiO2 > 60%   _RR < 5 or >40    _O2 sats < 85%    [] Strict Bedrest    [] Off Unit for surgery or procedure    [] Off Unit for testing       [] Pending imaging to R/O fracture    [] Refusal by Patient      [] Other      [] PT being discontinued at this time. Patient independent. No further needs. [] PT being discontinued at this time as the patient has been transferred to hospice care. No further needs.       Sergey Odell, PT

## 2022-02-02 NOTE — PROGRESS NOTES
[facility]  Progress Note    Kizzy Herron  MRN: [id number]  Date: 2/2/2022  LOS: 16     Reason for F/U :   COVID-19 virus infection    Impression :   1. COVID-19 virus infection tested + 1/16/2022  2. Acute hypoxic respiratory failure, currently ventilator dependent  · Intubated 1/23/2022  3. Emphysema with worsening changes on imaging  4. Worsening acute interstitial lung disease and clinically not typical COVID-19 virus infection  5. Worsening moderate to severe pulmonary artery hypertension  6. Bilateral lower extremity edema, likely related to above  7. Leukopenia and thrombocytopenia  8. 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 Fever, intermittently  10. Paroxysmal atrial fibrillation  11.  Acute kidney injury    Recommendations:   · COVID-19 therapy:  · The patient continues on Decadron 6 mg IV daily  · Patient was started on baricitinib, initiated 1/17/2022, but was discontinued 1/18/2022 due to cytopenias  · Actemra has been considered but again, due to cytopenias and thrombocytopenia, this has not been given    · Antimicrobial therapy:  · Patient received ceftriaxone and azithromycin 1/16/2022  · He received a dose of levofloxacin 1/18/2022  · He was started on cefepime and vancomycin 1/23/2022  · Completed a 7 day course of cefepime 1/28/2022  · Vancomycin was discontinued 1/24/2022 due to the acute kidney injury    · Patient is currently ventilator dependent, FiO2 70% PEEP of 16  · Continue supportive care    Infection Control Recommendations:   Droplet plus precautions    Discharge Planning:   Estimated Length of IV antimicrobials:  Patient will need Midline Catheter Insertion/ PICC line Insertion: No  Patient will need: Home IV , [place],  [facility],  LTAC: Undetermined  Patient willneed outpatient wound care: No    Medical Decision making / Summary of Stay:   Manny Dominguez is a 64y.o.-year-old male who was initially admitted on 1/16/2022.   Latrell Benavides has a history of diabetes mellitus type 2, essential hypertension, seizure disorder, chronic kidney disease stage III, hyperlipidemia among other medical problems.     The patient reports that about 1 to 2 months ago he had his diabetes medications changed and since that time he has noticed increasing swelling in his legs, hardness in the legs, difficulty ambulating, and weight gain from 178 to 255 pounds over the last 1 to 2 months. He did not report any subjective fevers, chills, cough or shortness of breath. He denies any loss of smell or change in taste. No abdominal pain nausea vomiting or diarrhea. The patient does report that he has home oxygen that he uses as needed at home and he reports that he hardly needs it. He is vaccinated did receive the J&J vaccine but has not yet received a booster dose.     The patient presented to the emergency department and was found to have leukopenia with a white blood cell count of 2.7 and thrombocytopenia with a platelet count of 688. Creatinine slightly elevated at 1.31 and proBNP is elevated at 9327. Chest x-ray showed hyperinflated lungs with cardiomegaly seen and diffuse increased interstitial markings in both lungs which may represent baseline chronic interstitial lung changes given that these findings were present before. A CT of the chest was done with IV contrast that showed no evidence of pulmonary embolism and compared to 2008 there is worsening underlying emphysema with worsening subacute or acute interstitial lung disease best seen in the left upper lobe.  Findings were not felt typical for COVID-19 virus pneumonia.   There was thickening and distortion of the left major fissure with an ill-defined masslike focus in the left lower lobe. Garret Goddard is worsening moderate to severe pulmonary artery hypertension     COVID testing was positive and I was asked to evaluate and help with COVID-19 virus infection    Current evaluation:2/2/2022    /68   Pulse 126   Temp 101.1 °F (38.4 °C) (Axillary)   Resp 23   Ht 5' 10.98\" (1.803 m)   Wt 234 lb 12.8 oz (106.5 kg)   SpO2 95%   BMI 32.77 kg/m²     Temperature Range: Temp: 101.1 °F (38.4 °C) Temp  Av.1 °F (38.4 °C)  Min: 98.7 °F (37.1 °C)  Max: 102.5 °F (39.2 °C)    The patient remains ventilator dependent, FiO2 70%, PEEP 16  Patient continues to have elevated temperatures, T-max in 24 hours 39.2  He remains sedated and on amiodarone    Review of Systems   Unable to perform ROS: Intubated       Physical Examination :     Physical Exam  Constitutional:       General: He is not in acute distress. Appearance: Normal appearance. He is normal weight. Interventions: He is sedated and intubated. HENT:      Head: Normocephalic and atraumatic. Cardiovascular:      Rate and Rhythm: Tachycardia present. Rhythm irregularly irregular. Pulmonary:      Effort: Pulmonary effort is normal. No respiratory distress. He is intubated. Breath sounds: Normal breath sounds. Abdominal:      General: Abdomen is flat. Bowel sounds are normal.      Palpations: Abdomen is soft. Musculoskeletal:      Comments: Bilateral feet cyanosis   Skin:     General: Skin is warm and dry. Laboratory data:   I have independently reviewed the followinglabs:  CBC with Differential:   Recent Labs     22   WBC 11.6* 10.7   HGB 13.9 13.8   HCT 47.0 47.6    193   LYMPHOPCT 5* PENDING   MONOPCT 5 PENDING     BMP:   Recent Labs     22  0332 22  0332 22  1504 22  1504 22  0417      < > 142  --   --  142   K 4.9   < > 5.6*   < > 5.7* 5.6*      < > 107  --   --  106   CO2 28   < > 29  --   --  31   BUN 88*   < > 90*  --   --  84*   CREATININE 1.35*   < > 1.28*  --   --  1.23*   MG 2.4  --   --   --   --  2.3    < > = values in this interval not displayed.      Hepatic Function Panel:   Recent Labs     22  1635 22  1504   PROT 6.0* 5.9*   LABALBU 2.4* 2.5* BILIDIR 0.16  --    IBILI 0.16  --    BILITOT 0.32 0.28*   ALKPHOS 99 100   ALT 26 43*   AST 28 46*         Lab Results   Component Value Date    PROCAL 0.25 02/01/2022    PROCAL 0.24 01/23/2022    PROCAL 0.11 01/19/2022     Lab Results   Component Value Date    CRP 23.5 01/16/2022    CRP 2.0 07/27/2019     Lab Results   Component Value Date    SEDRATE 3 01/16/2022         Lab Results   Component Value Date    DDIMER 2.96 01/30/2022    DDIMER 5.84 01/23/2022    DDIMER 1.53 01/18/2022    DDIMER 1.73 01/16/2022    DDIMER 1.18 12/07/2018     Lab Results   Component Value Date    FERRITIN 569 01/16/2022    FERRITIN 50 07/23/2019    FERRITIN 18 07/08/2019     Lab Results   Component Value Date     01/16/2022     Lab Results   Component Value Date    FIBRINOGEN 402 01/16/2022       Results in Past 30 Days  Result Component Current Result Ref Range Previous Result Ref Range   SARS-CoV-2, Rapid DETECTED (A) (1/16/2022) Not Detected Not in Time Range      Lab Results   Component Value Date    COVID19 DETECTED 01/16/2022    COVID19 Not Detected 11/02/2021       No results for input(s): [facility] in the last 72 hours. Imaging Studies:     Chest xray  Impression:        Relatively stable cardiopulmonary status. Cultures:     Culture, Blood 1 [[id number]] Collected: 01/31/22 1853   Order Status: Completed  Impression:        Relatively stable cardiopulmonary status. Specimen: Blood Updated: 02/01/22 2313    Specimen Description . BLOOD    Special Requests RT HAND,10ML    Culture NO GROWTH 1 DAY   Culture, Blood 1 [[id number]] Collected: 02/01/22 0003   Order Status: Completed Specimen: Blood Updated: 02/01/22 2029    Specimen Description . BLOOD    Special Requests ART LINE 20ML    Culture NO GROWTH 12 HOURS         Medications:      polyethylene glycol  17 g Per NG tube Daily    levalbuterol  1.25 mg Nebulization Q6H    insulin glargine  9 Units SubCUTAneous Daily    lactulose  20 g Oral BID    bisacodyl  10 mg Rectal Daily    furosemide  20 mg IntraVENous BID    pantoprazole  40 mg IntraVENous Daily    And    sodium chloride (PF)  10 mL IntraVENous Daily    aspirin  324 mg Oral Daily    levETIRAcetam  500 mg Oral BID    chlorhexidine  15 mL Mouth/Throat BID    enoxaparin  30 mg SubCUTAneous BID    QUEtiapine  50 mg Oral Once    budesonide  0.5 mg Nebulization BID    metoprolol succinate  50 mg Oral Nightly    dexamethasone  6 mg IntraVENous Q24H    sodium chloride flush  5-40 mL IntraVENous 2 times per day    amLODIPine  5 mg Oral Nightly    atorvastatin  20 mg Oral Daily           [facility]  [address] [place] [address], APRN - CNP  [employer] messaging  OFFICE: [phone]      Electronically signed by [address] [place] [address], APRN - CNP on 2/2/2022 at 6:37 AM  Thank you for allowing us to participate in the care of this patient. Please call with questions. This note iscreated with the assistance of a speech recognition program.  While intending to generate a document that actually reflects the content of the visit, the document can still have some errors including those of syntax andsound a like substitutions which may escape proof reading. In such instances, actual meaning can be extrapolated by contextual diversion.

## 2022-02-02 NOTE — PROGRESS NOTES
[facility]   Section of Cardiology  Progress Note      Date:  2/2/2022  Patient: Sergio Aparicio  Admission:  1/16/2022 11:51 AM  Admit DX: Hypokalemia [E87.6]  Hypomagnesemia [E83.42]  COPD exacerbation (HCC) [J44.1]  BLANCHE (acute kidney injury) ([facility] [facility] [N17.9]  Acute respiratory failure with hypoxia (HCC) [J96.01]  Acute on chronic systolic CHF (congestive heart failure) ([facility] [address] [I50.23]  COVID [U07.1]  COVID-19 [U07.1]  Age:  64 y.o., 1945                           LOS: 17 days     Reason for evaluation:   Paroxysmal atrial fibrillation. covid 19 pneumonia  Atrial  bigeminy      SUBJECTIVE:     The patient was seen and examined. Notes and labs reviewed. He went back into sinus rhythm with a prolonged pause. He is back in sinus rhythm. OBJECTIVE:    /66   Pulse 83   Temp 99.7 °F (37.6 °C) (Oral)   Resp 16   Ht 5' 10.98\" (1.803 m)   Wt 234 lb 12.8 oz (106.5 kg)   SpO2 95%   BMI 32.77 kg/m²     Intake/Output Summary (Last 24 hours) at 2/2/2022 1328  Last data filed at 2/2/2022 1106  Gross per 24 hour   Intake 927.89 ml   Output 1850 ml   Net -922.11 ml       EXAM:   Intubated and sedated  Chest. Bilateral crepitations  Cvs. s1s2 no murmurs,irregularly irregular  Abd. distended  Ext. Bilateral resolving edema. Pedal pulses decreased.     Current Inpatient Medications:   amLODIPine  2.5 mg Oral Nightly    [START ON 2/3/2022] furosemide  20 mg IntraVENous Daily    sodium phosphate IVPB  15 mmol IntraVENous Once    Vitamin D  1,000 Units Oral Daily    polyethylene glycol  17 g Per NG tube Daily    levalbuterol  1.25 mg Nebulization Q6H    insulin glargine  9 Units SubCUTAneous Daily    lactulose  20 g Oral BID    bisacodyl  10 mg Rectal Daily    pantoprazole  40 mg IntraVENous Daily    And    sodium chloride (PF)  10 mL IntraVENous Daily    aspirin  324 mg Oral Daily    levETIRAcetam  500 mg Oral BID    chlorhexidine  15 mL Mouth/Throat BID    enoxaparin  30 mg SubCUTAneous BID  QUEtiapine  50 mg Oral Once    budesonide  0.5 mg Nebulization BID    metoprolol succinate  50 mg Oral Nightly    dexamethasone  6 mg IntraVENous Q24H    sodium chloride flush  5-40 mL IntraVENous 2 times per day    atorvastatin  20 mg Oral Daily       IV Infusions (if any):   amiodarone 0.5 mg/min (02/02/22 0400)    insulin (HUMAN R) non-weight based infusion 4.4 Units/hr (02/02/22 1302)    phenylephrine (KEARA-SYNEPHRINE) 50mg/250mL infusion Stopped (01/26/22 1820)    propofol 8.059 mcg/kg/min (01/28/22 0610)    midazolam (VERSED) 1 mg/mL in D5W infusion 9 mg/hr (02/02/22 0400)    fentaNYL 100 mcg/hr (02/02/22 1210)    norepinephrine Stopped (01/24/22 1046)    sodium chloride      dextrose         Diagnostics:   Telemetry: atrial fibrillation with rapid ventricular response. Echo. Summary  Moderate left ventricular hypertrophy  Global left ventricular systolic function is normal  Estimated ejection fraction is 55 % . Left atrium is mildly dilated. Right atrium is mildly dilated . No significant valvular regurgitation or stenosis seen. No pericardial effusion seen. Labs:   CBC:   Recent Labs     02/01/22  0332 02/02/22  0417   WBC 11.6* 10.7   HGB 13.9 13.8   HCT 47.0 47.6    193     BMP:   Recent Labs     02/01/22  1504 02/01/22  1504 02/01/22 2012 02/02/22  0417     --   --  142   K 5.6*   < > 5.7* 5.6*   CO2 29  --   --  31   BUN 90*  --   --  84*   CREATININE 1.28*  --   --  1.23*   LABGLOM 54*  --   --  57*   GLUCOSE 127*  --   --  142*    < > = values in this interval not displayed. BNP: No results for input(s): BNP in the last 72 hours. PT/INR: No results for input(s): PROTIME, INR in the last 72 hours. APTT:No results for input(s): APTT in the last 72 hours. CARDIAC ENZYMES:No results for input(s): CKTOTAL, CKMB, CKMBINDEX, TROPONINI in the last 72 hours.   FASTING LIPID PANEL:  Lab Results   Component Value Date    HDL 30 11/03/2021    TRIG 181 11/03/2021 LIVER PROFILE:  Recent Labs     01/31/22  1635 02/01/22  1504   AST 28 46*   ALT 26 43*   LABALBU 2.4* 2.5*       ASSESSMENT:    Patient Active Problem List   Diagnosis    Biventricular CHF (congestive heart failure) (HCC)    CKD (chronic kidney disease) stage 3, GFR 30-59 ml/min (HCC)    Essential hypertension    Blurred vision, right eye    Type 2 diabetes mellitus treated with insulin ([address]    Atherosclerotic plaque    Weakness on left side of face    Carotid stenosis, asymptomatic, bilateral    New onset seizure ([address]    COVID-19    Acute on chronic systolic CHF (congestive heart failure) (HCC)    Acute respiratory failure with hypoxia (HCC)    BLANCHE (acute kidney injury) ([address]    COPD exacerbation (HCC)    Hypokalemia    Hypomagnesemia    Palliative care encounter    Goals of care, counseling/discussion    DNR (do not resuscitate) discussion    ACP (advance care planning)    Constipation    Abdominal pain, generalized       PLAN:    1. Paroxysmal atrial fibrillation now in sinus rhythm with atrial bigeminy  2. covid 19 pneumonia  3. Respiratory failure  4. Chronic kidney disease  Continue supportive therapy. No cardiac intervention at this time  Continue amiodarone drip. Please see orders. Discussed with nursing.     Patricia Herrera MD, MD

## 2022-02-02 NOTE — PROGRESS NOTES
Progress note  [facility].,    Adult Hospitalist      Name: Donal Cortes  MRN: [id number]     Acct: [de-identified]  Room: [address] [place] [address]    Admit Date: 1/16/2022 11:51 AM  PCP: Kaveh Cano MD    Primary Problem  Active Problems:    COVID-19    Acute on chronic systolic CHF (congestive heart failure) (HCC)    Acute respiratory failure with hypoxia (HCC)    BLANCHE (acute kidney injury) ([facility] [address]    COPD exacerbation ([facility] [address]    Hypokalemia    Hypomagnesemia    Palliative care encounter    Goals of care, counseling/discussion    DNR (do not resuscitate) discussion    ACP (advance care planning)    Constipation    Abdominal pain, generalized  Resolved Problems:    * No resolved hospital problems. *        Assesment:   Acute congestive heart failure, diastolic   [id number]   Viral pneumonia secondary to above  Acute respiratory failure with hypoxia intubated on 1/23/2022  Hyperkalemia  Paroxysmal atrial fibrillation  Essential hypertension  Mixed hyperlipidemia  Diabetes mellitus type 2  History of seizure disorder  History of CKD stage III, presently normal renal function  Lung mass?   Leukopenia  Polycythemia  Thrombocytopenia  Anxiety disorder  Recent past h/o lacunar infarct left thalamus   Altered mental status/agitation  Endotracheal intubation secondary to hypoxia dated 1/23/2022  Supraventricular tachycardia/elevated troponin  Fever  Emphysema   Pulmonary artery hypertension       Plan:   Admit patient to intermediate floor  Mechanical ventilation sedation as per critical care, patient continues to require 75% FiO2 with PEEP of 16  Telemetry  Check vitals closely  CBC BMP daily    Echocardiogram  Cardiology consult  IV pressors    Amiodarone  Cardiology following    IV Decadron  Patient completed a course of cefepime for 7 days  Bronchodilators  Pulmicort  Patient is deemed not a candidate for Actemra or baricitinib secondary to cytopenia  Infectious disease consult  Pulmonology consult    Continue Keppra  Continue amlodipine, atorvastatin  Continue aspirin  Hold insulin-resume at 6 units today  Patient seen by gastroenterology started on daily GlycoLax, also continued on suppositories, GlycoLax if not helping can be changed to milk of magnesia  Consult nephrology appreciated managing patient hyperkalemia, patient is continued on Lokelma  Increase free water if okay with nephrology  Continue other medication as below.     Scheduled Meds:   polyethylene glycol  17 g Per NG tube Daily    levalbuterol  1.25 mg Nebulization Q6H    insulin glargine  9 Units SubCUTAneous Daily    lactulose  20 g Oral BID    bisacodyl  10 mg Rectal Daily    furosemide  20 mg IntraVENous BID    pantoprazole  40 mg IntraVENous Daily    And    sodium chloride (PF)  10 mL IntraVENous Daily    aspirin  324 mg Oral Daily    levETIRAcetam  500 mg Oral BID    chlorhexidine  15 mL Mouth/Throat BID    enoxaparin  30 mg SubCUTAneous BID    QUEtiapine  50 mg Oral Once    budesonide  0.5 mg Nebulization BID    metoprolol succinate  50 mg Oral Nightly    dexamethasone  6 mg IntraVENous Q24H    sodium chloride flush  5-40 mL IntraVENous 2 times per day    amLODIPine  5 mg Oral Nightly    atorvastatin  20 mg Oral Daily     Continuous Infusions:   amiodarone Stopped (02/01/22 2209)    Followed by   Elsie Alexander amiodarone 0.5 mg/min (02/01/22 2209)    insulin (HUMAN R) non-weight based infusion 7.8 Units/hr (02/01/22 2209)    phenylephrine (KEARA-SYNEPHRINE) 50mg/250mL infusion Stopped (01/26/22 1820)    propofol 8.059 mcg/kg/min (01/28/22 0610)    midazolam (VERSED) 1 mg/mL in D5W infusion 9 mg/hr (02/01/22 0911)    fentaNYL 100 mcg/hr (02/01/22 1636)    norepinephrine Stopped (01/24/22 1046)    sodium chloride      dextrose       PRN Meds:  sodium chloride nebulizer, 3 mL, Q8H PRN  LORazepam, 1 mg, Q4H PRN  dextrose bolus (hypoglycemia), 125 mL, PRN   Or  dextrose bolus (hypoglycemia), 250 mL, PRN  sodium chloride flush, 10 mL, PRN  sodium chloride, 25 mL, PRN  ondansetron, 4 mg, Q8H PRN   Or  ondansetron, 4 mg, Q6H PRN  acetaminophen, 650 mg, Q6H PRN   Or  acetaminophen, 650 mg, Q6H PRN  glucose, 15 g, PRN  glucagon (rDNA), 1 mg, PRN  dextrose, 100 mL/hr, PRN  hydrALAZINE, 10 mg, Q6H PRN        Chief Complaint:     Chief Complaint   Patient presents with    Leg Swelling     bilateral, has CHF, on diuretic, EMT saw pt , assisted pt into car    Fever    Other     low SPO2         History of Present Illness:   Patient seen examined at bedside  Patient remains in medical ICU  Patient remains intubated sedated  Patient has been having fever overnight low-grade  Patient requiring 75% of FiO2 with PEEP of 16  Off amiodarone drip  Remains sedated  Tolerating tube feeding  Has been constipated  Dulcolax, Lactulose, Glycolax  Note edema over extremities  Blood glucose better        Review of systems:  Unable to conduct ROS secondary to patient being intubated      Initial HPI  Kizzy Herron is a 64 y.o.  male who presents with Leg Swelling (bilateral, has CHF, on diuretic, EMT saw pt , assisted pt into car), Fever, and Other (low SPO2)  This is a 75-year-old gentleman admitted via ER, come to ER with complaint of having shortness of breath, patient has medical history significant for COPD patient with history of cardiac failure: Patient noted that he has been having increasing swelling in his lower extremity and becoming more short of breath, further patient also been having some body aches and sweats, patient patient testing in the emergency room showed that he is positive for COVID-19 also noticed to have an elevated BNP, imaging concerning for fluid overload, admitted for further regiment    I have personally reviewed the past medical history, past surgical history, medications, social history, and family history, and summarized in the note.     Review of Systems:       Unable to conduct ROS secondary to patient being intubated      Past Medical History:     Past Medical History:   Diagnosis Date    Arthritis     Atherosclerotic plaque 7/28/2019    Cervical disc disease     degenerative disc disease    Diabetes mellitus ([facility] [address]     type 2    History of blood transfusion     Hx of blood clots     left leg    Hyperlipidemia     Neuropathy     Right kidney mass     \"urologist is watching\"    Snores     Type 2 diabetes mellitus treated with insulin ([facility] [address] 7/28/2019        Past Surgical History:     Past Surgical History:   Procedure Laterality Date    ABDOMINAL AORTIC ANEURYSM REPAIR  2005    CARPAL TUNNEL RELEASE Bilateral     CERVICAL SPINE SURGERY      COLONOSCOPY      benign polyps removed    INGUINAL HERNIA REPAIR Right     MD REPAIR INCISIONAL HERNIA,REDUCIBLE N/A 5/10/2017    HERNIA VENTRAL REPAIR WITH MESH  performed by Von Wilson DO at [address] [place] [address] Road  05/10/2017    with mesh        Medications Prior to Admission:       Prior to Admission medications    Medication Sig Start Date End Date Taking? Authorizing Provider   rosuvastatin (CRESTOR) 40 MG tablet Take 40 mg by mouth every evening   Yes Historical Provider, MD   insulin NPH (HUMULIN N;NOVOLIN N) 100 UNIT/ML injection vial Inject 20 Units into the skin 2 times daily (before meals)   Yes Historical Provider, MD   levETIRAcetam (KEPPRA) 500 MG tablet Take 1 tablet by mouth 2 times daily 11/3/21  Yes Crow Rdz MD   venlafaxine (EFFEXOR XR) 150 MG extended release capsule Take 1 capsule by mouth daily (with breakfast) 7/29/19  Yes Arabella Kiser   vitamin B-1 (THIAMINE) 100 MG tablet Take 100 mg by mouth daily   Yes Historical Provider, MD   ferrous sulfate 325 (65 Fe) MG tablet Take 325 mg by mouth daily (with breakfast)   Yes Historical Provider, MD   ALPRAZolam (XANAX) 0.5 MG tablet Take 0.5 mg by mouth three times daily.    Yes Historical Provider, MD   furosemide (LASIX) 40 MG tablet Take 1 tablet by mouth 2 times daily 18  Yes Joseph Shannon MD   tiotropium (SPIRIVA RESPIMAT) 2.5 MCG/ACT AERS inhaler Inhale 2 puffs into the lungs daily    Yes Historical Provider, MD   albuterol sulfate  (90 Base) MCG/ACT inhaler Inhale 2 puffs into the lungs every 6 hours as needed for Wheezing or Shortness of Breath   Yes Historical Provider, MD   metoprolol succinate (TOPROL XL) 50 MG extended release tablet Take 50 mg by mouth daily   Yes Historical Provider, MD   Multiple Vitamins-Minerals (MULTIVITAMIN ADULT) TABS Take 1 tablet by mouth daily   Yes Historical Provider, MD   vitamin D (CHOLECALCIFEROL) 1000 UNIT TABS tablet Take 1,000 Units by mouth daily   Yes Historical Provider, MD   fluticasone (FLONASE) 50 MCG/ACT nasal spray 1 spray by Each Nare route daily as needed for Rhinitis   Yes Historical Provider, MD   aspirin 325 MG EC tablet Take 325 mg by mouth daily    Yes Historical Provider, MD   Omega-3 Fatty Acids (FISH OIL) 1000 MG CAPS Take 1 capsule by mouth daily    Yes Historical Provider, MD   amLODIPine (NORVASC) 5 MG tablet Take 5 mg by mouth nightly    Yes Historical Provider, MD   mirtazapine (REMERON) 45 MG tablet Take 45 mg by mouth nightly   Yes Historical Provider, MD        Allergies: Barbiturates, Codeine, and Sulfa antibiotics    Social History:     Tobacco:    reports that he has quit smoking. He has never used smokeless tobacco.  Alcohol:      reports no history of alcohol use. Drug Use:  reports no history of drug use.     Family History:     Family History   Problem Relation Age of Onset    Ovarian Cancer Sister     Ovarian Cancer Sister          Physical Exam:     Vitals:  /70   Pulse 122   Temp 102.5 °F (39.2 °C) (Axillary)   Resp 24   Ht 5' 10.98\" (1.803 m)   Wt 234 lb 12.8 oz (106.5 kg)   SpO2 96%   BMI 32.77 kg/m²   Temp (24hrs), Av °F (37.8 °C), Min:97.3 °F (36.3 °C), Max:102.5 °F (39.2 °C)      General appearance -on ventilator  Mental status -sedated  Head - normocephalic and atraumatic  Eyes - conjunctiva clear  Ears -external ears normal  Nose - no drainage noted  Mouth - mucous membranes moist  Neck - supple, no carotid bruits, thyroid not palpable  Chest -basal crackles with decreased air entry bilaterally to auscultation, increased effort  Heart - normal rate, regular rhythm, no murmur  Abdomen - soft, nontender, nondistended, bowel sounds present all four quadrants, no masses, hepatomegaly or splenomegaly  Neurological -sedated on vent  Extremities - extremity edema, lower distal extremity discoloration    Skin - no gross lesions, rashes, or induration noted        Data:     Labs:    Hematology:  Recent Labs     01/30/22  0515 01/30/22  1250 01/31/22  0530 02/01/22 0332   WBC 11.9*  --  10.7 11.6*   RBC 5.75  --  5.45 5.55   HGB 14.2  --  13.7 13.9   HCT 47.7  --  45.6 47.0   MCV 83.0  --  83.7 84.7   MCH 24.7*  --  25.1* 25.0*   MCHC 29.8  --  30.0 29.6   RDW 15.6*  --  15.8* 15.5*   *  --  171 203   MPV 11.7  --  12.6 12.9   DDIMER  --  2.96*  --   --      Chemistry:  Recent Labs     01/31/22  1635 01/31/22  1635 02/01/22  0332 02/01/22  1504 02/01/22 2012     --  141 142  --    K 5.4*   < > 4.9 5.6* 5.7*     --  105 107  --    CO2 27  --  28 29  --    GLUCOSE 390*  --  347* 127*  --    BUN 96*  --  88* 90*  --    CREATININE 1.51*  --  1.35* 1.28*  --    MG  --   --  2.4  --   --    ANIONGAP 8*  --  8* 6*  --    LABGLOM 45*  --  51* 54*  --    GFRAA 54*  --  >60 >60  --    CALCIUM 9.6  --  9.7 9.9  --    PHOS  --   --  2.3*  --   --    PROBNP  --   --   --  98,944*  --     < > = values in this interval not displayed.      Recent Labs     01/31/22  1635 01/31/22  1651 02/01/22  1410 02/01/22  1504 02/01/22  1602 02/01/22  1710 02/01/22  1802 02/01/22  1854   PROT 6.0*  --   --  5.9*  --   --   --   --    LABALBU 2.4*  --   --  2.5*  --   --   --   --    AST 28  --   --  46*  --   --   --   --    ALT 26  --   --  43*  --   -- --   --    ALKPHOS 99  --   --  100  --   --   --   --    BILITOT 0.32  --   --  0.28*  --   --   --   --    BILIDIR 0.16  --   --   --   --   --   --   --    LIPASE 34  --   --   --   --   --   --   --    POCGLU  --    < > 92 111* 111* 138* 156* 161*    < > = values in this interval not displayed. Lab Results   Component Value Date    INR 1.0 01/17/2022    INR 1.0 11/03/2021    INR 1.0 07/27/2019    PROTIME 13.3 01/17/2022    PROTIME 11.1 11/03/2021    PROTIME 10.5 07/27/2019       Lab Results   Component Value Date/Time    SPECIAL ART LINE 20ML 02/01/2022 12:03 AM     Lab Results   Component Value Date/Time    CULTURE NO GROWTH 12 HOURS 02/01/2022 12:03 AM       Lab Results   Component Value Date    POCPH 7.342 02/01/2022    POCPCO2 59.1 02/01/2022    POCPO2 87.0 02/01/2022    POCHCO3 32.0 02/01/2022    NBEA NOT REPORTED 02/01/2022    PBEA 4 02/01/2022    NWR7YIS NOT REPORTED 02/01/2022    INOA9LLS 96 02/01/2022    FIO2 75.0 02/01/2022       Radiology:    XR CHEST 1 VIEW    Result Date: 1/16/2022  Hypoinflated lungs. Cardiomegaly again seen, when compared to the previous study performed 07/27/2019. Diffuse, increased interstitial markings throughout both lungs, some of which can be seen on the prior study may represent baseline chronic interstitial lung changes. The increased prominence on this exam could be secondary to the suboptimal inspiratory effort. The presence of pulmonary vascular congestion/mild CHF or bilateral interstitial pneumonia cannot be excluded. Clinical correlation is recommended. CT CHEST PULMONARY EMBOLISM W CONTRAST    Result Date: 1/16/2022  No evidence of pulmonary embolism.  Compared to 2008, worsening underlying emphysema, with worsening subacute or acute interstitial lung disease, best seen left upper lobe; differential includes interstitial pneumonia or pneumonitis superimposed on emphysema, DIP, HP, and other interstitial pneumonias as well as infectious or inflammatory process superimposed on emphysema disease. Findings are not typical for COVID-19 pneumonia, but an element of the latter should be considered. Thickening and distortion left major fissure with ill-defined mass-like focus left lower lobe. The latter could also be infectious or inflammatory, although neoplasm should be excluded. Underlying worsening emphysema. Nodular densities, best seen right upper lobe. Small pleural effusions, slightly larger on the left. See recommendations below. Mild mediastinal and left hilar adenopathy; see recommendations below. Worsening now moderate-severe pulmonary artery hypertension. Mild cardiomegaly. Stable mild dilatation ascending thoracic aorta. Additional unchanged or incidental findings, as above. RECOMMENDATIONS: Clinical correlation and short-term follow-up CT chest in 1-4 months depending upon the clinical presentation/course. All radiological studies reviewed                Code Status:  DNR-CCA    Electronically signed by Alberto Horton MD on 2/1/2022 at 10:17 PM     Copy sent to Dr. Gilford Salisbury, MD    This note was created with the assistance of a speech-recognition program.  Although the intention is to generate a document that actually reflects the content of the visit, no guarantees can be provided that every mistake has been identified and corrected by editing. Note was updated later by me after  physical examination and  completion of the assessment.

## 2022-02-02 NOTE — PROGRESS NOTES
[facility]    Gastroenterology Daily Progress Note      Patient:   Zoey Alan   :    1945   Facility:   [facility]  Date:     2022  Consultant:   HERB Howe CNP, CNP      SUBJECTIVE  64 y.o. male admitted 2022 with Hypokalemia [E87.6]  Hypomagnesemia [E83.42]  COPD exacerbation ([address] [J44.1]  BLANCHE (acute kidney injury) ([facility] [address] [N17.9]  Acute respiratory failure with hypoxia ([address] [J96.01]  Acute on chronic systolic CHF (congestive heart failure) ([address] [I50.23]  COVID [U07.1]  COVID-19 [U07.1] and seen for  Abdominal distention and constipation. The pt was discussed with the rn. Per the rn he had a large non bloody bm today. Has had tachycardia with a long pause with continued fevers. Tolerating tube feedings. OBJECTIVE  Scheduled Meds:   amLODIPine  2.5 mg Oral Nightly    [START ON 2/3/2022] furosemide  20 mg IntraVENous Daily    sodium phosphate IVPB  15 mmol IntraVENous Once    polyethylene glycol  17 g Per NG tube Daily    levalbuterol  1.25 mg Nebulization Q6H    insulin glargine  9 Units SubCUTAneous Daily    lactulose  20 g Oral BID    bisacodyl  10 mg Rectal Daily    pantoprazole  40 mg IntraVENous Daily    And    sodium chloride (PF)  10 mL IntraVENous Daily    aspirin  324 mg Oral Daily    levETIRAcetam  500 mg Oral BID    chlorhexidine  15 mL Mouth/Throat BID    enoxaparin  30 mg SubCUTAneous BID    QUEtiapine  50 mg Oral Once    budesonide  0.5 mg Nebulization BID    metoprolol succinate  50 mg Oral Nightly    dexamethasone  6 mg IntraVENous Q24H    sodium chloride flush  5-40 mL IntraVENous 2 times per day    atorvastatin  20 mg Oral Daily       Vital Signs:  /68   Pulse 127   Temp 101.1 °F (38.4 °C) (Axillary)   Resp 29   Ht 5' 10.98\" (1.803 m)   Wt 234 lb 12.8 oz (106.5 kg)   SpO2 95%   BMI 32.77 kg/m²      Physical Exam:   . Baudilio Dawn Due to the current efforts to prevent transmission of COVID-19 and also the need to preserve PPE for other caregivers, a face-to-face encounter with the patient was not performed. That being said, all relevant records and diagnostic tests were reviewed, including laboratory results and imaging. Please reference any relevant documentation elsewhere. Care will be coordinated with the primary service. Lab and Imaging Review     CBC  Recent Labs     01/31/22  0530 02/01/22  0332 02/02/22  0417   WBC 10.7 11.6* 10.7   HGB 13.7 13.9 13.8   HCT 45.6 47.0 47.6   MCV 83.7 84.7 85.8    203 193       BMP  Recent Labs     02/01/22  0332 02/01/22  0332 02/01/22  1504 02/01/22 2012 02/02/22  0417     --  142  --  142   K 4.9   < > 5.6* 5.7* 5.6*     --  107  --  106   CO2 28  --  29  --  31   BUN 88*  --  90*  --  84*   CREATININE 1.35*  --  1.28*  --  1.23*   GLUCOSE 347*  --  127*  --  142*   CALCIUM 9.7  --  9.9  --  9.7    < > = values in this interval not displayed. LFTS  Recent Labs     01/31/22  1635 02/01/22  1504   ALKPHOS 99 100   ALT 26 43*   AST 28 46*   PROT 6.0* 5.9*   BILITOT 0.32 0.28*   BILIDIR 0.16  --    LABALBU 2.4* 2.5*       AMYLASE/LIPASE/AMMONIA  Recent Labs     01/31/22  1635   LIPASE 34     FINDINGS:ct abd 1/28/22   Lower Chest: There are small to moderate bilateral pleural effusions,   increased from previous CT dated 01/16/2022.  Significant respiratory motion   limits assessment of the lung bases.  There is increased consolidation at the   left base.  There is increased interstitial thickening at both lung bases,   possibly accentuated by the degree of motion.       Organs: Limited unenhanced liver and gallbladder are within normal limits.    Motion is limiting assessment of the upper abdominal organs as well.  Spleen,   pancreas, and adrenal glands are unremarkable.       Kidneys are symmetric in size and attenuation.  No renal or ureteral   calculus.  No hydronephrosis or perinephric inflammation.       GI/Bowel: Enteric tube is in place with distal tip in the proximal stomach. There is a moderate amount of fecal material in the large bowel.  No abnormal   bowel distention or focal pericolonic inflammation.  No free air.  There is a   tiny amount of free fluid adjacent to the liver, in the pericolic gutters,   and in the pelvis. Charlette Glimpse is a tiny amount of presacral fluid and perirectal   stranding.  No significant rectal wall thickening appreciated.       Pelvis: Roberts catheter is in place.  Urinary bladder is nondistended.  There   is no pelvic lymphadenopathy.       Peritoneum/Retroperitoneum: No evidence of abdominal aortic aneurysm. Abdominal aortic stent graft noted.  No retroperitoneal or mesenteric   lymphadenopathy.       Bones/Soft Tissues:  There is mild scattered body wall edema.  There are   extensive degenerative changes in the lumbar spine.  No acute or suspicious   osseous abnormality.           Impression   1.  Overall mildly limited study due to motion and lack of contrast.       2.  No evidence of bowel obstruction.  Moderate stool burden is noted,   possibly related to constipation.  Enteric tube is in place with distal tip   in the proximal stomach.       3.  Tiny amount of free fluid scattered in the abdomen, including   presacral/perirectal fluid, most likely related to volume overload.  No   definite findings of rectal wall thickening or fecal impaction.       4.  Moderate pleural effusions, left basilar consolidation, and bibasilar   interstitial thickening, increased when compared to 01/16/2022.       RECOMMENDATIONS:   Unavailable             FINDINGS:kub 1/31/22   Nasogastric tube terminates in the mid stomach.  There is some density within   the stomach which could represent oral contrast.  Nonspecific bowel gas   pattern without identified obstruction.  There does appear to be a moderate   colonic stool burden.  Metallic densities from previous ventral hernia   repair.  Degenerative changes are scattered in the spine.  Catheter in the   midline of the lower pelvis likely represents a Roberts catheter.           Impression   Nonspecific abdomen with apparent moderate stool burden.         ASSESSMENT/plan  1. Abdominal distention, constipation, improved having non bloody bm's  -continue bowel regimen may titrate to have 1-2 BM's per day  -tube feeding as tolerated       2. covid 19 pneumonia with acute hypoxic failure     3. BLANCHE with hyperkalemia     4.acute left thalamic CVA    5.elevated lft's; was started on amiodarone  Will check amylase and lipase  Check lft in am       This plan was formulated in collaboration with Dr. Dayami Moss . Electronically signed by: HERB Omalley CNP on 2/2/2022 at 9:21 AM       Attending Physician Statement  I have discussed the care of Velia Cobos and   I have examined the patient myselft independently, and taken ros and hpi , including pertinent history and exam findings,  with the author of this note . I have reviewed the key elements of all parts of the encounter with the nurse practitioner/resident.     I agree with the assessment, plan and orders as documented by the above health care provider     Patient did have bowel movements   Tolerating tube feeding, but there is fever and tachycardia  Although the white count is normal  Slight elevation of the liver enzymes  We will repeat those tomorrow if they are higher will reimage the biliary tract  Otherwise we will continue to watch for now  electronically signed by Mike Amor MD

## 2022-02-02 NOTE — PLAN OF CARE
Problem: Airway Clearance - Ineffective  Goal: Achieve or maintain patent airway  2/2/2022 0531 by Lizzy Calderon RN  Outcome: Ongoing  2/1/2022 1856 by Carolyn Siddiqui RN  Outcome: Ongoing     Problem: Gas Exchange - Impaired  Goal: Absence of hypoxia  2/2/2022 0531 by Lizzy Calderon RN  Outcome: Ongoing  2/1/2022 1856 by Carolyn Siddiqui RN  Outcome: Ongoing     Problem: Gas Exchange - Impaired  Goal: Promote optimal lung function  2/2/2022 0531 by Lizzy Calderon RN  Outcome: Ongoing  2/1/2022 1856 by Carolyn Siddiqui RN  Outcome: Ongoing     Problem: Body Temperature -  Risk of, Imbalanced  Goal: Ability to maintain a body temperature within defined limits  2/2/2022 0531 by Lizzy Calderon RN  Outcome: Ongoing  2/1/2022 1856 by Carolyn Siddiqui RN  Outcome: Ongoing     Problem: Body Temperature -  Risk of, Imbalanced  Goal: Will regain or maintain usual level of consciousness  2/2/2022 0531 by Lizzy Calderon RN  Outcome: Ongoing  2/1/2022 1856 by Carolyn Siddiqui RN  Outcome: Ongoing     Problem:  Body Temperature -  Risk of, Imbalanced  Goal: Complications related to the disease process, condition or treatment will be avoided or minimized  2/2/2022 0531 by Lizzy Calderon RN  Outcome: Ongoing  2/1/2022 1856 by Carolyn Siddiqui RN  Outcome: Ongoing     Problem: Infection:  Goal: Will remain free from infection  Description: Will remain free from infection  2/2/2022 0531 by Lizzy Calderon RN  Outcome: Ongoing  2/1/2022 1856 by Carolyn Siddiqui RN  Outcome: Ongoing     Problem: Skin Integrity:  Goal: Skin integrity will stabilize  Description: Skin integrity will stabilize  2/2/2022 0531 by Lizzy Calderon RN  Outcome: Ongoing  2/1/2022 1856 by Carolyn Siddiqui RN  Outcome: Ongoing     Problem: Skin Integrity:  Goal: Will show no infection signs and symptoms  Description: Will show no infection signs and symptoms  2/2/2022 0531 by Lizzy Calderon RN  Outcome: Ongoing  2/1/2022 1856 by Carolyn Siddiqui RN  Outcome: Ongoing     Problem: Skin Integrity:  Goal: Absence of new skin breakdown  Description: Absence of new skin breakdown  2/2/2022 0531 by Jesu Otero RN  Outcome: Ongoing  2/1/2022 1856 by Ryland Villagran RN  Outcome: Ongoing     Problem: Non-Violent Restraints  Goal: Removal from restraints as soon as assessed to be safe  2/2/2022 0531 by Jesu Otero RN  Outcome: Ongoing  2/1/2022 1856 by Ryland Villagran RN  Outcome: Ongoing     Problem: Non-Violent Restraints  Goal: No harm/injury to patient while restraints in use  2/2/2022 0531 by Jesu Otero RN  Outcome: Ongoing  2/1/2022 1856 by Ryland Villagran RN  Outcome: Ongoing     Problem: Non-Violent Restraints  Goal: Patient's dignity will be maintained  2/2/2022 0531 by Jesu Otero RN  Outcome: Ongoing  2/1/2022 1856 by Ryland Villagran RN  Outcome: Ongoing

## 2022-02-02 NOTE — PLAN OF CARE
Problem: Airway Clearance - Ineffective  Goal: Achieve or maintain patent airway  2/2/2022 1547 by Delores Jurado RN  Outcome: Ongoing  2/2/2022 0531 by Molina Thakkar RN  Outcome: Ongoing     Problem: Gas Exchange - Impaired  Goal: Absence of hypoxia  2/2/2022 1547 by Delores Jurado RN  Outcome: Ongoing  2/2/2022 0531 by Molina Thakkar RN  Outcome: Ongoing  Goal: Promote optimal lung function  2/2/2022 1547 by Delores Jurado RN  Outcome: Ongoing  2/2/2022 0531 by Molina Thakkar RN  Outcome: Ongoing     Problem: Breathing Pattern - Ineffective  Goal: Ability to achieve and maintain a regular respiratory rate  Outcome: Ongoing     Problem:  Body Temperature -  Risk of, Imbalanced  Goal: Ability to maintain a body temperature within defined limits  2/2/2022 1547 by Delores uJrado RN  Outcome: Ongoing  2/2/2022 0531 by Molina Thakkar RN  Outcome: Ongoing  Goal: Will regain or maintain usual level of consciousness  2/2/2022 1547 by Delores Jurado RN  Outcome: Ongoing  2/2/2022 0531 by Molina Thakkar RN  Outcome: Ongoing  Goal: Complications related to the disease process, condition or treatment will be avoided or minimized  2/2/2022 1547 by Delores Jurado RN  Outcome: Ongoing  2/2/2022 0531 by Molina Thakkar RN  Outcome: Ongoing     Problem: Isolation Precautions - Risk of Spread of Infection  Goal: Prevent transmission of infection  Outcome: Ongoing     Problem: Nutrition Deficits  Goal: Optimize nutritional status  Outcome: Ongoing     Problem: Risk for Fluid Volume Deficit  Goal: Maintain normal heart rhythm  Outcome: Ongoing  Goal: Maintain absence of muscle cramping  Outcome: Ongoing  Goal: Maintain normal serum potassium, sodium, calcium, phosphorus, and pH  Outcome: Ongoing     Problem: Loneliness or Risk for Loneliness  Goal: Demonstrate positive use of time alone when socialization is not possible  Outcome: Ongoing     Problem: Fatigue  Goal: Verbalize increase energy and improved vitality  Outcome: Ongoing Problem: Patient Education: Go to Patient Education Activity  Goal: Patient/Family Education  Outcome: Ongoing     Problem: Falls - Risk of:  Goal: Will remain free from falls  Description: Will remain free from falls  Outcome: Ongoing  Goal: Absence of physical injury  Description: Absence of physical injury  Outcome: Ongoing     Problem: Infection:  Goal: Will remain free from infection  Description: Will remain free from infection  2/2/2022 1547 by Edmund Fletcher RN  Outcome: Ongoing  2/2/2022 0531 by Zack Schneider RN  Outcome: Ongoing     Problem: Safety:  Goal: Free from accidental physical injury  Description: Free from accidental physical injury  Outcome: Ongoing  Goal: Free from intentional harm  Description: Free from intentional harm  Outcome: Ongoing     Problem: Daily Care:  Goal: Daily care needs are met  Description: Daily care needs are met  Outcome: Ongoing     Problem: Pain:  Goal: Patient's pain/discomfort is manageable  Description: Patient's pain/discomfort is manageable  Outcome: Ongoing     Problem: Skin Integrity:  Goal: Skin integrity will stabilize  Description: Skin integrity will stabilize  2/2/2022 1547 by Edmund Fletcher RN  Outcome: Ongoing  2/2/2022 0531 by Zack Schneider RN  Outcome: Ongoing     Problem: Discharge Planning:  Goal: Patients continuum of care needs are met  Description: Patients continuum of care needs are met  Outcome: Ongoing     Problem: Nutrition  Goal: Optimal nutrition therapy  Outcome: Ongoing     Problem: Skin Integrity:  Goal: Will show no infection signs and symptoms  Description: Will show no infection signs and symptoms  2/2/2022 1547 by Edmund Fletcher RN  Outcome: Ongoing  2/2/2022 0531 by Zack Schneider RN  Outcome: Ongoing  Goal: Absence of new skin breakdown  Description: Absence of new skin breakdown  2/2/2022 1547 by Edmund Fletcher RN  Outcome: Ongoing  2/2/2022 0531 by Zack Schneider RN  Outcome: Ongoing     Problem: Non-Violent Restraints  Goal: Removal from restraints as soon as assessed to be safe  2/2/2022 1547 by Nataly Martínez RN  Outcome: Ongoing  2/2/2022 0531 by Alannah Ordaz RN  Outcome: Ongoing  Goal: No harm/injury to patient while restraints in use  2/2/2022 1547 by Nataly Martínez RN  Outcome: Ongoing  2/2/2022 0531 by Alannah Ordaz RN  Outcome: Ongoing  Goal: Patient's dignity will be maintained  2/2/2022 1547 by Nataly Martínez RN  Outcome: Ongoing  2/2/2022 0531 by Alannah Ordaz RN  Outcome: Ongoing

## 2022-02-02 NOTE — PROGRESS NOTES
Pulmonary Critical Care Progress Note  Bronwyn Aguilera MD     Patient seen for the follow up of COVID-19 pneumonia, acute hypoxic respiratory failure. Subjective:  He sedated, intubated on ventilator, FiO2 70%/PEEP 16. He is still on amiodarone drip. He is sedated with fentanyl and Versed drip. Examination:  Vitals: BP (!) 130/46   Pulse 73   Temp 99.7 °F (37.6 °C) (Oral)   Resp (!) 32   Ht 5' 10.98\" (1.803 m)   Wt 234 lb 12.8 oz (106.5 kg)   SpO2 95%   BMI 32.77 kg/m²   General appearance: Sedated, intubated on ventilator  Neck: No JVD  Lungs: Bilateral crackles, no wheeze, moderate air exchange. Heart: regular rate and rhythm, S1, S2 normal, no gallop  Abdomen: Soft, non tender, + BS  Extremities: no cyanosis or clubbing. No significant edema    LABs:  CBC:   Recent Labs     02/01/22  0332 02/02/22 0417   WBC 11.6* 10.7   HGB 13.9 13.8   HCT 47.0 47.6    193     BMP:   Recent Labs     02/01/22  1504 02/01/22  1504 02/01/22 2012 02/02/22  0417     --   --  142   K 5.6*   < > 5.7* 5.6*   CO2 29  --   --  31   BUN 90*  --   --  84*   CREATININE 1.28*  --   --  1.23*   LABGLOM 54*  --   --  57*   GLUCOSE 127*  --   --  142*    < > = values in this interval not displayed.      ABG:  Lab Results   Component Value Date    OOM4HUA NOT REPORTED 02/02/2022    FIO2 75.0 02/02/2022       Lab Results   Component Value Date    POCPH 7.375 02/02/2022    POCPCO2 55.9 02/02/2022    POCPO2 97.5 02/02/2022    POCHCO3 32.7 02/02/2022    PBEA 6 02/02/2022    ZCO5DDI NOT REPORTED 02/02/2022    BHDC1OSA 97 02/02/2022    FIO2 75.0 02/02/2022     Radiology:  X-ray chest 2/2/2022:        Impression:  · Acute on chronic hypoxic respiratory failure  · COVID-19 pneumonia  · COPD/pulmonary emphysema  · Acute left thalamic infarct/CVA  · Left lower lobe opacity versus nodule  · Pulmonary edema  · Elevated D-dimer, decreased platelets  · Systolic heart failure, s/p AICD placement  · BLANCHE on CKD  · Diabetes mellitus    Recommendations:  · Continue vent support, wean FiO2 as tolerated.   85% FiO2, PEEP at 16  · Fentanyl drip, 800 mics  · Versed drip 9 mg/h  · Insulin drip  · Watch off of Luke-Synephrine  · Amiodarone, 0.5 mg/h per cardiology  · Pulmicort aerosol treatment  · Airborne isolation  · IV Decadron 6 mg daily  · IV Lasix  · Not a candidate for Actemra/baricitinib stopped secondary to cytopenias  · Neurology following, would like us to back off Versed and primarily use Precedex if able for sedation  · Albuterol and Ipratropium Q 4 hours and prn  · Tube feeds  · X-ray chest in am  · Labs: CBC and BMP in am  · DVT prophylaxis with low molecular weight heparin  · Discussed with RN  · Will follow with you    Ez Quinteros MD, [facility]  Pulmonary Critical Care and Sleep Medicine,  [facility]  Cell: [phone]  Office: [phone]  CC: 35 minutes

## 2022-02-02 NOTE — PROGRESS NOTES
.  Nephrology  Progress Note    Reason for Consult: Hyperkalemia    Requesting Physician:  Ricardo Malhotra MD    INTERVAL HISTORY:  K is 5.6 today. Creatinine improved from 1.28 to 1.23. Remains sedated and on the ventilator. BUN improved to 84 from 86. Patient has had 2 BM. HISTORY OF PRESENT ILLNESS:    The patient is a 64 y.o. male who presented with to the hospital for worsening shortness of breath ad and worsening lower extremity edema on 1/16. He had diffuse body aches and sweats and a dry cough. He tested positive for COVID-19. He has steadily declined since admission. On 1/18 a CT head found New lacunar infarct left thalamus. He had to be intubated on 1/23. He has a significant past medical history of COPD, hyperlipidemia, Type 2 DM, Right kidney mass, and abdominal aortic aneurysm repair in 2005. His potassium has been steadily rising since 1/24/22. The most current Potassium level is 5.8. His creatine is improved to 1.71. This information was retrieved through chart review and nursing. Patient was unable to provide information due to current status on mechanical ventilation and sedation. Review Of Systems:  Unable to obtain. Patient sedated on ventilator.     Past Medical History:   Diagnosis Date    Arthritis     Atherosclerotic plaque 7/28/2019    Cervical disc disease     degenerative disc disease    Diabetes mellitus ([address]     type 2    History of blood transfusion     Hx of blood clots     left leg    Hyperlipidemia     Neuropathy     Right kidney mass     \"urologist is watching\"    Snores     Type 2 diabetes mellitus treated with insulin ([address] 7/28/2019       Past Surgical History:   Procedure Laterality Date    ABDOMINAL AORTIC ANEURYSM REPAIR  2005    CARPAL TUNNEL RELEASE Bilateral     CERVICAL SPINE SURGERY      COLONOSCOPY      benign polyps removed    INGUINAL HERNIA REPAIR Right     PA REPAIR INCISIONAL HERNIA,REDUCIBLE N/A 5/10/2017    HERNIA VENTRAL REPAIR WITH MESH  performed by Loretta Santana DO at [address] [place] [address] Road  05/10/2017    with mesh       Prior to Admission medications    Medication Sig Start Date End Date Taking? Authorizing Provider   rosuvastatin (CRESTOR) 40 MG tablet Take 40 mg by mouth every evening   Yes Historical Provider, MD   insulin NPH (HUMULIN N;NOVOLIN N) 100 UNIT/ML injection vial Inject 20 Units into the skin 2 times daily (before meals)   Yes Historical Provider, MD   levETIRAcetam (KEPPRA) 500 MG tablet Take 1 tablet by mouth 2 times daily 11/3/21  Yes Kathy Lala MD   venlafaxine (EFFEXOR XR) 150 MG extended release capsule Take 1 capsule by mouth daily (with breakfast) 7/29/19  Yes Arabella Kiser   vitamin B-1 (THIAMINE) 100 MG tablet Take 100 mg by mouth daily   Yes Historical Provider, MD   ferrous sulfate 325 (65 Fe) MG tablet Take 325 mg by mouth daily (with breakfast)   Yes Historical Provider, MD   ALPRAZolam (XANAX) 0.5 MG tablet Take 0.5 mg by mouth three times daily.    Yes Historical Provider, MD   furosemide (LASIX) 40 MG tablet Take 1 tablet by mouth 2 times daily 12/11/18  Yes Reji Evans MD   tiotropium (SPIRIVA RESPIMAT) 2.5 MCG/ACT AERS inhaler Inhale 2 puffs into the lungs daily    Yes Historical Provider, MD   albuterol sulfate  (90 Base) MCG/ACT inhaler Inhale 2 puffs into the lungs every 6 hours as needed for Wheezing or Shortness of Breath   Yes Historical Provider, MD   metoprolol succinate (TOPROL XL) 50 MG extended release tablet Take 50 mg by mouth daily   Yes Historical Provider, MD   Multiple Vitamins-Minerals (MULTIVITAMIN ADULT) TABS Take 1 tablet by mouth daily   Yes Historical Provider, MD   vitamin D (CHOLECALCIFEROL) 1000 UNIT TABS tablet Take 1,000 Units by mouth daily   Yes Historical Provider, MD   fluticasone (FLONASE) 50 MCG/ACT nasal spray 1 spray by Each Nare route daily as needed for Rhinitis   Yes Historical Provider, MD aspirin 325 MG EC tablet Take 325 mg by mouth daily    Yes Historical Provider, MD   Omega-3 Fatty Acids (FISH OIL) 1000 MG CAPS Take 1 capsule by mouth daily    Yes Historical Provider, MD   amLODIPine (NORVASC) 5 MG tablet Take 5 mg by mouth nightly    Yes Historical Provider, MD   mirtazapine (REMERON) 45 MG tablet Take 45 mg by mouth nightly   Yes Historical Provider, MD       Scheduled Meds:   polyethylene glycol  17 g Per NG tube Daily    levalbuterol  1.25 mg Nebulization Q6H    insulin glargine  9 Units SubCUTAneous Daily    lactulose  20 g Oral BID    bisacodyl  10 mg Rectal Daily    furosemide  20 mg IntraVENous BID    pantoprazole  40 mg IntraVENous Daily    And    sodium chloride (PF)  10 mL IntraVENous Daily    aspirin  324 mg Oral Daily    levETIRAcetam  500 mg Oral BID    chlorhexidine  15 mL Mouth/Throat BID    enoxaparin  30 mg SubCUTAneous BID    QUEtiapine  50 mg Oral Once    budesonide  0.5 mg Nebulization BID    metoprolol succinate  50 mg Oral Nightly    dexamethasone  6 mg IntraVENous Q24H    sodium chloride flush  5-40 mL IntraVENous 2 times per day    amLODIPine  5 mg Oral Nightly    atorvastatin  20 mg Oral Daily     Continuous Infusions:   amiodarone 0.5 mg/min (02/02/22 0400)    insulin (HUMAN R) non-weight based infusion 3.65 Units/hr (02/02/22 0811)    phenylephrine (KEARA-SYNEPHRINE) 50mg/250mL infusion Stopped (01/26/22 1820)    propofol 8.059 mcg/kg/min (01/28/22 0610)    midazolam (VERSED) 1 mg/mL in D5W infusion 9 mg/hr (02/02/22 0400)    fentaNYL 100 mcg/hr (02/02/22 0217)    norepinephrine Stopped (01/24/22 1046)    sodium chloride      dextrose       PRN Meds:sodium chloride nebulizer, LORazepam, dextrose bolus (hypoglycemia) **OR** dextrose bolus (hypoglycemia), sodium chloride flush, sodium chloride, ondansetron **OR** ondansetron, acetaminophen **OR** acetaminophen, glucose, glucagon (rDNA), dextrose, hydrALAZINE    Allergies   Allergen Reactions  Barbiturates Anxiety     Other reaction(s): Unknown    Codeine Palpitations    Sulfa Antibiotics Rash     Other reaction(s): Unknown       Social History     Socioeconomic History    Marital status:      Spouse name: Not on file    Number of children: Not on file    Years of education: Not on file    Highest education level: Not on file   Occupational History    Not on file   Tobacco Use    Smoking status: Former Smoker    Smokeless tobacco: Never Used    Tobacco comment: quit 30 years ago   Substance and Sexual Activity    Alcohol use: No    Drug use: No    Sexual activity: Not on file   Other Topics Concern    Not on file   Social History Narrative    Not on file     Social Determinants of Health     Financial Resource Strain:     Difficulty of Paying Living Expenses: Not on file   Food Insecurity:     Worried About [address] in the Last Year: Not on file    [address] in the Last Year: Not on file   Transportation Needs:     Lack of Transportation (Medical): Not on file    Lack of Transportation (Non-Medical):  Not on file   Physical Activity:     Days of Exercise per Week: Not on file    Minutes of Exercise per Session: Not on file   Stress:     Feeling of Stress : Not on file   Social Connections:     Frequency of Communication with Friends and Family: Not on file    Frequency of Social Gatherings with Friends and Family: Not on file    Attends Quaker Services: Not on file    Active Member of Clubs or Organizations: Not on file    Attends Club or Organization Meetings: Not on file    Marital Status: Not on file   Intimate Partner Violence:     Fear of Current or Ex-Partner: Not on file    Emotionally Abused: Not on file    Physically Abused: Not on file    Sexually Abused: Not on file   Housing Stability:     Unable to Pay for Housing in the Last Year: Not on file    Number of [place] in the Last Year: Not on file    Unstable Housing in the Last Year: Not on file       Family History   Problem Relation Age of Onset    Ovarian Cancer Sister     Ovarian Cancer Sister          Physical Exam:  Vitals:    02/02/22 0416 02/02/22 0500 02/02/22 0600 02/02/22 0729   BP:  114/71 115/68    Pulse:  126 126 127   Resp: (!) 6 (!) 5 23 29   Temp:       TempSrc:       SpO2: 95% 94% 95% 95%   Weight:       Height:         I/O last 3 completed shifts: In: 1414.5 [I.V.:988.6; NG/GT:230; IV Piggyback:196]  Out: 4250 [Urine:4250]    Deferred due to [address] isolation.     Data:  CBC:   Lab Results   Component Value Date    WBC 10.7 02/02/2022    HGB 13.8 02/02/2022    HCT 47.6 02/02/2022    MCV 85.8 02/02/2022     02/02/2022     BMP:    Lab Results   Component Value Date     02/02/2022     02/01/2022     02/01/2022    K 5.6 (H) 02/02/2022    K 5.7 (H) 02/01/2022    K 5.6 (H) 02/01/2022     02/02/2022     02/01/2022     02/01/2022    CO2 31 02/02/2022    CO2 29 02/01/2022    CO2 28 02/01/2022    BUN 84 (H) 02/02/2022    BUN 90 (H) 02/01/2022    BUN 88 (H) 02/01/2022    CREATININE 1.23 (H) 02/02/2022    CREATININE 1.28 (H) 02/01/2022    CREATININE 1.35 (H) 02/01/2022    GLUCOSE 142 (H) 02/02/2022    GLUCOSE 127 (H) 02/01/2022    GLUCOSE 347 (H) 02/01/2022     CMP:   Lab Results   Component Value Date     02/02/2022    K 5.6 02/02/2022     02/02/2022    CO2 31 02/02/2022    BUN 84 02/02/2022    CREATININE 1.23 02/02/2022    GLUCOSE 142 02/02/2022    CALCIUM 9.7 02/02/2022    PROT 5.9 02/01/2022    LABALBU 2.5 02/01/2022    BILITOT 0.28 02/01/2022    ALKPHOS 100 02/01/2022    AST 46 02/01/2022    ALT 43 02/01/2022      Hepatic:   Lab Results   Component Value Date    AST 46 (H) 02/01/2022    AST 28 01/31/2022    AST 45 (H) 01/16/2022    ALT 43 (H) 02/01/2022    ALT 26 01/31/2022    ALT 24 01/16/2022    BILITOT 0.28 (L) 02/01/2022    BILITOT 0.32 01/31/2022    BILITOT 0.68 01/16/2022    ALKPHOS 100 02/01/2022    ALKPHOS 99 01/31/2022    ALKPHOS 123 01/16/2022     BNP: No results found for: BNP  Lipids:   Lab Results   Component Value Date    CHOL 129 11/03/2021    HDL 30 (L) 11/03/2021     INR:   Lab Results   Component Value Date    INR 1.0 01/17/2022    INR 1.0 11/03/2021    INR 1.0 07/27/2019     PTH: No results found for: PTH  Phosphorus:    Lab Results   Component Value Date    PHOS 2.4 02/02/2022     Ionized Calcium: No results found for: IONCA  Magnesium:   Lab Results   Component Value Date    MG 2.3 02/02/2022     Albumin:   Lab Results   Component Value Date    LABALBU 2.5 02/01/2022     Last 3 CK, CKMB, Troponin: @LABRCNT(CKTOTAL:3,CKMB:3,TROPONINI:3)       URINE:)No results found for: Barbi Holcomb    Radiology:  Reviewed. Assessment:  BLANCHE, hemodynamically related, good UO, Cr is better. Azotemia. CKD 3A. Hyperkalemia. Hypophosphatemia. Hypovitaminosis D. Metabolic alkalosis. Hypertension. Diabetes mellitis. COVID19 pneumonia. COPD. Plan: We will decrease Amlodipine 2.5 mg daily. Monitor BP. Will replete phosphorus with 15 mmol sodium phosphate. Will start vitamin D supplementation. Off Lokelma. Will decrease Lasix 20 mg IV daily. Continue Lactulose 20 gm 2 times a day. Renal tube feed. Avoid hypotension, nephrotoxic drugs, Lovenox, Fleets enema and IV contrast exposure. Follow-up labs ordered for later today and daily in the morning. We will follow with you. Electronically signed by aErl Pacheco MD  on 2/2/2022 at 8:57 AM   [facility].   Ph: [phone]

## 2022-02-03 NOTE — PROGRESS NOTES
Pulmonary Critical Care Progress Note  Malgorzata Villalobos MD     Patient seen for the follow up of COVID-19 pneumonia, acute hypoxic respiratory failure. Subjective:  He sedated, intubated on ventilator, FiO2 60%/PEEP 16. He is still on amiodarone drip. He is sedated with fentanyl and Versed drip. His Lopressor was switched to lisinopril by cardiology secondary to asystole the night before. He is tolerating tube feeds, moving his bowels. Examination:  Vitals: BP (!) 130/46   Pulse 71   Temp 99.7 °F (37.6 °C) (Oral)   Resp 23   Ht 5' 10.98\" (1.803 m)   Wt 226 lb 3.1 oz (102.6 kg)   SpO2 95%   BMI 31.57 kg/m²   General appearance: Sedated, intubated on ventilator  Neck: No JVD  Lungs: Bilateral crackles, no wheeze, moderate air exchange. Heart: regular rate and rhythm, S1, S2 normal, no gallop  Abdomen: Soft, non tender, + BS  Extremities: no cyanosis or clubbing.   Positive edema    LABs:  CBC:   Recent Labs     02/02/22  0417 02/03/22  0400   WBC 10.7 9.5   HGB 13.8 13.2   HCT 47.6 46.0    165     BMP:   Recent Labs     02/02/22  1740 02/03/22  0400    144   K 5.4* 5.2   CO2 32* 32*   BUN 87* 85*   CREATININE 1.20 1.18   LABGLOM 59* 60*   GLUCOSE 194* 183*     ABG:  Lab Results   Component Value Date    FNC2EZX NOT REPORTED 02/03/2022    FIO2 70.0 02/03/2022       Lab Results   Component Value Date    POCPH 7.366 02/03/2022    POCPCO2 55.9 02/03/2022    POCPO2 153.5 02/03/2022    POCHCO3 32.0 02/03/2022    PBEA 5 02/03/2022    HOM6RWR NOT REPORTED 02/03/2022    RPGM5SML 99 02/03/2022    FIO2 70.0 02/03/2022     Radiology:  X-ray chest 2/3/2022:        Impression:  · Acute on chronic hypoxic respiratory failure  · COVID-19 pneumonia  · COPD/pulmonary emphysema  · Acute left thalamic infarct/CVA  · Left lower lobe opacity versus nodule  · Pulmonary edema  · Elevated D-dimer, decreased platelets  · Systolic heart failure, s/p AICD placement  · BLANCHE on CKD  · Diabetes mellitus    Recommendations:  · Continue vent support, wean FiO2 as tolerated.   85% FiO2, PEEP at 16  · Fentanyl drip, 100 mics  · Versed drip 9 mg/h  · Insulin drip  · Watch off of Luke-Synephrine  · Amiodarone, 0.5 mg/h per cardiology  · Pulmicort aerosol treatment  · Airborne isolation  · IV Decadron 6 mg daily  · IV Lasix  · Not a candidate for Actemra/baricitinib stopped secondary to cytopenias  · Neurology following, would like us to back off Versed and primarily use Precedex if able for sedation  · Xopenex every 6 hours  · Tube feeds  · X-ray chest in am  · Labs: CBC and BMP in am  · DVT prophylaxis with low molecular weight heparin  · GI prophylaxis, Protonix  · Discussed with RN  · Will follow with you    Gregory Gonzales MD, [facility]  Pulmonary Critical Care and Sleep Medicine,  [facility]  Cell: [phone]  Office: [phone]  CC: 35 minutes

## 2022-02-03 NOTE — CARE COORDINATION
Discharge planning:    Patient remains intubated and sedated. FIO2 is 85% and peep is at 16. [address]. Palliative following. Poor prognosis. Spouse may consider comfort care.

## 2022-02-03 NOTE — PROGRESS NOTES
..    Palliative Care Progress Note    NAME:  Velia Cobos  MEDICAL RECORD NUMBER:  [id number]  AGE: 64 y.o. GENDER: male  : 1945  TODAY'S DATE:  2/3/2022    Reason for Consult:  goals of care, code status, and support  History of Present Illness     The patient is a 64 y.o. Non- / non  male who presents with Leg Swelling (bilateral, has CHF, on diuretic, EMT saw pt , assisted pt into car), Fever, and Other (low SPO2)      Referred to Palliative Care by  [x] Physician   [] Nursing  [] Family Request   [] Other:       He was admitted to the primary service for Hypokalemia [E87.6]  Hypomagnesemia [E83.42]  COPD exacerbation ([facility] [J44.1]  BLANCHE (acute kidney injury) ([facility] [N17.9]  Acute respiratory failure with hypoxia ([facility] [J96.01]  Acute on chronic systolic CHF (congestive heart failure) ([facility] [I50.23]  COVID [U07.1]  COVID-19 [U07.1]. His hospital course has been associated with COPD exacerbation, worsening acute interstitial lung disease, COVID-19 pneumonia, acute hypoxic respiratory failure, BLANCHE, acute on chronic systolic CHF s/p AICD, hypokalemia, hypomagnesemia, elevated D-dimer, elevated troponin, elevated inflammatory markers, subacute small left thalamic stroke, and worsening moderate to severe pulmonary artery hypertension. The patient has a complicated medical history and has been hospitalized since 2022 11:51 AM.  Patient is day #12 on vent. He remains on 60 to 70% FiO2, and a PEEP of 16. He is sedated, and on amiodarone drip. GI was consulted on the  for abdominal pain. CT scan on the  revealed moderate stool burden. Patient was started on bowel regimen, and RN reports that patient has had 2 BMs. Kidney function has improved. Patient is a DNR CCA CODE STATUS. Palliative care following for review of goals, CODE STATUS discussion, and support. OVERNIGHT EVENTS: No overnight events reported. RN reports patient has a history of severe PVD and BLE.   He also reports that patient has been maintained on high vent settings with very little improvement. Chest x-ray today revealed  Impression   Slight improvement in aeration of the right base with slight worsening   infiltrates in the left lung.  Continued follow-up recommended. Liver ultrasound today revealed  Impression   Mild hepatomegaly with subtle slightly coarse echotexture.       Very small amount of perihepatic ascites. 2/3 pertinent labs include; PCO2 55.9, HCO3 32, albumin 2.4, ALT 77, AST 72, BUN 85,     BP (!) 130/46   Pulse 70   Temp 99.7 °F (37.6 °C) (Oral)   Resp 23   Ht 5' 10.98\" (1.803 m)   Wt 226 lb 3.1 oz (102.6 kg)   SpO2 96%   BMI 31.57 kg/m²     Assessment        REVIEW OF SYSTEMS    [x]   UNABLE TO OBTAIN: Patient is intubated and sedated. Constitutional:  []   Chills   []  Fatigue   []  Fevers   []  Malaise   []  Weight loss   [] Other:     Respiratory:   []  Cough    []  Shortness of breath    []  Chest pain    [] Other:     Cardiovascular:   []  Chest pain  []  Dyspnea    []  Exertional chest pressure/discomfort     [] Fatigue      []  Palpitations    []  Syncope   [] Other:     Gastrointestinal:   []  Abdominal pain   []  Constipation    []  Diarrhea    []   Dysphagia   []  Reflux             []  Vomiting   [] Other:     Genitourinary:  []  Dysuria     []  Frequency   []  Hematuria   [] Nocturia   []  Urinary incontinence   [] Other:     Musculoskeletal:   [] Back pain    []  Muscle weakness   []  Myalgias    []  Neck pain   []  Stiff joints   []  Other:     Behavioral/Psych:   [] Anxiety    []  Depression     []  Mood swings   [] Other:     PHYSICAL ASSESSMENT: Patient seen from door due to strict COVID-19 isolation, and in efforts to conserve PPE during global pandemic.     General: []  Oriented x3      [] well appearing      [x] Intubated      [x] ill appearing      [] Other:    Mental Status: [] normal mental status exam      [x] drowsy      [] Confused      [] Other: COVID-19 disease, and answer questions. I informed her that kidney function has improved. I discussed today's chest x-ray, and liver ultrasound with her. I explained patient's current CODE STATUS of DNR CCA, and the option to focus on comfort where aggressive measures would be stopped. Wife reports that she is hoping that patient can improve, and daughter reports that it sounds like patient is not improving. I discussed the typical number days on ventilator, and the risk with prolonged intubation. I also discussed how lung damage can prevent patient from weaning down or off vent. I offered wife support, and asked her if she had any questions, and she reported no at this time. Education/support to staff  Education/support to family  Communications with primary service  Providing support for coping/adaptation/distress of family  Discussing meaning/purpose   Decision making regarding life prolonging treatment  Continue with current plan of care  Clarification of medical condition to patient and family  Code status clarified: [facility]  Code status clarified: [facility]  Palliative care orders introduced  Validating patient/family distress  Continued communication updates  Recognizing, reflecting, and empathizing with family members' anticipatory grief  Principle Problem/Diagnosis:  Covid-19     Additional Assessments:  Active Problems:    COVID-19    Acute on chronic systolic CHF (congestive heart failure) ([facility]    Acute respiratory failure with hypoxia (HCC)    BLANCHE (acute kidney injury) ([facility]    COPD exacerbation ([facility]    Hypokalemia    Hypomagnesemia    Palliative care encounter    Goals of care, counseling/discussion    DNR (do not resuscitate) discussion    ACP (advance care planning)    Constipation    Abdominal pain, generalized  Resolved Problems:    * No resolved hospital problems. *    1- Symptom management/ pain control     Pain Assessment:  Pain is controlled with current analgesics. Medication(s) being used: narcotic analgesics including Fentanyl drip. Anxiety:  sedated on vent                          Dyspnea:  acute dyspnea - on vent                          Fatigue:  sedated on vent    Other: Malnutrition-tube feeds continue    We feel the patient symptoms are being controlled. his current regimen is reviewed by myself and discussed with the staff. 2- Goals of care evaluation   The patient goals of care are improve or maintain function/quality of life and support for family/caregiver   Goals of care discussed with:    [] Patient independently    [] Patient and Family    [x] Family or Healthcare DPOA independently    [] Unable to discuss with patient, family/DPOA not present    3- Code Status  DNR-CCA    4- Other recommendations  - We will continue to provide comfort and support to the patient and the family    Please call with any palliative questions or concerns. Palliative Care Team is available via perfect serve or via phone. Palliative Care will continue to follow Mr. Dominguez's care as needed. The note has been dictated by dragon, typing errors may be a possibility     Thank you for allowing Palliative Care to participate in the care of Mr. Jose David Triana . Electronically signed by   [facility]  Palliative Care Team  on 2/3/2022 at 8:58 AM    Palliative care can be reached via [employer].

## 2022-02-03 NOTE — PROGRESS NOTES
[facility]    Gastroenterology Daily Progress Note      Patient:   Leonie Morales   :    1945   Facility:   [facility]  Date:     2/3/2022  Consultant:   HERB Hernández - CNP, CNP      SUBJECTIVE  64 y.o. male admitted 2022 with Hypokalemia [E87.6]  Hypomagnesemia [E83.42]  COPD exacerbation ([facility] [address] [J44.1]  BLANCHE (acute kidney injury) ([facility] [address] [N17.9]  Acute respiratory failure with hypoxia ([facility] [address] [J96.01]  Acute on chronic systolic CHF (congestive heart failure) ([facility] [address] [I50.23]  COVID [U07.1]  COVID-19 [U07.1] and seen for abdominal distention with constipation and elevated lft's. The pt was discussed with the primary rn. Per the rn he continues to have non bloody BM's and is tolerating tube feeding. lft's remain elevated lipase and amylase are normal. .        OBJECTIVE  Scheduled Meds:   amLODIPine  2.5 mg Oral Nightly    furosemide  20 mg IntraVENous Daily    Vitamin D  1,000 Units Oral Daily    polyethylene glycol  17 g Per NG tube Daily    levalbuterol  1.25 mg Nebulization Q6H    insulin glargine  9 Units SubCUTAneous Daily    lactulose  20 g Oral BID    bisacodyl  10 mg Rectal Daily    pantoprazole  40 mg IntraVENous Daily    And    sodium chloride (PF)  10 mL IntraVENous Daily    aspirin  324 mg Oral Daily    levETIRAcetam  500 mg Oral BID    chlorhexidine  15 mL Mouth/Throat BID    enoxaparin  30 mg SubCUTAneous BID    QUEtiapine  50 mg Oral Once    budesonide  0.5 mg Nebulization BID    metoprolol succinate  50 mg Oral Nightly    dexamethasone  6 mg IntraVENous Q24H    sodium chloride flush  5-40 mL IntraVENous 2 times per day    atorvastatin  20 mg Oral Daily       Vital Signs:  BP (!) 130/46   Pulse 68   Temp 99.7 °F (37.6 °C) (Oral)   Resp 25   Ht 5' 10.98\" (1.803 m)   Wt 226 lb 3.1 oz (102.6 kg)   SpO2 96%   BMI 31.57 kg/m²      Physical Exam:   Physical Exam  .. Due to the current efforts to prevent transmission of COVID-19 and also the need to preserve PPE for other caregivers, a face-to-face encounter with the patient was not performed. That being said, all relevant records and diagnostic tests were reviewed, including laboratory results and imaging. Please reference any relevant documentation elsewhere. Care will be coordinated with the primary service. Lab and Imaging Review     CBC  Recent Labs     02/01/22  0332 02/02/22  0417 02/03/22  0400   WBC 11.6* 10.7 9.5   HGB 13.9 13.8 13.2   HCT 47.0 47.6 46.0   MCV 84.7 85.8 86.0    193 165       BMP  Recent Labs     02/02/22  0417 02/02/22  1740 02/03/22  0400    142 144   K 5.6* 5.4* 5.2    106 107   CO2 31 32* 32*   BUN 84* 87* 85*   CREATININE 1.23* 1.20 1.18   GLUCOSE 142* 194* 183*   CALCIUM 9.7 9.5 9.6       LFTS  Recent Labs     01/31/22  1635 02/01/22  1504 02/03/22  0400   ALKPHOS 99 100 117   ALT 26 43* 77*   AST 28 46* 72*   PROT 6.0* 5.9* 5.6*   BILITOT 0.32 0.28* 0.37   BILIDIR 0.16  --  0.16   LABALBU 2.4* 2.5* 2.4*       AMYLASE/LIPASE/AMMONIA  Recent Labs     01/31/22  1635 02/02/22  0417   AMYLASE  --  46   LIPASE 34 19     FINDINGS:ct abd 1/28/22   Lower Chest: There are small to moderate bilateral pleural effusions,   increased from previous CT dated 01/16/2022.  Significant respiratory motion   limits assessment of the lung bases.  There is increased consolidation at the   left base.  There is increased interstitial thickening at both lung bases,   possibly accentuated by the degree of motion.       Organs: Limited unenhanced liver and gallbladder are within normal limits. Motion is limiting assessment of the upper abdominal organs as well.  Spleen,   pancreas, and adrenal glands are unremarkable.       Kidneys are symmetric in size and attenuation.  No renal or ureteral   calculus.  No hydronephrosis or perinephric inflammation.       GI/Bowel: Enteric tube is in place with distal tip in the proximal stomach.    There is a moderate amount of fecal material in the large bowel.  No abnormal   bowel distention or focal pericolonic inflammation.  No free air.  There is a   tiny amount of free fluid adjacent to the liver, in the pericolic gutters,   and in the pelvis. Dorothe [place] is a tiny amount of presacral fluid and perirectal   stranding.  No significant rectal wall thickening appreciated.       Pelvis: Roberts catheter is in place.  Urinary bladder is nondistended.  There   is no pelvic lymphadenopathy.       Peritoneum/Retroperitoneum: No evidence of abdominal aortic aneurysm. Abdominal aortic stent graft noted.  No retroperitoneal or mesenteric   lymphadenopathy.       Bones/Soft Tissues:  There is mild scattered body wall edema.  There are   extensive degenerative changes in the lumbar spine.  No acute or suspicious   osseous abnormality.           Impression   1.  Overall mildly limited study due to motion and lack of contrast.       2.  No evidence of bowel obstruction.  Moderate stool burden is noted,   possibly related to constipation.  Enteric tube is in place with distal tip   in the proximal stomach.       3.  Tiny amount of free fluid scattered in the abdomen, including   presacral/perirectal fluid, most likely related to volume overload.  No   definite findings of rectal wall thickening or fecal impaction.       4.  Moderate pleural effusions, left basilar consolidation, and bibasilar   interstitial thickening, increased when compared to 01/16/2022.       RECOMMENDATIONS:   Unavailable             FINDINGS:kub 1/31/22   Nasogastric tube terminates in the mid stomach.  There is some density within   the stomach which could represent oral contrast.  Nonspecific bowel gas   pattern without identified obstruction.  There does appear to be a moderate   colonic stool burden.  Metallic densities from previous ventral hernia   repair.  Degenerative changes are scattered in the spine.  Catheter in the   midline of the lower pelvis likely represents a Roberts catheter.           Impression   Nonspecific abdomen with apparent moderate stool burden.          ASSESSMENT/plan  1. Abdominal distention, constipation, improved continues to have non bloody bm's  -continue bowel regimen may titrate to have 1-2 BM's per day  -tube feeding as tolerated        2. covid 19 pneumonia with acute hypoxic failure     3. BLANCHE with hyperkalemia     4.acute left thalamic CVA     5.elevated lft's; remain elevated, was started on amiodarone  Liver us ordered and continue to trend      Will d/w md  This plan was formulated in collaboration with Dr. Sivan Nesbitt Electronically signed by: HERB Vidal CNP on 2/3/2022 at 7:14 AM     Attending Physician Statement  I have discussed the care of Sammy Schrader and   I have examined the patient myselft independently, and taken ros and hpi , including pertinent history and exam findings,  with the author of this note . I have reviewed the key elements of all parts of the encounter with the nurse practitioner/resident.     I agree with the assessment, plan and orders as documented by the above health care provider       Liver enzymes worsening most likely related to worsening Covid infection  Ultrasound and work-up so far negative  No sign of bleeding continue to monitor continue PPI call if there is any change    Electronically signed by Giorgio Ashby MD

## 2022-02-03 NOTE — PLAN OF CARE
Problem: Airway Clearance - Ineffective  Goal: Achieve or maintain patent airway  2/2/2022 2253 by Jean Spangler RN  Outcome: Ongoing  2/2/2022 1547 by Annika Sargent RN  Outcome: Ongoing     Problem: Gas Exchange - Impaired  Goal: Absence of hypoxia  2/2/2022 2253 by Jean Spangler RN  Outcome: Ongoing  2/2/2022 1547 by Annika Sargent RN  Outcome: Ongoing  Goal: Promote optimal lung function  2/2/2022 2253 by Jean Spangler RN  Outcome: Ongoing  2/2/2022 1547 by Annika Sargent RN  Outcome: Ongoing     Problem: Breathing Pattern - Ineffective  Goal: Ability to achieve and maintain a regular respiratory rate  2/2/2022 2253 by Jean Spangler RN  Outcome: Ongoing  2/2/2022 1547 by Annika Sargent RN  Outcome: Ongoing     Problem:  Body Temperature -  Risk of, Imbalanced  Goal: Ability to maintain a body temperature within defined limits  2/2/2022 2253 by Jean Spangler RN  Outcome: Ongoing  2/2/2022 1547 by Annika Sargent RN  Outcome: Ongoing  Goal: Will regain or maintain usual level of consciousness  2/2/2022 2253 by Jean Spangler RN  Outcome: Ongoing  2/2/2022 1547 by Annika Sargent RN  Outcome: Ongoing  Goal: Complications related to the disease process, condition or treatment will be avoided or minimized  2/2/2022 2253 by Jean Spangler RN  Outcome: Ongoing  2/2/2022 1547 by Annika Sargent RN  Outcome: Ongoing     Problem: Isolation Precautions - Risk of Spread of Infection  Goal: Prevent transmission of infection  2/2/2022 2253 by Jean Spangler RN  Outcome: Ongoing  2/2/2022 1547 by Annika Sargent RN  Outcome: Ongoing     Problem: Nutrition Deficits  Goal: Optimize nutritional status  2/2/2022 2253 by Jean Spangler RN  Outcome: Ongoing  2/2/2022 1547 by Annika Sargent RN  Outcome: Ongoing     Problem: Risk for Fluid Volume Deficit  Goal: Maintain normal heart rhythm  2/2/2022 2253 by Jean Spangler RN  Outcome: Ongoing  2/2/2022 1547 by Annika Sargent RN  Outcome: Ongoing  Goal: Maintain absence of muscle cramping  2/2/2022 2253 by Sadia Goodwin LOURDES Toussaint  Outcome: Ongoing  2/2/2022 1547 by Rusty Crenshaw RN  Outcome: Ongoing  Goal: Maintain normal serum potassium, sodium, calcium, phosphorus, and pH  2/2/2022 2253 by Jovanny Prater RN  Outcome: Ongoing  2/2/2022 1547 by Rusty Crenshaw RN  Outcome: Ongoing     Problem: Loneliness or Risk for Loneliness  Goal: Demonstrate positive use of time alone when socialization is not possible  2/2/2022 2253 by Jovanny Prater RN  Outcome: Ongoing  2/2/2022 1547 by Rusty Crenshaw RN  Outcome: Ongoing     Problem: Fatigue  Goal: Verbalize increase energy and improved vitality  2/2/2022 2253 by Jovanny Prater RN  Outcome: Ongoing  2/2/2022 1547 by Rusty Crenshwa RN  Outcome: Ongoing     Problem: Patient Education: Go to Patient Education Activity  Goal: Patient/Family Education  2/2/2022 2253 by Jovanny Prater RN  Outcome: Ongoing  2/2/2022 1547 by Rusty Crenshaw RN  Outcome: Ongoing     Problem: Falls - Risk of:  Goal: Will remain free from falls  Description: Will remain free from falls  2/2/2022 2253 by Jovanny Prater RN  Outcome: Ongoing  2/2/2022 1547 by Rusty Crenshaw RN  Outcome: Ongoing  Goal: Absence of physical injury  Description: Absence of physical injury  2/2/2022 2253 by Jovanny Prater RN  Outcome: Ongoing  2/2/2022 1547 by Rusty Crenshaw RN  Outcome: Ongoing     Problem: Infection:  Goal: Will remain free from infection  Description: Will remain free from infection  2/2/2022 2253 by Jovanny Prater RN  Outcome: Ongoing  2/2/2022 1547 by Rusty Crenshaw RN  Outcome: Ongoing     Problem: Safety:  Goal: Free from accidental physical injury  Description: Free from accidental physical injury  2/2/2022 2253 by Jovanny Prater RN  Outcome: Ongoing  2/2/2022 1547 by Rusty Crenshaw RN  Outcome: Ongoing  Goal: Free from intentional harm  Description: Free from intentional harm  2/2/2022 2253 by Jovanny Prater RN  Outcome: Ongoing  2/2/2022 1547 by Rusty Crenshaw RN  Outcome: Ongoing     Problem: Daily Care:  Goal: Daily care needs are met  Description: Daily care needs are met  2/2/2022 2253 by Wyatt Brand RN  Outcome: Ongoing  2/2/2022 1547 by Ricki Kumar RN  Outcome: Ongoing     Problem: Pain:  Goal: Patient's pain/discomfort is manageable  Description: Patient's pain/discomfort is manageable  2/2/2022 2253 by Wyatt Brand RN  Outcome: Ongoing  2/2/2022 1547 by Ricki Kumar RN  Outcome: Ongoing     Problem: Skin Integrity:  Goal: Skin integrity will stabilize  Description: Skin integrity will stabilize  2/2/2022 2253 by Wyatt Brand RN  Outcome: Ongoing  2/2/2022 1547 by Ricki Kumar RN  Outcome: Ongoing     Problem: Discharge Planning:  Goal: Patients continuum of care needs are met  Description: Patients continuum of care needs are met  2/2/2022 2253 by Wyatt Brand RN  Outcome: Ongoing  2/2/2022 1547 by Ricki Kumar RN  Outcome: Ongoing     Problem: Nutrition  Goal: Optimal nutrition therapy  2/2/2022 2253 by Wyatt Brand RN  Outcome: Ongoing  2/2/2022 1547 by Ricki Kumar RN  Outcome: Ongoing     Problem: Skin Integrity:  Goal: Will show no infection signs and symptoms  Description: Will show no infection signs and symptoms  2/2/2022 2253 by Wyatt Brand RN  Outcome: Ongoing  2/2/2022 1547 by Ricki Kumar RN  Outcome: Ongoing  Goal: Absence of new skin breakdown  Description: Absence of new skin breakdown  2/2/2022 2253 by Wyatt Brand RN  Outcome: Ongoing  2/2/2022 1547 by Ricki Kumar RN  Outcome: Ongoing     Problem: Non-Violent Restraints  Goal: Removal from restraints as soon as assessed to be safe  2/2/2022 2253 by Wyatt Brand RN  Outcome: Ongoing  2/2/2022 1547 by Ricki Kumar RN  Outcome: Ongoing  Goal: No harm/injury to patient while restraints in use  2/2/2022 2253 by Wyatt Brand RN  Outcome: Ongoing  2/2/2022 1547 by Ricki Kumar RN  Outcome: Ongoing  Goal: Patient's dignity will be maintained  2/2/2022 2253 by Wyatt Brand RN  Outcome: Ongoing  2/2/2022 1547 by Ricki Kumar RN  Outcome: Ongoing

## 2022-02-03 NOTE — PROGRESS NOTES
.  Nephrology  Progress Note    Reason for Consult: Hyperkalemia    Requesting Physician:  Yanira Alba MD    INTERVAL HISTORY:  K continues to improve. Today is 5.2. Creatinine Stable. Remains sedated and on the ventilator. BUN improved to 85 from 87. Patient had 2 bowel movements overnight. Yesterday patient went asystole, he is a DNRCCA and did not receive or require compressions, cardiology following closely and adjusted medications. His SBP ranging 137-151. HISTORY OF PRESENT ILLNESS:    The patient is a 64 y.o. male who presented with to the hospital for worsening shortness of breath ad and worsening lower extremity edema on 1/16. He had diffuse body aches and sweats and a dry cough. He tested positive for COVID-19. He has steadily declined since admission. On 1/18 a CT head found New lacunar infarct left thalamus. He had to be intubated on 1/23. He has a significant past medical history of COPD, hyperlipidemia, Type 2 DM, Right kidney mass, and abdominal aortic aneurysm repair in 2005. His potassium has been steadily rising since 1/24/22. The most current Potassium level is 5.8. His creatine is improved to 1.71. This information was retrieved through chart review and nursing. Patient was unable to provide information due to current status on mechanical ventilation and sedation. Review Of Systems:  Unable to obtain. Patient sedated on ventilator.     Past Medical History:   Diagnosis Date    Arthritis     Atherosclerotic plaque 7/28/2019    Cervical disc disease     degenerative disc disease    Diabetes mellitus ([facility] [address]     type 2    History of blood transfusion     Hx of blood clots     left leg    Hyperlipidemia     Neuropathy     Right kidney mass     \"urologist is watching\"    Snores     Type 2 diabetes mellitus treated with insulin ([facility] [address] 7/28/2019       Past Surgical History:   Procedure Laterality Date    ABDOMINAL AORTIC ANEURYSM REPAIR  2005    CARPAL TUNNEL RELEASE Bilateral     CERVICAL SPINE SURGERY      COLONOSCOPY      benign polyps removed    INGUINAL HERNIA REPAIR Right     GA REPAIR INCISIONAL HERNIA,REDUCIBLE N/A 5/10/2017    HERNIA VENTRAL REPAIR WITH MESH  performed by Lita Apley, DO at [address] [place] [address] Road  05/10/2017    with mesh       Prior to Admission medications    Medication Sig Start Date End Date Taking? Authorizing Provider   rosuvastatin (CRESTOR) 40 MG tablet Take 40 mg by mouth every evening   Yes Historical Provider, MD   insulin NPH (HUMULIN N;NOVOLIN N) 100 UNIT/ML injection vial Inject 20 Units into the skin 2 times daily (before meals)   Yes Historical Provider, MD   levETIRAcetam (KEPPRA) 500 MG tablet Take 1 tablet by mouth 2 times daily 11/3/21  Yes Hudson Gomez MD   venlafaxine (EFFEXOR XR) 150 MG extended release capsule Take 1 capsule by mouth daily (with breakfast) 7/29/19  Yes Arabella Kiser   vitamin B-1 (THIAMINE) 100 MG tablet Take 100 mg by mouth daily   Yes Historical Provider, MD   ferrous sulfate 325 (65 Fe) MG tablet Take 325 mg by mouth daily (with breakfast)   Yes Historical Provider, MD   ALPRAZolam (XANAX) 0.5 MG tablet Take 0.5 mg by mouth three times daily.    Yes Historical Provider, MD   furosemide (LASIX) 40 MG tablet Take 1 tablet by mouth 2 times daily 12/11/18  Yes Royce Evans MD   tiotropium (SPIRIVA RESPIMAT) 2.5 MCG/ACT AERS inhaler Inhale 2 puffs into the lungs daily    Yes Historical Provider, MD   albuterol sulfate  (90 Base) MCG/ACT inhaler Inhale 2 puffs into the lungs every 6 hours as needed for Wheezing or Shortness of Breath   Yes Historical Provider, MD   metoprolol succinate (TOPROL XL) 50 MG extended release tablet Take 50 mg by mouth daily   Yes Historical Provider, MD   Multiple Vitamins-Minerals (MULTIVITAMIN ADULT) TABS Take 1 tablet by mouth daily   Yes Historical Provider, MD   vitamin D (CHOLECALCIFEROL) 1000 UNIT TABS tablet Take 1,000 Units by mouth daily   Yes Historical Provider, MD   fluticasone (FLONASE) 50 MCG/ACT nasal spray 1 spray by Each Nare route daily as needed for Rhinitis   Yes Historical Provider, MD   aspirin 325 MG EC tablet Take 325 mg by mouth daily    Yes Historical Provider, MD   Omega-3 Fatty Acids (FISH OIL) 1000 MG CAPS Take 1 capsule by mouth daily    Yes Historical Provider, MD   amLODIPine (NORVASC) 5 MG tablet Take 5 mg by mouth nightly    Yes Historical Provider, MD   mirtazapine (REMERON) 45 MG tablet Take 45 mg by mouth nightly   Yes Historical Provider, MD       Scheduled Meds:   lisinopril  20 mg Oral Daily    amLODIPine  2.5 mg Oral Nightly    furosemide  20 mg IntraVENous Daily    Vitamin D  1,000 Units Oral Daily    polyethylene glycol  17 g Per NG tube Daily    levalbuterol  1.25 mg Nebulization Q6H    insulin glargine  9 Units SubCUTAneous Daily    lactulose  20 g Oral BID    bisacodyl  10 mg Rectal Daily    pantoprazole  40 mg IntraVENous Daily    And    sodium chloride (PF)  10 mL IntraVENous Daily    aspirin  324 mg Oral Daily    levETIRAcetam  500 mg Oral BID    chlorhexidine  15 mL Mouth/Throat BID    enoxaparin  30 mg SubCUTAneous BID    QUEtiapine  50 mg Oral Once    budesonide  0.5 mg Nebulization BID    dexamethasone  6 mg IntraVENous Q24H    sodium chloride flush  5-40 mL IntraVENous 2 times per day    atorvastatin  20 mg Oral Daily     Continuous Infusions:   fentaNYL 100 mcg/hr (02/03/22 0725)    amiodarone 0.5 mg/min (02/03/22 0612)    insulin (HUMAN R) non-weight based infusion 3.2 Units/hr (02/03/22 0915)    phenylephrine (KEARA-SYNEPHRINE) 50mg/250mL infusion Stopped (01/26/22 1820)    propofol 8.059 mcg/kg/min (01/28/22 0610)    midazolam (VERSED) 1 mg/mL in D5W infusion 9 mg/hr (02/03/22 0612)    sodium chloride      dextrose       PRN Meds:sodium chloride nebulizer, LORazepam, dextrose bolus (hypoglycemia) **OR** dextrose bolus (hypoglycemia), sodium chloride flush, sodium chloride, ondansetron **OR** ondansetron, acetaminophen **OR** acetaminophen, glucose, glucagon (rDNA), dextrose, hydrALAZINE    Allergies   Allergen Reactions    Barbiturates Anxiety     Other reaction(s): Unknown    Codeine Palpitations    Sulfa Antibiotics Rash     Other reaction(s): Unknown       Social History     Socioeconomic History    Marital status:      Spouse name: Not on file    Number of children: Not on file    Years of education: Not on file    Highest education level: Not on file   Occupational History    Not on file   Tobacco Use    Smoking status: Former Smoker    Smokeless tobacco: Never Used    Tobacco comment: quit 30 years ago   Substance and Sexual Activity    Alcohol use: No    Drug use: No    Sexual activity: Not on file   Other Topics Concern    Not on file   Social History Narrative    Not on file     Social Determinants of Health     Financial Resource Strain:     Difficulty of Paying Living Expenses: Not on file   Food Insecurity:     Worried About Running Out of Food in the Last Year: Not on file    Dona of Food in the Last Year: Not on file   Transportation Needs:     Lack of Transportation (Medical): Not on file    Lack of Transportation (Non-Medical):  Not on file   Physical Activity:     Days of Exercise per Week: Not on file    Minutes of Exercise per Session: Not on file   Stress:     Feeling of Stress : Not on file   Social Connections:     Frequency of Communication with Friends and Family: Not on file    Frequency of Social Gatherings with Friends and Family: Not on file    Attends Mandaen Services: Not on file    Active Member of Clubs or Organizations: Not on file    Attends Club or Organization Meetings: Not on file    Marital Status: Not on file   Intimate Partner Violence:     Fear of Current or Ex-Partner: Not on file    Emotionally Abused: Not on file    Physically Abused: Not on file   Mayda Cross Sexually Abused: Not on file   Housing Stability:     Unable to Pay for Housing in the Last Year: Not on file    Number of Places Lived in the Last Year: Not on file    Unstable Housing in the Last Year: Not on file       Family History   Problem Relation Age of Onset    Ovarian Cancer Sister     Ovarian Cancer Sister          Physical Exam:  Vitals:    02/03/22 0900 02/03/22 0915 02/03/22 0920 02/03/22 0930   BP:       Pulse: 71 76 80 70   Resp: (!) 31 (!) 31 (!) 32 30   Temp:       TempSrc:       SpO2: 96% 96% 95% 95%   Weight:       Height:         I/O last 3 completed shifts: In: 4862.8 [I.V.:1673.8; NG/GT:2993; IV MRFUFIUFT:654]  Out: 3628 [Urine:3150]    Deferred due to [address] isolation.      Data:  CBC:   Lab Results   Component Value Date    WBC 9.5 02/03/2022    HGB 13.2 02/03/2022    HCT 46.0 02/03/2022    MCV 86.0 02/03/2022     02/03/2022     BMP:    Lab Results   Component Value Date     02/03/2022     02/02/2022     02/02/2022    K 5.2 02/03/2022    K 5.4 (H) 02/02/2022    K 5.6 (H) 02/02/2022     02/03/2022     02/02/2022     02/02/2022    CO2 32 (H) 02/03/2022    CO2 32 (H) 02/02/2022    CO2 31 02/02/2022    BUN 85 (H) 02/03/2022    BUN 87 (H) 02/02/2022    BUN 84 (H) 02/02/2022    CREATININE 1.18 02/03/2022    CREATININE 1.20 02/02/2022    CREATININE 1.23 (H) 02/02/2022    GLUCOSE 183 (H) 02/03/2022    GLUCOSE 194 (H) 02/02/2022    GLUCOSE 142 (H) 02/02/2022     CMP:   Lab Results   Component Value Date     02/03/2022    K 5.2 02/03/2022     02/03/2022    CO2 32 02/03/2022    BUN 85 02/03/2022    CREATININE 1.18 02/03/2022    GLUCOSE 183 02/03/2022    CALCIUM 9.6 02/03/2022    PROT 5.6 02/03/2022    LABALBU 2.4 02/03/2022    BILITOT 0.37 02/03/2022    ALKPHOS 117 02/03/2022    AST 72 02/03/2022    ALT 77 02/03/2022      Hepatic:   Lab Results   Component Value Date    AST 72 (H) 02/03/2022    AST 46 (H) 02/01/2022    AST 28 01/31/2022    ALT 77 (H) 02/03/2022    ALT 43 (H) 02/01/2022    ALT 26 01/31/2022    BILITOT 0.37 02/03/2022    BILITOT 0.28 (L) 02/01/2022    BILITOT 0.32 01/31/2022    ALKPHOS 117 02/03/2022    ALKPHOS 100 02/01/2022    ALKPHOS 99 01/31/2022     BNP: No results found for: BNP  Lipids:   Lab Results   Component Value Date    CHOL 129 11/03/2021    HDL 30 (L) 11/03/2021     INR:   Lab Results   Component Value Date    INR 1.0 01/17/2022    INR 1.0 11/03/2021    INR 1.0 07/27/2019     PTH: No results found for: PTH  Phosphorus:    Lab Results   Component Value Date    PHOS 3.0 02/03/2022     Ionized Calcium: No results found for: IONCA  Magnesium:   Lab Results   Component Value Date    MG 2.1 02/03/2022     Albumin:   Lab Results   Component Value Date    LABALBU 2.4 02/03/2022     Last 3 CK, CKMB, Troponin: @LABRCNT(CKTOTAL:3,CKMB:3,TROPONINI:3)       URINE:)No results found for: Mertk Doss    Radiology:  Reviewed. Assessment:  BLANCHE, hemodynamically related, good UO, Cr is better. Azotemia. CKD 3A. Hyperkalemia. Hypophosphatemia. Hypovitaminosis D. Metabolic alkalosis. Hypertension. Diabetes mellitis. COVID19 pneumonia. COPD. Plan:  Monitor potassium off of Lokelma. Will recheck K at 1500. Call nephrology if K is greater than 5.2. Metoprolol discontinued per cardiology. Lisinopril 20 mg daily started 2/2/2022 per cardiology. Monitor K and creatinine closely. Continue Amlodipine 2.5 mg daily. Monitor BP. Phosphorus improved. Continue vitamin D supplementation. Continue Lasix 20 mg IV daily. Continue Lactulose 20 gm 2 times a day. If this needs to be discontinued please notify nephrology first.  Renal tube feed. Avoid hypotension, nephrotoxic drugs, Lovenox, Fleets enema and IV contrast exposure. Follow-up labs for the morning. We will follow with you. Patient seen in collaboration with Dr. Tia Landis.       Electronically signed by HERB Lim CNP  on 2/3/2022 at 9:50 AM   [facility].   Ph: [phone]           Physician Addendum  I evaluated the patient with CNP   Agree with above assessment and plan   recheck potassium in the afternoon  monitor potassium closely after lisinopril was started  currently off Vonzell La  Electronically signed by Estefany Bustamante MD on 02/03/22 1:39 PM

## 2022-02-03 NOTE — PROGRESS NOTES
Progress note  [facility].,    Adult Hospitalist      Name: Marilou Bermudez  MRN: [id number]     Acct: [de-identified]  Room: [address] [place] [address]    Admit Date: 1/16/2022 11:51 AM  PCP: Mikey Finn MD    Primary Problem  Active Problems:    COVID-19    Acute on chronic systolic CHF (congestive heart failure) (HCC)    Acute respiratory failure with hypoxia (HCC)    BLANCHE (acute kidney injury) ([facility] [address]    COPD exacerbation ([facility] [address]    Hypokalemia    Hypomagnesemia    Palliative care encounter    Goals of care, counseling/discussion    DNR (do not resuscitate) discussion    ACP (advance care planning)    Constipation    Abdominal pain, generalized  Resolved Problems:    * No resolved hospital problems. *        Assesment:   Acute congestive heart failure, diastolic   [id number]   Viral pneumonia secondary to above  Acute respiratory failure with hypoxia intubated on 1/23/2022  Hyperkalemia  Paroxysmal atrial fibrillation  Essential hypertension  Mixed hyperlipidemia  Diabetes mellitus type 2  History of seizure disorder  History of CKD stage III, presently normal renal function  Lung mass?   Leukopenia  Polycythemia  Thrombocytopenia  Anxiety disorder  Recent past h/o lacunar infarct left thalamus   Altered mental status/agitation  Endotracheal intubation secondary to hypoxia dated 1/23/2022  Supraventricular tachycardia/elevated troponin  Fever  Emphysema   Pulmonary artery hypertension       Plan:   Admit patient to intermediate floor  Mechanical ventilation sedation as per critical care, patient continues to require 70% FiO2 with PEEP of 16  Telemetry  Check vitals closely  CBC BMP daily    Echocardiogram  Cardiology consult  IV pressors    Amiodarone  Cardiology following    IV Decadron  Patient completed a course of cefepime for 7 days  Bronchodilators  Pulmicort  Patient is deemed not a candidate for Actemra or baricitinib secondary to cytopenia  Infectious disease consult  Pulmonology consult    Continue Keppra  Continue amlodipine, atorvastatin  Continue aspirin    Patient seen by gastroenterology started on daily GlycoLax, also continued on suppositories, GlycoLax if not helping can be changed to milk of magnesia  Consult nephrology appreciated managing patient hyperkalemia, patient is continued on Lokelma  Increase free water if okay with nephrology  Insulin gtt  Continue other medication as below.     Scheduled Meds:   amLODIPine  2.5 mg Oral Nightly    [START ON 2/3/2022] furosemide  20 mg IntraVENous Daily    Vitamin D  1,000 Units Oral Daily    polyethylene glycol  17 g Per NG tube Daily    levalbuterol  1.25 mg Nebulization Q6H    insulin glargine  9 Units SubCUTAneous Daily    lactulose  20 g Oral BID    bisacodyl  10 mg Rectal Daily    pantoprazole  40 mg IntraVENous Daily    And    sodium chloride (PF)  10 mL IntraVENous Daily    aspirin  324 mg Oral Daily    levETIRAcetam  500 mg Oral BID    chlorhexidine  15 mL Mouth/Throat BID    enoxaparin  30 mg SubCUTAneous BID    QUEtiapine  50 mg Oral Once    budesonide  0.5 mg Nebulization BID    metoprolol succinate  50 mg Oral Nightly    dexamethasone  6 mg IntraVENous Q24H    sodium chloride flush  5-40 mL IntraVENous 2 times per day    atorvastatin  20 mg Oral Daily     Continuous Infusions:   fentaNYL 100 mcg/hr (02/02/22 2136)    amiodarone 0.5 mg/min (02/02/22 2136)    insulin (HUMAN R) non-weight based infusion 9.17 Units/hr (02/02/22 2213)    phenylephrine (KEARA-SYNEPHRINE) 50mg/250mL infusion Stopped (01/26/22 1820)    propofol 8.059 mcg/kg/min (01/28/22 0610)    midazolam (VERSED) 1 mg/mL in D5W infusion 9 mg/hr (02/02/22 2136)    sodium chloride      dextrose       PRN Meds:  sodium chloride nebulizer, 3 mL, Q8H PRN  LORazepam, 1 mg, Q4H PRN  dextrose bolus (hypoglycemia), 125 mL, PRN   Or  dextrose bolus (hypoglycemia), 250 mL, PRN  sodium chloride flush, 10 mL, PRN  sodium chloride, 25 mL, PRN  ondansetron, 4 mg, Q8H PRN Or  ondansetron, 4 mg, Q6H PRN  acetaminophen, 650 mg, Q6H PRN   Or  acetaminophen, 650 mg, Q6H PRN  glucose, 15 g, PRN  glucagon (rDNA), 1 mg, PRN  dextrose, 100 mL/hr, PRN  hydrALAZINE, 10 mg, Q6H PRN        Chief Complaint:     Chief Complaint   Patient presents with    Leg Swelling     bilateral, has CHF, on diuretic, EMT saw pt , assisted pt into car    Fever    Other     low SPO2         History of Present Illness:   Patient seen examined at bedside  Patient remains in medical ICU  D/w RN in detail  Patient remains intubated sedated    Patient now requiring 70% of FiO2 with PEEP of 16  Remains sedated  Tolerating tube feeding  Resolved constipation  Dulcolax, Lactulose, Glycolax- all on hold now  C/o diarrhea today  Edema over extremities better now  Blood glucose better        Review of systems:  Unable to conduct ROS secondary to patient being intubated      Initial HPI  Agapito Mann is a 64 y.o.  male who presents with Leg Swelling (bilateral, has CHF, on diuretic, EMT saw pt , assisted pt into car), Fever, and Other (low SPO2)  This is a 42-year-old gentleman admitted via ER, come to ER with complaint of having shortness of breath, patient has medical history significant for COPD patient with history of cardiac failure: Patient noted that he has been having increasing swelling in his lower extremity and becoming more short of breath, further patient also been having some body aches and sweats, patient patient testing in the emergency room showed that he is positive for COVID-19 also noticed to have an elevated BNP, imaging concerning for fluid overload, admitted for further regiment    I have personally reviewed the past medical history, past surgical history, medications, social history, and family history, and summarized in the note.     Review of Systems:       Unable to conduct ROS secondary to patient being intubated      Past Medical History:     Past Medical History:   Diagnosis Date    Arthritis     Atherosclerotic plaque 7/28/2019    Cervical disc disease     degenerative disc disease    Diabetes mellitus ([facility] [address]     type 2    History of blood transfusion     Hx of blood clots     left leg    Hyperlipidemia     Neuropathy     Right kidney mass     \"urologist is watching\"    Snores     Type 2 diabetes mellitus treated with insulin ([facility] [address] 7/28/2019        Past Surgical History:     Past Surgical History:   Procedure Laterality Date    ABDOMINAL AORTIC ANEURYSM REPAIR  2005    CARPAL TUNNEL RELEASE Bilateral     CERVICAL SPINE SURGERY      COLONOSCOPY      benign polyps removed    INGUINAL HERNIA REPAIR Right     DE REPAIR INCISIONAL HERNIA,REDUCIBLE N/A 5/10/2017    HERNIA VENTRAL REPAIR WITH MESH  performed by Loretta Gregorio DO at [address] [place] [address] Road  05/10/2017    with mesh        Medications Prior to Admission:       Prior to Admission medications    Medication Sig Start Date End Date Taking? Authorizing Provider   rosuvastatin (CRESTOR) 40 MG tablet Take 40 mg by mouth every evening   Yes Historical Provider, MD   insulin NPH (HUMULIN N;NOVOLIN N) 100 UNIT/ML injection vial Inject 20 Units into the skin 2 times daily (before meals)   Yes Historical Provider, MD   levETIRAcetam (KEPPRA) 500 MG tablet Take 1 tablet by mouth 2 times daily 11/3/21  Yes Uzair Meyer MD   venlafaxine (EFFEXOR XR) 150 MG extended release capsule Take 1 capsule by mouth daily (with breakfast) 7/29/19  Yes Arabella Kiser   vitamin B-1 (THIAMINE) 100 MG tablet Take 100 mg by mouth daily   Yes Historical Provider, MD   ferrous sulfate 325 (65 Fe) MG tablet Take 325 mg by mouth daily (with breakfast)   Yes Historical Provider, MD   ALPRAZolam (XANAX) 0.5 MG tablet Take 0.5 mg by mouth three times daily.    Yes Historical Provider, MD   furosemide (LASIX) 40 MG tablet Take 1 tablet by mouth 2 times daily 12/11/18  Yes Deja Ding MD   tiotropium (SPIRIVA RESPIMAT) 2.5 MCG/ACT AERS inhaler Inhale 2 puffs into the lungs daily    Yes Historical Provider, MD   albuterol sulfate  (90 Base) MCG/ACT inhaler Inhale 2 puffs into the lungs every 6 hours as needed for Wheezing or Shortness of Breath   Yes Historical Provider, MD   metoprolol succinate (TOPROL XL) 50 MG extended release tablet Take 50 mg by mouth daily   Yes Historical Provider, MD   Multiple Vitamins-Minerals (MULTIVITAMIN ADULT) TABS Take 1 tablet by mouth daily   Yes Historical Provider, MD   vitamin D (CHOLECALCIFEROL) 1000 UNIT TABS tablet Take 1,000 Units by mouth daily   Yes Historical Provider, MD   fluticasone (FLONASE) 50 MCG/ACT nasal spray 1 spray by Each Nare route daily as needed for Rhinitis   Yes Historical Provider, MD   aspirin 325 MG EC tablet Take 325 mg by mouth daily    Yes Historical Provider, MD   Omega-3 Fatty Acids (FISH OIL) 1000 MG CAPS Take 1 capsule by mouth daily    Yes Historical Provider, MD   amLODIPine (NORVASC) 5 MG tablet Take 5 mg by mouth nightly    Yes Historical Provider, MD   mirtazapine (REMERON) 45 MG tablet Take 45 mg by mouth nightly   Yes Historical Provider, MD        Allergies: Barbiturates, Codeine, and Sulfa antibiotics    Social History:     Tobacco:    reports that he has quit smoking. He has never used smokeless tobacco.  Alcohol:      reports no history of alcohol use. Drug Use:  reports no history of drug use.     Family History:     Family History   Problem Relation Age of Onset    Ovarian Cancer Sister     Ovarian Cancer Sister          Physical Exam:     Vitals:  BP (!) 130/46   Pulse 80   Temp 100 °F (37.8 °C) (Oral)   Resp (!) 32   Ht 5' 10.98\" (1.803 m)   Wt 234 lb 12.8 oz (106.5 kg)   SpO2 95%   BMI 32.77 kg/m²   Temp (24hrs), Av.5 °F (38.1 °C), Min:99.7 °F (37.6 °C), Max:101.1 °F (38.4 °C)      General appearance -on ventilator  Mental status -sedated  Head - normocephalic and atraumatic  Eyes - conjunctiva --   --   --   --   --   --    AST 28  --  46*  --   --   --   --   --   --   --   --   --    ALT 26  --  43*  --   --   --   --   --   --   --   --   --    ALKPHOS 99  --  100  --   --   --   --   --   --   --   --   --    BILITOT 0.32  --  0.28*  --   --   --   --   --   --   --   --   --    BILIDIR 0.16  --   --   --   --   --   --   --   --   --   --   --    AMYLASE  --   --   --   --  46  --   --   --   --   --   --   --    LIPASE 34  --   --   --  19  --   --   --   --   --   --   --    POCGLU  --    < > 111*   < >  --    < > 138* 156* 181* 170* 191* 191*    < > = values in this interval not displayed. Lab Results   Component Value Date    INR 1.0 01/17/2022    INR 1.0 11/03/2021    INR 1.0 07/27/2019    PROTIME 13.3 01/17/2022    PROTIME 11.1 11/03/2021    PROTIME 10.5 07/27/2019       Lab Results   Component Value Date/Time    SPECIAL ART LINE 20ML 02/01/2022 12:03 AM     Lab Results   Component Value Date/Time    CULTURE NO GROWTH 1 DAY 02/01/2022 12:03 AM       Lab Results   Component Value Date    POCPH 7.375 02/02/2022    POCPCO2 55.9 02/02/2022    POCPO2 97.5 02/02/2022    POCHCO3 32.7 02/02/2022    NBEA NOT REPORTED 02/02/2022    PBEA 6 02/02/2022    LPP6XYD NOT REPORTED 02/02/2022    POHI3MTU 97 02/02/2022    FIO2 75.0 02/02/2022       Radiology:    XR CHEST 1 VIEW    Result Date: 1/16/2022  Hypoinflated lungs. Cardiomegaly again seen, when compared to the previous study performed 07/27/2019. Diffuse, increased interstitial markings throughout both lungs, some of which can be seen on the prior study may represent baseline chronic interstitial lung changes. The increased prominence on this exam could be secondary to the suboptimal inspiratory effort. The presence of pulmonary vascular congestion/mild CHF or bilateral interstitial pneumonia cannot be excluded. Clinical correlation is recommended.      CT CHEST PULMONARY EMBOLISM W CONTRAST    Result Date: 1/16/2022  No evidence of pulmonary embolism. Compared to 2008, worsening underlying emphysema, with worsening subacute or acute interstitial lung disease, best seen left upper lobe; differential includes interstitial pneumonia or pneumonitis superimposed on emphysema, DIP, HP, and other interstitial pneumonias as well as infectious or inflammatory process superimposed on emphysema disease. Findings are not typical for COVID-19 pneumonia, but an element of the latter should be considered. Thickening and distortion left major fissure with ill-defined mass-like focus left lower lobe. The latter could also be infectious or inflammatory, although neoplasm should be excluded. Underlying worsening emphysema. Nodular densities, best seen right upper lobe. Small pleural effusions, slightly larger on the left. See recommendations below. Mild mediastinal and left hilar adenopathy; see recommendations below. Worsening now moderate-severe pulmonary artery hypertension. Mild cardiomegaly. Stable mild dilatation ascending thoracic aorta. Additional unchanged or incidental findings, as above. RECOMMENDATIONS: Clinical correlation and short-term follow-up CT chest in 1-4 months depending upon the clinical presentation/course. All radiological studies reviewed                Code Status:  DNR-CCA    Electronically signed by Minnie Hodgkins, MD on 2/2/2022 at 10:38 PM     Copy sent to Dr. Harvey Valentin MD    This note was created with the assistance of a speech-recognition program.  Although the intention is to generate a document that actually reflects the content of the visit, no guarantees can be provided that every mistake has been identified and corrected by editing. Note was updated later by me after  physical examination and  completion of the assessment.

## 2022-02-03 NOTE — PROGRESS NOTES
Nutrition Assessment     Type and Reason for Visit: Reassess    Nutrition Recommendations/Plan:   1. Continue NPO diet  2. Continue Nepro at goal rate 50 mL/hr (1200 mL total volume)  3. Monitor tube feeding rate/ tolerance, vent status, GI function and labs    Nutrition Assessment:  Patient remains intubated and sedated. Patient's temperature has resolved. Patient remains on insulin drip. Glucose levels have improved. Continue Nepro at 50 mL/hr as tolerated. Malnutrition Assessment:  Malnutrition Status: At risk for malnutrition (Comment)    Estimated Daily Nutrient Needs:  Energy (kcal): 7921-6499 kcal (MSJ 1.2, 1.3 factor); Weight Used for Energy Requirements:  Current     Protein (g):  gm protein (0.8-1.0 g/kg) d/t renal status; Weight Used for Protein Requirements:  Ideal          Nutrition Related Findings: Edema: +2 pitting BUE, +2 pitting BLE. Hypoactive bowel sounds. Liver ultra sound (2/3. Insulin drip.       Current Nutrition Therapies:    Diet NPO  ADULT TUBE FEEDING; Orogastric; Renal Formula; Continuous; 10; Yes; 10; Q 4 hours; 50; 30; Q 4 hours    Anthropometric Measures:  · Height: 5' 10.98\" (180.3 cm)  · Current Body Wt: 226 lb (102.5 kg)   · BMI: 31.5    Nutrition Diagnosis:   · Inadequate protein-energy intake related to inadequate protein-energy intake,impaired respiratory function as evidenced by NPO or clear liquid status due to medical condition,intubation,nutrition support - enteral nutrition      Nutrition Interventions:   Food and/or Nutrient Delivery:  Continue NPO,Continue Current Tube Feeding  Nutrition Education/Counseling:  Education not indicated   Coordination of Nutrition Care:  Continue to monitor while inpatient    Goals:  EN support to provide >75% of estimated nutritional needs       Nutrition Monitoring and Evaluation:   Behavioral-Environmental Outcomes:  None Identified   Food/Nutrient Intake Outcomes:  Enteral Nutrition Intake/Tolerance  Physical Signs/Symptoms Outcomes:  Biochemical Data,Fluid Status or Edema,Skin,Weight,GI Status     Discharge Planning:     Too soon to determine         Khris Brodie  MFN, RDN, LDN  Lead Clinical Dietitian  RD Office Phone [phone]

## 2022-02-03 NOTE — PROGRESS NOTES
[facility].,   Section of Cardiology  Progress Note      Date:  2/3/2022  Patient: Jennifer Colorado  Admission:  1/16/2022 11:51 AM  Admit DX: Hypokalemia [E87.6]  Hypomagnesemia [E83.42]  COPD exacerbation (HCC) [J44.1]  BLANCHE (acute kidney injury) ([facility] [address] [N17.9]  Acute respiratory failure with hypoxia (HCC) [J96.01]  Acute on chronic systolic CHF (congestive heart failure) ([facility] [address] [I50.23]  COVID [U07.1]  COVID-19 [U07.1]  Age:  64 y.o., 1945                           LOS: 18 days     Reason for evaluation:   Paroxysmal atrial fibrillation. covid 19 pneumonia  Hypertension. SUBJECTIVE:     The patient was seen and examined. Notes and labs reviewed. He continues to be in sinus rhythm. Bp was markedly increased today  OBJECTIVE:    BP (!) 130/46   Pulse 71   Temp 99.7 °F (37.6 °C) (Oral)   Resp 23   Ht 5' 10.98\" (1.803 m)   Wt 226 lb 3.1 oz (102.6 kg)   SpO2 95%   BMI 31.57 kg/m²     Intake/Output Summary (Last 24 hours) at 2/3/2022 1311  Last data filed at 2/3/2022 0612  Gross per 24 hour   Intake 3964.87 ml   Output 2000 ml   Net 1964.87 ml       EXAM:   Intubated and sedated  Chest. Bilateral crepitations  Cvs. s1s2 no murmurs,regular  Abd. distended  Ext. Bilateral resolving edema. Pedal pulses decreased.     Current Inpatient Medications:   lisinopril  20 mg Oral Daily    amLODIPine  2.5 mg Oral Nightly    furosemide  20 mg IntraVENous Daily    Vitamin D  1,000 Units Oral Daily    polyethylene glycol  17 g Per NG tube Daily    levalbuterol  1.25 mg Nebulization Q6H    insulin glargine  9 Units SubCUTAneous Daily    lactulose  20 g Oral BID    bisacodyl  10 mg Rectal Daily    pantoprazole  40 mg IntraVENous Daily    And    sodium chloride (PF)  10 mL IntraVENous Daily    aspirin  324 mg Oral Daily    levETIRAcetam  500 mg Oral BID    chlorhexidine  15 mL Mouth/Throat BID    enoxaparin  30 mg SubCUTAneous BID    QUEtiapine  50 mg Oral Once    budesonide  0.5 mg Nebulization BID    dexamethasone  6 mg IntraVENous Q24H    sodium chloride flush  5-40 mL IntraVENous 2 times per day    atorvastatin  20 mg Oral Daily       IV Infusions (if any):   fentaNYL 100 mcg/hr (02/03/22 0725)    amiodarone 0.5 mg/min (02/03/22 1205)    insulin (HUMAN R) non-weight based infusion 5.34 Units/hr (02/03/22 1300)    phenylephrine (KEARA-SYNEPHRINE) 50mg/250mL infusion Stopped (01/26/22 1820)    propofol 8.059 mcg/kg/min (01/28/22 0610)    midazolam (VERSED) 1 mg/mL in D5W infusion 9 mg/hr (02/03/22 0612)    sodium chloride      dextrose         Diagnostics:   Telemetry: normal sinus rhythm   Echo. Summary  Moderate left ventricular hypertrophy  Global left ventricular systolic function is normal  Estimated ejection fraction is 55 % . Left atrium is mildly dilated. Right atrium is mildly dilated . No significant valvular regurgitation or stenosis seen. No pericardial effusion seen. Labs:   CBC:   Recent Labs     02/02/22  0417 02/03/22  0400   WBC 10.7 9.5   HGB 13.8 13.2   HCT 47.6 46.0    165     BMP:   Recent Labs     02/02/22  1740 02/03/22  0400    144   K 5.4* 5.2   CO2 32* 32*   BUN 87* 85*   CREATININE 1.20 1.18   LABGLOM 59* 60*   GLUCOSE 194* 183*     BNP: No results for input(s): BNP in the last 72 hours. PT/INR: No results for input(s): PROTIME, INR in the last 72 hours. APTT:No results for input(s): APTT in the last 72 hours. CARDIAC ENZYMES:No results for input(s): CKTOTAL, CKMB, CKMBINDEX, TROPONINI in the last 72 hours.   FASTING LIPID PANEL:  Lab Results   Component Value Date    HDL 30 11/03/2021    TRIG 181 11/03/2021     LIVER PROFILE:  Recent Labs     02/01/22  1504 02/03/22  0400   AST 46* 72*   ALT 43* 77*   LABALBU 2.5* 2.4*       ASSESSMENT:    Patient Active Problem List   Diagnosis    Biventricular [facility]est[facility]) [facility]    CKD (chronic kidney disease) stage 3, GFR 30-59 ml/min (HCC)    Essential hypertension    Blurred vision, right eye    Type 2 diabetes mellitus treated with insulin ([facility])    Atherosclerotic plaque    Weakness on left side of face    Carotid stenosis, asymptomatic, bilateral    New onset seizure ([facility] [address]    COVID    Acute on chronic systolic CHF (congestive heart failure) ([facility])    Acute respiratory failure with hypoxia ([facility])    BLANCHE (acute kidney injury) ([facility] [address]    COPD exacerbation ([facility])    Hypokalemia    Hypomagnesemia    Palliative care encounter    Goals of care, counseling/discussion    DNR (do not resuscitate) discussion    ACP (advance care planning)    Constipation    Abdominal pain, generalized       PLAN:    1. Paroxysmal atrial fibrillation now in sinus rhythm with atrial bigeminy  2. covid 19 pneumonia  3. Respiratory failure  4. Chronic kidney disease  Continue supportive therapy. Discontinue toprol xl. Start lisinopril 20mg daily for hypertension. Continue amiodarone drip. Please see orders. Discussed with nursing.     Fidencio Kayser, MD, MD

## 2022-02-03 NOTE — PROGRESS NOTES
Case d/w dr Carola Pinzon constipation is resolved elevated lfts likely from covid gi signing off Van ESTEVEZ CNP

## 2022-02-03 NOTE — PROGRESS NOTES
[facility]  Progress Note    Donal Cortes  MRN: [id number]  Date: 2/3/2022  LOS: 25     Reason for F/U :   COVID-19 virus infection    Impression :   COVID-19 virus infection tested + 1/16/2022  Acute respiratory failure currently ventilator dependent  Intubated 1/23/2022  Emphysema with worsening changes on imaging  Worsening acute interstitial lung disease and clinically not typical of COVID-19 virus infection  Worsening moderate to severe pulmonary artery hypertension  Bilateral lower extremity edema likely related to above  Leukopenia and thrombocytopenia  Lacunar l infarct on the left thalamus  Fever to 103 degrees 1/23/2022  Acute kidney injury    Recommendations:   COVID-19 therapy: The patient is on Decadron 6 mg IV daily   The patient has been started on baricitinib 1/17/2022 but it was discontinued 1/18/2022 due to cytopenias. Actemra had been considered but again due to the cytopenias and thrombocytopenia this has been held. Antimicrobial therapy: The patient received Rocephin 1000 mg and azithromycin 500 mg on 1/16/2022  The patient received a dose of levofloxacin 500 mg on 1/18/2020. Patient started on cefepime and vancomycin 1/23/2022 plan completed a 7-day course of cefepime on 1/28/2022  Vancomycin discontinued due to acute kidney injury 1/24/2022    The patient continues on the ventilator but has been able to wean down to 60% FiO2 and 16 of PEEP. He is on sedation with fentanyl and Versed. Continue supportive therapy    Infection Control Recommendations:   Droplet plus precautions    Discharge Planning:   Estimated Length of IV antimicrobials: 5 to 7 days  Patient will need Midline Catheter Insertion/ PICC line Insertion: No  Patient will need: Home IV , [place],  SNF,  LTAC: Undetermined  Patient willneed outpatient wound care: No    Medical Decision making / Summary of Stay:   Donal Cortes is a 64y.o.-year-old male who was initially admitted on 1/16/2022. Jame Robertson has a history of diabetes mellitus type 2, essential hypertension, seizure disorder, chronic kidney disease stage III, hyperlipidemia among other medical problems.     The patient reports that about 1 to 2 months ago he had his diabetes medications changed and since that time he has noticed increasing swelling in his legs, hardness in the legs, difficulty ambulating, and weight gain from 178 to 255 pounds over the last 1 to 2 months. He did not report any subjective fevers, chills, cough or shortness of breath. He denies any loss of smell or change in taste. No abdominal pain nausea vomiting or diarrhea. The patient does report that he has home oxygen that he uses as needed at home and he reports that he hardly needs it. He is vaccinated did receive the J&J vaccine but has not yet received a booster dose.     The patient presented to the emergency department and was found to have leukopenia with a white blood cell count of 2.7 and thrombocytopenia with a platelet count of 374. Creatinine slightly elevated at 1.31 and proBNP is elevated at 9327. Chest x-ray showed hyperinflated lungs with cardiomegaly seen and diffuse increased interstitial markings in both lungs which may represent baseline chronic interstitial lung changes given that these findings were present before. A CT of the chest was done with IV contrast that showed no evidence of pulmonary embolism and compared to 2008 there is worsening underlying emphysema with worsening subacute or acute interstitial lung disease best seen in the left upper lobe. Findings were not felt typical for COVID-19 virus pneumonia. There was thickening and distortion of the left major fissure with an ill-defined masslike focus in the left lower lobe.   There is worsening moderate to severe pulmonary artery hypertension     COVID testing was positive and I was asked to evaluate and help with COVID-19 virus infection    The patient did have a CT scan of the 13.2   HCT 47.6 46.0    165   LYMPHOPCT 4* 3*   MONOPCT 9* 5     BMP:   Recent Labs     02/02/22  0417 02/02/22  0417 02/02/22  1740 02/03/22  0400      < > 142 144   K 5.6*   < > 5.4* 5.2      < > 106 107   CO2 31   < > 32* 32*   BUN 84*   < > 87* 85*   CREATININE 1.23*   < > 1.20 1.18   MG 2.3  --   --  2.1    < > = values in this interval not displayed. Hepatic Function Panel:   Recent Labs     01/31/22  1635 01/31/22  1635 02/01/22  1504 02/03/22  0400   PROT 6.0*   < > 5.9* 5.6*   LABALBU 2.4*   < > 2.5* 2.4*   BILIDIR 0.16  --   --  0.16   IBILI 0.16  --   --  0.21   BILITOT 0.32   < > 0.28* 0.37   ALKPHOS 99   < > 100 117   ALT 26   < > 43* 77*   AST 28   < > 46* 72*    < > = values in this interval not displayed. Lab Results   Component Value Date    PROCAL 0.25 02/01/2022    PROCAL 0.24 01/23/2022    PROCAL 0.11 01/19/2022     Lab Results   Component Value Date    CRP 23.5 01/16/2022    CRP 2.0 07/27/2019     Lab Results   Component Value Date    SEDRATE 3 01/16/2022         Lab Results   Component Value Date    DDIMER 2.96 01/30/2022    DDIMER 5.84 01/23/2022    DDIMER 1.53 01/18/2022    DDIMER 1.73 01/16/2022    DDIMER 1.18 12/07/2018     Lab Results   Component Value Date    FERRITIN 569 01/16/2022    FERRITIN 50 07/23/2019    FERRITIN 18 07/08/2019     Lab Results   Component Value Date     01/16/2022     Lab Results   Component Value Date    FIBRINOGEN 402 01/16/2022       Results in Past 30 Days  Result Component Current Result Ref Range Previous Result Ref Range   SARS-CoV-2, Rapid DETECTED (A) (1/16/2022) Not Detected Not in Time Range      Lab Results   Component Value Date    COVID19 DETECTED 01/16/2022    COVID19 Not Detected 11/02/2021       No results for input(s): VICTORINO in the last 72 hours.     Imaging Studies:   ONE XRAY VIEW OF THE CHEST 2/3/2022 4:36 am  Impression   Slight improvement in aeration of the right base with slight worsening infiltrates in the left lung.  Continued follow-up recommended. CT OF THE ABDOMEN AND PELVIS WITHOUT CONTRAST 1/28/2022 8:34 am    Impression   1.  Overall mildly limited study due to motion and lack of contrast.       2.  No evidence of bowel obstruction.  Moderate stool burden is noted,   possibly related to constipation.  Enteric tube is in place with distal tip   in the proximal stomach.       3.  Tiny amount of free fluid scattered in the abdomen, including   presacral/perirectal fluid, most likely related to volume overload.  No   definite findings of rectal wall thickening or fecal impaction.       4.  Moderate pleural effusions, left basilar consolidation, and bibasilar   interstitial thickening, increased when compared to 01/16/2022.       RECOMMENDATIONS:   Unavailable         Veins: Lower Extremities DVT Study, Venous Scan Lower Bilateral.  Indications for Study: Leg Swelling . Patient Status: In Patient. Technical Quality: Limited visualization. Limitation reason: Edema. Comments: Simultaneous real time imaging utilizing B-Mode, color doppler and spectral waveform analysis was performed on the  bilateral lower extremities for venous examination of the deep and superficial systems. Conclusions  Summary  No evidence of superficial or deep venous thrombosis in both lower extremities. Signature  Electronically signed by Driss Noriega RVT(Sonographer) on 01/19/2022 08:10 AM  Electronically signed by Sweta Fletcher physician) on 01/19/2022 06:21 PM      CT OF THE HEAD WITHOUT CONTRAST  1/18/2022 5:42 pm  Impression   New lacunar infarct left thalamus, age indeterminate. Kishore Tippecanoe may be helpful.           Cultures:     Culture, Blood 1 [[id number]] Collected: 02/01/22 0003   Order Status: Completed Specimen: Blood Updated: 02/03/22 0829    Specimen Description . BLOOD    Special Requests ART LINE 20ML    Culture NO GROWTH 2 DAYS   Culture, Blood 1 [[id number]] Collected: 01/31/22 5658 Order Status: Completed Specimen: Blood Updated: 02/02/22 2313    Specimen Description . BLOOD    Special Requests RT HAND,10ML    Culture NO GROWTH 2 DAYS       Culture, Respiratory [[id number]] Collected: 01/28/22 1030   Order Status: Completed Specimen: Sputum, Suctioned Updated: 01/30/22 0932    Specimen Description . SUCTIONED SPUTUM    Special Requests NOT REPORTED    Direct Exam < 10 EPITHELIAL CELLS/LPF     <10 NEUTROPHILS/LPF     NO SIGNIFICANT PATHOGENS SEEN    Culture NORMAL RESPIRATORY PO HEAVY GROWTH       Culture, Blood 1 [[id number]] Collected: 01/23/22 1759   Order Status: Completed Specimen: Blood Updated: 01/28/22 2111    Specimen Description . BLOOD    Special Requests RT HAND 19ML    Culture NO GROWTH 5 DAYS   Culture, Blood 1 [[id number]] Collected: 01/23/22 1759   Order Status: Completed Specimen: Blood Updated: 01/28/22 2110    Specimen Description . BLOOD    Special Requests ART LINE 10ML    Culture NO GROWTH 5 DAYS     Culture, Blood 2 [[id number]] Collected: 01/21/22 0742   Order Status: Completed Specimen: Blood Updated: 01/26/22 1001    Specimen Description . BLOOD    Special Requests LEFT AC 20ML    Culture NO GROWTH 5 DAYS   Culture, Blood 1 [[id number]] Collected: 01/21/22 0750   Order Status: Completed Specimen: Blood Updated: 01/26/22 1000    Specimen Description . BLOOD    Special Requests LEFT HAND 5ML    Culture NO GROWTH 5 DAYS   MRSA DNA Probe, Nasal [[id number]] Collected: 01/23/22 2258   Order Status: Completed Specimen: Nasal Updated: 01/25/22 1038    Specimen Description . NASAL SWAB    MRSA, DNA, Nasal NEGATIVE    Comment: NEGATIVE:  MRSA DNA not detected by nucleic acid amplification.                                                     Results should be used as an adjunct to nosocomial control efforts to identify patients   needing enhanced precautions.     The test is not intended to identify patients with staphylococcal infections.  Results   should not be used to guide or monitor treatment for MRSA infections. Culture, Blood 1 [[id number]] Collected: 01/16/22 1220   Order Status: Completed Specimen: Blood Updated: 01/21/22 1521    Specimen Description . BLOOD    Special Requests LAC 12ML    Culture NO GROWTH 5 DAYS   Culture, Blood 1 [[id number]] Collected: 01/16/22 1205   Order Status: Completed Specimen: Blood Updated: 01/21/22 1521    Specimen Description . BLOOD    Special Requests RAC 12ML    Culture NO GROWTH 5 DAYS       Culture, Urine [[id number]] Collected: 01/16/22 1315   Order Status: Completed Specimen: Urine, clean catch Updated: 01/17/22 2128    Specimen Description . CLEAN CATCH URINE    Special Requests NOT REPORTED    Culture NO SIGNIFICANT GROWTH       COVID-19, Rapid [[id number]] (Abnormal) Collected: 01/16/22 1230   Order Status: Completed Specimen: Nasopharyngeal Swab Updated: 01/16/22 1314    Specimen Description . NASOPHARYNGEAL SWAB    SARS-CoV-2, Rapid DETECTED Abnormal     Comment:        Rapid NAAT: The specimen is POSITIVE for SARS-Cov-2, the novel coronavirus associated with   COVID-19.         This test has been authorized by the FDA under an Emergency Use Authorization (EUA) for use   by authorized laboratories.         The ID NOW COVID-19 assay is designed to detect the virus that causes COVID-19 in patients   with signs and symptoms of infection who are suspected of COVID-19. An individual without symptoms of COVID-19 and who is not shedding SARS-CoV-2 virus would   expect to have a negative (not detected) result in this assay.    Fact sheet for Healthcare Providers: Cee   Fact sheet for Patients: Cee           Methodology: Isothermal Nucleic Acid Amplification         Results reported to the appropriate Health Department         Flu A/B Ag Detection [[id number]] Collected: 01/16/22 1230   Order Status: Completed Specimen: Nasopharyngeal Swab Updated: 01/16/22 1313 Specimen Description . NASOPHARYNGEAL SWAB    Special Requests NOT REPORTED    Direct Exam NEGATIVE for Influenza A + B antigens.                                PCR testing to confirm this result is available upon request. Hardeep Martin will be saved in the laboratory for 7 days.  Please call [phone] if PCR testing is indicated. Medications:      lisinopril  20 mg Oral Daily    amLODIPine  2.5 mg Oral Nightly    furosemide  20 mg IntraVENous Daily    Vitamin D  1,000 Units Oral Daily    polyethylene glycol  17 g Per NG tube Daily    levalbuterol  1.25 mg Nebulization Q6H    insulin glargine  9 Units SubCUTAneous Daily    lactulose  20 g Oral BID    bisacodyl  10 mg Rectal Daily    pantoprazole  40 mg IntraVENous Daily    And    sodium chloride (PF)  10 mL IntraVENous Daily    aspirin  324 mg Oral Daily    levETIRAcetam  500 mg Oral BID    chlorhexidine  15 mL Mouth/Throat BID    enoxaparin  30 mg SubCUTAneous BID    QUEtiapine  50 mg Oral Once    budesonide  0.5 mg Nebulization BID    dexamethasone  6 mg IntraVENous Q24H    sodium chloride flush  5-40 mL IntraVENous 2 times per day    atorvastatin  20 mg Oral Daily           [facility]  Tim Cleveland MD  Perfect [employer] messaging  OFFICE: [phone]      Electronically signed by Tim Cleveland MD on 2/3/2022 at 10:56 AM  Thank you for allowing us to participate in the care of this patient. Please call with questions. This note iscreated with the assistance of a speech recognition program.  While intending to generate a document that actually reflects the content of the visit, the document can still have some errors including those of syntax andsound a like substitutions which may escape proof reading. In such instances, actual meaning can be extrapolated by contextual diversion.

## 2022-02-03 NOTE — PROGRESS NOTES
Physical Therapy  DATE: 2/3/2022    NAME: Rogers Jensen  MRN: [id number]   : 1945    Patient not seen this date for Physical Therapy due to:      [] Cancel by RN or physician due to:    [] Hemodialysis    [] Critical Lab Value Level     [] Blood transfusion in progress    [] Acute or unstable cardiovascular status   _MAP < 55 or more than >115  _HR < 40 or > 130    [x] Acute or unstable pulmonary status   -FiO2 > 60%   _RR < 5 or >40    _O2 sats < 85%    [] Strict Bedrest    [] Off Unit for surgery or procedure    [] Off Unit for testing       [] Pending imaging to R/O fracture    [] Refusal by Patient      [] Other      [] PT being discontinued at this time. Patient independent. No further needs. [] PT being discontinued at this time as the patient has been transferred to hospice care. No further needs.       Che Hong, PT

## 2022-02-04 NOTE — PLAN OF CARE
Problem: Airway Clearance - Ineffective  Goal: Achieve or maintain patent airway  Outcome: Ongoing     Problem: Gas Exchange - Impaired  Goal: Absence of hypoxia  Outcome: Ongoing  Goal: Promote optimal lung function  Outcome: Ongoing     Problem: Breathing Pattern - Ineffective  Goal: Ability to achieve and maintain a regular respiratory rate  Outcome: Ongoing     Problem:  Body Temperature -  Risk of, Imbalanced  Goal: Ability to maintain a body temperature within defined limits  Outcome: Ongoing  Goal: Will regain or maintain usual level of consciousness  Outcome: Ongoing  Goal: Complications related to the disease process, condition or treatment will be avoided or minimized  Outcome: Ongoing     Problem: Isolation Precautions - Risk of Spread of Infection  Goal: Prevent transmission of infection  Outcome: Ongoing     Problem: Nutrition Deficits  Goal: Optimize nutritional status  Outcome: Ongoing     Problem: Risk for Fluid Volume Deficit  Goal: Maintain normal heart rhythm  Outcome: Ongoing  Goal: Maintain absence of muscle cramping  Outcome: Ongoing  Goal: Maintain normal serum potassium, sodium, calcium, phosphorus, and pH  Outcome: Ongoing     Problem: Loneliness or Risk for Loneliness  Goal: Demonstrate positive use of time alone when socialization is not possible  Outcome: Ongoing     Problem: Fatigue  Goal: Verbalize increase energy and improved vitality  Outcome: Ongoing     Problem: Patient Education: Go to Patient Education Activity  Goal: Patient/Family Education  Outcome: Ongoing     Problem: Falls - Risk of:  Goal: Will remain free from falls  Description: Will remain free from falls  Outcome: Ongoing  Goal: Absence of physical injury  Description: Absence of physical injury  Outcome: Ongoing     Problem: Infection:  Goal: Will remain free from infection  Description: Will remain free from infection  Outcome: Ongoing     Problem: Safety:  Goal: Free from accidental physical injury  Description: Free from accidental physical injury  Outcome: Ongoing  Goal: Free from intentional harm  Description: Free from intentional harm  Outcome: Ongoing     Problem: Daily Care:  Goal: Daily care needs are met  Description: Daily care needs are met  Outcome: Ongoing     Problem: Pain:  Goal: Patient's pain/discomfort is manageable  Description: Patient's pain/discomfort is manageable  Outcome: Ongoing     Problem: Skin Integrity:  Goal: Skin integrity will stabilize  Description: Skin integrity will stabilize  Outcome: Ongoing     Problem: Discharge Planning:  Goal: Patients continuum of care needs are met  Description: Patients continuum of care needs are met  Outcome: Ongoing     Problem: Nutrition  Goal: Optimal nutrition therapy  Outcome: Ongoing     Problem: Skin Integrity:  Goal: Will show no infection signs and symptoms  Description: Will show no infection signs and symptoms  Outcome: Ongoing  Goal: Absence of new skin breakdown  Description: Absence of new skin breakdown  Outcome: Ongoing     Problem: Non-Violent Restraints  Goal: Removal from restraints as soon as assessed to be safe  Outcome: Ongoing  Goal: No harm/injury to patient while restraints in use  Outcome: Ongoing  Goal: Patient's dignity will be maintained  Outcome: Ongoing

## 2022-02-04 NOTE — PROGRESS NOTES
Writer called and updated Mann Garcia about patients plan of care. Today is day 12 on the vent and Dr. Wolf Boyd wanted us to discuss trach/peg options with the family. Mann Garcia stated she will talk with family and think about this option.

## 2022-02-04 NOTE — PROGRESS NOTES
[facility]   Section of Cardiology  Progress Note      Date:  2/4/2022  Patient: Stacey Al  Admission:  1/16/2022 11:51 AM  Admit DX: Hypokalemia [E87.6]  Hypomagnesemia [E83.42]  COPD exacerbation (HCC) [J44.1]  BLANCHE (acute kidney injury) ([facility] [address] [N17.9]  Acute respiratory failure with hypoxia (HCC) [J96.01]  Acute on chronic systolic CHF (congestive heart failure) ([facility] [address] [I50.23]  COVID [U07.1]  COVID-19 [U07.1]  Age:  64 y.o., 1945                           LOS: 19 days     Reason for evaluation:   Paroxysmal atrial fibrillation. covid 19 pneumonia  Hypertension. SUBJECTIVE:     The patient was seen and examined. Notes and labs reviewed. He continues to be in sinus rhythm. He is intubated and sedated. No overnight issues. OBJECTIVE:    BP (!) 138/46   Pulse 60   Temp 98.3 °F (36.8 °C) (Oral)   Resp 26   Ht 5' 10.98\" (1.803 m)   Wt 228 lb 1.6 oz (103.5 kg)   SpO2 96%   BMI 31.83 kg/m²     Intake/Output Summary (Last 24 hours) at 2/4/2022 9861  Last data filed at 2/4/2022 0602  Gross per 24 hour   Intake 1735.04 ml   Output 1900 ml   Net -164.96 ml       EXAM:   Intubated and sedated  Chest. Bilateral crepitations  Cvs. s1s2 no murmurs,regular  Abd. distended  Ext. Bilateral resolving edema. Pedal pulses decreased.     Current Inpatient Medications:   insulin lispro  0-12 Units SubCUTAneous Q6H    lisinopril  20 mg Oral Daily    amLODIPine  2.5 mg Oral Nightly    furosemide  20 mg IntraVENous Daily    Vitamin D  1,000 Units Oral Daily    polyethylene glycol  17 g Per NG tube Daily    levalbuterol  1.25 mg Nebulization Q6H    insulin glargine  9 Units SubCUTAneous Daily    lactulose  20 g Oral BID    bisacodyl  10 mg Rectal Daily    pantoprazole  40 mg IntraVENous Daily    And    sodium chloride (PF)  10 mL IntraVENous Daily    aspirin  324 mg Oral Daily    levETIRAcetam  500 mg Oral BID    chlorhexidine  15 mL Mouth/Throat BID    enoxaparin  30 mg SubCUTAneous BID  QUEtiapine  50 mg Oral Once    budesonide  0.5 mg Nebulization BID    dexamethasone  6 mg IntraVENous Q24H    sodium chloride flush  5-40 mL IntraVENous 2 times per day    atorvastatin  20 mg Oral Daily       IV Infusions (if any):   fentaNYL 100 mcg/hr (02/04/22 0300)    amiodarone 0.5 mg/min (02/04/22 0556)    phenylephrine (KEARA-SYNEPHRINE) 50mg/250mL infusion Stopped (01/26/22 1820)    propofol 8.059 mcg/kg/min (01/28/22 0610)    midazolam (VERSED) 1 mg/mL in D5W infusion 9 mg/hr (02/04/22 0746)    sodium chloride      dextrose         Diagnostics:   Telemetry: normal sinus rhythm   Echo. Summary  Moderate left ventricular hypertrophy  Global left ventricular systolic function is normal  Estimated ejection fraction is 55 % . Left atrium is mildly dilated. Right atrium is mildly dilated . No significant valvular regurgitation or stenosis seen. No pericardial effusion seen. Labs:   CBC:   Recent Labs     02/03/22  0400 02/04/22  0550   WBC 9.5 11.5*   HGB 13.2 12.6*   HCT 46.0 43.5    148     BMP:   Recent Labs     02/03/22  0400 02/03/22  0400 02/03/22  1515 02/04/22  0550     --   --  147*   K 5.2   < > 4.7 4.9   CO2 32*  --   --  29   BUN 85*  --   --  91*   CREATININE 1.18  --   --  1.10   LABGLOM 60*  --   --  >60   GLUCOSE 183*  --   --  155*    < > = values in this interval not displayed. BNP: No results for input(s): BNP in the last 72 hours. PT/INR: No results for input(s): PROTIME, INR in the last 72 hours. APTT:No results for input(s): APTT in the last 72 hours. CARDIAC ENZYMES:No results for input(s): CKTOTAL, CKMB, CKMBINDEX, TROPONINI in the last 72 hours.   FASTING LIPID PANEL:  Lab Results   Component Value Date    HDL 30 11/03/2021    TRIG 181 11/03/2021     LIVER PROFILE:  Recent Labs     02/01/22  1504 02/03/22  0400   AST 46* 72*   ALT 43* 77*   LABALBU 2.5* 2.4*       ASSESSMENT:    Patient Active Problem List   Diagnosis    Biventricular CHF (congestive heart failure) (HCC)    CKD (chronic kidney disease) stage 3, GFR 30-59 ml/min (HCC)    Essential hypertension    Blurred vision, right eye    Type 2 diabetes mellitus treated with insulin (HCC)    Atherosclerotic plaque    Weakness on left side of face    Carotid stenosis, asymptomatic, bilateral    New onset seizure ([address]    COVID-19    Acute on chronic systolic CHF (congestive heart failure) (HCC)    Acute respiratory failure with hypoxia (HCC)    BLANCHE (acute kidney injury) ([address]    [facility] exacerbation [facility]    Hypokalemia    Hypomagnesemia    Palliative care encounter    Goals of care, counseling/discussion    DNR (do not resuscitate) discussion    ACP (advance care planning)    Constipation    Abdominal pain, generalized       PLAN:    1. Paroxysmal atrial fibrillation now in sinus rhythm with atrial bigeminy  2. covid 19 pneumonia  3. Respiratory failure  4. Chronic kidney disease  Continue supportive therapy. Wean and extubate as tolerated. Continue amiodarone drip. Please see orders. Discussed with nursing.     Jm Rene MD, MD

## 2022-02-04 NOTE — PROGRESS NOTES
Physical Therapy  DATE: 2022    NAME: Koko Melchor  MRN: [id number]   : 1945    Patient not seen this date for Physical Therapy due to:      [] Cancel by RN or physician due to:    [] Hemodialysis    [] Critical Lab Value Level     [] Blood transfusion in progress    [] Acute or unstable cardiovascular status   _MAP < 55 or more than >115  _HR < 40 or > 130    [x] Acute or unstable pulmonary status   -FiO2 > 60%   _RR < 5 or >40    _O2 sats < 85%    [] Strict Bedrest    [] Off Unit for surgery or procedure    [] Off Unit for testing       [] Pending imaging to R/O fracture    [] Refusal by Patient      [] Other      [] PT being discontinued at this time. Patient independent. No further needs. [] PT being discontinued at this time as the patient has been transferred to hospice care. No further needs.       Joel King, PT

## 2022-02-04 NOTE — PROGRESS NOTES
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 Actemra had been considered but again due to the cytopenias and thrombocytopenia this has been held. Antimicrobial therapy: The patient received Rocephin 1000 mg and azithromycin 500 mg on 1/16/2022  The patient received a dose of levofloxacin 500 mg on 1/18/2020. Patient started on cefepime and vancomycin 1/23/2022 plan completed a 7-day course of cefepime on 1/28/2022  Vancomycin discontinued due to acute kidney injury 1/24/2022    The patient continues on the ventilator but has been able to wean down to 60% FiO2 and 16 of PEEP. He is on sedation with fentanyl and Versed. Continue supportive therapy    Infection Control Recommendations:   Droplet plus precautions    Discharge Planning:   Estimated Length of IV antimicrobials: 5 to 7 days  Patient will need Midline Catheter Insertion/ PICC line Insertion: No  Patient will need: Home IV , [facility],  SNF,  LTAC: Undetermined  Patient willneed outpatient wound care: No    Medical Decision making / Summary of Stay:   Odette Freeman is a 64y.o.-year-old male who was initially admitted on 1/16/2022. Fabricio Wheat has a history of diabetes mellitus type 2, essential hypertension, seizure disorder, chronic kidney disease stage III, hyperlipidemia among other medical problems.     The patient reports that about 1 to 2 months ago he had his diabetes medications changed and since that time he has noticed increasing swelling in his legs, hardness in the legs, difficulty ambulating, and weight gain from 178 to 255 pounds over the last 1 to 2 months. He did not report any subjective fevers, chills, cough or shortness of breath. He denies any loss of smell or change in taste. No abdominal pain nausea vomiting or diarrhea. The patient does report that he has home oxygen that he uses as needed at home and he reports that he hardly needs it. He is vaccinated did receive the J&J vaccine but has not yet received a booster dose.     The patient presented to the emergency department and was found to have leukopenia with a white blood cell count of 2.7 and thrombocytopenia with a platelet count of 219. Creatinine slightly elevated at 1.31 and proBNP is elevated at 9327. Chest x-ray showed hyperinflated lungs with cardiomegaly seen and diffuse increased interstitial markings in both lungs which may represent baseline chronic interstitial lung changes given that these findings were present before. A CT of the chest was done with IV contrast that showed no evidence of pulmonary embolism and compared to 2008 there is worsening underlying emphysema with worsening subacute or acute interstitial lung disease best seen in the left upper lobe. Findings were not felt typical for COVID-19 virus pneumonia. There was thickening and distortion of the left major fissure with an ill-defined masslike focus in the left lower lobe.   There is worsening moderate to severe pulmonary artery hypertension     COVID testing was positive and I was asked to evaluate and help with COVID-19 virus infection    The patient did have a CT scan of the abdomen pelvis done 2022 which was a limited study with no evidence of bowel obstruction and moderate stool burden noted felt related to constipation. Tiny amount of free fluid scattered in the abdomen, moderate pleural effusions and left basilar consolidation and basilar interstitial thickening increased from 2022      Current evaluation:2022    BP (!) 138/46   Pulse 60   Temp 98.3 °F (36.8 °C) (Oral)   Resp (!) 32   Ht 5' 10.98\" (1.803 m)   Wt 228 lb 1.6 oz (103.5 kg)   SpO2 96%   BMI 31.83 kg/m²     Temperature Range: Temp: 98.3 °F (36.8 °C) Temp  Av.7 °F (37.1 °C)  Min: 98.2 °F (36.8 °C)  Max: 99.3 °F (37.4 °C)   The patient is seen and evaluated at bedside he remains intubated on the ventilator on 60% FiO2 and 10 of PEEP. The patient is on Versed and fentanyl for sedation. The patient was started on Luke-Synephrine for blood pressure support. Review of Systems   Unable to perform ROS: Intubated       Physical Examination :     Physical Exam  Constitutional:       Appearance: He is well-developed. Interventions: He is sedated and intubated. HENT:      Head: Normocephalic and atraumatic. Cardiovascular:      Rate and Rhythm: Normal rate. Heart sounds: Normal heart sounds. No friction rub. No gallop. Pulmonary:      Effort: Pulmonary effort is normal. He is intubated. Breath sounds: Normal breath sounds. No wheezing. Abdominal:      General: Bowel sounds are normal.      Palpations: Abdomen is soft. There is no mass. Tenderness: There is no abdominal tenderness. Musculoskeletal:         General: Swelling (Right upper extremity swelling) present. Cervical back: Neck supple. Lymphadenopathy:      Cervical: No cervical adenopathy. Skin:     General: Skin is warm and dry. Comments:  There is erythroderma in the legs and feet bilaterally which is not infectious         Laboratory data:   I have independently reviewed the followinglabs:  CBC with Differential:   Recent Labs     02/03/22  0400 02/04/22  0550   WBC 9.5 11.5*   HGB 13.2 12.6*   HCT 46.0 43.5    148   LYMPHOPCT 3* 3*   MONOPCT 5 4     BMP:   Recent Labs     02/03/22  0400 02/03/22  0400 02/03/22  1515 02/04/22  0550     --   --  147*   K 5.2   < > 4.7 4.9     --   --  109*   CO2 32*  --   --  29   BUN 85*  --   --  91*   CREATININE 1.18  --   --  1.10   MG 2.1  --   --  2.1    < > = values in this interval not displayed. Hepatic Function Panel:   Recent Labs     02/01/22  1504 02/03/22  0400   PROT 5.9* 5.6*   LABALBU 2.5* 2.4*   BILIDIR  --  0.16   IBILI  --  0.21   BILITOT 0.28* 0.37   ALKPHOS 100 117   ALT 43* 77*   AST 46* 72*         Lab Results   Component Value Date    PROCAL 0.25 02/01/2022    PROCAL 0.24 01/23/2022    PROCAL 0.11 01/19/2022     Lab Results   Component Value Date    CRP 23.5 01/16/2022    CRP 2.0 07/27/2019     Lab Results   Component Value Date    SEDRATE 3 01/16/2022         Lab Results   Component Value Date    DDIMER 2.96 01/30/2022    DDIMER 5.84 01/23/2022    DDIMER 1.53 01/18/2022    DDIMER 1.73 01/16/2022    DDIMER 1.18 12/07/2018     Lab Results   Component Value Date    FERRITIN 569 01/16/2022    FERRITIN 50 07/23/2019    FERRITIN 18 07/08/2019     Lab Results   Component Value Date     01/16/2022     Lab Results   Component Value Date    FIBRINOGEN 402 01/16/2022       Results in Past 30 Days  Result Component Current Result Ref Range Previous Result Ref Range   SARS-CoV-2, Rapid DETECTED (A) (1/16/2022) Not Detected Not in Time Range      Lab Results   Component Value Date    COVID19 DETECTED 01/16/2022    COVID19 Not Detected 11/02/2021       No results for input(s): VICTORINO in the last 72 hours. Imaging Studies:   ONE XRAY VIEW OF THE CHEST 2/3/2022 4:36 am  Impression   Slight improvement in aeration of the right base with slight worsening   infiltrates in the left lung.  Continued follow-up recommended.          CT OF THE ABDOMEN AND PELVIS WITHOUT CONTRAST 1/28/2022 8:34 am    Impression   1.  Overall mildly limited study due to motion and lack of contrast.       2.  No evidence of bowel obstruction.  Moderate stool burden is noted,   possibly related to constipation.  Enteric tube is in place with distal tip   in the proximal stomach.       3.  Tiny amount of free fluid scattered in the abdomen, including   presacral/perirectal fluid, most likely related to volume overload.  No   definite findings of rectal wall thickening or fecal impaction.       4.  Moderate pleural effusions, left basilar consolidation, and bibasilar   interstitial thickening, increased when compared to 01/16/2022.       RECOMMENDATIONS:   Unavailable         Veins: Lower Extremities DVT Study, Venous Scan Lower Bilateral.  Indications for Study: Leg Swelling . Patient Status: In Patient. Technical Quality: Limited visualization. Limitation reason: Edema. Comments: Simultaneous real time imaging utilizing B-Mode, color doppler and spectral waveform analysis was performed on the  bilateral lower extremities for venous examination of the deep and superficial systems. Conclusions  Summary  No evidence of superficial or deep venous thrombosis in both lower extremities. Signature  Electronically signed by Williams Tapia RVT(Sonographer) on 01/19/2022 08:10 AM  Electronically signed by Jenny Lighter physician) on 01/19/2022 06:21 PM      CT OF THE HEAD WITHOUT CONTRAST  1/18/2022 5:42 pm  Impression   New lacunar infarct left thalamus, age indeterminate. [facility] may be helpful.           Cultures:     Culture, Blood 1 [[id number]] Collected: 02/01/22 0003   Order Status: Completed Specimen: Blood Updated: 02/03/22 0824    Specimen Description . BLOOD    Special Requests ART LINE 20ML    Culture NO GROWTH 2 DAYS   Culture, Blood 1 [[id number]] Collected: 01/31/22 1853   Order Status: Completed Specimen: Blood Updated: 02/02/22 2313    Specimen Description . BLOOD    Special Requests RT HAND,10ML    Culture NO GROWTH 2 DAYS       Culture, Respiratory [[id number]] Collected: 01/28/22 1030   Order Status: Completed Specimen: Sputum, Suctioned Updated: 01/30/22 0932    Specimen Description . SUCTIONED SPUTUM    Special Requests NOT REPORTED    Direct Exam < 10 EPITHELIAL CELLS/LPF     <10 NEUTROPHILS/LPF     NO SIGNIFICANT PATHOGENS SEEN    Culture NORMAL RESPIRATORY PO HEAVY GROWTH       Culture, Blood 1 [[id number]] Collected: 01/23/22 1759   Order Status: Completed Specimen: Blood Updated: 01/28/22 2111    Specimen Description . BLOOD    Special Requests RT HAND 19ML    Culture NO GROWTH 5 DAYS   Culture, Blood 1 [[id number]] Collected: 01/23/22 1759   Order Status: Completed Specimen: Blood Updated: 01/28/22 2110    Specimen Description . BLOOD    Special Requests ART LINE 10ML    Culture NO GROWTH 5 DAYS     Culture, Blood 2 [[id number]] Collected: 01/21/22 0742   Order Status: Completed Specimen: Blood Updated: 01/26/22 1001    Specimen Description . BLOOD    Special Requests LEFT AC 20ML    Culture NO GROWTH 5 DAYS   Culture, Blood 1 [[id number]] Collected: 01/21/22 0750   Order Status: Completed Specimen: Blood Updated: 01/26/22 1000    Specimen Description . BLOOD    Special Requests LEFT HAND 5ML    Culture NO GROWTH 5 DAYS   MRSA DNA Probe, Nasal [[id number]] Collected: 01/23/22 2258   Order Status: Completed Specimen: Nasal Updated: 01/25/22 1038    Specimen Description . NASAL SWAB    MRSA, DNA, Nasal NEGATIVE    Comment: NEGATIVE:  MRSA DNA not detected by nucleic acid amplification.                                                     Results should be used as an adjunct to nosocomial control efforts to identify patients   needing enhanced precautions.     The test is not intended to identify patients with staphylococcal infections.  Results   should not be used to guide or monitor treatment for MRSA infections.          Culture, Blood 1 [[id number]] Collected: 01/16/22 1220   Order Status: Completed Specimen: Blood Updated: 01/21/22 1521    Specimen Description . BLOOD    Special Requests LAC 12ML    Culture NO GROWTH 5 DAYS   Culture, Blood 1 [[id number]] Collected: 01/16/22 1205   Order Status: Completed Specimen: Blood Updated: 01/21/22 1521    Specimen Description . BLOOD    Special Requests RAC 12ML    Culture NO GROWTH 5 DAYS       Culture, Urine [[id number]] Collected: 01/16/22 1315   Order Status: Completed Specimen: Urine, clean catch Updated: 01/17/22 2128    Specimen Description . CLEAN CATCH URINE    Special Requests NOT REPORTED    Culture NO SIGNIFICANT GROWTH       COVID-19, Rapid [[id number]] (Abnormal) Collected: 01/16/22 1230   Order Status: Completed Specimen: Nasopharyngeal Swab Updated: 01/16/22 1314    Specimen Description . NASOPHARYNGEAL SWAB    SARS-CoV-2, Rapid DETECTED Abnormal     Comment:        Rapid NAAT: The specimen is POSITIVE for SARS-Cov-2, the novel coronavirus associated with   COVID-19.         This test has been authorized by the FDA under an Emergency Use Authorization (EUA) for use   by authorized laboratories.         The ID NOW COVID-19 assay is designed to detect the virus that causes COVID-19 in patients   with signs and symptoms of infection who are suspected of COVID-19. An individual without symptoms of COVID-19 and who is not shedding SARS-CoV-2 virus would   expect to have a negative (not detected) result in this assay. Fact sheet for Healthcare Providers: Natacha.[email or web address]   Fact sheet for Patients: Natacha.es           Methodology: Isothermal Nucleic Acid Amplification         Results reported to the appropriate Health Department         Flu A/B Ag Detection [[id number]] Collected: 01/16/22 1230   Order Status: Completed Specimen: Nasopharyngeal Swab Updated: 01/16/22 1313    Specimen Description . NASOPHARYNGEAL SWAB    Special Requests NOT REPORTED

## 2022-02-04 NOTE — PROGRESS NOTES
Progress note  [facility].,    Adult Hospitalist      Name: Maricarmen Betts  MRN: [id number]     Acct: [de-identified]  Room: [address] [place] [address]    Admit Date: 1/16/2022 11:51 AM  PCP: Alis Henson MD    Primary Problem  Active Problems:    COVID    Acute on chronic systolic CHF (congestive heart failure) (HCC)    Acute respiratory failure with hypoxia (HCC)    BLANCHE (acute kidney injury) ([facility] [address]    COPD exacerbation ([facility] [address]    Hypokalemia    Hypomagnesemia    Palliative care encounter    Goals of care, counseling/discussion    DNR (do not resuscitate) discussion    ACP (advance care planning)    Constipation    Abdominal pain, generalized  Resolved Problems:    * No resolved hospital problems. *        Assesment:   Acute congestive heart failure, diastolic   [id number]   Viral pneumonia secondary to above  Acute respiratory failure with hypoxia intubated on 1/23/2022  Hyperkalemia  Paroxysmal atrial fibrillation  Essential hypertension  Mixed hyperlipidemia  Diabetes mellitus type 2  History of seizure disorder  History of CKD stage III, presently normal renal function  Lung mass?   Leukopenia  Polycythemia  Thrombocytopenia  Anxiety disorder  Recent past h/o lacunar infarct left thalamus   Altered mental status/agitation  Endotracheal intubation secondary to hypoxia dated 1/23/2022  Supraventricular tachycardia/elevated troponin  Fever  Emphysema   Pulmonary artery hypertension       Plan:   Admit patient to intermediate floor  Mechanical ventilation sedation as per critical care, patient continues to require 70% FiO2 with PEEP of 16  Telemetry  Check vitals closely  CBC BMP daily    Echocardiogram  Cardiology consult  IV pressors    Amiodarone  Cardiology following    IV Decadron  Patient completed a course of cefepime for 7 days  Bronchodilators  Pulmicort  Patient is deemed not a candidate for Actemra or baricitinib secondary to cytopenia  Infectious disease consult  Pulmonology consult    Continue Keppra  Continue amlodipine, atorvastatin  Continue aspirin    Patient seen by gastroenterology started on daily GlycoLax, also continued on suppositories, GlycoLax if not helping can be changed to milk of magnesia  Consult nephrology appreciated managing patient hyperkalemia, patient is continued on Lokelma  Increase free water if okay with nephrology  Insulin gtt  Continue other medication as below.     Scheduled Meds:   lisinopril  20 mg Oral Daily    amLODIPine  2.5 mg Oral Nightly    furosemide  20 mg IntraVENous Daily    Vitamin D  1,000 Units Oral Daily    polyethylene glycol  17 g Per NG tube Daily    levalbuterol  1.25 mg Nebulization Q6H    insulin glargine  9 Units SubCUTAneous Daily    lactulose  20 g Oral BID    bisacodyl  10 mg Rectal Daily    pantoprazole  40 mg IntraVENous Daily    And    sodium chloride (PF)  10 mL IntraVENous Daily    aspirin  324 mg Oral Daily    levETIRAcetam  500 mg Oral BID    chlorhexidine  15 mL Mouth/Throat BID    enoxaparin  30 mg SubCUTAneous BID    QUEtiapine  50 mg Oral Once    budesonide  0.5 mg Nebulization BID    dexamethasone  6 mg IntraVENous Q24H    sodium chloride flush  5-40 mL IntraVENous 2 times per day    atorvastatin  20 mg Oral Daily     Continuous Infusions:   fentaNYL 100 mcg/hr (02/03/22 1704)    amiodarone 0.5 mg/min (02/03/22 1715)    insulin (HUMAN R) non-weight based infusion 4.11 Units/hr (02/03/22 1902)    phenylephrine (KEARA-SYNEPHRINE) 50mg/250mL infusion Stopped (01/26/22 1820)    propofol 8.059 mcg/kg/min (01/28/22 0610)    midazolam (VERSED) 1 mg/mL in D5W infusion 9 mg/hr (02/03/22 1715)    sodium chloride      dextrose       PRN Meds:  sodium chloride nebulizer, 3 mL, Q8H PRN  LORazepam, 1 mg, Q4H PRN  dextrose bolus (hypoglycemia), 125 mL, PRN   Or  dextrose bolus (hypoglycemia), 250 mL, PRN  sodium chloride flush, 10 mL, PRN  sodium chloride, 25 mL, PRN  ondansetron, 4 mg, Q8H PRN   Or  ondansetron, 4 mg, Q6H PRN  acetaminophen, 650 mg, Q6H PRN   Or  acetaminophen, 650 mg, Q6H PRN  glucose, 15 g, PRN  glucagon (rDNA), 1 mg, PRN  dextrose, 100 mL/hr, PRN  hydrALAZINE, 10 mg, Q6H PRN        Chief Complaint:     Chief Complaint   Patient presents with    Leg Swelling     bilateral, has CHF, on diuretic, EMT saw pt , assisted pt into car    Fever    Other     low SPO2         History of Present Illness:   Patient seen examined at bedside  Patient remains in medical ICU  D/w RN in detail  Patient remains intubated sedated    Patient now requiring 70% of FiO2 with PEEP of 16  Remains sedated  Tolerating tube feeding  Resolved constipation  Dulcolax, Lactulose, Glycolax- all on hold now  C/o diarrhea today  Edema over extremities better now  Blood glucose better        Review of systems:  Unable to conduct ROS secondary to patient being intubated      Initial HPI  Lana Cheng is a 64 y.o.  male who presents with Leg Swelling (bilateral, has CHF, on diuretic, EMT saw pt , assisted pt into car), Fever, and Other (low SPO2)  This is a 26-year-old gentleman admitted via ER, come to ER with complaint of having shortness of breath, patient has medical history significant for COPD patient with history of cardiac failure: Patient noted that he has been having increasing swelling in his lower extremity and becoming more short of breath, further patient also been having some body aches and sweats, patient patient testing in the emergency room showed that he is positive for COVID-19 also noticed to have an elevated BNP, imaging concerning for fluid overload, admitted for further regiment    I have personally reviewed the past medical history, past surgical history, medications, social history, and family history, and summarized in the note.     Review of Systems:       Unable to conduct ROS secondary to patient being intubated      Past Medical History:     Past Medical History:   Diagnosis Date    Arthritis     Atherosclerotic plaque 7/28/2019    Cervical disc disease     degenerative disc disease    Diabetes mellitus ([facility] [address]     type 2    History of blood transfusion     Hx of blood clots     left leg    Hyperlipidemia     Neuropathy     Right kidney mass     \"urologist is watching\"    Snores     Type 2 diabetes mellitus treated with insulin ([facility] [address] 7/28/2019        Past Surgical History:     Past Surgical History:   Procedure Laterality Date    ABDOMINAL AORTIC ANEURYSM REPAIR  2005    CARPAL TUNNEL RELEASE Bilateral     CERVICAL SPINE SURGERY      COLONOSCOPY      benign polyps removed    INGUINAL HERNIA REPAIR Right     [place] REPAIR INCISIONAL HERNIA,REDUCIBLE N/A 5/10/2017    HERNIA VENTRAL REPAIR WITH MESH  performed by Edwina Mayen DO at [address] [place] [address] Road  05/10/2017    with mesh        Medications Prior to Admission:       Prior to Admission medications    Medication Sig Start Date End Date Taking? Authorizing Provider   rosuvastatin (CRESTOR) 40 MG tablet Take 40 mg by mouth every evening   Yes Historical Provider, MD   insulin NPH (HUMULIN N;NOVOLIN N) 100 UNIT/ML injection vial Inject 20 Units into the skin 2 times daily (before meals)   Yes Historical Provider, MD   levETIRAcetam (KEPPRA) 500 MG tablet Take 1 tablet by mouth 2 times daily 11/3/21  Yes Humera Li MD   venlafaxine (EFFEXOR XR) 150 MG extended release capsule Take 1 capsule by mouth daily (with breakfast) 7/29/19  Yes Arabella Kiser   vitamin B-1 (THIAMINE) 100 MG tablet Take 100 mg by mouth daily   Yes Historical Provider, MD   ferrous sulfate 325 (65 Fe) MG tablet Take 325 mg by mouth daily (with breakfast)   Yes Historical Provider, MD   ALPRAZolam (XANAX) 0.5 MG tablet Take 0.5 mg by mouth three times daily.    Yes Historical Provider, MD   furosemide (LASIX) 40 MG tablet Take 1 tablet by mouth 2 times daily 12/11/18  Yes Min Sanborn Keyanna Srivastava MD   tiotropium (SPIRIVA RESPIMAT) 2.5 MCG/ACT AERS inhaler Inhale 2 puffs into the lungs daily    Yes Historical Provider, MD   albuterol sulfate  (90 Base) MCG/ACT inhaler Inhale 2 puffs into the lungs every 6 hours as needed for Wheezing or Shortness of Breath   Yes Historical Provider, MD   metoprolol succinate (TOPROL XL) 50 MG extended release tablet Take 50 mg by mouth daily   Yes Historical Provider, MD   Multiple Vitamins-Minerals (MULTIVITAMIN ADULT) TABS Take 1 tablet by mouth daily   Yes Historical Provider, MD   vitamin D (CHOLECALCIFEROL) 1000 UNIT TABS tablet Take 1,000 Units by mouth daily   Yes Historical Provider, MD   fluticasone (FLONASE) 50 MCG/ACT nasal spray 1 spray by Each Nare route daily as needed for Rhinitis   Yes Historical Provider, MD   aspirin 325 MG EC tablet Take 325 mg by mouth daily    Yes Historical Provider, MD   Omega-3 Fatty Acids (FISH OIL) 1000 MG CAPS Take 1 capsule by mouth daily    Yes Historical Provider, MD   amLODIPine (NORVASC) 5 MG tablet Take 5 mg by mouth nightly    Yes Historical Provider, MD   mirtazapine (REMERON) 45 MG tablet Take 45 mg by mouth nightly   Yes Historical Provider, MD        Allergies: Barbiturates, Codeine, and Sulfa antibiotics    Social History:     Tobacco:    reports that he has quit smoking. He has never used smokeless tobacco.  Alcohol:      reports no history of alcohol use. Drug Use:  reports no history of drug use.     Family History:     Family History   Problem Relation Age of Onset    Ovarian Cancer Sister     Ovarian Cancer Sister          Physical Exam:     Vitals:  BP (!) 130/46   Pulse 72   Temp 99.7 °F (37.6 °C) (Oral)   Resp (!) 32   Ht 5' 10.98\" (1.803 m)   Wt 226 lb 3.1 oz (102.6 kg)   SpO2 92%   BMI 31.57 kg/m²   Temp (24hrs), Av.7 °F (37.6 °C), Min:99.7 °F (37.6 °C), Max:99.7 °F (37.6 °C)      General appearance -on ventilator  Mental status -sedated  Head - normocephalic and atraumatic  Eyes - conjunctiva clear  Ears -external ears normal  Nose - no drainage noted  Mouth - mucous membranes moist  Neck - supple, no carotid bruits, thyroid not palpable  Chest -basal crackles with decreased air entry bilaterally to auscultation, increased effort  Heart - normal rate, regular rhythm, no murmur  Abdomen - soft, nontender, nondistended, bowel sounds present all four quadrants, no masses, hepatomegaly or splenomegaly  Neurological -sedated on vent  Extremities - extremity edema, lower distal extremity discoloration    Skin - no gross lesions, rashes, or induration noted        Data:     Labs:    Hematology:  Recent Labs     02/01/22 0332 02/02/22 0417 02/03/22  0400   WBC 11.6* 10.7 9.5   RBC 5.55 5.55 5.35   HGB 13.9 13.8 13.2   HCT 47.0 47.6 46.0   MCV 84.7 85.8 86.0   MCH 25.0* 24.9* 24.7*   MCHC 29.6 29.0 28.7   RDW 15.5* 15.7* 15.6*    193 165   MPV 12.9 13.4 12.8     Chemistry:  Recent Labs     02/01/22  0332 02/01/22  0332 02/01/22  1504 02/01/22 2012 02/02/22  0417 02/02/22  0417 02/02/22  1740 02/03/22  0400 02/03/22  1515      < > 142  --  142  --  142 144  --    K 4.9   < > 5.6*   < > 5.6*   < > 5.4* 5.2 4.7      < > 107  --  106  --  106 107  --    CO2 28   < > 29  --  31  --  32* 32*  --    GLUCOSE 347*   < > 127*  --  142*  --  194* 183*  --    BUN 88*   < > 90*  --  84*  --  87* 85*  --    CREATININE 1.35*   < > 1.28*  --  1.23*  --  1.20 1.18  --    MG 2.4  --   --   --  2.3  --   --  2.1  --    ANIONGAP 8*   < > 6*  --  5*  --  4* 5*  --    LABGLOM 51*   < > 54*  --  57*  --  59* 60*  --    GFRAA >60   < > >60  --  >60  --  >60 >60  --    CALCIUM 9.7   < > 9.9  --  9.7  --  9.5 9.6  --    PHOS 2.3*  --   --   --  2.4*  --   --  3.0  --    PROBNP  --   --  07,091*  --   --   --   --   --   --     < > = values in this interval not displayed.      Recent Labs     02/01/22  1504 02/01/22  1602 02/02/22  0417 02/02/22  0541 02/03/22  0400 02/03/22  0405 02/03/22  1432 02/03/22  1503 02/03/22  1605 02/03/22  1645 02/03/22  1759 02/03/22  1902 correlation is recommended. CT CHEST PULMONARY EMBOLISM W CONTRAST    Result Date: 1/16/2022  No evidence of pulmonary embolism. Compared to 2008, worsening underlying emphysema, with worsening subacute or acute interstitial lung disease, best seen left upper lobe; differential includes interstitial pneumonia or pneumonitis superimposed on emphysema, DIP, HP, and other interstitial pneumonias as well as infectious or inflammatory process superimposed on emphysema disease. Findings are not typical for COVID-19 pneumonia, but an element of the latter should be considered. Thickening and distortion left major fissure with ill-defined mass-like focus left lower lobe. The latter could also be infectious or inflammatory, although neoplasm should be excluded. Underlying worsening emphysema. Nodular densities, best seen right upper lobe. Small pleural effusions, slightly larger on the left. See recommendations below. Mild mediastinal and left hilar adenopathy; see recommendations below. Worsening now moderate-severe pulmonary artery hypertension. Mild cardiomegaly. Stable mild dilatation ascending thoracic aorta. Additional unchanged or incidental findings, as above. RECOMMENDATIONS: Clinical correlation and short-term follow-up CT chest in 1-4 months depending upon the clinical presentation/course. All radiological studies reviewed                Code Status:  DNR-CCA    Electronically signed by Meri Bamberger, MD on 2/3/2022 at 7:39 PM     Copy sent to Dr. Alexis Pompa MD    This note was created with the assistance of a speech-recognition program.  Although the intention is to generate a document that actually reflects the content of the visit, no guarantees can be provided that every mistake has been identified and corrected by editing. Note was updated later by me after  physical examination and  completion of the assessment.

## 2022-02-04 NOTE — PROGRESS NOTES
Progress note  [facility].,    Adult Hospitalist      Name: Agapito Mann  MRN: [id number]     Acct: [de-identified]  Room: [address] [place] [address]-    Admit Date: 1/16/2022 11:51 AM  PCP: Alexis Pompa MD    Primary Problem  Active Problems:    COVID-19    Acute on chronic systolic CHF (congestive heart failure) (HCC)    Acute respiratory failure with hypoxia (HCC)    BLANCHE (acute kidney injury) ([facility]    COPD exacerbation ([facility]    Hypokalemia    Hypomagnesemia    Palliative care encounter    Goals of care, counseling/discussion    DNR (do not resuscitate) discussion    ACP (advance care planning)    Constipation    Abdominal pain, generalized  Resolved Problems:    * No resolved hospital problems. *        Assesment:   Acute congestive heart failure, diastolic   [id number]   Viral pneumonia secondary to above  Acute respiratory failure with hypoxia intubated on 1/23/2022  Hyperkalemia  Paroxysmal atrial fibrillation  Essential hypertension  Mixed hyperlipidemia  Diabetes mellitus type 2  History of seizure disorder  History of CKD stage III, presently normal renal function  Lung mass?   Leukopenia  Polycythemia  Thrombocytopenia  Anxiety disorder  Recent past h/o lacunar infarct left thalamus   Altered mental status/agitation  Endotracheal intubation secondary to hypoxia dated 1/23/2022  Supraventricular tachycardia/elevated troponin  Fever  Emphysema   Pulmonary artery hypertension       Plan:   Admit patient to intermediate floor  Mechanical ventilation sedation as per critical care, patient continues to require 70% FiO2 with PEEP of 16  Telemetry  Check vitals closely  CBC BMP daily    Echocardiogram  Cardiology consult  IV pressors    Amiodarone  Cardiology following    IV Decadron  Patient completed a course of cefepime for 7 days  Bronchodilators  Pulmicort  Patient is deemed not a candidate for Actemra or baricitinib secondary to cytopenia  Infectious disease consult  Pulmonology consult    Continue Keppra  Continue amlodipine, atorvastatin  Continue aspirin    Patient seen by gastroenterology started on daily GlycoLax, also continued on suppositories, GlycoLax if not helping can be changed to milk of magnesia  Consult nephrology appreciated managing patient hyperkalemia, patient is continued on Lokelma  Increase free water if okay with nephrology  Insulin gtt  Plan tracheostomy and PEG if family wishes  Continue other medication as below.     Scheduled Meds:   insulin lispro  0-12 Units SubCUTAneous Q6H    lisinopril  20 mg Oral Daily    amLODIPine  2.5 mg Oral Nightly    furosemide  20 mg IntraVENous Daily    Vitamin D  1,000 Units Oral Daily    polyethylene glycol  17 g Per NG tube Daily    levalbuterol  1.25 mg Nebulization Q6H    insulin glargine  9 Units SubCUTAneous Daily    lactulose  20 g Oral BID    bisacodyl  10 mg Rectal Daily    pantoprazole  40 mg IntraVENous Daily    And    sodium chloride (PF)  10 mL IntraVENous Daily    aspirin  324 mg Oral Daily    levETIRAcetam  500 mg Oral BID    chlorhexidine  15 mL Mouth/Throat BID    enoxaparin  30 mg SubCUTAneous BID    QUEtiapine  50 mg Oral Once    budesonide  0.5 mg Nebulization BID    dexamethasone  6 mg IntraVENous Q24H    sodium chloride flush  5-40 mL IntraVENous 2 times per day    atorvastatin  20 mg Oral Daily     Continuous Infusions:   dexmedetomidine (PRECEDEX) IV infusion 0.2 mcg/kg/hr (02/04/22 1552)    fentaNYL 50 mcg/hr (02/04/22 1456)    amiodarone 0.5 mg/min (02/04/22 1519)    phenylephrine (KEARA-SYNEPHRINE) 50mg/250mL infusion 10 mcg/min (02/04/22 1519)    propofol 8.059 mcg/kg/min (01/28/22 0610)    midazolam (VERSED) 1 mg/mL in D5W infusion Stopped (02/04/22 1448)    sodium chloride      dextrose       PRN Meds:  sodium chloride nebulizer, 3 mL, Q8H PRN  LORazepam, 1 mg, Q4H PRN  dextrose bolus (hypoglycemia), 125 mL, PRN   Or  dextrose bolus (hypoglycemia), 250 mL, PRN  sodium chloride flush, 10 mL, PRN  sodium chloride, 25 mL, PRN  ondansetron, 4 mg, Q8H PRN   Or  ondansetron, 4 mg, Q6H PRN  acetaminophen, 650 mg, Q6H PRN   Or  acetaminophen, 650 mg, Q6H PRN  glucose, 15 g, PRN  glucagon (rDNA), 1 mg, PRN  dextrose, 100 mL/hr, PRN  hydrALAZINE, 10 mg, Q6H PRN        Chief Complaint:     Chief Complaint   Patient presents with    Leg Swelling     bilateral, has CHF, on diuretic, EMT saw pt , assisted pt into car    Fever    Other     low SPO2         History of Present Illness:   Patient seen examined at bedside  Patient remains in medical ICU  D/w RN in detail  Patient remains intubated sedated    Patient now requiring 70% of FiO2 with PEEP of 16  Remains sedated  Tolerating tube feeding  Resolved constipation  Dulcolax, Lactulose, Glycolax- all on hold now  C/o diarrhea today  Edema over extremities better now  Blood glucose better        Review of systems:  Unable to conduct ROS secondary to patient being intubated      Initial HPI  Kevin Varela is a 64 y.o.  male who presents with Leg Swelling (bilateral, has CHF, on diuretic, EMT saw pt , assisted pt into car), Fever, and Other (low SPO2)  This is a 69-year-old gentleman admitted via ER, come to ER with complaint of having shortness of breath, patient has medical history significant for COPD patient with history of cardiac failure: Patient noted that he has been having increasing swelling in his lower extremity and becoming more short of breath, further patient also been having some body aches and sweats, patient patient testing in the emergency room showed that he is positive for COVID-19 also noticed to have an elevated BNP, imaging concerning for fluid overload, admitted for further regiment    I have personally reviewed the past medical history, past surgical history, medications, social history, and family history, and summarized in the note.     Review of Systems:       Unable to conduct ROS secondary to patient being intubated      Past Medical History:     Past Medical History:   Diagnosis Date    Arthritis     Atherosclerotic plaque 7/28/2019    Cervical disc disease     degenerative disc disease    Diabetes mellitus ([facility]     type 2    History of blood transfusion     Hx of blood clots     left leg    Hyperlipidemia     Neuropathy     Right kidney mass     \"urologist is watching\"    Snores     Type 2 diabetes mellitus treated with insulin ([facility] 7/28/2019        Past Surgical History:     Past Surgical History:   Procedure Laterality Date    ABDOMINAL AORTIC ANEURYSM REPAIR  2005    CARPAL TUNNEL RELEASE Bilateral     CERVICAL SPINE SURGERY      COLONOSCOPY      benign polyps removed    INGUINAL HERNIA REPAIR Right     DC REPAIR INCISIONAL HERNIA,REDUCIBLE N/A 5/10/2017    HERNIA VENTRAL REPAIR WITH MESH  performed by Nanci Chao DO at [address] [place] [address] Road  05/10/2017    with mesh        Medications Prior to Admission:       Prior to Admission medications    Medication Sig Start Date End Date Taking? Authorizing Provider   rosuvastatin (CRESTOR) 40 MG tablet Take 40 mg by mouth every evening   Yes Historical Provider, MD   insulin NPH (HUMULIN N;NOVOLIN N) 100 UNIT/ML injection vial Inject 20 Units into the skin 2 times daily (before meals)   Yes Historical Provider, MD   levETIRAcetam (KEPPRA) 500 MG tablet Take 1 tablet by mouth 2 times daily 11/3/21  Yes Bernadette Tellez MD   venlafaxine (EFFEXOR XR) 150 MG extended release capsule Take 1 capsule by mouth daily (with breakfast) 7/29/19  Yes Arabella Kiser   vitamin B-1 (THIAMINE) 100 MG tablet Take 100 mg by mouth daily   Yes Historical Provider, MD   ferrous sulfate 325 (65 Fe) MG tablet Take 325 mg by mouth daily (with breakfast)   Yes Historical Provider, MD   ALPRAZolam (XANAX) 0.5 MG tablet Take 0.5 mg by mouth three times daily.    Yes Historical Provider, MD   furosemide (LASIX) 40 MG tablet Take 1 tablet by mouth 2 times daily 18  Yes Monica Haji MD   tiotropium (SPIRIVA RESPIMAT) 2.5 MCG/ACT AERS inhaler Inhale 2 puffs into the lungs daily    Yes Historical Provider, MD   albuterol sulfate  (90 Base) MCG/ACT inhaler Inhale 2 puffs into the lungs every 6 hours as needed for Wheezing or Shortness of Breath   Yes Historical Provider, MD   metoprolol succinate (TOPROL XL) 50 MG extended release tablet Take 50 mg by mouth daily   Yes Historical Provider, MD   Multiple Vitamins-Minerals (MULTIVITAMIN ADULT) TABS Take 1 tablet by mouth daily   Yes Historical Provider, MD   vitamin D (CHOLECALCIFEROL) 1000 UNIT TABS tablet Take 1,000 Units by mouth daily   Yes Historical Provider, MD   fluticasone (FLONASE) 50 MCG/ACT nasal spray 1 spray by Each Nare route daily as needed for Rhinitis   Yes Historical Provider, MD   aspirin 325 MG EC tablet Take 325 mg by mouth daily    Yes Historical Provider, MD   Omega-3 Fatty Acids (FISH OIL) 1000 MG CAPS Take 1 capsule by mouth daily    Yes Historical Provider, MD   amLODIPine (NORVASC) 5 MG tablet Take 5 mg by mouth nightly    Yes Historical Provider, MD   mirtazapine (REMERON) 45 MG tablet Take 45 mg by mouth nightly   Yes Historical Provider, MD        Allergies: Barbiturates, Codeine, and Sulfa antibiotics    Social History:     Tobacco:    reports that he has quit smoking. He has never used smokeless tobacco.  Alcohol:      reports no history of alcohol use. Drug Use:  reports no history of drug use.     Family History:     Family History   Problem Relation Age of Onset    Ovarian Cancer Sister     Ovarian Cancer Sister          Physical Exam:     Vitals:  BP (!) 138/46   Pulse 66   Temp 98.2 °F (36.8 °C) (Oral)   Resp 20   Ht 5' 10.98\" (1.803 m)   Wt 228 lb 1.6 oz (103.5 kg)   SpO2 93%   BMI 31.83 kg/m²   Temp (24hrs), Av.6 °F (37 °C), Min:98.2 °F (36.8 °C), Max:99.3 °F (37.4 °C)      General appearance -on ventilator  Mental status -sedated  Head - normocephalic and atraumatic  Eyes - conjunctiva clear  Ears -external ears normal  Nose - no drainage noted  Mouth - mucous membranes moist  Neck - supple, no carotid bruits, thyroid not palpable  Chest -basal crackles with decreased air entry bilaterally to auscultation, increased effort  Heart - normal rate, regular rhythm, no murmur  Abdomen - soft, nontender, nondistended, bowel sounds present all four quadrants, no masses, hepatomegaly or splenomegaly  Neurological -sedated on vent  Extremities - extremity edema, lower distal extremity discoloration    Skin - no gross lesions, rashes, or induration noted        Data:     Labs:    Hematology:  Recent Labs     02/02/22 0417 02/03/22  0400 02/04/22  0550   WBC 10.7 9.5 11.5*   RBC 5.55 5.35 5.08   HGB 13.8 13.2 12.6*   HCT 47.6 46.0 43.5   MCV 85.8 86.0 85.6   MCH 24.9* 24.7* 24.8*   MCHC 29.0 28.7 29.0   RDW 15.7* 15.6* 15.6*    165 148   MPV 13.4 12.8 12.2     Chemistry:  Recent Labs     02/02/22  0417 02/02/22  0417 02/02/22  1740 02/02/22  1740 02/03/22  0400 02/03/22  1515 02/04/22  0550      < > 142  --  144  --  147*   K 5.6*   < > 5.4*   < > 5.2 4.7 4.9      < > 106  --  107  --  109*   CO2 31   < > 32*  --  32*  --  29   GLUCOSE 142*   < > 194*  --  183*  --  155*   BUN 84*   < > 87*  --  85*  --  91*   CREATININE 1.23*   < > 1.20  --  1.18  --  1.10   MG 2.3  --   --   --  2.1  --  2.1   ANIONGAP 5*   < > 4*  --  5*  --  9   LABGLOM 57*   < > 59*  --  60*  --  >60   GFRAA >60   < > >60  --  >60  --  >60   CALCIUM 9.7   < > 9.5  --  9.6  --  9.5   PHOS 2.4*  --   --   --  3.0  --  3.3    < > = values in this interval not displayed.      Recent Labs     02/02/22  0417 02/02/22  0541 02/03/22  0400 02/03/22  0405 02/04/22  0553 02/04/22  0659 02/04/22  0825 02/04/22  0920 02/04/22  1022 02/04/22  1144   PROT  --   --  5.6*  --   --   --   --   --   --   --    LABALBU  --   --  2.4*  --   --   --   --   --   --   --    AST  --   --  72* --   --   --   --   --   --   --    ALT  --   --  77*  --   --   --   --   --   --   --    ALKPHOS  --   --  117  --   --   --   --   --   --   --    BILITOT  --   --  0.37  --   --   --   --   --   --   --    BILIDIR  --   --  0.16  --   --   --   --   --   --   --    AMYLASE 46  --   --   --   --   --   --   --   --   --    LIPASE 19  --   --   --   --   --   --   --   --   --    POCGLU  --    < >  --    < > 143* 115* 136* 130* 146* 170*    < > = values in this interval not displayed. Lab Results   Component Value Date    INR 1.0 01/17/2022    INR 1.0 11/03/2021    INR 1.0 07/27/2019    PROTIME 13.3 01/17/2022    PROTIME 11.1 11/03/2021    PROTIME 10.5 07/27/2019       Lab Results   Component Value Date/Time    SPECIAL ART LINE 20ML 02/01/2022 12:03 AM     Lab Results   Component Value Date/Time    CULTURE NO GROWTH 3 DAYS 02/01/2022 12:03 AM       Lab Results   Component Value Date    POCPH 7.366 02/03/2022    POCPCO2 55.9 02/03/2022    POCPO2 153.5 02/03/2022    POCHCO3 32.0 02/03/2022    NBEA NOT REPORTED 02/03/2022    PBEA 5 02/03/2022    BBG9CII NOT REPORTED 02/03/2022    HWTM8TJJ 99 02/03/2022    FIO2 70.0 02/03/2022       Radiology:    XR CHEST 1 VIEW    Result Date: 1/16/2022  Hypoinflated lungs. Cardiomegaly again seen, when compared to the previous study performed 07/27/2019. Diffuse, increased interstitial markings throughout both lungs, some of which can be seen on the prior study may represent baseline chronic interstitial lung changes. The increased prominence on this exam could be secondary to the suboptimal inspiratory effort. The presence of pulmonary vascular congestion/mild CHF or bilateral interstitial pneumonia cannot be excluded. Clinical correlation is recommended. CT CHEST PULMONARY EMBOLISM W CONTRAST    Result Date: 1/16/2022  No evidence of pulmonary embolism.  Compared to 2008, worsening underlying emphysema, with worsening subacute or acute interstitial lung disease, best seen left upper lobe; differential includes interstitial pneumonia or pneumonitis superimposed on emphysema, DIP, HP, and other interstitial pneumonias as well as infectious or inflammatory process superimposed on emphysema disease. Findings are not typical for COVID-19 pneumonia, but an element of the latter should be considered. Thickening and distortion left major fissure with ill-defined mass-like focus left lower lobe. The latter could also be infectious or inflammatory, although neoplasm should be excluded. Underlying worsening emphysema. Nodular densities, best seen right upper lobe. Small pleural effusions, slightly larger on the left. See recommendations below. Mild mediastinal and left hilar adenopathy; see recommendations below. Worsening now moderate-severe pulmonary artery hypertension. Mild cardiomegaly. Stable mild dilatation ascending thoracic aorta. Additional unchanged or incidental findings, as above. RECOMMENDATIONS: Clinical correlation and short-term follow-up CT chest in 1-4 months depending upon the clinical presentation/course. All radiological studies reviewed                Code Status:  DNR-CCA    Electronically signed by Kamran Lima MD on 2/4/2022 at 4:43 PM     Copy sent to Dr. Iam Florez MD    This note was created with the assistance of a speech-recognition program.  Although the intention is to generate a document that actually reflects the content of the visit, no guarantees can be provided that every mistake has been identified and corrected by editing. Note was updated later by me after  physical examination and  completion of the assessment.

## 2022-02-04 NOTE — PROGRESS NOTES
Pulmonary Critical Care Progress Note  Eriberto Bunn MD     Patient seen for the follow up of COVID-19 pneumonia, acute hypoxic respiratory failure. Subjective:  He sedated, intubated on ventilator, FiO2 60%/PEEP 16. He is still on amiodarone drip. He is on insulin and Luke-Synephrine drips. He is off of Versed on fentanyl drip 50 mics. He is tolerating tube feeds, moving his bowels. Examination:  Vitals: BP (!) 138/46   Pulse 61   Temp 98.2 °F (36.8 °C) (Oral)   Resp (!) 32   Ht 5' 10.98\" (1.803 m)   Wt 228 lb 1.6 oz (103.5 kg)   SpO2 96%   BMI 31.83 kg/m²   General appearance: Sedated, intubated on ventilator  Neck: No JVD  Lungs: Bilateral crackles, no wheeze, moderate air exchange. Heart: regular rate and rhythm, S1, S2 normal, no gallop  Abdomen: Soft, non tender, + BS  Extremities: no cyanosis or clubbing. Positive edema    LABs:  CBC:   Recent Labs     02/03/22  0400 02/04/22  0550   WBC 9.5 11.5*   HGB 13.2 12.6*   HCT 46.0 43.5    148     BMP:   Recent Labs     02/03/22  0400 02/03/22  0400 02/03/22  1515 02/04/22  0550     --   --  147*   K 5.2   < > 4.7 4.9   CO2 32*  --   --  29   BUN 85*  --   --  91*   CREATININE 1.18  --   --  1.10   LABGLOM 60*  --   --  >60   GLUCOSE 183*  --   --  155*    < > = values in this interval not displayed.      ABG:  Lab Results   Component Value Date    EGI6BIT NOT REPORTED 02/03/2022    FIO2 70.0 02/03/2022       Lab Results   Component Value Date    POCPH 7.366 02/03/2022    POCPCO2 55.9 02/03/2022    POCPO2 153.5 02/03/2022    POCHCO3 32.0 02/03/2022    PBEA 5 02/03/2022    IUP0YXY NOT REPORTED 02/03/2022    ANPY5ULP 99 02/03/2022    FIO2 70.0 02/03/2022     Radiology:  X-ray chest 2/4/2022:      Impression:  · Acute on chronic hypoxic respiratory failure  · COVID-19 pneumonia  · COPD/pulmonary emphysema  · Acute left thalamic infarct/CVA  · Left lower lobe opacity versus nodule  · Pulmonary edema  · Elevated D-dimer, decreased platelets  · Systolic heart failure, s/p AICD placement  · BLANCHE on CKD  · Diabetes mellitus    Recommendations:  · Continue vent support, wean FiO2 as tolerated.   60% FiO2, PEEP at 16  · Fentanyl drip, 50 mics  · Off Versed  · Precedex for sedation if necessary  · Insulin drip  · Titrate Luke-Synephrine for map of 65 to 75 mmHg  · Amiodarone, 0.5 mg/h per cardiology  · Pulmicort aerosol treatment  · Airborne isolation  · IV Decadron 6 mg daily  · IV Lasix  · Not a candidate for Actemra/baricitinib stopped secondary to cytopenias  · Neurology following  · Xopenex every 6 hours  · Tube feeds  · X-ray chest in am  · Labs: CBC and BMP in am  · DVT prophylaxis with low molecular weight heparin  · GI prophylaxis, Protonix  · Discussed with RN  · Will follow with you    Bronwyn Aguilera MD, [facility]  Pulmonary Critical Care and Sleep Medicine,  [facility]  Cell: [phone]  Office: [phone]  CC: 35 minutes

## 2022-02-04 NOTE — PROGRESS NOTES
.  Nephrology  Progress Note    Reason for Consult: Hyperkalemia    Requesting Physician:  Desiree Perez MD    INTERVAL HISTORY:  K continues to improve. Today is 5.2. Creatinine Stable. Remains sedated and on the ventilator. BUN improved to 85 from 87. Patient had 2 bowel movements overnight. Yesterday patient went asystole, he is a DNRCCA and did not receive or require compressions, cardiology following closely and adjusted medications. His SBP ranging 137-151. HISTORY OF PRESENT ILLNESS:    The patient is a 64 y.o. male who presented with to the hospital for worsening shortness of breath ad and worsening lower extremity edema on 1/16. He had diffuse body aches and sweats and a dry cough. He tested positive for COVID-19. He has steadily declined since admission. On 1/18 a CT head found New lacunar infarct left thalamus. He had to be intubated on 1/23. He has a significant past medical history of COPD, hyperlipidemia, Type 2 DM, Right kidney mass, and abdominal aortic aneurysm repair in 2005. His potassium has been steadily rising since 1/24/22. The most current Potassium level is 5.8. His creatine is improved to 1.71. This information was retrieved through chart review and nursing. Patient was unable to provide information due to current status on mechanical ventilation and sedation. Review Of Systems:  Unable to obtain. Patient sedated on ventilator.     Past Medical History:   Diagnosis Date    Arthritis     Atherosclerotic plaque 7/28/2019    Cervical disc disease     degenerative disc disease    Diabetes mellitus ([facility]     type 2    History of blood transfusion     Hx of blood clots     left leg    Hyperlipidemia     Neuropathy     Right kidney mass     \"urologist is watching\"    Snores     Type 2 diabetes mellitus treated with insulin ([facility] 7/28/2019       Past Surgical History:   Procedure Laterality Date    ABDOMINAL AORTIC ANEURYSM REPAIR  2005    CARPAL TUNNEL RELEASE Bilateral     CERVICAL SPINE SURGERY      COLONOSCOPY      benign polyps removed    INGUINAL HERNIA REPAIR Right     MI REPAIR INCISIONAL HERNIA,REDUCIBLE N/A 5/10/2017    HERNIA VENTRAL REPAIR WITH MESH  performed by Rachell Herbert DO at [address] [place] [address] Road  05/10/2017    with mesh       Prior to Admission medications    Medication Sig Start Date End Date Taking? Authorizing Provider   rosuvastatin (CRESTOR) 40 MG tablet Take 40 mg by mouth every evening   Yes Historical Provider, MD   insulin NPH (HUMULIN N;NOVOLIN N) 100 UNIT/ML injection vial Inject 20 Units into the skin 2 times daily (before meals)   Yes Historical Provider, MD   levETIRAcetam (KEPPRA) 500 MG tablet Take 1 tablet by mouth 2 times daily 11/3/21  Yes Mildred Mitchell MD   venlafaxine (EFFEXOR XR) 150 MG extended release capsule Take 1 capsule by mouth daily (with breakfast) 7/29/19  Yes Arabella Kiser   vitamin B-1 (THIAMINE) 100 MG tablet Take 100 mg by mouth daily   Yes Historical Provider, MD   ferrous sulfate 325 (65 Fe) MG tablet Take 325 mg by mouth daily (with breakfast)   Yes Historical Provider, MD   ALPRAZolam (XANAX) 0.5 MG tablet Take 0.5 mg by mouth three times daily.    Yes Historical Provider, MD   furosemide (LASIX) 40 MG tablet Take 1 tablet by mouth 2 times daily 12/11/18  Yes Aislinn Evans MD   tiotropium (SPIRIVA RESPIMAT) 2.5 MCG/ACT AERS inhaler Inhale 2 puffs into the lungs daily    Yes Historical Provider, MD   albuterol sulfate  (90 Base) MCG/ACT inhaler Inhale 2 puffs into the lungs every 6 hours as needed for Wheezing or Shortness of Breath   Yes Historical Provider, MD   metoprolol succinate (TOPROL XL) 50 MG extended release tablet Take 50 mg by mouth daily   Yes Historical Provider, MD   Multiple Vitamins-Minerals (MULTIVITAMIN ADULT) TABS Take 1 tablet by mouth daily   Yes Historical Provider, MD   vitamin D (CHOLECALCIFEROL) 1000 UNIT TABS tablet Take 1,000 Units by mouth daily   Yes Historical Provider, MD   fluticasone (FLONASE) 50 MCG/ACT nasal spray 1 spray by Each Nare route daily as needed for Rhinitis   Yes Historical Provider, MD   aspirin 325 MG EC tablet Take 325 mg by mouth daily    Yes Historical Provider, MD   Omega-3 Fatty Acids (FISH OIL) 1000 MG CAPS Take 1 capsule by mouth daily    Yes Historical Provider, MD   amLODIPine (NORVASC) 5 MG tablet Take 5 mg by mouth nightly    Yes Historical Provider, MD   mirtazapine (REMERON) 45 MG tablet Take 45 mg by mouth nightly   Yes Historical Provider, MD       Scheduled Meds:   insulin lispro  0-12 Units SubCUTAneous Q6H    lisinopril  20 mg Oral Daily    amLODIPine  2.5 mg Oral Nightly    furosemide  20 mg IntraVENous Daily    Vitamin D  1,000 Units Oral Daily    polyethylene glycol  17 g Per NG tube Daily    levalbuterol  1.25 mg Nebulization Q6H    insulin glargine  9 Units SubCUTAneous Daily    lactulose  20 g Oral BID    bisacodyl  10 mg Rectal Daily    pantoprazole  40 mg IntraVENous Daily    And    sodium chloride (PF)  10 mL IntraVENous Daily    aspirin  324 mg Oral Daily    levETIRAcetam  500 mg Oral BID    chlorhexidine  15 mL Mouth/Throat BID    enoxaparin  30 mg SubCUTAneous BID    QUEtiapine  50 mg Oral Once    budesonide  0.5 mg Nebulization BID    dexamethasone  6 mg IntraVENous Q24H    sodium chloride flush  5-40 mL IntraVENous 2 times per day    atorvastatin  20 mg Oral Daily     Continuous Infusions:   fentaNYL 100 mcg/hr (02/04/22 0300)    amiodarone 0.5 mg/min (02/04/22 0556)    phenylephrine (KEARA-SYNEPHRINE) 50mg/250mL infusion Stopped (01/26/22 1820)    propofol 8.059 mcg/kg/min (01/28/22 0610)    midazolam (VERSED) 1 mg/mL in D5W infusion 9 mg/hr (02/04/22 0746)    sodium chloride      dextrose       PRN Meds:sodium chloride nebulizer, LORazepam, dextrose bolus (hypoglycemia) **OR** dextrose bolus (hypoglycemia), sodium chloride flush, sodium chloride, ondansetron **OR** ondansetron, acetaminophen **OR** acetaminophen, glucose, glucagon (rDNA), dextrose, hydrALAZINE    Allergies   Allergen Reactions    Barbiturates Anxiety     Other reaction(s): Unknown    Codeine Palpitations    Sulfa Antibiotics Rash     Other reaction(s): Unknown       Social History     Socioeconomic History    Marital status:      Spouse name: Not on file    Number of children: Not on file    Years of education: Not on file    Highest education level: Not on file   Occupational History    Not on file   Tobacco Use    Smoking status: Former Smoker    Smokeless tobacco: Never Used    Tobacco comment: quit 30 years ago   Substance and Sexual Activity    Alcohol use: No    Drug use: No    Sexual activity: Not on file   Other Topics Concern    Not on file   Social History Narrative    Not on file     Social Determinants of Health     Financial Resource Strain:     Difficulty of Paying Living Expenses: Not on file   Food Insecurity:     Worried About Running Out of Food in the Last Year: Not on file    Dona of Food in the Last Year: Not on file   Transportation Needs:     Lack of Transportation (Medical): Not on file    Lack of Transportation (Non-Medical):  Not on file   Physical Activity:     Days of Exercise per Week: Not on file    Minutes of Exercise per Session: Not on file   Stress:     Feeling of Stress : Not on file   Social Connections:     Frequency of Communication with Friends and Family: Not on file    Frequency of Social Gatherings with Friends and Family: Not on file    Attends Voodoo Services: Not on file    Active Member of Clubs or Organizations: Not on file    Attends Club or Organization Meetings: Not on file    Marital Status: Not on file   Intimate Partner Violence:     Fear of Current or Ex-Partner: Not on file    Emotionally Abused: Not on file    Physically Abused: Not on file    Sexually Abused: Not on file   Housing Stability:     Unable to Pay for Housing in the Last Year: Not on file    Number of Places Lived in the Last Year: Not on file    Unstable Housing in the Last Year: Not on file       Family History   Problem Relation Age of Onset    Ovarian Cancer Sister     Ovarian Cancer Sister          Physical Exam:  Vitals:    02/04/22 0600 02/04/22 0630 02/04/22 0700 02/04/22 0801   BP:       Pulse: 63 61 61 60   Resp: 26 (!) 32 (!) 32 26   Temp:   98.3 °F (36.8 °C)    TempSrc:   Oral    SpO2: 96% 96% 97% 96%   Weight: 228 lb 1.6 oz (103.5 kg)      Height:         I/O last 3 completed shifts: In: 5338.2 [I.V.:1279.2; NG/GT:4059]  Out: 3150 [CGKJV:1261]    Deferred due to [facility] isolation.      Data:  CBC:   Lab Results   Component Value Date    WBC 11.5 (H) 02/04/2022    HGB 12.6 (L) 02/04/2022    HCT 43.5 02/04/2022    MCV 85.6 02/04/2022     02/04/2022     BMP:    Lab Results   Component Value Date     (H) 02/04/2022     02/03/2022     02/02/2022    K 4.9 02/04/2022    K 4.7 02/03/2022    K 5.2 02/03/2022     (H) 02/04/2022     02/03/2022     02/02/2022    CO2 29 02/04/2022    CO2 32 (H) 02/03/2022    CO2 32 (H) 02/02/2022    BUN 91 (HH) 02/04/2022    BUN 85 (H) 02/03/2022    BUN 87 (H) 02/02/2022    CREATININE 1.10 02/04/2022    CREATININE 1.18 02/03/2022    CREATININE 1.20 02/02/2022    GLUCOSE 155 (H) 02/04/2022    GLUCOSE 183 (H) 02/03/2022    GLUCOSE 194 (H) 02/02/2022     CMP:   Lab Results   Component Value Date     02/04/2022    K 4.9 02/04/2022     02/04/2022    CO2 29 02/04/2022    BUN 91 02/04/2022    CREATININE 1.10 02/04/2022    GLUCOSE 155 02/04/2022    CALCIUM 9.5 02/04/2022    PROT 5.6 02/03/2022    LABALBU 2.4 02/03/2022    BILITOT 0.37 02/03/2022    ALKPHOS 117 02/03/2022    AST 72 02/03/2022    ALT 77 02/03/2022      Hepatic:   Lab Results   Component Value Date    AST 72 (H) 02/03/2022    AST 46 (H) 02/01/2022    AST 28 01/31/2022    ALT 77 (H) 02/03/2022    ALT 43 (H) 02/01/2022    ALT 26 01/31/2022    BILITOT 0.37 02/03/2022    BILITOT 0.28 (L) 02/01/2022    BILITOT 0.32 01/31/2022    ALKPHOS 117 02/03/2022    ALKPHOS 100 02/01/2022    ALKPHOS 99 01/31/2022     BNP: No results found for: BNP  Lipids:   Lab Results   Component Value Date    CHOL 129 11/03/2021    HDL 30 (L) 11/03/2021     INR:   Lab Results   Component Value Date    INR 1.0 01/17/2022    INR 1.0 11/03/2021    INR 1.0 07/27/2019     PTH: No results found for: PTH  Phosphorus:    Lab Results   Component Value Date    PHOS 3.3 02/04/2022     Ionized Calcium: No results found for: IONCA  Magnesium:   Lab Results   Component Value Date    MG 2.1 02/04/2022     Albumin:   Lab Results   Component Value Date    LABALBU 2.4 02/03/2022     Last 3 CK, CKMB, Troponin: @LABRCNT(CKTOTAL:3,CKMB:3,TROPONINI:3)       URINE:)No results found for: Ellen Meagan    Radiology:  Reviewed. Assessment:  BLANCHE, hemodynamically related, good UO, Cr is better. Azotemia. CKD 3A. Hypernatremia. Hyperkalemia. Hypophosphatemia, improved. Hypovitaminosis D. Metabolic alkalosis. Hypertension. Diabetes mellitis. COVID19 pneumonia. CHF. COPD. Plan:  Monitor potassium off of Lokelma. Metoprolol discontinued per cardiology. Lisinopril 20 mg daily started 2/2/2022 per cardiology. Monitor K and creatinine closely. Continue Amlodipine 2.5 mg daily. Monitor BP. Continue vitamin D supplementation. Continue Lasix 20 mg IV daily. Continue Lactulose 20 gm 2 times a day. If this needs to be discontinued please notify nephrology first.  Renal tube feed. Will increase free water flushes to 300 ml every 3 hours. Avoid hypotension, nephrotoxic drugs, Lovenox, Fleets enema and IV contrast exposure. Follow-up labs later today and daily in the morning. We will follow with you.       Electronically signed by Kareen Franco MD  on 2/4/2022 at 10:09 AM   [facility].   Ph: [phone]

## 2022-02-05 NOTE — PROGRESS NOTES
.  Nephrology  Progress Note    Reason for Consult: Hyperkalemia    Requesting Physician:  Gilmer Klein MD    INTERVAL HISTORY:  K continues to improve. Today is 4.8. Creatinine better. Remains sedated and on the ventilator. BUN is in the 80s. His SBP ranging 110-170s. HISTORY OF PRESENT ILLNESS:    The patient is a 64 y.o. male who presented with to the hospital for worsening shortness of breath ad and worsening lower extremity edema on 1/16. He had diffuse body aches and sweats and a dry cough. He tested positive for COVID-19. He has steadily declined since admission. On 1/18 a CT head found New lacunar infarct left thalamus. He had to be intubated on 1/23. He has a significant past medical history of COPD, hyperlipidemia, Type 2 DM, Right kidney mass, and abdominal aortic aneurysm repair in 2005. His potassium has been steadily rising since 1/24/22. The most current Potassium level is 5.8. His creatine is improved to 1.71. This information was retrieved through chart review and nursing. Patient was unable to provide information due to current status on mechanical ventilation and sedation. Review Of Systems:  Unable to obtain. Patient sedated on ventilator.     Past Medical History:   Diagnosis Date    Arthritis     Atherosclerotic plaque 7/28/2019    Cervical disc disease     degenerative disc disease    Diabetes mellitus ([address]     type 2    History of blood transfusion     Hx of blood clots     left leg    Hyperlipidemia     Neuropathy     Right kidney mass     \"urologist is watching\"    Snores     Type 2 diabetes mellitus treated with insulin ([address] 7/28/2019       Past Surgical History:   Procedure Laterality Date    ABDOMINAL AORTIC ANEURYSM REPAIR  2005    CARPAL TUNNEL RELEASE Bilateral     CERVICAL SPINE SURGERY      COLONOSCOPY      benign polyps removed    INGUINAL HERNIA REPAIR Right     NE REPAIR INCISIONAL HERNIA,REDUCIBLE N/A 5/10/2017    HERNIA VENTRAL REPAIR WITH MESH  performed by Loretta Santana DO at [address] [place] [address] Road  05/10/2017    with mesh       Prior to Admission medications    Medication Sig Start Date End Date Taking? Authorizing Provider   rosuvastatin (CRESTOR) 40 MG tablet Take 40 mg by mouth every evening   Yes Historical Provider, MD   insulin NPH (HUMULIN N;NOVOLIN N) 100 UNIT/ML injection vial Inject 20 Units into the skin 2 times daily (before meals)   Yes Historical Provider, MD   levETIRAcetam (KEPPRA) 500 MG tablet Take 1 tablet by mouth 2 times daily 11/3/21  Yes Kathy Lala MD   venlafaxine (EFFEXOR XR) 150 MG extended release capsule Take 1 capsule by mouth daily (with breakfast) 7/29/19  Yes Arabella Kiser   vitamin B-1 (THIAMINE) 100 MG tablet Take 100 mg by mouth daily   Yes Historical Provider, MD   ferrous sulfate 325 (65 Fe) MG tablet Take 325 mg by mouth daily (with breakfast)   Yes Historical Provider, MD   ALPRAZolam (XANAX) 0.5 MG tablet Take 0.5 mg by mouth three times daily.    Yes Historical Provider, MD   furosemide (LASIX) 40 MG tablet Take 1 tablet by mouth 2 times daily 12/11/18  Yes Reji Evans MD   tiotropium (SPIRIVA RESPIMAT) 2.5 MCG/ACT AERS inhaler Inhale 2 puffs into the lungs daily    Yes Historical Provider, MD   albuterol sulfate  (90 Base) MCG/ACT inhaler Inhale 2 puffs into the lungs every 6 hours as needed for Wheezing or Shortness of Breath   Yes Historical Provider, MD   metoprolol succinate (TOPROL XL) 50 MG extended release tablet Take 50 mg by mouth daily   Yes Historical Provider, MD   Multiple Vitamins-Minerals (MULTIVITAMIN ADULT) TABS Take 1 tablet by mouth daily   Yes Historical Provider, MD   vitamin D (CHOLECALCIFEROL) 1000 UNIT TABS tablet Take 1,000 Units by mouth daily   Yes Historical Provider, MD   fluticasone (FLONASE) 50 MCG/ACT nasal spray 1 spray by Each Nare route daily as needed for Rhinitis   Yes Historical Provider, MD aspirin 325 MG EC tablet Take 325 mg by mouth daily    Yes Historical Provider, MD   Omega-3 Fatty Acids (FISH OIL) 1000 MG CAPS Take 1 capsule by mouth daily    Yes Historical Provider, MD   amLODIPine (NORVASC) 5 MG tablet Take 5 mg by mouth nightly    Yes Historical Provider, MD   mirtazapine (REMERON) 45 MG tablet Take 45 mg by mouth nightly   Yes Historical Provider, MD       Scheduled Meds:   insulin lispro  0-12 Units SubCUTAneous Q6H    lisinopril  20 mg Oral Daily    amLODIPine  2.5 mg Oral Nightly    furosemide  20 mg IntraVENous Daily    Vitamin D  1,000 Units Oral Daily    polyethylene glycol  17 g Per NG tube Daily    levalbuterol  1.25 mg Nebulization Q6H    insulin glargine  9 Units SubCUTAneous Daily    lactulose  20 g Oral BID    bisacodyl  10 mg Rectal Daily    pantoprazole  40 mg IntraVENous Daily    And    sodium chloride (PF)  10 mL IntraVENous Daily    aspirin  324 mg Oral Daily    levETIRAcetam  500 mg Oral BID    chlorhexidine  15 mL Mouth/Throat BID    enoxaparin  30 mg SubCUTAneous BID    QUEtiapine  50 mg Oral Once    budesonide  0.5 mg Nebulization BID    sodium chloride flush  5-40 mL IntraVENous 2 times per day    atorvastatin  20 mg Oral Daily     Continuous Infusions:   dexmedetomidine (PRECEDEX) IV infusion 0.3 mcg/kg/hr (02/05/22 0517)    fentaNYL 50 mcg/hr (02/05/22 0439)    amiodarone 0.5 mg/min (02/05/22 0517)    phenylephrine (KEARA-SYNEPHRINE) 50mg/250mL infusion 20 mcg/min (02/05/22 0440)    propofol 8.059 mcg/kg/min (01/28/22 0610)    midazolam (VERSED) 1 mg/mL in D5W infusion 3 mg/hr (02/04/22 2332)    sodium chloride      dextrose       PRN Meds:sodium chloride nebulizer, LORazepam, dextrose bolus (hypoglycemia) **OR** dextrose bolus (hypoglycemia), sodium chloride flush, sodium chloride, ondansetron **OR** ondansetron, acetaminophen **OR** acetaminophen, glucose, glucagon (rDNA), dextrose, hydrALAZINE    Allergies   Allergen Reactions    Barbiturates Anxiety     Other reaction(s): Unknown    Codeine Palpitations    Sulfa Antibiotics Rash     Other reaction(s): Unknown       Social History     Socioeconomic History    Marital status:      Spouse name: Not on file    Number of children: Not on file    Years of education: Not on file    Highest education level: Not on file   Occupational History    Not on file   Tobacco Use    Smoking status: Former Smoker    Smokeless tobacco: Never Used    Tobacco comment: quit 30 years ago   Substance and Sexual Activity    Alcohol use: No    Drug use: No    Sexual activity: Not on file   Other Topics Concern    Not on file   Social History Narrative    Not on file     Social Determinants of Health     Financial Resource Strain:     Difficulty of Paying Living Expenses: Not on file   Food Insecurity:     Worried About [address] in the Last Year: Not on file    [address] in the Last Year: Not on file   Transportation Needs:     Lack of Transportation (Medical): Not on file    Lack of Transportation (Non-Medical):  Not on file   Physical Activity:     Days of Exercise per Week: Not on file    Minutes of Exercise per Session: Not on file   Stress:     Feeling of Stress : Not on file   Social Connections:     Frequency of Communication with Friends and Family: Not on file    Frequency of Social Gatherings with Friends and Family: Not on file    Attends Restorationism Services: Not on file    Active Member of Clubs or Organizations: Not on file    Attends Club or Organization Meetings: Not on file    Marital Status: Not on file   Intimate Partner Violence:     Fear of Current or Ex-Partner: Not on file    Emotionally Abused: Not on file    Physically Abused: Not on file    Sexually Abused: Not on file   Housing Stability:     Unable to Pay for Housing in the Last Year: Not on file    Number of [place] in the Last Year: Not on file    Unstable Housing in the Last Year: Not on file       Family History   Problem Relation Age of Onset    Ovarian Cancer Sister     Ovarian Cancer Sister          Physical Exam:  Vitals:    02/05/22 0630 02/05/22 0700 02/05/22 0800 02/05/22 0802   BP:       Pulse: 80 79 76    Resp: 20 20 (!) 33    Temp:   98.8 °F (37.1 °C)    TempSrc:   Axillary    SpO2: 96% 96% 95% 95%   Weight:       Height:         I/O last 3 completed shifts: In: 3834.7 [I.V.:878.7; NG/GT:2956]  Out: 4565 [Urine:3275]    Deferred due to [address] isolation.      Data:  CBC:   Lab Results   Component Value Date    WBC 17.0 (H) 02/05/2022    HGB 13.7 02/05/2022    HCT 46.4 02/05/2022    MCV 83.3 02/05/2022     02/05/2022     BMP:    Lab Results   Component Value Date     (H) 02/05/2022     02/04/2022     (H) 02/04/2022    K 4.8 02/05/2022    K 4.8 02/04/2022    K 4.9 02/04/2022     02/05/2022     02/04/2022     (H) 02/04/2022    CO2 29 02/05/2022    CO2 29 02/04/2022    CO2 29 02/04/2022    BUN 88 (H) 02/05/2022    BUN 95 (HH) 02/04/2022    BUN 91 (HH) 02/04/2022    CREATININE 1.01 02/05/2022    CREATININE 1.08 02/04/2022    CREATININE 1.10 02/04/2022    GLUCOSE 359 (H) 02/05/2022    GLUCOSE 315 (H) 02/04/2022    GLUCOSE 155 (H) 02/04/2022     CMP:   Lab Results   Component Value Date     02/05/2022    K 4.8 02/05/2022     02/05/2022    CO2 29 02/05/2022    BUN 88 02/05/2022    CREATININE 1.01 02/05/2022    GLUCOSE 359 02/05/2022    CALCIUM 9.5 02/05/2022    PROT 5.6 02/03/2022    LABALBU 2.4 02/03/2022    BILITOT 0.37 02/03/2022    ALKPHOS 117 02/03/2022    AST 72 02/03/2022    ALT 77 02/03/2022      Hepatic:   Lab Results   Component Value Date    AST 72 (H) 02/03/2022    AST 46 (H) 02/01/2022    AST 28 01/31/2022    ALT 77 (H) 02/03/2022    ALT 43 (H) 02/01/2022    ALT 26 01/31/2022    BILITOT 0.37 02/03/2022    BILITOT 0.28 (L) 02/01/2022    BILITOT 0.32 01/31/2022    ALKPHOS 117 02/03/2022    ALKPHOS 100 02/01/2022 ALKPHOS 99 01/31/2022     BNP: No results found for: BNP  Lipids:   Lab Results   Component Value Date    CHOL 129 11/03/2021    HDL 30 (L) 11/03/2021     INR:   Lab Results   Component Value Date    INR 1.0 01/17/2022    INR 1.0 11/03/2021    INR 1.0 07/27/2019     PTH: No results found for: PTH  Phosphorus:    Lab Results   Component Value Date    PHOS 3.5 02/05/2022     Ionized Calcium: No results found for: IONCA  Magnesium:   Lab Results   Component Value Date    MG 2.1 02/05/2022     Albumin:   Lab Results   Component Value Date    LABALBU 2.4 02/03/2022     Last 3 CK, CKMB, Troponin: @LABRCNT(CKTOTAL:3,CKMB:3,TROPONINI:3)       URINE:)No results found for: Peace Sims    Radiology:  Reviewed. Assessment:  BLANCHE, hemodynamically related, good UO, Cr is better. Azotemia. CKD 3A. Hypernatremia, corrected serum Na is 151 today. Hyperkalemia. Hypophosphatemia, improved. Hypovitaminosis D. Metabolic alkalosis. Hypertension. Diabetes mellitis. COVID19 pneumonia. CHF. COPD. Plan: Will start insulin drip. Will start D5W at 125 ml/hr to replete her water deficit. Continue free water flushes at 300 ml every 3 hours. Renal tube feed. Monitor potassium off of Lokelma. Metoprolol discontinued per cardiology. Lisinopril 20 mg daily started 2/2/2022 per cardiology. Monitor K and creatinine closely. Continue Amlodipine 2.5 mg daily. Monitor BP. Continue vitamin D supplementation. Continue Lasix 20 mg IV daily. Will discontinue Lactulose for now. Avoid hypotension, nephrotoxic drugs, Fleets enema and IV contrast exposure. Follow up BMP every 6 hours. We will follow with you. Electronically signed by Alejandro Owens MD  on 2/5/2022 at 9:29 AM   [facility].   Ph: [phone]

## 2022-02-05 NOTE — PROGRESS NOTES
Pulmonary Critical Care Progress Note  Aman Augustine MD     Patient seen for the follow up of COVID-19 pneumonia, acute hypoxic respiratory failure. Subjective:  He sedated, intubated on ventilator, FiO2 60%/PEEP 16. He is still on amiodarone drip. He is on insulin and Luke-Synephrine drips. He is on versed, fentanyl drip 50 mics, precedex . He is tolerating tube feeds, moving his bowels. Examination:  Vitals: BP (!) 139/53   Pulse 63   Temp 98.7 °F (37.1 °C) (Oral)   Resp 26   Ht 5' 10.98\" (1.803 m)   Wt 228 lb 1.6 oz (103.5 kg)   SpO2 96%   BMI 31.83 kg/m²   General appearance: Sedated, intubated on ventilator  Neck: No JVD  Lungs: Bilateral crackles, no wheeze, moderate air exchange. Heart: regular rate and rhythm, S1, S2 normal, no gallop  Abdomen: Soft, non tender, + BS  Extremities: no cyanosis or clubbing.   Positive edema    LABs:  CBC:   Recent Labs     02/04/22  0550 02/05/22  0515   WBC 11.5* 17.0*   HGB 12.6* 13.7   HCT 43.5 46.4    199     BMP:   Recent Labs     02/05/22  0515 02/05/22  1230   * 142   K 4.8 3.9   CO2 29 29   BUN 88* 81*   CREATININE 1.01 0.92   LABGLOM >60 >60   GLUCOSE 359* 301*     ABG:  Lab Results   Component Value Date    KWR5QST NOT REPORTED 02/03/2022    FIO2 70.0 02/03/2022       Lab Results   Component Value Date    POCPH 7.366 02/03/2022    POCPCO2 55.9 02/03/2022    POCPO2 153.5 02/03/2022    POCHCO3 32.0 02/03/2022    PBEA 5 02/03/2022    PXN9USV NOT REPORTED 02/03/2022    CWXN4XCN 99 02/03/2022    FIO2 70.0 02/03/2022     Radiology:  X-ray chest 2/4/2022:      Impression:  · Acute on chronic hypoxic respiratory failure  · COVID-19 pneumonia  · COPD/pulmonary emphysema  · Acute left thalamic infarct/CVA  · Left lower lobe opacity versus nodule  · Pulmonary edema  · Elevated D-dimer, decreased platelets  · Systolic heart failure, s/p AICD placement  · BLANCHE on CKD  · Diabetes mellitus    Recommendations:  · Continue vent support, wean FiO2 as tolerated.   60% FiO2, PEEP at 16  · Wean FiO2 down to 50% then start to wean PEEP  · Fentanyl drip, 50 mics  · Versed drip restarted  · Continue precedex drip  · Insulin drip  · Titrate Luke-Synephrine for map of 65 to 75 mmHg  · Amiodarone, 0.5 mg/h per cardiology  · Pulmicort aerosol treatment  · Airborne isolation  · IV Decadron 6 mg daily  · IV Lasix  · Not a candidate for Actemra/baricitinib stopped secondary to cytopenias  · Neurology following  · Xopenex every 6 hours  · Tube feeds  · X-ray chest in am  · Labs: CBC and BMP in am  · DVT prophylaxis with low molecular weight heparin  · GI prophylaxis, Protonix  · Discussed with RN  · Will follow with you    Electronically signed by     Carlita Lacy MD on 2/5/2022 at 4:26 PM  Pulmonary Critical Care and Sleep Medicine,  [facility]– [place]  Cell: [phone]  Office: [phone]  Cc: 35 minutes

## 2022-02-05 NOTE — PROGRESS NOTES
[facility]  Progress Note    Neida Contreras  MRN: [id number]  Date: 2/5/2022  LOS: 21     Reason for F/U :   COVID-19 virus infection    Impression :   COVID-19 virus infection tested + 1/16/2022  Acute respiratory failure currently ventilator dependent  Intubated 1/23/2022  Emphysema with worsening changes on imaging  Worsening acute interstitial lung disease and clinically not typical of COVID-19 virus infection  Worsening moderate to severe pulmonary artery hypertension  Bilateral lower extremity edema likely related to above  Leukopenia and thrombocytopenia  Lacunar l infarct on the left thalamus  Fever to 103 degrees 1/23/2022  Acute kidney injury    Recommendations:   COVID-19 therapy: The patient is completed Decadron on 2/4/2022  The patient has been started on baricitinib 1/17/2022 but it was discontinued 1/18/2022 due to cytopenias. Actemra had been considered but again due to the cytopenias and thrombocytopenia this has been held. Antimicrobial therapy: The patient received Rocephin 1000 mg and azithromycin 500 mg on 1/16/2022  The patient received a dose of levofloxacin 500 mg on 1/18/2020. Patient started on cefepime and vancomycin 1/23/2022 plan completed a 7-day course of cefepime on 1/28/2022  Vancomycin discontinued due to acute kidney injury 1/24/2022    Continue supportive therapy    Infection Control Recommendations:   Droplet plus precautions    Discharge Planning:   Estimated Length of IV antimicrobials: 5 to 7 days  Patient will need Midline Catheter Insertion/ PICC line Insertion: No  [facility]  [facility],  LTAC: Undetermined  Patient willneed outpatient wound care: No    Medical Decision making / Summary of Stay:   Neida Contreras is a 64y.o.-year-old male who was initially admitted on 1/16/2022.    Madisyn Santizo has a history of diabetes mellitus type 2, essential hypertension, seizure disorder, chronic kidney disease stage III, hyperlipidemia among other medical problems.     The patient reports that about 1 to 2 months ago he had his diabetes medications changed and since that time he has noticed increasing swelling in his legs, hardness in the legs, difficulty ambulating, and weight gain from 178 to 255 pounds over the last 1 to 2 months. He did not report any subjective fevers, chills, cough or shortness of breath. He denies any loss of smell or change in taste. No abdominal pain nausea vomiting or diarrhea. The patient does report that he has home oxygen that he uses as needed at home and he reports that he hardly needs it. He is vaccinated did receive the J&J vaccine but has not yet received a booster dose.     The patient presented to the emergency department and was found to have leukopenia with a white blood cell count of 2.7 and thrombocytopenia with a platelet count of 995. Creatinine slightly elevated at 1.31 and proBNP is elevated at 9327. Chest x-ray showed hyperinflated lungs with cardiomegaly seen and diffuse increased interstitial markings in both lungs which may represent baseline chronic interstitial lung changes given that these findings were present before. A CT of the chest was done with IV contrast that showed no evidence of pulmonary embolism and compared to 2008 there is worsening underlying emphysema with worsening subacute or acute interstitial lung disease best seen in the left upper lobe. Findings were not felt typical for COVID-19 virus pneumonia. There was thickening and distortion of the left major fissure with an ill-defined masslike focus in the left lower lobe.   There is worsening moderate to severe pulmonary artery hypertension     COVID testing was positive and I was asked to evaluate and help with COVID-19 virus infection    The patient did have a CT scan of the abdomen pelvis done 1/28/2022 which was a limited study with no evidence of bowel obstruction and moderate stool burden noted felt related to constipation. Tiny amount of free fluid scattered in the abdomen, moderate pleural effusions and left basilar consolidation and basilar interstitial thickening increased from 2022      Current evaluation:2022    BP (!) 139/53   Pulse 63   Temp 98.8 °F (37.1 °C) (Axillary)   Resp 26   Ht 5' 10.98\" (1.803 m)   Wt 228 lb 1.6 oz (103.5 kg)   SpO2 96%   BMI 31.83 kg/m²     Temperature Range: Temp: 98.8 °F (37.1 °C) Temp  Av.7 °F (37.1 °C)  Min: 98.2 °F (36.8 °C)  Max: 99.4 °F (37.4 °C)   The patient remains intubated, sedated, on Luek-Synephrine, amiodarone and insulin drips, tolerating tube feed, had bowel movements. Afebrile, WBC 17  No growth on blood culture from 2022 and to 122  Normal stefan growth on sputum culture from 2022    Review of Systems   Unable to perform ROS: Intubated       Physical Examination :     Physical Exam  Constitutional:       Appearance: He is well-developed. Interventions: He is sedated and intubated. HENT:      Head: Normocephalic and atraumatic. Cardiovascular:      Rate and Rhythm: Normal rate. Heart sounds: Normal heart sounds. No friction rub. No gallop. Pulmonary:      Effort: Pulmonary effort is normal. He is intubated. Breath sounds: Normal breath sounds. No wheezing. Abdominal:      General: Bowel sounds are normal.      Palpations: Abdomen is soft. There is no mass. Tenderness: There is no abdominal tenderness. Musculoskeletal:         General: Swelling (Right upper extremity swelling) present. Cervical back: Neck supple. Lymphadenopathy:      Cervical: No cervical adenopathy. Skin:     General: Skin is warm and dry. Comments:  There is erythroderma in the legs and feet bilaterally which is not infectious         Laboratory data:   I have independently reviewed the followinglabs:  CBC with Differential:   Recent Labs     22  0550 22  0515   WBC 11.5* 17.0*   HGB 12.6* 13.7   HCT 43.5 46.4    199   LYMPHOPCT 3* 2*   MONOPCT 4 4     BMP:   Recent Labs     02/04/22  0550 02/04/22  0550 02/04/22  1745 02/05/22  0515   *   < > 142 145*   K 4.9   < > 4.8 4.8   *   < > 104 107   CO2 29   < > 29 29   BUN 91*   < > 95* 88*   CREATININE 1.10   < > 1.08 1.01   MG 2.1  --   --  2.1    < > = values in this interval not displayed. Hepatic Function Panel:   Recent Labs     02/03/22  0400   PROT 5.6*   LABALBU 2.4*   BILIDIR 0.16   IBILI 0.21   BILITOT 0.37   ALKPHOS 117   ALT 77*   AST 72*         Lab Results   Component Value Date    PROCAL 0.25 02/01/2022    PROCAL 0.24 01/23/2022    PROCAL 0.11 01/19/2022     Lab Results   Component Value Date    CRP 23.5 01/16/2022    CRP 2.0 07/27/2019     Lab Results   Component Value Date    SEDRATE 3 01/16/2022         Lab Results   Component Value Date    DDIMER 2.96 01/30/2022    DDIMER 5.84 01/23/2022    DDIMER 1.53 01/18/2022    DDIMER 1.73 01/16/2022    DDIMER 1.18 12/07/2018     Lab Results   Component Value Date    FERRITIN 569 01/16/2022    FERRITIN 50 07/23/2019    FERRITIN 18 07/08/2019     Lab Results   Component Value Date     01/16/2022     Lab Results   Component Value Date    FIBRINOGEN 402 01/16/2022       Results in Past 30 Days  Result Component Current Result Ref Range Previous Result Ref Range   SARS-CoV-2, Rapid DETECTED (A) (1/16/2022) Not Detected Not in Time Range      Lab Results   Component Value Date    COVID19 DETECTED 01/16/2022    COVID19 Not Detected 11/02/2021       No results for input(s): [facility] in the last 72 hours. Imaging Studies:   ONE XRAY VIEW OF THE CHEST 2/3/2022 4:36 am  Impression   Slight improvement in aeration of the right base with slight worsening   infiltrates in the left lung.  Continued follow-up recommended.          CT OF THE ABDOMEN AND PELVIS WITHOUT CONTRAST 1/28/2022 8:34 am    Impression   1.  Overall mildly limited study due to motion and lack of contrast.       2.  No evidence of bowel obstruction.  Moderate stool burden is noted,   possibly related to constipation.  Enteric tube is in place with distal tip   in the proximal stomach.       3.  Tiny amount of free fluid scattered in the abdomen, including   presacral/perirectal fluid, most likely related to volume overload.  No   definite findings of rectal wall thickening or fecal impaction.       4.  Moderate pleural effusions, left basilar consolidation, and bibasilar   interstitial thickening, increased when compared to 01/16/2022.       RECOMMENDATIONS:   Unavailable         Veins: Lower Extremities DVT Study, Venous Scan Lower Bilateral.  Indications for Study: Leg Swelling . Patient Status: In Patient. Technical Quality: Limited visualization. Limitation reason: Edema. Comments: Simultaneous real time imaging utilizing B-Mode, color doppler and spectral waveform analysis was performed on the  bilateral lower extremities for venous examination of the deep and superficial systems. Conclusions  Summary  No evidence of superficial or deep venous thrombosis in both lower extremities. Signature  Electronically signed by Kristene Bence, RVT(Sonographer) on 01/19/2022 08:10 AM  Electronically signed by Blanchie Ahumada physician) on 01/19/2022 06:21 PM      CT OF THE HEAD WITHOUT CONTRAST  1/18/2022 5:42 pm  Impression   New lacunar infarct left thalamus, age indeterminate. Ariana Kailash may be helpful.           Cultures:     Culture, Blood 1 [[id number]] Collected: 02/01/22 0003   Order Status: Completed Specimen: Blood Updated: 02/03/22 0829    Specimen Description . BLOOD    Special Requests ART LINE 20ML    Culture NO GROWTH 2 DAYS   Culture, Blood 1 [[id number]] Collected: 01/31/22 1853   Order Status: Completed Specimen: Blood Updated: 02/02/22 2313    Specimen Description . BLOOD    Special Requests RT HAND,10ML    Culture NO GROWTH 2 DAYS       Culture, Respiratory [[id number]] Collected: 01/28/22 1030   Order Status: Completed Specimen: Sputum, Suctioned Updated: 01/30/22 0932    Specimen Description . SUCTIONED SPUTUM    Special Requests NOT REPORTED    Direct Exam < 10 EPITHELIAL CELLS/LPF     <10 NEUTROPHILS/LPF     NO SIGNIFICANT PATHOGENS SEEN    Culture NORMAL RESPIRATORY PO HEAVY GROWTH       Culture, Blood 1 [[id number]] Collected: 01/23/22 1759   Order Status: Completed Specimen: Blood Updated: 01/28/22 2111    Specimen Description . BLOOD    Special Requests RT HAND 19ML    Culture NO GROWTH 5 DAYS   Culture, Blood 1 [[id number]] Collected: 01/23/22 1759   Order Status: Completed Specimen: Blood Updated: 01/28/22 2110    Specimen Description . BLOOD    Special Requests ART LINE 10ML    Culture NO GROWTH 5 DAYS     Culture, Blood 2 [[id number]] Collected: 01/21/22 0742   Order Status: Completed Specimen: Blood Updated: 01/26/22 1001    Specimen Description . BLOOD    Special Requests LEFT AC 20ML    Culture NO GROWTH 5 DAYS   Culture, Blood 1 [[id number]] Collected: 01/21/22 0750   Order Status: Completed Specimen: Blood Updated: 01/26/22 1000    Specimen Description . BLOOD    Special Requests LEFT HAND 5ML    Culture NO GROWTH 5 DAYS   MRSA DNA Probe, Nasal [[id number]] Collected: 01/23/22 2258   Order Status: Completed Specimen: Nasal Updated: 01/25/22 1038    Specimen Description . NASAL SWAB    MRSA, DNA, Nasal NEGATIVE    Comment: NEGATIVE:  MRSA DNA not detected by nucleic acid amplification.                                                     Results should be used as an adjunct to nosocomial control efforts to identify patients   needing enhanced precautions.     The test is not intended to identify patients with staphylococcal infections.  Results   should not be used to guide or monitor treatment for MRSA infections. Culture, Blood 1 [[id number]] Collected: 01/16/22 1220   Order Status: Completed Specimen: Blood Updated: 01/21/22 1521    Specimen Description . BLOOD    Special Requests LAC 12ML Culture NO GROWTH 5 DAYS   Culture, Blood 1 [[id number]] Collected: 01/16/22 1205   Order Status: Completed Specimen: Blood Updated: 01/21/22 1521    Specimen Description . BLOOD    Special Requests RAC 12ML    Culture NO GROWTH 5 DAYS       Culture, Urine [[id number]] Collected: 01/16/22 1315   Order Status: Completed Specimen: Urine, clean catch Updated: 01/17/22 2128    Specimen Description . CLEAN CATCH URINE    Special Requests NOT REPORTED    Culture NO SIGNIFICANT GROWTH       COVID-19, Rapid [[id number]] (Abnormal) Collected: 01/16/22 1230   Order Status: Completed Specimen: Nasopharyngeal Swab Updated: 01/16/22 1314    Specimen Description . NASOPHARYNGEAL SWAB    SARS-CoV-2, Rapid DETECTED Abnormal     Comment:        Rapid NAAT: The specimen is POSITIVE for SARS-Cov-2, the novel coronavirus associated with   COVID-19.         This test has been authorized by the FDA under an Emergency Use Authorization (EUA) for use   by authorized laboratories.         The ID NOW COVID-19 assay is designed to detect the virus that causes COVID-19 in patients   with signs and symptoms of infection who are suspected of COVID-19. An individual without symptoms of COVID-19 and who is not shedding SARS-CoV-2 virus would   expect to have a negative (not detected) result in this assay. Fact sheet for Healthcare Providers: Natacha.es   Fact sheet for Patients: Natacha.es           Methodology: Isothermal Nucleic Acid Amplification         Results reported to the appropriate Health Department         Flu A/B Ag Detection [[id number]] Collected: 01/16/22 1230   Order Status: Completed Specimen: Nasopharyngeal Swab Updated: 01/16/22 1313    Specimen Description . NASOPHARYNGEAL SWAB    Special Requests NOT REPORTED    Direct Exam NEGATIVE for Influenza A + B antigens.                                PCR testing to confirm this result is available upon request. Justine Crow will be saved in the laboratory for 7 days.  Please call [phone] if PCR testing is indicated. Medications:      insulin lispro  0-12 Units SubCUTAneous Q6H    lisinopril  20 mg Oral Daily    amLODIPine  2.5 mg Oral Nightly    Vitamin D  1,000 Units Oral Daily    polyethylene glycol  17 g Per NG tube Daily    levalbuterol  1.25 mg Nebulization Q6H    insulin glargine  9 Units SubCUTAneous Daily    bisacodyl  10 mg Rectal Daily    pantoprazole  40 mg IntraVENous Daily    And    sodium chloride (PF)  10 mL IntraVENous Daily    aspirin  324 mg Oral Daily    levETIRAcetam  500 mg Oral BID    chlorhexidine  15 mL Mouth/Throat BID    enoxaparin  30 mg SubCUTAneous BID    QUEtiapine  50 mg Oral Once    budesonide  0.5 mg Nebulization BID    sodium chloride flush  5-40 mL IntraVENous 2 times per day    atorvastatin  20 mg Oral Daily           [facility]  Arthur Ochoa MD  [employer]  OFFICE: [phone]      Electronically signed by Arthur Ochoa MD on 2/5/2022 at 11:34 AM  Thank you for allowing us to participate in the care of this patient. Please call with questions. This note iscreated with the assistance of a speech recognition program.  While intending to generate a document that actually reflects the content of the visit, the document can still have some errors including those of syntax andsound a like substitutions which may escape proof reading. In such instances, actual meaning can be extrapolated by contextual diversion.

## 2022-02-05 NOTE — PROGRESS NOTES
Progress note  [facility]    Adult Hospitalist      Name: Kevin Varela  MRN: [id number]     Acct: [de-identified]  Room: [address] [place], [place] [id number]-    Admit Date: 1/16/2022 11:51 AM  PCP: Winston Ramírez MD    Primary Problem  Active Problems:    COVID-19    Acute on chronic systolic CHF (congestive heart failure) (HCC)    Acute respiratory failure with hypoxia (HCC)    BLANCHE (acute kidney injury) ([facility]    COPD exacerbation ([facility]    Hypokalemia    Hypomagnesemia    Palliative care encounter    Goals of care, counseling/discussion    DNR (do not resuscitate) discussion    ACP (advance care planning)    Constipation    Abdominal pain, generalized  Resolved Problems:    * No resolved hospital problems. *        Assesment:   Acute congestive heart failure, diastolic   [id number]   Viral pneumonia secondary to above  Acute respiratory failure with hypoxia intubated on 1/23/2022  Hyperkalemia  Paroxysmal atrial fibrillation  Essential hypertension  Mixed hyperlipidemia  Diabetes mellitus type 2  History of seizure disorder  History of CKD stage III, presently normal renal function  Lung mass?   Leukopenia  Polycythemia  Thrombocytopenia  Anxiety disorder  Recent past h/o lacunar infarct left thalamus   Altered mental status/agitation  Endotracheal intubation secondary to hypoxia dated 1/23/2022  Supraventricular tachycardia/elevated troponin  Fever  Emphysema   Pulmonary artery hypertension       Plan:   Admit patient to intermediate floor  Mechanical ventilation sedation as per critical care, patient continues to require 70% FiO2 with PEEP of 16  Telemetry  Check vitals closely  CBC BMP daily    Echocardiogram  Cardiology consult  IV pressors    Amiodarone  Cardiology following    IV Decadron  Patient completed a course of cefepime for 7 days  Bronchodilators  Pulmicort  Patient is deemed not a candidate for Actemra or baricitinib secondary to cytopenia  Infectious disease consult  Pulmonology consult    Continue Keppra  Continue amlodipine, atorvastatin  Continue aspirin    Patient seen by gastroenterology started on daily GlycoLax, also continued on suppositories, GlycoLax if not helping can be changed to milk of magnesia  Consult nephrology appreciated managing patient hyperkalemia, patient is continued on Lokelma  Increase free water if okay with nephrology  Insulin gtt  Plan tracheostomy and PEG if family wishes  Discussed with RN  Continue other medication as below.     Scheduled Meds:   [Held by provider] insulin lispro  0-12 Units SubCUTAneous Q6H    lisinopril  20 mg Oral Daily    amLODIPine  2.5 mg Oral Nightly    Vitamin D  1,000 Units Oral Daily    polyethylene glycol  17 g Per NG tube Daily    levalbuterol  1.25 mg Nebulization Q6H    [Held by provider] insulin glargine  9 Units SubCUTAneous Daily    bisacodyl  10 mg Rectal Daily    pantoprazole  40 mg IntraVENous Daily    And    sodium chloride (PF)  10 mL IntraVENous Daily    aspirin  324 mg Oral Daily    levETIRAcetam  500 mg Oral BID    chlorhexidine  15 mL Mouth/Throat BID    enoxaparin  30 mg SubCUTAneous BID    QUEtiapine  50 mg Oral Once    budesonide  0.5 mg Nebulization BID    sodium chloride flush  5-40 mL IntraVENous 2 times per day    atorvastatin  20 mg Oral Daily     Continuous Infusions:   insulin 6.23 Units/hr (02/05/22 1725)    dextrose Stopped (02/05/22 1340)    dexmedetomidine (PRECEDEX) IV infusion 1 mcg/kg/hr (02/05/22 1638)    fentaNYL 50 mcg/hr (02/05/22 0439)    amiodarone 0.5 mg/min (02/05/22 1123)    phenylephrine (KEARA-SYNEPHRINE) 50mg/250mL infusion 9 mcg/min (02/05/22 1638)    propofol 8.059 mcg/kg/min (01/28/22 0610)    midazolam (VERSED) 1 mg/mL in D5W infusion 3 mg/hr (02/05/22 1121)    sodium chloride      dextrose       PRN Meds:  sodium chloride nebulizer, 3 mL, Q8H PRN  LORazepam, 1 mg, Q4H PRN  dextrose bolus (hypoglycemia), 125 mL, PRN   Or  dextrose bolus (hypoglycemia), 250 mL, PRN  sodium chloride flush, 10 mL, PRN  sodium chloride, 25 mL, PRN  ondansetron, 4 mg, Q8H PRN   Or  ondansetron, 4 mg, Q6H PRN  acetaminophen, 650 mg, Q6H PRN   Or  acetaminophen, 650 mg, Q6H PRN  glucose, 15 g, PRN  glucagon (rDNA), 1 mg, PRN  dextrose, 100 mL/hr, PRN  hydrALAZINE, 10 mg, Q6H PRN        Chief Complaint:     Chief Complaint   Patient presents with    Leg Swelling     bilateral, has CHF, on diuretic, EMT saw pt , assisted pt into car    Fever    Other     low SPO2         History of Present Illness:   Patient seen examined at bedside  Patient remains in medical ICU  D/w RN in detail  Patient remains intubated sedated    Patient now requiring 70% of FiO2 with PEEP of 16  Vent setting were changed   Remains sedated  Tolerating tube feeding  Resolved constipation  Dulcolax, Lactulose, Glycolax- all on hold now  Edema over extremities   Blood glucose better        Review of systems:  Unable to conduct ROS secondary to patient being intubated      Initial HPI  Heri Guo is a 64 y.o.  male who presents with Leg Swelling (bilateral, has CHF, on diuretic, EMT saw pt , assisted pt into car), Fever, and Other (low SPO2)  This is a 64-year-old gentleman admitted via ER, come to ER with complaint of having shortness of breath, patient has medical history significant for COPD patient with history of cardiac failure: Patient noted that he has been having increasing swelling in his lower extremity and becoming more short of breath, further patient also been having some body aches and sweats, patient patient testing in the emergency room showed that he is positive for COVID-19 also noticed to have an elevated BNP, imaging concerning for fluid overload, admitted for further regiment    I have personally reviewed the past medical history, past surgical history, medications, social history, and family history, and summarized in the note.     Review of Systems:       Unable to conduct ROS secondary to patient being intubated      Past Medical History:     Past Medical History:   Diagnosis Date    Arthritis     Atherosclerotic plaque 7/28/2019    Cervical disc disease     degenerative disc disease    Diabetes mellitus ([facility]     type 2    History of blood transfusion     Hx of blood clots     left leg    Hyperlipidemia     Neuropathy     Right kidney mass     \"urologist is watching\"    Snores     Type 2 diabetes mellitus treated with insulin ([facility] 7/28/2019        Past Surgical History:     Past Surgical History:   Procedure Laterality Date    ABDOMINAL AORTIC ANEURYSM REPAIR  2005    CARPAL TUNNEL RELEASE Bilateral     CERVICAL SPINE SURGERY      COLONOSCOPY      benign polyps removed    INGUINAL HERNIA REPAIR Right     [place] REPAIR INCISIONAL HERNIA,REDUCIBLE N/A 5/10/2017    HERNIA VENTRAL REPAIR WITH MESH  performed by Desirae Whitman DO at [address] [place] [address] Road  05/10/2017    with mesh        Medications Prior to Admission:       Prior to Admission medications    Medication Sig Start Date End Date Taking? Authorizing Provider   rosuvastatin (CRESTOR) 40 MG tablet Take 40 mg by mouth every evening   Yes Historical Provider, MD   insulin NPH (HUMULIN N;NOVOLIN N) 100 UNIT/ML injection vial Inject 20 Units into the skin 2 times daily (before meals)   Yes Historical Provider, MD   levETIRAcetam (KEPPRA) 500 MG tablet Take 1 tablet by mouth 2 times daily 11/3/21  Yes Judy Ospina MD   venlafaxine (EFFEXOR XR) 150 MG extended release capsule Take 1 capsule by mouth daily (with breakfast) 7/29/19  Yes Arabella Kiser   vitamin B-1 (THIAMINE) 100 MG tablet Take 100 mg by mouth daily   Yes Historical Provider, MD   ferrous sulfate 325 (65 Fe) MG tablet Take 325 mg by mouth daily (with breakfast)   Yes Historical Provider, MD   ALPRAZolam (XANAX) 0.5 MG tablet Take 0.5 mg by mouth three times daily.    Yes Historical Provider, MD   furosemide (LASIX) 40 MG tablet Take 1 tablet by mouth 2 times daily 18  Yes Jose Alberto Evans MD   tiotropium (SPIRIVA RESPIMAT) 2.5 MCG/ACT AERS inhaler Inhale 2 puffs into the lungs daily    Yes Historical Provider, MD   albuterol sulfate  (90 Base) MCG/ACT inhaler Inhale 2 puffs into the lungs every 6 hours as needed for Wheezing or Shortness of Breath   Yes Historical Provider, MD   metoprolol succinate (TOPROL XL) 50 MG extended release tablet Take 50 mg by mouth daily   Yes Historical Provider, MD   Multiple Vitamins-Minerals (MULTIVITAMIN ADULT) TABS Take 1 tablet by mouth daily   Yes Historical Provider, MD   vitamin D (CHOLECALCIFEROL) 1000 UNIT TABS tablet Take 1,000 Units by mouth daily   Yes Historical Provider, MD   fluticasone (FLONASE) 50 MCG/ACT nasal spray 1 spray by Each Nare route daily as needed for Rhinitis   Yes Historical Provider, MD   aspirin 325 MG EC tablet Take 325 mg by mouth daily    Yes Historical Provider, MD   Omega-3 Fatty Acids (FISH OIL) 1000 MG CAPS Take 1 capsule by mouth daily    Yes Historical Provider, MD   amLODIPine (NORVASC) 5 MG tablet Take 5 mg by mouth nightly    Yes Historical Provider, MD   mirtazapine (REMERON) 45 MG tablet Take 45 mg by mouth nightly   Yes Historical Provider, MD        Allergies: Barbiturates, Codeine, and Sulfa antibiotics    Social History:     Tobacco:    reports that he has quit smoking. He has never used smokeless tobacco.  Alcohol:      reports no history of alcohol use. Drug Use:  reports no history of drug use.     Family History:     Family History   Problem Relation Age of Onset    Ovarian Cancer Sister     Ovarian Cancer Sister          Physical Exam:     Vitals:  BP (!) 139/53   Pulse 68   Temp 98.7 °F (37.1 °C) (Oral)   Resp 26   Ht 5' 10.98\" (1.803 m)   Wt 228 lb 1.6 oz (103.5 kg)   SpO2 95%   BMI 31.83 kg/m²   Temp (24hrs), Av °F (37.2 °C), Min:98.5 °F (36.9 °C), Max:99.9 °F (37.7 °C)      General appearance -on ventilator  Mental status -sedated  Head - normocephalic and atraumatic  Eyes - conjunctiva clear  Ears -external ears normal  Nose - no drainage noted  Mouth - mucous membranes moist  Neck - supple, no carotid bruits, thyroid not palpable  Chest -basal crackles with decreased air entry bilaterally to auscultation, increased effort  Heart - normal rate, regular rhythm, no murmur  Abdomen - soft, nontender, nondistended, bowel sounds present all four quadrants, no masses, hepatomegaly or splenomegaly  Neurological -sedated on vent  Extremities - extremity edema, lower distal extremity discoloration    Skin - no gross lesions, rashes, or induration noted        Data:     Labs:    Hematology:  Recent Labs     02/03/22  0400 02/04/22  0550 02/05/22  0515   WBC 9.5 11.5* 17.0*   RBC 5.35 5.08 5.57   HGB 13.2 12.6* 13.7   HCT 46.0 43.5 46.4   MCV 86.0 85.6 83.3   MCH 24.7* 24.8* 24.6*   MCHC 28.7 29.0 29.5   RDW 15.6* 15.6* 15.9*    148 199   MPV 12.8 12.2 Abnormal     Chemistry:  Recent Labs     02/03/22  0400 02/03/22  1515 02/04/22  0550 02/04/22  0550 02/04/22  1745 02/05/22  0515 02/05/22  1230     --  147*   < > 142 145* 142   K 5.2   < > 4.9   < > 4.8 4.8 3.9     --  109*   < > 104 107 104   CO2 32*  --  29   < > 29 29 29   GLUCOSE 183*  --  155*   < > 315* 359* 301*   BUN 85*  --  91*   < > 95* 88* 81*   CREATININE 1.18  --  1.10   < > 1.08 1.01 0.92   MG 2.1  --  2.1  --   --  2.1  --    ANIONGAP 5*  --  9   < > 9 9 9   LABGLOM 60*  --  >60   < > >60 >60 >60   GFRAA >60  --  >60   < > >60 >60 >60   CALCIUM 9.6  --  9.5   < > 9.9 9.5 9.3   PHOS 3.0  --  3.3  --   --  3.5  --     < > = values in this interval not displayed.      Recent Labs     02/03/22  0400 02/03/22  0405 02/04/22  0825 02/04/22  0920 02/04/22  1022 02/04/22  1144 02/04/22  2349 02/05/22  0554   PROT 5.6*  --   --   --   --   --   --   --    LABALBU 2.4*  --   --   --   --   --   --   --    AST 72*  --   --   --   --   --   -- --    ALT 77*  --   --   --   --   --   --   --    ALKPHOS 117  --   --   --   --   --   --   --    BILITOT 0.37  --   --   --   --   --   --   --    BILIDIR 0.16  --   --   --   --   --   --   --    POCGLU  --    < > 136* 130* 146* 170* 299* 301*    < > = values in this interval not displayed. Lab Results   Component Value Date    INR 1.0 01/17/2022    INR 1.0 11/03/2021    INR 1.0 07/27/2019    PROTIME 13.3 01/17/2022    PROTIME 11.1 11/03/2021    PROTIME 10.5 07/27/2019       Lab Results   Component Value Date/Time    SPECIAL ART LINE 20ML 02/01/2022 12:03 AM     Lab Results   Component Value Date/Time    CULTURE NO GROWTH 4 DAYS 02/01/2022 12:03 AM       Lab Results   Component Value Date    POCPH 7.366 02/03/2022    POCPCO2 55.9 02/03/2022    POCPO2 153.5 02/03/2022    POCHCO3 32.0 02/03/2022    NBEA NOT REPORTED 02/03/2022    PBEA 5 02/03/2022    HDM3AUZ NOT REPORTED 02/03/2022    HDLZ0PPV 99 02/03/2022    FIO2 70.0 02/03/2022       Radiology:    XR CHEST 1 VIEW    Result Date: 1/16/2022  Hypoinflated lungs. Cardiomegaly again seen, when compared to the previous study performed 07/27/2019. Diffuse, increased interstitial markings throughout both lungs, some of which can be seen on the prior study may represent baseline chronic interstitial lung changes. The increased prominence on this exam could be secondary to the suboptimal inspiratory effort. The presence of pulmonary vascular congestion/mild CHF or bilateral interstitial pneumonia cannot be excluded. Clinical correlation is recommended. CT CHEST PULMONARY EMBOLISM W CONTRAST    Result Date: 1/16/2022  No evidence of pulmonary embolism.  Compared to 2008, worsening underlying emphysema, with worsening subacute or acute interstitial lung disease, best seen left upper lobe; differential includes interstitial pneumonia or pneumonitis superimposed on emphysema, DIP, HP, and other interstitial pneumonias as well as infectious or inflammatory process superimposed on emphysema disease. Findings are not typical for COVID-19 pneumonia, but an element of the latter should be considered. Thickening and distortion left major fissure with ill-defined mass-like focus left lower lobe. The latter could also be infectious or inflammatory, although neoplasm should be excluded. Underlying worsening emphysema. Nodular densities, best seen right upper lobe. Small pleural effusions, slightly larger on the left. See recommendations below. Mild mediastinal and left hilar adenopathy; see recommendations below. Worsening now moderate-severe pulmonary artery hypertension. Mild cardiomegaly. Stable mild dilatation ascending thoracic aorta. Additional unchanged or incidental findings, as above. RECOMMENDATIONS: Clinical correlation and short-term follow-up CT chest in 1-4 months depending upon the clinical presentation/course. All radiological studies reviewed                Code Status:  DNR-CCA    Electronically signed by Kiersten Charles MD on 2/5/2022 at 5:32 PM     Copy sent to Dr. Isma Duong MD    This note was created with the assistance of a speech-recognition program.  Although the intention is to generate a document that actually reflects the content of the visit, no guarantees can be provided that every mistake has been identified and corrected by editing. Note was updated later by me after  physical examination and  completion of the assessment.

## 2022-02-05 NOTE — PROGRESS NOTES
Section of Cardiology  Progress Note      Date:  2/5/2022  Patient: Heri Guo  Admission:  1/16/2022 11:51 AM  Admit DX: Hypokalemia [E87.6]  Hypomagnesemia [E83.42]  COPD exacerbation (HCC) [J44.1]  BLANCHE (acute [facility]) ([facility] [N17.9]  Acute respiratory failure with hypoxia (HCC) [J96.01]  Acute on chronic systolic CHF (congestive heart failure) ([facility] [I50.23]  COVID [U07.1]  COVID-19 [U07.1]  Age:  64 y.o., 1945     LOS: 20 days     Reason for evaluation:   atrial fibrillation      SUBJECTIVE:     The patient was seen and examined. Notes and labs reviewed. Pt remains intubated/sedated  BP has been labile - need for ranulfo when over sedated, BP is high when sedated  No active issues.  Remains in NSR on tele    OBJECTIVE:      EXAM:   Vitals:    VITALS:  BP (!) 139/53   Pulse 76   Temp 98.8 °F (37.1 °C) (Axillary)   Resp (!) 33   Ht 5' 10.98\" (1.803 m)   Wt 228 lb 1.6 oz (103.5 kg)   SpO2 95%   BMI 31.83 kg/m²   24HR INTAKE/OUTPUT:    Intake/Output Summary (Last 24 hours) at 2/5/2022 8943  Last data filed at 2/5/2022 0518  Gross per 24 hour   Intake 2878.23 ml   Output 2450 ml   Net 428.23 ml       NOT EXAMINED DUE TO COVID 19 AND TO REDUCE SPREAD OF INFECTION AND USE OF PPE  EXAMINED THROUGH WINDOW - intubated/sedated    Current Inpatient Medications:   insulin lispro  0-12 Units SubCUTAneous Q6H    lisinopril  20 mg Oral Daily    amLODIPine  2.5 mg Oral Nightly    furosemide  20 mg IntraVENous Daily    Vitamin D  1,000 Units Oral Daily    polyethylene glycol  17 g Per NG tube Daily    levalbuterol  1.25 mg Nebulization Q6H    insulin glargine  9 Units SubCUTAneous Daily    lactulose  20 g Oral BID    bisacodyl  10 mg Rectal Daily    pantoprazole  40 mg IntraVENous Daily    And    sodium chloride (PF)  10 mL IntraVENous Daily    aspirin  324 mg Oral Daily    levETIRAcetam  500 mg Oral BID    chlorhexidine  15 mL Mouth/Throat BID    enoxaparin  30 mg SubCUTAneous BID    QUEtiapine  50 mg Oral Once    budesonide  0.5 mg Nebulization BID    sodium chloride flush  5-40 mL IntraVENous 2 times per day    atorvastatin  20 mg Oral Daily       IV Infusions (if any):   dexmedetomidine (PRECEDEX) IV infusion 0.3 mcg/kg/hr (02/05/22 0517)    fentaNYL 50 mcg/hr (02/05/22 0439)    amiodarone 0.5 mg/min (02/05/22 0517)    phenylephrine (KEARA-SYNEPHRINE) 50mg/250mL infusion 20 mcg/min (02/05/22 0440)    propofol 8.059 mcg/kg/min (01/28/22 0610)    midazolam (VERSED) 1 mg/mL in D5W infusion 3 mg/hr (02/04/22 2332)    sodium chloride      dextrose         Diagnostics:   Telemetry:NSR      Labs:   CBC:  Recent Labs     02/04/22  0550 02/05/22  0515   WBC 11.5* 17.0*   HGB 12.6* 13.7   HCT 43.5 46.4    199     Magnesium:  Recent Labs     02/04/22  0550 02/05/22  0515   MG 2.1 2.1     BMP:  Recent Labs     02/04/22  1745 02/05/22 0515    145*   K 4.8 4.8   CALCIUM 9.9 9.5   CO2 29 29   BUN 95* 88*   CREATININE 1.08 1.01   LABGLOM >60 >60   GLUCOSE 315* 359*     BNP:No results for input(s): BNP, PROBNP in the last 72 hours. PT/INR:No results for input(s): PROTIME, INR in the last 72 hours. APTT:No results for input(s): APTT in the last 72 hours. CARDIAC ENZYMES:No results for input(s): MYOGLOBIN, CKTOTAL, CKMB, CKMBINDEX, TROPHS, TROPONINT in the last 72 hours. FASTING LIPID PANEL:  Lab Results   Component Value Date    HDL 30 11/03/2021    TRIG 181 11/03/2021     LIVER PROFILE:  Recent Labs     02/03/22  0400   AST 72*   ALT 77*   LABALBU 2.4*   ALKPHOS 117   BILITOT 0.37   BILIDIR 0.16   IBILI 0.21   PROT 5.6*   GLOB NOT REPORTED   ALBUMIN NOT REPORTED        ASSESSMENT:  · Paroxysmal Atrial Fibrillation - now in sinus  · Hx of CHD  · HTN  · COVID 19 PNA - managed by others  · Chronic Kidney Disease  · COPD  · Acute Hypoxic Respiratory Failure - intubated    PLAN:  1. Continue current medications. 2. Pt on Amiodarone gtt at 0.5 while intubated.  May switch to PO tomorrow  3. Cont. Supportive care and respiratory management to wean vent  4. Remain on tele. Monitor vitals  5. Will continue to follow        Discussed with nursing.     Shanika Kulkarni MD

## 2022-02-06 NOTE — PROGRESS NOTES
.  Nephrology  Progress Note    Reason for Consult: Hyperkalemia    Requesting Physician:  Nya Lucas MD    INTERVAL HISTORY:  K continues to improve. Today is 4.2. Creatinine slightly higher. Remains sedated and on the ventilator. BUN is in the 80s. His SBP ranging 110-170s. HISTORY OF PRESENT ILLNESS:    The patient is a 64 y.o. male who presented with to the hospital for worsening shortness of breath ad and worsening lower extremity edema on 1/16. He had diffuse body aches and sweats and a dry cough. He tested positive for COVID-19. He has steadily declined since admission. On 1/18 a CT head found New lacunar infarct left thalamus. He had to be intubated on 1/23. He has a significant past medical history of COPD, hyperlipidemia, Type 2 DM, Right kidney mass, and abdominal aortic aneurysm repair in 2005. His potassium has been steadily rising since 1/24/22. The most current Potassium level is 5.8. His creatine is improved to 1.71. This information was retrieved through chart review and nursing. Patient was unable to provide information due to current status on mechanical ventilation and sedation. Review Of Systems:  Unable to obtain. Patient sedated on ventilator.     Past Medical History:   Diagnosis Date    Arthritis     Atherosclerotic plaque 7/28/2019    Cervical disc disease     degenerative disc disease    Diabetes mellitus ([address]     type 2    History of blood transfusion     Hx of blood clots     left leg    Hyperlipidemia     Neuropathy     Right kidney mass     \"urologist is watching\"    Snores     Type 2 diabetes mellitus treated with insulin ([address] 7/28/2019       Past Surgical History:   Procedure Laterality Date    ABDOMINAL AORTIC ANEURYSM REPAIR  2005    CARPAL TUNNEL RELEASE Bilateral     CERVICAL SPINE SURGERY      COLONOSCOPY      benign polyps removed    INGUINAL HERNIA REPAIR Right     [place] REPAIR INCISIONAL HERNIA,REDUCIBLE N/A 5/10/2017    HERNIA VENTRAL REPAIR WITH MESH  performed by Eliza Stone DO at [address] [place] [address] Road  05/10/2017    with mesh       Prior to Admission medications    Medication Sig Start Date End Date Taking? Authorizing Provider   rosuvastatin (CRESTOR) 40 MG tablet Take 40 mg by mouth every evening   Yes Historical Provider, MD   insulin NPH (HUMULIN N;NOVOLIN N) 100 UNIT/ML injection vial Inject 20 Units into the skin 2 times daily (before meals)   Yes Historical Provider, MD   levETIRAcetam (KEPPRA) 500 MG tablet Take 1 tablet by mouth 2 times daily 11/3/21  Yes Jenn Garber MD   venlafaxine (EFFEXOR XR) 150 MG extended release capsule Take 1 capsule by mouth daily (with breakfast) 7/29/19  Yes Arabella Kiser   vitamin B-1 (THIAMINE) 100 MG tablet Take 100 mg by mouth daily   Yes Historical Provider, MD   ferrous sulfate 325 (65 Fe) MG tablet Take 325 mg by mouth daily (with breakfast)   Yes Historical Provider, MD   ALPRAZolam (XANAX) 0.5 MG tablet Take 0.5 mg by mouth three times daily.    Yes Historical Provider, MD   furosemide (LASIX) 40 MG tablet Take 1 tablet by mouth 2 times daily 12/11/18  Yes Malik Evans MD   tiotropium (SPIRIVA RESPIMAT) 2.5 MCG/ACT AERS inhaler Inhale 2 puffs into the lungs daily    Yes Historical Provider, MD   albuterol sulfate  (90 Base) MCG/ACT inhaler Inhale 2 puffs into the lungs every 6 hours as needed for Wheezing or Shortness of Breath   Yes Historical Provider, MD   metoprolol succinate (TOPROL XL) 50 MG extended release tablet Take 50 mg by mouth daily   Yes Historical Provider, MD   Multiple Vitamins-Minerals (MULTIVITAMIN ADULT) TABS Take 1 tablet by mouth daily   Yes Historical Provider, MD   vitamin D (CHOLECALCIFEROL) 1000 UNIT TABS tablet Take 1,000 Units by mouth daily   Yes Historical Provider, MD   fluticasone (FLONASE) 50 MCG/ACT nasal spray 1 spray by Each Nare route daily as needed for Rhinitis   Yes Historical Provider, MD   aspirin 325 MG EC tablet Take 325 mg by mouth daily    Yes Historical Provider, MD   Omega-3 Fatty Acids (FISH OIL) 1000 MG CAPS Take 1 capsule by mouth daily    Yes Historical Provider, MD   amLODIPine (NORVASC) 5 MG tablet Take 5 mg by mouth nightly    Yes Historical Provider, MD   mirtazapine (REMERON) 45 MG tablet Take 45 mg by mouth nightly   Yes Historical Provider, MD       Scheduled Meds:   [Held by provider] insulin lispro  0-12 Units SubCUTAneous Q6H    lisinopril  20 mg Oral Daily    amLODIPine  2.5 mg Oral Nightly    Vitamin D  1,000 Units Oral Daily    polyethylene glycol  17 g Per NG tube Daily    levalbuterol  1.25 mg Nebulization Q6H    [Held by provider] insulin glargine  9 Units SubCUTAneous Daily    bisacodyl  10 mg Rectal Daily    pantoprazole  40 mg IntraVENous Daily    And    sodium chloride (PF)  10 mL IntraVENous Daily    aspirin  324 mg Oral Daily    levETIRAcetam  500 mg Oral BID    chlorhexidine  15 mL Mouth/Throat BID    enoxaparin  30 mg SubCUTAneous BID    QUEtiapine  50 mg Oral Once    budesonide  0.5 mg Nebulization BID    sodium chloride flush  5-40 mL IntraVENous 2 times per day    atorvastatin  20 mg Oral Daily     Continuous Infusions:   insulin 0.72 Units/hr (02/06/22 0557)    dexmedetomidine (PRECEDEX) IV infusion 1 mcg/kg/hr (02/06/22 0730)    fentaNYL 50 mcg/hr (02/06/22 0010)    amiodarone 0.5 mg/min (02/06/22 0557)    phenylephrine (KEARA-SYNEPHRINE) 50mg/250mL infusion 10 mcg/min (02/06/22 0806)    propofol 8.059 mcg/kg/min (01/28/22 0610)    midazolam (VERSED) 1 mg/mL in D5W infusion 2 mg/hr (02/05/22 2033)    sodium chloride      dextrose       PRN Meds:sodium chloride nebulizer, LORazepam, dextrose bolus (hypoglycemia) **OR** dextrose bolus (hypoglycemia), sodium chloride flush, sodium chloride, ondansetron **OR** ondansetron, acetaminophen **OR** acetaminophen, glucose, glucagon (rDNA), dextrose, hydrALAZINE    Allergies Allergen Reactions    Barbiturates Anxiety     Other reaction(s): Unknown    Codeine Palpitations    Sulfa Antibiotics Rash     Other reaction(s): Unknown       Social History     Socioeconomic History    Marital status:      Spouse name: Not on file    Number of children: Not on file    Years of education: Not on file    Highest education level: Not on file   Occupational History    Not on file   Tobacco Use    Smoking status: Former Smoker    Smokeless tobacco: Never Used    Tobacco comment: quit 30 years ago   Substance and Sexual Activity    Alcohol use: No    Drug use: No    Sexual activity: Not on file   Other Topics Concern    Not on file   Social History Narrative    Not on file     Social Determinants of Health     Financial Resource Strain:     Difficulty of Paying Living Expenses: Not on file   Food Insecurity:     Worried About Running Out of Food in the Last Year: Not on file    Dona of Food in the Last Year: Not on file   Transportation Needs:     Lack of Transportation (Medical): Not on file    Lack of Transportation (Non-Medical):  Not on file   Physical Activity:     Days of Exercise per Week: Not on file    Minutes of Exercise per Session: Not on file   Stress:     Feeling of Stress : Not on file   Social Connections:     Frequency of Communication with Friends and Family: Not on file    Frequency of Social Gatherings with Friends and Family: Not on file    Attends Shinto Services: Not on file    Active Member of Clubs or Organizations: Not on file    Attends Club or Organization Meetings: Not on file    Marital Status: Not on file   Intimate Partner Violence:     Fear of Current or Ex-Partner: Not on file    Emotionally Abused: Not on file    Physically Abused: Not on file    Sexually Abused: Not on file   Housing Stability:     Unable to Pay for Housing in the Last Year: Not on file    Number of [place] in the Last Year: Not on file    Unstable Housing in the Last Year: Not on file       Family History   Problem Relation Age of Onset    Ovarian Cancer Sister     Ovarian Cancer Sister          Physical Exam:  Vitals:    02/06/22 0735 02/06/22 0800 02/06/22 0900 02/06/22 1000   BP:       Pulse: 60 64 57 61   Resp: 16 29 18 15   Temp:  99.3 °F (37.4 °C)  101.1 °F (38.4 °C)   TempSrc:  Axillary  Oral   SpO2: 95% 95% 94% 95%   Weight:       Height:         I/O last 3 completed shifts: In: 6461.1 [I.V.:1679.1; NG/GT:4782]  Out: 8449 [Urine:4025]    Deferred due to [address] isolation.      Data:  CBC:   Lab Results   Component Value Date    WBC 12.1 (H) 02/06/2022    HGB 13.2 02/06/2022    HCT 43.6 02/06/2022    MCV 83.0 02/06/2022     (L) 02/06/2022     BMP:    Lab Results   Component Value Date     02/06/2022     (H) 02/06/2022     02/05/2022    K 4.2 02/06/2022    K 4.5 02/06/2022    K 4.2 02/05/2022     02/06/2022     02/06/2022     02/05/2022    CO2 30 02/06/2022    CO2 31 02/06/2022    CO2 30 02/05/2022    BUN 82 (H) 02/06/2022    BUN 84 (H) 02/06/2022    BUN 83 (H) 02/05/2022    CREATININE 1.02 02/06/2022    CREATININE 1.04 02/06/2022    CREATININE 0.98 02/05/2022    GLUCOSE 165 (H) 02/06/2022    GLUCOSE 143 (H) 02/06/2022    GLUCOSE 124 (H) 02/05/2022     CMP:   Lab Results   Component Value Date     02/06/2022    K 4.2 02/06/2022     02/06/2022    CO2 30 02/06/2022    BUN 82 02/06/2022    CREATININE 1.02 02/06/2022    GLUCOSE 165 02/06/2022    CALCIUM 9.2 02/06/2022    PROT 5.6 02/03/2022    LABALBU 2.4 02/03/2022    BILITOT 0.37 02/03/2022    ALKPHOS 117 02/03/2022    AST 72 02/03/2022    ALT 77 02/03/2022      Hepatic:   Lab Results   Component Value Date    AST 72 (H) 02/03/2022    AST 46 (H) 02/01/2022    AST 28 01/31/2022    ALT 77 (H) 02/03/2022    ALT 43 (H) 02/01/2022    ALT 26 01/31/2022    BILITOT 0.37 02/03/2022    BILITOT 0.28 (L) 02/01/2022    BILITOT 0.32 01/31/2022    ALKPHOS 117 02/03/2022    ALKPHOS 100 02/01/2022    ALKPHOS 99 01/31/2022     BNP: No results found for: BNP  Lipids:   Lab Results   Component Value Date    CHOL 129 11/03/2021    HDL 30 (L) 11/03/2021     INR:   Lab Results   Component Value Date    INR 1.0 01/17/2022    INR 1.0 11/03/2021    INR 1.0 07/27/2019     PTH: No results found for: PTH  Phosphorus:    Lab Results   Component Value Date    PHOS 3.2 02/06/2022     Ionized Calcium: No results found for: IONCA  Magnesium:   Lab Results   Component Value Date    MG 1.9 02/06/2022     Albumin:   Lab Results   Component Value Date    LABALBU 2.4 02/03/2022     Last 3 CK, CKMB, Troponin: @LABRCNT(CKTOTAL:3,CKMB:3,TROPONINI:3)       URINE:)No results found for: Neil Colder    Radiology:  Reviewed. Assessment:  BLANCHE, hemodynamically related, good UO, Cr is better. Azotemia. CKD 3A. Hypernatremia, corrected serum Na is 151 today. Hyperkalemia. Hypophosphatemia, improved. Hypovitaminosis D. Metabolic alkalosis. Hypertension. Diabetes mellitis. COVID19 pneumonia. CHF. COPD. Plan: On insulin drip. Off D5W. Continue free water flushes at 300 ml every 3 hours. Renal tube feed. Monitor potassium off of Lokelma. Metoprolol discontinued per cardiology. Lisinopril 20 mg daily started 2/2/2022 per cardiology. Monitor K and creatinine closely. Continue Amlodipine 2.5 mg daily. Monitor BP. Continue vitamin D supplementation. Continue Lasix 20 mg IV daily. Off Lactulose for now. Avoid hypotension, nephrotoxic drugs, Fleets enema and IV contrast exposure. Follow up BMP every 12 hours. We will follow with you. Electronically signed by Sophia Martinez MD  on 2/6/2022 at 10:23 AM   [facility].   Ph: [phone]

## 2022-02-06 NOTE — PROGRESS NOTES
BMP results back. Writer notified Dr. Lynne Hall as ordered. Dr. Lynne Hall would like to be called if patient's sodium is more than 146 or below 143. Next BMP draw is due at 0000. Will continue to monitor.

## 2022-02-06 NOTE — PROGRESS NOTES
Patient noted to have a temp of 101.6 upon initial assessment. Writer gave PRN tylenol through patient's OG. Will recheck temperature in an hour and continue to monitor patient.

## 2022-02-06 NOTE — PROGRESS NOTES
Section of Cardiology  Progress Note      Date:  2/6/2022  Patient: Vianey Walker  Admission:  1/16/2022 11:51 AM  Admit DX: Hypokalemia [E87.6]  Hypomagnesemia [E83.42]  COPD exacerbation (HCC) [J44.1]  BLANCHE (acute kidney injury) ([facility] [N17.9]  Acute respiratory failure with hypoxia (HCC) [J96.01]  Acute on chronic systolic CHF (congestive heart failure) ([facility] [I50.23]  COVID [U07.1]  COVID-19 [U07.1]  Age:  64 y.o., 1945     LOS: 21 days     Reason for evaluation:   atrial fibrillation      SUBJECTIVE:     The patient was seen and examined. Notes and labs reviewed. Pt remains intubated/sedated  BP has been stable  No active issues.  Remains in NSR on tele    OBJECTIVE:      EXAM:   Vitals:    VITALS:  BP (!) 139/53   Pulse 69   Temp 99.5 °F (37.5 °C) (Oral)   Resp (!) 32   Ht 5' 10.98\" (1.803 m)   Wt 228 lb 1.6 oz (103.5 kg)   SpO2 90%   BMI 31.83 kg/m²   24HR INTAKE/OUTPUT:      Intake/Output Summary (Last 24 hours) at 2/6/2022 1157  Last data filed at 2/6/2022 1000  Gross per 24 hour   Intake 4458.08 ml   Output 2575 ml   Net 1883.08 ml       NOT EXAMINED DUE TO COVID 19 AND TO REDUCE SPREAD OF INFECTION AND USE OF PPE  EXAMINED THROUGH WINDOW - intubated/sedated    Current Inpatient Medications:   [Held by provider] insulin lispro  0-12 Units SubCUTAneous Q6H    lisinopril  20 mg Oral Daily    amLODIPine  2.5 mg Oral Nightly    Vitamin D  1,000 Units Oral Daily    polyethylene glycol  17 g Per NG tube Daily    levalbuterol  1.25 mg Nebulization Q6H    [Held by provider] insulin glargine  9 Units SubCUTAneous Daily    bisacodyl  10 mg Rectal Daily    pantoprazole  40 mg IntraVENous Daily    And    sodium chloride (PF)  10 mL IntraVENous Daily    aspirin  324 mg Oral Daily    levETIRAcetam  500 mg Oral BID    chlorhexidine  15 mL Mouth/Throat BID    enoxaparin  30 mg SubCUTAneous BID    QUEtiapine  50 mg Oral Once    budesonide  0.5 mg Nebulization BID    sodium chloride flush 5-40 mL IntraVENous 2 times per day    atorvastatin  20 mg Oral Daily       IV Infusions (if any):   insulin 0.72 Units/hr (02/06/22 0557)    dexmedetomidine (PRECEDEX) IV infusion 1.2 mcg/kg/hr (02/06/22 1107)    fentaNYL 50 mcg/hr (02/06/22 0010)    amiodarone 0.5 mg/min (02/06/22 0557)    phenylephrine (KEARA-SYNEPHRINE) 50mg/250mL infusion Stopped (02/06/22 1115)    propofol 8.059 mcg/kg/min (01/28/22 0610)    midazolam (VERSED) 1 mg/mL in D5W infusion 2 mg/hr (02/05/22 2033)    sodium chloride      dextrose         Diagnostics:   Telemetry: NSR      Labs:   CBC:  Recent Labs     02/05/22 0515 02/06/22 0555   WBC 17.0* 12.1*   HGB 13.7 13.2   HCT 46.4 43.6    134*     Magnesium:  Recent Labs     02/05/22 0515 02/06/22 0555   MG 2.1 1.9     BMP:  Recent Labs     02/06/22  0001 02/06/22 0555   * 143   K 4.5 4.2   CALCIUM 9.4 9.2   CO2 31 30   BUN 84* 82*   CREATININE 1.04 1.02   LABGLOM >60 >60   GLUCOSE 143* 165*     BNP:No results for input(s): BNP, PROBNP in the last 72 hours. PT/INR:No results for input(s): PROTIME, INR in the last 72 hours. APTT:No results for input(s): APTT in the last 72 hours. CARDIAC ENZYMES:No results for input(s): MYOGLOBIN, CKTOTAL, CKMB, CKMBINDEX, TROPHS, TROPONINT in the last 72 hours. FASTING LIPID PANEL:  Lab Results   Component Value Date    HDL 30 11/03/2021    TRIG 181 11/03/2021     LIVER PROFILE:  No results for input(s): AST, ALT, LABALBU, ALKPHOS, BILITOT, BILIDIR, IBILI, PROT, GLOB, ALBUMIN in the last 72 hours. ASSESSMENT:  · Paroxysmal Atrial Fibrillation - now in sinus  · Hx of CHD  · HTN  · COVID 19 PNA - managed by others  · Chronic Kidney Disease  · COPD  · Acute Hypoxic Respiratory Failure - intubated    PLAN:  1. Continue current medications. 2. Stop Amiodarone drip and start Amio 200mg PO/NG daily  3. Cont. Treatment of COVID/pneumonia  4. Supportive management otherwise  5.  Will continue to follow        Keith Fajardo MD

## 2022-02-06 NOTE — PROGRESS NOTES
[facility]  Progress Note    Donal Cortes  MRN: [id number]  Date: 2/6/2022  LOS: 24     Reason for F/U :   COVID-19 virus infection    Impression :   COVID-19 virus infection tested + 1/16/2022  Acute respiratory failure currently ventilator dependent  Intubated 1/23/2022  Emphysema   Moderate to severe pulmonary artery hypertension  Bilateral lower extremity edema likely related to above  Leukopenia and thrombocytopenia  Lacunar l infarct on the left thalamus  Fever to 103 degrees 1/23/2022  Acute kidney injury    Recommendations:       COVID-19 therapy: The patient is completed Decadron on 2/4/2022  The patient has been started on baricitinib 1/17/2022 but it was discontinued 1/18/2022 due to cytopenias. Actemra had been considered but again due to the cytopenias and thrombocytopenia this has been held. Antimicrobial therapy: The patient received Rocephin 1000 mg and azithromycin 500 mg on 1/16/2022  The patient received a dose of levofloxacin 500 mg on 1/18/2020. Patient started on cefepime and vancomycin 1/23/2022 plan completed a 7-day course of cefepime on 1/28/2022  Vancomycin discontinued due to acute kidney injury 1/24/2022    Continue supportive therapy    Infection Control Recommendations:   Droplet plus precautions    Discharge Planning:   Estimated Length of IV antimicrobials: 5 to 7 days  Patient will need Midline Catheter Insertion/ PICC line Insertion: No  Patient will need: Home IV , [place],  SNF,  LTAC: Undetermined  Patient willneed outpatient wound care: No    Medical Decision making / Summary of Stay:   Donal Cortes is a 64y.o.-year-old male who was initially admitted on 1/16/2022.    Herbie Ortiz has a history of diabetes mellitus type 2, essential hypertension, seizure disorder, chronic kidney disease stage III, hyperlipidemia among other medical problems.     The patient reports that about 1 to 2 months ago he had his diabetes medications changed and since that time he has noticed increasing swelling in his legs, hardness in the legs, difficulty ambulating, and weight gain from 178 to 255 pounds over the last 1 to 2 months. He did not report any subjective fevers, chills, cough or shortness of breath. He denies any loss of smell or change in taste. No abdominal pain nausea vomiting or diarrhea. The patient does report that he has home oxygen that he uses as needed at home and he reports that he hardly needs it. He is vaccinated did receive the J&J vaccine but has not yet received a booster dose.     The patient presented to the emergency department and was found to have leukopenia with a white blood cell count of 2.7 and thrombocytopenia with a platelet count of 807. Creatinine slightly elevated at 1.31 and proBNP is elevated at 9327. Chest x-ray showed hyperinflated lungs with cardiomegaly seen and diffuse increased interstitial markings in both lungs which may represent baseline chronic interstitial lung changes given that these findings were present before. A CT of the chest was done with IV contrast that showed no evidence of pulmonary embolism and compared to 2008 there is worsening underlying emphysema with worsening subacute or acute interstitial lung disease best seen in the left upper lobe. Findings were not felt typical for COVID-19 virus pneumonia. There was thickening and distortion of the left major fissure with an ill-defined masslike focus in the left lower lobe. There is worsening moderate to severe pulmonary artery hypertension     COVID testing was positive and I was asked to evaluate and help with COVID-19 virus infection    The patient did have a CT scan of the abdomen pelvis done 1/28/2022 which was a limited study with no evidence of bowel obstruction and moderate stool burden noted felt related to constipation.   Tiny amount of free fluid scattered in the abdomen, moderate pleural effusions and left basilar consolidation and basilar interstitial thickening increased from 2022      Current evaluation:2022    BP (!) 139/53   Pulse 74   Temp 98.1 °F (36.7 °C) (Oral)   Resp 22   Ht 5' 10.98\" (1.803 m)   Wt 228 lb 1.6 oz (103.5 kg)   SpO2 91%   BMI 31.83 kg/m²     Temperature Range: Temp: 98.1 °F (36.7 °C) Temp  Av.1 °F (37.8 °C)  Min: 98.1 °F (36.7 °C)  Max: 101.8 °F (38.8 °C)   The patient remains intubated, sedated, on Luke-Synephrine, amiodarone and insulin drips, tolerating tube feed, had bowel movements. No new events. Temperature max 99.8  WBC 12.1  No growth on blood culture from 2022 and 2022  Normal stefan growth on sputum culture from 2022    Review of Systems   Unable to perform ROS: Intubated       Physical Examination :     Physical Exam  Constitutional:       Appearance: He is well-developed. Interventions: He is sedated and intubated. HENT:      Head: Normocephalic and atraumatic. Cardiovascular:      Rate and Rhythm: Normal rate. Heart sounds: Normal heart sounds. No friction rub. No gallop. Pulmonary:      Effort: Pulmonary effort is normal. He is intubated. Breath sounds: Normal breath sounds. No wheezing. Abdominal:      General: Bowel sounds are normal.      Palpations: Abdomen is soft. There is no mass. Tenderness: There is no abdominal tenderness. Musculoskeletal:         General: Swelling (Right upper extremity swelling) present. Cervical back: Neck supple. Lymphadenopathy:      Cervical: No cervical adenopathy. Skin:     General: Skin is warm and dry.          Laboratory data:   I have independently reviewed the followinglabs:  CBC with Differential:   Recent Labs     22  0515 22  0555   WBC 17.0* 12.1*   HGB 13.7 13.2   HCT 46.4 43.6    134*   LYMPHOPCT 2* 6*   MONOPCT 4 5     BMP:   Recent Labs     22  0515 22  1230 22  0001 22  0555   *   < > 145* 143   K 4.8   < > 4.5 4.2      < > 107 106 CO2 29   < > 31 30   BUN 88*   < > 84* 82*   CREATININE 1.01   < > 1.04 1.02   MG 2.1  --   --  1.9    < > = values in this interval not displayed. Hepatic Function Panel:   No results for input(s): PROT, LABALBU, BILIDIR, IBILI, BILITOT, ALKPHOS, ALT, AST in the last 72 hours. Lab Results   Component Value Date    PROCAL 0.25 02/01/2022    PROCAL 0.24 01/23/2022    PROCAL 0.11 01/19/2022     Lab Results   Component Value Date    CRP 23.5 01/16/2022    CRP 2.0 07/27/2019     Lab Results   Component Value Date    SEDRATE 3 01/16/2022         Lab Results   Component Value Date    DDIMER 2.96 01/30/2022    DDIMER 5.84 01/23/2022    DDIMER 1.53 01/18/2022    DDIMER 1.73 01/16/2022    DDIMER 1.18 12/07/2018     Lab Results   Component Value Date    FERRITIN 569 01/16/2022    FERRITIN 50 07/23/2019    FERRITIN 18 07/08/2019     Lab Results   Component Value Date     01/16/2022     Lab Results   Component Value Date    FIBRINOGEN 402 01/16/2022       Results in Past 30 Days  Result Component Current Result Ref Range Previous Result Ref Range   SARS-CoV-2, Rapid DETECTED (A) (1/16/2022) Not Detected Not in Time Range      Lab Results   Component Value Date    COVID19 DETECTED 01/16/2022    COVID19 Not Detected 11/02/2021       [facility] results for input[facility] [place] in [facility] last 72 hours. Imaging Studies:   ONE XRAY VIEW OF THE CHEST 2/3/2022 4:36 am  Impression   Slight improvement in aeration of the right base with slight worsening   infiltrates in the left lung.  Continued follow-up recommended.          CT OF THE ABDOMEN AND PELVIS WITHOUT CONTRAST 1/28/2022 8:34 am    Impression   1.  Overall mildly limited study due to motion and lack of contrast.       2.  No evidence of bowel obstruction.  Moderate stool burden is noted,   possibly related to constipation.  Enteric tube is in place with distal tip   in the proximal stomach.       3.  Tiny amount of free fluid scattered in the abdomen, including presacral/perirectal fluid, most likely related to volume overload.  No   definite findings of rectal wall thickening or fecal impaction.       4.  Moderate pleural effusions, left basilar consolidation, and bibasilar   interstitial thickening, increased when compared to 01/16/2022.       RECOMMENDATIONS:   Unavailable         Veins: Lower Extremities DVT Study, Venous Scan Lower Bilateral.  Indications for Study: Leg Swelling . Patient Status: In Patient. Technical Quality: Limited visualization. Limitation reason: Edema. Comments: Simultaneous real time imaging utilizing B-Mode, color doppler and spectral waveform analysis was performed on the  bilateral lower extremities for venous examination of the deep and superficial systems. Conclusions  Summary  No evidence of superficial or deep venous thrombosis in both lower extremities. Signature  Electronically signed by Radha Perry RVT(Sonographer) on 01/19/2022 08:10 AM  Electronically signed by Amador Prydeinig physician) on 01/19/2022 06:21 PM      CT OF THE HEAD WITHOUT CONTRAST  1/18/2022 5:42 pm  Impression   New lacunar infarct left thalamus, age indeterminate. Haider Barrera may be helpful.           Cultures:     Culture, Blood 1 [[id number]] Collected: 02/01/22 0003   Order Status: Completed Specimen: Blood Updated: 02/03/22 0829    Specimen Description . BLOOD    Special Requests ART LINE 20ML    Culture NO GROWTH 2 DAYS   Culture, Blood 1 [[id number]] Collected: 01/31/22 1853   Order Status: Completed Specimen: Blood Updated: 02/02/22 2313    Specimen Description . BLOOD    Special Requests RT HAND,10ML    Culture NO GROWTH 2 DAYS       Culture, Respiratory [[id number]] Collected: 01/28/22 1030   Order Status: Completed Specimen: Sputum, Suctioned Updated: 01/30/22 0932    Specimen Description . SUCTIONED SPUTUM    Special Requests NOT REPORTED    Direct Exam < 10 EPITHELIAL CELLS/LPF     <10 NEUTROPHILS/LPF     NO SIGNIFICANT PATHOGENS SEEN Culture NORMAL RESPIRATORY PO HEAVY GROWTH       Culture, Blood 1 [[id number]] Collected: 01/23/22 1759   Order Status: Completed Specimen: Blood Updated: 01/28/22 2111    Specimen Description . BLOOD    Special Requests RT HAND 19ML    Culture NO GROWTH 5 DAYS   Culture, Blood 1 [[id number]] Collected: 01/23/22 1759   Order Status: Completed Specimen: Blood Updated: 01/28/22 2110    Specimen Description . BLOOD    Special Requests ART LINE 10ML    Culture NO GROWTH 5 DAYS     Culture, Blood 2 [[id number]] Collected: 01/21/22 0742   Order Status: Completed Specimen: Blood Updated: 01/26/22 1001    Specimen Description . BLOOD    Special Requests LEFT AC 20ML    Culture NO GROWTH 5 DAYS   Culture, Blood 1 [[id number]] Collected: 01/21/22 0750   Order Status: Completed Specimen: Blood Updated: 01/26/22 1000    Specimen Description . BLOOD    Special Requests LEFT HAND 5ML    Culture NO GROWTH 5 DAYS   MRSA DNA Probe, Nasal [[id number]] Collected: 01/23/22 2258   Order Status: Completed Specimen: Nasal Updated: 01/25/22 1038    Specimen Description . NASAL SWAB    MRSA, DNA, Nasal NEGATIVE    Comment: NEGATIVE:  MRSA DNA not detected by nucleic acid amplification.                                                     Results should be used as an adjunct to nosocomial control efforts to identify patients   needing enhanced precautions.     The test is not intended to identify patients with staphylococcal infections.  Results   should not be used to guide or monitor treatment for MRSA infections. Culture, Blood 1 [[id number]] Collected: 01/16/22 1220   Order Status: Completed Specimen: Blood Updated: 01/21/22 1521    Specimen Description . BLOOD    Special Requests LAC 12ML    Culture NO GROWTH 5 DAYS   Culture, Blood 1 [[id number]] Collected: 01/16/22 1205   Order Status: Completed Specimen: Blood Updated: 01/21/22 1521    Specimen Description . BLOOD    Special Requests RAC 12ML    Culture NO GROWTH 5 DAYS Culture, Urine [[id number]] Collected: 01/16/22 1315   Order Status: Completed Specimen: Urine, clean catch Updated: 01/17/22 2128    Specimen Description . CLEAN CATCH URINE    Special Requests NOT REPORTED    Culture NO SIGNIFICANT GROWTH       COVID-19, Rapid [[id number]] (Abnormal) Collected: 01/16/22 1230   Order Status: Completed Specimen: Nasopharyngeal Swab Updated: 01/16/22 1314    Specimen Description . NASOPHARYNGEAL SWAB    SARS-CoV-2, Rapid DETECTED Abnormal     Comment:        Rapid NAAT: The specimen is POSITIVE for SARS-Cov-2, the novel coronavirus associated with   COVID-19.         This test has been authorized by the FDA under an Emergency Use Authorization (EUA) for use   by authorized laboratories.         The ID NOW COVID-19 assay is designed to detect the virus that causes COVID-19 in patients   with signs and symptoms of infection who are suspected of COVID-19. An individual without symptoms of COVID-19 and who is not shedding SARS-CoV-2 virus would   expect to have a negative (not detected) result in this assay. Fact sheet for Healthcare Providers: [email or web address]   Fact sheet for Patients: [email or web address]           Methodology: Isothermal Nucleic Acid Amplification         Results reported to the appropriate Health Department         Flu A/B Ag Detection [[id number]] Collected: 01/16/22 1230   Order Status: Completed Specimen: Nasopharyngeal Swab Updated: 01/16/22 1313    Specimen Description . NASOPHARYNGEAL SWAB    Special Requests NOT REPORTED    Direct Exam NEGATIVE for Influenza A + B antigens.                                PCR testing to confirm this result is available upon request. Quinton Gill will be saved in the laboratory for 7 days.  Please call [phone] if PCR testing is indicated.      Medications:      amiodarone  200 mg Per NG tube Daily    [Held by provider] insulin lispro  0-12 Units SubCUTAneous Q6H    lisinopril  20 mg Oral Daily    amLODIPine  2.5 mg Oral Nightly    Vitamin D  1,000 Units Oral Daily    polyethylene glycol  17 g Per NG tube Daily    levalbuterol  1.25 mg Nebulization Q6H    [Held by provider] insulin glargine  9 Units SubCUTAneous Daily    bisacodyl  10 mg Rectal Daily    pantoprazole  40 mg IntraVENous Daily    And    sodium chloride (PF)  10 mL IntraVENous Daily    aspirin  324 mg Oral Daily    levETIRAcetam  500 mg Oral BID    chlorhexidine  15 mL Mouth/Throat BID    enoxaparin  30 mg SubCUTAneous BID    QUEtiapine  50 mg Oral Once    budesonide  0.5 mg Nebulization BID    sodium chloride flush  5-40 mL IntraVENous 2 times per day    atorvastatin  20 mg Oral Daily           [facility]  Bay Espinoza MD  [employer] messaging  OFFICE: [phone]      Electronically signed by Bay Espinoza MD on 2/6/2022 at 1:56 PM  Thank you for allowing us to participate in the care of this patient. Please call with questions. This note iscreated with the assistance of a speech recognition program.  While intending to generate a document that actually reflects the content of the visit, the document can still have some errors including those of syntax andsound a like substitutions which may escape proof reading. In such instances, actual meaning can be extrapolated by contextual diversion.

## 2022-02-06 NOTE — PROGRESS NOTES
Pulmonary Critical Care Progress Note  Abdiaziz Herrera MD     Patient seen for the follow up of COVID-19 pneumonia, acute hypoxic respiratory failure. Subjective:  He sedated, intubated on ventilator, FiO2 50%/PEEP 16. He is still on amiodarone drip. He is on insulin and Luke-Synephrine drips. He is on versed, fentanyl drip 50 mics, precedex . He is tolerating tube feeds, moving his bowels. Examination:  Vitals: BP (!) 139/53   Pulse 74   Temp 98.1 °F (36.7 °C) (Oral)   Resp 22   Ht 5' 10.98\" (1.803 m)   Wt 228 lb 1.6 oz (103.5 kg)   SpO2 91%   BMI 31.83 kg/m²   General appearance: Sedated, intubated on ventilator  Neck: No JVD  Lungs: Moderate air exchange, no crackles or wheezing  Heart: regular rate and rhythm, S1, S2 normal, no gallop  Abdomen: Soft, non tender, + BS  Extremities: no cyanosis or clubbing. Positive edema    LABs:  CBC:   Recent Labs     02/05/22  0515 02/06/22  0555   WBC 17.0* 12.1*   HGB 13.7 13.2   HCT 46.4 43.6    134*     BMP:   Recent Labs     02/06/22  0001 02/06/22  0555   * 143   K 4.5 4.2   CO2 31 30   BUN 84* 82*   CREATININE 1.04 1.02   LABGLOM >60 >60   GLUCOSE 143* 165*     ABG:  Lab Results   Component Value Date    TDV6JZP NOT REPORTED 02/06/2022    FIO2 60.0 02/06/2022       Lab Results   Component Value Date    POCPH 7.453 02/06/2022    POCPCO2 43.4 02/06/2022    POCPO2 74.8 02/06/2022    POCHCO3 30.4 02/06/2022    PBEA 6 02/06/2022    OPG3MKN NOT REPORTED 02/06/2022    VUIJ3MSM 95 02/06/2022    FIO2 60.0 02/06/2022     Radiology:  X-ray chest 2/6/2022:      Impression:  · Acute on chronic hypoxic respiratory failure  · COVID-19 pneumonia  · COPD/pulmonary emphysema  · Acute left thalamic infarct/CVA  · Left lower lobe opacity versus nodule  · Pulmonary edema  · Elevated D-dimer, decreased platelets  · Systolic heart failure, s/p AICD placement  · BLANCHE on CKD  · Diabetes mellitus    Recommendations:  · Continue vent support, wean FiO2 as tolerated. 50% FiO2, PEEP at 16  · Wean PEEP as tolerated  · Fentanyl drip, 50 mics  · Continue versed drip  · Continue precedex drip  · Continue Insulin drip  · Titrate Luke-Synephrine for map of 65 to 75 mmHg  · Amiodarone, 0.5 mg/h per cardiology  · Pulmicort aerosol treatment  · Airborne isolation  · IV Decadron 6 mg daily  · Not a candidate for Actemra/baricitinib stopped secondary to cytopenias  · Neurology following for stroke workup  · Nephology following for hyperkalemia   · Xopenex every 6 hours  · Tube feeds as tolerated  · X-ray chest in am  · Labs: CBC and BMP in am  · DVT prophylaxis with low molecular weight heparin  · GI prophylaxis, Protonix  · Discussed with RN  · Will follow with you    Electronically signed by     Jax Shah MD on 2/6/2022 at 3:44 PM  Pulmonary Critical Care and Sleep Medicine,  [facility]  Cell: [phone]  Office: [phone]  Cc: 35 minutes

## 2022-02-07 NOTE — PROGRESS NOTES
Nutrition Assessment     Type and Reason for Visit: Reassess    Nutrition Recommendations/Plan:   1. Continue NPO diet  2. Continue Nepro at goal rate 50 mL/hr (1200 mL total volume)  3. Monitor tube feeding rate/ tolerance, vent status, GI function and labs    Nutrition Assessment:  Patient remains intubated and sedated but vent setting are improving. Propofol is turned off. Patient's family agreed to Trach/Peg when appropriate. Tube feeding is at goal rate and tolerated well. Patient no longer is constipated and is having daily bowel movements. Stool softeners are held. Will continue tube feeding. Malnutrition Assessment:  Malnutrition Status: At risk for malnutrition (Comment)    Estimated Daily Nutrient Needs:  Energy (kcal): 7844-5877 kcal (MSJ 1.2, 1.3 factor); Weight Used for Energy Requirements:  Current     Protein (g): 102-110 gm (1.3-1.4 gm/kg); Weight Used for Protein Requirements:  Ideal          Nutrition Related Findings: Edema: +2 generalized edema, +4 pitting BUE, +2 pitting BLE. Active bowel sounds.  Loose stools after softeners      Current Nutrition Therapies:    Diet NPO  ADULT TUBE FEEDING; Orogastric; Renal Formula; Continuous; 10; Yes; 10; Q 4 hours; 50; 300; Q 6 hours    Anthropometric Measures:  · Height: 5' 10\" (177.8 cm)  · Current Body Wt: 228 lb (103.4 kg)   · BMI: 32.7    Nutrition Diagnosis:   · Inadequate protein-energy intake related to inadequate protein-energy intake,impaired respiratory function as evidenced by NPO or clear liquid status due to medical condition,intubation,nutrition support - enteral nutrition      Nutrition Interventions:   Food and/or Nutrient Delivery:  Continue NPO,Continue Current Tube Feeding  Nutrition Education/Counseling:  Education not indicated   Coordination of Nutrition Care:  Continue to monitor while inpatient    Goals:  EN support to provide >75% of estimated nutritional needs       Nutrition Monitoring and Evaluation: Behavioral-Environmental Outcomes:  None Identified   Food/Nutrient Intake Outcomes:  Enteral Nutrition Intake/Tolerance  Physical Signs/Symptoms Outcomes:  Biochemical Data,Fluid Status or Edema,Skin,Weight,GI Status     Discharge Planning:     Too soon to determine           Margette Snellen MFN, RDN, LDN  Lead Clinical Dietitian  RD Office Phone [phone]

## 2022-02-07 NOTE — PROGRESS NOTES
Pulmonary Critical Care Progress Note  Nhung Ramírez MD     Patient seen for the follow up of COVID-19 pneumonia, acute hypoxic respiratory failure. Subjective:  He sedated, intubated on ventilator, FiO2 45%/PEEP 15. He was on Luke-Synephrine drip last night, now weaned off this morning. He is off amiodarone drip. He is tolerating tube feeds. He is on Versed drip, fentanyl drip, Precedex drip. Examination:  Vitals: BP (!) 139/53   Pulse 64   Temp 99.8 °F (37.7 °C) (Oral)   Resp (!) 32   Ht 5' 10.98\" (1.803 m)   Wt 228 lb 1.6 oz (103.5 kg)   SpO2 94%   BMI 31.83 kg/m²   General appearance: Sedated, intubated on ventilator  Neck: No JVD  Lungs: Moderate air exchange, no crackles or wheezing  Heart: regular rate and rhythm, S1, S2 normal, no gallop  Abdomen: Soft, non tender, + BS  Extremities: no cyanosis or clubbing.   Positive edema    LABs:  CBC:   Recent Labs     02/06/22  0555 02/07/22  0601   WBC 12.1* 15.1*   HGB 13.2 13.1   HCT 43.6 43.4   * 125*     BMP:   Recent Labs     02/06/22  1820 02/07/22  0601    133*   K 4.3 3.9   CO2 28 26   BUN 83* 78*   CREATININE 0.90 0.89   LABGLOM >60 >60   GLUCOSE 171* 331*     ABG:  Lab Results   Component Value Date    VRN8UPS NOT REPORTED 02/07/2022    FIO2 45.0 02/07/2022       Lab Results   Component Value Date    POCPH 7.475 02/07/2022    POCPCO2 38.9 02/07/2022    POCPO2 63.2 02/07/2022    POCHCO3 28.7 02/07/2022    PBEA 5 02/07/2022    HXG1LUE NOT REPORTED 02/07/2022    GXSO2ZSU 93 02/07/2022    FIO2 45.0 02/07/2022     Radiology:  X-ray chest 2/7/2022:      Impression:  · Acute on chronic hypoxic respiratory failure  · COVID-19 pneumonia  · COPD/pulmonary emphysema  · Acute left thalamic infarct/CVA  · Left lower lobe opacity versus nodule  · Pulmonary edema  · Elevated D-dimer, decreased platelets  · Systolic heart failure, s/p AICD placement  · BLANCHE on CKD  · Diabetes mellitus    Recommendations:  · Continue vent support, wean FiO2 as tolerated. 45% FiO2, PEEP at 15  · Wean PEEP as tolerated  · Fentanyl drip, 50 mics  · Continue versed drip  · Continue precedex drip  · Monitor blood sugars off insulin drip  · Titrate Luke-Synephrine for map of 65 to 75 mmHg  · Amiodarone per cardiology. · Pulmicort aerosol treatment  · Airborne isolation  · IV Decadron 6 mg daily  · Not a candidate for Actemra/baricitinib stopped secondary to cytopenias  · Neurology following for stroke workup  · Nephology following for hyperkalemia   · Xopenex every 6 hours  · Tube feeds as tolerated  · X-ray chest in am  · Labs: CBC and BMP in am  · DVT prophylaxis with low molecular weight heparin  · GI prophylaxis, Protonix  · Discussed with RN  · Proceed with tracheostomy/PEG tube placement, while PEEP is being weaned.   · Will follow with you    Electronically signed by     Leilani Young MD on 2/7/2022 at 11:43 AM  Pulmonary Critical Care and Sleep Medicine,  [facility]  Cell: [phone]  Office: [phone]  Cc: 35 minutes

## 2022-02-07 NOTE — PROGRESS NOTES
.  Nephrology  Progress Note    Reason for Consult: Hyperkalemia    Requesting Physician:  Amador Steel MD    INTERVAL HISTORY:  K continues to improve. Today it is 3.9. Creatinine is stable. Remains sedated and on the ventilator. Off pressors. BUN is in the 70s. His SBP ranging 130-150s. HISTORY OF PRESENT ILLNESS:    The patient is a 64 y.o. male who presented with to the hospital for worsening shortness of breath ad and worsening lower extremity edema on 1/16. He had diffuse body aches and sweats and a dry cough. He tested positive for COVID-19. He has steadily declined since admission. On 1/18 a CT head found New lacunar infarct left thalamus. He had to be intubated on 1/23. He has a significant past medical history of COPD, hyperlipidemia, Type 2 DM, Right kidney mass, and abdominal aortic aneurysm repair in 2005. His potassium has been steadily rising since 1/24/22. The most current Potassium level is 5.8. His creatine is improved to 1.71. This information was retrieved through chart review and nursing. Patient was unable to provide information due to current status on mechanical ventilation and sedation. Review Of Systems:  Unable to obtain. Patient sedated on ventilator.     Past Medical History:   Diagnosis Date    Arthritis     Atherosclerotic plaque 7/28/2019    Cervical disc disease     degenerative disc disease    Diabetes mellitus ([address]     type 2    History of blood transfusion     Hx of blood clots     left leg    Hyperlipidemia     Neuropathy     Right kidney mass     \"urologist is watching\"    Snores     Type 2 diabetes mellitus treated with insulin ([address] 7/28/2019       Past Surgical History:   Procedure Laterality Date    ABDOMINAL AORTIC ANEURYSM REPAIR  2005    CARPAL TUNNEL RELEASE Bilateral     CERVICAL SPINE SURGERY      COLONOSCOPY      benign polyps removed    INGUINAL HERNIA REPAIR Right     FL REPAIR INCISIONAL HERNIA,REDUCIBLE N/A 5/10/2017    HERNIA VENTRAL REPAIR WITH MESH  performed by Cynthia Melchor DO at [address] [place] [address] Road  05/10/2017    with mesh       Prior to Admission medications    Medication Sig Start Date End Date Taking? Authorizing Provider   rosuvastatin (CRESTOR) 40 MG tablet Take 40 mg by mouth every evening   Yes Historical Provider, MD   insulin NPH (HUMULIN N;NOVOLIN N) 100 UNIT/ML injection vial Inject 20 Units into the skin 2 times daily (before meals)   Yes Historical Provider, MD   levETIRAcetam (KEPPRA) 500 MG tablet Take 1 tablet by mouth 2 times daily 11/3/21  Yes Orly Steven MD   venlafaxine (EFFEXOR XR) 150 MG extended release capsule Take 1 capsule by mouth daily (with breakfast) 7/29/19  Yes Arabella Kiser   vitamin B-1 (THIAMINE) 100 MG tablet Take 100 mg by mouth daily   Yes Historical Provider, MD   ferrous sulfate 325 (65 Fe) MG tablet Take 325 mg by mouth daily (with breakfast)   Yes Historical Provider, MD   ALPRAZolam (XANAX) 0.5 MG tablet Take 0.5 mg by mouth three times daily.    Yes Historical Provider, MD   furosemide (LASIX) 40 MG tablet Take 1 tablet by mouth 2 times daily 12/11/18  Yes Jin Evans MD   tiotropium (SPIRIVA RESPIMAT) 2.5 MCG/ACT AERS inhaler Inhale 2 puffs into the lungs daily    Yes Historical Provider, MD   albuterol sulfate  (90 Base) MCG/ACT inhaler Inhale 2 puffs into the lungs every 6 hours as needed for Wheezing or Shortness of Breath   Yes Historical Provider, MD   metoprolol succinate (TOPROL XL) 50 MG extended release tablet Take 50 mg by mouth daily   Yes Historical Provider, MD   Multiple Vitamins-Minerals (MULTIVITAMIN ADULT) TABS Take 1 tablet by mouth daily   Yes Historical Provider, MD   vitamin D (CHOLECALCIFEROL) 1000 UNIT TABS tablet Take 1,000 Units by mouth daily   Yes Historical Provider, MD   fluticasone (FLONASE) 50 MCG/ACT nasal spray 1 spray by Each Nare route daily as needed for Rhinitis   Yes Historical Provider, MD   aspirin 325 MG EC tablet Take 325 mg by mouth daily    Yes Historical Provider, MD   Omega-3 Fatty Acids (FISH OIL) 1000 MG CAPS Take 1 capsule by mouth daily    Yes Historical Provider, MD   amLODIPine (NORVASC) 5 MG tablet Take 5 mg by mouth nightly    Yes Historical Provider, MD   mirtazapine (REMERON) 45 MG tablet Take 45 mg by mouth nightly   Yes Historical Provider, MD       Scheduled Meds:   amiodarone  200 mg Per NG tube Daily    insulin glargine  15 Units SubCUTAneous BID    insulin lispro  0-12 Units SubCUTAneous 4x Daily AC & HS    lisinopril  20 mg Oral Daily    amLODIPine  2.5 mg Oral Nightly    Vitamin D  1,000 Units Oral Daily    polyethylene glycol  17 g Per NG tube Daily    levalbuterol  1.25 mg Nebulization Q6H    bisacodyl  10 mg Rectal Daily    pantoprazole  40 mg IntraVENous Daily    And    sodium chloride (PF)  10 mL IntraVENous Daily    aspirin  324 mg Oral Daily    levETIRAcetam  500 mg Oral BID    chlorhexidine  15 mL Mouth/Throat BID    enoxaparin  30 mg SubCUTAneous BID    QUEtiapine  50 mg Oral Once    budesonide  0.5 mg Nebulization BID    sodium chloride flush  5-40 mL IntraVENous 2 times per day    atorvastatin  20 mg Oral Daily     Continuous Infusions:   dexmedetomidine (PRECEDEX) IV infusion 1 mcg/kg/hr (02/07/22 0600)    fentaNYL 50 mcg/hr (02/06/22 1955)    phenylephrine (KEARA-SYNEPHRINE) 50mg/250mL infusion Stopped (02/07/22 0948)    propofol Stopped (02/04/22 0700)    midazolam (VERSED) 1 mg/mL in D5W infusion 3 mg/hr (02/07/22 0958)    sodium chloride      dextrose       PRN Meds:sodium chloride nebulizer, LORazepam, dextrose bolus (hypoglycemia) **OR** dextrose bolus (hypoglycemia), sodium chloride flush, sodium chloride, ondansetron **OR** ondansetron, acetaminophen **OR** acetaminophen, glucose, glucagon (rDNA), dextrose, hydrALAZINE    Allergies   Allergen Reactions    Barbiturates Anxiety     Other reaction(s): Unknown  Codeine Palpitations    Sulfa Antibiotics Rash     Other reaction(s): Unknown       Social History     Socioeconomic History    Marital status:      Spouse name: Not on file    Number of children: Not on file    Years of education: Not on file    Highest education level: Not on file   Occupational History    Not on file   Tobacco Use    Smoking status: Former Smoker    Smokeless tobacco: Never Used    Tobacco comment: quit 30 years ago   Substance and Sexual Activity    Alcohol use: No    Drug use: No    Sexual activity: Not on file   Other Topics Concern    Not on file   Social History Narrative    Not on file     Social Determinants of Health     Financial Resource Strain:     Difficulty of Paying Living Expenses: Not on file   Food Insecurity:     Worried About [address] [employer] in the Last Year: Not on file    [address] [employer] in the Last Year: Not on file   Transportation Needs:     Lack of Transportation (Medical): Not on file    Lack of Transportation (Non-Medical):  Not on file   Physical Activity:     Days of Exercise per Week: Not on file    Minutes of Exercise per Session: Not on file   Stress:     Feeling of Stress : Not on file   Social Connections:     Frequency of Communication with Friends and Family: Not on file    Frequency of Social Gatherings with Friends and Family: Not on file    Attends Rastafarian Services: Not on file    Active Member of [address] [place] [address] [employer] or Organizations: Not on file    Attends Club or Organization Meetings: Not on file    Marital Status: Not on file   Intimate Partner Violence:     Fear of Current or Ex-Partner: Not on file    Emotionally Abused: Not on file    Physically Abused: Not on file    Sexually Abused: Not on file   Housing Stability:     Unable to Pay for Housing in the Last Year: Not on file    Number of [place] in the Last Year: Not on file    Unstable Housing in the Last Year: Not on file       Family History   Problem Relation Age of Onset    Ovarian Cancer Sister     Ovarian Cancer Sister          Physical Exam:  Vitals:    02/07/22 0530 02/07/22 0600 02/07/22 0752 02/07/22 1030   BP:       Pulse:  69 64    Resp:  (!) 0 (!) 32    Temp:       TempSrc:       SpO2: 93% (!) 89% 94% 94%   Weight:       Height:         I/O last 3 completed shifts: In: 1889 [I.V.:1583; NG/GT:5031]  Out: 2700 [Urine:2700]    Deferred due to COVID19 isolation.      Data:  CBC:   Lab Results   Component Value Date    WBC 15.1 (H) 02/07/2022    HGB 13.1 02/07/2022    HCT 43.4 02/07/2022    MCV 82.4 (L) 02/07/2022     (L) 02/07/2022     BMP:    Lab Results   Component Value Date     (L) 02/07/2022     02/06/2022     02/06/2022    K 3.9 02/07/2022    K 4.3 02/06/2022    K 4.2 02/06/2022    CL 97 (L) 02/07/2022     02/06/2022     02/06/2022    CO2 26 02/07/2022    CO2 28 02/06/2022    CO2 30 02/06/2022    BUN 78 (H) 02/07/2022    BUN 83 (H) 02/06/2022    BUN 82 (H) 02/06/2022    CREATININE 0.89 02/07/2022    CREATININE 0.90 02/06/2022    CREATININE 1.02 02/06/2022    GLUCOSE 331 (H) 02/07/2022    GLUCOSE 171 (H) 02/06/2022    GLUCOSE 165 (H) 02/06/2022     CMP:   Lab Results   Component Value Date     02/07/2022    K 3.9 02/07/2022    CL 97 02/07/2022    CO2 26 02/07/2022    BUN 78 02/07/2022    CREATININE 0.89 02/07/2022    GLUCOSE 331 02/07/2022    CALCIUM 9.0 02/07/2022    PROT 5.6 02/03/2022    LABALBU 2.4 02/03/2022    BILITOT 0.37 02/03/2022    ALKPHOS 117 02/03/2022    AST 72 02/03/2022    ALT 77 02/03/2022      Hepatic:   Lab Results   Component Value Date    AST 72 (H) 02/03/2022    AST 46 (H) 02/01/2022    AST 28 01/31/2022    ALT 77 (H) 02/03/2022    ALT 43 (H) 02/01/2022    ALT 26 01/31/2022    BILITOT 0.37 02/03/2022    BILITOT 0.28 (L) 02/01/2022    BILITOT 0.32 01/31/2022    ALKPHOS 117 02/03/2022    ALKPHOS 100 02/01/2022    ALKPHOS 99 01/31/2022     BNP: No results found for: BNP  Lipids:   Lab Results   Component Value Date    CHOL 129 11/03/2021    HDL 30 (L) 11/03/2021     INR:   Lab Results   Component Value Date    INR 1.0 01/17/2022    INR 1.0 11/03/2021    INR 1.0 07/27/2019     PTH: No results found for: PTH  Phosphorus:    Lab Results   Component Value Date    PHOS 3.0 02/07/2022     Ionized Calcium: No results found for: IONCA  Magnesium:   Lab Results   Component Value Date    MG 2.0 02/07/2022     Albumin:   Lab Results   Component Value Date    LABALBU 2.4 02/03/2022     Last 3 CK, CKMB, Troponin: @LABRCNT(CKTOTAL:3,CKMB:3,TROPONINI:3)       URINE:)No results found for: Justin Doss    Radiology:  Reviewed. Assessment:  BLANCHE, hemodynamically related, good UO, Cr is stable. Azotemia. CKD 3A. Hypernatremia, corrected serum Na is 139 today. Hyperkalemia, improved. Hypophosphatemia, improved. Hypovitaminosis D. Metabolic alkalosis. Hypertension. Diabetes mellitis. COVID19 pneumonia. CHF. COPD. Plan:  Off insulin drip. Glucose levels are high again. Off D5W. Will decrease free water flushes to 300 ml every 6 hours. Renal tube feed. Monitor potassium off of Lokelma. Off Lactulose for now. Metoprolol discontinued per cardiology. Lisinopril 20 mg daily started 2/2/2022 per cardiology. Monitor K and creatinine closely. Continue Amlodipine 2.5 mg daily. Monitor BP. Continue vitamin D supplementation. Avoid hypotension, nephrotoxic drugs, Fleets enema and IV contrast exposure. Follow up chemistries daily in AM.  We will follow with you. Electronically signed by Michael Waldrop MD  on 2/7/2022 at 11:34 AM   [facility].   Ph: [phone]

## 2022-02-07 NOTE — FLOWSHEET NOTE
Patient unable to respond.     got update from Chelsea Wild (RN)   offered prayer at patient's door  Patient did not respond   available for emotional support and spiritual care       02/07/22 1413   Encounter Summary   Services provided to: Patient   Referral/Consult From: [address] Family members   Continue Visiting   (2/7/22)   Complexity of Encounter Low   Length of Encounter 15 minutes   Routine   Type Follow up   Assessment Unable to respond   Intervention Prayer   Outcome Did not respond

## 2022-02-07 NOTE — PROGRESS NOTES
Section of Cardiology  Progress Note      Date:  2/7/2022  Patient: Coleman Giang  Admission:  1/16/2022 11:51 AM  Admit DX: Hypokalemia [E87.6]  Hypomagnesemia [E83.42]  COPD exacerbation (HCC) [J44.1]  BLANCHE (acute kidney injury) ([facility] [N17.9]  Acute respiratory failure with hypoxia (HCC) [J96.01]  Acute on chronic systolic CHF (congestive heart failure) ([facility] [I50.23]  COVID [U07.1]  COVID-19 [U07.1]  Age:  64 y.o., 1945     LOS: 22 days     Reason for evaluation:   atrial fibrillation      SUBJECTIVE:     The patient was seen and examined. Notes and labs reviewed. Pt remains intubated/sedated  BP has been stable  Remains in NSR.  Bradycardic with precedex  Pt has wide pulse pressure with low MAPs  On Tube feeds    OBJECTIVE:      EXAM:   Vitals:    VITALS:  BP (!) 139/53   Pulse 64   Temp 99.8 °F (37.7 °C) (Oral)   Resp (!) 32   Ht 5' 10.98\" (1.803 m)   Wt 228 lb 1.6 oz (103.5 kg)   SpO2 94%   BMI 31.83 kg/m²   24HR INTAKE/OUTPUT:      Intake/Output Summary (Last 24 hours) at 2/7/2022 1147  Last data filed at 2/7/2022 0606  Gross per 24 hour   Intake 3804.55 ml   Output 1875 ml   Net 1929.55 ml       NOT EXAMINED DUE TO COVID 19 AND TO REDUCE SPREAD OF INFECTION AND USE OF PPE  EXAMINED THROUGH WINDOW - intubated/sedated    Current Inpatient Medications:   amiodarone  200 mg Per NG tube Daily    insulin glargine  15 Units SubCUTAneous BID    insulin lispro  0-12 Units SubCUTAneous 4x Daily AC & HS    lisinopril  20 mg Oral Daily    amLODIPine  2.5 mg Oral Nightly    Vitamin D  1,000 Units Oral Daily    polyethylene glycol  17 g Per NG tube Daily    levalbuterol  1.25 mg Nebulization Q6H    bisacodyl  10 mg Rectal Daily    pantoprazole  40 mg IntraVENous Daily    And    sodium chloride (PF)  10 mL IntraVENous Daily    aspirin  324 mg Oral Daily    levETIRAcetam  500 mg Oral BID    chlorhexidine  15 mL Mouth/Throat BID    enoxaparin  30 mg SubCUTAneous BID    QUEtiapine  50 mg Oral Once    budesonide  0.5 mg Nebulization BID    sodium chloride flush  5-40 mL IntraVENous 2 times per day    atorvastatin  20 mg Oral Daily       IV Infusions (if any):   dexmedetomidine (PRECEDEX) IV infusion 1 mcg/kg/hr (02/07/22 0600)    fentaNYL 50 mcg/hr (02/06/22 1955)    phenylephrine (KEARA-SYNEPHRINE) 50mg/250mL infusion Stopped (02/07/22 0948)    propofol Stopped (02/04/22 0700)    midazolam (VERSED) 1 mg/mL in D5W infusion 3 mg/hr (02/07/22 0958)    sodium chloride      dextrose         Diagnostics:   Telemetry: NSR/Sinus Thai      Labs:   CBC:  Recent Labs     02/06/22  0555 02/07/22 0601   WBC 12.1* 15.1*   HGB 13.2 13.1   HCT 43.6 43.4   * 125*     Magnesium:  Recent Labs     02/06/22  0555 02/07/22  0601   MG 1.9 2.0     BMP:  Recent Labs     02/06/22  1820 02/07/22  0601    133*   K 4.3 3.9   CALCIUM 9.1 9.0   CO2 28 26   BUN 83* 78*   CREATININE 0.90 0.89   LABGLOM >60 >60   GLUCOSE 171* 331*     BNP:No results for input(s): BNP, PROBNP in the last 72 hours. PT/INR:No results for input(s): PROTIME, INR in the last 72 hours. APTT:No results for input(s): APTT in the last 72 hours. CARDIAC ENZYMES:No results for input(s): MYOGLOBIN, CKTOTAL, CKMB, CKMBINDEX, TROPHS, TROPONINT in the last 72 hours. FASTING LIPID PANEL:  Lab Results   Component Value Date    HDL 30 11/03/2021    TRIG 181 11/03/2021     LIVER PROFILE:  No results for input(s): AST, ALT, LABALBU, ALKPHOS, BILITOT, BILIDIR, IBILI, PROT, GLOB, ALBUMIN in the last 72 hours. ASSESSMENT:  · Paroxysmal Atrial Fibrillation - now in sinus  · Hx of CHD  · HTN  · COVID 19 PNA - managed by others  · Chronic Kidney Disease  · COPD  · Acute Hypoxic Respiratory Failure - intubated  · Hypotension with Wide Pulse Pressure    PLAN:  1. Continue current medications. 2. Cont. Amio 200mg PO/NG daily  3. Can given 500cc bolus of fluids for hypotension given low diastolic pressures. Continue pressor support as needed.  Will consider echo if not improving for assessment of Aortic Valve for insufficiency. 4. Cont. Treatment of COVID/pneumonia  5. Supportive management otherwise  6.  Will continue to follow    Discussed with nursing    Julyluis eduardo Villanueva MD

## 2022-02-07 NOTE — CARE COORDINATION
Discharge planning:    Pt. Is intubated and sedated. Has been febrile off and on. FIO2 45% and Peep 15. Possible trach and PEG, if family wants this. Mckenzie conroy. [facility].

## 2022-02-07 NOTE — PROGRESS NOTES
[facility]  Progress Note    Maricarmen Betts  MRN: [id number]  Date: 2/7/2022  LOS: 25     Reason for F/U :   COVID-19 virus infection    Impression :   1. COVID-19 virus infection tested + 1/16/2022  2. Acute hypoxic respiratory failure, currently ventilator dependent  · Intubated 1/23/2022  3. Emphysema with worsening changes on imaging  4. Worsening acute interstitial lung disease and clinically not typical COVID-19 virus infection  5. Worsening moderate to severe pulmonary artery hypertension  6. Bilateral lower extremity edema, likely related to above  7. Leukopenia and thrombocytopenia  8. 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 Fever, intermittently  10. Paroxysmal atrial fibrillation  11.  Acute kidney injury    Recommendations:   · COVID-19 therapy:  · The patient status post course of Decadron  · Patient was started on baricitinib, initiated 1/17/2022, but was discontinued 1/18/2022 due to cytopenias  · Actemra has been considered but again, due to cytopenias and thrombocytopenia, this has not been given    · Antimicrobial therapy:  · Patient received ceftriaxone and azithromycin 1/16/2022  · He received a dose of levofloxacin 1/18/2022  · He was started on cefepime and vancomycin 1/23/2022  · Completed a 7 day course of cefepime 1/28/2022  · Vancomycin was discontinued 1/24/2022 due to the acute kidney injury    · Patient is currently ventilator dependent, FiO2 45% PEEP of 15  · Continue supportive care    Infection Control Recommendations:   Droplet plus precautions    Discharge Planning:   Estimated Length of IV antimicrobials:  Patient will need Midline Catheter Insertion/ PICC line Insertion: No  Patient will need: Home IV , [place],  [facility],  LTAC: Undetermined  Patient willneed outpatient wound care: No    Medical Decision making / Summary of Stay:   Darlene Dominguez is a 64y.o.-year-old male who was initially admitted on 1/16/2022.   Franky Connell has a history of diabetes mellitus type 2, essential hypertension, seizure disorder, chronic kidney disease stage III, hyperlipidemia among other medical problems.     The patient reports that about 1 to 2 months ago he had his diabetes medications changed and since that time he has noticed increasing swelling in his legs, hardness in the legs, difficulty ambulating, and weight gain from 178 to 255 pounds over the last 1 to 2 months. He did not report any subjective fevers, chills, cough or shortness of breath. He denies any loss of smell or change in taste. No abdominal pain nausea vomiting or diarrhea. The patient does report that he has home oxygen that he uses as needed at home and he reports that he hardly needs it. He is vaccinated did receive the J&J vaccine but has not yet received a booster dose.     The patient presented to the emergency department and was found to have leukopenia with a white blood cell count of 2.7 and thrombocytopenia with a platelet count of 706. Creatinine slightly elevated at 1.31 and proBNP is elevated at 9327. Chest x-ray showed hyperinflated lungs with cardiomegaly seen and diffuse increased interstitial markings in both lungs which may represent baseline chronic interstitial lung changes given that these findings were present before. A CT of the chest was done with IV contrast that showed no evidence of pulmonary embolism and compared to 2008 there is worsening underlying emphysema with worsening subacute or acute interstitial lung disease best seen in the left upper lobe.  Findings were not felt typical for COVID-19 virus pneumonia.   There was thickening and distortion of the left major fissure with an ill-defined masslike focus in the left lower lobe.  There is worsening moderate to severe pulmonary artery hypertension     COVID testing was positive and I was asked to evaluate and help with COVID-19 virus infection    Current evaluation:2/7/2022    BP (!) 139/53   Pulse 69   Temp 99.8 °F (37.7 °C) (Oral)   Resp (!) 0   Ht 5' 10.98\" (1.803 m)   Wt 228 lb 1.6 oz (103.5 kg)   SpO2 (!) 89%   BMI 31.83 kg/m²     Temperature Range: Temp: 99.8 °F (37.7 °C) Temp  Av.6 °F (37.6 °C)  Min: 97.9 °F (36.6 °C)  Max: 101.1 °F (38.4 °C)    The patient remains ventilator dependent, FiO2 45%, PEEP 15  Patient continues to have elevated temperatures, T-max in 24 hours 38.4  Is on Precedex  Off all vasopressors  RN is at bedside  There are discussions for possible trach and PEG    Review of Systems   Unable to perform ROS: Intubated       Physical Examination :     Physical Exam  Constitutional:       General: He is not in acute distress. Appearance: Normal appearance. He is normal weight. Interventions: He is sedated and intubated. HENT:      Head: Normocephalic and atraumatic. Cardiovascular:      Rate and Rhythm: Tachycardia present. Rhythm irregularly irregular. Pulmonary:      Effort: Pulmonary effort is normal. No respiratory distress. He is intubated. Breath sounds: Normal breath sounds. Abdominal:      General: Abdomen is flat. Bowel sounds are normal.      Palpations: Abdomen is soft. Musculoskeletal:      Comments: Bilateral feet cool to touch   Skin:     General: Skin is dry. Laboratory data:   I have independently reviewed the followinglabs:  CBC with Differential:   Recent Labs     22  0555 22  0601   WBC 12.1* 15.1*   HGB 13.2 13.1   HCT 43.6 43.4   * 125*   LYMPHOPCT 6* PENDING   MONOPCT 5 PENDING     BMP:   Recent Labs     22  0555 22  0555 22  1820 22  0601      < > 142 133*   K 4.2   < > 4.3 3.9      < > 106 97*   CO2 30   < > 28 26   BUN 82*   < > 83* 78*   CREATININE 1.02   < > 0.90 0.89   MG 1.9  --   --  2.0    < > = values in this interval not displayed. Hepatic Function Panel:   No results for input(s): PROT, LABALBU, BILIDIR, IBILI, BILITOT, ALKPHOS, ALT, AST in the last 72 hours.       Lab Results Component Value Date    PROCAL 0.25 02/01/2022    PROCAL 0.24 01/23/2022    PROCAL 0.11 01/19/2022     Lab Results   Component Value Date    CRP 23.5 01/16/2022    CRP 2.0 07/27/2019     Lab Results   Component Value Date    SEDRATE 3 01/16/2022         Lab Results   Component Value Date    DDIMER 2.96 01/30/2022    DDIMER 5.84 01/23/2022    DDIMER 1.53 01/18/2022    DDIMER 1.73 01/16/2022    DDIMER 1.18 12/07/2018     Lab Results   Component Value Date    FERRITIN 569 01/16/2022    FERRITIN 50 07/23/2019    FERRITIN 18 07/08/2019     Lab Results   Component Value Date     01/16/2022     Lab Results   Component Value Date    FIBRINOGEN 402 01/16/2022       Results in Past 30 Days  Result Component Current Result Ref Range Previous Result Ref Range   SARS-CoV-2, Rapid DETECTED (A) (1/16/2022) Not Detected Not in Time Range      Lab Results   Component Value Date    COVID19 DETECTED 01/16/2022    COVID19 Not Detected 11/02/2021       No results for input(s): VANCOTROUGH in the last 72 hours. Imaging Studies:     Chest xray        Impression:       Persistent bilateral lung infiltrates compatible with the clinical history of   COVID-19 pneumonia.           Cultures:   No new culture data  Medications:      amiodarone  200 mg Per NG tube Daily    insulin glargine  15 Units SubCUTAneous BID    insulin lispro  0-12 Units SubCUTAneous 4x Daily AC & HS    lisinopril  20 mg Oral Daily    amLODIPine  2.5 mg Oral Nightly    Vitamin D  1,000 Units Oral Daily    polyethylene glycol  17 g Per NG tube Daily    levalbuterol  1.25 mg Nebulization Q6H    bisacodyl  10 mg Rectal Daily    pantoprazole  40 mg IntraVENous Daily    And    sodium chloride (PF)  10 mL IntraVENous Daily    aspirin  324 mg Oral Daily    levETIRAcetam  500 mg Oral BID    chlorhexidine  15 mL Mouth/Throat BID    enoxaparin  30 mg SubCUTAneous BID    QUEtiapine  50 mg Oral Once    budesonide  0.5 mg Nebulization BID    sodium chloride flush  5-40 mL IntraVENous 2 times per day    atorvastatin  20 mg Oral Daily           [facility]  [facility], [address] messaging  OFFICE: [phone]      Electronically signed by HERB Rodríguez CNP on 2/7/2022 at 7:17 AM  Thank you for allowing us to participate in the care of this patient. Please call with questions. This note iscreated with the assistance of a speech recognition program.  While intending to generate a document that actually reflects the content of the visit, the document can still have some errors including those of syntax andsound a like substitutions which may escape proof reading. In such instances, actual meaning can be extrapolated by contextual diversion.

## 2022-02-07 NOTE — PROGRESS NOTES
Progress note  [facility].,    Adult Hospitalist      Name: Toy Walters  MRN: [id number]     Acct: [de-identified]  Room: [facility]-    Admit Date: 1/16/2022 11:51 AM  PCP: Krishan Castellano MD    Primary Problem  Active Problems:    COVID-19    Acute on chronic systolic CHF (congestive heart failure) (HCC)    Acute respiratory failure with hypoxia (HCC)    BLANCHE (acute kidney injury) ([facility] [address]    COPD exacerbation ([facility] [address]    Hypokalemia    Hypomagnesemia    Palliative care encounter    Goals of care, counseling/discussion    DNR (do not resuscitate) discussion    ACP (advance care planning)    Constipation    Abdominal pain, generalized  Resolved Problems:    * No resolved hospital problems. *        Assesment:   Acute congestive heart failure, diastolic   [id number]   Viral pneumonia secondary to above  Acute respiratory failure with hypoxia intubated on 1/23/2022  Hyperkalemia  Paroxysmal atrial fibrillation  Essential hypertension  Mixed hyperlipidemia  Diabetes mellitus type 2  History of seizure disorder  History of CKD stage III, presently normal renal function  Lung mass?   Leukopenia  Polycythemia  Thrombocytopenia  Anxiety disorder  Recent past h/o lacunar infarct left thalamus   Altered mental status/agitation  Endotracheal intubation secondary to hypoxia dated 1/23/2022  Supraventricular tachycardia/elevated troponin  Fever  Emphysema   Pulmonary artery hypertension       Plan:   Admit patient to intermediate floor  Mechanical ventilation sedation as per critical care,   Telemetry  Check vitals closely  CBC BMP daily    Cardiology consult  Amiodarone  Precedex gtt    IV Decadron  Patient completed a course of cefepime for 7 days  Bronchodilators  Pulmicort  Patient is deemed not a candidate for Actemra or baricitinib secondary to cytopenia  Infectious disease consult  Pulmonology consult    Continue Keppra  Continue amlodipine, atorvastatin  Continue aspirin    GI signed off   Tube feeds  Consult nephrology   Insulin gtt- DC  Lantus w SSI    Plan tracheostomy and PEG if family wishes- want to hold off  Discussed with RN  Continue other medication as below.     Scheduled Meds:   amiodarone  200 mg Per NG tube Daily    insulin glargine  15 Units SubCUTAneous BID    insulin lispro  0-12 Units SubCUTAneous 4x Daily AC & HS    lisinopril  20 mg Oral Daily    amLODIPine  2.5 mg Oral Nightly    Vitamin D  1,000 Units Oral Daily    polyethylene glycol  17 g Per NG tube Daily    levalbuterol  1.25 mg Nebulization Q6H    bisacodyl  10 mg Rectal Daily    pantoprazole  40 mg IntraVENous Daily    And    sodium chloride (PF)  10 mL IntraVENous Daily    aspirin  324 mg Oral Daily    levETIRAcetam  500 mg Oral BID    chlorhexidine  15 mL Mouth/Throat BID    enoxaparin  30 mg SubCUTAneous BID    QUEtiapine  50 mg Oral Once    budesonide  0.5 mg Nebulization BID    sodium chloride flush  5-40 mL IntraVENous 2 times per day    atorvastatin  20 mg Oral Daily     Continuous Infusions:   dexmedetomidine (PRECEDEX) IV infusion 1 mcg/kg/hr (02/06/22 1346)    fentaNYL 50 mcg/hr (02/06/22 1955)    phenylephrine (KEARA-SYNEPHRINE) 50mg/250mL infusion 12 mcg/min (02/06/22 2103)    propofol Stopped (02/04/22 0700)    midazolam (VERSED) 1 mg/mL in D5W infusion 4 mg/hr (02/06/22 1736)    sodium chloride      dextrose       PRN Meds:  sodium chloride nebulizer, 3 mL, Q8H PRN  LORazepam, 1 mg, Q4H PRN  dextrose bolus (hypoglycemia), 125 mL, PRN   Or  dextrose bolus (hypoglycemia), 250 mL, PRN  sodium chloride flush, 10 mL, PRN  sodium chloride, 25 mL, PRN  ondansetron, 4 mg, Q8H PRN   Or  ondansetron, 4 mg, Q6H PRN  acetaminophen, 650 mg, Q6H PRN   Or  acetaminophen, 650 mg, Q6H PRN  glucose, 15 g, PRN  glucagon (rDNA), 1 mg, PRN  dextrose, 100 mL/hr, PRN  hydrALAZINE, 10 mg, Q6H PRN        Chief Complaint:     Chief Complaint   Patient presents with    Leg Swelling     bilateral, has CHF, on diuretic, EMT saw pt , assisted pt into car    Fever    Other     low SPO2         History of Present Illness:   Patient seen examined at bedside  Patient remains in medical ICU  D/w RN in detail  Patient remains intubated sedated    Vent setting were changed- tolerating    Remains sedated  Tolerating tube feeding  Resolved constipation  Dulcolax, Lactulose, Glycolax- all on hold now  Edema over extremities   Blood glucose better        Review of systems:  Unable to conduct ROS secondary to patient being intubated      Initial HPI  Odette Freeman is a 64 y.o.  male who presents with Leg Swelling (bilateral, has CHF, on diuretic, EMT saw pt , assisted pt into car), Fever, and Other (low SPO2)  This is a 73-year-old gentleman admitted via ER, come to ER with complaint of having shortness of breath, patient has medical history significant for COPD patient with history of cardiac failure: Patient noted that he has been having increasing swelling in his lower extremity and becoming more short of breath, further patient also been having some body aches and sweats, patient patient testing in the emergency room showed that he is positive for COVID-19 also noticed to have an elevated BNP, imaging concerning for fluid overload, admitted for further regiment    I have personally reviewed the past medical history, past surgical history, medications, social history, and family history, and summarized in the note.     Review of Systems:       Unable to conduct ROS secondary to patient being intubated      Past Medical History:     Past Medical History:   Diagnosis Date    Arthritis     Atherosclerotic plaque 7/28/2019    Cervical disc disease     degenerative disc disease    Diabetes mellitus ([facility] [address]     type 2    History of blood transfusion     Hx of blood clots     left leg    Hyperlipidemia     Neuropathy     Right kidney mass     \"urologist is watching\"    Snores     Type 2 diabetes mellitus treated with insulin ([facility] [address] 7/28/2019 Past Surgical History:     Past Surgical History:   Procedure Laterality Date    ABDOMINAL AORTIC ANEURYSM REPAIR  2005    CARPAL TUNNEL RELEASE Bilateral     CERVICAL SPINE SURGERY      COLONOSCOPY      benign polyps removed    INGUINAL HERNIA REPAIR Right     [place] REPAIR INCISIONAL HERNIA,REDUCIBLE N/A 5/10/2017    HERNIA VENTRAL REPAIR WITH MESH  performed by Loretta Gregorio DO at [address] [place] [address] Road  05/10/2017    with mesh        Medications Prior to Admission:       Prior to Admission medications    Medication Sig Start Date End Date Taking? Authorizing Provider   rosuvastatin (CRESTOR) 40 MG tablet Take 40 mg by mouth every evening   Yes Historical Provider, MD   insulin NPH (HUMULIN N;NOVOLIN N) 100 UNIT/ML injection vial Inject 20 Units into the skin 2 times daily (before meals)   Yes Historical Provider, MD   levETIRAcetam (KEPPRA) 500 MG tablet Take 1 tablet by mouth 2 times daily 11/3/21  Yes Uzair Meyer MD   venlafaxine (EFFEXOR XR) 150 MG extended release capsule Take 1 capsule by mouth daily (with breakfast) 7/29/19  Yes Arabella Kiser   vitamin B-1 (THIAMINE) 100 MG tablet Take 100 mg by mouth daily   Yes Historical Provider, MD   ferrous sulfate 325 (65 Fe) MG tablet Take 325 mg by mouth daily (with breakfast)   Yes Historical Provider, MD   ALPRAZolam (XANAX) 0.5 MG tablet Take 0.5 mg by mouth three times daily.    Yes Historical Provider, MD   furosemide (LASIX) 40 MG tablet Take 1 tablet by mouth 2 times daily 12/11/18  Yes Shaheed Evans MD   tiotropium (SPIRIVA RESPIMAT) 2.5 MCG/ACT AERS inhaler Inhale 2 puffs into the lungs daily    Yes Historical Provider, MD   albuterol sulfate  (90 Base) MCG/ACT inhaler Inhale 2 puffs into the lungs every 6 hours as needed for Wheezing or Shortness of Breath   Yes Historical Provider, MD   metoprolol succinate (TOPROL XL) 50 MG extended release tablet Take 50 mg by mouth daily Yes Historical Provider, MD   Multiple Vitamins-Minerals (MULTIVITAMIN ADULT) TABS Take 1 tablet by mouth daily   Yes Historical Provider, MD   vitamin D (CHOLECALCIFEROL) 1000 UNIT TABS tablet Take 1,000 Units by mouth daily   Yes Historical Provider, MD   fluticasone (FLONASE) 50 MCG/ACT nasal spray 1 spray by Each Nare route daily as needed for Rhinitis   Yes Historical Provider, MD   aspirin 325 MG EC tablet Take 325 mg by mouth daily    Yes Historical Provider, MD   Omega-3 Fatty Acids (FISH OIL) 1000 MG CAPS Take 1 capsule by mouth daily    Yes Historical Provider, MD   amLODIPine (NORVASC) 5 MG tablet Take 5 mg by mouth nightly    Yes Historical Provider, MD   mirtazapine (REMERON) 45 MG tablet Take 45 mg by mouth nightly   Yes Historical Provider, MD        Allergies: Barbiturates, Codeine, and Sulfa antibiotics    Social History:     Tobacco:    reports that he has quit smoking. He has never used smokeless tobacco.  Alcohol:      reports no history of alcohol use. Drug Use:  reports no history of drug use.     Family History:     Family History   Problem Relation Age of Onset    Ovarian Cancer Sister     Ovarian Cancer Sister          Physical Exam:     Vitals:  BP (!) 139/53   Pulse 59   Temp 97.9 °F (36.6 °C) (Oral)   Resp (!) 31   Ht 5' 10.98\" (1.803 m)   Wt 228 lb 1.6 oz (103.5 kg)   SpO2 92%   BMI 31.83 kg/m²   Temp (24hrs), Av.7 °F (37.6 °C), Min:97.9 °F (36.6 °C), Max:101.8 °F (38.8 °C)      General appearance -on ventilator  Mental status -sedated  Head - normocephalic and atraumatic  Eyes - conjunctiva clear  Ears -external ears normal  Nose - no drainage noted  Mouth - mucous membranes moist  Neck - supple, no carotid bruits, thyroid not palpable  Chest -basal crackles with decreased air entry bilaterally to auscultation, increased effort  Heart - normal rate, regular rhythm, no murmur  Abdomen - soft, nontender, nondistended, bowel sounds present all four quadrants, no masses, hepatomegaly or splenomegaly  Neurological -sedated on vent  Extremities - extremity edema, lower distal extremity discoloration    Skin - no gross lesions, rashes, or induration noted        Data:     Labs:    Hematology:  Recent Labs     02/04/22  0550 02/05/22  0515 02/06/22  0555   WBC 11.5* 17.0* 12.1*   RBC 5.08 5.57 5.25   HGB 12.6* 13.7 13.2   HCT 43.5 46.4 43.6   MCV 85.6 83.3 83.0   MCH 24.8* 24.6* 25.1*   MCHC 29.0 29.5 30.3   RDW 15.6* 15.9* 15.9*    199 134*   MPV 12.2 Abnormal Abnormal     Chemistry:  Recent Labs     02/04/22  0550 02/04/22  1745 02/05/22  0515 02/05/22  1230 02/06/22  0001 02/06/22  0555 02/06/22  1820   *   < > 145*   < > 145* 143 142   K 4.9   < > 4.8   < > 4.5 4.2 4.3   *   < > 107   < > 107 106 106   CO2 29   < > 29   < > 31 30 28   GLUCOSE 155*   < > 359*   < > 143* 165* 171*   BUN 91*   < > 88*   < > 84* 82* 83*   CREATININE 1.10   < > 1.01   < > 1.04 1.02 0.90   MG 2.1  --  2.1  --   --  1.9  --    ANIONGAP 9   < > 9   < > 7* 7* 8*   LABGLOM >60   < > >60   < > >60 >60 >60   GFRAA >60   < > >60   < > >60 >60 >60   CALCIUM 9.5   < > 9.5   < > 9.4 9.2 9.1   PHOS 3.3  --  3.5  --   --  3.2  --     < > = values in this interval not displayed. Recent Labs     02/05/22  0515 02/05/22  0554 02/06/22  1301 02/06/22  1403 02/06/22  1504 02/06/22  1609 02/06/22  1710 02/06/22  1951   LABA1C 9.7*  --   --   --   --   --   --   --    POCGLU  --    < > 148* 134* 145* 150* 135* 211*    < > = values in this interval not displayed.        Lab Results   Component Value Date    INR 1.0 01/17/2022    INR 1.0 11/03/2021    INR 1.0 07/27/2019    PROTIME 13.3 01/17/2022    PROTIME 11.1 11/03/2021    PROTIME 10.5 07/27/2019       Lab Results   Component Value Date/Time    SPECIAL ART LINE 20ML 02/01/2022 12:03 AM     Lab Results   Component Value Date/Time    CULTURE NO GROWTH 5 DAYS 02/01/2022 12:03 AM       Lab Results   Component Value Date    POCPH 7.453 02/06/2022 POCPCO2 43.4 02/06/2022    POCPO2 74.8 02/06/2022    POCHCO3 30.4 02/06/2022    NBEA NOT REPORTED 02/06/2022    PBEA 6 02/06/2022    GSA2IQZ NOT REPORTED 02/06/2022    XOLQ2UXY 95 02/06/2022    FIO2 60.0 02/06/2022       Radiology:    XR CHEST 1 VIEW    Result Date: 1/16/2022  Hypoinflated lungs. Cardiomegaly again seen, when compared to the previous study performed 07/27/2019. Diffuse, increased interstitial markings throughout both lungs, some of which can be seen on the prior study may represent baseline chronic interstitial lung changes. The increased prominence on this exam could be secondary to the suboptimal inspiratory effort. The presence of pulmonary vascular congestion/mild CHF or bilateral interstitial pneumonia cannot be excluded. Clinical correlation is recommended. CT CHEST PULMONARY EMBOLISM W CONTRAST    Result Date: 1/16/2022  No evidence of pulmonary embolism. Compared to 2008, worsening underlying emphysema, with worsening subacute or acute interstitial lung disease, best seen left upper lobe; differential includes interstitial pneumonia or pneumonitis superimposed on emphysema, DIP, HP, and other interstitial pneumonias as well as infectious or inflammatory process superimposed on emphysema disease. Findings are not typical for COVID-19 pneumonia, but an element of the latter should be considered. Thickening and distortion left major fissure with ill-defined mass-like focus left lower lobe. The latter could also be infectious or inflammatory, although neoplasm should be excluded. Underlying worsening emphysema. Nodular densities, best seen right upper lobe. Small pleural effusions, slightly larger on the left. See recommendations below. Mild mediastinal and left hilar adenopathy; see recommendations below. Worsening now moderate-severe pulmonary artery hypertension. Mild cardiomegaly. Stable mild dilatation ascending thoracic aorta.  Additional unchanged or incidental findings, as above. RECOMMENDATIONS: Clinical correlation and short-term follow-up CT chest in 1-4 months depending upon the clinical presentation/course. All radiological studies reviewed                Code Status:  DNR-CCA    Electronically signed by Sim Essex, MD on 2/6/2022 at 9:31 PM     Copy sent to Dr. Josefa Klein MD    This note was created with the assistance of a speech-recognition program.  Although the intention is to generate a document that actually reflects the content of the visit, no guarantees can be provided that every mistake has been identified and corrected by editing. Note was updated later by me after  physical examination and  completion of the assessment.

## 2022-02-08 NOTE — PROGRESS NOTES
at 14  · Wean PEEP as tolerated  · Fentanyl drip, 50 mics  · Continue versed drip  · Continue precedex drip  · Monitor blood sugars off insulin drip  · Titrate Luke-Synephrine for map of 65 to 75 mmHg  · Amiodarone per cardiology. · Pulmicort aerosol treatment  · Airborne isolation  · IV Decadron 6 mg daily  · Not a candidate for Actemra/baricitinib stopped secondary to cytopenias  · Neurology following for stroke workup  · Nephology following for hyperkalemia   · Xopenex every 6 hours  · Tube feeds as tolerated  · X-ray chest in am  · Labs: CBC and BMP in am  · DVT prophylaxis with low molecular weight heparin  · GI prophylaxis, Protonix  · Discussed with RN  · Proceed with tracheostomy/PEG tube placement, while PEEP is being weaned.   · Will follow with you    Electronically signed by     Gonzales Mcdaniel MD on 2/8/2022 at 11:32 AM  Pulmonary Critical Care and Sleep Medicine,  [facility]  Cell: [phone]  Office: [phone]  Cc: 35 minutes

## 2022-02-08 NOTE — PROGRESS NOTES
Progress note  [facility]    Adult Hospitalist      Name: Sammy Schrader  MRN: [id number]     Acct: [de-identified]  Room: [address] [place] [address]-    Admit Date: 1/16/2022 11:51 AM  PCP: Yesica Dominguez MD    Primary Problem  Active Problems:    COVID-19    Acute on chronic systolic CHF (congestive heart failure) (HCC)    Acute respiratory failure with hypoxia (HCC)    BLANCHE (acute kidney injury) ([facility]    COPD exacerbation ([facility]    Hypokalemia    Hypomagnesemia    Palliative care encounter    Goals of care, counseling/discussion    DNR (do not resuscitate) discussion    ACP (advance care planning)    Constipation    Abdominal pain, generalized  Resolved Problems:    * No resolved hospital problems. *        Assesment:   Acute congestive heart failure, diastolic   [id number]   Viral pneumonia secondary to above  Acute respiratory failure with hypoxia intubated on 1/23/2022  Hyperkalemia  Paroxysmal atrial fibrillation  Essential hypertension  Mixed hyperlipidemia  Diabetes mellitus type 2  History of seizure disorder  History of CKD stage III, presently normal renal function  Lung mass?   Leukopenia  Polycythemia  Thrombocytopenia  Anxiety disorder  Recent past h/o lacunar infarct left thalamus   Altered mental status/agitation  Endotracheal intubation secondary to hypoxia dated 1/23/2022  Supraventricular tachycardia/elevated troponin  Fever  Emphysema   Pulmonary artery hypertension       Plan:   Admit patient to intermediate floor  Mechanical ventilation sedation as per critical care   Telemetry  Check vitals closely  CBC BMP daily    Cardiology consult  Amiodarone  Precedex gtt    IV Decadron  IV pressors as needed  Patient completed a course of cefepime for 7 days  Bronchodilators  Pulmicort  Patient is deemed not a candidate for Actemra or baricitinib secondary to cytopenia  Infectious disease consult  Pulmonology consult    Continue Keppra  Continue amlodipine, atorvastatin  Continue aspirin    GI signed off   Tube feeds  Consult nephrology   Insulin gtt- DC  Lantus w SSI-->increase to high intensity as BS running high    Plan tracheostomy and PEG, continue to wean FiO2  Discussed with RN  Continue other medication as below.     Scheduled Meds:   amLODIPine  5 mg Oral BID    insulin lispro  0-18 Units SubCUTAneous TID WC    insulin lispro  0-9 Units SubCUTAneous Nightly    amiodarone  200 mg Per NG tube Daily    insulin glargine  15 Units SubCUTAneous BID    lisinopril  20 mg Oral Daily    Vitamin D  1,000 Units Oral Daily    polyethylene glycol  17 g Per NG tube Daily    levalbuterol  1.25 mg Nebulization Q6H    bisacodyl  10 mg Rectal Daily    pantoprazole  40 mg IntraVENous Daily    And    sodium chloride (PF)  10 mL IntraVENous Daily    aspirin  324 mg Oral Daily    levETIRAcetam  500 mg Oral BID    chlorhexidine  15 mL Mouth/Throat BID    enoxaparin  30 mg SubCUTAneous BID    QUEtiapine  50 mg Oral Once    budesonide  0.5 mg Nebulization BID    sodium chloride flush  5-40 mL IntraVENous 2 times per day    atorvastatin  20 mg Oral Daily     Continuous Infusions:   phenylephrine (KEARA-SYNEPHRINE) 50mg/250mL infusion 14 mcg/min (02/08/22 1717)    dextrose      dexmedetomidine (PRECEDEX) IV infusion 0.7 mcg/kg/hr (02/08/22 1717)    fentaNYL 50 mcg/hr (02/08/22 1010)    propofol Stopped (02/04/22 0700)    midazolam (VERSED) 1 mg/mL in D5W infusion 5 mg/hr (02/08/22 1717)    sodium chloride      dextrose       PRN Meds:  glucose, 15 g, PRN  dextrose, 12.5 g, PRN  glucagon (rDNA), 1 mg, PRN  dextrose, 100 mL/hr, PRN  sodium chloride nebulizer, 3 mL, Q8H PRN  LORazepam, 1 mg, Q4H PRN  dextrose bolus (hypoglycemia), 125 mL, PRN   Or  dextrose bolus (hypoglycemia), 250 mL, PRN  sodium chloride flush, 10 mL, PRN  sodium chloride, 25 mL, PRN  ondansetron, 4 mg, Q8H PRN   Or  ondansetron, 4 mg, Q6H PRN  acetaminophen, 650 mg, Q6H PRN   Or  acetaminophen, 650 mg, Q6H PRN  glucose, 15 g, PRN  glucagon (rDNA), 1 mg, PRN  dextrose, 100 mL/hr, PRN  hydrALAZINE, 10 mg, Q6H PRN        Chief Complaint:     Chief Complaint   Patient presents with    Leg Swelling     bilateral, has CHF, on diuretic, EMT saw pt , assisted pt into car    Fever    Other     low SPO2         History of Present Illness:   Patient seen and examined at bedside and reviewed  last 24 hrs events with nursing staff  Remains in medical ICU  Patient remains intubated sedated  No change in patient condition  Patient continues to require 45% FiO2 with PEEP of 13      Review of systems:  Unable to conduct ROS secondary to patient being intubated      Initial HPI  Hans Garcia is a 64 y.o.  male who presents with Leg Swelling (bilateral, has CHF, on diuretic, EMT saw pt , assisted pt into car), Fever, and Other (low SPO2)  This is a 73-year-old gentleman admitted via ER, come to ER with complaint of having shortness of breath, patient has medical history significant for COPD patient with history of cardiac failure: Patient noted that he has been having increasing swelling in his lower extremity and becoming more short of breath, further patient also been having some body aches and sweats, patient patient testing in the emergency room showed that he is positive for COVID-19 also noticed to have an elevated BNP, imaging concerning for fluid overload, admitted for further regiment    I have personally reviewed the past medical history, past surgical history, medications, social history, and family history, and summarized in the note.     Review of Systems:       Unable to conduct ROS secondary to patient being intubated      Past Medical History:     Past Medical History:   Diagnosis Date    Arthritis     Atherosclerotic plaque 7/28/2019    Cervical disc disease     degenerative disc disease    Diabetes mellitus ([facility]     type 2    History of blood transfusion     Hx of blood clots     left leg    Hyperlipidemia     Neuropathy     Right kidney mass \"urologist is watching\"    Snores     Type 2 diabetes mellitus treated with insulin ([facility] 7/28/2019        Past Surgical History:     Past Surgical History:   Procedure Laterality Date    ABDOMINAL AORTIC ANEURYSM REPAIR  2005    CARPAL TUNNEL RELEASE Bilateral     CERVICAL SPINE SURGERY      COLONOSCOPY      benign polyps removed    INGUINAL HERNIA REPAIR Right     [facility] REPAIR INCISIONAL HERNIA,REDUCIBLE N/A 5/10/2017    HERNIA VENTRAL REPAIR WITH MESH  performed by Angus Gallo DO at [address] [place] [address] Road  05/10/2017    with mesh        Medications Prior to Admission:       Prior to Admission medications    Medication Sig Start Date End Date Taking? Authorizing Provider   rosuvastatin (CRESTOR) 40 MG tablet Take 40 mg by mouth every evening   Yes Historical Provider, MD   insulin NPH (HUMULIN N;NOVOLIN N) 100 UNIT/ML injection vial Inject 20 Units into the skin 2 times daily (before meals)   Yes Historical Provider, MD   levETIRAcetam (KEPPRA) 500 MG tablet Take 1 tablet by mouth 2 times daily 11/3/21  Yes Douglas Duckworth MD   venlafaxine (EFFEXOR XR) 150 MG extended release capsule Take 1 capsule by mouth daily (with breakfast) 7/29/19  Yes Arabella Kiser   vitamin B-1 (THIAMINE) 100 MG tablet Take 100 mg by mouth daily   Yes Historical Provider, MD   ferrous sulfate 325 (65 Fe) MG tablet Take 325 mg by mouth daily (with breakfast)   Yes Historical Provider, MD   ALPRAZolam (XANAX) 0.5 MG tablet Take 0.5 mg by mouth three times daily.    Yes Historical Provider, MD   furosemide (LASIX) 40 MG tablet Take 1 tablet by mouth 2 times daily 12/11/18  Yes Mary Kate Evans MD   tiotropium (SPIRIVA RESPIMAT) 2.5 MCG/ACT AERS inhaler Inhale 2 puffs into the lungs daily    Yes Historical Provider, MD   albuterol sulfate  (90 Base) MCG/ACT inhaler Inhale 2 puffs into the lungs every 6 hours as needed for Wheezing or Shortness of Breath   Yes Historical Provider, MD   metoprolol succinate (TOPROL XL) 50 MG extended release tablet Take 50 mg by mouth daily   Yes Historical Provider, MD   Multiple Vitamins-Minerals (MULTIVITAMIN ADULT) TABS Take 1 tablet by mouth daily   Yes Historical Provider, MD   vitamin D (CHOLECALCIFEROL) 1000 UNIT TABS tablet Take 1,000 Units by mouth daily   Yes Historical Provider, MD   fluticasone (FLONASE) 50 MCG/ACT nasal spray 1 spray by Each Nare route daily as needed for Rhinitis   Yes Historical Provider, MD   aspirin 325 MG EC tablet Take 325 mg by mouth daily    Yes Historical Provider, MD   Omega-3 Fatty Acids (FISH OIL) 1000 MG CAPS Take 1 capsule by mouth daily    Yes Historical Provider, MD   amLODIPine (NORVASC) 5 MG tablet Take 5 mg by mouth nightly    Yes Historical Provider, MD   mirtazapine (REMERON) 45 MG tablet Take 45 mg by mouth nightly   Yes Historical Provider, MD        Allergies: Barbiturates, Codeine, and Sulfa antibiotics    Social History:     Tobacco:    reports that he has quit smoking. He has never used smokeless tobacco.  Alcohol:      reports no history of alcohol use. Drug Use:  reports no history of drug use.     Family History:     Family History   Problem Relation Age of Onset    Ovarian Cancer Sister     Ovarian Cancer Sister          Physical Exam:     Vitals:  BP (!) 139/53   Pulse 70   Temp 100.8 °F (38.2 °C) (Oral)   Resp 13   Ht 5' 10\" (1.778 m)   Wt 228 lb 1.6 oz (103.5 kg)   SpO2 92%   BMI 32.73 kg/m²   Temp (24hrs), Av.6 °F (38.1 °C), Min:99.7 °F (37.6 °C), Max:101.7 °F (38.7 °C)      General appearance -on ventilator  Mental status -sedated  Head - normocephalic and atraumatic  Eyes - conjunctiva clear  Ears -external ears normal  Nose - no drainage noted  Mouth - mucous membranes moist  Neck - supple, no carotid bruits, thyroid not palpable  Chest -basal crackles with decreased air entry bilaterally to auscultation, increased effort  Heart - normal rate, regular rhythm, no murmur  Abdomen - soft, nontender, nondistended, bowel sounds present all four quadrants, no masses, hepatomegaly or splenomegaly  Neurological -sedated on vent  Extremities - extremity edema, lower distal extremity discoloration    Skin - no gross lesions, rashes, or induration noted        Data:     Labs:    Hematology:  Recent Labs     02/06/22  0555 02/07/22  0601 02/08/22  0510   WBC 12.1* 15.1* 13.6*   RBC 5.25 5.27 4.84   HGB 13.2 13.1 12.0*   HCT 43.6 43.4 39.7*   MCV 83.0 82.4* 82.0*   MCH 25.1* 24.9* 24.8*   MCHC 30.3 30.2 30.2   RDW 15.9* 16.1* 15.9*   * 125* 129*   MPV Abnormal Abnormal Abnormal     Chemistry:  Recent Labs     02/06/22  0555 02/06/22  0555 02/06/22  1820 02/07/22  0601 02/08/22  0510      < > 142 133* 142   K 4.2   < > 4.3 3.9 3.8      < > 106 97* 107   CO2 30   < > 28 26 26   GLUCOSE 165*   < > 171* 331* 324*   BUN 82*   < > 83* 78* 72*   CREATININE 1.02   < > 0.90 0.89 0.84   MG 1.9  --   --  2.0 2.0   ANIONGAP 7*   < > 8* 10 9   LABGLOM >60   < > >60 >60 >60   GFRAA >60   < > >60 >60 >60   CALCIUM 9.2   < > 9.1 9.0 8.9   PHOS 3.2  --   --  3.0 2.2*    < > = values in this interval not displayed.      Recent Labs     02/07/22  1605 02/07/22  1934 02/08/22  0022 02/08/22  0520 02/08/22  1142 02/08/22  1705   POCGLU 211* 251* 283* 274* 222* 215*       Lab Results   Component Value Date    INR 1.0 01/17/2022    INR 1.0 11/03/2021    INR 1.0 07/27/2019    PROTIME 13.3 01/17/2022    PROTIME 11.1 11/03/2021    PROTIME 10.5 07/27/2019       Lab Results   Component Value Date/Time    SPECIAL ART LINE 20ML 02/01/2022 12:03 AM     Lab Results   Component Value Date/Time    CULTURE NO GROWTH 5 DAYS 02/01/2022 12:03 AM       Lab Results   Component Value Date    POCPH 7.449 02/08/2022    POCPCO2 43.1 02/08/2022    POCPO2 56.9 02/08/2022    POCHCO3 29.9 02/08/2022    NBEA NOT REPORTED 02/08/2022    PBEA 5 02/08/2022    CPO9KNA NOT REPORTED 02/08/2022    IPRW5BOP 90 02/08/2022 reviewed                Code Status:  DNR-CCA    Electronically signed by Karoline English MD on 2/8/2022 at 6:09 PM     Copy sent to Dr. Amlaia Mora MD    This note was created with the assistance of a speech-recognition program.  Although the intention is to generate a document that actually reflects the content of the visit, no guarantees can be provided that every mistake has been identified and corrected by editing. Note was updated later by me after  physical examination and  completion of the assessment.

## 2022-02-08 NOTE — PROGRESS NOTES
.  Nephrology  Progress Note    Reason for Consult: Hyperkalemia    Requesting Physician:  Jud Davison MD    INTERVAL HISTORY:  K is 3.8. Creatinine is better at 0.84. Remains sedated and on the ventilator. Off pressors. BUN is in the 70s. His SBP higher in 150-170s. HISTORY OF PRESENT ILLNESS:    The patient is a 64 y.o. male who presented with to the hospital for worsening shortness of breath ad and worsening lower extremity edema on 1/16. He had diffuse body aches and sweats and a dry cough. He tested positive for COVID-19. He has steadily declined since admission. On 1/18 a CT head found New lacunar infarct left thalamus. He had to be intubated on 1/23. He has a significant past medical history of COPD, hyperlipidemia, Type 2 DM, Right kidney mass, and abdominal aortic aneurysm repair in 2005. His potassium has been steadily rising since 1/24/22. The most current Potassium level is 5.8. His creatine is improved to 1.71. This information was retrieved through chart review and nursing. Patient was unable to provide information due to current status on mechanical ventilation and sedation. Review Of Systems:  Unable to obtain. Patient sedated on ventilator.     Past Medical History:   Diagnosis Date    Arthritis     Atherosclerotic plaque 7/28/2019    Cervical disc disease     degenerative disc disease    Diabetes mellitus ([address]     type 2    History of blood transfusion     Hx of blood clots     left leg    Hyperlipidemia     Neuropathy     Right kidney mass     \"urologist is watching\"    Snores     Type 2 diabetes mellitus treated with insulin ([address] 7/28/2019       Past Surgical History:   Procedure Laterality Date    ABDOMINAL AORTIC ANEURYSM REPAIR  2005    CARPAL TUNNEL RELEASE Bilateral     CERVICAL SPINE SURGERY      COLONOSCOPY      benign polyps removed    INGUINAL HERNIA REPAIR Right     [place] REPAIR INCISIONAL HERNIA,REDUCIBLE N/A 5/10/2017    HERNIA VENTRAL REPAIR WITH MESH  performed by Zoey Martin DO at [address] [place] [address] Road  05/10/2017    with mesh       Prior to Admission medications    Medication Sig Start Date End Date Taking? Authorizing Provider   rosuvastatin (CRESTOR) 40 MG tablet Take 40 mg by mouth every evening   Yes Historical Provider, MD   insulin NPH (HUMULIN N;NOVOLIN N) 100 UNIT/ML injection vial Inject 20 Units into the skin 2 times daily (before meals)   Yes Historical Provider, MD   levETIRAcetam (KEPPRA) 500 MG tablet Take 1 tablet by mouth 2 times daily 11/3/21  Yes Tammy Reeder MD   venlafaxine (EFFEXOR XR) 150 MG extended release capsule Take 1 capsule by mouth daily (with breakfast) 7/29/19  Yes Arabella Kiser   vitamin B-1 (THIAMINE) 100 MG tablet Take 100 mg by mouth daily   Yes Historical Provider, MD   ferrous sulfate 325 (65 Fe) MG tablet Take 325 mg by mouth daily (with breakfast)   Yes Historical Provider, MD   ALPRAZolam (XANAX) 0.5 MG tablet Take 0.5 mg by mouth three times daily.    Yes Historical Provider, MD   furosemide (LASIX) 40 MG tablet Take 1 tablet by mouth 2 times daily 12/11/18  Yes Atul Evans MD   tiotropium (SPIRIVA RESPIMAT) 2.5 MCG/ACT AERS inhaler Inhale 2 puffs into the lungs daily    Yes Historical Provider, MD   albuterol sulfate  (90 Base) MCG/ACT inhaler Inhale 2 puffs into the lungs every 6 hours as needed for Wheezing or Shortness of Breath   Yes Historical Provider, MD   metoprolol succinate (TOPROL XL) 50 MG extended release tablet Take 50 mg by mouth daily   Yes Historical Provider, MD   Multiple Vitamins-Minerals (MULTIVITAMIN ADULT) TABS Take 1 tablet by mouth daily   Yes Historical Provider, MD   vitamin D (CHOLECALCIFEROL) 1000 UNIT TABS tablet Take 1,000 Units by mouth daily   Yes Historical Provider, MD   fluticasone (FLONASE) 50 MCG/ACT nasal spray 1 spray by Each Nare route daily as needed for Rhinitis   Yes Historical Provider, MD aspirin 325 MG EC tablet Take 325 mg by mouth daily    Yes Historical Provider, MD   Omega-3 Fatty Acids (FISH OIL) 1000 MG CAPS Take 1 capsule by mouth daily    Yes Historical Provider, MD   amLODIPine (NORVASC) 5 MG tablet Take 5 mg by mouth nightly    Yes Historical Provider, MD   mirtazapine (REMERON) 45 MG tablet Take 45 mg by mouth nightly   Yes Historical Provider, MD       Scheduled Meds:   amLODIPine  5 mg Oral BID    insulin lispro  0-12 Units SubCUTAneous Q6H    amiodarone  200 mg Per NG tube Daily    insulin glargine  15 Units SubCUTAneous BID    lisinopril  20 mg Oral Daily    Vitamin D  1,000 Units Oral Daily    polyethylene glycol  17 g Per NG tube Daily    levalbuterol  1.25 mg Nebulization Q6H    bisacodyl  10 mg Rectal Daily    pantoprazole  40 mg IntraVENous Daily    And    sodium chloride (PF)  10 mL IntraVENous Daily    aspirin  324 mg Oral Daily    levETIRAcetam  500 mg Oral BID    chlorhexidine  15 mL Mouth/Throat BID    enoxaparin  30 mg SubCUTAneous BID    QUEtiapine  50 mg Oral Once    budesonide  0.5 mg Nebulization BID    sodium chloride flush  5-40 mL IntraVENous 2 times per day    atorvastatin  20 mg Oral Daily     Continuous Infusions:   phenylephrine (KEARA-SYNEPHRINE) 50mg/250mL infusion 26 mcg/min (02/08/22 0541)    dexmedetomidine (PRECEDEX) IV infusion 0.8 mcg/kg/hr (02/08/22 0541)    fentaNYL 50 mcg/hr (02/06/22 1955)    propofol Stopped (02/04/22 0700)    midazolam (VERSED) 1 mg/mL in D5W infusion 5 mg/hr (02/08/22 0541)    sodium chloride      dextrose       PRN Meds:sodium chloride nebulizer, LORazepam, dextrose bolus (hypoglycemia) **OR** dextrose bolus (hypoglycemia), sodium chloride flush, sodium chloride, ondansetron **OR** ondansetron, acetaminophen **OR** acetaminophen, glucose, glucagon (rDNA), dextrose, hydrALAZINE    Allergies   Allergen Reactions    Barbiturates Anxiety     Other reaction(s): Unknown    Codeine Palpitations    Sulfa Antibiotics Rash     Other reaction(s): Unknown       Social History     Socioeconomic History    Marital status:      Spouse name: Not on file    Number of children: Not on file    Years of education: Not on file    Highest education level: Not on file   Occupational History    Not on file   Tobacco Use    Smoking status: Former Smoker    Smokeless tobacco: Never Used    Tobacco comment: quit 30 years ago   Substance and Sexual Activity    Alcohol use: No    Drug use: No    Sexual activity: Not on file   Other Topics Concern    Not on file   Social History Narrative    Not on file     Social Determinants of Health     Financial Resource Strain:     Difficulty of Paying Living Expenses: Not on file   Food Insecurity:     Worried About [employer] [employer] in the Last Year: Not on file    [address] [facility] in the Last Year: Not on file   Transportation Needs:     Lack of Transportation (Medical): Not on file    Lack of Transportation (Non-Medical):  Not on file   Physical Activity:     Days of Exercise per Week: Not on file    Minutes of Exercise per Session: Not on file   Stress:     Feeling of Stress : Not on file   Social Connections:     Frequency of Communication with Friends and Family: Not on file    Frequency of Social Gatherings with Friends and Family: Not on file    Attends [employer] Services: Not on file    Active Member of [address] [place] [address] [employer] or Organizations: Not on file    Attends Club or Organization Meetings: Not on file    Marital Status: Not on file   Intimate Partner Violence:     Fear of Current or Ex-Partner: Not on file    Emotionally Abused: Not on file    Physically Abused: Not on file    Sexually Abused: Not on file   Housing Stability:     Unable to Pay for Housing in the Last Year: Not on file    Number of [place] in the Last Year: Not on file    Unstable Housing in the Last Year: Not on file       Family History   Problem Relation Age of Onset    Ovarian Cancer Sister     Ovarian Cancer Sister          Physical Exam:  Vitals:    02/08/22 0815 02/08/22 0830 02/08/22 0845 02/08/22 0900   BP:       Pulse: 66 67 66 64   Resp: (!) 0 28 28 8   Temp:       TempSrc:       SpO2: 98% 98% 98% 99%   Weight:       Height:         I/O last 3 completed shifts: In: 4683.7 [I.V.:1184.7; NG/GT:3499]  Out: 3150 [Urine:3150]    Deferred due to [address] isolation.      Data:  CBC:   Lab Results   Component Value Date    WBC 13.6 (H) 02/08/2022    HGB 12.0 (L) 02/08/2022    HCT 39.7 (L) 02/08/2022    MCV 82.0 (L) 02/08/2022     (L) 02/08/2022     BMP:    Lab Results   Component Value Date     02/08/2022     (L) 02/07/2022     02/06/2022    K 3.8 02/08/2022    K 3.9 02/07/2022    K 4.3 02/06/2022     02/08/2022    CL 97 (L) 02/07/2022     02/06/2022    CO2 26 02/08/2022    CO2 26 02/07/2022    CO2 28 02/06/2022    BUN 72 (H) 02/08/2022    BUN 78 (H) 02/07/2022    BUN 83 (H) 02/06/2022    CREATININE 0.84 02/08/2022    CREATININE 0.89 02/07/2022    CREATININE 0.90 02/06/2022    GLUCOSE 324 (H) 02/08/2022    GLUCOSE 331 (H) 02/07/2022    GLUCOSE 171 (H) 02/06/2022     CMP:   Lab Results   Component Value Date     02/08/2022    K 3.8 02/08/2022     02/08/2022    CO2 26 02/08/2022    BUN 72 02/08/2022    CREATININE 0.84 02/08/2022    GLUCOSE 324 02/08/2022    CALCIUM 8.9 02/08/2022    PROT 5.6 02/03/2022    LABALBU 2.4 02/03/2022    BILITOT 0.37 02/03/2022    ALKPHOS 117 02/03/2022    AST 72 02/03/2022    ALT 77 02/03/2022      Hepatic:   Lab Results   Component Value Date    AST 72 (H) 02/03/2022    AST 46 (H) 02/01/2022    AST 28 01/31/2022    ALT 77 (H) 02/03/2022    ALT 43 (H) 02/01/2022    ALT 26 01/31/2022    BILITOT 0.37 02/03/2022    BILITOT 0.28 (L) 02/01/2022    BILITOT 0.32 01/31/2022    ALKPHOS 117 02/03/2022    ALKPHOS 100 02/01/2022    ALKPHOS 99 01/31/2022     BNP: No results found for: BNP  Lipids:   Lab Results   Component Value

## 2022-02-08 NOTE — CONSULTS
GI Consult Note:    Name: Kamaljit Foster  MRN: [id number]     Acct: [de-identified]  Room: [address] [place] [address]    Admit Date: 1/16/2022  PCP: Derek Deleon MD    Physician Requesting Consult: Yanira Alba MD     Reason for Consult:    Severe COVID-19 infection  Ventilatory failure  Malnutrition  Evaluation for PEG tube placement  History for constipation      Chief Complaint:     Chief Complaint   Patient presents with    Leg Swelling     bilateral, has CHF, on diuretic, EMT saw pt , assisted pt into car    Fever    Other     low SPO2       History Obtained From:     electronic medical record patient is intubated discussed with nursing staff in ICU previous records were reviewed    History of Present Illness:      Kamaljit Foster is a  64 y.o.  male who presents with Leg Swelling (bilateral, has CHF, on diuretic, EMT saw pt , assisted pt into car), Fever, and Other (low SPO2)    This elderly gentleman has been hospitalized at [facility] intensive care unit with severe COVID-19 infection and has been managed and treated by pulmonology infectious disease  Patient is currently intubated not able to communicate or give proper history  History was obtained mainly from the charts  Patient has been is evaluated by gastroenterology for constipation elevated LFTs issues which are resolved    Gastroenterologist been reconsulted for the possible placement of the PEG tube  Patient is not able to tolerate because he is intubated and trach and PEG have been planned    No bleeding melena is reported  As mild anemia    Rest of the history was mainly obtained from the chart and discussed with nursing staff in ICU    Symptoms:  Onset:  Location:  abdomen  Duration:  day(s)  Severity:  mild  Quality:  intermittent      Past Medical History:     Past Medical History:   Diagnosis Date    Arthritis     Atherosclerotic plaque 7/28/2019    Cervical disc disease     degenerative disc disease    Diabetes mellitus ([facility]     type 2    History of blood transfusion     Hx of blood clots     left leg    Hyperlipidemia     Neuropathy     Right kidney mass     \"urologist is watching\"    Snores     Type 2 diabetes mellitus treated with insulin ([facility] [address] 7/28/2019        Past Surgical History:     Past Surgical History:   Procedure Laterality Date    ABDOMINAL AORTIC ANEURYSM REPAIR  2005    CARPAL TUNNEL RELEASE Bilateral     CERVICAL SPINE SURGERY      COLONOSCOPY      benign polyps removed    INGUINAL HERNIA REPAIR Right     DE REPAIR INCISIONAL HERNIA,REDUCIBLE N/A 5/10/2017    HERNIA VENTRAL REPAIR WITH MESH  performed by Shayla Remy DO at [address] [place] [address] Road  05/10/2017    with mesh        Medications Prior to Admission:       Prior to Admission medications    Medication Sig Start Date End Date Taking? Authorizing Provider   rosuvastatin (CRESTOR) 40 MG tablet Take 40 mg by mouth every evening   Yes Historical Provider, MD   insulin NPH (HUMULIN N;NOVOLIN N) 100 UNIT/ML injection vial Inject 20 Units into the skin 2 times daily (before meals)   Yes Historical Provider, MD   levETIRAcetam (KEPPRA) 500 MG tablet Take 1 tablet by mouth 2 times daily 11/3/21  Yes Agnes Rosario MD   venlafaxine (EFFEXOR XR) 150 MG extended release capsule Take 1 capsule by mouth daily (with breakfast) 7/29/19  Yes Arabella S [place]   vitamin B-1 (THIAMINE) 100 MG tablet Take 100 mg by mouth daily   Yes Historical Provider, MD   ferrous sulfate 325 (65 Fe) MG tablet Take 325 mg by mouth daily (with breakfast)   Yes Historical Provider, MD   ALPRAZolam (XANAX) 0.5 MG tablet Take 0.5 mg by mouth three times daily.    Yes Historical Provider, MD   furosemide (LASIX) 40 MG tablet Take 1 tablet by mouth 2 times daily 12/11/18  Yes Marie Evans MD   tiotropium (SPIRIVA RESPIMAT) 2.5 MCG/ACT AERS inhaler Inhale 2 puffs into the lungs daily    Yes Historical Provider, MD   albuterol sulfate  (90 Base) MCG/ACT inhaler Inhale 2 puffs into the lungs every 6 hours as needed for Wheezing or Shortness of Breath   Yes Historical Provider, MD   metoprolol succinate (TOPROL XL) 50 MG extended release tablet Take 50 mg by mouth daily   Yes Historical Provider, MD   Multiple Vitamins-Minerals (MULTIVITAMIN ADULT) TABS Take 1 tablet by mouth daily   Yes Historical Provider, MD   vitamin D (CHOLECALCIFEROL) 1000 UNIT TABS tablet Take 1,000 Units by mouth daily   Yes Historical Provider, MD   fluticasone (FLONASE) 50 MCG/ACT nasal spray 1 spray by Each Nare route daily as needed for Rhinitis   Yes Historical Provider, MD   aspirin 325 MG EC tablet Take 325 mg by mouth daily    Yes Historical Provider, MD   Omega-3 Fatty Acids (FISH OIL) 1000 MG CAPS Take 1 capsule by mouth daily    Yes Historical Provider, MD   amLODIPine (NORVASC) 5 MG tablet Take 5 mg by mouth nightly    Yes Historical Provider, MD   mirtazapine (REMERON) 45 MG tablet Take 45 mg by mouth nightly   Yes Historical Provider, MD        Allergies: Barbiturates, Codeine, and Sulfa antibiotics    Social History:     Tobacco:    reports that he has quit smoking. He has never used smokeless tobacco.  Alcohol:      reports no history of alcohol use. Drug Use:  reports no history of drug use.     Family History:     Family History   Problem Relation Age of Onset    Ovarian Cancer Sister     Ovarian Cancer Sister        Review of Systems:     Positive and Negative as described in HPI  As per charts patient is intubated and sedated    Code Status:  DNR-CCA    Physical Exam:     Vitals:  BP (!) 139/53   Pulse 71   Temp 99.7 °F (37.6 °C) (Oral)   Resp 25   Ht 5' 10\" (1.778 m)   Wt 228 lb 1.6 oz (103.5 kg)   SpO2 94%   BMI 32.73 kg/m²   Temp (24hrs), Av.4 °F (38 °C), Min:99.3 °F (37.4 °C), Max:101.7 °F (38.7 °C)      General appearance -patient is intubated  Mental status -patient is intubated  Head - normocephalic and atraumatic  Eyes - pupils equal and reactive, extraocular eye movements intact, conjunctiva clear  Ears - hearing appears to be intact  Nose - no drainage noted  Mouth - mucous membranes dry  Neck - supple, no carotid bruits, thyroid not palpable  Chest patient on vent  Heart -not done at this time   abdomen - soft, bloated mild diffuse tenderness, nondistended, bowel sounds present all four quadrants, no masses, hepatomegaly or splenomegaly.  No hernias  Neurological -intubated  Extremities -positive pedal edema or calf pain with palpation  Skin -positive rashes, or induration noted  Cranial Nerves : Not done  Lymph nodes: not done at this time    Data:   CBC:   Lab Results   Component Value Date    WBC 13.6 02/08/2022    RBC 4.84 02/08/2022    HGB 12.0 02/08/2022    HCT 39.7 02/08/2022    MCV 82.0 02/08/2022    MCH 24.8 02/08/2022    MCHC 30.2 02/08/2022    RDW 15.9 02/08/2022     02/08/2022    MPV Abnormal 02/08/2022     CBC with Differential:    Lab Results   Component Value Date    WBC 13.6 02/08/2022    RBC 4.84 02/08/2022    HGB 12.0 02/08/2022    HCT 39.7 02/08/2022     02/08/2022    MCV 82.0 02/08/2022    MCH 24.8 02/08/2022    MCHC 30.2 02/08/2022    RDW 15.9 02/08/2022    LYMPHOPCT 3 02/08/2022    MONOPCT 5 02/08/2022    BASOPCT 0 02/08/2022    MONOSABS 0.68 02/08/2022    LYMPHSABS 0.41 02/08/2022    EOSABS 0.14 02/08/2022    BASOSABS 0.00 02/08/2022    DIFFTYPE NOT REPORTED 02/08/2022     Hemoglobin/Hematocrit:    Lab Results   Component Value Date    HGB 12.0 02/08/2022    HCT 39.7 02/08/2022     CMP:    Lab Results   Component Value Date     02/08/2022    K 3.8 02/08/2022     02/08/2022    CO2 26 02/08/2022    BUN 72 02/08/2022    CREATININE 0.84 02/08/2022    GFRAA >60 02/08/2022    LABGLOM >60 02/08/2022    GLUCOSE 324 02/08/2022    PROT 5.6 02/03/2022    LABALBU 2.4 02/03/2022    CALCIUM 8.9 02/08/2022    BILITOT 0.37 02/03/2022    ALKPHOS 117 02/03/2022    AST 72 02/03/2022 ALT 77 02/03/2022     BMP:    Lab Results   Component Value Date     02/08/2022    K 3.8 02/08/2022     02/08/2022    CO2 26 02/08/2022    BUN 72 02/08/2022    LABALBU 2.4 02/03/2022    CREATININE 0.84 02/08/2022    CALCIUM 8.9 02/08/2022    GFRAA >60 02/08/2022    LABGLOM >60 02/08/2022    GLUCOSE 324 02/08/2022     PT/INR:    Lab Results   Component Value Date    PROTIME 13.3 01/17/2022    INR 1.0 01/17/2022     PTT:    Lab Results   Component Value Date    APTT 26.0 12/07/2018   [APTT}    Assesment:     Primary Problem  <principal problem not specified>    Active Hospital Problems    Diagnosis Date Noted    Constipation [K59.00]     Abdominal pain, generalized [R10.84]     Acute on chronic systolic [facility] [I50.23]     Acute respiratory failure with hypoxia ([facility]) [J96.01]     BLANCHE (acute kidney injury) ([facility] [address] [N17.9]     COPD exacerbation ([facility]) [J44.1]     Hypokalemia [E87.6]     Hypomagnesemia [E83.42]     Palliative care encounter [Z51.5]     Goals of care, counseling/discussion [Z71.89]     DNR (do not resuscitate) discussion [Z71.89]     ACP (advance care planning) [Z71.89]     COVID-19 [U07.1] 01/16/2022     Severe COVID-19 infection  Ventilatory failure  Malnutrition  Evaluation for PEG tube placement  History for constipation  Plan:     1. Continue supportive care  2. Will discuss with family about the feeding tube placement  3. Once agreed upon we will plan PEG tube placement endoscopically  4. Discussed with pulmonologist  5. Discussed with nursing staff in the ICU        Thank you for allowing me to participate in the care of your patient. Please feel free to contact me with any questions or concerns.      Electronically signed by Alan Painting MD on 2/8/2022 at 2:17 PM     Copy sent to Dr. Gilford Salisbury, MD

## 2022-02-08 NOTE — PROGRESS NOTES
Progress note  [facility].,    Adult Hospitalist      Name: Stacey Al  MRN: [id number]     Acct: [de-identified]  Room: [address] [place] [address]    Admit Date: 1/16/2022 11:51 AM  PCP: Jocelyne Goodwin MD    Primary Problem  Active Problems:    COVID-19    Acute on chronic systolic CHF (congestive heart failure) (HCC)    Acute respiratory failure with hypoxia (HCC)    BLANCHE (acute kidney injury) ([facility] [address]    COPD exacerbation ([facility] [address]    Hypokalemia    Hypomagnesemia    Palliative care encounter    Goals of care, counseling/discussion    DNR (do not resuscitate) discussion    ACP (advance care planning)    Constipation    Abdominal pain, generalized  Resolved Problems:    * No resolved hospital problems. *        Assesment:   Acute congestive heart failure, diastolic   [id number]   Viral pneumonia secondary to above  Acute respiratory failure with hypoxia intubated on 1/23/2022  Hyperkalemia  Paroxysmal atrial fibrillation  Essential hypertension  Mixed hyperlipidemia  Diabetes mellitus type 2  History of seizure disorder  History of CKD stage III, presently normal renal function  Lung mass?   Leukopenia  Polycythemia  Thrombocytopenia  Anxiety disorder  Recent past h/o lacunar infarct left thalamus   Altered mental status/agitation  Endotracheal intubation secondary to hypoxia dated 1/23/2022  Supraventricular tachycardia/elevated troponin  Fever  Emphysema   Pulmonary artery hypertension       Plan:   Admit patient to intermediate floor  Mechanical ventilation sedation as per critical care,   Telemetry  Check vitals closely  CBC BMP daily    Cardiology consult  Amiodarone  Precedex gtt    IV Decadron  Patient completed a course of cefepime for 7 days  Bronchodilators  Pulmicort  Patient is deemed not a candidate for Actemra or baricitinib secondary to cytopenia  Infectious disease consult  Pulmonology consult    Continue Keppra  Continue amlodipine, atorvastatin  Continue aspirin    GI signed off   Tube feeds  Consult nephrology   Insulin gtt- DC  Lantus w SSI    Plan tracheostomy and PEG if family wishes- want to hold off  Discussed with RN  Continue other medication as below.     Scheduled Meds:   insulin lispro  0-12 Units SubCUTAneous Q6H    amiodarone  200 mg Per NG tube Daily    insulin glargine  15 Units SubCUTAneous BID    lisinopril  20 mg Oral Daily    amLODIPine  2.5 mg Oral Nightly    Vitamin D  1,000 Units Oral Daily    polyethylene glycol  17 g Per NG tube Daily    levalbuterol  1.25 mg Nebulization Q6H    bisacodyl  10 mg Rectal Daily    pantoprazole  40 mg IntraVENous Daily    And    sodium chloride (PF)  10 mL IntraVENous Daily    aspirin  324 mg Oral Daily    levETIRAcetam  500 mg Oral BID    chlorhexidine  15 mL Mouth/Throat BID    enoxaparin  30 mg SubCUTAneous BID    QUEtiapine  50 mg Oral Once    budesonide  0.5 mg Nebulization BID    sodium chloride flush  5-40 mL IntraVENous 2 times per day    atorvastatin  20 mg Oral Daily     Continuous Infusions:   dexmedetomidine (PRECEDEX) IV infusion 1 mcg/kg/hr (02/07/22 1700)    fentaNYL 50 mcg/hr (02/06/22 1955)    phenylephrine (KEARA-SYNEPHRINE) 50mg/250mL infusion 6 mcg/min (02/07/22 1824)    propofol Stopped (02/04/22 0700)    midazolam (VERSED) 1 mg/mL in D5W infusion 3 mg/hr (02/07/22 1700)    sodium chloride      dextrose       PRN Meds:  sodium chloride nebulizer, 3 mL, Q8H PRN  LORazepam, 1 mg, Q4H PRN  dextrose bolus (hypoglycemia), 125 mL, PRN   Or  dextrose bolus (hypoglycemia), 250 mL, PRN  sodium chloride flush, 10 mL, PRN  sodium chloride, 25 mL, PRN  ondansetron, 4 mg, Q8H PRN   Or  ondansetron, 4 mg, Q6H PRN  acetaminophen, 650 mg, Q6H PRN   Or  acetaminophen, 650 mg, Q6H PRN  glucose, 15 g, PRN  glucagon (rDNA), 1 mg, PRN  dextrose, 100 mL/hr, PRN  hydrALAZINE, 10 mg, Q6H PRN        Chief Complaint:     Chief Complaint   Patient presents with    Leg Swelling     bilateral, has CHF, on diuretic, EMT saw pt , assisted pt into car    Fever    Other     low SPO2         History of Present Illness:     I have seen and examined patient today, patient last 24 hours events were reviewed also discussed with nursing staff  No new complaints  Overnight patient did not had any acute issues  Afebrile  Pt in prone position        Review of systems:  Unable to conduct ROS secondary to patient being intubated      Initial HPI  Jennifer Colorado is a 64 y.o.  male who presents with Leg Swelling (bilateral, has CHF, on diuretic, EMT saw pt , assisted pt into car), Fever, and Other (low SPO2)  This is a 57-year-old gentleman admitted via ER, come to ER with complaint of having shortness of breath, patient has medical history significant for COPD patient with history of cardiac failure: Patient noted that he has been having increasing swelling in his lower extremity and becoming more short of breath, further patient also been having some body aches and sweats, patient patient testing in the emergency room showed that he is positive for COVID-19 also noticed to have an elevated BNP, imaging concerning for fluid overload, admitted for further regiment    I have personally reviewed the past medical history, past surgical history, medications, social history, and family history, and summarized in the note.     Review of Systems:       Unable to conduct ROS secondary to patient being intubated      Past Medical History:     Past Medical History:   Diagnosis Date    Arthritis     Atherosclerotic plaque 7/28/2019    Cervical disc disease     degenerative disc disease    Diabetes mellitus ([address]     type 2    History of blood transfusion     Hx of blood clots     left leg    Hyperlipidemia     Neuropathy     Right kidney mass     \"urologist is watching\"    Snores     Type 2 diabetes mellitus treated with insulin ([address] 7/28/2019        Past Surgical History:     Past Surgical History:   Procedure Laterality Date    ABDOMINAL AORTIC ANEURYSM REPAIR Historical Provider, MD   vitamin D (CHOLECALCIFEROL) 1000 UNIT TABS tablet Take 1,000 Units by mouth daily   Yes Historical Provider, MD   fluticasone (FLONASE) 50 MCG/ACT nasal spray 1 spray by Each Nare route daily as needed for Rhinitis   Yes Historical Provider, MD   aspirin 325 MG EC tablet Take 325 mg by mouth daily    Yes Historical Provider, MD   Omega-3 Fatty Acids (FISH OIL) 1000 MG CAPS Take 1 capsule by mouth daily    Yes Historical Provider, MD   amLODIPine (NORVASC) 5 MG tablet Take 5 mg by mouth nightly    Yes Historical Provider, MD   mirtazapine (REMERON) 45 MG tablet Take 45 mg by mouth nightly   Yes Historical Provider, MD        Allergies: Barbiturates, Codeine, and Sulfa antibiotics    Social History:     Tobacco:    reports that he has quit smoking. He has never used smokeless tobacco.  Alcohol:      reports no history of alcohol use. Drug Use:  reports no history of drug use.     Family History:     Family History   Problem Relation Age of Onset    Ovarian Cancer Sister     Ovarian Cancer Sister          Physical Exam:     Vitals:  BP (!) 139/53   Pulse 68   Temp 100 °F (37.8 °C) (Oral)   Resp 16   Ht 5' 10\" (1.778 m)   Wt 228 lb 1.6 oz (103.5 kg)   SpO2 100%   BMI 32.73 kg/m²   Temp (24hrs), Av.3 °F (37.9 °C), Min:99.3 °F (37.4 °C), Max:101.7 °F (38.7 °C)      General appearance -on ventilator  Mental status -sedated  Head - normocephalic and atraumatic  Eyes - conjunctiva clear  Ears -external ears normal  Nose - no drainage noted  Mouth - mucous membranes moist  Neck - supple, no carotid bruits, thyroid not palpable  Chest -basal crackles with decreased air entry bilaterally to auscultation, increased effort  Heart - normal rate, regular rhythm, no murmur  Abdomen - soft, nontender, nondistended, bowel sounds present all four quadrants, no masses, hepatomegaly or splenomegaly  Neurological -sedated on vent  Extremities - extremity edema, lower distal extremity discoloration    Skin - no gross lesions, rashes, or induration noted        Data:     Labs:    Hematology:  Recent Labs     02/05/22  0515 02/06/22  0555 02/07/22  0601   WBC 17.0* 12.1* 15.1*   RBC 5.57 5.25 5.27   HGB 13.7 13.2 13.1   HCT 46.4 43.6 43.4   MCV 83.3 83.0 82.4*   MCH 24.6* 25.1* 24.9*   MCHC 29.5 30.3 30.2   RDW 15.9* 15.9* 16.1*    134* 125*   MPV Abnormal Abnormal Abnormal     Chemistry:  Recent Labs     02/05/22  0515 02/05/22  1230 02/06/22  0555 02/06/22  1820 02/07/22  0601   *   < > 143 142 133*   K 4.8   < > 4.2 4.3 3.9      < > 106 106 97*   CO2 29   < > 30 28 26   GLUCOSE 359*   < > 165* 171* 331*   BUN 88*   < > 82* 83* 78*   CREATININE 1.01   < > 1.02 0.90 0.89   MG 2.1  --  1.9  --  2.0   ANIONGAP 9   < > 7* 8* 10   LABGLOM >60   < > >60 >60 >60   GFRAA >60   < > >60 >60 >60   CALCIUM 9.5   < > 9.2 9.1 9.0   PHOS 3.5  --  3.2  --  3.0    < > = values in this interval not displayed. Recent Labs     02/05/22  0515 02/05/22  0554 02/06/22  1951 02/07/22  0605 02/07/22  0701 02/07/22  1105 02/07/22  1605 02/07/22  1934   LABA1C 9.7*  --   --   --   --   --   --   --    POCGLU  --    < > 211* 275* 256* 249* 211* 251*    < > = values in this interval not displayed.        Lab Results   Component Value Date    INR 1.0 01/17/2022    INR 1.0 11/03/2021    INR 1.0 07/27/2019    PROTIME 13.3 01/17/2022    PROTIME 11.1 11/03/2021    PROTIME 10.5 07/27/2019       Lab Results   Component Value Date/Time    SPECIAL ART LINE 20ML 02/01/2022 12:03 AM     Lab Results   Component Value Date/Time    CULTURE NO GROWTH 5 DAYS 02/01/2022 12:03 AM       Lab Results   Component Value Date    POCPH 7.475 02/07/2022    POCPCO2 38.9 02/07/2022    POCPO2 63.2 02/07/2022    POCHCO3 28.7 02/07/2022    NBEA NOT REPORTED 02/07/2022    PBEA 5 02/07/2022    FSK5ZOX NOT REPORTED 02/07/2022    KRQM8FTQ 93 02/07/2022    FIO2 45.0 02/07/2022       Radiology:    XR CHEST 1 VIEW    Result Date: 1/16/2022  Hypoinflated lungs. Cardiomegaly again seen, when compared to the previous study performed 07/27/2019. Diffuse, increased interstitial markings throughout both lungs, some of which can be seen on the prior study may represent baseline chronic interstitial lung changes. The increased prominence on this exam could be secondary to the suboptimal inspiratory effort. The presence of pulmonary vascular congestion/mild CHF or bilateral interstitial pneumonia cannot be excluded. Clinical correlation is recommended. CT CHEST PULMONARY EMBOLISM W CONTRAST    Result Date: 1/16/2022  No evidence of pulmonary embolism. Compared to 2008, worsening underlying emphysema, with worsening subacute or acute interstitial lung disease, best seen left upper lobe; differential includes interstitial pneumonia or pneumonitis superimposed on emphysema, DIP, HP, and other interstitial pneumonias as well as infectious or inflammatory process superimposed on emphysema disease. Findings are not typical for COVID-19 pneumonia, but an element of the latter should be considered. Thickening and distortion left major fissure with ill-defined mass-like focus left lower lobe. The latter could also be infectious or inflammatory, although neoplasm should be excluded. Underlying worsening emphysema. Nodular densities, best seen right upper lobe. Small pleural effusions, slightly larger on the left. See recommendations below. Mild mediastinal and left hilar adenopathy; see recommendations below. Worsening now moderate-severe pulmonary artery hypertension. Mild cardiomegaly. Stable mild dilatation ascending thoracic aorta. Additional unchanged or incidental findings, as above. RECOMMENDATIONS: Clinical correlation and short-term follow-up CT chest in 1-4 months depending upon the clinical presentation/course.          All radiological studies reviewed                Code Status:  DNR-CCA    Electronically signed by Nam Thorpe MD on 2/7/2022 at 9:55 PM     Copy sent to Dr. Jenna Garcia MD    This note was created with the assistance of a speech-recognition program.  Although the intention is to generate a document that actually reflects the content of the visit, no guarantees can be provided that every mistake has been identified and corrected by editing. Note was updated later by me after  physical examination and  completion of the assessment.

## 2022-02-08 NOTE — PLAN OF CARE
Tolerating vent at 45%. Sedation continues. No restraints used. Temp<100. Tolerating TF and water flushes. No injury.

## 2022-02-08 NOTE — PROGRESS NOTES
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 Actemra had been considered but again due to the cytopenias and thrombocytopenia this has been held. Antimicrobial therapy: The patient received Rocephin 1000 mg and azithromycin 500 mg on 1/16/2022  The patient received a dose of levofloxacin 500 mg on 1/18/2020. Patient started on cefepime and vancomycin 1/23/2022 plan completed a 7-day course of cefepime on 1/28/2022  Vancomycin discontinued due to acute kidney injury 1/24/2022    The patient is on 45% FiO2 and 14 of PEEP. He remains on Luke-Synephrine for blood pressure support  He continues to have intermittent fevers.   Chest x-ray shows changes consistent with the recent COVID-19 virus infection  The plan is for the patient to undergo tracheostomy and PEG tube placement when deemed stable    Infection Control Recommendations:   Droplet plus precautions    Discharge Planning:   Patient will need Midline Catheter Insertion/ PICC line Insertion: No  Patient will need: Home IV , [place],  SNF,  LTAC: Undetermined  Patient willneed outpatient wound care: No    Medical Decision making / Summary of Stay:   Monique Salamanca is a 64y.o.-year-old male who was initially admitted on 1/16/2022. Tisha Quezada has a history of diabetes mellitus type 2, essential hypertension, seizure disorder, chronic kidney disease stage III, hyperlipidemia among other medical problems.     The patient reports that about 1 to 2 months ago he had his diabetes medications changed and since that time he has noticed increasing swelling in his legs, hardness in the legs, difficulty ambulating, and weight gain from 178 to 255 pounds over the last 1 to 2 months. He did not report any subjective fevers, chills, cough or shortness of breath. He denies any loss of smell or change in taste. No abdominal pain nausea vomiting or diarrhea. The patient does report that he has home oxygen that he uses as needed at home and he reports that he hardly needs it. He is vaccinated did receive the J&J vaccine but has not yet received a booster dose.     The patient presented to the emergency department and was found to have leukopenia with a white blood cell count of 2.7 and thrombocytopenia with a platelet count of 359. Creatinine slightly elevated at 1.31 and proBNP is elevated at 9327. Chest x-ray showed hyperinflated lungs with cardiomegaly seen and diffuse increased interstitial markings in both lungs which may represent baseline chronic interstitial lung changes given that these findings were present before. A CT of the chest was done with IV contrast that showed no evidence of pulmonary embolism and compared to 2008 there is worsening underlying emphysema with worsening subacute or acute interstitial lung disease best seen in the left upper lobe. Findings were not felt typical for COVID-19 virus pneumonia. There was thickening and distortion of the left major fissure with an ill-defined masslike focus in the left lower lobe.   There is worsening moderate to severe pulmonary artery hypertension     COVID testing was positive and I was asked to evaluate and help with COVID-19 virus infection    The patient did have a CT scan of the abdomen pelvis done 2022 which was a limited study with no evidence of bowel obstruction and moderate stool burden noted felt related to constipation. Tiny amount of free fluid scattered in the abdomen, moderate pleural effusions and left basilar consolidation and basilar interstitial thickening increased from 2022      Current evaluation:2022    BP (!) 139/53   Pulse 71   Temp 101 °F (38.3 °C) (Axillary)   Resp (!) 34   Ht 5' 10\" (1.778 m)   Wt 228 lb 1.6 oz (103.5 kg)   SpO2 92%   BMI 32.73 kg/m²     Temperature Range: Temp: 101 °F (38.3 °C) Temp  Av.5 °F (38.1 °C)  Min: 99.3 °F (37.4 °C)  Max: 101.7 °F (38.7 °C)   The patient is seen and evaluated at bedside he was febrile to 101.7. He remains intubated on the ventilator at 45% FiO2 and 14 of PEEP. The patient continues on Luke-Synephrine for blood pressure support. He is on sedation with Precedex, Versed, and fentanyl. He was actually tachypneic showing signs of labored breathing at the time of my evaluation and discussed with the nurse who does reports that he seems to have more difficulty breathing on his left side. Review of Systems   Unable to perform ROS: Intubated       Physical Examination :     Physical Exam  Constitutional:       Appearance: He is well-developed. Interventions: He is sedated and intubated. HENT:      Head: Normocephalic and atraumatic. Cardiovascular:      Rate and Rhythm: Normal rate. Heart sounds: Normal heart sounds. No friction rub. No gallop. Pulmonary:      Effort: Pulmonary effort is normal. He is intubated. Breath sounds: Normal breath sounds. No wheezing. Abdominal:      General: Bowel sounds are normal.      Palpations: Abdomen is soft. There is no mass. Tenderness: There is no abdominal tenderness.    Musculoskeletal:         General: Swelling (Right upper extremity swelling) present. Cervical back: Neck supple. Lymphadenopathy:      Cervical: No cervical adenopathy. Skin:     General: Skin is warm and dry. Comments: There is erythroderma in the legs and feet bilaterally which is not infectious         Laboratory data:   I have independently reviewed the followinglabs:  CBC with Differential:   Recent Labs     02/07/22  0601 02/08/22  0510   WBC 15.1* 13.6*   HGB 13.1 12.0*   HCT 43.4 39.7*   * 129*   LYMPHOPCT 6* 3*   MONOPCT 4 5     BMP:   Recent Labs     02/07/22  0601 02/08/22  0510   * 142   K 3.9 3.8   CL 97* 107   CO2 26 26   BUN 78* 72*   CREATININE 0.89 0.84   MG 2.0 2.0     Hepatic Function Panel:   No results for input(s): PROT, LABALBU, BILIDIR, IBILI, BILITOT, ALKPHOS, ALT, AST in the last 72 hours. Lab Results   Component Value Date    PROCAL 0.25 02/01/2022    PROCAL 0.24 01/23/2022    PROCAL 0.11 01/19/2022     Lab Results   Component Value Date    CRP 23.5 01/16/2022    CRP 2.0 07/27/2019     Lab Results   Component Value Date    SEDRATE 3 01/16/2022         Lab Results   Component Value Date    DDIMER 2.96 01/30/2022    DDIMER 5.84 01/23/2022    DDIMER 1.53 01/18/2022    DDIMER 1.73 01/16/2022    DDIMER 1.18 12/07/2018     Lab Results   Component Value Date    FERRITIN 569 01/16/2022    FERRITIN 50 07/23/2019    FERRITIN 18 07/08/2019     Lab Results   Component Value Date     01/16/2022     Lab Results   Component Value Date    FIBRINOGEN 402 01/16/2022       Results in Past 30 Days  Result Component Current Result Ref Range Previous Result Ref Range   SARS-CoV-2, Rapid DETECTED (A) (1/16/2022) Not Detected Not in Time Range      Lab Results   Component Value Date    COVID19 DETECTED 01/16/2022    COVID19 Not Detected 11/02/2021       No results for input(s): VANCOTROUGH in the last 72 hours.     Imaging Studies:   ONE XRAY VIEW OF THE CHEST 2/7/2022 6:31 am    Impression   Persistent bilateral lung infiltrates compatible with the clinical history of   COVID-19 pneumonia.             CT OF THE ABDOMEN AND PELVIS WITHOUT CONTRAST 1/28/2022 8:34 am    Impression   1.  Overall mildly limited study due to motion and lack of contrast.       2.  No evidence of bowel obstruction.  Moderate stool burden is noted,   possibly related to constipation.  Enteric tube is in place with distal tip   in the proximal stomach.       3.  Tiny amount of free fluid scattered in the abdomen, including   presacral/perirectal fluid, most likely related to volume overload.  No   definite findings of rectal wall thickening or fecal impaction.       4.  Moderate pleural effusions, left basilar consolidation, and bibasilar   interstitial thickening, increased when compared to 01/16/2022.       RECOMMENDATIONS:   Unavailable         Veins: Lower Extremities DVT Study, Venous Scan Lower Bilateral.  Indications for Study: Leg Swelling . Patient Status: In Patient. Technical Quality: Limited visualization. Limitation reason: Edema. Comments: Simultaneous real time imaging utilizing B-Mode, color doppler and spectral waveform analysis was performed on the  bilateral lower extremities for venous examination of the deep and superficial systems. Conclusions  Summary  No evidence of superficial or deep venous thrombosis in both lower extremities. Signature  Electronically signed by Monika Horne RVT(Sonographer) on 01/19/2022 08:10 AM  Electronically signed by Dominic Gage physician) on 01/19/2022 06:21 PM      CT OF THE HEAD WITHOUT CONTRAST  1/18/2022 5:42 pm  Impression   New lacunar infarct left thalamus, age indeterminate. Byron Finder may be helpful.           Cultures:     Culture, Blood 1 [[id number]] Collected: 02/01/22 0003   Order Status: Completed Specimen: Blood Updated: 02/06/22 0830    Specimen Description . BLOOD    Special Requests ART LINE 20ML    Culture NO GROWTH 5 DAYS   Culture, Blood 1 [[id number]] Collected: 01/31/22 4575 monitor treatment for MRSA infections. Culture, Blood 1 [[id number]] Collected: 01/16/22 1220   Order Status: Completed Specimen: Blood Updated: 01/21/22 1521    Specimen Description . BLOOD    Special Requests LAC 12ML    Culture NO GROWTH 5 DAYS   Culture, Blood 1 [[id number]] Collected: 01/16/22 1205   Order Status: Completed Specimen: Blood Updated: 01/21/22 1521    Specimen Description . BLOOD    Special Requests RAC 12ML    Culture NO GROWTH 5 DAYS       Culture, Urine [[id number]] Collected: 01/16/22 1315   Order Status: Completed Specimen: Urine, clean catch Updated: 01/17/22 2128    Specimen Description . CLEAN CATCH URINE    Special Requests NOT REPORTED    Culture NO SIGNIFICANT GROWTH       COVID-19, Rapid [[id number]] (Abnormal) Collected: 01/16/22 1230   Order Status: Completed Specimen: Nasopharyngeal Swab Updated: 01/16/22 1314    Specimen Description . NASOPHARYNGEAL SWAB    SARS-CoV-2, Rapid DETECTED Abnormal     Comment:        Rapid NAAT: The specimen is POSITIVE for SARS-Cov-2, the novel coronavirus associated with   COVID-19.         This test has been authorized by the FDA under an Emergency Use Authorization (EUA) for use   by authorized laboratories.         The ID NOW COVID-19 assay is designed to detect the virus that causes COVID-19 in patients   with signs and symptoms of infection who are suspected of COVID-19. An individual without symptoms of COVID-19 and who is not shedding SARS-CoV-2 virus would   expect to have a negative (not detected) result in this assay.    Fact sheet for Healthcare Providers: Cee   Fact sheet for Patients: Cee           Methodology: Isothermal Nucleic Acid Amplification         Results reported to the appropriate Health Department         Flu A/B Ag Detection [[id number]] Collected: 01/16/22 1230   Order Status: Completed Specimen: Nasopharyngeal Swab Updated: 01/16/22 1313 Specimen Description . NASOPHARYNGEAL SWAB    Special Requests NOT REPORTED    Direct Exam NEGATIVE for Influenza A + B antigens.                                PCR testing to confirm this result is available upon request. Og Coulter will be saved in the laboratory for 7 days.  Please call [phone] if PCR testing is indicated. Medications:      amLODIPine  5 mg Oral BID    insulin lispro  0-12 Units SubCUTAneous Q6H    amiodarone  200 mg Per NG tube Daily    insulin glargine  15 Units SubCUTAneous BID    lisinopril  20 mg Oral Daily    Vitamin D  1,000 Units Oral Daily    polyethylene glycol  17 g Per NG tube Daily    levalbuterol  1.25 mg Nebulization Q6H    bisacodyl  10 mg Rectal Daily    pantoprazole  40 mg IntraVENous Daily    And    sodium chloride (PF)  10 mL IntraVENous Daily    aspirin  324 mg Oral Daily    levETIRAcetam  500 mg Oral BID    chlorhexidine  15 mL Mouth/Throat BID    enoxaparin  30 mg SubCUTAneous BID    QUEtiapine  50 mg Oral Once    budesonide  0.5 mg Nebulization BID    sodium chloride flush  5-40 mL IntraVENous 2 times per day    atorvastatin  20 mg Oral Daily           [facility]  Maria G Perrin MD  [employer] messaging  OFFICE: [phone]      Electronically signed by Maria G Perrin MD on 2/8/2022 at 11:22 AM  Thank you for allowing us to participate in the care of this patient. Please call with questions. This note iscreated with the assistance of a speech recognition program.  While intending to generate a document that actually reflects the content of the visit, the document can still have some errors including those of syntax andsound a like substitutions which may escape proof reading. In such instances, actual meaning can be extrapolated by contextual diversion.

## 2022-02-08 NOTE — CONSULTS
General Surgery:    Consult Note        PATIENT NAME: Sadia Goodwin OF BIRTH: 1945    ADMISSION DATE: 1/16/2022 11:51 AM     Admitting Provider: Dr. Doron Calderon Physician: Dr. Raheem Obregon: 2/8/2022    Chief Complaint:  Covid with respiratory failure  Consult Regarding: Trach and PEG    HISTORY OF PRESENT ILLNESS:  The patient is a 64 y.o. male who originally presented on 1/16/2022. Patient presented presented with chief complaint of leg swelling due to his congestive heart failure, fever, and concern for hypoxia. Patient has a significant past medical history as listed below. Patient was found to be positive for COVID-19 with an elevated BNP and hypoxic. Patient has since been intubated in the medical ICU. Patient continued to have hypoxia and was intubated on 1/23/2022. Patient is currently on a phenylephrine drip at 26 mcg. Patient has been seen and evaluated by multiple consultants. He is currently on amiodarone due to his A. fib. Patient has completed a course of IV Decadron, cefepime, and has been on multiple bronchodilators. Currently receiving tube feeds. Remains on ventilator with settings of 45% FiO2, PEEP 14. Currently sedated with Versed. Previous midline laparotomy scar noted. Previous history of open AAA and ventral hernia repair with mesh.     Past Medical History:        Diagnosis Date    Arthritis     Atherosclerotic plaque 7/28/2019    Cervical disc disease     degenerative disc disease    Diabetes mellitus ([address]     type 2    History of blood transfusion     Hx of blood clots     left leg    Hyperlipidemia     Neuropathy     Right kidney mass     \"urologist is watching\"    Snores     Type 2 diabetes mellitus treated with insulin ([address] 7/28/2019       Past Surgical History:        Procedure Laterality Date    ABDOMINAL AORTIC ANEURYSM REPAIR  2005    CARPAL TUNNEL RELEASE Bilateral     CERVICAL SPINE SURGERY      COLONOSCOPY      benign polyps removed    INGUINAL HERNIA REPAIR Right     LA REPAIR INCISIONAL HERNIA,REDUCIBLE N/A 5/10/2017    HERNIA VENTRAL REPAIR WITH MESH  performed by Shayla Remy DO at [facility] [address]  05/10/2017    with mesh       Medications Prior to Admission:   Medications Prior to Admission: rosuvastatin (CRESTOR) 40 MG tablet, Take 40 mg by mouth every evening  insulin NPH (HUMULIN N;NOVOLIN N) 100 UNIT/ML injection vial, Inject 20 Units into the skin 2 times daily (before meals)  levETIRAcetam (KEPPRA) 500 MG tablet, Take 1 tablet by mouth 2 times daily  venlafaxine (EFFEXOR XR) 150 MG extended release capsule, Take 1 capsule by mouth daily (with breakfast)  vitamin B-1 (THIAMINE) 100 MG tablet, Take 100 mg by mouth daily  ferrous sulfate 325 (65 Fe) MG tablet, Take 325 mg by mouth daily (with breakfast)  ALPRAZolam (XANAX) 0.5 MG tablet, Take 0.5 mg by mouth three times daily.   furosemide (LASIX) 40 MG tablet, Take 1 tablet by mouth 2 times daily  tiotropium (SPIRIVA RESPIMAT) 2.5 MCG/ACT AERS inhaler, Inhale 2 puffs into the lungs daily   albuterol sulfate  (90 Base) MCG/ACT inhaler, Inhale 2 puffs into the lungs every 6 hours as needed for Wheezing or Shortness of Breath  metoprolol succinate (TOPROL XL) 50 MG extended release tablet, Take 50 mg by mouth daily  Multiple Vitamins-Minerals (MULTIVITAMIN ADULT) TABS, Take 1 tablet by mouth daily  vitamin D (CHOLECALCIFEROL) 1000 UNIT TABS tablet, Take 1,000 Units by mouth daily  fluticasone (FLONASE) 50 MCG/ACT nasal spray, 1 spray by Each Nare route daily as needed for Rhinitis  aspirin 325 MG EC tablet, Take 325 mg by mouth daily   Omega-3 Fatty Acids (FISH OIL) 1000 MG CAPS, Take 1 capsule by mouth daily   amLODIPine (NORVASC) 5 MG tablet, Take 5 mg by mouth nightly   mirtazapine (REMERON) 45 MG tablet, Take 45 mg by mouth nightly  [DISCONTINUED] Iron-Vitamin C (IRON 100/C) 100-250 MG TABS, iron    Allergies: Barbiturates, Codeine, and Sulfa antibiotics    Social History:   Social History     Socioeconomic History    Marital status:      Spouse name: Not on file    Number of children: Not on file    Years of education: Not on file    Highest education level: Not on file   Occupational History    Not on file   Tobacco Use    Smoking status: Former Smoker    Smokeless tobacco: Never Used    Tobacco comment: quit 30 years ago   Substance and Sexual Activity    Alcohol use: No    Drug use: No    Sexual activity: Not on file   Other Topics Concern    Not on file   Social History Narrative    Not on file     Social Determinants of Health     Financial Resource Strain:     Difficulty of Paying Living Expenses: Not on file   Food Insecurity:     Worried About [address] in the Last Year: Not on file    [address] in the Last Year: Not on file   Transportation Needs:     Lack of Transportation (Medical): Not on file    Lack of Transportation (Non-Medical):  Not on file   Physical Activity:     Days of Exercise per Week: Not on file    Minutes of Exercise per Session: Not on file   Stress:     Feeling of Stress : Not on file   Social Connections:     Frequency of Communication with Friends and Family: Not on file    Frequency of Social Gatherings with Friends and Family: Not on file    Attends Zoroastrianism Services: Not on file    Active Member of [address] [place] [address] or Organizations: Not on file    Attends Club or Organization Meetings: Not on file    Marital Status: Not on file   Intimate Partner Violence:     Fear of Current or Ex-Partner: Not on file    Emotionally Abused: Not on file    Physically Abused: Not on file    Sexually Abused: Not on file   Housing Stability:     Unable to Pay for Housing in the Last Year: Not on file    Number of [place] in the Last Year: Not on file    Unstable Housing in the Last Year: Not on file       Family History:       Problem Relation Age of Onset    Ovarian Cancer Sister     Ovarian Cancer Sister        REVIEW OF SYSTEMS:    Unable to obtain due to patient condition    PHYSICAL EXAM:    VITALS:  BP (!) 139/53   Pulse 64   Temp 101 °F (38.3 °C) (Axillary)   Resp 19   Ht 5' 10\" (1.778 m)   Wt 228 lb 1.6 oz (103.5 kg)   SpO2 99%   BMI 32.73 kg/m²   INTAKE/OUTPUT:     Intake/Output Summary (Last 24 hours) at 2/8/2022 0601  Last data filed at 2/8/2022 0541  Gross per 24 hour   Intake 2789.7 ml   Output 3150 ml   Net -360.3 ml       CONSTITUTIONAL: Intubated, sedated, does not follow commands  ENT:  ENT exam normal, no neck nodes or sinus tenderness and normocephalic/atraumatic, without obvious abnormality, endotracheal tube in place  NECK:  supple, symmetrical, trachea midline   LUNGS: Moving air bilaterally, crackles with decreased air entry bilaterally  CARDIOVASCULAR: S1, S2, normal rate  ABDOMEN: Soft, nontender, nondistended, no peritoneal signs, previous midline laparotomy scar  SKIN: No rashes or skin lesions noted  MUSCULOSKELETAL:  No joint swelling, deformity, or tenderness. , there is not obvious somatic dysfunction  NEUROLOGIC:  Mental Status Exam:  Level of Alertness:   alert  Orientation:   oriented to person, place, and time      CBC with Differential:    Lab Results   Component Value Date    WBC 13.6 02/08/2022    RBC 4.84 02/08/2022    HGB 12.0 02/08/2022    HCT 39.7 02/08/2022     02/08/2022    MCV 82.0 02/08/2022    MCH 24.8 02/08/2022    MCHC 30.2 02/08/2022    RDW 15.9 02/08/2022    LYMPHOPCT PENDING 02/08/2022    MONOPCT PENDING 02/08/2022    BASOPCT PENDING 02/08/2022    MONOSABS PENDING 02/08/2022    LYMPHSABS PENDING 02/08/2022    EOSABS PENDING 02/08/2022    BASOSABS PENDING 02/08/2022    DIFFTYPE NOT REPORTED 02/08/2022     BMP:    Lab Results   Component Value Date     02/08/2022    K 3.8 02/08/2022     02/08/2022    CO2 26 02/08/2022    BUN 72 02/08/2022    LABALBU 2.4 02/03/2022 CREATININE 0.84 02/08/2022    CALCIUM 8.9 02/08/2022    GFRAA >60 02/08/2022    LABGLOM >60 02/08/2022    GLUCOSE 324 02/08/2022       Pertinent Radiology:   XR CHEST 1 VIEW    Result Date: 1/16/2022  EXAMINATION: ONE XRAY VIEW OF THE CHEST 1/16/2022 1:18 pm COMPARISON: The previous study performed 07/27/2019. HISTORY: ORDERING SYSTEM PROVIDED HISTORY: SOB cough leg swelling hypoxia TECHNOLOGIST PROVIDED HISTORY: SOB cough leg swelling hypoxia Reason for Exam: SOB, leg swelling, fever. AP UPRIGHT PORTABLE FINDINGS: The lungs are hypoinflated. Cardiomegaly is again identified. Diffuse, increased interstitial markings are noted throughout both lungs. Some of this can be seen on the prior examination and may represent baseline chronic interstitial lung changes. The increased prominence on this examination could be secondary to the suboptimal inspiratory effort, however, the presence of pulmonary vascular congestion/mild CHF or bilateral interstitial pneumonia cannot be excluded. Clinical correlation is recommended. Follow-up radiographs are recommended. No active pleural disease is seen. The thoracic aorta is again atherosclerotic. The tracheobronchial tree is midline and patent. Osteo-degenerative changes are again noted involving the thoracic spine. Hypoinflated lungs. Cardiomegaly again seen, when compared to the previous study performed 07/27/2019. Diffuse, increased interstitial markings throughout both lungs, some of which can be seen on the prior study may represent baseline chronic interstitial lung changes. The increased prominence on this exam could be secondary to the suboptimal inspiratory effort. The presence of pulmonary vascular congestion/mild CHF or bilateral interstitial pneumonia cannot be excluded. Clinical correlation is recommended.      CT CHEST PULMONARY EMBOLISM W CONTRAST    Result Date: 1/17/2022  EXAMINATION: CTA OF THE CHEST, 1/16/2022 2:09 pm TECHNIQUE: CTA of the chest was performed after the administration of intravenous contrast.  Multiplanar reformatted images are provided for review. MIP images are provided for review. Dose modulation, iterative reconstruction, and/or weight based adjustment of the mA/kV was utilized to reduce the radiation dose to as low as reasonably achievable. COMPARISON: CT scan of the chest from 2008, and chest x-ray from the same day at 13:10. HISTORY: ORDERING SYSTEM PROVIDED HISTORY:  +D-dimer, hypoxia, SOB. TECHNOLOGIST PROVIDED HISTORY: +D-dimer, hypoxia, SOB. Decision Support Exception - unselect if not a suspected or confirmed emergency medical condition->Emergency Medical Condition (MA) Reason for Exam:  D-dimer 1.73 COVID +, SOB. FINDINGS: Pulmonary Arteries: Pulmonary arteries are adequately opacified for evaluation. No evidence of intraluminal filling defect to suggest pulmonary embolism. Main pulmonary artery is further increased in caliber as compared to 2008, now 43 mm versus 37 mm previously. . Mediastinum: Mediastinal & left hilar lymphadenopathy, best seen para and subaortic/subcarinal (maximal short axis 12 mm at-slice 83, series 3). The heart and pericardium demonstrate no acute abnormality; mild left chamber enlargement. Small anterior pericardial effusion. No acute abnormality of the thoracic aorta; similar mild dilatation ascending aorta, maximal 43 mm, and moderate-severe calcific and noncalcific ASVD arch and descending aorta. Lungs/pleura: Interval worsening emphysematous changes, more mid-upper lung zones; associated thickened cystic changes with scattered GGO and perhaps honeycombing, latter best seen left mid-upper lung with localized thickening/tenting left major fissure; latter is contiguous with an oblong mass-like ill-defined abnormality left lower lobe, overall measuring 3.7 x 2.1 cm with an inferior more solid element measuring 1.7 x 1.1 cm (slice 57, series 4). 5 mm right apical nodule (slice 20, series 4).   Second smaller RUL nodular density (slice 18, series 4) and probable posterolateral scarring. Small ground-glass focus contiguous with the minor fissure (best seen slice 61, series 4). Small left and trace right pleural effusions. No pneumothorax. Tiny calcified focus along the right minor fissure. Mild bronchial wall thickening. Upper Abdomen: Limited images of the upper abdomen show no acute abnormality; scattered hepatic calcifications. Small hiatus hernia. Soft Tissues/Bones:  No acute bone or soft tissue abnormality; mild scoliosis and kyphoscoliosis with multilevel DDD/DJD thoracic spine. No evidence of pulmonary embolism. Compared to 2008, worsening underlying emphysema, with worsening subacute or acute interstitial lung disease, best seen left upper lobe; differential includes interstitial pneumonia or pneumonitis superimposed on emphysema, DIP, HP, and other interstitial pneumonias as well as infectious or inflammatory process superimposed on emphysema disease. Findings are not typical for COVID-19 pneumonia, but an element of the latter should be considered. Thickening and distortion left major fissure with ill-defined mass-like focus left lower lobe. The latter could also be infectious or inflammatory, although neoplasm should be excluded. Underlying worsening emphysema. Nodular densities, best seen right upper lobe. Small pleural effusions, slightly larger on the left. See recommendations below. Mild mediastinal and left hilar adenopathy; see recommendations below. Worsening now moderate-severe pulmonary artery hypertension. Mild cardiomegaly. Stable mild dilatation ascending thoracic aorta. Additional unchanged or incidental findings, as above. RECOMMENDATIONS: Clinical correlation and short-term follow-up CT chest in 1-4 months depending upon the clinical presentation/course.          ASSESSMENT:  Active Hospital Problems    Diagnosis Date Noted    Constipation [K59.00]     Abdominal pain, generalized [R10.84]     Acute on chronic systolic CHF (congestive heart failure) ([facility]) [I50.23]     Acute respiratory failure with hypoxia ([facility]) [J96.01]     BLANCHE (acute kidney injury) ([address] [N17.9]     COPD exacerbation ([facility]) [J44.1]     Hypokalemia [E87.6]     Hypomagnesemia [E83.42]     Palliative care encounter [Z51.5]     Goals of care, counseling/discussion [Z71.89]     DNR (do not resuscitate) discussion [Z71.89]     ACP (advance care planning) [Z71.89]     COVID-19 [U07.1] 01/16/2022       1. 64 y.o. male with respiratory failure due to COVID-19, history of acute on chronic systolic congestive heart failure, COPD    Plan:  1. Patient seen examined at bedside this morning. 2. Recommend discussion with family regarding final wishes for possible trach and PEG. 3. Continue medical management per primary team.  4. We will follow up final goals of family. 5. With patient's current vent settings not ready for trach at this point. Recommend GI consultation for PEG placement due to history of laparotomy and unable to place G-tube.     Electronically signed by Maria Ines Boo DO  on 2/8/2022 at 6:01 AM

## 2022-02-08 NOTE — PROGRESS NOTES
General Surgery:  Daily Progress Note            PATIENT NAME: Sergio Aparicio     TODAY'S DATE: 2/8/2022, 5:53 AM    SUBJECTIVE:     Pt seen and examined at bedside this morning. No acute events overnight. T-max 38.3. Patient reports episodes of pain overnight. Roberts in place. Currently n.p.o.  NG tube in place to low minute wall suction. Minimal output from NG overnight. Good urine output overnight. 700 cc total.  No flatus or bowel movement yet. Review of systems:  General: Denies chills, fatigue  Respiratory: Denies cough, shortness of breath  Cardiac: Denies chest pain  Abdomen: Denies nausea, vomiting  MSK: Denies musculoskeletal pain      OBJECTIVE:   VITALS:  BP (!) 139/53   Pulse 64   Temp 101 °F (38.3 °C) (Axillary)   Resp 19   Ht 5' 10\" (1.778 m)   Wt 228 lb 1.6 oz (103.5 kg)   SpO2 99%   BMI 32.73 kg/m²      INTAKE/OUTPUT:      Intake/Output Summary (Last 24 hours) at 2/8/2022 0553  Last data filed at 2/8/2022 0541  Gross per 24 hour   Intake 2592.71 ml   Output 2650 ml   Net -57.29 ml       PHYSICAL EXAM:  General Appearance:  awake, alert, oriented, in no acute distress  HEENT:  Normocephalic, atraumatic, mucus membranes moist   Skin:  Skin color, texture, turgor normal. No rashes or lesions. Lungs:  No chest wall tenderness. Heart:  Heart regular rate and rhythm  Abdomen: Soft, midline dressing intact, no bleeding or strikethrough noted, midline staples intact, no peritoneal signs, no rebound tenderness  Extremities: Extremities warm to touch, pink, with no edema.         Data:  CBC with Differential:    Lab Results   Component Value Date    WBC 13.6 02/08/2022    RBC 4.84 02/08/2022    HGB 12.0 02/08/2022    HCT 39.7 02/08/2022     02/08/2022    MCV 82.0 02/08/2022    MCH 24.8 02/08/2022    MCHC 30.2 02/08/2022    RDW 15.9 02/08/2022    LYMPHOPCT PENDING 02/08/2022    MONOPCT PENDING 02/08/2022    BASOPCT PENDING 02/08/2022    MONOSABS PENDING 02/08/2022    LYMPHSABS PENDING 02/08/2022    EOSABS PENDING 02/08/2022    BASOSABS PENDING 02/08/2022    DIFFTYPE NOT REPORTED 02/08/2022     BMP:    Lab Results   Component Value Date     02/08/2022    K 3.8 02/08/2022     02/08/2022    CO2 26 02/08/2022    BUN 72 02/08/2022    LABALBU 2.4 02/03/2022    CREATININE 0.84 02/08/2022    CALCIUM 8.9 02/08/2022    GFRAA >60 02/08/2022    LABGLOM >60 02/08/2022    GLUCOSE 324 02/08/2022       ASSESSMENT:  Active Hospital Problems    Diagnosis Date Noted    Constipation [K59.00]     Abdominal pain, generalized [R10.84]     Acute on chronic systolic CHF (congestive heart failure) (HCC) [I50.23]     Acute respiratory failure with hypoxia (HCC) [J96.01]     BLANCHE (acute kidney injury) ([facility] [N17.9]     COPD exacerbation (HCC) [J44.1]     Hypokalemia [E87.6]     Hypomagnesemia [E83.42]     Palliative care encounter [Z51.5]     Goals of care, counseling/discussion [Z71.89]     DNR (do not resuscitate) discussion [Z71.89]     ACP (advance care planning) [Z71.89]     COVID-19 [U07.1] 01/16/2022       1. 64 y.o. male postop day 1 status post right hemicolectomy with wedge liver biopsy, finding of a sending colon mass with concern for liver metastasis, recent diagnosis of small bowel obstruction due to colon mass, elevated CEA    Plan:  1. Patient was seen and examined at bedside this morning. 2. Continue n.p.o.  3. NG tube low intermittent wall suction  4. Encourage patient to be out of bed to ambulate/up to chair  5. Can DC Roberts today. 6. Labs reviewed  7. Continue IV fluids  8. Encourage patient to use incentive spirometer  9. Pain control can continue IV Dilaudid as needed  10. Had a long discussion with patient at bedside regarding his operative findings of an ascending colon mass and concern for liver metastasis. Primary anastomosis was performed. No ostomy was needed. Discussed possible liver metastasis with patient that he may need postoperative chemotherapy.   We will wait for final pathology for further recommendations.     Electronically signed by Anthony Serrano DO  on 2/8/2022 at 5:53 AM

## 2022-02-08 NOTE — PROGRESS NOTES
Section of Cardiology  Progress Note      Date:  2/8/2022  Patient: Maricarmen Betts  Admission:  1/16/2022 11:51 AM  Admit DX: Hypokalemia [E87.6]  Hypomagnesemia [E83.42]  COPD exacerbation (HCC) [J44.1]  BLANCHE (acute kidney injury) ([facility] [N17.9]  Acute respiratory failure with hypoxia (HCC) [J96.01]  Acute on chronic systolic CHF (congestive heart failure) ([facility] [I50.23]  COVID [U07.1]  COVID-19 [U07.1]  Age:  64 y.o., 1945     LOS: 23 days     Reason for evaluation:   atrial fibrillation      SUBJECTIVE:     The patient was seen and examined. Notes and labs reviewed. Pt remains intubated/sedated  BP has been stable  Remains in NSR. Rates normal  On Tube feeds.  No acute issues or concerns per nursing    OBJECTIVE:      EXAM:   Vitals:    VITALS:  BP (!) 139/53   Pulse 72   Temp 99.7 °F (37.6 °C) (Oral)   Resp (!) 33   Ht 5' 10\" (1.778 m)   Wt 228 lb 1.6 oz (103.5 kg)   SpO2 91%   BMI 32.73 kg/m²   24HR INTAKE/OUTPUT:      Intake/Output Summary (Last 24 hours) at 2/8/2022 1346  Last data filed at 2/8/2022 1315  Gross per 24 hour   Intake 3119.7 ml   Output 2000 ml   Net 1119.7 ml       NOT EXAMINED DUE TO COVID 19 AND TO REDUCE SPREAD OF INFECTION AND USE OF PPE  EXAMINED THROUGH WINDOW - intubated/sedated    Current Inpatient Medications:   amLODIPine  5 mg Oral BID    insulin lispro  0-12 Units SubCUTAneous Q6H    amiodarone  200 mg Per NG tube Daily    insulin glargine  15 Units SubCUTAneous BID    lisinopril  20 mg Oral Daily    Vitamin D  1,000 Units Oral Daily    polyethylene glycol  17 g Per NG tube Daily    levalbuterol  1.25 mg Nebulization Q6H    bisacodyl  10 mg Rectal Daily    pantoprazole  40 mg IntraVENous Daily    And    sodium chloride (PF)  10 mL IntraVENous Daily    aspirin  324 mg Oral Daily    levETIRAcetam  500 mg Oral BID    chlorhexidine  15 mL Mouth/Throat BID    enoxaparin  30 mg SubCUTAneous BID    QUEtiapine  50 mg Oral Once    budesonide  0.5 mg Nebulization BID    sodium chloride flush  5-40 mL IntraVENous 2 times per day    atorvastatin  20 mg Oral Daily       IV Infusions (if any):   phenylephrine (KEARA-SYNEPHRINE) 50mg/250mL infusion 17 mcg/min (02/08/22 1344)    dexmedetomidine (PRECEDEX) IV infusion 0.7 mcg/kg/hr (02/08/22 1327)    fentaNYL 50 mcg/hr (02/08/22 1010)    propofol Stopped (02/04/22 0700)    midazolam (VERSED) 1 mg/mL in D5W infusion 5 mg/hr (02/08/22 0541)    sodium chloride      dextrose         Diagnostics:   Telemetry: NSR/Sinus Thai      Labs:   CBC:  Recent Labs     02/07/22  0601 02/08/22  0510   WBC 15.1* 13.6*   HGB 13.1 12.0*   HCT 43.4 39.7*   * 129*     Magnesium:  Recent Labs     02/07/22  0601 02/08/22  0510   MG 2.0 2.0     BMP:  Recent Labs     02/07/22  0601 02/08/22  0510   * 142   K 3.9 3.8   CALCIUM 9.0 8.9   CO2 26 26   BUN 78* 72*   CREATININE 0.89 0.84   LABGLOM >60 >60   GLUCOSE 331* 324*     BNP:No results for input(s): BNP, PROBNP in the last 72 hours. PT/INR:No results for input(s): PROTIME, INR in the last 72 hours. APTT:No results for input(s): APTT in the last 72 hours. CARDIAC ENZYMES:No results for input(s): MYOGLOBIN, CKTOTAL, CKMB, CKMBINDEX, TROPHS, TROPONINT in the last 72 hours. FASTING LIPID PANEL:  Lab Results   Component Value Date    HDL 30 11/03/2021    TRIG 181 11/03/2021     LIVER PROFILE:  No results for input(s): AST, ALT, LABALBU, ALKPHOS, BILITOT, BILIDIR, IBILI, PROT, GLOB, ALBUMIN in the last 72 hours. ASSESSMENT:  · Paroxysmal Atrial Fibrillation - now in sinus  · Hx of CHD  · HTN  · COVID 19 PNA - managed by others  · Chronic Kidney Disease  · COPD  · Acute Hypoxic Respiratory Failure - intubated  · Hypotension with Wide Pulse Pressure    PLAN:  1. Continue current medications. 2. Cont. Amio 200mg PO/NG daily  3. Cont. Treatment of COVID/pneumonia  4. Supportive management otherwise  5.  Will continue to follow    Discussed with nursing    Kathy Butts, MD

## 2022-02-09 NOTE — PROGRESS NOTES
MG tablet Take 1 tablet by mouth 2 times daily 11/3/21  Yes Jd Tejada MD   venlafaxine (EFFEXOR XR) 150 MG extended release capsule Take 1 capsule by mouth daily (with breakfast) 7/29/19  Yes Arabella Kiser   vitamin B-1 (THIAMINE) 100 MG tablet Take 100 mg by mouth daily   Yes Historical Provider, MD   ferrous sulfate 325 (65 Fe) MG tablet Take 325 mg by mouth daily (with breakfast)   Yes Historical Provider, MD   ALPRAZolam (XANAX) 0.5 MG tablet Take 0.5 mg by mouth three times daily.    Yes Historical Provider, MD   furosemide (LASIX) 40 MG tablet Take 1 tablet by mouth 2 times daily 12/11/18  Yes Jose Alberto Evans MD   tiotropium (SPIRIVA RESPIMAT) 2.5 MCG/ACT AERS inhaler Inhale 2 puffs into the lungs daily    Yes Historical Provider, MD   albuterol sulfate  (90 Base) MCG/ACT inhaler Inhale 2 puffs into the lungs every 6 hours as needed for Wheezing or Shortness of Breath   Yes Historical Provider, MD   metoprolol succinate (TOPROL XL) 50 MG extended release tablet Take 50 mg by mouth daily   Yes Historical Provider, MD   Multiple Vitamins-Minerals (MULTIVITAMIN ADULT) TABS Take 1 tablet by mouth daily   Yes Historical Provider, MD   vitamin D (CHOLECALCIFEROL) 1000 UNIT TABS tablet Take 1,000 Units by mouth daily   Yes Historical Provider, MD   fluticasone (FLONASE) 50 MCG/ACT nasal spray 1 spray by Each Nare route daily as needed for Rhinitis   Yes Historical Provider, MD   aspirin 325 MG EC tablet Take 325 mg by mouth daily    Yes Historical Provider, MD   Omega-3 Fatty Acids (FISH OIL) 1000 MG CAPS Take 1 capsule by mouth daily    Yes Historical Provider, MD   amLODIPine (NORVASC) 5 MG tablet Take 5 mg by mouth nightly    Yes Historical Provider, MD   mirtazapine (REMERON) 45 MG tablet Take 45 mg by mouth nightly   Yes Historical Provider, MD       Scheduled Meds:   amLODIPine  5 mg Oral BID    insulin lispro  0-18 Units SubCUTAneous TID WC    insulin lispro  0-9 Units SubCUTAneous Nightly  amiodarone  200 mg Per NG tube Daily    insulin glargine  15 Units SubCUTAneous BID    lisinopril  20 mg Oral Daily    Vitamin D  1,000 Units Oral Daily    polyethylene glycol  17 g Per NG tube Daily    levalbuterol  1.25 mg Nebulization Q6H    bisacodyl  10 mg Rectal Daily    pantoprazole  40 mg IntraVENous Daily    And    sodium chloride (PF)  10 mL IntraVENous Daily    aspirin  324 mg Oral Daily    levETIRAcetam  500 mg Oral BID    chlorhexidine  15 mL Mouth/Throat BID    enoxaparin  30 mg SubCUTAneous BID    QUEtiapine  50 mg Oral Once    budesonide  0.5 mg Nebulization BID    sodium chloride flush  5-40 mL IntraVENous 2 times per day    atorvastatin  20 mg Oral Daily     Continuous Infusions:   phenylephrine (KEARA-SYNEPHRINE) 50mg/250mL infusion 12 mcg/min (02/09/22 1100)    dextrose      dexmedetomidine (PRECEDEX) IV infusion 0.7 mcg/kg/hr (02/09/22 0510)    fentaNYL 50 mcg/hr (02/09/22 0445)    propofol Stopped (02/09/22 1030)    midazolam (VERSED) 1 mg/mL in D5W infusion 4 mg/hr (02/09/22 0548)    sodium chloride          Physical Exam:  Vitals:    02/09/22 1200 02/09/22 1207 02/09/22 1230 02/09/22 1300   BP:       Pulse: 71 71 75 77   Resp: 26 23 (!) 32 (!) 32   Temp: 100.4 °F (38 °C)  98.6 °F (37 °C)    TempSrc: Axillary  Oral    SpO2: 100% 100% 96% 97%   Weight:       Height:         I/O last 3 completed shifts: In: 3886.1 [I.V.:1240.1; NG/GT:2646]  Out: 9915 [Urine:3075]    Deferred due to [address] isolation.      Data:  CBC:   Lab Results   Component Value Date    WBC 11.7 (H) 02/09/2022    HGB 11.5 (L) 02/09/2022    HCT 37.7 (L) 02/09/2022    MCV 81.3 (L) 02/09/2022     (L) 02/09/2022     BMP:    Lab Results   Component Value Date     02/09/2022     02/08/2022     (L) 02/07/2022    K 3.8 02/09/2022    K 3.8 02/08/2022    K 3.9 02/07/2022     (H) 02/09/2022     02/08/2022    CL 97 (L) 02/07/2022    CO2 26 02/09/2022    CO2 26 02/08/2022 CO2 26 02/07/2022    BUN 54 (H) 02/09/2022    BUN 72 (H) 02/08/2022    BUN 78 (H) 02/07/2022    CREATININE 0.73 02/09/2022    CREATININE 0.84 02/08/2022    CREATININE 0.89 02/07/2022    GLUCOSE 178 (H) 02/09/2022    GLUCOSE 324 (H) 02/08/2022    GLUCOSE 331 (H) 02/07/2022     CMP:   Lab Results   Component Value Date     02/09/2022    K 3.8 02/09/2022     02/09/2022    CO2 26 02/09/2022    BUN 54 02/09/2022    CREATININE 0.73 02/09/2022    GLUCOSE 178 02/09/2022    CALCIUM 9.0 02/09/2022    PROT 5.6 02/03/2022    LABALBU 2.4 02/03/2022    BILITOT 0.37 02/03/2022    ALKPHOS 117 02/03/2022    AST 72 02/03/2022    ALT 77 02/03/2022      Hepatic:   Lab Results   Component Value Date    AST 72 (H) 02/03/2022    AST 46 (H) 02/01/2022    AST 28 01/31/2022    ALT 77 (H) 02/03/2022    ALT 43 (H) 02/01/2022    ALT 26 01/31/2022    BILITOT 0.37 02/03/2022    BILITOT 0.28 (L) 02/01/2022    BILITOT 0.32 01/31/2022    ALKPHOS 117 02/03/2022    ALKPHOS 100 02/01/2022    ALKPHOS 99 01/31/2022     BNP: No results found for: BNP  Lipids:   Lab Results   Component Value Date    CHOL 129 11/03/2021    HDL 30 (L) 11/03/2021     INR:   Lab Results   Component Value Date    INR 1.0 01/17/2022    INR 1.0 11/03/2021    INR 1.0 07/27/2019     PTH: No results found for: PTH  Phosphorus:    Lab Results   Component Value Date    PHOS 2.3 02/09/2022     Ionized Calcium: No results found for: IONCA  Magnesium:   Lab Results   Component Value Date    MG 1.8 02/09/2022     Albumin:   Lab Results   Component Value Date    LABALBU 2.4 02/03/2022     Last 3 CK, CKMB, Troponin: @LABRCNT(CKTOTAL:3,CKMB:3,TROPONINI:3)       URINE:)No results found for: Elida Resendiz    Radiology:  Reviewed. Assessment:  BLANCHE, hemodynamically related, good UO, Cr is stable. Azotemia. CKD 3A. Hypernatremia  Hyperkalemia, improved. Hypophosphatemia, improved. Hypovitaminosis D. Metabolic alkalosis. Hypertension. Diabetes mellitis.   [address] [facility] pneumonia. CHF. COPD. Plan:  Electrolytes are okay   Resume free water flushes 300 ml every 4 hours this evening. Patient will be resumed on low potassium tube feeding formula whenever allowed by GI  Monitor potassium off of Lokelma. Off Lactulose for now. Metoprolol discontinued per cardiology. Lisinopril 20 mg daily started 2/2/2022 per cardiology. Monitor K and creatinine closely. Amlodipine was increased to 5 mg BID. Monitor BP, may need more medication. Continue vitamin D supplementation. Avoid hypotension, nephrotoxic drugs, Fleets enema and IV contrast exposure. Follow up chemistries daily in AM.  We will follow with you. Electronically signed by Sharyn Rdz MD  on 2/9/2022 at 1:53 PM   [facility].   Ph: [phone]

## 2022-02-09 NOTE — PLAN OF CARE
Problem: Airway Clearance - Ineffective  Goal: Achieve or maintain patent airway  2/9/2022 0305 by Suzie Dean RN  Outcome: Ongoing       Problem: Gas Exchange - Impaired  Goal: Absence of hypoxia  2/9/2022 0305 by Suzie Dean RN  Outcome: Ongoing    Goal: Promote optimal lung function  2/9/2022 0305 by Suzie Dean RN  Outcome: Ongoing       Problem: Breathing Pattern - Ineffective  Goal: Ability to achieve and maintain a regular respiratory rate  2/9/2022 0305 by Suzie Dean RN  Outcome: Ongoing       Problem:  Body Temperature -  Risk of, Imbalanced  Goal: Ability to maintain a body temperature within defined limits  2/9/2022 0305 by Suzie Dean RN  Outcome: Ongoing    Goal: Will regain or maintain usual level of consciousness  2/9/2022 0305 by Suzie Dean RN  Outcome: Ongoing    Goal: Complications related to the disease process, condition or treatment will be avoided or minimized  2/9/2022 0305 by Suzie Dean RN  Outcome: Ongoing       Problem: Isolation Precautions - Risk of Spread of Infection  Goal: Prevent transmission of infection  2/9/2022 0305 by Suzie Dean RN  Outcome: Ongoing       Problem: Nutrition Deficits  Goal: Optimize nutritional status  2/9/2022 0305 by Suzie Dean RN  Outcome: Ongoing       Problem: Risk for Fluid Volume Deficit  Goal: Maintain normal heart rhythm  2/9/2022 0305 by Suzie Dean RN  Outcome: Ongoing    Goal: Maintain absence of muscle cramping  2/9/2022 0305 by Suzie Dean RN  Outcome: Ongoing    Goal: Maintain normal serum potassium, sodium, calcium, phosphorus, and pH  2/9/2022 0305 by Suzie Dean RN  Outcome: Ongoing       Problem: Skin Integrity:  Goal: Will show no infection signs and symptoms  Description: Will show no infection signs and symptoms  2/9/2022 0305 by Suzie Dean RN  Outcome: Ongoing    Goal: Absence of new skin breakdown  Description: Absence of new skin breakdown  2/9/2022 0305 by Suzie Dean

## 2022-02-09 NOTE — PROGRESS NOTES
[facility]  Progress Note    Leonie Morales  MRN: [id number]  Date: 2/9/2022  LOS: 25     Reason for F/U :   COVID-19 virus infection    Impression :   1. COVID-19 virus infection tested + 1/16/2022  2. Acute hypoxic respiratory failure, currently ventilator dependent  · Intubated 1/23/2022  3. Emphysema with worsening changes on imaging  4. Worsening acute interstitial lung disease and clinically not typical COVID-19 virus infection  5. Worsening moderate to severe pulmonary artery hypertension  6. Bilateral lower extremity edema, likely related to above  7. Leukopenia and thrombocytopenia  8. 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 Fever, intermittently  10. Paroxysmal atrial fibrillation  11.  Acute kidney injury    Recommendations:   · COVID-19 therapy:  · The patient status post course of Decadron  · Patient was started on baricitinib, initiated 1/17/2022, but was discontinued 1/18/2022 due to cytopenias  · Actemra has been considered but again, due to cytopenias and thrombocytopenia, this has not been given    · Antimicrobial therapy:  · Patient received ceftriaxone and azithromycin 1/16/2022  · He received a dose of levofloxacin 1/18/2022  · He was started on cefepime and vancomycin 1/23/2022  · Completed a 7 day course of cefepime 1/28/2022  · Vancomycin was discontinued 1/24/2022 due to the acute kidney injury    · Patient is currently ventilator dependent, FiO2 50% PEEP of 12  · He continues to have intermittent low-grade temperatures  · He is status post PEG tube placement 2/9/2022  · The plan is for patient to undergo tracheostomy when he is stable enough  · Continue supportive care    Infection Control Recommendations:   Droplet plus precautions    Discharge Planning:   Estimated Length of IV antimicrobials:  Patient will need Midline Catheter Insertion/ PICC line Insertion: No  [facility]  [facility],  LTAC: Undetermined  Patient willneed outpatient wound care: No    Medical Decision making / Summary of Stay:   Cynthia Dominguez is a 64y.o.-year-old male who was initially admitted on 1/16/2022.   Glee Habermann has a history of diabetes mellitus type 2, essential hypertension, seizure disorder, chronic kidney disease stage III, hyperlipidemia among other medical problems.     The patient reports that about 1 to 2 months ago he had his diabetes medications changed and since that time he has noticed increasing swelling in his legs, hardness in the legs, difficulty ambulating, and weight gain from 178 to 255 pounds over the last 1 to 2 months. He did not report any subjective fevers, chills, cough or shortness of breath. He denies any loss of smell or change in taste. No abdominal pain nausea vomiting or diarrhea. The patient does report that he has home oxygen that he uses as needed at home and he reports that he hardly needs it. He is vaccinated did receive the J&J vaccine but has not yet received a booster dose.     The patient presented to the emergency department and was found to have leukopenia with a white blood cell count of 2.7 and thrombocytopenia with a platelet count of 383. Creatinine slightly elevated at 1.31 and proBNP is elevated at 9327. Chest x-ray showed hyperinflated lungs with cardiomegaly seen and diffuse increased interstitial markings in both lungs which may represent baseline chronic interstitial lung changes given that these findings were present before. A CT of the chest was done with IV contrast that showed no evidence of pulmonary embolism and compared to 2008 there is worsening underlying emphysema with worsening subacute or acute interstitial lung disease best seen in the left upper lobe.  Findings were not felt typical for COVID-19 virus pneumonia.   There was thickening and distortion of the left major fissure with an ill-defined masslike focus in the left lower lobe. Rachel Metyashira is worsening moderate to severe pulmonary artery hypertension     COVID testing was positive and I was asked to evaluate and help with COVID-19 virus infection    Current evaluation:2022    BP (!) 134/40   Pulse 68   Temp 99.2 °F (37.3 °C) (Axillary)   Resp 29   Ht 5' 10\" (1.778 m)   Wt 227 lb 12.8 oz (103.3 kg)   SpO2 98%   BMI 32.69 kg/m²     Temperature Range: Temp: 99.2 °F (37.3 °C) Temp  Av.8 °F (37.7 °C)  Min: 98.9 °F (37.2 °C)  Max: 100.8 °F (38.2 °C)    The patient remains ventilator dependent, FiO2  55%, PEEP of 12  Patient continues to have low-grade temperatures but they have overall improved  Patient is on Luke-Synephrine  He continues on Precedex    Review of Systems   Unable to perform ROS: Intubated       Physical Examination :     Physical Exam  Constitutional:       General: He is not in acute distress. Appearance: Normal appearance. He is normal weight. Interventions: He is sedated and intubated. HENT:      Head: Normocephalic and atraumatic. Cardiovascular:      Rate and Rhythm: Tachycardia present. Rhythm irregularly irregular. Pulmonary:      Effort: Pulmonary effort is normal. No respiratory distress. He is intubated. Breath sounds: Normal breath sounds. Abdominal:      General: Abdomen is flat. Bowel sounds are normal.      Palpations: Abdomen is soft. Musculoskeletal:      Comments: Bilateral feet cool to touch and cyanotic  Bilateral lower extremity stasis dermatitis   Skin:     General: Skin is dry.          Laboratory data:   I have independently reviewed the followinglabs:  CBC with Differential:   Recent Labs     22  0510 22  0540   WBC 13.6* 11.7*   HGB 12.0* 11.5*   HCT 39.7* 37.7*   * 123*   LYMPHOPCT 3* 2*   MONOPCT 5 7     BMP:   Recent Labs     22  0510 22  0540    143   K 3.8 3.8    108*   CO2 26 26   BUN 72* 54*   CREATININE 0.84 0.73   MG 2.0 1.8     Hepatic Function Panel:   No results for input(s): PROT, LABALBU, BILIDIR, IBILI, BILITOT, ALKPHOS, ALT, AST in the last 72 hours. Lab Results   Component Value Date    PROCAL 0.25 02/01/2022    PROCAL 0.24 01/23/2022    PROCAL 0.11 01/19/2022     Lab Results   Component Value Date    CRP 23.5 01/16/2022    CRP 2.0 07/27/2019     Lab Results   Component Value Date    SEDRATE 3 01/16/2022         Lab Results   Component Value Date    DDIMER 2.96 01/30/2022    DDIMER 5.84 01/23/2022    DDIMER 1.53 01/18/2022    DDIMER 1.73 01/16/2022    DDIMER 1.18 12/07/2018     Lab Results   Component Value Date    FERRITIN 569 01/16/2022    FERRITIN 50 07/23/2019    FERRITIN 18 07/08/2019     Lab Results   Component Value Date     01/16/2022     Lab Results   Component Value Date    FIBRINOGEN 402 01/16/2022       Results in Past 30 Days  Result Component Current Result Ref Range Previous Result Ref Range   SARS-CoV-2, Rapid DETECTED (A) (1/16/2022) Not Detected Not in Time Range      Lab Results   Component Value Date    COVID19 DETECTED 01/16/2022    COVID19 Not Detected 11/02/2021       No results for input(s): [facility] in the last 72 hours. Imaging Studies:     Chest xray  Impression:        Persistent bilateral infiltrates with increased in bibasilar opacities since   the prior study. Cultures:     Culture, Blood 1 [[id number]] Collected: 02/08/22 1559   Order Status: Completed Specimen: Blood Updated: 02/09/22 0834    Specimen Description . BLOOD    Special Requests RT ARTERY,10ML    Culture NO GROWTH 12 HOURS   Culture, Blood 1 [[id number]] Collected: 02/08/22 1549   Order Status: Completed Specimen: Blood Updated: 02/09/22 0832    Specimen Description . BLOOD    Special Requests RT HAND,10ML    Culture NO GROWTH 12 HOURS       Medications:      amLODIPine  5 mg Oral BID    insulin lispro  0-18 Units SubCUTAneous TID WC    insulin lispro  0-9 Units SubCUTAneous Nightly    amiodarone  200 mg Per NG tube Daily    insulin glargine  15 Units SubCUTAneous BID    lisinopril  20 mg Oral Daily    Vitamin D  1,000 Units Oral Daily    polyethylene glycol  17 g Per NG tube Daily    levalbuterol  1.25 mg Nebulization Q6H    bisacodyl  10 mg Rectal Daily    pantoprazole  40 mg IntraVENous Daily    And    sodium chloride (PF)  10 mL IntraVENous Daily    aspirin  324 mg Oral Daily    levETIRAcetam  500 mg Oral BID    chlorhexidine  15 mL Mouth/Throat BID    enoxaparin  30 mg SubCUTAneous BID    QUEtiapine  50 mg Oral Once    budesonide  0.5 mg Nebulization BID    sodium chloride flush  5-40 mL IntraVENous 2 times per day    atorvastatin  20 mg Oral Daily           [facility]  HERB Sparks CNP  [employer] messaging  OFFICE: [phone]      Electronically signed by HERB Sparks CNP on 2/9/2022 at 9:20 AM  Thank you for allowing us to participate in the care of this patient. Please call with questions. This note iscreated with the assistance of a speech recognition program.  While intending to generate a document that actually reflects the content of the visit, the document can still have some errors including those of syntax andsound a like substitutions which may escape proof reading. In such instances, actual meaning can be extrapolated by contextual diversion.

## 2022-02-09 NOTE — PROGRESS NOTES
PALLIATIVE CARE NURSING ASSESSMENT    Patient: Gina Bansal  Room: 5592/9375-58    Reason For Consult   Goals of care evaluation  Distress management  Guidance and support  Facilitate communications  Assistance in coordinating care    Code Status:  DNR-CCA      Impression: Gina Bansal is a 64y.o. year old male  has a past medical history of Arthritis, Atherosclerotic plaque, Cervical disc disease, Diabetes mellitus ([address] History of blood transfusion, Hx of blood clots, Hyperlipidemia, Neuropathy, Right kidney mass, Snores, and Type 2 diabetes mellitus treated with insulin ([address] .  Currently hospitalized for the management of Covid 19 pneumonia acute hypoxic respiratory failure . The Palliative Care Team is following to assist with patient and family support/ goals of care    Vital Signs  Blood pressure (!) 134/40, pulse 69, temperature 99.2 °F (37.3 °C), temperature source Axillary, resp. rate (!) 32, height 5' 10\" (1.778 m), weight 227 lb 12.8 oz (103.3 kg), SpO2 97 %. Patient Active Problem List   Diagnosis    Biventricular CHF (congestive heart failure) (HCC)    CKD (chronic kidney disease) stage 3, GFR 30-59 ml/min ([facility])    Essential hypertension    Blurred vision, right eye    Type 2 diabetes mellitus treated with insulin ([address]    Atherosclerotic plaque    Weakness on left side of face    Carotid stenosis, asymptomatic, bilateral    New onset seizure ([address]    COVID-19    Acute on chronic systolic CHF (congestive heart failure) (HCC)    Acute respiratory failure with hypoxia ([address]    BLANCHE (acute kidney injury) ([address]    COPD exacerbation ([address]    Hypokalemia    Hypomagnesemia    Palliative care encounter    Goals of care, counseling/discussion    DNR (do not resuscitate) discussion    ACP (advance care planning)    Constipation    Abdominal pain, generalized       Palliative Interaction:  Reviewed patients chart  Update received from bedside nurse Sahra Valentino.   Pt intubated and sedated on ventilator. Visited patient's bedside. Vent settings: FIO2 55%, vent rate of 32, peep 14. Pt's pulse ox reading 97%, respiratory rate 32, bp 124/41  Pt on precedex, fentanyl, versed,neosynephrine and propofol. Peg tube placed today at bedside. Appears to be resting comfortably. Called and spoke with Deepti Farmer patient's daughter. Reminded her that I am with palliative care and offered her support. Wanted to let her know that peg tube was placed without difficulty this am.  Let her know that her dad is out of isolation and let her know that they can come up. I answered questions regarding isolation, and ventilator/tracheostomy questions. Support offered, encouraged her to call us as needed. Vero relates that her and her mom are planning on coming in tomorrow. Tomorrow is Vero and Viet's dad's birthday. Confirmed DNRCCA status and daughter relates that is in line with the family's wishes. Wife Juan Luis Cronin is primary decision maker.          Goals/Plan of care  Education/support to staff  Education/support to family  Communications with primary service  Providing support for coping/adaptation/distress of family  Discussing meaning/purpose   Continue with current plan of care  Clarification of medical condition to patient and family  Code status clarified: [facility]  Validating patient/family distress  Continued communication updates  Recognizing, reflecting, and empathizing with family members' anticipatory grief      Palliative Care Coordinator  Manjinder SIMMONS, RN, [facility] Office: [phone] Office: [phone]    For [facility] scheduling please call [phone]

## 2022-02-09 NOTE — OP NOTE
PROCEDURE NOTE    DATE OF PROCEDURE: 2/9/2022     SURGEON: Florentino Joy MD    ASSISTANT: None    PREOPERATIVE DIAGNOSIS: PATIENT INTUBATED NEED PEG AND TRACH  PER PULMONARY   CONSENT WAS TAKEN FROM WIFE ON PHONE    POSTOPERATIVE DIAGNOSIS: As described below    OPERATION: Upper GI endoscopy with PEG TUBE PLACEMENT     ANESTHESIA: MAC PER ANESTHESIA     ESTIMATED BLOOD LOSS: Less than 50 ml    COMPLICATIONS: None. SPECIMENS:  Was Not Obtained    HISTORY: The patient is a 64y.o. year old male with history of above preop diagnosis. I recommended esophagogastroduodenoscopy with possible biopsy and I explained the risk, benefits, expected outcome, and alternatives to the procedure. Risks included but are not limited to bleeding, infection, respiratory distress, hypotension, and perforation of the esophagus, stomach, or duodenum. Patient understands and is in agreement. PROCEDURE: The patient was given IV conscious sedation. The patient's SPO2 remained above 90% throughout the procedure. The gastroscope was inserted orally and advanced under direct vision through the esophagus, through the stomach, through the pylorus, and into the descending duodenum. Findings:    Retropharyngeal area THICK SECRETIONS INTUBATED    Esophagus: abnormal: ESOPHAGITIS GRADE 2    Stomach:    Fundus: abnormal: GASTRITIS MOD DIFFUSE    Body: abnormal: AS ABOVE    AN AREA WAS TRANSILLUMINATED AND EXTERNALLY AREAS WAS DRAPED AND CLEANED    A 20 FR BARD TYPE GASTROSTOMY TUBE WAS PASSED OVER GUIDE WIRE UNDER ALL POSSIBLE ASEPTIC MEASURES       Antrum: abnormal: SEVERE GASTRITIS     Duodenum:     Descending: abnormal: EDEMA AND DUODENITIS WITH EROSIONS    Bulb: abnormal: AS ABOVE    The scope was removed and the patient tolerated the procedure well. Recommendations/Plan:     1. Discussed with the family  2.  Post sedation patient was stable with stable vital signs and stable O2 saturations    Electronically signed by Yan Pham Taryn Bagley MD  on 2/9/2022 at 10:23 AM

## 2022-02-09 NOTE — PROGRESS NOTES
General Surgery:  Daily Progress Note                PATIENT NAME: Lana Cheng     TODAY'S DATE: 2/9/2022, 6:04 AM    SUBJECTIVE:     Pt seen and examined at bedside this morning. No acute events overnight. Afebrile. Patient continues to be intubated and sedated. Currently on 17 mcg of phenylephrine. FiO2-55%, PEEP-14. Unable to obtain review of systems due to patient condition    OBJECTIVE:   VITALS:  BP (!) 134/40   Pulse 61   Temp 98.9 °F (37.2 °C) (Oral)   Resp 29   Ht 5' 10\" (1.778 m)   Wt 228 lb 1.6 oz (103.5 kg)   SpO2 97%   BMI 32.73 kg/m²      INTAKE/OUTPUT:      Intake/Output Summary (Last 24 hours) at 2/9/2022 0604  Last data filed at 2/9/2022 0502  Gross per 24 hour   Intake 2515.93 ml   Output 1875 ml   Net 640.93 ml       PHYSICAL EXAM:  General Appearance: Intubated, sedated, does not follow commands  HEENT:  Normocephalic, atraumatic, mucus membranes moist   Skin:  Skin color, texture, turgor normal. No rashes or lesions. Lungs:  Normal expansion. Clear to auscultation. No rales, rhonchi, or wheezing. Heart:  Heart regular rate and rhythm  Abdomen:   soft, ND, nondistended, previous midline laparotomy scar, no peritoneal signs  Extremities: Extremities warm to touch, pink, with no edema.         Data:  CBC with Differential:    Lab Results   Component Value Date    WBC 11.7 02/09/2022    RBC 4.64 02/09/2022    HGB 11.5 02/09/2022    HCT 37.7 02/09/2022     02/09/2022    MCV 81.3 02/09/2022    MCH 24.8 02/09/2022    MCHC 30.5 02/09/2022    RDW 15.9 02/09/2022    LYMPHOPCT PENDING 02/09/2022    MONOPCT PENDING 02/09/2022    BASOPCT PENDING 02/09/2022    MONOSABS PENDING 02/09/2022    LYMPHSABS PENDING 02/09/2022    EOSABS PENDING 02/09/2022    BASOSABS PENDING 02/09/2022    DIFFTYPE NOT REPORTED 02/09/2022     BMP:    Lab Results   Component Value Date     02/08/2022    K 3.8 02/08/2022     02/08/2022    CO2 26 02/08/2022    BUN 72 02/08/2022    LABALBU 2.4 02/03/2022    CREATININE 0.84 02/08/2022    CALCIUM 8.9 02/08/2022    GFRAA >60 02/08/2022    LABGLOM >60 02/08/2022    GLUCOSE 324 02/08/2022       ASSESSMENT:  Active Hospital Problems    Diagnosis Date Noted    Constipation [K59.00]     Abdominal pain, generalized [R10.84]     Acute on chronic systolic CHF (congestive heart failure) ([facility]) [I50.23]     Acute respiratory failure with hypoxia ([facility]) [J96.01]     BLANCHE (acute kidney injury) ([facility] [address] [N17.9]     COPD exacerbation ([facility]) [J44.1]     Hypokalemia [E87.6]     Hypomagnesemia [E83.42]     Palliative care encounter [Z51.5]     Goals of care, counseling/discussion [Z71.89]     DNR (do not resuscitate) discussion [Z71.89]     ACP (advance care planning) [Z71.89]     COVID-19 [U07.1] 01/16/2022       1. 64 y.o. male with COVID-19 with history of COPD exacerbation, currently with respiratory failure requiring vent support    Plan:  1. Patient was seen and examined at bedside this morning  2. Patient currently requiring FiO2-55%, PEEP 14, increased vent settings over the last 24 hours. PF ratio-89 indicative of severe acute respiratory failure. .  Patient currently not at a respiratory status that is amenable to tracheostomy at this time. 3. GI consulted for PEG tube placement.   4. Continue medical management per primary team.    Electronically signed by Chris Alfred DO  on 2/9/2022 at 6:04 AM

## 2022-02-09 NOTE — PROGRESS NOTES
PEEP  12, wean to 10  · Wean PEEP as tolerated  · Fentanyl drip, 50 mics  · Start weaning versed drip  · Continue precedex drip  · Monitor blood sugars off insulin drip  · Monitor off Luke-Synephrine, keep map  65 to 75 mmHg  · Amiodarone per cardiology. · Pulmicort aerosol treatment  · Airborne isolation  · IV Decadron 6 mg daily  · Not a candidate for Actemra/baricitinib stopped secondary to cytopenias  · Neurology following for stroke workup  · Nephology following for hyperkalemia   · Xopenex every 6 hours  · Tube feeds as tolerated  · X-ray chest in am  · Labs: CBC and BMP in am  · DVT prophylaxis with low molecular weight heparin  · GI prophylaxis, Protonix  · Discussed with RN/RT  · General surgery following, tracheostomy when respiratory FiO2 and PEEP is improved.   · Will follow with you    Electronically signed by     eJrson Adler MD on 2/9/2022 at 3:26 PM  Pulmonary Critical Care and Sleep Medicine,  [facility]  Cell: [phone]  Office: [phone]  Cc: 35 minutes

## 2022-02-09 NOTE — PROGRESS NOTES
Section of Cardiology  Progress Note      Date:  2/9/2022  Patient: Betsy Corcoran  Admission:  1/16/2022 11:51 AM  Admit DX: Hypokalemia [E87.6]  Hypomagnesemia [E83.42]  COPD exacerbation (HCC) [J44.1]  BLANCHE (acute kidney injury) ([facility] [address] [N17.9]  Acute respiratory failure with hypoxia (HCC) [J96.01]  Acute on chronic systolic CHF (congestive heart failure) ([facility] [address] [I50.23]  COVID [U07.1]  COVID-19 [U07.1]  Age:  64 y.o., 1945     LOS: 24 days     Reason for evaluation:   atrial fibrillation      SUBJECTIVE:     The patient was seen and examined. Notes and labs reviewed. Pt remains intubated/sedated  PEG tube placed today  Pt out of isolation  Remains in NSR on PO amio 200mg  Requiring ranulfo for low Bps when sedation increased. Bps high when sedation removed due to anxiety    OBJECTIVE:      EXAM:   Vitals:    VITALS:  BP (!) 134/40   Pulse 69   Temp 99.2 °F (37.3 °C) (Axillary)   Resp (!) 32   Ht 5' 10\" (1.778 m)   Wt 227 lb 12.8 oz (103.3 kg)   SpO2 97%   BMI 32.69 kg/m²   24HR INTAKE/OUTPUT:      Intake/Output Summary (Last 24 hours) at 2/9/2022 1151  Last data filed at 2/9/2022 0502  Gross per 24 hour   Intake 2215.93 ml   Output 1875 ml   Net 340.93 ml       Physical Exam  Vitals and nursing note reviewed. Constitutional:       Comments: Intubated/sedated   Neck:      Vascular: No carotid bruit. Cardiovascular:      Rate and Rhythm: Normal rate and regular rhythm. Pulses: Normal pulses. Heart sounds: Normal heart sounds. No murmur heard. Pulmonary:      Effort: Pulmonary effort is normal.      Breath sounds: Normal breath sounds. Comments: Intubated. Respiratory coarse breath sounds. Equal entry  Abdominal:      General: Abdomen is flat. Palpations: Abdomen is soft. Comments: PEG tube placement today   Musculoskeletal:      Right lower leg: No edema. Left lower leg: No edema. Skin:     General: Skin is warm and dry.    Neurological:      Comments: Intubated/sedated   Psychiatric:      Comments: Intubated/sedated         Current Inpatient Medications:   amLODIPine  5 mg Oral BID    insulin lispro  0-18 Units SubCUTAneous TID WC    insulin lispro  0-9 Units SubCUTAneous Nightly    amiodarone  200 mg Per NG tube Daily    insulin glargine  15 Units SubCUTAneous BID    lisinopril  20 mg Oral Daily    Vitamin D  1,000 Units Oral Daily    polyethylene glycol  17 g Per NG tube Daily    levalbuterol  1.25 mg Nebulization Q6H    bisacodyl  10 mg Rectal Daily    pantoprazole  40 mg IntraVENous Daily    And    sodium chloride (PF)  10 mL IntraVENous Daily    aspirin  324 mg Oral Daily    levETIRAcetam  500 mg Oral BID    chlorhexidine  15 mL Mouth/Throat BID    enoxaparin  30 mg SubCUTAneous BID    QUEtiapine  50 mg Oral Once    budesonide  0.5 mg Nebulization BID    sodium chloride flush  5-40 mL IntraVENous 2 times per day    atorvastatin  20 mg Oral Daily       IV Infusions (if any):   phenylephrine (KEARA-SYNEPHRINE) 50mg/250mL infusion 12 mcg/min (02/09/22 1100)    dextrose      dexmedetomidine (PRECEDEX) IV infusion 0.7 mcg/kg/hr (02/09/22 0510)    fentaNYL 50 mcg/hr (02/09/22 0445)    propofol Stopped (02/09/22 1030)    midazolam (VERSED) 1 mg/mL in D5W infusion 4 mg/hr (02/09/22 0548)    sodium chloride         Diagnostics:   Telemetry: NSR/Sinus Thai      Labs:   CBC:  Recent Labs     02/08/22  0510 02/09/22  0540   WBC 13.6* 11.7*   HGB 12.0* 11.5*   HCT 39.7* 37.7*   * 123*     Magnesium:  Recent Labs     02/08/22  0510 02/09/22  0540   MG 2.0 1.8     BMP:  Recent Labs     02/08/22  0510 02/09/22  0540    143   K 3.8 3.8   CALCIUM 8.9 9.0   CO2 26 26   BUN 72* 54*   CREATININE 0.84 0.73   LABGLOM >60 >60   GLUCOSE 324* 178*     BNP:No results for input(s): BNP, PROBNP in the last 72 hours. PT/INR:No results for input(s): PROTIME, INR in the last 72 hours. APTT:No results for input(s): APTT in the last 72 hours.   CARDIAC ENZYMES:No results for input(s): MYOGLOBIN, CKTOTAL, CKMB, CKMBINDEX, TROPHS, TROPONINT in the last 72 hours. FASTING LIPID PANEL:  Lab Results   Component Value Date    HDL 30 11/03/2021    TRIG 181 11/03/2021     LIVER PROFILE:  No results for input(s): AST, ALT, LABALBU, ALKPHOS, BILITOT, BILIDIR, IBILI, PROT, GLOB, ALBUMIN in the last 72 hours. ASSESSMENT:  · Paroxysmal Atrial Fibrillation - now in sinus  · Hx of CHD  · HTN  · COVID 19 PNA - managed by others  · Chronic Kidney Disease  · COPD  · Acute Hypoxic Respiratory Failure - intubated  · Hypotension with Wide Pulse Pressure    PLAN:  1. Continue current medications. 2. Cont. Amio 200mg PO/NG daily  3. Cont. Treatment of COVID/pneumonia  4. Supportive management otherwise  5.  Will continue to follow    Discussed with nursing    Lesia Heredia MD

## 2022-02-10 NOTE — PROGRESS NOTES
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 Actemra had been considered but again due to the cytopenias and thrombocytopenia this has been held. Antimicrobial therapy: The patient received Rocephin 1000 mg and azithromycin 500 mg on 1/16/2022  The patient received a dose of levofloxacin 500 mg on 1/18/2020. Patient started on cefepime and vancomycin 1/23/2022 plan completed a 7-day course of cefepime on 1/28/2022  Vancomycin discontinued due to acute kidney injury 1/24/2022    The patient remains on 50% FiO2 on the ventilator and 10 of PEEP. He remains on Precedex, fentanyl and Versed for sedation.   Continue supportive therapy and the patient is awaiting tracheostomy once the respiratory parameters allow    Infection Control Recommendations:   Droplet plus precautions    Discharge Planning:   Patient will need Midline Catheter Insertion/ PICC line Insertion: No  Patient will need: Home IV , [place],  SNF,  LTAC: Undetermined  Patient willneed outpatient wound care: No    Medical Decision making / Summary of Stay:   Donal Cortes is a 64y.o.-year-old male who was initially admitted on 1/16/2022. Huel Kayser has a history of diabetes mellitus type 2, essential hypertension, seizure disorder, chronic kidney disease stage III, hyperlipidemia among other medical problems.     The patient reports that about 1 to 2 months ago he had his diabetes medications changed and since that time he has noticed increasing swelling in his legs, hardness in the legs, difficulty ambulating, and weight gain from 178 to 255 pounds over the last 1 to 2 months. He did not report any subjective fevers, chills, cough or shortness of breath. He denies any loss of smell or change in taste. No abdominal pain nausea vomiting or diarrhea. The patient does report that he has home oxygen that he uses as needed at home and he reports that he hardly needs it. He is vaccinated did receive the J&J vaccine but has not yet received a booster dose.     The patient presented to the emergency department and was found to have leukopenia with a white blood cell count of 2.7 and thrombocytopenia with a platelet count of 026. Creatinine slightly elevated at 1.31 and proBNP is elevated at 9327. Chest x-ray showed hyperinflated lungs with cardiomegaly seen and diffuse increased interstitial markings in both lungs which may represent baseline chronic interstitial lung changes given that these findings were present before. A CT of the chest was done with IV contrast that showed no evidence of pulmonary embolism and compared to 2008 there is worsening underlying emphysema with worsening subacute or acute interstitial lung disease best seen in the left upper lobe. Findings were not felt typical for COVID-19 virus pneumonia. There was thickening and distortion of the left major fissure with an ill-defined masslike focus in the left lower lobe.   There is worsening moderate to severe pulmonary artery hypertension     COVID testing was positive and I was asked to evaluate and help with COVID-19 virus infection    The Laboratory data:   I have independently reviewed the followinglabs:  CBC with Differential:   Recent Labs     02/09/22  0540 02/10/22  0603   WBC 11.7* 7.8   HGB 11.5* 11.3*   HCT 37.7* 37.2*   * 115*   LYMPHOPCT 2* 4*   MONOPCT 7 5     BMP:   Recent Labs     02/09/22  0540 02/10/22  0603    143   K 3.8 3.3*   * 108*   CO2 26 23   BUN 54* 46*   CREATININE 0.73 0.67*   MG 1.8 1.8     Hepatic Function Panel:   No results for input(s): PROT, LABALBU, BILIDIR, IBILI, BILITOT, ALKPHOS, ALT, AST in the last 72 hours. Lab Results   Component Value Date    PROCAL 0.25 02/01/2022    PROCAL 0.24 01/23/2022    PROCAL 0.11 01/19/2022     Lab Results   Component Value Date    CRP 23.5 01/16/2022    CRP 2.0 07/27/2019     Lab Results   Component Value Date    SEDRATE 3 01/16/2022         Lab Results   Component Value Date    DDIMER 2.96 01/30/2022    DDIMER 5.84 01/23/2022    DDIMER 1.53 01/18/2022    DDIMER 1.73 01/16/2022    DDIMER 1.18 12/07/2018     Lab Results   Component Value Date    FERRITIN 569 01/16/2022    FERRITIN 50 07/23/2019    FERRITIN 18 07/08/2019     Lab Results   Component Value Date     01/16/2022     Lab Results   Component Value Date    FIBRINOGEN 402 01/16/2022       Results in Past 30 Days  Result Component Current Result Ref Range Previous Result Ref Range   SARS-CoV-2, Rapid DETECTED (A) (1/16/2022) Not Detected Not in Time Range      Lab Results   Component Value Date    COVID19 DETECTED 01/16/2022    COVID19 Not Detected 11/02/2021       No results for input(s): VANCOTROUGH in the last 72 hours. Imaging Studies:   ONE XRAY VIEW OF THE CHEST 2/10/2022 4:54 am    Impression   Relatively stable cardiopulmonary status as visualized.            CT OF THE ABDOMEN AND PELVIS WITHOUT CONTRAST 1/28/2022 8:34 am    Impression   1.  Overall mildly limited study due to motion and lack of contrast.       2.  No evidence of bowel obstruction.  Moderate stool burden is noted, possibly related to constipation.  Enteric tube is in place with distal tip   in the proximal stomach.       3.  Tiny amount of free fluid scattered in the abdomen, including   presacral/perirectal fluid, most likely related to volume overload.  No   definite findings of rectal wall thickening or fecal impaction.       4.  Moderate pleural effusions, left basilar consolidation, and bibasilar   interstitial thickening, increased when compared to 01/16/2022.       RECOMMENDATIONS:   Unavailable         Veins: Lower Extremities DVT Study, Venous Scan Lower Bilateral.  Indications for Study: Leg Swelling . Patient Status: In Patient. Technical Quality: Limited visualization. Limitation reason: Edema. Comments: Simultaneous real time imaging utilizing B-Mode, color doppler and spectral waveform analysis was performed on the  bilateral lower extremities for venous examination of the deep and superficial systems. Conclusions  Summary  No evidence of superficial or deep venous thrombosis in both lower extremities. Signature  Electronically signed by Juan Black RVT(Sonographer) on 01/19/2022 08:10 AM  Electronically signed by Kasie Ibarra physician) on 01/19/2022 06:21 PM      CT OF THE HEAD WITHOUT CONTRAST  1/18/2022 5:42 pm  Impression   New lacunar infarct left thalamus, age indeterminate. Jocelyn Capuchin may be helpful.           Cultures:     Culture, Blood 1 [[id number]] Collected: 02/08/22 1549   Order Status: Completed Specimen: Blood Updated: 02/10/22 0714    Specimen Description . BLOOD    Special Requests RT HAND,10ML    Culture NO GROWTH 2 DAYS   Culture, Blood 1 [[id number]] Collected: 02/08/22 1559   Order Status: Completed Specimen: Blood Updated: 02/10/22 0714    Specimen Description . BLOOD    Special Requests RT ARTERY,10ML    Culture NO GROWTH 2 DAYS     Culture, Blood 1 [[id number]] Collected: 02/01/22 0003   Order Status: Completed Specimen: Blood Updated: 02/06/22 0830    Specimen Description . BLOOD    Special Requests ART LINE 20ML    Culture NO GROWTH 5 DAYS   Culture, Blood 1 [[id number]] Collected: 01/31/22 1853   Order Status: Completed Specimen: Blood Updated: 02/05/22 2315    Specimen Description . BLOOD    Special Requests RT HAND,10ML    Culture NO GROWTH 5 DAYS     Culture, Respiratory [[id number]] Collected: 01/28/22 1030   Order Status: Completed Specimen: Sputum, Suctioned Updated: 01/30/22 0932    Specimen Description . SUCTIONED SPUTUM    Special Requests NOT REPORTED    Direct Exam < 10 EPITHELIAL CELLS/LPF     <10 NEUTROPHILS/LPF     NO SIGNIFICANT PATHOGENS SEEN    Culture NORMAL RESPIRATORY PO HEAVY GROWTH       Culture, Blood 1 [[id number]] Collected: 01/23/22 1759   Order Status: Completed Specimen: Blood Updated: 01/28/22 2111    Specimen Description . BLOOD    Special Requests RT HAND 19ML    Culture NO GROWTH 5 DAYS   Culture, Blood 1 [[id number]] Collected: 01/23/22 1759   Order Status: Completed Specimen: Blood Updated: 01/28/22 2110    Specimen Description . BLOOD    Special Requests ART LINE 10ML    Culture NO GROWTH 5 DAYS     Culture, Blood 2 [[id number]] Collected: 01/21/22 0742   Order Status: Completed Specimen: Blood Updated: 01/26/22 1001    Specimen Description . BLOOD    Special Requests LEFT AC 20ML    Culture NO GROWTH 5 DAYS   Culture, Blood 1 [[id number]] Collected: 01/21/22 0750   Order Status: Completed Specimen: Blood Updated: 01/26/22 1000    Specimen Description . BLOOD    Special Requests LEFT HAND 5ML    Culture NO GROWTH 5 DAYS   MRSA DNA Probe, Nasal [[id number]] Collected: 01/23/22 2258   Order Status: Completed Specimen: Nasal Updated: 01/25/22 1038    Specimen Description . NASAL SWAB    MRSA, DNA, Nasal NEGATIVE    Comment: NEGATIVE:  MRSA DNA not detected by nucleic acid amplification.                                                     Results should be used as an adjunct to nosocomial control efforts to identify patients   needing enhanced precautions.     The test is not intended to identify patients with staphylococcal infections.  Results   should not be used to guide or monitor treatment for MRSA infections. Culture, Blood 1 [[id number]] Collected: 01/16/22 1220   Order Status: Completed Specimen: Blood Updated: 01/21/22 1521    Specimen Description . BLOOD    Special Requests LAC 12ML    Culture NO GROWTH 5 DAYS   Culture, Blood 1 [[id number]] Collected: 01/16/22 1205   Order Status: Completed Specimen: Blood Updated: 01/21/22 1521    Specimen Description . BLOOD    Special Requests RAC 12ML    Culture NO GROWTH 5 DAYS       Culture, Urine [[id number]] Collected: 01/16/22 1315   Order Status: Completed Specimen: Urine, clean catch Updated: 01/17/22 2128    Specimen Description . CLEAN CATCH URINE    Special Requests NOT REPORTED    Culture NO SIGNIFICANT GROWTH       COVID-19, Rapid [[id number]] (Abnormal) Collected: 01/16/22 1230   Order Status: Completed Specimen: Nasopharyngeal Swab Updated: 01/16/22 1314    Specimen Description . NASOPHARYNGEAL SWAB    SARS-CoV-2, Rapid DETECTED Abnormal     Comment:        Rapid NAAT: The specimen is POSITIVE for SARS-Cov-2, the novel coronavirus associated with   COVID-19.         This test has been authorized by the FDA under an Emergency Use Authorization (EUA) for use   by authorized laboratories.         The ID NOW COVID-19 assay is designed to detect the virus that causes COVID-19 in patients   with signs and symptoms of infection who are suspected of COVID-19. An individual without symptoms of COVID-19 and who is not shedding SARS-CoV-2 virus would   expect to have a negative (not detected) result in this assay.    Fact sheet for Healthcare Providers: Natacha.sangeetha   Fact sheet for Patients: Natacha.es           Methodology: Isothermal Nucleic Acid Amplification         Results reported to the appropriate Health Department         Flu A/B Ag Detection [[id number]] Collected: 01/16/22 1230   Order Status: Completed Specimen: Nasopharyngeal Swab Updated: 01/16/22 1313    Specimen Description . NASOPHARYNGEAL SWAB    Special Requests NOT REPORTED    Direct Exam NEGATIVE for Influenza A + B antigens.                                PCR testing to confirm this result is available upon request. Breanne Dyer will be saved in the laboratory for 7 days.  Please call [phone] if PCR testing is indicated. Medications:      amLODIPine  5 mg Oral BID    insulin lispro  0-18 Units SubCUTAneous TID WC    insulin lispro  0-9 Units SubCUTAneous Nightly    amiodarone  200 mg Per NG tube Daily    insulin glargine  15 Units SubCUTAneous BID    lisinopril  20 mg Oral Daily    Vitamin D  1,000 Units Oral Daily    polyethylene glycol  17 g Per NG tube Daily    levalbuterol  1.25 mg Nebulization Q6H    bisacodyl  10 mg Rectal Daily    pantoprazole  40 mg IntraVENous Daily    And    sodium chloride (PF)  10 mL IntraVENous Daily    aspirin  324 mg Oral Daily    levETIRAcetam  500 mg Oral BID    chlorhexidine  15 mL Mouth/Throat BID    enoxaparin  30 mg SubCUTAneous BID    QUEtiapine  50 mg Oral Once    budesonide  0.5 mg Nebulization BID    sodium chloride flush  5-40 mL IntraVENous 2 times per day    atorvastatin  20 mg Oral Daily           [facility]  Zafar Tiwari MD  [employer] messaging  OFFICE: [phone]      Electronically signed by Zafar Tiwari MD on 2/10/2022 at 7:39 AM  Thank you for allowing us to participate in the care of this patient. Please call with questions. This note iscreated with the assistance of a speech recognition program.  While intending to generate a document that actually reflects the content of the visit, the document can still have some errors including those of syntax andsound a like substitutions which may escape proof reading.   In such instances, actual meaning can be extrapolated by contextual diversion.

## 2022-02-10 NOTE — CARE COORDINATION
Discharge Planning:    Patient remains intubated and sedated. FIO2 is 50% peep of 10. PEG was placed yesterday. Patient cannot have trach placed yet. Gen. surg following. [facility] following.

## 2022-02-10 NOTE — PROGRESS NOTES
Pulmonary Critical Care Progress Note  Argenis Stuart MD     Patient seen for the follow up of COVID-19 pneumonia, acute hypoxic respiratory failure. Subjective:  He sedated, intubated on ventilator, FiO2 50%/PEEP 10 saturating 99%. He is off Luke-Synephrine. He is off amiodarone drip. He is on Versed drip, fentanyl drip and Precedex drip. He was moving around with examination today. He had PEG tube placed 2/9/2022. Examination:  Vitals: BP (!) 159/45   Pulse 69   Temp 98.5 °F (36.9 °C) (Oral)   Resp 9   Ht 5' 10\" (1.778 m)   Wt 227 lb 12.8 oz (103.3 kg)   SpO2 99%   BMI 32.69 kg/m²   General appearance: Sedated, intubated on ventilator  Neck: No JVD  Lungs: Moderate air exchange, no crackles or wheezing  Heart: regular rate and rhythm, S1, S2 normal, no gallop  Abdomen: Soft, non tender, + BS  Extremities: no cyanosis or clubbing.   Positive edema    LABs:  CBC:   Recent Labs     02/09/22  0540 02/10/22  0603   WBC 11.7* 7.8   HGB 11.5* 11.3*   HCT 37.7* 37.2*   * 115*     BMP:   Recent Labs     02/09/22  0540 02/10/22  0603    143   K 3.8 3.3*   CO2 26 23   BUN 54* 46*   CREATININE 0.73 0.67*   LABGLOM >60 >60   GLUCOSE 178* 196*     ABG:  Lab Results   Component Value Date    VMS2QDS NOT REPORTED 02/10/2022    FIO2 50.0 02/10/2022       Lab Results   Component Value Date    POCPH 7.508 02/10/2022    POCPCO2 37.0 02/10/2022    POCPO2 45.4 02/10/2022    POCHCO3 29.4 02/10/2022    PBEA 6 02/10/2022    KIF7WLF NOT REPORTED 02/10/2022    EMPR0KEM 85 02/10/2022    FIO2 50.0 02/10/2022     Radiology:  X-ray chest 2/10/2022:      Impression:  · Acute on chronic hypoxic respiratory failure  · COVID-19 pneumonia  · COPD/pulmonary emphysema  · Acute left thalamic infarct/CVA  · Left lower lobe opacity versus nodule  · Pulmonary edema  · Elevated D-dimer, decreased platelets  · Systolic heart failure, s/p AICD placement  · BLANCHE on CKD  · Diabetes mellitus    Recommendations:  · Continue vent support, wean FiO2 as tolerated. 50% FiO2, PEEP 10. Decreased to 8. · Fentanyl drip, 50 mics  · Start weaning versed drip, currently at 3 mg  · Continue precedex drip  · Monitor blood sugars off insulin drip  · Monitor off Luke-Synephrine, keep map  65 to 75 mmHg  · Amiodarone per cardiology.     · Pulmicort aerosol treatment  · Airborne isolation  · IV Decadron 6 mg daily  · Not a candidate for Actemra/baricitinib stopped secondary to cytopenias  · Neurology following for stroke workup  · Nephology following for hyperkalemia   · Xopenex every 6 hours  · Tube feeds as tolerated  · X-ray chest in am  · Labs: CBC and BMP in am  · DVT prophylaxis with low molecular weight heparin  · GI prophylaxis, Protonix  · Discussed with RN/RT  · General surgery following for tracheostomy  · Will follow with you    Electronically signed by     Tico Sotelo MD on 2/10/2022 at 11:35 AM  Pulmonary Critical Care and Sleep Medicine,  [facility]  Cell: [phone]  Office: [phone]  Cc: 35 minutes

## 2022-02-10 NOTE — FLOWSHEET NOTE
Patient is Intubated. Writer provided silent prayer of healing, comfort, and rest.    Spiritual care will follow up as needed or requested.      02/10/22 1417   Encounter Summary   Services provided to: Patient   Referral/Consult From: Palliative Care   Continue Visiting   (2/10/2022 Patient Intubated)   Complexity of Encounter Low   Length of Encounter 15 minutes   Routine   Type Follow up   Assessment Unable to respond   Intervention Prayer

## 2022-02-10 NOTE — PROGRESS NOTES
GI Progress notes    2/10/2022   11:54 AM    Name:  Stacey Al  MRN:    [id number]     Acct:     [de-identified]   Room:  [address] [place] [address] Day: 22     Admit Date: 1/16/2022 11:51 AM  PCP: Jocelyne Goodwin MD    Subjective:     C/C:   Chief Complaint   Patient presents with    Leg Swelling     bilateral, has CHF, on diuretic, EMT saw pt , assisted pt into car    Fever    Other     low SPO2       Interval History: Status: not changed. Patient seen and examined. PEG site satisfactory. BS active  Abdomen soft    ROS:  Intubated, sedated    Medications: Allergies:    Allergies   Allergen Reactions    Barbiturates Anxiety     Other reaction(s): Unknown    Codeine Palpitations    Sulfa Antibiotics Rash     Other reaction(s): Unknown       Current Meds: sucralfate (CARAFATE) tablet 1 g, 4 times per day  phenylephrine (KEARA-SYNEPHRINE) 50 mg in dextrose 5 % 250 mL infusion, Continuous  amLODIPine (NORVASC) tablet 5 mg, BID  insulin lispro (HUMALOG) injection vial 0-18 Units, TID WC  insulin lispro (HUMALOG) injection vial 0-9 Units, Nightly  glucose (GLUTOSE) 40 % oral gel 15 g, PRN  glucagon (rDNA) injection 1 mg, PRN  dextrose 5 % solution, PRN  dexmedetomidine (PRECEDEX) 1,000 mcg in sodium chloride 0.9 % 250 mL infusion, Continuous  amiodarone (CORDARONE) tablet 200 mg, Daily  insulin glargine (LANTUS) injection vial 15 Units, BID  lisinopril (PRINIVIL;ZESTRIL) tablet 20 mg, Daily  Vitamin D (CHOLECALCIFEROL) tablet 1,000 Units, Daily  fentaNYL 20 mcg/mL Infusion, Continuous  polyethylene glycol (GLYCOLAX) packet 17 g, Daily  levalbuterol (XOPENEX) nebulizer solution 1.25 mg, Q6H  sodium chloride nebulizer 0.9 % solution 3 mL, Q8H PRN  bisacodyl (DULCOLAX) suppository 10 mg, Daily  pantoprazole (PROTONIX) injection 40 mg, Daily   And  sodium chloride (PF) 0.9 % injection 10 mL, Daily  aspirin chewable tablet 324 mg, Daily  levETIRAcetam (KEPPRA) 100 MG/ML solution 500 mg, BID  propofol injection, Titrated  midazolam (VERSED) 1 mg/mL in D5W infusion, Continuous  chlorhexidine (PERIDEX) 0.12 % solution 15 mL, BID  enoxaparin (LOVENOX) injection 30 mg, BID  LORazepam (ATIVAN) injection 1 mg, Q4H PRN  QUEtiapine (SEROQUEL) tablet 50 mg, Once  budesonide (PULMICORT) nebulizer suspension 500 mcg, BID  dextrose bolus (hypoglycemia) 10% 125 mL, PRN   Or  dextrose bolus (hypoglycemia) 10% 250 mL, PRN  sodium chloride flush 0.9 % injection 5-40 mL, 2 times per day  sodium chloride flush 0.9 % injection 10 mL, PRN  0.9 % sodium chloride infusion, PRN  ondansetron (ZOFRAN-ODT) disintegrating tablet 4 mg, Q8H PRN   Or  ondansetron (ZOFRAN) injection 4 mg, Q6H PRN  acetaminophen (TYLENOL) tablet 650 mg, Q6H PRN   Or  acetaminophen (TYLENOL) suppository 650 mg, Q6H PRN  atorvastatin (LIPITOR) tablet 20 mg, Daily  hydrALAZINE (APRESOLINE) injection 10 mg, Q6H PRN        Data:     Code Status:  DNR-CCA    Family History   Problem Relation Age of Onset    Ovarian Cancer Sister     Ovarian Cancer Sister        Social History     Socioeconomic History    Marital status:      Spouse name: Not on file    Number of children: Not on file    Years of education: Not on file    Highest education level: Not on file   Occupational History    Not on file   Tobacco Use    Smoking status: Former Smoker    Smokeless tobacco: Never Used    Tobacco comment: quit 30 years ago   Substance and Sexual Activity    Alcohol use: No    Drug use: No    Sexual activity: Not on file   Other Topics Concern    Not on file   Social History Narrative    Not on file     Social Determinants of Health     Financial Resource Strain:     Difficulty of Paying Living Expenses: Not on file   Food Insecurity:     Worried About Running Out of Food in the Last Year: Not on file    Dona of Food in the Last Year: Not on file   Transportation Needs:     Lack of Transportation (Medical):  Not on file    Lack of Transportation (Non-Medical): 02/10/2022    MCHC 30.4 02/10/2022    RDW 15.9 02/10/2022    LYMPHOPCT 4 02/10/2022    MONOPCT 5 02/10/2022    BASOPCT 0 02/10/2022    MONOSABS 0.39 02/10/2022    LYMPHSABS 0.31 02/10/2022    EOSABS 0.23 02/10/2022    BASOSABS 0.00 02/10/2022    DIFFTYPE NOT REPORTED 02/10/2022     Hemoglobin/Hematocrit:    Lab Results   Component Value Date    HGB 11.3 02/10/2022    HCT 37.2 02/10/2022     CMP:    Lab Results   Component Value Date     02/10/2022    K 3.3 02/10/2022     02/10/2022    CO2 23 02/10/2022    BUN 46 02/10/2022    CREATININE 0.67 02/10/2022    GFRAA >60 02/10/2022    LABGLOM >60 02/10/2022    GLUCOSE 196 02/10/2022    PROT 5.6 02/03/2022    LABALBU 2.4 02/03/2022    CALCIUM 8.7 02/10/2022    BILITOT 0.37 02/03/2022    ALKPHOS 117 02/03/2022    AST 72 02/03/2022    ALT 77 02/03/2022     BMP:    Lab Results   Component Value Date     02/10/2022    K 3.3 02/10/2022     02/10/2022    CO2 23 02/10/2022    BUN 46 02/10/2022    LABALBU 2.4 02/03/2022    CREATININE 0.67 02/10/2022    CALCIUM 8.7 02/10/2022    GFRAA >60 02/10/2022    LABGLOM >60 02/10/2022    GLUCOSE 196 02/10/2022     PT/INR:    Lab Results   Component Value Date    PROTIME 13.3 01/17/2022    INR 1.0 01/17/2022     PTT:    Lab Results   Component Value Date    APTT 26.0 12/07/2018   [APTT}    Physical Examination:        General appearance: intubated, sedated  Abdomen: soft, nontender, nondistended, bowel sounds present, PEG site satisfactory    Assessment:        Primary Problem  <principal problem not specified>     Active Hospital Problems    Diagnosis Date Noted    Constipation [K59.00]     Abdominal pain, generalized [R10.84]     Acute on chronic systolic CHF (congestive heart failure) ([facility]) [I50.23]     Acute respiratory failure with hypoxia ([facility]) [J96.01]     BLANCHE [facility] [N17.9]     COPD exacerbation ([facility]) [J44.1]     Hypokalemia [E87.6]     Hypomagnesemia [E83.42]     Palliative care encounter [Z51.5]     Goals of care, counseling/discussion [Z71.89]     DNR (do not resuscitate) discussion [Z71.89]     ACP (advance care planning) [Z71.89]     COVID-19 [U07.1] 01/16/2022     Past Medical History:   Diagnosis Date    Arthritis     Atherosclerotic plaque 7/28/2019    Cervical disc disease     degenerative disc disease    Diabetes mellitus ([facility]     type 2    History of blood transfusion     Hx of blood clots     left leg    Hyperlipidemia     Neuropathy     Right kidney mass     \"urologist is watching\"    Snores     Type 2 diabetes mellitus treated with insulin ([facility] 7/28/2019        Plan:        1. Intubated s/p PEG tube placement  1. Consult dietician for ordering and management  2. PEG site satisfactory  3. Monitor for tolerance  4. Keep HOB elevated  5.  Will see as needed    Explained to the patient and d/W Nursing Staff  Will F/U with you  Please call or Page for any issues or change in status  Thanks    Electronically signed by HERB Marsh NP on 2/10/2022 at 11:54 AM none

## 2022-02-10 NOTE — PROGRESS NOTES
Patient's wife requested that nurse call her with update regarding general surgery's decision on trach placement.

## 2022-02-10 NOTE — PROGRESS NOTES
Progress note  [facility].,    Adult Hospitalist      Name: Hans Garcia  MRN: [id number]     Acct: [de-identified]  Room: [facility]5-    Admit Date: 1/16/2022 11:51 AM  PCP: Rangel Hallman MD    Primary Problem  Active Problems:    COVID-19    Acute on chronic systolic CHF (congestive heart failure) (HCC)    Acute respiratory failure with hypoxia (HCC)    BLANCHE (acute kidney injury) ([facility]    COPD exacerbation ([facility]    Hypokalemia    Hypomagnesemia    Palliative care encounter    Goals of care, counseling/discussion    DNR (do not resuscitate) discussion    ACP (advance care planning)    Constipation    Abdominal pain, generalized  Resolved Problems:    * No resolved hospital problems. *        Assesment:   Acute congestive heart failure, diastolic   [id number]   Viral pneumonia secondary to above  Acute respiratory failure with hypoxia intubated on 1/23/2022  Hyperkalemia  Paroxysmal atrial fibrillation  Essential hypertension  Mixed hyperlipidemia  Diabetes mellitus type 2  History of seizure disorder  History of CKD stage III, presently normal renal function  Lung mass?   Leukopenia  Polycythemia  Thrombocytopenia  Anxiety disorder  Recent past h/o lacunar infarct left thalamus   Altered mental status/agitation  Endotracheal intubation secondary to hypoxia dated 1/23/2022  Supraventricular tachycardia/elevated troponin  Fever  Emphysema   Pulmonary artery hypertension       Plan:   Admit patient to intermediate floor  Mechanical ventilation sedation as per critical care   Telemetry  Check vitals closely  CBC BMP daily    Cardiology consult  Amiodarone  Precedex gtt    IV Decadron completed  IV pressors as needed  Patient completed a course of cefepime for 7 days  Bronchodilators  Pulmicort  Patient is deemed not a candidate for Actemra or baricitinib secondary to cytopenia  Infectious disease consult  Pulmonology consult    Continue Keppra  Continue amlodipine, atorvastatin  Continue aspirin    GI signed off   Tube feeds  Consult nephrology   Insulin gtt- DC  Lantus w SSI-->increase to high intensity as BS running high, now better controlled    Plan tracheostomy, continue to wean FiO2  S/P PEG tube today  Continue other medication as below.     Scheduled Meds:   amLODIPine  5 mg Oral BID    insulin lispro  0-18 Units SubCUTAneous TID WC    insulin lispro  0-9 Units SubCUTAneous Nightly    amiodarone  200 mg Per NG tube Daily    insulin glargine  15 Units SubCUTAneous BID    lisinopril  20 mg Oral Daily    Vitamin D  1,000 Units Oral Daily    polyethylene glycol  17 g Per NG tube Daily    levalbuterol  1.25 mg Nebulization Q6H    bisacodyl  10 mg Rectal Daily    pantoprazole  40 mg IntraVENous Daily    And    sodium chloride (PF)  10 mL IntraVENous Daily    aspirin  324 mg Oral Daily    levETIRAcetam  500 mg Oral BID    chlorhexidine  15 mL Mouth/Throat BID    enoxaparin  30 mg SubCUTAneous BID    QUEtiapine  50 mg Oral Once    budesonide  0.5 mg Nebulization BID    sodium chloride flush  5-40 mL IntraVENous 2 times per day    atorvastatin  20 mg Oral Daily     Continuous Infusions:   phenylephrine (KEARA-SYNEPHRINE) 50mg/250mL infusion Stopped (02/09/22 1500)    dextrose      dexmedetomidine (PRECEDEX) IV infusion 0.7 mcg/kg/hr (02/09/22 2046)    fentaNYL 50 mcg/hr (02/09/22 0445)    propofol Stopped (02/09/22 1030)    midazolam (VERSED) 1 mg/mL in D5W infusion 2 mg/hr (02/09/22 1626)    sodium chloride       PRN Meds:  glucose, 15 g, PRN  glucagon (rDNA), 1 mg, PRN  dextrose, 100 mL/hr, PRN  sodium chloride nebulizer, 3 mL, Q8H PRN  LORazepam, 1 mg, Q4H PRN  dextrose bolus (hypoglycemia), 125 mL, PRN   Or  dextrose bolus (hypoglycemia), 250 mL, PRN  sodium chloride flush, 10 mL, PRN  sodium chloride, 25 mL, PRN  ondansetron, 4 mg, Q8H PRN   Or  ondansetron, 4 mg, Q6H PRN  acetaminophen, 650 mg, Q6H PRN   Or  acetaminophen, 650 mg, Q6H PRN  hydrALAZINE, 10 mg, Q6H PRN        Chief Complaint: Chief Complaint   Patient presents with    Leg Swelling     bilateral, has CHF, on diuretic, EMT saw pt , assisted pt into car    Fever    Other     low SPO2         History of Present Illness:     I have seen and examined patient today, patient last 24 hours events were reviewed also discussed with nursing staff  No new issues  Overnight patient did not had any acute issues  Afebrile      Review of systems:  Unable to conduct ROS secondary to patient being intubated      Initial HPI  Rogers Jensen is a 64 y.o.  male who presents with Leg Swelling (bilateral, has CHF, on diuretic, EMT saw pt , assisted pt into car), Fever, and Other (low SPO2)  This is a 73-year-old gentleman admitted via ER, come to ER with complaint of having shortness of breath, patient has medical history significant for COPD patient with history of cardiac failure: Patient noted that he has been having increasing swelling in his lower extremity and becoming more short of breath, further patient also been having some body aches and sweats, patient patient testing in the emergency room showed that he is positive for COVID-19 also noticed to have an elevated BNP, imaging concerning for fluid overload, admitted for further regiment    I have personally reviewed the past medical history, past surgical history, medications, social history, and family history, and summarized in the note.     Review of Systems:       Unable to conduct ROS secondary to patient being intubated      Past Medical History:     Past Medical History:   Diagnosis Date    Arthritis     Atherosclerotic plaque 7/28/2019    Cervical disc disease     degenerative disc disease    Diabetes mellitus ([address]     type 2    History of blood transfusion     Hx of blood clots     left leg    Hyperlipidemia     Neuropathy     Right kidney mass     \"urologist is watching\"    Snores     Type 2 diabetes mellitus treated with insulin ([address] 7/28/2019        Past Surgical History:     Past Surgical History:   Procedure Laterality Date    ABDOMINAL AORTIC ANEURYSM REPAIR  2005    CARPAL TUNNEL RELEASE Bilateral     CERVICAL SPINE SURGERY      COLONOSCOPY      benign polyps removed    INGUINAL HERNIA REPAIR Right     CA REPAIR INCISIONAL HERNIA,REDUCIBLE N/A 5/10/2017    HERNIA VENTRAL REPAIR WITH MESH  performed by Rachell Herbert DO at [facility] ENDOSCOPY      UPPER GASTROINTESTINAL ENDOSCOPY N/A 2/9/2022    EGD   PEG TUBE INSERTION performed by Luke Wang MD at [address]  05/10/2017    with mesh        Medications Prior to Admission:       Prior to Admission medications    Medication Sig Start Date End Date Taking? Authorizing Provider   rosuvastatin (CRESTOR) 40 MG tablet Take 40 mg by mouth every evening   Yes Historical Provider, MD   insulin NPH (HUMULIN N;NOVOLIN N) 100 UNIT/ML injection vial Inject 20 Units into the skin 2 times daily (before meals)   Yes Historical Provider, MD   levETIRAcetam (KEPPRA) 500 MG tablet Take 1 tablet by mouth 2 times daily 11/3/21  Yes Mildred Mitchell MD   venlafaxine (EFFEXOR XR) 150 MG extended release capsule Take 1 capsule by mouth daily (with breakfast) 7/29/19  Yes Arabella Kiser   vitamin B-1 (THIAMINE) 100 MG tablet Take 100 mg by mouth daily   Yes Historical Provider, MD   ferrous sulfate 325 (65 Fe) MG tablet Take 325 mg by mouth daily (with breakfast)   Yes Historical Provider, MD   ALPRAZolam (XANAX) 0.5 MG tablet Take 0.5 mg by mouth three times daily.    Yes Historical Provider, MD   furosemide (LASIX) 40 MG tablet Take 1 tablet by mouth 2 times daily 12/11/18  Yes Aislinn Evans MD   tiotropium (SPIRIVA RESPIMAT) 2.5 MCG/ACT AERS inhaler Inhale 2 puffs into the lungs daily    Yes Historical Provider, MD   albuterol sulfate  (90 Base) MCG/ACT inhaler Inhale 2 puffs into the lungs every 6 hours as needed for Wheezing or Shortness of Breath   Yes Historical Provider, MD   metoprolol succinate (TOPROL XL) 50 MG extended release tablet Take 50 mg by mouth daily   Yes Historical Provider, MD   Multiple Vitamins-Minerals (MULTIVITAMIN ADULT) TABS Take 1 tablet by mouth daily   Yes Historical Provider, MD   vitamin D (CHOLECALCIFEROL) 1000 UNIT TABS tablet Take 1,000 Units by mouth daily   Yes Historical Provider, MD   fluticasone (FLONASE) 50 MCG/ACT nasal spray 1 spray by Each Nare route daily as needed for Rhinitis   Yes Historical Provider, MD   aspirin 325 MG EC tablet Take 325 mg by mouth daily    Yes Historical Provider, MD   Omega-3 Fatty Acids (FISH OIL) 1000 MG CAPS Take 1 capsule by mouth daily    Yes Historical Provider, MD   amLODIPine (NORVASC) 5 MG tablet Take 5 mg by mouth nightly    Yes Historical Provider, MD   mirtazapine (REMERON) 45 MG tablet Take 45 mg by mouth nightly   Yes Historical Provider, MD        Allergies: Barbiturates, Codeine, and Sulfa antibiotics    Social History:     Tobacco:    reports that he has quit smoking. He has never used smokeless tobacco.  Alcohol:      reports no history of alcohol use. Drug Use:  reports no history of drug use.     Family History:     Family History   Problem Relation Age of Onset    Ovarian Cancer Sister     Ovarian Cancer Sister          Physical Exam:     Vitals:  BP (!) 134/40   Pulse 71   Temp 98.7 °F (37.1 °C) (Oral)   Resp (!) 33   Ht 5' 10\" (1.778 m)   Wt 227 lb 12.8 oz (103.3 kg)   SpO2 100%   BMI 32.69 kg/m²   Temp (24hrs), Av.5 °F (37.5 °C), Min:98.6 °F (37 °C), Max:101.2 °F (38.4 °C)      General appearance -on ventilator  Mental status -sedated  Head - normocephalic and atraumatic  Eyes - conjunctiva clear  Ears -external ears normal  Nose - no drainage noted  Mouth - mucous membranes moist  Neck - supple, no carotid bruits, thyroid not palpable  Chest -basal crackles with decreased air entry bilaterally to auscultation, increased effort  Heart - normal rate, regular rhythm, no murmur  Abdomen - soft, nontender, nondistended, bowel sounds present all four quadrants, no masses, hepatomegaly or splenomegaly  Neurological -sedated on vent  Extremities - extremity edema, lower distal extremity discoloration    Skin - no gross lesions, rashes, or induration noted        Data:     Labs:    Hematology:  Recent Labs     02/07/22  0601 02/08/22  0510 02/09/22  0540   WBC 15.1* 13.6* 11.7*   RBC 5.27 4.84 4.64   HGB 13.1 12.0* 11.5*   HCT 43.4 39.7* 37.7*   MCV 82.4* 82.0* 81.3*   MCH 24.9* 24.8* 24.8*   MCHC 30.2 30.2 30.5   RDW 16.1* 15.9* 15.9*   * 129* 123*   MPV Abnormal Abnormal Abnormal     Chemistry:  Recent Labs     02/07/22  0601 02/08/22  0510 02/09/22  0540   * 142 143   K 3.9 3.8 3.8   CL 97* 107 108*   CO2 26 26 26   GLUCOSE 331* 324* 178*   BUN 78* 72* 54*   CREATININE 0.89 0.84 0.73   MG 2.0 2.0 1.8   ANIONGAP 10 9 9   LABGLOM >60 >60 >60   GFRAA >60 >60 >60   CALCIUM 9.0 8.9 9.0   PHOS 3.0 2.2* 2.3*     Recent Labs     02/08/22  0510 02/08/22  0520 02/08/22  1705 02/08/22  2115 02/09/22  0657 02/09/22  1115 02/09/22  1604 02/09/22  1904   LABA1C 9.7*  --   --   --   --   --   --   --    POCGLU  --    < > 215* 228* 149* 172* 107 124*    < > = values in this interval not displayed.        Lab Results   Component Value Date    INR 1.0 01/17/2022    INR 1.0 11/03/2021    INR 1.0 07/27/2019    PROTIME 13.3 01/17/2022    PROTIME 11.1 11/03/2021    PROTIME 10.5 07/27/2019       Lab Results   Component Value Date/Time    SPECIAL RT ARTERY,10ML 02/08/2022 03:59 PM     Lab Results   Component Value Date/Time    CULTURE NO GROWTH 1 DAY 02/08/2022 03:59 PM       Lab Results   Component Value Date    POCPH 7.455 02/09/2022    POCPCO2 43.5 02/09/2022    POCPO2 44.8 02/09/2022    POCHCO3 30.6 02/09/2022    NBEA NOT REPORTED 02/09/2022    PBEA 6 02/09/2022    JYN7IEU NOT REPORTED 02/09/2022    MGFK2SHK 82 02/09/2022    FIO2 45.0 02/09/2022       Radiology:    XR CHEST 1 VIEW    Result Date: 1/16/2022  Hypoinflated lungs. Cardiomegaly again seen, when compared to the previous study performed 07/27/2019. Diffuse, increased interstitial markings throughout both lungs, some of which can be seen on the prior study may represent baseline chronic interstitial lung changes. The increased prominence on this exam could be secondary to the suboptimal inspiratory effort. The presence of pulmonary vascular congestion/mild CHF or bilateral interstitial pneumonia cannot be excluded. Clinical correlation is recommended. CT CHEST PULMONARY EMBOLISM W CONTRAST    Result Date: 1/16/2022  No evidence of pulmonary embolism. Compared to 2008, worsening underlying emphysema, with worsening subacute or acute interstitial lung disease, best seen left upper lobe; differential includes interstitial pneumonia or pneumonitis superimposed on emphysema, DIP, HP, and other interstitial pneumonias as well as infectious or inflammatory process superimposed on emphysema disease. Findings are not typical for COVID-19 pneumonia, but an element of the latter should be considered. Thickening and distortion left major fissure with ill-defined mass-like focus left lower lobe. The latter could also be infectious or inflammatory, although neoplasm should be excluded. Underlying worsening emphysema. Nodular densities, best seen right upper lobe. Small pleural effusions, slightly larger on the left. See recommendations below. Mild mediastinal and left hilar adenopathy; see recommendations below. Worsening now moderate-severe pulmonary artery hypertension. Mild cardiomegaly. Stable mild dilatation ascending thoracic aorta. Additional unchanged or incidental findings, as above. RECOMMENDATIONS: Clinical correlation and short-term follow-up CT chest in 1-4 months depending upon the clinical presentation/course.          All radiological studies reviewed                Code Status:  DNR-CCA    Electronically signed by Verónica Clements MD on 2/9/2022 at 9:46 PM     Copy sent to Dr. Isma Duong MD    This note was created with the assistance of a speech-recognition program.  Although the intention is to generate a document that actually reflects the content of the visit, no guarantees can be provided that every mistake has been identified and corrected by editing. Note was updated later by me after  physical examination and  completion of the assessment.

## 2022-02-10 NOTE — PROGRESS NOTES
Physical Therapy    Facility/Department: [facility] ICU  RE- Assessment    NAME: Neida Contreras  : 1945  MRN: [id number]    Date of Service: 2/10/2022    Discharge Recommendations:  Patient would benefit from continued therapy after discharge ; [place]     Assessment   Body structures, Functions, Activity limitations: Decreased ROM; Decreased strength  Assessment: Pt continues on vent and sedation. FiO2 down to 50% w/ RR > 35 to 40 during ROM. Sedation decreased. Noted vascular changes saman LE's and severe swelling in Rt UE; high risk for skin breakdown. New goals set today. Will continue to follow for ROM as appropriate and will progress as pt able to tolerate. Specific instructions for Next Treatment: patient will need re-assess for mobilty when medically stable  Prognosis: Good  PT Education: Orientation  REQUIRES PT FOLLOW UP: Yes  Activity Tolerance  Activity Tolerance: patient sedated and intubated,  vitals remained stable throughout treatment; RR > 35 - 40  at end of treatment -> RN informed/ aware. Patient Diagnosis(es): The primary encounter diagnosis was COVID-19. Diagnoses of Acute respiratory failure with hypoxia (HCC), BLANCHE (acute kidney injury) ([address] Acute on chronic systolic CHF (congestive heart failure) ([address] Hypokalemia, Hypomagnesemia, and COPD exacerbation ([address] were also pertinent to this visit. has a past medical history of Arthritis, Atherosclerotic plaque, Cervical disc disease, Diabetes mellitus ([address] History of blood transfusion, Hx of blood clots, Hyperlipidemia, Neuropathy, Right kidney mass, Snores, and Type 2 diabetes mellitus treated with insulin ([address] has a past surgical history that includes Abdominal aortic aneurysm repair (); Carpal tunnel release (Bilateral); Cervical spine surgery; Inguinal hernia repair (Right); Upper gastrointestinal endoscopy;  Colonoscopy; ventral hernia repair (05/10/2017); pr repair incisional hernia,reducible (N/A, 5/10/2017); and Upper gastrointestinal endoscopy (N/A, 2/9/2022). Restrictions  Restrictions/Precautions  Restrictions/Precautions: General Precautions,Fall Risk,Isolation,Contact Precautions  Required Braces or Orthoses?: No  Position Activity Restriction  Other position/activity restrictions: Up with assist, telemetry, COVID isolation,  IV, alarms      Subjective  General  Chart Reviewed: Yes  Additional Pertinent Hx: COPD not on oxygen at home, history of heart failure on Lasix Arthritis, Atherosclerotic plaque, Cervical disc disease, DM2,  Neuropathy, Right kidney mass  Response To Previous Treatment: Patient with no complaints from previous session. Subjective  Subjective: Pt orally intubated and sedated. Orientation  Orientation  Overall Orientation Status:  (sedated)  Social/Functional History  Social/Functional History  Lives With: Spouse  Type of Home: House  Home Layout: One level  Home Access: Stairs to enter with rails  Entrance Stairs - Number of Steps: 2  Entrance Stairs - Rails: Both  Bathroom Shower/Tub: Tub/Shower unit  Bathroom Toilet: Handicap height  Bathroom Equipment: Shower chair  Home Equipment: 4 wheeled walker,Cane  ADL Assistance: Independent  Homemaking Assistance: Needs assistance (Spouse completes I ADL's)  Ambulation Assistance: Independent (no device in home, 4WW in community)  Transfer Assistance: Independent  Active : No (spouse or daughter)  Mode of Transportation: Car  Occupation: Retired  Type of occupation: building maintenance  Leisure & Hobbies: collects coins or stamps  Additional Comments: pt denies had recent fall 2 weeks ago when slid out of recliner & flipped him    Objective    ROM RLE: PROM x 10 reps  ROM LLE: PROM x 10 reps  ROM RUE: PROM x 10 reps  ROM LUE: PROM x 10 reps. Pt is at risk for skin breakdown. Patient positioned appropriately after treatment with all high risk areas elevated or propped. Lt sidelying w/ all high risk areas propped;  Lt UE higher than heart; saman heel floating in air. RN assisted with positioning. Plan   Plan  Times per week: 2-3x/week while sedated.   Specific instructions for Next Treatment: patient will need re-assess for mobilty when medically stable  Current Treatment Recommendations: Strengthening,Patient/Caregiver Education & Training  Safety Devices  Type of devices: Left in bed  Restraints  Initially in place: No    AM-PAC Score 6/24        Goals  Short term goals  Time Frame for Short term goals: 12 visits  Short term goal 1: prevent decrease ROM and skin breakdown  Short term goal 2: progress strengthening and cognition stimulation once off sedation  Short term goal 3: progress mobility and re-eval as medically appropriate  Short term goal 4: -  Short term goal 5: -  Patient Goals   Patient goals : unable to verbalize     Therapy Time   Individual   Time In 1515   Time Out 1550   Minutes 35         CYRUS CANO, PT

## 2022-02-10 NOTE — PROGRESS NOTES
.  Nephrology  Progress Note    Reason for Consult: Hyperkalemia    Requesting Physician:  Sahra Guallpa MD    INTERVAL HISTORY:  Blood pressure is improving  Started tube feeding at 10 mL/h   Potassium was low 3.3   serum sodium level has been stable around 143    HISTORY OF PRESENT ILLNESS:    The patient is a 76 y.o. male who presented with to the hospital for worsening shortness of breath ad and worsening lower extremity edema on 1/16. He had diffuse body aches and sweats and a dry cough. He tested positive for COVID-19. He has steadily declined since admission. On 1/18 a CT head found New lacunar infarct left thalamus. He had to be intubated on 1/23. He has a significant past medical history of COPD, hyperlipidemia, Type 2 DM, Right kidney mass, and abdominal aortic aneurysm repair in 2005. His potassium has been steadily rising since 1/24/22. The most current Potassium level is 5.8. His creatine is improved to 1.71. This information was retrieved through chart review and nursing. Patient was unable to provide information due to current status on mechanical ventilation and sedation. Review Of Systems:  Unable to obtain. Patient sedated on ventilator. Past Medical History:   Diagnosis Date    Arthritis     Atherosclerotic plaque 7/28/2019    Cervical disc disease     degenerative disc disease    Diabetes mellitus ([facility] [address]     type 2    History of blood transfusion     Hx of blood clots     left leg    Hyperlipidemia     Neuropathy     Right kidney mass     \"urologist is watching\"    Snores     Type 2 diabetes mellitus treated with insulin ([facility] [address] 7/28/2019       Prior to Admission medications    Medication Sig Start Date End Date Taking?  Authorizing Provider   rosuvastatin (CRESTOR) 40 MG tablet Take 40 mg by mouth every evening   Yes Historical Provider, MD   insulin NPH (HUMULIN N;NOVOLIN N) 100 UNIT/ML injection vial Inject 20 Units into the skin 2 times daily (before meals)   Yes Historical Provider, MD   levETIRAcetam (KEPPRA) 500 MG tablet Take 1 tablet by mouth 2 times daily 11/3/21  Yes Ramona Danielle MD   venlafaxine (EFFEXOR XR) 150 MG extended release capsule Take 1 capsule by mouth daily (with breakfast) 7/29/19  Yes Arabella Kiser   vitamin B-1 (THIAMINE) 100 MG tablet Take 100 mg by mouth daily   Yes Historical Provider, MD   ferrous sulfate 325 (65 Fe) MG tablet Take 325 mg by mouth daily (with breakfast)   Yes Historical Provider, MD   ALPRAZolam (XANAX) 0.5 MG tablet Take 0.5 mg by mouth three times daily.    Yes Historical Provider, MD   furosemide (LASIX) 40 MG tablet Take 1 tablet by mouth 2 times daily 12/11/18  Yes Jenny Evans MD   tiotropium (SPIRIVA RESPIMAT) 2.5 MCG/ACT AERS inhaler Inhale 2 puffs into the lungs daily    Yes Historical Provider, MD   albuterol sulfate  (90 Base) MCG/ACT inhaler Inhale 2 puffs into the lungs every 6 hours as needed for Wheezing or Shortness of Breath   Yes Historical Provider, MD   metoprolol succinate (TOPROL XL) 50 MG extended release tablet Take 50 mg by mouth daily   Yes Historical Provider, MD   Multiple Vitamins-Minerals (MULTIVITAMIN ADULT) TABS Take 1 tablet by mouth daily   Yes Historical Provider, MD   vitamin D (CHOLECALCIFEROL) 1000 UNIT TABS tablet Take 1,000 Units by mouth daily   Yes Historical Provider, MD   fluticasone (FLONASE) 50 MCG/ACT nasal spray 1 spray by Each Nare route daily as needed for Rhinitis   Yes Historical Provider, MD   aspirin 325 MG EC tablet Take 325 mg by mouth daily    Yes Historical Provider, MD   Omega-3 Fatty Acids (FISH OIL) 1000 MG CAPS Take 1 capsule by mouth daily    Yes Historical Provider, MD   amLODIPine (NORVASC) 5 MG tablet Take 5 mg by mouth nightly    Yes Historical Provider, MD   mirtazapine (REMERON) 45 MG tablet Take 45 mg by mouth nightly   Yes Historical Provider, MD       Scheduled Meds:   sucralfate  1 g Oral 4 times per day    amLODIPine  5 mg Oral BID    insulin lispro  0-18 Units SubCUTAneous TID     insulin lispro  0-9 Units SubCUTAneous Nightly    amiodarone  200 mg Per NG tube Daily    insulin glargine  15 Units SubCUTAneous BID    lisinopril  20 mg Oral Daily    Vitamin D  1,000 Units Oral Daily    polyethylene glycol  17 g Per NG tube Daily    levalbuterol  1.25 mg Nebulization Q6H    bisacodyl  10 mg Rectal Daily    pantoprazole  40 mg IntraVENous Daily    And    sodium chloride (PF)  10 mL IntraVENous Daily    aspirin  324 mg Oral Daily    levETIRAcetam  500 mg Oral BID    chlorhexidine  15 mL Mouth/Throat BID    enoxaparin  30 mg SubCUTAneous BID    QUEtiapine  50 mg Oral Once    budesonide  0.5 mg Nebulization BID    sodium chloride flush  5-40 mL IntraVENous 2 times per day    atorvastatin  20 mg Oral Daily     Continuous Infusions:   phenylephrine (KEARA-SYNEPHRINE) 50mg/250mL infusion Stopped (02/09/22 1500)    dextrose      dexmedetomidine (PRECEDEX) IV infusion 0.9 mcg/kg/hr (02/10/22 0839)    fentaNYL 50 mcg/hr (02/10/22 0015)    propofol Stopped (02/09/22 1030)    midazolam (VERSED) 1 mg/mL in D5W infusion 3 mg/hr (02/10/22 0839)    sodium chloride          Physical Exam:  Vitals:    02/10/22 0900 02/10/22 1000 02/10/22 1120 02/10/22 1145   BP:       Pulse: 75 72 69 69   Resp: 29 27 9 20   Temp:       TempSrc:       SpO2: 100% 100% 99% 97%   Weight:       Height:         I/O last 3 completed shifts: In: 2601.5 [I.V.:1071.5; NG/GT:1530]  Out: 8135 [Urine:2275]    General: Intubated, sedated t, not in distress. Appears to be stated age. HEENT: Atraumatic, normocephalic. Anicteric sclera. Pink and moist oral mucosa. Neck supple. No JVD. Chest: Bilateral air entry, clear to auscultation, no wheezing, rhonchi or rales. Cardiovascular: RRR, S1S2, no murmur, rub or gallop. Bilateral mild lower extremity edema. Abdomen: Soft, non tender to palpation. Musculoskeletal: No cyanosis or clubbing.   Integumentary: Pink, warm and dry. Free from rash or lesions. CNS: Sedated, intubated, face symmetrical. No tremor.      Data:  CBC:   Lab Results   Component Value Date    WBC 7.8 02/10/2022    HGB 11.3 (L) 02/10/2022    HCT 37.2 (L) 02/10/2022    MCV 81.6 (L) 02/10/2022     (L) 02/10/2022     BMP:    Lab Results   Component Value Date     02/10/2022     02/09/2022     02/08/2022    K 3.3 (L) 02/10/2022    K 3.8 02/09/2022    K 3.8 02/08/2022     (H) 02/10/2022     (H) 02/09/2022     02/08/2022    CO2 23 02/10/2022    CO2 26 02/09/2022    CO2 26 02/08/2022    BUN 46 (H) 02/10/2022    BUN 54 (H) 02/09/2022    BUN 72 (H) 02/08/2022    CREATININE 0.67 (L) 02/10/2022    CREATININE 0.73 02/09/2022    CREATININE 0.84 02/08/2022    GLUCOSE 196 (H) 02/10/2022    GLUCOSE 178 (H) 02/09/2022    GLUCOSE 324 (H) 02/08/2022     CMP:   Lab Results   Component Value Date     02/10/2022    K 3.3 02/10/2022     02/10/2022    CO2 23 02/10/2022    BUN 46 02/10/2022    CREATININE 0.67 02/10/2022    GLUCOSE 196 02/10/2022    CALCIUM 8.7 02/10/2022    PROT 5.6 02/03/2022    LABALBU 2.4 02/03/2022    BILITOT 0.37 02/03/2022    ALKPHOS 117 02/03/2022    AST 72 02/03/2022    ALT 77 02/03/2022      Hepatic:   Lab Results   Component Value Date    AST 72 (H) 02/03/2022    AST 46 (H) 02/01/2022    AST 28 01/31/2022    ALT 77 (H) 02/03/2022    ALT 43 (H) 02/01/2022    ALT 26 01/31/2022    BILITOT 0.37 02/03/2022    BILITOT 0.28 (L) 02/01/2022    BILITOT 0.32 01/31/2022    ALKPHOS 117 02/03/2022    ALKPHOS 100 02/01/2022    ALKPHOS 99 01/31/2022     BNP: No results found for: BNP  Lipids:   Lab Results   Component Value Date    CHOL 129 11/03/2021    HDL 30 (L) 11/03/2021     INR:   Lab Results   Component Value Date    INR 1.0 01/17/2022    INR 1.0 11/03/2021    INR 1.0 07/27/2019     PTH: No results found for: PTH  Phosphorus:    Lab Results   Component Value Date    PHOS 2.3 02/10/2022     Ionized Calcium: No results found for: IONCA  Magnesium:   Lab Results   Component Value Date    MG 1.8 02/10/2022     Albumin:   Lab Results   Component Value Date    LABALBU 2.4 02/03/2022     Last 3 CK, CKMB, Troponin: @LABRCNT(CKTOTAL:3,CKMB:3,TROPONINI:3)       URINE:)No results found for: Karuna Mcdonald    Radiology:  Reviewed. Assessment:  Hypokalemia, used to have hyperkalemia  Hypernatremia  Hypophosphatemia  Hypovitaminosis D. Metabolic alkalosis. Hypertension. Diabetes mellitis. COVID19 pneumonia. CHF. COPD. Plan:  Replace potassium, we will recheck at 2 PM  Replace electrolytes per protocol  Continue free water flushes 300 ml every 4 hours this evening. Metoprolol discontinued per cardiology. Lisinopril 20 mg daily started 2/2/2022 per cardiology. Monitor K and creatinine closely. Amlodipine was increased to 5 mg BID. Monitor BP, may need more medication. Continue vitamin D supplementation. Avoid hypotension, nephrotoxic drugs, Fleets enema and IV contrast exposure. Follow up chemistries daily in AM.  We will follow with you. Electronically signed by Jose A Wilson MD  on 2/10/2022 at 12:35 PM   [facility].   Ph: [phone]

## 2022-02-10 NOTE — PROGRESS NOTES
Progress note  [facility]    Adult Hospitalist      Name: Milla Gimenez  MRN: [id number]     Acct: [de-identified]  Room: [address] [place] [address]-    Admit Date: 1/16/2022 11:51 AM  PCP: Amalia Mora MD    Primary Problem  Active Problems:    COVID-19    Acute on chronic systolic CHF (congestive heart failure) (HCC)    Acute respiratory failure with hypoxia (HCC)    BLANCHE (acute kidney injury) ([facility] [address]    COPD exacerbation ([facility] [address]    Hypokalemia    Hypomagnesemia    Palliative care encounter    Goals of care, counseling/discussion    DNR (do not resuscitate) discussion    ACP (advance care planning)    Constipation    Abdominal pain, generalized  Resolved Problems:    * No resolved hospital problems. *        Assesment:   Acute congestive heart failure, diastolic   [id number]   Viral pneumonia secondary to above  Acute respiratory failure with hypoxia intubated on 1/23/2022  Hyperkalemia  Paroxysmal atrial fibrillation  Essential hypertension  Mixed hyperlipidemia  Diabetes mellitus type 2  History of seizure disorder  History of CKD stage III, presently normal renal function  Lung mass?   Leukopenia  Polycythemia  Thrombocytopenia  Anxiety disorder  Recent past h/o lacunar infarct left thalamus   Altered mental status/agitation  Endotracheal intubation secondary to hypoxia dated 1/23/2022  Supraventricular tachycardia/elevated troponin  Fever  Emphysema   Pulmonary artery hypertension       Plan:   Admit patient to intermediate floor  Mechanical ventilation sedation as per critical care, patient is requiring PEEP of 8 with 50% FiO2  Telemetry  Check vitals closely  CBC BMP daily    Cardiology consult  Amiodarone  Precedex gtt    IV Decadron completed  Off pressors  Off amiodarone drip  Patient completed a course of cefepime for 7 days  Bronchodilators  Pulmicort  Patient is deemed not a candidate for Actemra or baricitinib secondary to cytopenia  Infectious disease consult  Pulmonology consult    Continue Keppra  Continue amlodipine, atorvastatin  Continue aspirin    GI signed off   Tube feeds  Consult nephrology   Insulin gtt- DC  Lantus w SSI-->increase to high intensity as BS running high, now better controlled    Plan tracheostomy, continue to wean FiO2  S/P PEG tube today  Continue other medication as below.     Scheduled Meds:   sucralfate  1 g Oral 4 times per day    amLODIPine  5 mg Oral BID    insulin lispro  0-18 Units SubCUTAneous TID WC    insulin lispro  0-9 Units SubCUTAneous Nightly    amiodarone  200 mg Per NG tube Daily    insulin glargine  15 Units SubCUTAneous BID    lisinopril  20 mg Oral Daily    Vitamin D  1,000 Units Oral Daily    polyethylene glycol  17 g Per NG tube Daily    levalbuterol  1.25 mg Nebulization Q6H    bisacodyl  10 mg Rectal Daily    pantoprazole  40 mg IntraVENous Daily    And    sodium chloride (PF)  10 mL IntraVENous Daily    aspirin  324 mg Oral Daily    levETIRAcetam  500 mg Oral BID    chlorhexidine  15 mL Mouth/Throat BID    enoxaparin  30 mg SubCUTAneous BID    QUEtiapine  50 mg Oral Once    budesonide  0.5 mg Nebulization BID    sodium chloride flush  5-40 mL IntraVENous 2 times per day    atorvastatin  20 mg Oral Daily     Continuous Infusions:   phenylephrine (KEARA-SYNEPHRINE) 50mg/250mL infusion 10 mcg/min (02/10/22 1535)    dextrose      dexmedetomidine (PRECEDEX) IV infusion 0.8 mcg/kg/hr (02/10/22 1545)    fentaNYL 50 mcg/hr (02/10/22 0015)    propofol Stopped (02/09/22 1030)    midazolam (VERSED) 1 mg/mL in D5W infusion 3 mg/hr (02/10/22 1500)    sodium chloride       PRN Meds:  glucose, 15 g, PRN  glucagon (rDNA), 1 mg, PRN  dextrose, 100 mL/hr, PRN  sodium chloride nebulizer, 3 mL, Q8H PRN  LORazepam, 1 mg, Q4H PRN  dextrose bolus (hypoglycemia), 125 mL, PRN   Or  dextrose bolus (hypoglycemia), 250 mL, PRN  sodium chloride flush, 10 mL, PRN  sodium chloride, 25 mL, PRN  ondansetron, 4 mg, Q8H PRN   Or  ondansetron, 4 mg, Q6H PRN  acetaminophen, 650 mg, Q6H PRN   Or  acetaminophen, 650 mg, Q6H PRN  hydrALAZINE, 10 mg, Q6H PRN        Chief Complaint:     Chief Complaint   Patient presents with    Leg Swelling     bilateral, has CHF, on diuretic, EMT saw pt , assisted pt into car    Fever    Other     low SPO2         History of Present Illness:     Patient seen examined bedside  Patient remains in medical ICU  Patient remains intubated, patient is requiring PEEP of 8 with 50% FiO2  Patient is off pressors and amiodarone drip  No acute issues overnight  Patient is status post PEG tube placement      Review of systems:  Unable to conduct ROS secondary to patient being intubated      Initial HPI  Beatriz Salas is a 76 y.o.  male who presents with Leg Swelling (bilateral, has CHF, on diuretic, EMT saw pt , assisted pt into car), Fever, and Other (low SPO2)  This is a 77-year-old gentleman admitted via ER, come to ER with complaint of having shortness of breath, patient has medical history significant for COPD patient with history of cardiac failure: Patient noted that he has been having increasing swelling in his lower extremity and becoming more short of breath, further patient also been having some body aches and sweats, patient patient testing in the emergency room showed that he is positive for COVID-19 also noticed to have an elevated BNP, imaging concerning for fluid overload, admitted for further regiment    I have personally reviewed the past medical history, past surgical history, medications, social history, and family history, and summarized in the note.     Review of Systems:       Unable to conduct ROS secondary to patient being intubated      Past Medical History:     Past Medical History:   Diagnosis Date    Arthritis     Atherosclerotic plaque 7/28/2019    Cervical disc disease     degenerative disc disease    Diabetes mellitus ([facility] [address]     type 2    History of blood transfusion     Hx of blood clots     left leg    Hyperlipidemia     albuterol sulfate  (90 Base) MCG/ACT inhaler Inhale 2 puffs into the lungs every 6 hours as needed for Wheezing or Shortness of Breath   Yes Historical Provider, MD   metoprolol succinate (TOPROL XL) 50 MG extended release tablet Take 50 mg by mouth daily   Yes Historical Provider, MD   Multiple Vitamins-Minerals (MULTIVITAMIN ADULT) TABS Take 1 tablet by mouth daily   Yes Historical Provider, MD   vitamin D (CHOLECALCIFEROL) 1000 UNIT TABS tablet Take 1,000 Units by mouth daily   Yes Historical Provider, MD   fluticasone (FLONASE) 50 MCG/ACT nasal spray 1 spray by Each Nare route daily as needed for Rhinitis   Yes Historical Provider, MD   aspirin 325 MG EC tablet Take 325 mg by mouth daily    Yes Historical Provider, MD   Omega-3 Fatty Acids (FISH OIL) 1000 MG CAPS Take 1 capsule by mouth daily    Yes Historical Provider, MD   amLODIPine (NORVASC) 5 MG tablet Take 5 mg by mouth nightly    Yes Historical Provider, MD   mirtazapine (REMERON) 45 MG tablet Take 45 mg by mouth nightly   Yes Historical Provider, MD        Allergies: Barbiturates, Codeine, and Sulfa antibiotics    Social History:     Tobacco:    reports that he has quit smoking. He has never used smokeless tobacco.  Alcohol:      reports no history of alcohol use. Drug Use:  reports no history of drug use.     Family History:     Family History   Problem Relation Age of Onset    Ovarian Cancer Sister     Ovarian Cancer Sister          Physical Exam:     Vitals:  BP (!) 159/45   Pulse 84   Temp 98.4 °F (36.9 °C) (Oral)   Resp (!) 38   Ht 5' 10\" (1.778 m)   Wt 227 lb 12.8 oz (103.3 kg)   SpO2 99%   BMI 32.69 kg/m²   Temp (24hrs), Av.6 °F (37 °C), Min:98.4 °F (36.9 °C), Max:98.9 °F (37.2 °C)      General appearance -on ventilator  Mental status -sedated  Head - normocephalic and atraumatic  Eyes - conjunctiva clear  Ears -external ears normal  Nose - no drainage noted  Mouth - mucous membranes moist  Neck - supple, no carotid bruits, thyroid not palpable  Chest -basal crackles with decreased air entry bilaterally to auscultation, increased effort  Heart - normal rate, regular rhythm, no murmur  Abdomen - soft, nontender, nondistended, bowel sounds present all four quadrants, no masses, hepatomegaly or splenomegaly  Neurological -sedated on vent  Extremities - extremity edema, lower distal extremity discoloration    Skin - no gross lesions, rashes, or induration noted        Data:     Labs:    Hematology:  Recent Labs     02/08/22 0510 02/09/22  0540 02/10/22  0603   WBC 13.6* 11.7* 7.8   RBC 4.84 4.64 4.56   HGB 12.0* 11.5* 11.3*   HCT 39.7* 37.7* 37.2*   MCV 82.0* 81.3* 81.6*   MCH 24.8* 24.8* 24.8*   MCHC 30.2 30.5 30.4   RDW 15.9* 15.9* 15.9*   * 123* 115*   MPV Abnormal Abnormal Abnormal     Chemistry:  Recent Labs     02/08/22 0510 02/08/22  0510 02/09/22  0540 02/10/22  0603 02/10/22  1417     --  143 143  --    K 3.8   < > 3.8 3.3* 3.7     --  108* 108*  --    CO2 26  --  26 23  --    GLUCOSE 324*  --  178* 196*  --    BUN 72*  --  54* 46*  --    CREATININE 0.84  --  0.73 0.67*  --    MG 2.0  --  1.8 1.8  --    ANIONGAP 9  --  9 12  --    LABGLOM >60  --  >60 >60  --    GFRAA >60  --  >60 >60  --    CALCIUM 8.9  --  9.0 8.7  --    PHOS 2.2*  --  2.3* 2.3*  --     < > = values in this interval not displayed. Recent Labs     02/08/22  0510 02/08/22  0520 02/09/22  1115 02/09/22  1604 02/09/22  1904 02/10/22  0614 02/10/22  0819 02/10/22  1115   LABA1C 9.7*  --   --   --   --   --   --   --    POCGLU  --    < > 172* 107 124* 143* 180* 180*    < > = values in this interval not displayed.        Lab Results   Component Value Date    INR 1.0 01/17/2022    INR 1.0 11/03/2021    INR 1.0 07/27/2019    PROTIME 13.3 01/17/2022    PROTIME 11.1 11/03/2021    PROTIME 10.5 07/27/2019       Lab Results   Component Value Date/Time    SPECIAL RT ARTERY,10ML 02/08/2022 03:59 PM     Lab Results   Component Value Date/Time CULTURE NO GROWTH 2 DAYS 02/08/2022 03:59 PM       Lab Results   Component Value Date    POCPH 7.508 02/10/2022    POCPCO2 37.0 02/10/2022    POCPO2 45.4 02/10/2022    POCHCO3 29.4 02/10/2022    NBEA NOT REPORTED 02/10/2022    PBEA 6 02/10/2022    ZPL8GQY NOT REPORTED 02/10/2022    PWUM7CGY 85 02/10/2022    FIO2 50.0 02/10/2022       Radiology:    XR CHEST 1 VIEW    Result Date: 1/16/2022  Hypoinflated lungs. Cardiomegaly again seen, when compared to the previous study performed 07/27/2019. Diffuse, increased interstitial markings throughout both lungs, some of which can be seen on the prior study may represent baseline chronic interstitial lung changes. The increased prominence on this exam could be secondary to the suboptimal inspiratory effort. The presence of pulmonary vascular congestion/mild CHF or bilateral interstitial pneumonia cannot be excluded. Clinical correlation is recommended. CT CHEST PULMONARY EMBOLISM W CONTRAST    Result Date: 1/16/2022  No evidence of pulmonary embolism. Compared to 2008, worsening underlying emphysema, with worsening subacute or acute interstitial lung disease, best seen left upper lobe; differential includes interstitial pneumonia or pneumonitis superimposed on emphysema, DIP, HP, and other interstitial pneumonias as well as infectious or inflammatory process superimposed on emphysema disease. Findings are not typical for COVID-19 pneumonia, but an element of the latter should be considered. Thickening and distortion left major fissure with ill-defined mass-like focus left lower lobe. The latter could also be infectious or inflammatory, although neoplasm should be excluded. Underlying worsening emphysema. Nodular densities, best seen right upper lobe. Small pleural effusions, slightly larger on the left. See recommendations below. Mild mediastinal and left hilar adenopathy; see recommendations below. Worsening now moderate-severe pulmonary artery hypertension. Mild cardiomegaly. Stable mild dilatation ascending thoracic aorta. Additional unchanged or incidental findings, as above. RECOMMENDATIONS: Clinical correlation and short-term follow-up CT chest in 1-4 months depending upon the clinical presentation/course. All radiological studies reviewed                Code Status:  DNR-CCA    Electronically signed by Adilene Perez MD on 2/10/2022 at 5:45 PM     Copy sent to Dr. Hollie Deluca MD    This note was created with the assistance of a speech-recognition program.  Although the intention is to generate a document that actually reflects the content of the visit, no guarantees can be provided that every mistake has been identified and corrected by editing. Note was updated later by me after  physical examination and  completion of the assessment.

## 2022-02-10 NOTE — PROGRESS NOTES
General Surgery:  Daily Progress Note            PATIENT NAME: Lana Cheng     TODAY'S DATE: 2/10/2022, 6:12 AM    SUBJECTIVE:     Pt seen and examined at bedside this morning. Afebrile. Patient continues to be intubated and sedated. Currently off pressors. FiO2-50%. PEEP down to 8. OBJECTIVE:   VITALS:  BP (!) 159/50   Pulse 71   Temp 98.9 °F (37.2 °C) (Oral)   Resp (!) 32   Ht 5' 10\" (1.778 m)   Wt 227 lb 12.8 oz (103.3 kg)   SpO2 96%   BMI 32.69 kg/m²      INTAKE/OUTPUT:      Intake/Output Summary (Last 24 hours) at 2/10/2022 0733  Last data filed at 2/10/2022 0500  Gross per 24 hour   Intake 650.08 ml   Output 1400 ml   Net -749.92 ml       PHYSICAL EXAM:  General Appearance: Intubated, sedated, does not follow commands  HEENT:  Normocephalic, atraumatic, mucus membranes moist , endotracheal tube in place  Skin:  Skin color, texture, turgor normal. No rashes or lesions. Lungs:  No chest wall tenderness. Heart:  Heart regular rate and rhythm  Abdomen:   soft, ND, NTTP, +bowel sounds  Extremities: Extremities warm to touch, pink, with no edema.         Data:  CBC with Differential:    Lab Results   Component Value Date    WBC 11.7 02/09/2022    RBC 4.64 02/09/2022    HGB 11.5 02/09/2022    HCT 37.7 02/09/2022     02/09/2022    MCV 81.3 02/09/2022    MCH 24.8 02/09/2022    MCHC 30.5 02/09/2022    RDW 15.9 02/09/2022    LYMPHOPCT 2 02/09/2022    MONOPCT 7 02/09/2022    BASOPCT 0 02/09/2022    MONOSABS 0.82 02/09/2022    LYMPHSABS 0.23 02/09/2022    EOSABS 0.00 02/09/2022    BASOSABS 0.00 02/09/2022    DIFFTYPE NOT REPORTED 02/09/2022     BMP:    Lab Results   Component Value Date     02/09/2022    K 3.8 02/09/2022     02/09/2022    CO2 26 02/09/2022    BUN 54 02/09/2022    LABALBU 2.4 02/03/2022    CREATININE 0.73 02/09/2022    CALCIUM 9.0 02/09/2022    GFRAA >60 02/09/2022    LABGLOM >60 02/09/2022    GLUCOSE 178 02/09/2022         ASSESSMENT:  Active Hospital Problems Diagnosis Date Noted    Constipation [K59.00]     Abdominal pain, generalized [R10.84]     Acute on chronic systolic CHF [facility]congestive heart failure) ([facility]) [I50.23]     Acute respiratory failure with hypoxia ([facility]) [J96.01]     BLANCHE (acute kidney injury) ([facility] [address] [N17.9]     COPD exacerbation ([facility]) [J44.1]     Hypokalemia [E87.6]     Hypomagnesemia [E83.42]     Palliative care encounter [Z51.5]     Goals of care, counseling/discussion [Z71.89]     DNR (do not resuscitate) discussion [Z71.89]     ACP (advance care planning) [Z71.89]     COVID-19 [U07.1] 01/16/2022       1. 76 y.o. male with COVID-19 with acute respiratory failure    Plan:  1. Patient was seen and examined at bedside this morning. 2. Patient continues to be intubated and sedated. PEEP-8, FiO2-50%. PF ratio-90. Patient continues to be in the severe respiratory failure range. 3. Recommend continue aggressive ventilatory management to increase PaO2. We will discuss trach timing.   4. Continue medical management per critical care team.    Electronically signed by Celeste Medrano DO  on 2/10/2022 at 6:12 AM

## 2022-02-10 NOTE — PLAN OF CARE
Problem: Airway Clearance - Ineffective  Goal: Achieve or maintain patent airway  Outcome: Ongoing     Problem: Gas Exchange - Impaired  Goal: Absence of hypoxia  Outcome: Ongoing     Problem:  Body Temperature -  Risk of, Imbalanced  Goal: Ability to maintain a body temperature within defined limits  Outcome: Ongoing     Problem: Nutrition Deficits  Goal: Optimize nutritional status  Outcome: Ongoing  Note: Pt had PEG tube placed, RN began giving flushes at 2000 Q3, TF will be restarted tomorrow     Problem: Infection:  Goal: Will remain free from infection  Description: Will remain free from infection  Outcome: Ongoing     Problem: Safety:  Goal: Free from accidental physical injury  Description: Free from accidental physical injury  Outcome: Ongoing     Problem: Daily Care:  Goal: Daily care needs are met  Description: Daily care needs are met  Outcome: Ongoing     Problem: Skin Integrity:  Goal: Skin integrity will stabilize  Description: Skin integrity will stabilize  Outcome: Ongoing     Problem: Skin Integrity:  Goal: Absence of new skin breakdown  Description: Absence of new skin breakdown  Outcome: Ongoing  Note: Pt turned Q2

## 2022-02-10 NOTE — PROGRESS NOTES
Psychiatric:      Comments: Intubated/sedated         Current Inpatient Medications:   sucralfate  1 g Oral 4 times per day    amLODIPine  5 mg Oral BID    insulin lispro  0-18 Units SubCUTAneous TID WC    insulin lispro  0-9 Units SubCUTAneous Nightly    amiodarone  200 mg Per NG tube Daily    insulin glargine  15 Units SubCUTAneous BID    lisinopril  20 mg Oral Daily    Vitamin D  1,000 Units Oral Daily    polyethylene glycol  17 g Per NG tube Daily    levalbuterol  1.25 mg Nebulization Q6H    bisacodyl  10 mg Rectal Daily    pantoprazole  40 mg IntraVENous Daily    And    sodium chloride (PF)  10 mL IntraVENous Daily    aspirin  324 mg Oral Daily    levETIRAcetam  500 mg Oral BID    chlorhexidine  15 mL Mouth/Throat BID    enoxaparin  30 mg SubCUTAneous BID    QUEtiapine  50 mg Oral Once    budesonide  0.5 mg Nebulization BID    sodium chloride flush  5-40 mL IntraVENous 2 times per day    atorvastatin  20 mg Oral Daily       IV Infusions (if any):   phenylephrine (KEARA-SYNEPHRINE) 50mg/250mL infusion 20 mcg/min (02/10/22 1445)    dextrose      dexmedetomidine (PRECEDEX) IV infusion 0.6 mcg/kg/hr (02/10/22 1445)    fentaNYL 50 mcg/hr (02/10/22 0015)    propofol Stopped (02/09/22 1030)    midazolam (VERSED) 1 mg/mL in D5W infusion Stopped (02/10/22 1415)    sodium chloride         Diagnostics:   Telemetry: NSR/Sinus Thai      Labs:   CBC:  Recent Labs     02/09/22  0540 02/10/22  0603   WBC 11.7* 7.8   HGB 11.5* 11.3*   HCT 37.7* 37.2*   * 115*     Magnesium:  Recent Labs     02/09/22  0540 02/10/22  0603   MG 1.8 1.8     BMP:  Recent Labs     02/09/22  0540 02/09/22  0540 02/10/22  0603 02/10/22  1417     --  143  --    K 3.8   < > 3.3* 3.7   CALCIUM 9.0  --  8.7  --    CO2 26  --  23  --    BUN 54*  --  46*  --    CREATININE 0.73  --  0.67*  --    LABGLOM >60  --  >60  --    GLUCOSE 178*  --  196*  --     < > = values in this interval not displayed.      BNP:No results for input(s): BNP, PROBNP in the last 72 hours. PT/INR:No results for input(s): PROTIME, INR in the last 72 hours. APTT:No results for input(s): APTT in the last 72 hours. CARDIAC ENZYMES:No results for input(s): MYOGLOBIN, CKTOTAL, CKMB, CKMBINDEX, TROPHS, TROPONINT in the last 72 hours. FASTING LIPID PANEL:  Lab Results   Component Value Date    HDL 30 11/03/2021    TRIG 181 11/03/2021     LIVER PROFILE:  No results for input(s): AST, ALT, LABALBU, ALKPHOS, BILITOT, BILIDIR, IBILI, PROT, GLOB, ALBUMIN in the last 72 hours. ASSESSMENT:  · Paroxysmal Atrial Fibrillation - now in sinus  · Hx of CHD  · HTN  · COVID 19 PNA - managed by others  · Chronic Kidney Disease  · COPD  · Acute Hypoxic Respiratory Failure - intubated  · Hypotension with Wide Pulse Pressure    PLAN:  1. Continue current medications. 2. Cont. Amio 200mg PO/NG daily  3. Cont. Treatment of COVID/pneumonia  4. Supportive management otherwise  5.  Will continue to follow    Discussed with nursing    Vanessa Jones MD

## 2022-02-11 NOTE — PROGRESS NOTES
Nutrition Assessment     Type and Reason for Visit: Reassess    Nutrition Recommendations/Plan:   1. Continue NPO diet  2. Nepro goal rate 50 mL/hr. Slowly advance tube feeding Per physician water flush is 300 mL every 4 hrs for elevated sodium      Nutrition Assessment:  Patient remains intubated and sedated. Intermitent fevers continues. Vent settings have increased to 100%, Patient is not appropriate for trach placement at this time. Tube feeding is at 20 mL/hr. Water flushes are per physician- 300 mL every 4 hours for elevated sodium. Malnutrition Assessment:  Malnutrition Status: At risk for malnutrition (Comment)    Estimated Daily Nutrient Needs:  Energy (kcal): 6572-2690 kcal (MSJ 1.2, 1.3 factor); Weight Used for Energy Requirements:  Current     Protein (g): 102-110 gm (1.3-1.4 gm/kg); Weight Used for Protein Requirements:  Ideal          Nutrition Related Findings: Edema: +3 pitting BUE, +2 pitting BLE. Hypoactive bowel sounds.       Current Nutrition Therapies:    Diet NPO  ADULT TUBE FEEDING; Orogastric; Renal Formula; Continuous; 10; Yes; 10; Q 6 hours; 50; 300; Q 4 hours    Anthropometric Measures:  · Height: 5' 10\" (177.8 cm)  · Current Body Wt: 230 lb (104.3 kg)   · BMI: 33    Nutrition Diagnosis:   · Inadequate protein-energy intake related to inadequate protein-energy intake,impaired respiratory function as evidenced by NPO or clear liquid status due to medical condition,intubation,nutrition support - enteral nutrition      Nutrition Interventions:   Food and/or Nutrient Delivery:  Continue NPO,Continue Current Tube Feeding  Nutrition Education/Counseling:  Education not indicated   Coordination of Nutrition Care:  Continue to monitor while inpatient    Goals:  EN support to provide >75% of estimated nutritional needs       Nutrition Monitoring and Evaluation:   Behavioral-Environmental Outcomes:  None Identified   Food/Nutrient Intake Outcomes:  Enteral Nutrition Intake/Tolerance  Physical Signs/Symptoms Outcomes:  Biochemical Data,Weight,Skin,GI Status     Discharge Planning:     Too soon to determine         Khris Calloway  MFN, RDN, LDN  Lead Clinical Dietitian  RD Office Phone [phone]

## 2022-02-11 NOTE — PROGRESS NOTES
Writer spoke with Kasey Pina, wife, to update that vent settings do not allow for moving forward with the tracheostomy at this time.

## 2022-02-11 NOTE — PLAN OF CARE
Problem: Airway Clearance - Ineffective  Goal: Achieve or maintain patent airway  Outcome: Ongoing     Problem: Gas Exchange - Impaired  Goal: Absence of hypoxia  Outcome: Ongoing     Problem:  Body Temperature -  Risk of, Imbalanced  Goal: Ability to maintain a body temperature within defined limits  Outcome: Ongoing  Note: Pt's oral temperature 101.2, tylenol given, blankets removed       Problem: Isolation Precautions - Risk of Spread of Infection  Goal: Prevent transmission of infection  Outcome: Ongoing     Problem: Nutrition Deficits  Goal: Optimize nutritional status  Outcome: Ongoing  Note: Pt receiving tube feed at 20mL an hour able to increase by 10mL every 6 hours     Problem: Fatigue  Goal: Verbalize increase energy and improved vitality  Outcome: Ongoing     Problem: Patient Education: Go to Patient Education Activity  Goal: Patient/Family Education  Outcome: Ongoing     Problem: Safety:  Goal: Free from accidental physical injury  Description: Free from accidental physical injury  Outcome: Ongoing     Problem: Daily Care:  Goal: Daily care needs are met  Description: Daily care needs are met  Outcome: Ongoing     Problem: Skin Integrity:  Goal: Skin integrity will stabilize  Description: Skin integrity will stabilize  Outcome: Ongoing     Problem: Skin Integrity:  Goal: Will show no infection signs and symptoms  Description: Will show no infection signs and symptoms  Outcome: Ongoing

## 2022-02-11 NOTE — PROGRESS NOTES
.  Nephrology  Progress Note    Reason for Consult: Hyperkalemia    Requesting Physician:  Ayanna Shin MD    INTERVAL HISTORY:  Remains sedated on mechanical ventilation FiO2 100%. Potassium was low 3.5  Serum sodium remains stable 145. Patient requiring vasopressor support today. Patient has fever, was as high as 102.9. HISTORY OF PRESENT ILLNESS:    The patient is a 76 y.o. male who presented with to the hospital for worsening shortness of breath ad and worsening lower extremity edema on 1/16. He had diffuse body aches and sweats and a dry cough. He tested positive for COVID-19. He has steadily declined since admission. On 1/18 a CT head found New lacunar infarct left thalamus. He had to be intubated on 1/23. He has a significant past medical history of COPD, hyperlipidemia, Type 2 DM, Right kidney mass, and abdominal aortic aneurysm repair in 2005. His potassium has been steadily rising since 1/24/22. The most current Potassium level is 5.8. His creatine is improved to 1.71. This information was retrieved through chart review and nursing. Patient was unable to provide information due to current status on mechanical ventilation and sedation. Review Of Systems:  Unable to obtain due to patient's current clinical condition. Remains on mechanical ventilation and sedated. Past Medical History:   Diagnosis Date    Arthritis     Atherosclerotic plaque 7/28/2019    Cervical disc disease     degenerative disc disease    Diabetes mellitus ([address]     type 2    History of blood transfusion     Hx of blood clots     left leg    Hyperlipidemia     Neuropathy     Right kidney mass     \"urologist is watching\"    Snores     Type 2 diabetes mellitus treated with insulin ([facility] [address] 7/28/2019       Prior to Admission medications    Medication Sig Start Date End Date Taking?  Authorizing Provider   rosuvastatin (CRESTOR) 40 MG tablet Take 40 mg by mouth every evening   Yes Historical Provider, MD   insulin NPH (HUMULIN N;NOVOLIN N) 100 UNIT/ML injection vial Inject 20 Units into the skin 2 times daily (before meals)   Yes Historical Provider, MD   levETIRAcetam (KEPPRA) 500 MG tablet Take 1 tablet by mouth 2 times daily 11/3/21  Yes Sameer Harrison MD   venlafaxine (EFFEXOR XR) 150 MG extended release capsule Take 1 capsule by mouth daily (with breakfast) 7/29/19  Yes Arabelladaniel Kiser   vitamin B-1 (THIAMINE) 100 MG tablet Take 100 mg by mouth daily   Yes Historical Provider, MD   ferrous sulfate 325 (65 Fe) MG tablet Take 325 mg by mouth daily (with breakfast)   Yes Historical Provider, MD   ALPRAZolam (XANAX) 0.5 MG tablet Take 0.5 mg by mouth three times daily.    Yes Historical Provider, MD   furosemide (LASIX) 40 MG tablet Take 1 tablet by mouth 2 times daily 12/11/18  Yes Rosendo Evans MD   tiotropium (SPIRIVA RESPIMAT) 2.5 MCG/ACT AERS inhaler Inhale 2 puffs into the lungs daily    Yes Historical Provider, MD   albuterol sulfate  (90 Base) MCG/ACT inhaler Inhale 2 puffs into the lungs every 6 hours as needed for Wheezing or Shortness of Breath   Yes Historical Provider, MD   metoprolol succinate (TOPROL XL) 50 MG extended release tablet Take 50 mg by mouth daily   Yes Historical Provider, MD   Multiple Vitamins-Minerals (MULTIVITAMIN ADULT) TABS Take 1 tablet by mouth daily   Yes Historical Provider, MD   vitamin D (CHOLECALCIFEROL) 1000 UNIT TABS tablet Take 1,000 Units by mouth daily   Yes Historical Provider, MD   fluticasone (FLONASE) 50 MCG/ACT nasal spray 1 spray by Each Nare route daily as needed for Rhinitis   Yes Historical Provider, MD   aspirin 325 MG EC tablet Take 325 mg by mouth daily    Yes Historical Provider, MD   Omega-3 Fatty Acids (FISH OIL) 1000 MG CAPS Take 1 capsule by mouth daily    Yes Historical Provider, MD   amLODIPine (NORVASC) 5 MG tablet Take 5 mg by mouth nightly    Yes Historical Provider, MD   mirtazapine (REMERON) 45 MG tablet Take 45 mg by mouth nightly   Yes Historical Provider, MD       Scheduled Meds:   sucralfate  1 g Oral 4 times per day    amLODIPine  5 mg Oral BID    insulin lispro  0-18 Units SubCUTAneous TID WC    insulin lispro  0-9 Units SubCUTAneous Nightly    amiodarone  200 mg Per NG tube Daily    insulin glargine  15 Units SubCUTAneous BID    lisinopril  20 mg Oral Daily    Vitamin D  1,000 Units Oral Daily    polyethylene glycol  17 g Per NG tube Daily    levalbuterol  1.25 mg Nebulization Q6H    bisacodyl  10 mg Rectal Daily    pantoprazole  40 mg IntraVENous Daily    And    sodium chloride (PF)  10 mL IntraVENous Daily    aspirin  324 mg Oral Daily    levETIRAcetam  500 mg Oral BID    chlorhexidine  15 mL Mouth/Throat BID    enoxaparin  30 mg SubCUTAneous BID    QUEtiapine  50 mg Oral Once    budesonide  0.5 mg Nebulization BID    sodium chloride flush  5-40 mL IntraVENous 2 times per day    atorvastatin  20 mg Oral Daily     Continuous Infusions:   phenylephrine (KEARA-SYNEPHRINE) 50mg/250mL infusion 75 mcg/min (02/11/22 1354)    dextrose      dexmedetomidine (PRECEDEX) IV infusion 1.3 mcg/kg/hr (02/11/22 1326)    fentaNYL 50 mcg/hr (02/11/22 1212)    propofol Stopped (02/09/22 1030)    midazolam (VERSED) 1 mg/mL in D5W infusion 4 mg/hr (02/11/22 1331)    sodium chloride          Physical Exam:  Vitals:    02/11/22 1100 02/11/22 1200 02/11/22 1300 02/11/22 1345   BP:       Pulse: 74 75 73 76   Resp:    (!) 31   Temp:       TempSrc:       SpO2: 91% 97% 94% 95%   Weight:       Height:         I/O last 3 completed shifts: In: 4418.2 [I.V.:1116. 2; NG/GT:3202; IV Piggyback:100]  Out: 2100 [Urine:2100]    General: Intubated, sedated, not in distress. Appears to be stated age. HEENT: Atraumatic, normocephalic. Anicteric sclera. Pink and moist oral mucosa. Neck supple. No JVD. Chest: Bilateral air entry, clear to auscultation, no wheezing, rhonchi or rales. Cardiovascular: RRR, S1S2, no murmur, rub or gallop.   Bilateral mild lower extremity edema. Abdomen: Soft, non tender to palpation. Musculoskeletal: No cyanosis or clubbing. Integumentary: Pink, warm and dry. Free from rash or lesions. CNS: Sedated, intubated, face symmetrical. No tremor.      Data:  CBC:   Lab Results   Component Value Date    WBC 14.7 (H) 02/11/2022    HGB 11.5 (L) 02/11/2022    HCT 38.7 (L) 02/11/2022    MCV 82.3 (L) 02/11/2022     02/11/2022     BMP:    Lab Results   Component Value Date     (H) 02/11/2022     02/10/2022     02/09/2022    K 3.5 (L) 02/11/2022    K 3.7 02/10/2022    K 3.3 (L) 02/10/2022     (H) 02/11/2022     (H) 02/10/2022     (H) 02/09/2022    CO2 23 02/11/2022    CO2 23 02/10/2022    CO2 26 02/09/2022    BUN 42 (H) 02/11/2022    BUN 46 (H) 02/10/2022    BUN 54 (H) 02/09/2022    CREATININE 0.73 02/11/2022    CREATININE 0.67 (L) 02/10/2022    CREATININE 0.73 02/09/2022    GLUCOSE 192 (H) 02/11/2022    GLUCOSE 196 (H) 02/10/2022    GLUCOSE 178 (H) 02/09/2022     CMP:   Lab Results   Component Value Date     02/11/2022    K 3.5 02/11/2022     02/11/2022    CO2 23 02/11/2022    BUN 42 02/11/2022    CREATININE 0.73 02/11/2022    GLUCOSE 192 02/11/2022    CALCIUM 8.6 02/11/2022    PROT 5.6 02/03/2022    LABALBU 2.4 02/03/2022    BILITOT 0.37 02/03/2022    ALKPHOS 117 02/03/2022    AST 72 02/03/2022    ALT 77 02/03/2022      Hepatic:   Lab Results   Component Value Date    AST 72 (H) 02/03/2022    AST 46 (H) 02/01/2022    AST 28 01/31/2022    ALT 77 (H) 02/03/2022    ALT 43 (H) 02/01/2022    ALT 26 01/31/2022    BILITOT 0.37 02/03/2022    BILITOT 0.28 (L) 02/01/2022    BILITOT 0.32 01/31/2022    ALKPHOS 117 02/03/2022    ALKPHOS 100 02/01/2022    ALKPHOS 99 01/31/2022     BNP: No results found for: BNP  Lipids:   Lab Results   Component Value Date    CHOL 129 11/03/2021    HDL 30 (L) 11/03/2021     INR:   Lab Results   Component Value Date    INR 1.0 01/17/2022    INR 1.0 11/03/2021    INR 1.0 07/27/2019     PTH: No results found for: PTH  Phosphorus:    Lab Results   Component Value Date    PHOS 2.6 02/11/2022     Ionized Calcium: No results found for: IONCA  Magnesium:   Lab Results   Component Value Date    MG 1.8 02/11/2022     Albumin:   Lab Results   Component Value Date    LABALBU 2.4 02/03/2022     Last 3 CK, CKMB, Troponin: @LABRCNT(CKTOTAL:3,CKMB:3,TROPONINI:3)       URINE:)No results found for: Ozzy Simmons    Radiology:  Reviewed. Assessment:  Hypokalemia, used to have hyperkalemia  Hypernatremia  Hypophosphatemia  Hypovitaminosis D. Metabolic alkalosis. Hypertension. Diabetes mellitis. COVID19 pneumonia. CHF. COPD. Plan:  Potassium replaced today with 40 mEq IV. Potassium recheck stable. Replace electrolytes per protocol. Will increase free water flushes to 350 ml every 4 hours. Repeat echo completed today per cardiology. Metoprolol discontinued per cardiology. Lisinopril 20 mg daily started 2/2/2022 per cardiology. Monitor K and creatinine closely. Amlodipine recently increased to 5 mg BID. Continue vitamin D supplementation. Monitor blood pressure closely. Avoid hypotension, nephrotoxic drugs, Fleets enema and IV contrast exposure. Follow up chemistries in the morning. We will follow with you. Patient seen in collaboration with Dr. Brady Lieberman. Electronically signed by HERB Chiu CNP  on 2/11/2022 at 2:00 PM   [facility].   Ph: [phone]         Physician Addendum  I evaluated the patient with CNP   Agree with above assessment and plan   Increase free water flushes  Ok to start electrolytes replacement protocols     Electronically signed by Jesus Euceda MD on 02/11/22 11:03 PM

## 2022-02-11 NOTE — PROGRESS NOTES
GI Progress notes    2/11/2022   9:55 AM    Name:  Zoey Alan  MRN:    [id number]     Acct:     [de-identified]   Room:  [address] [place] [address] Day: 32     Admit Date: 1/16/2022 11:51 AM  PCP: Byron Miller MD    Subjective:     C/C:   Chief Complaint   Patient presents with    Leg Swelling     bilateral, has CHF, on diuretic, EMT saw pt , assisted pt into car    Fever    Other     low SPO2       Interval History: Status: not changed. Patient seen and examined. No acute events overnight. Discussed with RN  Tolerating TF  Abdomen soft  PEG site satisfactory    ROS:  Unable to assess:  Intubated    Medications: Allergies:    Allergies   Allergen Reactions    Barbiturates Anxiety     Other reaction(s): Unknown    Codeine Palpitations    Sulfa Antibiotics Rash     Other reaction(s): Unknown       Current Meds: potassium chloride 20 mEq/50 mL IVPB (Central Line), PRN  potassium chloride 10 mEq/100 mL IVPB (Peripheral Line), PRN  magnesium sulfate 1000 mg in dextrose 5% 100 mL IVPB, PRN  sucralfate (CARAFATE) tablet 1 g, 4 times per day  phenylephrine (KEARA-SYNEPHRINE) 50 mg in dextrose 5 % 250 mL infusion, Continuous  amLODIPine (NORVASC) tablet 5 mg, BID  insulin lispro (HUMALOG) injection vial 0-18 Units, TID WC  insulin lispro (HUMALOG) injection vial 0-9 Units, Nightly  glucose (GLUTOSE) 40 % oral gel 15 g, PRN  glucagon (rDNA) injection 1 mg, PRN  dextrose 5 % solution, PRN  dexmedetomidine (PRECEDEX) 1,000 mcg in sodium chloride 0.9 % 250 mL infusion, Continuous  amiodarone (CORDARONE) tablet 200 mg, Daily  insulin glargine (LANTUS) injection vial 15 Units, BID  lisinopril (PRINIVIL;ZESTRIL) tablet 20 mg, Daily  Vitamin D (CHOLECALCIFEROL) tablet 1,000 Units, Daily  fentaNYL 20 mcg/mL Infusion, Continuous  polyethylene glycol (GLYCOLAX) packet 17 g, Daily  levalbuterol (XOPENEX) nebulizer solution 1.25 mg, Q6H  sodium chloride nebulizer 0.9 % solution 3 mL, Q8H PRN  bisacodyl (DULCOLAX) suppository 10 mg, Daily  pantoprazole (PROTONIX) injection 40 mg, Daily   And  sodium chloride (PF) 0.9 % injection 10 mL, Daily  aspirin chewable tablet 324 mg, Daily  levETIRAcetam (KEPPRA) 100 MG/ML solution 500 mg, BID  propofol injection, Titrated  midazolam (VERSED) 1 mg/mL in D5W infusion, Continuous  chlorhexidine (PERIDEX) 0.12 % solution 15 mL, BID  enoxaparin (LOVENOX) injection 30 mg, BID  LORazepam (ATIVAN) injection 1 mg, Q4H PRN  QUEtiapine (SEROQUEL) tablet 50 mg, Once  budesonide (PULMICORT) nebulizer suspension 500 mcg, BID  dextrose bolus (hypoglycemia) 10% 125 mL, PRN   Or  dextrose bolus (hypoglycemia) 10% 250 mL, PRN  sodium chloride flush 0.9 % injection 5-40 mL, 2 times per day  sodium chloride flush 0.9 % injection 10 mL, PRN  0.9 % sodium chloride infusion, PRN  ondansetron (ZOFRAN-ODT) disintegrating tablet 4 mg, Q8H PRN   Or  ondansetron (ZOFRAN) injection 4 mg, Q6H PRN  acetaminophen (TYLENOL) tablet 650 mg, Q6H PRN   Or  acetaminophen (TYLENOL) suppository 650 mg, Q6H PRN  atorvastatin (LIPITOR) tablet 20 mg, Daily  hydrALAZINE (APRESOLINE) injection 10 mg, Q6H PRN        Data:     Code Status:  DNR-CCA    Family History   Problem Relation Age of Onset    Ovarian Cancer Sister     Ovarian Cancer Sister        Social History     Socioeconomic History    Marital status:      Spouse name: Not on file    Number of children: Not on file    Years of education: Not on file    Highest education level: Not on file   Occupational History    Not on file   Tobacco Use    Smoking status: Former Smoker    Smokeless tobacco: Never Used    Tobacco comment: quit 30 years ago   Substance and Sexual Activity    Alcohol use: No    Drug use: No    Sexual activity: Not on file   Other Topics Concern    Not on file   Social History Narrative    Not on file     Social Determinants of Health     Financial Resource Strain:     Difficulty of Paying Living Expenses: Not on file   Food Insecurity:     Worried About [facility] in the Last Year: Not on file    Dona of Food in the Last Year: Not on file   Transportation Needs:     Lack of Transportation (Medical): Not on file    Lack of Transportation (Non-Medical): Not on file   Physical Activity:     Days of Exercise per Week: Not on file    Minutes of Exercise per Session: Not on file   Stress:     Feeling of Stress : Not on file   Social Connections:     Frequency of Communication with Friends and Family: Not on file    Frequency of Social Gatherings with Friends and Family: Not on file    Attends Confucianist Services: Not on file    Active Member of [address] [place] [address] or Organizations: Not on file    Attends Club or Organization Meetings: Not on file    Marital Status: Not on file   Intimate Partner Violence:     Fear of Current or Ex-Partner: Not on file    Emotionally Abused: Not on file    Physically Abused: Not on file    Sexually Abused: Not on file   Housing Stability:     Unable to Pay for Housing in the Last Year: Not on file    Number of [place] in the Last Year: Not on file    Unstable Housing in the Last Year: Not on file       Vitals:  BP (!) 156/44   Pulse 79   Temp 99 °F (37.2 °C) (Oral)   Resp (!) 31   Ht 5' 10\" (1.778 m)   Wt 230 lb 9.6 oz (104.6 kg)   SpO2 (!) 89%   BMI 33.09 kg/m²   Temp (24hrs), Av.6 °F (37.6 °C), Min:98.4 °F (36.9 °C), Max:101.2 °F (38.4 °C)    Recent Labs     02/10/22  1115 02/10/22  1738 02/10/22  2006 02/11/22  0719   POCGLU 180* 161* 138* 158*       I/O (24Hr):     Intake/Output Summary (Last 24 hours) at 2022 0955  Last data filed at 2022 0527  Gross per 24 hour   Intake 2660.79 ml   Output 950 ml   Net 1710.79 ml       Labs:      CBC:   Lab Results   Component Value Date    WBC 14.7 2022    RBC 4.70 2022    HGB 11.5 2022    HCT 38.7 2022    MCV 82.3 2022    MCH 24.5 2022    MCHC 29.7 2022    RDW 16.3 2022     2022 MPV 13.4 02/11/2022     CBC with Differential:    Lab Results   Component Value Date    WBC 14.7 02/11/2022    RBC 4.70 02/11/2022    HGB 11.5 02/11/2022    HCT 38.7 02/11/2022     02/11/2022    MCV 82.3 02/11/2022    MCH 24.5 02/11/2022    MCHC 29.7 02/11/2022    RDW 16.3 02/11/2022    LYMPHOPCT 5 02/11/2022    MONOPCT 4 02/11/2022    BASOPCT 0 02/11/2022    MONOSABS 0.59 02/11/2022    LYMPHSABS 0.74 02/11/2022    EOSABS 0.15 02/11/2022    BASOSABS 0.00 02/11/2022    DIFFTYPE NOT REPORTED 02/11/2022     Hemoglobin/Hematocrit:    Lab Results   Component Value Date    HGB 11.5 02/11/2022    HCT 38.7 02/11/2022     CMP:    Lab Results   Component Value Date     02/11/2022    K 3.5 02/11/2022     02/11/2022    CO2 23 02/11/2022    BUN 42 02/11/2022    CREATININE 0.73 02/11/2022    GFRAA >60 02/11/2022    LABGLOM >60 02/11/2022    GLUCOSE 192 02/11/2022    PROT 5.6 02/03/2022    LABALBU 2.4 02/03/2022    CALCIUM 8.6 02/11/2022    BILITOT 0.37 02/03/2022    ALKPHOS 117 02/03/2022    AST 72 02/03/2022    ALT 77 02/03/2022     BMP:    Lab Results   Component Value Date     02/11/2022    K 3.5 02/11/2022     02/11/2022    CO2 23 02/11/2022    BUN 42 02/11/2022    LABALBU 2.4 02/03/2022    CREATININE 0.73 02/11/2022    CALCIUM 8.6 02/11/2022    GFRAA >60 02/11/2022    LABGLOM >60 02/11/2022    GLUCOSE 192 02/11/2022     PT/INR:    Lab Results   Component Value Date    PROTIME 13.3 01/17/2022    INR 1.0 01/17/2022     PTT:    Lab Results   Component Value Date    APTT 26.0 12/07/2018   [APTT}    Physical Examination:        General appearance: alert, cooperative and no distress  Mental Status: oriented to person, place and time and normal affect  Abdomen: soft, nontender, nondistended, bowel sounds present   Assessment:        Primary Problem  <principal problem not specified>     Active Hospital Problems    Diagnosis Date Noted    Constipation [K59.00]     Abdominal pain, generalized [R10.84]

## 2022-02-11 NOTE — PROGRESS NOTES
General Surgery:  Daily Progress Note                 PATIENT NAME: Lana Cheng     TODAY'S DATE: 2/11/2022, 6:39 AM     SUBJECTIVE:     Pt seen and examined at bedside this morning. Patient remains intubated and sedated. T-max 100.4 overnight. FiO2-75% this morning, PEEP-8. OBJECTIVE:   VITALS:  BP (!) 149/46   Pulse 84   Temp 100.4 °F (38 °C) (Axillary)   Resp (!) 35   Ht 5' 10\" (1.778 m)   Wt 227 lb 12.8 oz (103.3 kg)   SpO2 93%   BMI 32.69 kg/m²      INTAKE/OUTPUT:      Intake/Output Summary (Last 24 hours) at 2/11/2022 1716  Last data filed at 2/11/2022 0656  Gross per 24 hour   Intake 2960.79 ml   Output 1400 ml   Net 1560.79 ml       PHYSICAL EXAM:  General Appearance: Intubated, sedated, does not follow commands  HEENT:  Normocephalic, atraumatic, mucus membranes moist , endotracheal tube in place  Skin:  Skin color, texture, turgor normal, bilateral lower extremity edema  Lungs:  No chest wall tenderness. Heart:  Heart regular rate and rhythm  Abdomen:   soft, ND, NTTP, +bowel sounds  Extremities: Extremities warm to touch, pink, with no edema.         Data:  CBC with Differential:    Lab Results   Component Value Date    WBC 14.7 02/11/2022    RBC 4.70 02/11/2022    HGB 11.5 02/11/2022    HCT 38.7 02/11/2022     02/11/2022    MCV 82.3 02/11/2022    MCH 24.5 02/11/2022    MCHC 29.7 02/11/2022    RDW 16.3 02/11/2022    LYMPHOPCT 5 02/11/2022    MONOPCT 4 02/11/2022    BASOPCT 0 02/11/2022    MONOSABS 0.59 02/11/2022    LYMPHSABS 0.74 02/11/2022    EOSABS 0.15 02/11/2022    BASOSABS 0.00 02/11/2022    DIFFTYPE NOT REPORTED 02/11/2022     BMP:    Lab Results   Component Value Date     02/11/2022    K 3.5 02/11/2022     02/11/2022    CO2 23 02/11/2022    BUN 42 02/11/2022    LABALBU 2.4 02/03/2022    CREATININE 0.73 02/11/2022    CALCIUM 8.6 02/11/2022    GFRAA >60 02/11/2022    LABGLOM >60 02/11/2022    GLUCOSE 192 02/11/2022         ASSESSMENT:  Active Hospital Problems Diagnosis Date Noted    Constipation [K59.00]     Abdominal pain, generalized [R10.84]     Acute on chronic systolic CHF (congestive heart failure) ([facility]) [I50.23]     Acute respiratory failure with hypoxia ([facility]) [J96.01]     BLANCHE (acute kidney injury) ([facility] [address] [N17.9]     COPD exacerbation ([facility]) [J44.1]     Hypokalemia [E87.6]     Hypomagnesemia [E83.42]     Palliative care encounter [Z51.5]     Goals of care, counseling/discussion [Z71.89]     DNR (do not resuscitate) discussion [Z71.89]     ACP (advance care planning) [Z71.89]     COVID-19 [U07.1] 01/16/2022       1. 76 y.o. male with acute respiratory failure secondary to COVID-19    Plan:  1. Patient was seen and examined at bedside this morning. 2. Patient continues to be intubated and sedated. FiO2 increased this morning to 75%, PEEP-8. Would recommend decreasing FiO2 and increasing PEEP. ABG reviewed. PF ratio-88. 3. We will plan for trach early next week. Okay to proceed with PEG tube placement by GI consultants. 4. We will follow with you.     Electronically signed by Ramos Hernandez DO  on 2/11/2022 at 6:39 AM

## 2022-02-11 NOTE — PLAN OF CARE
Problem: Body Temperature -  Risk of, Imbalanced  Goal: Ability to maintain a body temperature within defined limits  Outcome: Ongoing  Patient has intermittent fevers. Tylenol and icepacks as indicated. Problem: Nutrition Deficits  Goal: Optimize nutritional status  Outcome: Ongoing     Problem: Gas Exchange - Impaired  Goal: Absence of hypoxia  Outcome: Not Met This Shift  Patient remains on high ventilator settings. Problem: Isolation Precautions - Risk of Spread of Infection  Goal: Prevent transmission of infection  Outcome: Completed   COVID isolation .

## 2022-02-11 NOTE — PROGRESS NOTES
[facility]  Progress Note    Shirley Dior  MRN: [id number]  Date: 2/11/2022  LOS: 32     Reason for F/U :   COVID-19 virus infection    Impression :   COVID-19 virus infection tested + 1/16/2022  Acute respiratory failure currently ventilator dependent  Intubated 1/23/2022  Emphysema with worsening changes on imaging  Worsening acute interstitial lung disease and clinically not typical of COVID-19 virus infection  Worsening moderate to severe pulmonary artery hypertension  Bilateral lower extremity edema likely related to above  Leukopenia and thrombocytopenia  Lacunar  infarct on the left thalamus  Intermittent fevers  Acute kidney injury    Recommendations:   COVID-19 therapy: The patient was on Decadron 6 mg IV daily through 02/04/2022  The patient has been started on baricitinib 1/17/2022 but it was discontinued 1/18/2022 due to cytopenias. Actemra had been considered but again due to the cytopenias and thrombocytopenia this has been held. Antimicrobial therapy: The patient received Rocephin 1000 mg and azithromycin 500 mg on 1/16/2022  The patient received a dose of levofloxacin 500 mg on 1/18/2020. Patient started on cefepime and vancomycin 1/23/2022 plan completed a 7-day course of cefepime on 1/28/2022  Vancomycin discontinued due to acute kidney injury 1/24/2022    The patient's FiO2 requirements increased to 75% with a PEEP was down to 8. The patient continues to have intermittent fevers. He has bilateral upper extremity swelling and I would wonder about DVTs. Infection Control Recommendations:   Droplet plus precautions    Discharge Planning:   Patient will need Midline Catheter Insertion/ PICC line Insertion: No  Patient will need: Home IV , [facility],  SNF,  LTAC: Undetermined  Patient willneed outpatient wound care: No    Medical Decision making / Summary of Stay:   Shirley Dior is a 64y.o.-year-old male who was initially admitted on 1/16/2022.    Tisha Quezada has a history of diabetes mellitus type 2, essential hypertension, seizure disorder, chronic kidney disease stage III, hyperlipidemia among other medical problems.     The patient reports that about 1 to 2 months ago he had his diabetes medications changed and since that time he has noticed increasing swelling in his legs, hardness in the legs, difficulty ambulating, and weight gain from 178 to 255 pounds over the last 1 to 2 months. He did not report any subjective fevers, chills, cough or shortness of breath. He denies any loss of smell or change in taste. No abdominal pain nausea vomiting or diarrhea. The patient does report that he has home oxygen that he uses as needed at home and he reports that he hardly needs it. He is vaccinated did receive the J&J vaccine but has not yet received a booster dose.     The patient presented to the emergency department and was found to have leukopenia with a white blood cell count of 2.7 and thrombocytopenia with a platelet count of 504. Creatinine slightly elevated at 1.31 and proBNP is elevated at 9327. Chest x-ray showed hyperinflated lungs with cardiomegaly seen and diffuse increased interstitial markings in both lungs which may represent baseline chronic interstitial lung changes given that these findings were present before. A CT of the chest was done with IV contrast that showed no evidence of pulmonary embolism and compared to 2008 there is worsening underlying emphysema with worsening subacute or acute interstitial lung disease best seen in the left upper lobe. Findings were not felt typical for COVID-19 virus pneumonia. There was thickening and distortion of the left major fissure with an ill-defined masslike focus in the left lower lobe.   There is worsening moderate to severe pulmonary artery hypertension     COVID testing was positive and I was asked to evaluate and help with COVID-19 virus infection    The patient did have a CT scan of the abdomen pelvis done Differential:   Recent Labs     02/10/22  0603 02/11/22  0523   WBC 7.8 14.7*   HGB 11.3* 11.5*   HCT 37.2* 38.7*   * 170   LYMPHOPCT 4* 5*   MONOPCT 5 4     BMP:   Recent Labs     02/10/22  0603 02/10/22  0603 02/10/22  1417 02/11/22  0523     --   --  145*   K 3.3*   < > 3.7 3.5*   *  --   --  109*   CO2 23  --   --  23   BUN 46*  --   --  42*   CREATININE 0.67*  --   --  0.73   MG 1.8  --   --  1.8    < > = values in this interval not displayed. Hepatic Function Panel:   No results for input(s): PROT, LABALBU, BILIDIR, IBILI, BILITOT, ALKPHOS, ALT, AST in the last 72 hours. Lab Results   Component Value Date    PROCAL 0.25 02/01/2022    PROCAL 0.24 01/23/2022    PROCAL 0.11 01/19/2022     Lab Results   Component Value Date    CRP 23.5 01/16/2022    CRP 2.0 07/27/2019     Lab Results   Component Value Date    SEDRATE 3 01/16/2022         Lab Results   Component Value Date    DDIMER 2.96 01/30/2022    DDIMER 5.84 01/23/2022    DDIMER 1.53 01/18/2022    DDIMER 1.73 01/16/2022    DDIMER 1.18 12/07/2018     Lab Results   Component Value Date    FERRITIN 569 01/16/2022    FERRITIN 50 07/23/2019    FERRITIN 18 07/08/2019     Lab Results   Component Value Date     01/16/2022     Lab Results   Component Value Date    FIBRINOGEN 402 01/16/2022       Results in Past 30 Days  Result Component Current Result Ref Range Previous Result Ref Range   SARS-CoV-2, Rapid DETECTED (A) (1/16/2022) Not Detected Not in Time Range      Lab Results   Component Value Date    COVID19 DETECTED 01/16/2022    COVID19 Not Detected 11/02/2021       No results for input(s): VANCOTROUGH in the last 72 hours. Imaging Studies:   ONE XRAY VIEW OF THE CHEST 2/10/2022 4:54 am    Impression   Relatively stable cardiopulmonary status as visualized.            CT OF THE ABDOMEN AND PELVIS WITHOUT CONTRAST 1/28/2022 8:34 am    Impression   1.  Overall mildly limited study due to motion and lack of contrast.       2.  No evidence of bowel obstruction.  Moderate stool burden is noted,   possibly related to constipation.  Enteric tube is in place with distal tip   in the proximal stomach.       3.  Tiny amount of free fluid scattered in the abdomen, including   presacral/perirectal fluid, most likely related to volume overload.  No   definite findings of rectal wall thickening or fecal impaction.       4.  Moderate pleural effusions, left basilar consolidation, and bibasilar   interstitial thickening, increased when compared to 01/16/2022.       RECOMMENDATIONS:   Unavailable         Veins: Lower Extremities DVT Study, Venous Scan Lower Bilateral.  Indications for Study: Leg Swelling . Patient Status: In Patient. Technical Quality: Limited visualization. Limitation reason: Edema. Comments: Simultaneous real time imaging utilizing B-Mode, color doppler and spectral waveform analysis was performed on the  bilateral lower extremities for venous examination of the deep and superficial systems. Conclusions  Summary  No evidence of superficial or deep venous thrombosis in both lower extremities. Signature  Electronically signed by Janelle Jeffers RVT(Sonographer) on 01/19/2022 08:10 AM  Electronically signed by Alejandra Gaitaner physician) on 01/19/2022 06:21 PM      CT OF THE HEAD WITHOUT CONTRAST  1/18/2022 5:42 pm  Impression   New lacunar infarct left thalamus, age indeterminate. Zigmund Neither may be helpful.           Cultures:     Culture, Blood 1 [[id number]] Collected: 02/08/22 1549   Order Status: Completed Specimen: Blood Updated: 02/10/22 0714    Specimen Description . BLOOD    Special Requests RT HAND,10ML    Culture NO GROWTH 2 DAYS   Culture, Blood 1 [[id number]] Collected: 02/08/22 1559   Order Status: Completed Specimen: Blood Updated: 02/10/22 0714    Specimen Description . BLOOD    Special Requests RT ARTERY,10ML    Culture NO GROWTH 2 DAYS       Culture, Blood 1 [[id number]] Collected: 02/01/22 0003   Order Status: Completed Specimen: Blood Updated: 02/06/22 0830    Specimen Description . BLOOD    Special Requests ART LINE 20ML    Culture NO GROWTH 5 DAYS   Culture, Blood 1 [[id number]] Collected: 01/31/22 1853   Order Status: Completed Specimen: Blood Updated: 02/05/22 2315    Specimen Description . BLOOD    Special Requests RT HAND,10ML    Culture NO GROWTH 5 DAYS     Culture, Respiratory [[id number]] Collected: 01/28/22 1030   Order Status: Completed Specimen: Sputum, Suctioned Updated: 01/30/22 0932    Specimen Description . SUCTIONED SPUTUM    Special Requests NOT REPORTED    Direct Exam < 10 EPITHELIAL CELLS/LPF     <10 NEUTROPHILS/LPF     NO SIGNIFICANT PATHOGENS SEEN    Culture NORMAL RESPIRATORY PO HEAVY GROWTH       Culture, Blood 1 [[id number]] Collected: 01/23/22 1759   Order Status: Completed Specimen: Blood Updated: 01/28/22 2111    Specimen Description . BLOOD    Special Requests RT HAND 19ML    Culture NO GROWTH 5 DAYS   Culture, Blood 1 [[id number]] Collected: 01/23/22 1759   Order Status: Completed Specimen: Blood Updated: 01/28/22 2110    Specimen Description . BLOOD    Special Requests ART LINE 10ML    Culture NO GROWTH 5 DAYS     Culture, Blood 2 [[id number]] Collected: 01/21/22 0742   Order Status: Completed Specimen: Blood Updated: 01/26/22 1001    Specimen Description . BLOOD    Special Requests LEFT AC 20ML    Culture NO GROWTH 5 DAYS   Culture, Blood 1 [[id number]] Collected: 01/21/22 0750   Order Status: Completed Specimen: Blood Updated: 01/26/22 1000    Specimen Description . BLOOD    Special Requests LEFT HAND 5ML    Culture NO GROWTH 5 DAYS   MRSA DNA Probe, Nasal [[id number]] Collected: 01/23/22 2258   Order Status: Completed Specimen: Nasal Updated: 01/25/22 1038    Specimen Description . NASAL SWAB    MRSA, DNA, Nasal NEGATIVE    Comment: NEGATIVE:  MRSA DNA not detected by nucleic acid amplification.                                                     Results should be used as an adjunct to nosocomial control efforts to identify patients   needing enhanced precautions.     The test is not intended to identify patients with staphylococcal infections.  Results   should not be used to guide or monitor treatment for MRSA infections. Culture, Blood 1 [[id number]] Collected: 01/16/22 1220   Order Status: Completed Specimen: Blood Updated: 01/21/22 1521    Specimen Description . BLOOD    Special Requests LAC 12ML    Culture NO GROWTH 5 DAYS   Culture, Blood 1 [[id number]] Collected: 01/16/22 1205   Order Status: Completed Specimen: Blood Updated: 01/21/22 1521    Specimen Description . BLOOD    Special Requests RAC 12ML    Culture NO GROWTH 5 DAYS       Culture, Urine [[id number]] Collected: 01/16/22 1315   Order Status: Completed Specimen: Urine, clean catch Updated: 01/17/22 2128    Specimen Description . CLEAN CATCH URINE    Special Requests NOT REPORTED    Culture NO SIGNIFICANT GROWTH       COVID-19, Rapid [[id number]] (Abnormal) Collected: 01/16/22 1230   Order Status: Completed Specimen: Nasopharyngeal Swab Updated: 01/16/22 1314    Specimen Description . NASOPHARYNGEAL SWAB    SARS-CoV-2, Rapid DETECTED Abnormal     Comment:        Rapid NAAT: The specimen is POSITIVE for SARS-Cov-2, the novel coronavirus associated with   COVID-19.         This test has been authorized by the FDA under an Emergency Use Authorization (EUA) for use   by authorized laboratories.         The ID NOW COVID-19 assay is designed to detect the virus that causes COVID-19 in patients   with signs and symptoms of infection who are suspected of COVID-19. An individual without symptoms of COVID-19 and who is not shedding SARS-CoV-2 virus would   expect to have a negative (not detected) result in this assay.    Fact sheet for Healthcare Providers: Natacha.es   Fact sheet for Patients: BuildHer.es           Methodology: Isothermal Nucleic Acid Amplification       Results reported to the appropriate Health Department         Flu A/B Ag Detection [[id number]] Collected: 01/16/22 1230   Order Status: Completed Specimen: Nasopharyngeal Swab Updated: 01/16/22 1313    Specimen Description . NASOPHARYNGEAL SWAB    Special Requests NOT REPORTED    Direct Exam NEGATIVE for Influenza A + B antigens.                                PCR testing to confirm this result is available upon request. Demetrius Novak will be saved in the laboratory for 7 days.  Please call [phone] if PCR testing is indicated. Medications:      sucralfate  1 g Oral 4 times per day    amLODIPine  5 mg Oral BID    insulin lispro  0-18 Units SubCUTAneous TID WC    insulin lispro  0-9 Units SubCUTAneous Nightly    amiodarone  200 mg Per NG tube Daily    insulin glargine  15 Units SubCUTAneous BID    lisinopril  20 mg Oral Daily    Vitamin D  1,000 Units Oral Daily    polyethylene glycol  17 g Per NG tube Daily    levalbuterol  1.25 mg Nebulization Q6H    bisacodyl  10 mg Rectal Daily    pantoprazole  40 mg IntraVENous Daily    And    sodium chloride (PF)  10 mL IntraVENous Daily    aspirin  324 mg Oral Daily    levETIRAcetam  500 mg Oral BID    chlorhexidine  15 mL Mouth/Throat BID    enoxaparin  30 mg SubCUTAneous BID    QUEtiapine  50 mg Oral Once    budesonide  0.5 mg Nebulization BID    sodium chloride flush  5-40 mL IntraVENous 2 times per day    atorvastatin  20 mg Oral Daily           [facility]  Nori Romero MD  [employer] messaging  OFFICE: [phone]      Electronically signed by Nori Romero MD on 2/11/2022 at 9:21 AM  Thank you for allowing us to participate in the care of this patient. Please call with questions.     This note iscreated with the assistance of a speech recognition program.  While intending to generate a document that actually reflects the content of the visit, the document can still have some errors including those of syntax andsound a like substitutions which may escape proof reading. In such instances, actual meaning can be extrapolated by contextual diversion.

## 2022-02-11 NOTE — PROGRESS NOTES
[facility] PALLIAT[facility] NURSING ASSESSMENT    Patient: Marilou Bermudez  Room: 93[id number]    Reason For Consult   Goals of care evaluation  Distress management  Guidance and support  Facilitate communications  Assistance in coordinating care    Code Status: [facility]      Impression: Marilou Bermudez is a 76y.o. year old male  has a past medical history of Arthritis, Atherosclerotic plaque, Cervical disc disease, Diabetes mellitus ([address] History of blood transfusion, Hx of blood clots, Hyperlipidemia, Neuropathy, Right kidney mass, Snores, and Type 2 diabetes mellitus treated with insulin ([address] .  Currently hospitalized for the management of COVID pneumonia. The Palliative Care Team is following to assist with goals of care/familyi support. Vital Signs  Blood pressure (!) 156/44, pulse 65, temperature 99.8 °F (37.7 °C), temperature source Oral, resp. rate 27, height 5' 10\" (1.778 m), weight 230 lb 9.6 oz (104.6 kg), SpO2 98 %. Patient Active Problem List   Diagnosis    Biventricular CHF (congestive heart failure) [facility])    CKD (chronic kidney disease) stage 3, GFR 30-59 ml/min ([facility])    Essential hypertension    Blurred vision, right eye    Type 2 diabetes mellitus treated with insulin ([address]    Atherosclerotic plaque    Weakness on left side of face    Carotid stenosis, asymptomatic, bilateral    New onset seizure ([address]    COVID-19    Acute on chronic systolic CHF (congestive heart failure) ([facility])    Acute respiratory failure with hypoxia ([address]    BLANCHE (acute kidney injury) ([address]    COPD exacerbation ([address]    Hypokalemia    Hypomagnesemia    Palliative care encounter    Goals of care, counseling/discussion    DNR (do not resuscitate) discussion    ACP (advance care planning)    Constipation    Abdominal pain, generalized       Palliative Interaction: Chart reviewed, Update rec'd from bedside RN, Chacorta Ellis. Pt remains on FIO2 100% and PEEP 14. He is currently out of COVID isolation.  He continues on

## 2022-02-11 NOTE — PROGRESS NOTES
Pulmonary Critical Care Progress Note  Cliff Ojeda MD     Patient seen for the follow up of COVID-19 pneumonia, acute hypoxic respiratory failure. Subjective:  He sedated, intubated on ventilator, he has had increasing oxygen requirements, currently at 100% FiO2/PEEP 10. He is on 50 mics of Luke-Synephrine. He is off amiodarone drip. He remains on fentanyl drip and Precedex drip, Versed at 5. He had PEG tube placed 2/9/2022. Examination:  Vitals: BP (!) 156/44   Pulse 73   Temp 99 °F (37.2 °C) (Oral)   Resp 29   Ht 5' 10\" (1.778 m)   Wt 230 lb 9.6 oz (104.6 kg)   SpO2 94%   BMI 33.09 kg/m²   General appearance: Sedated, intubated on ventilator  Neck: No JVD  Lungs: Moderate air exchange, no crackles or wheezing  Heart: regular rate and rhythm, S1, S2 normal, no gallop  Abdomen: Soft, non tender, + BS  Extremities: no cyanosis or clubbing. Positive edema    LABs:  CBC:   Recent Labs     02/10/22  0603 02/11/22  0523   WBC 7.8 14.7*   HGB 11.3* 11.5*   HCT 37.2* 38.7*   * 170     BMP:   Recent Labs     02/10/22  0603 02/10/22  0603 02/10/22  1417 02/11/22  0523     --   --  145*   K 3.3*   < > 3.7 3.5*   CO2 23  --   --  23   BUN 46*  --   --  42*   CREATININE 0.67*  --   --  0.73   LABGLOM >60  --   --  >60   GLUCOSE 196*  --   --  192*    < > = values in this interval not displayed.      ABG:  Lab Results   Component Value Date    WTY4RAY NOT REPORTED 02/11/2022    FIO2 75.0 02/11/2022       Lab Results   Component Value Date    POCPH 7.469 02/11/2022    POCPCO2 36.6 02/11/2022    POCPO2 66.5 02/11/2022    POCHCO3 26.6 02/11/2022    PBEA 3 02/11/2022    KSE1KZK NOT REPORTED 02/11/2022    SRWH0BCC 94 02/11/2022    FIO2 75.0 02/11/2022     Radiology:  X-ray chest 2/11/2022:      Impression:  · Acute on chronic hypoxic respiratory failure  · COVID-19 pneumonia  · COPD/pulmonary emphysema  · Acute left thalamic infarct/CVA  · Left lower lobe opacity versus nodule  · Pulmonary edema  · Elevated D-dimer, decreased platelets  · Systolic heart failure, s/p AICD placement  · BLANCHE on CKD  · Diabetes mellitus    Recommendations:  · Continue vent support, wean FiO2 as tolerated. 100% FiO2, PEEP 10, increase PEEP to 14  · Fentanyl drip, 50 mics  · Versed at 5 mg  · Continue precedex drip  · Monitor blood sugars off insulin drip  · Monitor off Luke-Synephrine, keep map  65 to 75 mmHg  · Amiodarone per cardiology.     · Pulmicort aerosol treatment  · Airborne isolation  · IV Decadron 6 mg daily  · Not a candidate for Actemra/baricitinib stopped secondary to cytopenias  · Neurology following for stroke workup  · Nephology following for hyperkalemia   · Xopenex every 6 hours  · Tube feeds as tolerated  · X-ray chest in am  · Labs: CBC and BMP in am  · DVT prophylaxis with low molecular weight heparin  · GI prophylaxis, Protonix  · Discussed with RN/RT  · General surgery following for tracheostomy  · Will follow with you    Electronically signed by     Red Kimball MD on 2/11/2022 at 1:22 PM  Pulmonary Critical Care and Sleep Medicine,  [facility]  Cell: [phone]  Office: [phone]  Cc: 35 minutes

## 2022-02-11 NOTE — PROGRESS NOTES
Physical Therapy  DATE: 2022    NAME: Leonie Morales  MRN: [id number]   : 1945    Patient not seen this date for Physical Therapy due to:      [x] Cancel by RN or physician due to: Awaiting trach placement at this time. [] Hemodialysis    [] Critical Lab Value Level     [] Blood transfusion in progress    [] Acute or unstable cardiovascular status   _MAP < 55 or more than >115  _HR < 40 or > 130    [x] Acute or unstable pulmonary status   -FiO2 > 60%   _RR < 5 or >40    _O2 sats < 85%    [] Strict Bedrest    [] Off Unit for surgery or procedure    [] Off Unit for testing       [] Pending imaging to R/O fracture    [] Refusal by Patient      [] Other      [] PT being discontinued at this time. Patient independent. No further needs. [] PT being discontinued at this time as the patient has been transferred to hospice care. No further needs.       Megan Du, PTA

## 2022-02-11 NOTE — PROGRESS NOTES
Keppra  Continue amlodipine, atorvastatin  Continue aspirin    GI signed off   Tube feeds  Consult nephrology   Insulin gtt- DC  Lantus w SSI-->increase to high intensity as BS running high, now better controlled    Plan tracheostomy, continue to wean FiO2  General surgery following, plan for tracheostomy  S/P PEG tube today  Continue other medication as below.     Scheduled Meds:   sucralfate  1 g Oral 4 times per day    amLODIPine  5 mg Oral BID    insulin lispro  0-18 Units SubCUTAneous TID WC    insulin lispro  0-9 Units SubCUTAneous Nightly    amiodarone  200 mg Per NG tube Daily    insulin glargine  15 Units SubCUTAneous BID    lisinopril  20 mg Oral Daily    Vitamin D  1,000 Units Oral Daily    polyethylene glycol  17 g Per NG tube Daily    levalbuterol  1.25 mg Nebulization Q6H    bisacodyl  10 mg Rectal Daily    pantoprazole  40 mg IntraVENous Daily    And    sodium chloride (PF)  10 mL IntraVENous Daily    aspirin  324 mg Oral Daily    levETIRAcetam  500 mg Oral BID    chlorhexidine  15 mL Mouth/Throat BID    enoxaparin  30 mg SubCUTAneous BID    QUEtiapine  50 mg Oral Once    budesonide  0.5 mg Nebulization BID    sodium chloride flush  5-40 mL IntraVENous 2 times per day    atorvastatin  20 mg Oral Daily     Continuous Infusions:   phenylephrine (KEARA-SYNEPHRINE) 50mg/250mL infusion 50 mcg/min (02/11/22 1359)    dextrose      dexmedetomidine (PRECEDEX) IV infusion 1.3 mcg/kg/hr (02/11/22 1326)    fentaNYL 50 mcg/hr (02/11/22 1212)    propofol Stopped (02/09/22 1030)    midazolam (VERSED) 1 mg/mL in D5W infusion 4 mg/hr (02/11/22 1331)    sodium chloride       PRN Meds:  potassium chloride, 20 mEq, PRN  potassium chloride, 10 mEq, PRN  magnesium sulfate, 1,000 mg, PRN  glucose, 15 g, PRN  glucagon (rDNA), 1 mg, PRN  dextrose, 100 mL/hr, PRN  sodium chloride nebulizer, 3 mL, Q8H PRN  LORazepam, 1 mg, Q4H PRN  dextrose bolus (hypoglycemia), 125 mL, PRN   Or  dextrose bolus (hypoglycemia), 250 mL, PRN  sodium chloride flush, 10 mL, PRN  sodium chloride, 25 mL, PRN  ondansetron, 4 mg, Q8H PRN   Or  ondansetron, 4 mg, Q6H PRN  acetaminophen, 650 mg, Q6H PRN   Or  acetaminophen, 650 mg, Q6H PRN  hydrALAZINE, 10 mg, Q6H PRN        Chief Complaint:     Chief Complaint   Patient presents with    Leg Swelling     bilateral, has CHF, on diuretic, EMT saw pt , assisted pt into car    Fever    Other     low SPO2         History of Present Illness:   Patient seen examined at bedside  Patient remains intubated  Patient is continued on weaning from FiO2  Follow patient started requiring more FiO2 200%, for this reason patient PEEP is increased to 14 now      Review of systems:  Unable to conduct ROS secondary to patient being intubated      Initial HPI  Gina Bansal is a 76 y.o.  male who presents with Leg Swelling (bilateral, has CHF, on diuretic, EMT saw pt , assisted pt into car), Fever, and Other (low SPO2)  This is a 68-year-old gentleman admitted via ER, come to ER with complaint of having shortness of breath, patient has medical history significant for COPD patient with history of cardiac failure: Patient noted that he has been having increasing swelling in his lower extremity and becoming more short of breath, further patient also been having some body aches and sweats, patient patient testing in the emergency room showed that he is positive for COVID-19 also noticed to have an elevated BNP, imaging concerning for fluid overload, admitted for further regiment    I have personally reviewed the past medical history, past surgical history, medications, social history, and family history, and summarized in the note.     Review of Systems:       Unable to conduct ROS secondary to patient being intubated      Past Medical History:     Past Medical History:   Diagnosis Date    Arthritis     Atherosclerotic plaque 7/28/2019    Cervical disc disease     degenerative disc disease    Diabetes mellitus ([facility] [address]     type 2    History of blood transfusion     Hx of blood clots     left leg    Hyperlipidemia     Neuropathy     Right kidney mass     \"urologist is watching\"    Snores     Type 2 diabetes mellitus treated with insulin ([facility] [address] 7/28/2019        Past Surgical History:     Past Surgical History:   Procedure Laterality Date    ABDOMINAL AORTIC ANEURYSM REPAIR  2005    CARPAL TUNNEL RELEASE Bilateral     CERVICAL SPINE SURGERY      COLONOSCOPY      benign polyps removed    INGUINAL HERNIA REPAIR Right     MI REPAIR INCISIONAL HERNIA,REDUCIBLE N/A 5/10/2017    HERNIA VENTRAL REPAIR WITH MESH  performed by Brenda Robison DO at [address] [facility]  [address] ENDOSCOPY      UPPER GASTROINTESTINAL ENDOSCOPY N/A 2/9/2022    EGD   PEG TUBE INSERTION performed by Don Ariza MD at [address]  05/10/2017    with mesh        Medications Prior to Admission:       Prior to Admission medications    Medication Sig Start Date End Date Taking? Authorizing Provider   rosuvastatin (CRESTOR) 40 MG tablet Take 40 mg by mouth every evening   Yes Historical Provider, MD   insulin NPH (HUMULIN N;NOVOLIN N) 100 UNIT/ML injection vial Inject 20 Units into the skin 2 times daily (before meals)   Yes Historical Provider, MD   levETIRAcetam (KEPPRA) 500 MG tablet Take 1 tablet by mouth 2 times daily 11/3/21  Yes Inge Valdez MD   venlafaxine (EFFEXOR XR) 150 MG extended release capsule Take 1 capsule by mouth daily (with breakfast) 7/29/19  Yes Arabella Kiser   vitamin B-1 (THIAMINE) 100 MG tablet Take 100 mg by mouth daily   Yes Historical Provider, MD   ferrous sulfate 325 (65 Fe) MG tablet Take 325 mg by mouth daily (with breakfast)   Yes Historical Provider, MD   ALPRAZolam (XANAX) 0.5 MG tablet Take 0.5 mg by mouth three times daily.    Yes Historical Provider, MD   furosemide (LASIX) 40 MG tablet Take 1 tablet by mouth 2 times daily 12/11/18  Yes Bing Mares MD tiotropium (SPIRIVA RESPIMAT) 2.5 MCG/ACT AERS inhaler Inhale 2 puffs into the lungs daily    Yes Historical Provider, MD   albuterol sulfate  (90 Base) MCG/ACT inhaler Inhale 2 puffs into the lungs every 6 hours as needed for Wheezing or Shortness of Breath   Yes Historical Provider, MD   metoprolol succinate (TOPROL XL) 50 MG extended release tablet Take 50 mg by mouth daily   Yes Historical Provider, MD   Multiple Vitamins-Minerals (MULTIVITAMIN ADULT) TABS Take 1 tablet by mouth daily   Yes Historical Provider, MD   vitamin D (CHOLECALCIFEROL) 1000 UNIT TABS tablet Take 1,000 Units by mouth daily   Yes Historical Provider, MD   fluticasone (FLONASE) 50 MCG/ACT nasal spray 1 spray by Each Nare route daily as needed for Rhinitis   Yes Historical Provider, MD   aspirin 325 MG EC tablet Take 325 mg by mouth daily    Yes Historical Provider, MD   Omega-3 Fatty Acids (FISH OIL) 1000 MG CAPS Take 1 capsule by mouth daily    Yes Historical Provider, MD   amLODIPine (NORVASC) 5 MG tablet Take 5 mg by mouth nightly    Yes Historical Provider, MD   mirtazapine (REMERON) 45 MG tablet Take 45 mg by mouth nightly   Yes Historical Provider, MD        Allergies: Barbiturates, Codeine, and Sulfa antibiotics    Social History:     Tobacco:    reports that he has quit smoking. He has never used smokeless tobacco.  Alcohol:      reports no history of alcohol use. Drug Use:  reports no history of drug use.     Family History:     Family History   Problem Relation Age of Onset    Ovarian Cancer Sister     Ovarian Cancer Sister          Physical Exam:     Vitals:  BP (!) 156/44   Pulse 68   Temp 102.9 °F (39.4 °C) (Oral)   Resp 28   Ht 5' 10\" (1.778 m)   Wt 230 lb 9.6 oz (104.6 kg)   SpO2 98%   BMI 33.09 kg/m²   Temp (24hrs), Av.7 °F (38.2 °C), Min:99 °F (37.2 °C), Max:102.9 °F (39.4 °C)      General appearance -on ventilator  Mental status -sedated  Head - normocephalic and atraumatic  Eyes - conjunctiva clear  Ears -external ears normal  Nose - no drainage noted  Mouth - mucous membranes moist  Neck - supple, no carotid bruits, thyroid not palpable  Chest -basal crackles with decreased air entry bilaterally to auscultation, increased effort  Heart - normal rate, regular rhythm, no murmur  Abdomen - soft, nontender, nondistended, bowel sounds present all four quadrants, no masses, hepatomegaly or splenomegaly  Neurological -sedated on vent  Extremities - extremity edema, lower distal extremity discoloration    Skin - no gross lesions, rashes, or induration noted        Data:     Labs:    Hematology:  Recent Labs     02/09/22  0540 02/10/22  0603 02/11/22  0523   WBC 11.7* 7.8 14.7*   RBC 4.64 4.56 4.70   HGB 11.5* 11.3* 11.5*   HCT 37.7* 37.2* 38.7*   MCV 81.3* 81.6* 82.3*   MCH 24.8* 24.8* 24.5*   MCHC 30.5 30.4 29.7   RDW 15.9* 15.9* 16.3*   * 115* 170   MPV Abnormal Abnormal 13.4     Chemistry:  Recent Labs     02/09/22  0540 02/09/22  0540 02/10/22  0603 02/10/22  0603 02/10/22  1417 02/11/22  0523 02/11/22  1400     --  143  --   --  145*  --    K 3.8   < > 3.3*   < > 3.7 3.5* 4.3   *  --  108*  --   --  109*  --    CO2 26  --  23  --   --  23  --    GLUCOSE 178*  --  196*  --   --  192*  --    BUN 54*  --  46*  --   --  42*  --    CREATININE 0.73  --  0.67*  --   --  0.73  --    MG 1.8  --  1.8  --   --  1.8  --    ANIONGAP 9  --  12  --   --  13  --    LABGLOM >60  --  >60  --   --  >60  --    GFRAA >60  --  >60  --   --  >60  --    CALCIUM 9.0  --  8.7  --   --  8.6  --    PHOS 2.3*  --  2.3*  --   --  2.6  --     < > = values in this interval not displayed.      Recent Labs     02/10/22  0819 02/10/22  1115 02/10/22  1738 02/10/22  2006 02/11/22  0719 02/11/22  1153   POCGLU 180* 180* 161* 138* 158* 146*       Lab Results   Component Value Date    INR 1.0 01/17/2022    INR 1.0 11/03/2021    INR 1.0 07/27/2019    PROTIME 13.3 01/17/2022    PROTIME 11.1 11/03/2021    PROTIME 10.5 07/27/2019 Lab Results   Component Value Date/Time    SPECIAL RT ARTERY,10ML 02/08/2022 03:59 PM     Lab Results   Component Value Date/Time    CULTURE NO GROWTH 2 DAYS 02/08/2022 03:59 PM       Lab Results   Component Value Date    POCPH 7.469 02/11/2022    POCPCO2 36.6 02/11/2022    POCPO2 66.5 02/11/2022    POCHCO3 26.6 02/11/2022    NBEA NOT REPORTED 02/11/2022    PBEA 3 02/11/2022    DGJ7NRO NOT REPORTED 02/11/2022    BCPF8IMF 94 02/11/2022    FIO2 75.0 02/11/2022       Radiology:    XR CHEST 1 VIEW    Result Date: 1/16/2022  Hypoinflated lungs. Cardiomegaly again seen, when compared to the previous study performed 07/27/2019. Diffuse, increased interstitial markings throughout both lungs, some of which can be seen on the prior study may represent baseline chronic interstitial lung changes. The increased prominence on this exam could be secondary to the suboptimal inspiratory effort. The presence of pulmonary vascular congestion/mild CHF or bilateral interstitial pneumonia cannot be excluded. Clinical correlation is recommended. CT CHEST PULMONARY EMBOLISM W CONTRAST    Result Date: 1/16/2022  No evidence of pulmonary embolism. Compared to 2008, worsening underlying emphysema, with worsening subacute or acute interstitial lung disease, best seen left upper lobe; differential includes interstitial pneumonia or pneumonitis superimposed on emphysema, DIP, HP, and other interstitial pneumonias as well as infectious or inflammatory process superimposed on emphysema disease. Findings are not typical for COVID-19 pneumonia, but an element of the latter should be considered. Thickening and distortion left major fissure with ill-defined mass-like focus left lower lobe. The latter could also be infectious or inflammatory, although neoplasm should be excluded. Underlying worsening emphysema. Nodular densities, best seen right upper lobe. Small pleural effusions, slightly larger on the left.   See recommendations below. Mild mediastinal and left hilar adenopathy; see recommendations below. Worsening now moderate-severe pulmonary artery hypertension. Mild cardiomegaly. Stable mild dilatation ascending thoracic aorta. Additional unchanged or incidental findings, as above. RECOMMENDATIONS: Clinical correlation and short-term follow-up CT chest in 1-4 months depending upon the clinical presentation/course. All radiological studies reviewed                Code Status:  DNR-CCA    Electronically signed by Pretty Arrington MD on 2/11/2022 at 4:00 PM     Copy sent to Dr. Galen Dangelo MD    This note was created with the assistance of a speech-recognition program.  Although the intention is to generate a document that actually reflects the content of the visit, no guarantees can be provided that every mistake has been identified and corrected by editing. Note was updated later by me after  physical examination and  completion of the assessment.

## 2022-02-11 NOTE — PROGRESS NOTES
Section of Cardiology  Progress Note      Date:  2/11/2022  Patient: Vianey Walker  Admission:  1/16/2022 11:51 AM  Admit DX: Hypokalemia [E87.6]  Hypomagnesemia [E83.42]  COPD exacerbation (HCC) [J44.1]  BLANCHE (acute kidney injury) ([facility] [address] [N17.9]  Acute respiratory failure with hypoxia (HCC) [J96.01]  Acute on chronic systolic CHF (congestive heart failure) ([facility] [address] [I50.23]  COVID [U07.1]  COVID-19 [U07.1]  Age:  76 y.o., 1945     LOS: 26 days     Reason for evaluation:   atrial fibrillation      SUBJECTIVE:     The patient was seen and examined. Notes and labs reviewed. Pt remains intubated/sedated  PEG tube placed  Pt out of isolation  Remains in NSR on PO amio 200mg  Seen in room. More comfortable today. Still on ranulfo  Continues to have wide pulse pressure    OBJECTIVE:      EXAM:   Vitals:    VITALS:  BP (!) 156/44   Pulse 74   Temp 99 °F (37.2 °C) (Oral)   Resp 29   Ht 5' 10\" (1.778 m)   Wt 230 lb 9.6 oz (104.6 kg)   SpO2 91%   BMI 33.09 kg/m²   24HR INTAKE/OUTPUT:      Intake/Output Summary (Last 24 hours) at 2/11/2022 1153  Last data filed at 2/11/2022 1100  Gross per 24 hour   Intake 2930.65 ml   Output 950 ml   Net 1980.65 ml       Physical Exam  Vitals and nursing note reviewed. Constitutional:       Comments: Intubated/sedated. More comfortable today   Neck:      Vascular: No carotid bruit. Cardiovascular:      Rate and Rhythm: Normal rate and regular rhythm. Pulses: Normal pulses. Heart sounds: Normal heart sounds. No murmur heard. Pulmonary:      Effort: Pulmonary effort is normal.      Breath sounds: Normal breath sounds. Comments: Intubated. Respiratory coarse breath sounds. Equal entry  Abdominal:      General: Abdomen is flat. Palpations: Abdomen is soft. Comments: PEG tube in situ   Musculoskeletal:      Right lower leg: Edema present. Left lower leg: Edema present. Skin:     General: Skin is warm and dry.    Neurological:      Comments: Intubated/sedated   Psychiatric:      Comments: Intubated/sedated         Current Inpatient Medications:   sucralfate  1 g Oral 4 times per day    amLODIPine  5 mg Oral BID    insulin lispro  0-18 Units SubCUTAneous TID WC    insulin lispro  0-9 Units SubCUTAneous Nightly    amiodarone  200 mg Per NG tube Daily    insulin glargine  15 Units SubCUTAneous BID    lisinopril  20 mg Oral Daily    Vitamin D  1,000 Units Oral Daily    polyethylene glycol  17 g Per NG tube Daily    levalbuterol  1.25 mg Nebulization Q6H    bisacodyl  10 mg Rectal Daily    pantoprazole  40 mg IntraVENous Daily    And    sodium chloride (PF)  10 mL IntraVENous Daily    aspirin  324 mg Oral Daily    levETIRAcetam  500 mg Oral BID    chlorhexidine  15 mL Mouth/Throat BID    enoxaparin  30 mg SubCUTAneous BID    QUEtiapine  50 mg Oral Once    budesonide  0.5 mg Nebulization BID    sodium chloride flush  5-40 mL IntraVENous 2 times per day    atorvastatin  20 mg Oral Daily       IV Infusions (if any):   phenylephrine (KEARA-SYNEPHRINE) 50mg/250mL infusion 50 mcg/min (02/11/22 1058)    dextrose      dexmedetomidine (PRECEDEX) IV infusion 1.3 mcg/kg/hr (02/11/22 1100)    fentaNYL 50 mcg/hr (02/10/22 2022)    propofol Stopped (02/09/22 1030)    midazolam (VERSED) 1 mg/mL in D5W infusion 3 mg/hr (02/10/22 1500)    sodium chloride         Diagnostics:   Telemetry: NSR/Sinus Thai      Labs:   CBC:  Recent Labs     02/10/22  0603 02/11/22  0523   WBC 7.8 14.7*   HGB 11.3* 11.5*   HCT 37.2* 38.7*   * 170     Magnesium:  Recent Labs     02/10/22  0603 02/11/22  0523   MG 1.8 1.8     BMP:  Recent Labs     02/10/22  0603 02/10/22  0603 02/10/22  1417 02/11/22  0523     --   --  145*   K 3.3*   < > 3.7 3.5*   CALCIUM 8.7  --   --  8.6   CO2 23  --   --  23   BUN 46*  --   --  42*   CREATININE 0.67*  --   --  0.73   LABGLOM >60  --   --  >60   GLUCOSE 196*  --   --  192*    < > = values in this interval not displayed.

## 2022-02-11 NOTE — CARE COORDINATION
Discharge planning:    Patient remains intubated. FIO2 at 100% and peep of 10. PEG placed. Not appropriate for trach placement at this time. Palliative consulted today. [facility] following.

## 2022-02-12 NOTE — PROGRESS NOTES
Progress note  [facility]    Adult Hospitalist      Name: Sergio Aparicio  MRN: [id number]     Acct: [de-identified]  Room: [address] [place] [address]-    Admit Date: 1/16/2022 11:51 AM  PCP: Josefa Klein MD    Primary Problem  Active Problems:    COVID-19    Acute on chronic systolic CHF (congestive heart failure) (HCC)    Acute respiratory failure with hypoxia (HCC)    BLANCHE (acute kidney injury) ([facility]    COPD exacerbation ([facility]    Hypokalemia    Hypomagnesemia    Palliative care encounter    Goals of care, counseling/discussion    DNR (do not resuscitate) discussion    ACP (advance care planning)    Constipation    Abdominal pain, generalized  Resolved Problems:    * No resolved hospital problems. *        Assesment:   Acute congestive heart failure, diastolic   [id number]   Viral pneumonia secondary to above  Acute respiratory failure with hypoxia intubated on 1/23/2022  Hyperkalemia  Paroxysmal atrial fibrillation  Essential hypertension  Mixed hyperlipidemia  Diabetes mellitus type 2  History of seizure disorder  History of CKD stage III, presently normal renal function  Lung mass?   Leukopenia  Polycythemia  Thrombocytopenia  Anxiety disorder  Recent past h/o lacunar infarct left thalamus   Altered mental status/agitation  Endotracheal intubation secondary to hypoxia dated 1/23/2022  Supraventricular tachycardia/elevated troponin  Fever  Emphysema   Pulmonary artery hypertension       Plan:   Admit patient to intermediate floor  Mechanical ventilation sedation as per critical care, patient is requiring PEEP of 10-->14 with  80% FiO2  Telemetry  Check vitals closely  CBC BMP daily    Cardiology consult  Amiodarone  Precedex gtt    IV Decadron completed  Off pressors  Off amiodarone drip  Patient completed a course of cefepime for 7 days  Bronchodilators  Pulmicort  Patient is deemed not a candidate for Actemra or baricitinib secondary to cytopenia  Infectious disease consult  Pulmonology consult    Continue Keppra  Continue amlodipine, atorvastatin  Continue aspirin    GI signed off   Tube feeds  Consult nephrology   Insulin gtt- DC  Lantus w SSI-->increase to high intensity as BS running high, now better controlled    Plan tracheostomy, continue to wean FiO2  General surgery following, plan for tracheostomy  S/P PEG tube today  Continue other medication as below.     Scheduled Meds:   meropenem  1,000 mg IntraVENous Q8H    linezolid  600 mg IntraVENous Q12H    sucralfate  1 g Oral 4 times per day    amLODIPine  5 mg Oral BID    insulin lispro  0-18 Units SubCUTAneous TID WC    insulin lispro  0-9 Units SubCUTAneous Nightly    amiodarone  200 mg Per NG tube Daily    insulin glargine  15 Units SubCUTAneous BID    Vitamin D  1,000 Units Oral Daily    polyethylene glycol  17 g Per NG tube Daily    levalbuterol  1.25 mg Nebulization Q6H    bisacodyl  10 mg Rectal Daily    pantoprazole  40 mg IntraVENous Daily    And    sodium chloride (PF)  10 mL IntraVENous Daily    aspirin  324 mg Oral Daily    levETIRAcetam  500 mg Oral BID    chlorhexidine  15 mL Mouth/Throat BID    enoxaparin  30 mg SubCUTAneous BID    QUEtiapine  50 mg Oral Once    budesonide  0.5 mg Nebulization BID    sodium chloride flush  5-40 mL IntraVENous 2 times per day    atorvastatin  20 mg Oral Daily     Continuous Infusions:   phenylephrine (KEARA-SYNEPHRINE) 50mg/250mL infusion 45 mcg/min (02/12/22 0906)    dextrose      dexmedetomidine (PRECEDEX) IV infusion 1.4 mcg/kg/hr (02/12/22 1946)    fentaNYL 50 mcg/hr (02/12/22 4140)    propofol Stopped (02/09/22 1030)    midazolam (VERSED) 1 mg/mL in D5W infusion Stopped (02/12/22 1059)    sodium chloride       PRN Meds:  potassium chloride, 20 mEq, PRN  potassium chloride, 10 mEq, PRN  magnesium sulfate, 1,000 mg, PRN  glucose, 15 g, PRN  glucagon (rDNA), 1 mg, PRN  dextrose, 100 mL/hr, PRN  sodium chloride nebulizer, 3 mL, Q8H PRN  LORazepam, 1 mg, Q4H PRN  dextrose bolus (hypoglycemia), 125 mL, PRN   Or  dextrose bolus (hypoglycemia), 250 mL, PRN  sodium chloride flush, 10 mL, PRN  sodium chloride, 25 mL, PRN  ondansetron, 4 mg, Q8H PRN   Or  ondansetron, 4 mg, Q6H PRN  acetaminophen, 650 mg, Q6H PRN   Or  acetaminophen, 650 mg, Q6H PRN  hydrALAZINE, 10 mg, Q6H PRN        Chief Complaint:     Chief Complaint   Patient presents with    Leg Swelling     bilateral, has CHF, on diuretic, EMT saw pt , assisted pt into car    Fever    Other     low SPO2         History of Present Illness:    patient remains in Medical ICU  Patient seen examined bedside   Further patient is continued on ventilator, patient remains intubated   Patient is requiring peep of 14 with 80% FiO2   Further patient also is continued on pressors   Overall respiratory status has not improved, patient continued to require high PEEP, is on continues weaning from FiO2 so he can be ready for tracheostomy      Review of systems:  Unable to conduct ROS secondary to patient being intubated      Initial HPI  Vianey Walker is a 76 y.o.  male who presents with Leg Swelling (bilateral, has CHF, on diuretic, EMT saw pt , assisted pt into car), Fever, and Other (low SPO2)  This is a 68-year-old gentleman admitted via ER, come to ER with complaint of having shortness of breath, patient has medical history significant for COPD patient with history of cardiac failure: Patient noted that he has been having increasing swelling in his lower extremity and becoming more short of breath, further patient also been having some body aches and sweats, patient patient testing in the emergency room showed that he is positive for COVID-19 also noticed to have an elevated BNP, imaging concerning for fluid overload, admitted for further regiment    I have personally reviewed the past medical history, past surgical history, medications, social history, and family history, and summarized in the note.     Review of Systems:       Unable to conduct ROS secondary to patient being intubated      Past Medical History:     Past Medical History:   Diagnosis Date    Arthritis     Atherosclerotic plaque 7/28/2019    Cervical disc disease     degenerative disc disease    Diabetes mellitus ([facility] [address]     type 2    History of blood transfusion     Hx of blood clots     left leg    Hyperlipidemia     Neuropathy     Right kidney mass     \"urologist is watching\"    Snores     Type 2 diabetes mellitus treated with insulin ([facility] [address] 7/28/2019        Past Surgical History:     Past Surgical History:   Procedure Laterality Date    ABDOMINAL AORTIC ANEURYSM REPAIR  2005    CARPAL TUNNEL RELEASE Bilateral     CERVICAL SPINE SURGERY      COLONOSCOPY      benign polyps removed    INGUINAL HERNIA REPAIR Right     IN REPAIR INCISIONAL HERNIA,REDUCIBLE N/A 5/10/2017    HERNIA VENTRAL REPAIR WITH MESH  performed by Briadna Cordon DO at [place] [id number] N/A 2/9/2022    EGD   PEG TUBE INSERTION performed by Champ Villalta MD at [address]  05/10/2017    with mesh        Medications Prior to Admission:       Prior to Admission medications    Medication Sig Start Date End Date Taking?  Authorizing Provider   rosuvastatin (CRESTOR) 40 MG tablet Take 40 mg by mouth every evening   Yes Historical Provider, MD   insulin NPH (HUMULIN N;NOVOLIN N) 100 UNIT/ML injection vial Inject 20 Units into the skin 2 times daily (before meals)   Yes Historical Provider, MD   levETIRAcetam (KEPPRA) 500 MG tablet Take 1 tablet by mouth 2 times daily 11/3/21  Yes Tamy Evans MD   venlafaxine (EFFEXOR XR) 150 MG extended release capsule Take 1 capsule by mouth daily (with breakfast) 7/29/19  Yes Arabella Kiser   vitamin B-1 (THIAMINE) 100 MG tablet Take 100 mg by mouth daily   Yes Historical Provider, MD   ferrous sulfate 325 (65 Fe) MG tablet Take 325 mg by mouth daily (with breakfast)   Yes SpO2 100%   BMI 33.46 kg/m²   Temp (24hrs), Av.4 °F (38 °C), Min:99.4 °F (37.4 °C), Max:102.8 °F (39.3 °C)      General appearance -on ventilator  Mental status -sedated  Head - normocephalic and atraumatic  Eyes - conjunctiva clear  Ears -external ears normal  Nose - no drainage noted  Mouth - mucous membranes moist  Neck - supple, no carotid bruits, thyroid not palpable  Chest -basal crackles with decreased air entry bilaterally to auscultation, increased effort  Heart - normal rate, regular rhythm, no murmur  Abdomen - soft, nontender, nondistended, bowel sounds present all four quadrants, no masses, hepatomegaly or splenomegaly  Neurological -sedated on vent  Extremities - extremity edema, lower distal extremity discoloration    Skin - no gross lesions, rashes, or induration noted        Data:     Labs:    Hematology:  Recent Labs     02/10/22  0603 22  0523 22  0600   WBC 7.8 14.7* 11.9*   RBC 4.56 4.70 4.61   HGB 11.3* 11.5* 11.4*   HCT 37.2* 38.7* 38.2*   MCV 81.6* 82.3* 82.9   MCH 24.8* 24.5* 24.7*   MCHC 30.4 29.7 29.8   RDW 15.9* 16.3* 16.2*   * 170 159   MPV Abnormal 13.4 12.3     Chemistry:  Recent Labs     02/10/22  0603 02/10/22  1417 22  0523 22  1400 22  0600     --  145*  --  143   K 3.3*   < > 3.5* 4.3 4.4   *  --  109*  --  109*   CO2 23  --  23  --  24   GLUCOSE 196*  --  192*  --  95   BUN 46*  --  42*  --  35*   CREATININE 0.67*  --  0.73  --  0.69*   MG 1.8  --  1.8  --  1.8   ANIONGAP 12  --  13  --  10   LABGLOM >60  --  >60  --  >60   GFRAA >60  --  >60  --  >60   CALCIUM 8.7  --  8.6  --  8.6   PHOS 2.3*  --  2.6  --  2.6    < > = values in this interval not displayed.      Recent Labs     22  0224 22  0428 22  0600 22  0717 22  0750 22  0957 22  1131   PROT  --   --  5.0*  --   --   --   --    LABALBU  --   --  1.6*  --   --   --   --    AST  --   --  39  --   --   --   --    ALT  --   --  36 --   --   --   --    ALKPHOS  --   --  105  --   --   --   --    BILITOT  --   --  0.37  --   --   --   --    BILIDIR  --   --  0.21  --   --   --   --    POCGLU 77 72*  --  66* 67* 105 101       Lab Results   Component Value Date    INR 1.0 01/17/2022    INR 1.0 11/03/2021    INR 1.0 07/27/2019    PROTIME 13.3 01/17/2022    PROTIME 11.1 11/03/2021    PROTIME 10.5 07/27/2019       Lab Results   Component Value Date/Time    SPECIAL RT ARTERY,10ML 02/08/2022 03:59 PM     Lab Results   Component Value Date/Time    CULTURE NO GROWTH 3 DAYS 02/08/2022 03:59 PM       Lab Results   Component Value Date    POCPH 7.409 02/12/2022    POCPCO2 45.2 02/12/2022    POCPO2 88.7 02/12/2022    POCHCO3 28.6 02/12/2022    NBEA NOT REPORTED 02/12/2022    PBEA 3 02/12/2022    NMC5EBL NOT REPORTED 02/12/2022    WZRI1DZT 97 02/12/2022    FIO2 100.0 02/12/2022       Radiology:    XR CHEST 1 VIEW    Result Date: 1/16/2022  Hypoinflated lungs. Cardiomegaly again seen, when compared to the previous study performed 07/27/2019. Diffuse, increased interstitial markings throughout both lungs, some of which can be seen on the prior study may represent baseline chronic interstitial lung changes. The increased prominence on this exam could be secondary to the suboptimal inspiratory effort. The presence of pulmonary vascular congestion/mild CHF or bilateral interstitial pneumonia cannot be excluded. Clinical correlation is recommended. CT CHEST PULMONARY EMBOLISM W CONTRAST    Result Date: 1/16/2022  No evidence of pulmonary embolism. Compared to 2008, worsening underlying emphysema, with worsening subacute or acute interstitial lung disease, best seen left upper lobe; differential includes interstitial pneumonia or pneumonitis superimposed on emphysema, DIP, HP, and other interstitial pneumonias as well as infectious or inflammatory process superimposed on emphysema disease.   Findings are not typical for COVID-19 pneumonia, but an element of the latter should be considered. Thickening and distortion left major fissure with ill-defined mass-like focus left lower lobe. The latter could also be infectious or inflammatory, although neoplasm should be excluded. Underlying worsening emphysema. Nodular densities, best seen right upper lobe. Small pleural effusions, slightly larger on the left. See recommendations below. Mild mediastinal and left hilar adenopathy; see recommendations below. Worsening now moderate-severe pulmonary artery hypertension. Mild cardiomegaly. Stable mild dilatation ascending thoracic aorta. Additional unchanged or incidental findings, as above. RECOMMENDATIONS: Clinical correlation and short-term follow-up CT chest in 1-4 months depending upon the clinical presentation/course. All radiological studies reviewed                Code Status:  DNR-CCA    Electronically signed by Nona Morgan MD on 2/12/2022 at 2:47 PM     Copy sent to Dr. Derek Deelon MD    This note was created with the assistance of a speech-recognition program.  Although the intention is to generate a document that actually reflects the content of the visit, no guarantees can be provided that every mistake has been identified and corrected by editing. Note was updated later by me after  physical examination and  completion of the assessment.

## 2022-02-12 NOTE — PROGRESS NOTES
Pulmonary Critical Care Progress Note       Patient seen for the follow up of COVID-19 pneumonia, acute hypoxic respiratory failure. Subjective:  He sedated with Precedex and fentanyl on the ventilator with increased oxygen requirements, currently at 80% FiO2/PEEP 14 tidal volume 450. He is on 45 mics of Luke-Synephrine. He is off amiodarone drip. He is off Versed. He had increased tube feeds residual tube feeds were held. He had decreased blood sugar this morning requiring D50 W x2. He is not waking up on decreasing sedation but gets dyssynchronous with the vent according to RN. Examination:  Vitals: BP (!) 117/55   Pulse 72   Temp 102.1 °F (38.9 °C) (Oral)   Resp 26   Ht 5' 10\" (1.778 m)   Wt 233 lb 3.2 oz (105.8 kg)   SpO2 98%   BMI 33.46 kg/m²   General appearance: Sedated, intubated on ventilator  Neck: No JVD  Lungs decreased breath sound no crackles or wheezing  Heart: regular rate and rhythm, S1, S2 normal, no gallop  Abdomen: Soft, non tender, + BS  Extremities: no cyanosis or clubbing. 2+ edema    LABs:  CBC:   Recent Labs     02/11/22  0523 02/12/22  0600   WBC 14.7* 11.9*   HGB 11.5* 11.4*   HCT 38.7* 38.2*    159     BMP:   Recent Labs     02/11/22  0523 02/11/22  0523 02/11/22  1400 02/12/22  0600   *  --   --  143   K 3.5*   < > 4.3 4.4   CO2 23  --   --  24   BUN 42*  --   --  35*   CREATININE 0.73  --   --  0.69*   LABGLOM >60  --   --  >60   GLUCOSE 192*  --   --  95    < > = values in this interval not displayed.      ABG:  Lab Results   Component Value Date    GKB4GKP NOT REPORTED 02/12/2022    FIO2 100.0 02/12/2022       Lab Results   Component Value Date    POCPH 7.409 02/12/2022    POCPCO2 45.2 02/12/2022    POCPO2 88.7 02/12/2022    POCHCO3 28.6 02/12/2022    PBEA 3 02/12/2022    XFO6TKL NOT REPORTED 02/12/2022    IENJ4XAN 97 02/12/2022    FIO2 100.0 02/12/2022     Radiology:  Chest x-ray 2/12  Bilateral lung airspace disease which has slightly progressed within the   right lower lobe               X-ray chest 2/11/2022:      Impression:  · Acute on chronic hypoxic respiratory failure  · COVID-19 pneumonia/ARDS  · COPD/pulmonary emphysema  · Acute left thalamic infarct/CVA  · Left lower lobe opacity versus nodule  · Pulmonary edema  · Elevated D-dimer, decreased platelets  · Systolic heart failure, s/p AICD placement  · BLANCHE on CKD  · Diabetes mellitus    Recommendations:  · Continue vent support, wean FiO2 as tolerate keep keep PEEP  14  · Fentanyl drip RASS -2  · Continue precedex drip RASS -2  · Add Versed drip if needed to RASS -2  · Will consider Nimbex  · Repeat CT brain  · CTA chest rule out PE  · CT abdomen pelvis? Abdomen sepsis  · Xopenex by nebulizer  · Pulmicort 0.5 every 12 hours  · Monitor blood sugars off insulin drip  · Wean off Luke-Synephrine, keep map  65 to 75 mmHg  ·  discontinue lisinopril  · Amiodarone per cardiology.     · Out of Airborne isolation  · IV Decadron 6 mg daily  · Linezolid and meropenem per ID  · Not a candidate for Actemra/baricitinib stopped secondary to cytopenias  · Neurology on consult  · Check liver function test ammonia  · Nephology on consult  · Tube feeds as tolerated  · X-ray chest in am  · GI prophylaxis, Protonix  · Discussed with RN  · General surgery following for tracheostomy  · DVT prophylaxis    Electronically signed by     Juliocesar Diallo MD on 2/12/2022 at 2:01 PM  Pulmonary Critical Care and Sleep Medicine,  [facility] AT [place]: [phone]  Cc: 35 minutes

## 2022-02-12 NOTE — PROGRESS NOTES
Contacted Dr. Alie Whitlock and Dr. Shane Lundborg (Dr. Wayne Angela on call) as patient had > 500 mL residual in his stomach. Per Dr. Alie Whitlock, half of contents returned and TF will be on hold for the evening. Per Dr. Wayne Angela, holding midnight bolus and will restart water bolus at 250 mL q4h. Held patient's Lantus as well as he is not getting TF at this time and last POCT BG was 83.

## 2022-02-12 NOTE — PROGRESS NOTES
Patient evaluated at request of critical care team due to small amount pneumoperitoneum on CT PE scan. No acute surgical intervention required at this time. Patient underwent dedicated CT abdomen and pelvis, currently awaiting official read. On initial exam of CT there is small amount pneumoperitoneum consistent with gas extravasation from gastric body during PEG tube placement. No overt inflammatory changes noted in the bowel, some contrast seen in colon, gas and contrast in appendix, gallbladder without obvious wall thickening or pericholecystic fluid, some small volume free fluid that is consistent with anasarca picture, severe RLL lung disease and bilateral pulmonary effusion. Clinically patient is the same as examined earlier in the day, PEG bumper at 4cm and snug without drainage around stomal site, Abdomen soft and non distended, continues on small amount vasopressor support and sedatives, tube feeds running at trophic rate but would exercise caution with rapid advancement as neosynephrine can reduce splanchnic blood flow.

## 2022-02-12 NOTE — PROGRESS NOTES
[facility]  Progress Note    Donal Cortes  MRN: [id number]  Date: 2/12/2022  LOS: 32     Reason for F/U :   COVID-19 virus infection    Impression :   COVID-19 virus infection tested + 1/16/2022  Acute respiratory failure currently ventilator dependent  Intubated 1/23/2022  Emphysema with worsening changes on imaging  Worsening acute interstitial lung disease and clinically not typical of COVID-19 virus infection  Worsening moderate to severe pulmonary artery hypertension  Bilateral lower extremity edema likely related to above  Leukopenia and thrombocytopenia  Lacunar  infarct on the left thalamus  Intermittent fevers  Acute kidney injury    Recommendations:   COVID-19 therapy: The patient was on Decadron 6 mg IV daily through 02/04/2022  The patient has been started on baricitinib 1/17/2022 but it was discontinued 1/18/2022 due to cytopenias. Actemra had been considered but again due to the cytopenias and thrombocytopenia this has been held. Antimicrobial therapy: The patient received Rocephin 1000 mg and azithromycin 500 mg on 1/16/2022  The patient received a dose of levofloxacin 500 mg on 1/18/2020.   Patient started on cefepime and vancomycin 1/23/2022 plan completed a 7-day course of cefepime on 1/28/2022  Vancomycin discontinued due to acute kidney injury 1/24/2022    Given the continued fevers and the worsening hypoxia and x-ray changes with right lower lobe infiltrates I will restart the antibiotics with meropenem and Zyvox for now  The blood culture data has remained negative  We will follow his progress but as he currently is not a candidate for tracheostomy    Infection Control Recommendations:   Droplet plus precautions    Discharge Planning:   Patient will need Midline Catheter Insertion/ PICC line Insertion: No  Patient will need: Home IV , [place],  SNF,  LTAC: Undetermined  Patient willneed outpatient wound care: No    Medical Decision making / Summary of Stay: Odette Freeman is a 64y.o.-year-old male who was initially admitted on 1/16/2022. Chiquis Flores has a history of diabetes mellitus type 2, essential hypertension, seizure disorder, chronic kidney disease stage III, hyperlipidemia among other medical problems.     The patient reports that about 1 to 2 months ago he had his diabetes medications changed and since that time he has noticed increasing swelling in his legs, hardness in the legs, difficulty ambulating, and weight gain from 178 to 255 pounds over the last 1 to 2 months. He did not report any subjective fevers, chills, cough or shortness of breath. He denies any loss of smell or change in taste. No abdominal pain nausea vomiting or diarrhea. The patient does report that he has home oxygen that he uses as needed at home and he reports that he hardly needs it. He is vaccinated did receive the J&J vaccine but has not yet received a booster dose.     The patient presented to the emergency department and was found to have leukopenia with a white blood cell count of 2.7 and thrombocytopenia with a platelet count of 037. Creatinine slightly elevated at 1.31 and proBNP is elevated at 9327. Chest x-ray showed hyperinflated lungs with cardiomegaly seen and diffuse increased interstitial markings in both lungs which may represent baseline chronic interstitial lung changes given that these findings were present before. A CT of the chest was done with IV contrast that showed no evidence of pulmonary embolism and compared to 2008 there is worsening underlying emphysema with worsening subacute or acute interstitial lung disease best seen in the left upper lobe. Findings were not felt typical for COVID-19 virus pneumonia. There was thickening and distortion of the left major fissure with an ill-defined masslike focus in the left lower lobe.   There is worsening moderate to severe pulmonary artery hypertension     COVID testing was positive and I was asked to evaluate and help with COVID-19 virus infection    The patient did have a CT scan of the abdomen pelvis done 2022 which was a limited study with no evidence of bowel obstruction and moderate stool burden noted felt related to constipation. Tiny amount of free fluid scattered in the abdomen, moderate pleural effusions and left basilar consolidation and basilar interstitial thickening increased from 2022      Current evaluation:2022    BP (!) 131/43   Pulse 73   Temp 100.3 °F (37.9 °C) (Axillary)   Resp 30   Ht 5' 10\" (1.778 m)   Wt 233 lb 3.2 oz (105.8 kg)   SpO2 97%   BMI 33.46 kg/m²     Temperature Range: Temp: 100.3 °F (37.9 °C) Temp  Av.2 °F (37.9 °C)  Min: 99.4 °F (37.4 °C)  Max: 102.9 °F (39.4 °C)   The patient is seen and evaluated at bedside he continues to have fevers up to 102.9 degrees and FiO2 requirements on the ventilator increasing to 90% and 15 of PEEP. The patient remains on Precedex, fentanyl, Versed drips. The patient is on Luke-Synephrine for blood pressure support. FiO2 requirements had to be increased as the patient's PO2 was low. Review of Systems   Unable to perform ROS: Intubated       Physical Examination :     Physical Exam  Constitutional:       Appearance: He is well-developed. Interventions: He is sedated and intubated. HENT:      Head: Normocephalic and atraumatic. Cardiovascular:      Rate and Rhythm: Normal rate. Heart sounds: Normal heart sounds. No friction rub. No gallop. Pulmonary:      Effort: Pulmonary effort is normal. He is intubated. Breath sounds: Normal breath sounds. No wheezing. Abdominal:      General: Bowel sounds are normal.      Palpations: Abdomen is soft. There is no mass. Tenderness: There is no abdominal tenderness. Musculoskeletal:         General: Swelling (Bilateral upper extremity swelling) present. Cervical back: Neck supple. Lymphadenopathy:      Cervical: No cervical adenopathy. Skin:     General: Skin is warm and dry. Comments: There is erythroderma in the legs and feet bilaterally which is not infectious         Laboratory data:   I have independently reviewed the followinglabs:  CBC with Differential:   Recent Labs     02/11/22  0523 02/12/22  0600   WBC 14.7* 11.9*   HGB 11.5* 11.4*   HCT 38.7* 38.2*    159   LYMPHOPCT 5* 5*   MONOPCT 4 4     BMP:   Recent Labs     02/11/22  0523 02/11/22  0523 02/11/22  1400 02/12/22  0600   *  --   --  143   K 3.5*   < > 4.3 4.4   *  --   --  109*   CO2 23  --   --  24   BUN 42*  --   --  35*   CREATININE 0.73  --   --  0.69*   MG 1.8  --   --  1.8    < > = values in this interval not displayed. Hepatic Function Panel:   No results for input(s): PROT, LABALBU, BILIDIR, IBILI, BILITOT, ALKPHOS, ALT, AST in the last 72 hours. Lab Results   Component Value Date    PROCAL 0.25 02/01/2022    PROCAL 0.24 01/23/2022    PROCAL 0.11 01/19/2022     Lab Results   Component Value Date    CRP 23.5 01/16/2022    CRP 2.0 07/27/2019     Lab Results   Component Value Date    SEDRATE 3 01/16/2022         Lab Results   Component Value Date    DDIMER 2.96 01/30/2022    DDIMER 5.84 01/23/2022    DDIMER 1.53 01/18/2022    DDIMER 1.73 01/16/2022    DDIMER 1.18 12/07/2018     Lab Results   Component Value Date    FERRITIN 569 01/16/2022    FERRITIN 50 07/23/2019    FERRITIN 18 07/08/2019     Lab Results   Component Value Date     01/16/2022     Lab Results   Component Value Date    FIBRINOGEN 402 01/16/2022       Results in Past 30 Days  Result Component Current Result Ref Range Previous Result Ref Range   SARS-CoV-2, Rapid DETECTED (A) (1/16/2022) Not Detected Not in Time Range      Lab Results   Component Value Date    COVID19 DETECTED 01/16/2022    COVID19 Not Detected 11/02/2021       No results for input(s): VICTORINO in the last 72 hours.     Imaging Studies:   ONE XRAY VIEW OF THE CHEST 2/12/2022 5:28 am    Impression Bilateral lung airspace disease which has slightly progressed within the   right lower lobe.               CT OF THE ABDOMEN AND PELVIS WITHOUT CONTRAST 1/28/2022 8:34 am    Impression   1.  Overall mildly limited study due to motion and lack of contrast.       2.  No evidence of bowel obstruction.  Moderate stool burden is noted,   possibly related to constipation.  Enteric tube is in place with distal tip   in the proximal stomach.       3.  Tiny amount of free fluid scattered in the abdomen, including   presacral/perirectal fluid, most likely related to volume overload.  No   definite findings of rectal wall thickening or fecal impaction.       4.  Moderate pleural effusions, left basilar consolidation, and bibasilar   interstitial thickening, increased when compared to 01/16/2022.       RECOMMENDATIONS:   Unavailable         Veins: Lower Extremities DVT Study, Venous Scan Lower Bilateral.  Indications for Study: Leg Swelling . Patient Status: In Patient. Technical Quality: Limited visualization. Limitation reason: Edema. Comments: Simultaneous real time imaging utilizing B-Mode, color doppler and spectral waveform analysis was performed on the  bilateral lower extremities for venous examination of the deep and superficial systems. Conclusions  Summary  No evidence of superficial or deep venous thrombosis in both lower extremities. Signature  Electronically signed by Mary Harris RVT(Sonographer) on 01/19/2022 08:10 AM  Electronically signed by Elsi Hudson physician) on 01/19/2022 06:21 PM      CT OF THE HEAD WITHOUT CONTRAST  1/18/2022 5:42 pm  Impression   New lacunar infarct left thalamus, age indeterminate. Recardo Levels may be helpful.           Cultures:     Culture, Blood 1 [[id number]] Collected: 02/08/22 6197   Order Status: Completed Specimen: Blood Updated: 02/11/22 2034    Specimen Description . BLOOD    Special Requests RT ARTERY,10ML    Culture NO GROWTH 3 DAYS   Culture, Blood 1 [[id number]] Collected: 02/08/22 1549   Order Status: Completed Specimen: Blood Updated: 02/11/22 2032    Specimen Description . BLOOD    Special Requests RT HAND,10ML    Culture NO GROWTH 3 DAYS     Culture, Blood 1 [[id number]] Collected: 02/01/22 0003   Order Status: Completed Specimen: Blood Updated: 02/06/22 0830    Specimen Description . BLOOD    Special Requests ART LINE 20ML    Culture NO GROWTH 5 DAYS   Culture, Blood 1 [[id number]] Collected: 01/31/22 1853   Order Status: Completed Specimen: Blood Updated: 02/05/22 2315    Specimen Description . BLOOD    Special Requests RT HAND,10ML    Culture NO GROWTH 5 DAYS     Culture, Respiratory [[id number]] Collected: 01/28/22 1030   Order Status: Completed Specimen: Sputum, Suctioned Updated: 01/30/22 0932    Specimen Description . SUCTIONED SPUTUM    Special Requests NOT REPORTED    Direct Exam < 10 EPITHELIAL CELLS/LPF     <10 NEUTROPHILS/LPF     NO SIGNIFICANT PATHOGENS SEEN    Culture NORMAL RESPIRATORY PO HEAVY GROWTH       Culture, Blood 1 [[id number]] Collected: 01/23/22 1759   Order Status: Completed Specimen: Blood Updated: 01/28/22 2111    Specimen Description . BLOOD    Special Requests RT HAND 19ML    Culture NO GROWTH 5 DAYS   Culture, Blood 1 [[id number]] Collected: 01/23/22 1759   Order Status: Completed Specimen: Blood Updated: 01/28/22 2110    Specimen Description . BLOOD    Special Requests ART LINE 10ML    Culture NO GROWTH 5 DAYS     Culture, Blood 2 [[id number]] Collected: 01/21/22 0742   Order Status: Completed Specimen: Blood Updated: 01/26/22 1001    Specimen Description . BLOOD    Special Requests LEFT AC 20ML    Culture NO GROWTH 5 DAYS   Culture, Blood 1 [[id number]] Collected: 01/21/22 0750   Order Status: Completed Specimen: Blood Updated: 01/26/22 1000    Specimen Description . BLOOD    Special Requests LEFT HAND 5ML    Culture NO GROWTH 5 DAYS   MRSA DNA Probe, Nasal [[id number]] Collected: 01/23/22 2258   Order Status: Completed Specimen: Nasal Updated: 01/25/22 1038    Specimen Description . NASAL SWAB    MRSA, DNA, Nasal NEGATIVE    Comment: NEGATIVE:  MRSA DNA not detected by nucleic acid amplification.                                                     Results should be used as an adjunct to nosocomial control efforts to identify patients   needing enhanced precautions.     The test is not intended to identify patients with staphylococcal infections.  Results   should not be used to guide or monitor treatment for MRSA infections. Culture, Blood 1 [[id number]] Collected: 01/16/22 1220   Order Status: Completed Specimen: Blood Updated: 01/21/22 1521    Specimen Description . BLOOD    Special Requests LAC 12ML    Culture NO GROWTH 5 DAYS   Culture, Blood 1 [[id number]] Collected: 01/16/22 1205   Order Status: Completed Specimen: Blood Updated: 01/21/22 1521    Specimen Description . BLOOD    Special Requests RAC 12ML    Culture NO GROWTH 5 DAYS       Culture, Urine [[id number]] Collected: 01/16/22 1315   Order Status: Completed Specimen: Urine, clean catch Updated: 01/17/22 2128    Specimen Description . CLEAN CATCH URINE    Special Requests NOT REPORTED    Culture NO SIGNIFICANT GROWTH       COVID-19, Rapid [[id number]] (Abnormal) Collected: 01/16/22 1230   Order Status: Completed Specimen: Nasopharyngeal Swab Updated: 01/16/22 1314    Specimen Description . NASOPHARYNGEAL SWAB    SARS-CoV-2, Rapid DETECTED Abnormal     Comment:        Rapid NAAT: The specimen is POSITIVE for SARS-Cov-2, the novel coronavirus associated with   COVID-19.         This test has been authorized by the FDA under an Emergency Use Authorization (EUA) for use   by authorized laboratories.         The ID NOW COVID-19 assay is designed to detect the virus that causes COVID-19 in patients   with signs and symptoms of infection who are suspected of COVID-19.    An individual without symptoms of COVID-19 and who is not shedding SARS-CoV-2 virus would   expect to care of this patient. Please call with questions. This note iscreated with the assistance of a speech recognition program.  While intending to generate a document that actually reflects the content of the visit, the document can still have some errors including those of syntax andsound a like substitutions which may escape proof reading. In such instances, actual meaning can be extrapolated by contextual diversion.

## 2022-02-12 NOTE — PROGRESS NOTES
Section of Cardiology  Progress Note      Date:  2/12/2022  Patient: Sammy Schrader  Admission:  1/16/2022 11:51 AM  Admit DX: Hypokalemia [E87.6]  Hypomagnesemia [E83.42]  COPD exacerbation (HCC) [J44.1]  BLANCHE (acute kidney injury) ([facility] [N17.9]  Acute respiratory failure with hypoxia (HCC) [J96.01]  Acute on chronic systolic CHF (congestive heart failure) ([facility] [I50.23]  COVID [U07.1]  COVID-19 [U07.1]  Age:  76 y.o., 1945     LOS: 27 days     Reason for evaluation:   atrial fibrillation      SUBJECTIVE:     The patient was seen and examined. Notes and labs reviewed. Pt remains intubated/sedated. Still on high ventilation requirements  PEG tube placed. Pt out of isolation  Remains in NSR on PO amio 200mg  On and off ranulfo  Continues to have wide pulse pressure - ECHO normal AV  Pt has new fevers and diaphoresis. Not doing well    OBJECTIVE:      EXAM:   Vitals:    VITALS:  BP (!) 131/43   Pulse 73   Temp 100.3 °F (37.9 °C) (Axillary)   Resp 30   Ht 5' 10\" (1.778 m)   Wt 233 lb 3.2 oz (105.8 kg)   SpO2 97%   BMI 33.46 kg/m²   24HR INTAKE/OUTPUT:      Intake/Output Summary (Last 24 hours) at 2/12/2022 0937  Last data filed at 2/12/2022 0528  Gross per 24 hour   Intake 2927.81 ml   Output 1075 ml   Net 1852.81 ml       Physical Exam  Vitals and nursing note reviewed. Constitutional:       Comments: Intubated/sedated. More comfortable today   Neck:      Vascular: No carotid bruit. Cardiovascular:      Rate and Rhythm: Normal rate and regular rhythm. Pulses: Normal pulses. Heart sounds: Normal heart sounds. No murmur heard. Pulmonary:      Effort: Pulmonary effort is normal.      Breath sounds: Normal breath sounds. Comments: Intubated. Respiratory coarse breath sounds. Equal entry  Abdominal:      General: Abdomen is flat. Palpations: Abdomen is soft. Comments: PEG tube in situ   Musculoskeletal:      Right lower leg: Edema present.       Left lower leg: Edema present. Skin:     General: Skin is warm and dry. Neurological:      Comments: Intubated/sedated   Psychiatric:      Comments: Intubated/sedated         Current Inpatient Medications:   meropenem  1,000 mg IntraVENous Once    Followed by    meropenem  1,000 mg IntraVENous Q8H    linezolid  600 mg IntraVENous Q12H    sucralfate  1 g Oral 4 times per day    amLODIPine  5 mg Oral BID    insulin lispro  0-18 Units SubCUTAneous TID WC    insulin lispro  0-9 Units SubCUTAneous Nightly    amiodarone  200 mg Per NG tube Daily    insulin glargine  15 Units SubCUTAneous BID    lisinopril  20 mg Oral Daily    Vitamin D  1,000 Units Oral Daily    polyethylene glycol  17 g Per NG tube Daily    levalbuterol  1.25 mg Nebulization Q6H    bisacodyl  10 mg Rectal Daily    pantoprazole  40 mg IntraVENous Daily    And    sodium chloride (PF)  10 mL IntraVENous Daily    aspirin  324 mg Oral Daily    levETIRAcetam  500 mg Oral BID    chlorhexidine  15 mL Mouth/Throat BID    enoxaparin  30 mg SubCUTAneous BID    QUEtiapine  50 mg Oral Once    budesonide  0.5 mg Nebulization BID    sodium chloride flush  5-40 mL IntraVENous 2 times per day    atorvastatin  20 mg Oral Daily       IV Infusions (if any):   phenylephrine (KEARA-SYNEPHRINE) 50mg/250mL infusion 45 mcg/min (02/12/22 0906)    dextrose      dexmedetomidine (PRECEDEX) IV infusion 1.4 mcg/kg/hr (02/12/22 0737)    fentaNYL 50 mcg/hr (02/12/22 5322)    propofol Stopped (02/09/22 1030)    midazolam (VERSED) 1 mg/mL in D5W infusion 1 mg/hr (02/12/22 2339)    sodium chloride         Diagnostics:   Telemetry: NSR/Sinus Thai    ECHO 2/11/2022  Technically difficult study  Mild to moderately increased LV wall thickness  Left ventricle is normal in size. Difficult to assess for wall motion  abnormalities  Global left ventricular systolic function is normal with an estimated  ejection fraction of > 55% . RV appears mildly dilated with reduced function.  RVSP 37 mmHg  Left atrium is mildly dilated. Right atrium is mildly dilated . Aortic valve leaflets are mildly thickened. No aortic stenosis. No aortic insufficiency. Trivial pericardial effusion. IVC not well visualized    Labs:   CBC:  Recent Labs     02/11/22  0523 02/12/22  0600   WBC 14.7* 11.9*   HGB 11.5* 11.4*   HCT 38.7* 38.2*    159     Magnesium:  Recent Labs     02/11/22  0523 02/12/22  0600   MG 1.8 1.8     BMP:  Recent Labs     02/11/22  0523 02/11/22  0523 02/11/22  1400 02/12/22  0600   *  --   --  143   K 3.5*   < > 4.3 4.4   CALCIUM 8.6  --   --  8.6   CO2 23  --   --  24   BUN 42*  --   --  35*   CREATININE 0.73  --   --  0.69*   LABGLOM >60  --   --  >60   GLUCOSE 192*  --   --  95    < > = values in this interval not displayed. BNP:No results for input(s): BNP, PROBNP in the last 72 hours. PT/INR:No results for input(s): PROTIME, INR in the last 72 hours. APTT:No results for input(s): APTT in the last 72 hours. CARDIAC ENZYMES:No results for input(s): MYOGLOBIN, CKTOTAL, CKMB, CKMBINDEX, TROPHS, TROPONINT in the last 72 hours. FASTING LIPID PANEL:  Lab Results   Component Value Date    HDL 30 11/03/2021    TRIG 181 11/03/2021     LIVER PROFILE:  No results for input(s): AST, ALT, LABALBU, ALKPHOS, BILITOT, BILIDIR, IBILI, PROT, GLOB, ALBUMIN in the last 72 hours. ASSESSMENT:  · Paroxysmal Atrial Fibrillation - now in sinus  · Hx of CHD  · HTN  · COVID 19 PNA - managed by others  · Chronic Kidney Disease  · COPD  · Acute Hypoxic Respiratory Failure - intubated  · Hypotension with Wide Pulse Pressure    PLAN:  1. Continue current medications. 2. Cont. Amio 200mg PO/NG daily  3. Cont. Treatment of COVID/pneumonia  4. Supportive management otherwise  5. Fluids and pressors for low MAPs   6. Hold diuretics and BP meds while on ranulfo.  Monitor leg edema    Discussed with nursing    Idalia Rede, MD

## 2022-02-12 NOTE — PLAN OF CARE
Problem: Airway Clearance - Ineffective  Goal: Achieve or maintain patent airway  Outcome: Ongoing     Problem: Gas Exchange - Impaired  Goal: Absence of hypoxia  2/12/2022 0326 by Hafsa Canas RN  Outcome: Ongoing   Goal: Promote optimal lung function  Outcome: Ongoing       Problem:  Body Temperature -  Risk of, Imbalanced  Goal: Ability to maintain a body temperature within defined limits  2/12/2022 0326 by Hafsa Canas RN  Outcome: Ongoing  Goal: Will regain or maintain usual level of consciousness  Outcome: Ongoing  Goal: Complications related to the disease process, condition or treatment will be avoided or minimized  Outcome: Ongoing     Problem: Nutrition Deficits  Goal: Optimize nutritional status  2/12/2022 0326 by Hafsa Canas RN  Outcome: Ongoing      Problem: Risk for Fluid Volume Deficit  Goal: Maintain normal serum potassium, sodium, calcium, phosphorus, and pH  Outcome: Ongoing     Problem: Infection:  Goal: Will remain free from infection  Description: Will remain free from infection  Outcome: Ongoing     Problem: Skin Integrity:  Goal: Skin integrity will stabilize  Description: Skin integrity will stabilize  Outcome: Ongoing     Problem: Nutrition  Goal: Optimal nutrition therapy  Outcome: Ongoing

## 2022-02-12 NOTE — PROGRESS NOTES
.  Nephrology  Progress Note    Reason for Consult: Hyperkalemia    Requesting Physician:  Amador Steel MD    INTERVAL HISTORY:  Remains sedated on mechanical ventilation FiO2 90%  Had high TF residuals over night. Potassium stable. Serum Na stable 143. Patient continues to require vasopressor support. Low grade fevers. HISTORY OF PRESENT ILLNESS:    The patient is a 76 y.o. male who presented with to the hospital for worsening shortness of breath ad and worsening lower extremity edema on 1/16. He had diffuse body aches and sweats and a dry cough. He tested positive for COVID-19. He has steadily declined since admission. On 1/18 a CT head found New lacunar infarct left thalamus. He had to be intubated on 1/23. He has a significant past medical history of COPD, hyperlipidemia, Type 2 DM, Right kidney mass, and abdominal aortic aneurysm repair in 2005. His potassium has been steadily rising since 1/24/22. The most current Potassium level is 5.8. His creatine is improved to 1.71. This information was retrieved through chart review and nursing. Patient was unable to provide information due to current status on mechanical ventilation and sedation. Review Of Systems:  Unable to obtain due to patient's current clinical condition. Remains on mechanical ventilation and sedated. Past Medical History:   Diagnosis Date    Arthritis     Atherosclerotic plaque 7/28/2019    Cervical disc disease     degenerative disc disease    Diabetes mellitus ([address]     type 2    History of blood transfusion     Hx of blood clots     left leg    Hyperlipidemia     Neuropathy     Right kidney mass     \"urologist is watching\"    Snores     Type 2 diabetes mellitus treated with insulin ([facility] [address] 7/28/2019       Prior to Admission medications    Medication Sig Start Date End Date Taking?  Authorizing Provider   rosuvastatin (CRESTOR) 40 MG tablet Take 40 mg by mouth every evening   Yes Historical Provider, MD   insulin NPH (HUMULIN N;NOVOLIN N) 100 UNIT/ML injection vial Inject 20 Units into the skin 2 times daily (before meals)   Yes Historical Provider, MD   levETIRAcetam (KEPPRA) 500 MG tablet Take 1 tablet by mouth 2 times daily 11/3/21  Yes Kellie Coates MD   venlafaxine (EFFEXOR XR) 150 MG extended release capsule Take 1 capsule by mouth daily (with breakfast) 7/29/19  Yes Arabella Kiser   vitamin B-1 (THIAMINE) 100 MG tablet Take 100 mg by mouth daily   Yes Historical Provider, MD   ferrous sulfate 325 (65 Fe) MG tablet Take 325 mg by mouth daily (with breakfast)   Yes Historical Provider, MD   ALPRAZolam (XANAX) 0.5 MG tablet Take 0.5 mg by mouth three times daily.    Yes Historical Provider, MD   furosemide (LASIX) 40 MG tablet Take 1 tablet by mouth 2 times daily 12/11/18  Yes Alfredo Evans MD   tiotropium (SPIRIVA RESPIMAT) 2.5 MCG/ACT AERS inhaler Inhale 2 puffs into the lungs daily    Yes Historical Provider, MD   albuterol sulfate  (90 Base) MCG/ACT inhaler Inhale 2 puffs into the lungs every 6 hours as needed for Wheezing or Shortness of Breath   Yes Historical Provider, MD   metoprolol succinate (TOPROL XL) 50 MG extended release tablet Take 50 mg by mouth daily   Yes Historical Provider, MD   Multiple Vitamins-Minerals (MULTIVITAMIN ADULT) TABS Take 1 tablet by mouth daily   Yes Historical Provider, MD   vitamin D (CHOLECALCIFEROL) 1000 UNIT TABS tablet Take 1,000 Units by mouth daily   Yes Historical Provider, MD   fluticasone (FLONASE) 50 MCG/ACT nasal spray 1 spray by Each Nare route daily as needed for Rhinitis   Yes Historical Provider, MD   aspirin 325 MG EC tablet Take 325 mg by mouth daily    Yes Historical Provider, MD   Omega-3 Fatty Acids (FISH OIL) 1000 MG CAPS Take 1 capsule by mouth daily    Yes Historical Provider, MD   amLODIPine (NORVASC) 5 MG tablet Take 5 mg by mouth nightly    Yes Historical Provider, MD   mirtazapine (REMERON) 45 MG tablet Take 45 mg by mouth nightly   Yes Historical Provider, MD       Scheduled Meds:   sucralfate  1 g Oral 4 times per day    amLODIPine  5 mg Oral BID    insulin lispro  0-18 Units SubCUTAneous TID WC    insulin lispro  0-9 Units SubCUTAneous Nightly    amiodarone  200 mg Per NG tube Daily    insulin glargine  15 Units SubCUTAneous BID    lisinopril  20 mg Oral Daily    Vitamin D  1,000 Units Oral Daily    polyethylene glycol  17 g Per NG tube Daily    levalbuterol  1.25 mg Nebulization Q6H    bisacodyl  10 mg Rectal Daily    pantoprazole  40 mg IntraVENous Daily    And    sodium chloride (PF)  10 mL IntraVENous Daily    aspirin  324 mg Oral Daily    levETIRAcetam  500 mg Oral BID    chlorhexidine  15 mL Mouth/Throat BID    enoxaparin  30 mg SubCUTAneous BID    QUEtiapine  50 mg Oral Once    budesonide  0.5 mg Nebulization BID    sodium chloride flush  5-40 mL IntraVENous 2 times per day    atorvastatin  20 mg Oral Daily     Continuous Infusions:   phenylephrine (KEARA-SYNEPHRINE) 50mg/250mL infusion 47 mcg/min (02/12/22 8530)    dextrose      dexmedetomidine (PRECEDEX) IV infusion 1.4 mcg/kg/hr (02/12/22 0051)    fentaNYL 50 mcg/hr (02/12/22 0608)    propofol Stopped (02/09/22 1030)    midazolam (VERSED) 1 mg/mL in D5W infusion 1 mg/hr (02/12/22 0528)    sodium chloride          Physical Exam:  Vitals:    02/12/22 0319 02/12/22 0326 02/12/22 0400 02/12/22 0500   BP:   (!) 131/43    Pulse:   73 75   Resp: 26  25 24   Temp:   100.3 °F (37.9 °C)    TempSrc:   Axillary    SpO2: 100% 100% 99% 95%   Weight:       Height:         I/O last 3 completed shifts: In: 4804.1 [I.V.:1486. 1; NG/GT:3118; IV Piggyback:200]  Out: 1900 [Urine:1900]    General: Intubated, sedated, not in distress. Appears to be stated age. HEENT: Atraumatic, normocephalic. Anicteric sclera. Pink and moist oral mucosa. Neck supple. No JVD. Chest: Bilateral air entry, clear to auscultation, no wheezing, rhonchi or rales.   Cardiovascular: RRR, S1S2, no murmur, rub or gallop. Bilateral mild lower extremity edema. Abdomen: Soft, non tender to palpation. PEG, hypoactive bowel sounds  Musculoskeletal: No cyanosis or clubbing. Integumentary: Pink, warm and dry. Free from rash or lesions. CNS: Sedated, intubated, face symmetrical. No tremor.      Data:  CBC:   Lab Results   Component Value Date    WBC 11.9 (H) 02/12/2022    HGB 11.4 (L) 02/12/2022    HCT 38.2 (L) 02/12/2022    MCV 82.9 02/12/2022     02/12/2022     BMP:    Lab Results   Component Value Date     02/12/2022     (H) 02/11/2022     02/10/2022    K 4.4 02/12/2022    K 4.3 02/11/2022    K 3.5 (L) 02/11/2022     (H) 02/12/2022     (H) 02/11/2022     (H) 02/10/2022    CO2 24 02/12/2022    CO2 23 02/11/2022    CO2 23 02/10/2022    BUN 35 (H) 02/12/2022    BUN 42 (H) 02/11/2022    BUN 46 (H) 02/10/2022    CREATININE 0.69 (L) 02/12/2022    CREATININE 0.73 02/11/2022    CREATININE 0.67 (L) 02/10/2022    GLUCOSE 95 02/12/2022    GLUCOSE 192 (H) 02/11/2022    GLUCOSE 196 (H) 02/10/2022     CMP:   Lab Results   Component Value Date     02/12/2022    K 4.4 02/12/2022     02/12/2022    CO2 24 02/12/2022    BUN 35 02/12/2022    CREATININE 0.69 02/12/2022    GLUCOSE 95 02/12/2022    CALCIUM 8.6 02/12/2022    PROT 5.6 02/03/2022    LABALBU 2.4 02/03/2022    BILITOT 0.37 02/03/2022    ALKPHOS 117 02/03/2022    AST 72 02/03/2022    ALT 77 02/03/2022      Hepatic:   Lab Results   Component Value Date    AST 72 (H) 02/03/2022    AST 46 (H) 02/01/2022    AST 28 01/31/2022    ALT 77 (H) 02/03/2022    ALT 43 (H) 02/01/2022    ALT 26 01/31/2022    BILITOT 0.37 02/03/2022    BILITOT 0.28 (L) 02/01/2022    BILITOT 0.32 01/31/2022    ALKPHOS 117 02/03/2022    ALKPHOS 100 02/01/2022    ALKPHOS 99 01/31/2022     BNP: No results found for: BNP  Lipids:   Lab Results   Component Value Date    CHOL 129 11/03/2021    HDL 30 (L) 11/03/2021     INR:   Lab Results   Component Value Date

## 2022-02-12 NOTE — PROGRESS NOTES
Residuals for midnight are 150 mL. Requested PCA get blood glucose, currently at 71. Will recheck in one hour and be ready with dextrose bolus as needed.

## 2022-02-12 NOTE — PROGRESS NOTES
General Surgery:  Daily Progress Note                 PATIENT NAME: Zoey Alan     TODAY'S DATE: 2/12/2022, 7:02 AM     SUBJECTIVE:     Pt seen and examined at bedside this morning. Patient remains intubated and sedated. T-max 102.9 overnight. O2 and PEEP requirements significantly increased, still requiring some pressor support. OBJECTIVE:   VITALS:  BP (!) 131/43   Pulse 75   Temp 100.3 °F (37.9 °C) (Axillary)   Resp 24   Ht 5' 10\" (1.778 m)   Wt 230 lb 9.6 oz (104.6 kg)   SpO2 95%   BMI 33.09 kg/m²      INTAKE/OUTPUT:      Intake/Output Summary (Last 24 hours) at 2/12/2022 2690  Last data filed at 2/12/2022 2066  Gross per 24 hour   Intake 2800.81 ml   Output 1075 ml   Net 1725.81 ml       PHYSICAL EXAM:  General Appearance: Intubated, sedated, does not follow commands  HEENT:  Normocephalic, atraumatic, mucus membranes dry , endotracheal tube in place  Skin:  Skin color, texture, turgor normal, bilateral lower extremity edema  Lungs:  No chest wall tenderness. Heart:  Heart regular rate and rhythm  Abdomen:   soft, ND  Extremities: Extremities warm to touch, pink, with no edema. IMAGING: XR CHEST PORTABLE    Result Date: 2/12/2022  Bilateral lung airspace disease which has slightly progressed within the right lower lobe     XR CHEST PORTABLE    Result Date: 2/11/2022  Trace effusions with scattered infiltrates. Support tubes as described above.        Data:  CBC with Differential:    Lab Results   Component Value Date    WBC 11.9 02/12/2022    RBC 4.61 02/12/2022    HGB 11.4 02/12/2022    HCT 38.2 02/12/2022     02/12/2022    MCV 82.9 02/12/2022    MCH 24.7 02/12/2022    MCHC 29.8 02/12/2022    RDW 16.2 02/12/2022    LYMPHOPCT PENDING 02/12/2022    MONOPCT PENDING 02/12/2022    BASOPCT PENDING 02/12/2022    MONOSABS PENDING 02/12/2022    LYMPHSABS PENDING 02/12/2022    EOSABS PENDING 02/12/2022    BASOSABS PENDING 02/12/2022    DIFFTYPE NOT REPORTED 02/12/2022     BMP:    Lab Results   Component Value Date     02/12/2022    K 4.4 02/12/2022     02/12/2022    CO2 24 02/12/2022    BUN 35 02/12/2022    LABALBU 2.4 02/03/2022    CREATININE 0.69 02/12/2022    CALCIUM 8.6 02/12/2022    GFRAA >60 02/12/2022    LABGLOM >60 02/12/2022    GLUCOSE 95 02/12/2022         ASSESSMENT:  Active Hospital Problems    Diagnosis Date Noted    Constipation [K59.00]     Abdominal pain, generalized [R10.84]     Acute on chronic systolic CHF (congestive heart failure) ([facility]) [I50.23]     Acute respiratory failure with hypoxia ([facility]) [J96.01]     BLANCHE (acute kidney injury) ([facility] [address] [N17.9]     COPD exacerbation ([facility]) [J44.1]     Hypokalemia [E87.6]     Hypomagnesemia [E83.42]     Palliative care encounter [Z51.5]     Goals of care, counseling/discussion [Z71.89]     DNR (do not resuscitate) discussion [Z71.89]     ACP (advance care planning) [Z71.89]     COVID-19 [U07.1] 01/16/2022       1. 76 y.o. male with acute respiratory failure secondary to COVID-19    Plan:  1. Patient not suitable for tracheostomy tube placement at this time, will follow along and tentatively plan for trach early next week. Okay to proceed with PEG tube placement by GI consultants.     Electronically signed by Chino Disla DO  on 2/12/2022 at 7:02 AM

## 2022-02-13 NOTE — PROGRESS NOTES
Section of Cardiology  Progress Note      Date:  2/13/2022  Patient: Demetria Stevenson  Admission:  1/16/2022 11:51 AM  Admit DX: Hypokalemia [E87.6]  Hypomagnesemia [E83.42]  COPD exacerbation (HCC) [J44.1]  BLANCHE (acute kidney injury) ([facility] [address] [N17.9]  Acute respiratory failure with hypoxia (HCC) [J96.01]  Acute on chronic systolic CHF (congestive heart failure) ([facility] [address] [I50.23]  COVID [U07.1]  COVID-19 [U07.1]  Age:  76 y.o., 1945     LOS: 28 days     Reason for evaluation:   atrial fibrillation      SUBJECTIVE:     The patient was seen and examined. Notes and labs reviewed. Pt remains intubated/sedated. Still on high ventilation requirements  Pt had episode of hypotension after being turned yesterday requiring ranulfo up to 200s. Now weaned down to 35s  Pt still has edema on exam  Still full code  Imaging showed free air in abdomen likely from PEG placement    OBJECTIVE:      EXAM:   Vitals:    VITALS:  BP (!) 127/59   Pulse 61   Temp 100.5 °F (38.1 °C) (Oral)   Resp 26   Ht 5' 10\" (1.778 m)   Wt 233 lb 3.2 oz (105.8 kg)   SpO2 100%   BMI 33.46 kg/m²   24HR INTAKE/OUTPUT:      Intake/Output Summary (Last 24 hours) at 2/13/2022 0917  Last data filed at 2/13/2022 0601  Gross per 24 hour   Intake 4030.32 ml   Output 1200 ml   Net 2830.32 ml       Physical Exam  Vitals and nursing note reviewed. Constitutional:       Comments: Intubated/sedated. More comfortable today   Neck:      Vascular: No carotid bruit. Cardiovascular:      Rate and Rhythm: Normal rate and regular rhythm. Pulses: Normal pulses. Heart sounds: Normal heart sounds. No murmur heard. Pulmonary:      Effort: Pulmonary effort is normal.      Breath sounds: Normal breath sounds. Comments: Intubated. Respiratory coarse breath sounds. Equal entry  Abdominal:      General: Abdomen is flat. Palpations: Abdomen is soft. Comments: PEG tube in situ   Musculoskeletal:      Right lower leg: Edema present.       Left lower leg: Edema present. Skin:     General: Skin is warm and dry. Comments: Dusky/purple toes peripherally   Neurological:      Comments: Intubated/sedated   Psychiatric:      Comments: Intubated/sedated         Current Inpatient Medications:   meropenem  1,000 mg IntraVENous Q8H    linezolid  600 mg IntraVENous Q12H    sodium chloride  80 mL IntraVENous Once    sucralfate  1 g Oral 4 times per day    amLODIPine  5 mg Oral BID    insulin lispro  0-18 Units SubCUTAneous TID WC    insulin lispro  0-9 Units SubCUTAneous Nightly    amiodarone  200 mg Per NG tube Daily    insulin glargine  15 Units SubCUTAneous BID    Vitamin D  1,000 Units Oral Daily    polyethylene glycol  17 g Per NG tube Daily    levalbuterol  1.25 mg Nebulization Q6H    bisacodyl  10 mg Rectal Daily    pantoprazole  40 mg IntraVENous Daily    And    sodium chloride (PF)  10 mL IntraVENous Daily    aspirin  324 mg Oral Daily    levETIRAcetam  500 mg Oral BID    chlorhexidine  15 mL Mouth/Throat BID    enoxaparin  30 mg SubCUTAneous BID    QUEtiapine  50 mg Oral Once    budesonide  0.5 mg Nebulization BID    sodium chloride flush  5-40 mL IntraVENous 2 times per day    atorvastatin  20 mg Oral Daily       IV Infusions (if any):   phenylephrine (KEARA-SYNEPHRINE) 50mg/250mL infusion 30 mcg/min (02/13/22 0630)    dextrose      dexmedetomidine (PRECEDEX) IV infusion 1.4 mcg/kg/hr (02/13/22 0600)    fentaNYL 50 mcg/hr (02/13/22 0145)    propofol Stopped (02/09/22 1030)    midazolam (VERSED) 1 mg/mL in D5W infusion Stopped (02/13/22 0905)    sodium chloride         Diagnostics:   Telemetry: NSR/Sinus Thai    ECHO 2/11/2022  Technically difficult study  Mild to moderately increased LV wall thickness  Left ventricle is normal in size. Difficult to assess for wall motion  abnormalities  Global left ventricular systolic function is normal with an estimated  ejection fraction of > 55% .   RV appears mildly dilated with reduced function. RVSP 37 mmHg  Left atrium is mildly dilated. Right atrium is mildly dilated . Aortic valve leaflets are mildly thickened. No aortic stenosis. No aortic insufficiency. Trivial pericardial effusion. IVC not well visualized    Labs:   CBC:  Recent Labs     02/12/22 0600 02/13/22 0620   WBC 11.9* 10.8   HGB 11.4* 11.7*   HCT 38.2* 39.9*    154     Magnesium:  Recent Labs     02/12/22 0600 02/13/22 0620   MG 1.8 1.9     BMP:  Recent Labs     02/12/22 0600 02/13/22 0620    138   K 4.4 4.6   CALCIUM 8.6 8.7   CO2 24 21   BUN 35* 38*   CREATININE 0.69* 0.78   LABGLOM >60 >60   GLUCOSE 95 255*     BNP:No results for input(s): BNP, PROBNP in the last 72 hours. PT/INR:No results for input(s): PROTIME, INR in the last 72 hours. APTT:No results for input(s): APTT in the last 72 hours. CARDIAC ENZYMES:No results for input(s): MYOGLOBIN, CKTOTAL, CKMB, CKMBINDEX, TROPHS, TROPONINT in the last 72 hours. FASTING LIPID PANEL:  Lab Results   Component Value Date    HDL 30 11/03/2021    TRIG 181 11/03/2021     LIVER PROFILE:  Recent Labs     02/12/22 0600   AST 39   ALT 36   LABALBU 1.6*   ALKPHOS 105   BILITOT 0.37   BILIDIR 0.21   IBILI 0.16   PROT 5.0*   GLOB NOT REPORTED   ALBUMIN NOT REPORTED        ASSESSMENT:  · Paroxysmal Atrial Fibrillation - now in sinus  · Hx of CHD  · HTN  · COVID 19 PNA - managed by others  · Chronic Kidney Disease  · COPD  · Acute Hypoxic Respiratory Failure - intubated  · Hypotension with Wide Pulse Pressure    PLAN:  1. Continue current medications. 2. Cont. Amio 200mg PO/NG daily  3. Cont. Treatment of COVID/pneumonia  4. Supportive management otherwise. Continue goals of care with family as patient as very poor prognosis at this time  5. Fluids and pressors for low MAPs. Likely exacerbated by high ventilator settings. When patient was turned, higher intrathoracic pressures restricted venous return to the heart resulting in transient hypotension.  Likely pressures will remain low when on high ventilator settings  6. Hold diuretics and BP meds while on ranulfo.  Monitor leg edema    Discussed with nursing    Krish Stevens MD

## 2022-02-13 NOTE — PROGRESS NOTES
After laying patient flat (TF off) to turn and then turning patient to his right side, patient's MAP dropped to 48. Increased Luke by 5 with no effect. Requested Risa Fleischer, RN to room. Patient's Luke increased significantly to help with recovery from turn. Patient's MAP resolves approximately 10 minutes later.

## 2022-02-13 NOTE — PLAN OF CARE
Problem: Gas Exchange - Impaired  Goal: Absence of hypoxia  Outcome: Ongoing     Problem: Falls - Risk of:  Goal: Will remain free from falls  Description: Will remain free from falls  Outcome: Ongoing     Problem: Pain:  Goal: Patient's pain/discomfort is manageable  Description: Patient's pain/discomfort is manageable  Outcome: Ongoing     Problem: Skin Integrity:  Goal: Skin integrity will stabilize  Description: Skin integrity will stabilize  Outcome: Ongoing   Pt will maintain SpO2 GT 92%. Fall precautions in place will continue to monitor. Patient will have progressive improvement with pain scale with interventions. Continue to monitor skin integrity and IV sites.

## 2022-02-13 NOTE — PROGRESS NOTES
Progress note  [facility]    Adult Hospitalist      Name: Betsy Corcoran  MRN: [id number]     Acct: [de-identified]  Room: [address] [place] [address]    Admit Date: 1/16/2022 11:51 AM  PCP: Víctor Grullon MD    Primary Problem  Active Problems:    COVID-19    Acute on chronic systolic CHF (congestive heart failure) (HCC)    Acute respiratory failure with hypoxia (HCC)    BLANCHE (acute kidney injury) ([facility] [address]    COPD exacerbation ([facility] [address]    Hypokalemia    Hypomagnesemia    Palliative care encounter    Goals of care, counseling/discussion    DNR (do not resuscitate) discussion    ACP (advance care planning)    Constipation    Abdominal pain, generalized  Resolved Problems:    * No resolved hospital problems. *        Assesment:   Acute congestive heart failure, diastolic   [id number]   Viral pneumonia secondary to above  Acute respiratory failure with hypoxia intubated on 1/23/2022  Hyperkalemia  Paroxysmal atrial fibrillation  Essential hypertension  Mixed hyperlipidemia  Diabetes mellitus type 2  History of seizure disorder  History of CKD stage III, presently normal renal function  Lung mass?   Leukopenia  Polycythemia  Thrombocytopenia  Anxiety disorder  Recent past h/o lacunar infarct left thalamus   Altered mental status/agitation  Endotracheal intubation secondary to hypoxia dated 1/23/2022  Supraventricular tachycardia/elevated troponin  Fever  Emphysema   Pulmonary artery hypertension       Plan:   Admit patient to intermediate floor  Mechanical ventilation sedation as per critical care, patient is requiring PEEP of 10-->14 with  90% FiO2  Telemetry  Check vitals closely  CBC BMP daily    Cardiology consult  Amiodarone  Precedex gtt    IV Decadron completed  Off pressors  Off amiodarone drip  Patient completed a course of cefepime for 7 days  Bronchodilators  Pulmicort  Patient is deemed not a candidate for Actemra or baricitinib secondary to cytopenia  Infectious disease consult  Pulmonology consult    Continue Keppra  Continue amlodipine, atorvastatin  Continue aspirin    GI signed off   Tube feeds  Consult nephrology   Insulin gtt- DC  Lantus w SSI-->increase to high intensity as BS running high, now better controlled    Plan tracheostomy, continue to wean FiO2  General surgery following, plan for tracheostomy  S/P PEG tube today  Continue other medication as below.     Scheduled Meds:   meropenem  1,000 mg IntraVENous Q8H    linezolid  600 mg IntraVENous Q12H    sodium chloride  80 mL IntraVENous Once    sucralfate  1 g Oral 4 times per day    amLODIPine  5 mg Oral BID    insulin lispro  0-18 Units SubCUTAneous TID WC    insulin lispro  0-9 Units SubCUTAneous Nightly    amiodarone  200 mg Per NG tube Daily    insulin glargine  15 Units SubCUTAneous BID    Vitamin D  1,000 Units Oral Daily    polyethylene glycol  17 g Per NG tube Daily    levalbuterol  1.25 mg Nebulization Q6H    bisacodyl  10 mg Rectal Daily    pantoprazole  40 mg IntraVENous Daily    And    sodium chloride (PF)  10 mL IntraVENous Daily    aspirin  324 mg Oral Daily    levETIRAcetam  500 mg Oral BID    chlorhexidine  15 mL Mouth/Throat BID    enoxaparin  30 mg SubCUTAneous BID    QUEtiapine  50 mg Oral Once    budesonide  0.5 mg Nebulization BID    sodium chloride flush  5-40 mL IntraVENous 2 times per day    atorvastatin  20 mg Oral Daily     Continuous Infusions:   phenylephrine (KEARA-SYNEPHRINE) 50mg/250mL infusion 40 mcg/min (02/13/22 1312)    dextrose      dexmedetomidine (PRECEDEX) IV infusion 1.3 mcg/kg/hr (02/13/22 1235)    fentaNYL 50 mcg/hr (02/13/22 0145)    propofol Stopped (02/09/22 1030)    midazolam (VERSED) 1 mg/mL in D5W infusion Stopped (02/13/22 0905)    sodium chloride       PRN Meds:  sodium chloride flush, 10 mL, PRN  potassium chloride, 20 mEq, PRN  potassium chloride, 10 mEq, PRN  magnesium sulfate, 1,000 mg, PRN  glucose, 15 g, PRN  glucagon (rDNA), 1 mg, PRN  dextrose, 100 mL/hr, PRN  sodium chloride nebulizer, 3 mL, Q8H PRN  LORazepam, 1 mg, Q4H PRN  dextrose bolus (hypoglycemia), 125 mL, PRN   Or  dextrose bolus (hypoglycemia), 250 mL, PRN  sodium chloride flush, 10 mL, PRN  sodium chloride, 25 mL, PRN  ondansetron, 4 mg, Q8H PRN   Or  ondansetron, 4 mg, Q6H PRN  acetaminophen, 650 mg, Q6H PRN   Or  acetaminophen, 650 mg, Q6H PRN  hydrALAZINE, 10 mg, Q6H PRN        Chief Complaint:     Chief Complaint   Patient presents with    Leg Swelling     bilateral, has CHF, on diuretic, EMT saw pt , assisted pt into car    Fever    Other     low SPO2         History of Present Illness:   Patient seen examined at bedside, patient remains in medical ICU  Patient remains intubated sedated  Patient did not wake up on decreasing sedations  Further patient is continuing to require high PEEP at 14 with tidal volume of 550 along with 90% FiO2  Patient has increasing residuals from tube feeding        Review of systems:  Unable to conduct ROS secondary to patient being intubated      Initial HPI  Gela Choudhury is a 76 y.o.  male who presents with Leg Swelling (bilateral, has CHF, on diuretic, EMT saw pt , assisted pt into car), Fever, and Other (low SPO2)  This is a 66-year-old gentleman admitted via ER, come to ER with complaint of having shortness of breath, patient has medical history significant for COPD patient with history of cardiac failure: Patient noted that he has been having increasing swelling in his lower extremity and becoming more short of breath, further patient also been having some body aches and sweats, patient patient testing in the emergency room showed that he is positive for COVID-19 also noticed to have an elevated BNP, imaging concerning for fluid overload, admitted for further regiment    I have personally reviewed the past medical history, past surgical history, medications, social history, and family history, and summarized in the note.     Review of Systems:       Unable to conduct ROS secondary to patient being intubated      Past Medical History:     Past Medical History:   Diagnosis Date    Arthritis     Atherosclerotic plaque 7/28/2019    Cervical disc disease     degenerative disc disease    Diabetes mellitus ([facility] [address]     type 2    History of blood transfusion     Hx of blood clots     left leg    Hyperlipidemia     Neuropathy     Right kidney mass     \"urologist is watching\"    Snores     Type 2 diabetes mellitus treated with insulin ([facility] [address] 7/28/2019        Past Surgical History:     Past Surgical History:   Procedure Laterality Date    ABDOMINAL AORTIC ANEURYSM REPAIR  2005    CARPAL TUNNEL RELEASE Bilateral     CERVICAL SPINE SURGERY      COLONOSCOPY      benign polyps removed    INGUINAL HERNIA REPAIR Right     AL REPAIR INCISIONAL HERNIA,REDUCIBLE N/A 5/10/2017    HERNIA VENTRAL REPAIR WITH MESH  performed by Desirae Whitman DO at [place] [address] N/A 2/9/2022    EGD   PEG TUBE INSERTION performed by Nola Gonzalez MD at [address]  05/10/2017    with mesh        Medications Prior to Admission:       Prior to Admission medications    Medication Sig Start Date End Date Taking?  Authorizing Provider   rosuvastatin (CRESTOR) 40 MG tablet Take 40 mg by mouth every evening   Yes Historical Provider, MD   insulin NPH (HUMULIN N;NOVOLIN N) 100 UNIT/ML injection vial Inject 20 Units into the skin 2 times daily (before meals)   Yes Historical Provider, MD   levETIRAcetam (KEPPRA) 500 MG tablet Take 1 tablet by mouth 2 times daily 11/3/21  Yes Judy Ospina MD   venlafaxine (EFFEXOR XR) 150 MG extended release capsule Take 1 capsule by mouth daily (with breakfast) 7/29/19  Yes Arabella Kiser   vitamin B-1 (THIAMINE) 100 MG tablet Take 100 mg by mouth daily   Yes Historical Provider, MD   ferrous sulfate 325 (65 Fe) MG tablet Take 325 mg by mouth daily (with breakfast)   Yes Historical Provider, MD ALPRAZolam (XANAX) 0.5 MG tablet Take 0.5 mg by mouth three times daily. Yes Historical Provider, MD   furosemide (LASIX) 40 MG tablet Take 1 tablet by mouth 2 times daily 12/11/18  Yes Teo Evans MD   tiotropium (SPIRIVA RESPIMAT) 2.5 MCG/ACT AERS inhaler Inhale 2 puffs into the lungs daily    Yes Historical Provider, MD   albuterol sulfate  (90 Base) MCG/ACT inhaler Inhale 2 puffs into the lungs every 6 hours as needed for Wheezing or Shortness of Breath   Yes Historical Provider, MD   metoprolol succinate (TOPROL XL) 50 MG extended release tablet Take 50 mg by mouth daily   Yes Historical Provider, MD   Multiple Vitamins-Minerals (MULTIVITAMIN ADULT) TABS Take 1 tablet by mouth daily   Yes Historical Provider, MD   vitamin D (CHOLECALCIFEROL) 1000 UNIT TABS tablet Take 1,000 Units by mouth daily   Yes Historical Provider, MD   fluticasone (FLONASE) 50 MCG/ACT nasal spray 1 spray by Each Nare route daily as needed for Rhinitis   Yes Historical Provider, MD   aspirin 325 MG EC tablet Take 325 mg by mouth daily    Yes Historical Provider, MD   Omega-3 Fatty Acids (FISH OIL) 1000 MG CAPS Take 1 capsule by mouth daily    Yes Historical Provider, MD   amLODIPine (NORVASC) 5 MG tablet Take 5 mg by mouth nightly    Yes Historical Provider, MD   mirtazapine (REMERON) 45 MG tablet Take 45 mg by mouth nightly   Yes Historical Provider, MD        Allergies: Barbiturates, Codeine, and Sulfa antibiotics    Social History:     Tobacco:    reports that he has quit smoking. He has never used smokeless tobacco.  Alcohol:      reports no history of alcohol use. Drug Use:  reports no history of drug use.     Family History:     Family History   Problem Relation Age of Onset    Ovarian Cancer Sister     Ovarian Cancer Sister          Physical Exam:     Vitals:  BP (!) 126/94   Pulse 68   Temp 99.2 °F (37.3 °C) (Oral)   Resp 23   Ht 5' 10\" (1.778 m)   Wt 233 lb 3.2 oz (105.8 kg)   SpO2 95%   BMI 33.46 LABALBU 1.6*  --   --   --   --   --   --   --   --    AST 39  --   --   --   --   --   --   --   --    ALT 36  --   --   --   --   --   --   --   --    ALKPHOS 105  --   --   --   --   --   --   --   --    BILITOT 0.37  --   --   --   --   --   --   --   --    BILIDIR 0.21  --   --   --   --   --   --   --   --    AMMONIA  --   --   --   --   --   --  30  --   --    POCGLU  --    < > 145* 130* 118* 197*  --  216* 179*    < > = values in this interval not displayed. Lab Results   Component Value Date    INR 1.0 01/17/2022    INR 1.0 11/03/2021    INR 1.0 07/27/2019    PROTIME 13.3 01/17/2022    PROTIME 11.1 11/03/2021    PROTIME 10.5 07/27/2019       Lab Results   Component Value Date/Time    SPECIAL RT ARTERY,10ML 02/08/2022 03:59 PM     Lab Results   Component Value Date/Time    CULTURE NO GROWTH 4 DAYS 02/08/2022 03:59 PM       Lab Results   Component Value Date    POCPH 7.360 02/13/2022    POCPCO2 35.0 02/13/2022    POCPO2 71.8 02/13/2022    POCHCO3 19.8 02/13/2022    NBEA 5 02/13/2022    PBEA NOT REPORTED 02/13/2022    SEE9CCX NOT REPORTED 02/13/2022    CFQE9MTW 94 02/13/2022    FIO2 75.0 02/13/2022       Radiology:    XR CHEST 1 VIEW    Result Date: 1/16/2022  Hypoinflated lungs. Cardiomegaly again seen, when compared to the previous study performed 07/27/2019. Diffuse, increased interstitial markings throughout both lungs, some of which can be seen on the prior study may represent baseline chronic interstitial lung changes. The increased prominence on this exam could be secondary to the suboptimal inspiratory effort. The presence of pulmonary vascular congestion/mild CHF or bilateral interstitial pneumonia cannot be excluded. Clinical correlation is recommended. CT CHEST PULMONARY EMBOLISM W CONTRAST    Result Date: 1/16/2022  No evidence of pulmonary embolism.  Compared to 2008, worsening underlying emphysema, with worsening subacute or acute interstitial lung disease, best seen left upper lobe; differential includes interstitial pneumonia or pneumonitis superimposed on emphysema, DIP, HP, and other interstitial pneumonias as well as infectious or inflammatory process superimposed on emphysema disease. Findings are not typical for COVID-19 pneumonia, but an element of the latter should be considered. Thickening and distortion left major fissure with ill-defined mass-like focus left lower lobe. The latter could also be infectious or inflammatory, although neoplasm should be excluded. Underlying worsening emphysema. Nodular densities, best seen right upper lobe. Small pleural effusions, slightly larger on the left. See recommendations below. Mild mediastinal and left hilar adenopathy; see recommendations below. Worsening now moderate-severe pulmonary artery hypertension. Mild cardiomegaly. Stable mild dilatation ascending thoracic aorta. Additional unchanged or incidental findings, as above. RECOMMENDATIONS: Clinical correlation and short-term follow-up CT chest in 1-4 months depending upon the clinical presentation/course. All radiological studies reviewed                Code Status:  DNR-CCA    Electronically signed by Rick Tyler MD on 2/13/2022 at 2:44 PM     Copy sent to Dr. Winston Ramírez MD    This note was created with the assistance of a speech-recognition program.  Although the intention is to generate a document that actually reflects the content of the visit, no guarantees can be provided that every mistake has been identified and corrected by editing. Note was updated later by me after  physical examination and  completion of the assessment.

## 2022-02-13 NOTE — PROGRESS NOTES
More weeping noted in right arm than left arm throughout the day. Pt has tube feeding restarted so glucose levels have improved. Pt has increased labored breathing after turnging pt so will monitor and not decrease precedex at this time.

## 2022-02-13 NOTE — PROGRESS NOTES
Patient febrile after starting Merropenem, ice packs applied as Tylenol is q6H. Patient's temp has dropped to 100.5.

## 2022-02-13 NOTE — PROGRESS NOTES
General Surgery:  Daily Progress Note                 PATIENT NAME: Milla Gimenez     TODAY'S DATE: 2/13/2022, 7:20 AM     SUBJECTIVE:     Pt seen and examined at bedside this morning. Patient remains intubated and sedated. T-max 102.8 overnight. O2 and PEEP requirements still elevated, small pneumoperitoneum after PEG tube placement, tolerating TF currently. OBJECTIVE:   VITALS:  BP (!) 127/59   Pulse 61   Temp 100.5 °F (38.1 °C) (Oral)   Resp 26   Ht 5' 10\" (1.778 m)   Wt 233 lb 3.2 oz (105.8 kg)   SpO2 100%   BMI 33.46 kg/m²      INTAKE/OUTPUT:      Intake/Output Summary (Last 24 hours) at 2/13/2022 0720  Last data filed at 2/13/2022 0601  Gross per 24 hour   Intake 4280.32 ml   Output 1200 ml   Net 3080.32 ml       PHYSICAL EXAM:  General Appearance: Intubated, sedated, does not follow commands  HEENT:  Normocephalic, atraumatic, mucus membranes dry , endotracheal tube in place  Skin:  Skin color, texture, diffuse edema,  bilateral lower extremity edema  Lungs:  No chest wall tenderness. Heart:  Heart regular rate and rhythm  Abdomen:   soft, ND  Extremities: Extremities warm to touch, pink, with no edema. IMAGING: CT HEAD WO CONTRAST    Result Date: 2/12/2022  1. No acute intracranial abnormality. 2. Microangiopathic change. CT ABDOMEN PELVIS W IV CONTRAST Additional Contrast? None (Pt already getting IV contrast.)    Result Date: 2/12/2022  Status post gastrostomy tube with tip in the upper stomach. There is development of mild pneumoperitoneum seen along the anterior aspect of the upper abdomen along the hepatic margin. It is not clear whether the free air is associated of the recent gastrostomy placement. Recommend surgical consultation Slightly more pronounced pelvic and abdominal ascites when compared to the prior exam Stable anasarca Critical results were called by Dr. Tosin Julien MD to Dr Juan Villegas on 2/12/2022 at 21:14.      XR CHEST PORTABLE    Result Date: 2/12/2022  Bilateral lung airspace disease which has slightly progressed within the right lower lobe. CT CHEST PULMONARY EMBOLISM W CONTRAST    Result Date: 2/12/2022  1. No evidence of pulmonary embolism 2. Interval development of extensive airspace disease within the lower lobes bilaterally. Differential considerations would include aspiration changes, bacterial pneumonia, and atelectasis 3. Diffuse interstitial pulmonary fibrosis, with worsening of the reticulonodular changes, suggesting superimposed infection or edema 4. Small amount of pneumoperitoneum present within the upper abdomen. Correlation with the dedicated CT scan abdomen pelvis recommended. 5. Cardiomegaly, with extensive coronary arterial calcifications 6. Small bilateral pleural effusions 7. Critical results were called by Dr. Alvin Hare to [facility] on 2/12/2022 at 5:45 p.m. hours.          Data:  CBC with Differential:    Lab Results   Component Value Date    WBC 10.8 02/13/2022    RBC 4.82 02/13/2022    HGB 11.7 02/13/2022    HCT 39.9 02/13/2022     02/13/2022    MCV 82.8 02/13/2022    MCH 24.3 02/13/2022    MCHC 29.3 02/13/2022    RDW 16.2 02/13/2022    LYMPHOPCT 6 02/13/2022    MONOPCT 4 02/13/2022    BASOPCT 1 02/13/2022    MONOSABS 0.43 02/13/2022    LYMPHSABS 0.65 02/13/2022    EOSABS 0.11 02/13/2022    BASOSABS 0.11 02/13/2022    DIFFTYPE NOT REPORTED 02/13/2022     BMP:    Lab Results   Component Value Date     02/13/2022    K 4.6 02/13/2022     02/13/2022    CO2 21 02/13/2022    BUN 38 02/13/2022    LABALBU 1.6 02/12/2022    CREATININE 0.78 02/13/2022    CALCIUM 8.7 02/13/2022    GFRAA >60 02/13/2022    LABGLOM >60 02/13/2022    GLUCOSE 255 02/13/2022         ASSESSMENT:  Active Hospital Problems    Diagnosis Date Noted    Constipation [K59.00]     Abdominal pain, generalized [R10.84]     Acute on chronic systolic CHF (congestive heart failure) (HCC) [I50.23]     Acute respiratory failure with hypoxia ([facility] [address] [J96.01]     BLANCHE (acute kidney injury) ([facility] [address] [N17.9]     COPD exacerbation ([facility] [address] [J44.1]     Hypokalemia [E87.6]     Hypomagnesemia [E83.42]     Palliative care encounter [Z51.5]     Goals of care, counseling/discussion [Z71.89]     DNR (do not resuscitate) discussion [Z71.89]     ACP (advance care planning) [Z71.89]     COVID-19 [U07.1] 01/16/2022       1. 76 y.o. male with acute respiratory failure secondary to COVID-19    Plan:  1. Will continue to follow along. Patient still having much too high vent settings to safely perform tracheostomy tube placement. PEG tube placed yesterday appears in good position, currently tolerating TF and requiring slightly less pressor support.     Electronically signed by Geovany Sams DO  on 2/13/2022 at 7:20 AM

## 2022-02-13 NOTE — PLAN OF CARE
Problem: Gas Exchange - Impaired  Goal: Absence of hypoxia  2/13/2022 1525 by Malia Houston RN  Outcome: Ongoing  2/13/2022 0519 by Robert Hernandez RN  Outcome: Ongoing     Problem: Falls - Risk of:  Goal: Will remain free from falls  Description: Will remain free from falls  2/13/2022 1525 by Malia Houston RN  Outcome: Ongoing  2/13/2022 0519 by Robert Hernandez RN  Outcome: Ongoing     Problem: Pain:  Goal: Patient's pain/discomfort is manageable  Description: Patient's pain/discomfort is manageable  2/13/2022 1525 by Malia Houston RN  Outcome: Ongoing  2/13/2022 0519 by Robert Hernandez RN  Outcome: Ongoing     Problem: Skin Integrity:  Goal: Skin integrity will stabilize  Description: Skin integrity will stabilize  2/13/2022 1525 by Malia Houston RN  Outcome: Ongoing  2/13/2022 0519 by Robert Hernandez RN  Outcome: Ongoing     Problem: Skin Integrity:  Goal: Will show no infection signs and symptoms  Description: Will show no infection signs and symptoms  2/13/2022 1525 by Malia Houston RN  Outcome: Ongoing  2/13/2022 0519 by Robert Hernandez RN  Outcome: Ongoing   Pt will maintain SpO2 GT 92%. Fall precautions in place will continue to monitor. Patient will have progressive improvement with pain scale with interventions. Continue to monitor skin integrity and IV sites.

## 2022-02-13 NOTE — PROGRESS NOTES
Pulmonary Critical Care Progress Note       Patient seen for the follow up of COVID-19 pneumonia, acute hypoxic respiratory failure. Subjective:  He sedated with Precedex and fentanyl  50 mcg/hr on the ventilator with increased oxygen requirements 100%, currently at 90% FiO2/PEEP 14 tidal volume 550. He is on 35 mics of Luke-Synephrine. He is off amiodarone drip. He had increased tube feeds residual tube feeds were held. He had free air on CT abdomen. I was contacted by radiology. I advised to have surgery evaluate patient. Surgical resident evaluated patient yesterday. I repeated x-ray abdomen he is not waking up on decreasing sedation but gets dyssynchronous with the vent according to RN. Examination:  Vitals: BP (!) 105/46   Pulse 65   Temp 99.2 °F (37.3 °C) (Oral)   Resp 19   Ht 5' 10\" (1.778 m)   Wt 233 lb 3.2 oz (105.8 kg)   SpO2 93%   BMI 33.46 kg/m²   General appearance: Sedated, intubated on ventilator  Neck: No JVD  Lungs decreased breath sound no crackles or wheezing  Heart: regular rate and rhythm, S1, S2 normal, no gallop  Abdomen: Soft, non tender, + BS  Extremities: no cyanosis or clubbing.  2+ edema    LABs:  CBC:   Recent Labs     02/12/22  0600 02/13/22  0620   WBC 11.9* 10.8   HGB 11.4* 11.7*   HCT 38.2* 39.9*    154     BMP:   Recent Labs     02/12/22  0600 02/13/22  0620    138   K 4.4 4.6   CO2 24 21   BUN 35* 38*   CREATININE 0.69* 0.78   LABGLOM >60 >60   GLUCOSE 95 255*     ABG:  Lab Results   Component Value Date    VAT3KDG NOT REPORTED 02/13/2022    FIO2 75.0 02/13/2022       Lab Results   Component Value Date    POCPH 7.360 02/13/2022    POCPCO2 35.0 02/13/2022    POCPO2 71.8 02/13/2022    POCHCO3 19.8 02/13/2022    PBEA NOT REPORTED 02/13/2022    JKQ1JBP NOT REPORTED 02/13/2022    JJGF5PSY 94 02/13/2022    FIO2 75.0 02/13/2022     Radiology:  Chest x-ray 2/13  Stable appearance of the chest with diffuse reticular and ground-glass   opacities bilaterally Received call earlier to set up [facility] for patient; when I called into the room I spoke with wife; offered [facility] and [facility] Chest x-ray 2/12        X-ray abdomen 2/13  Pneumoperitoneum is not visualized on current radiograph.       CT chest     1. No evidence of pulmonary embolism   2. Interval development of extensive airspace disease within the lower lobes   bilaterally.  Differential considerations would include aspiration changes,   bacterial pneumonia, and atelectasis   3. Diffuse interstitial pulmonary fibrosis, with worsening of the   reticulonodular changes, suggesting superimposed infection or edema   4. Small amount of pneumoperitoneum present within the upper abdomen. Correlation with the dedicated CT scan abdomen pelvis recommended. 5. Cardiomegaly, with extensive coronary arterial calcifications   6. Small bilateral pleural effusions             CT abdomen pelvis  Status post gastrostomy tube with tip in the upper stomach.  There is   development of mild pneumoperitoneum seen along the anterior aspect of the   upper abdomen along the hepatic margin.  It is not clear whether the free air   is associated of the recent gastrostomy placement.       Recommend surgical consultation       Slightly more pronounced pelvic and abdominal ascites when compared to the   prior exam       Stable anasarca               :  CT brain  No acute intracranial abnormality.    2. Microangiopathic change     Impression:  · Acute on chronic hypoxic respiratory failure  · COVID-19 pneumonia/ARDS  · COPD/pulmonary emphysema  · Acute left thalamic infarct/CVA  · Left lower lobe opacity versus nodule  · Pulmonary edema  · Elevated D-dimer, decreased platelets  · Systolic heart failure, s/p AICD placement  · BLANCHE on CKD  · Diabetes mellitus    Recommendations:  · Continue vent support, wean FiO2 as tolerate keep keep PEEP  14  · Fentanyl drip RASS -2  · Continue precedex drip RASS -2  · Add Versed drip if needed to RASS -2  · Will consider Nimbex  · Xopenex by nebulizer  · Pulmicort 0.5 every 12 hours  · Monitor blood sugars off insulin drip  · Wean off Luke-Synephrine, keep map  65 to 75 mmHg  · Amiodarone per cardiology.     · Out of Airborne isolation  · IV Decadron 6 mg daily  · Linezolid and meropenem per ID  · Not a candidate for Actemra/baricitinib stopped secondary to cytopenias  · Neurology on consult  · Lasix 20 IV x1 given/consider aggressive diuresis per nephrology  · Nephology on consult/on free water flushes  · Tube feeds as tolerated  · GI prophylaxis, Protonix  · Discussed with RN and RT  · General surgery following for tracheostomy  · DVT prophylaxis    Electronically signed by     Jj Estevez MD on 2/13/2022 at 1:51 PM  Pulmonary Critical Care and Sleep Medicine,  [facility] AT [place]: [phone]  Cc: 35 minutes

## 2022-02-13 NOTE — PLAN OF CARE
Problem: Airway Clearance - Ineffective  Goal: Achieve or maintain patent airway  2/13/2022 0519 by Brianna Taylor RN  Outcome: Ongoing    Problem: Gas Exchange - Impaired  Goal: Absence of hypoxia  2/13/2022 0519 by Brianna Taylor RN  Outcome: Ongoing    Goal: Promote optimal lung function  2/13/2022 0519 by Brianna Taylor RN  Outcome: Ongoing       Problem: Breathing Pattern - Ineffective  Goal: Ability to achieve and maintain a regular respiratory rate  2/13/2022 0519 by Brianna Taylor RN  Outcome: Ongoing    Problem:  Body Temperature -  Risk of, Imbalanced  Goal: Ability to maintain a body temperature within defined limits  2/13/2022 0519 by Brianna Tayolr RN  Outcome: Ongoing    Goal: Will regain or maintain usual level of consciousness  2/13/2022 0519 by Brianna Taylor RN  Outcome: Ongoing    Goal: Complications related to the disease process, condition or treatment will be avoided or minimized  2/13/2022 0519 by Brianna Taylor RN  Outcome: Ongoing

## 2022-02-13 NOTE — PROGRESS NOTES
.  Nephrology  Progress Note    Reason for Consult: Hyperkalemia    Requesting Physician:  Kamaljit Donis MD    INTERVAL HISTORY:  Remains sedated on mechanical ventilation FiO2 75%  TF have been resumed  Potassium stable. Serum Na stable 138, corrected Na 140. Patient continues to require vasopressor support. Continues to have low grade fevers. HISTORY OF PRESENT ILLNESS:    The patient is a 76 y.o. male who presented with to the hospital for worsening shortness of breath ad and worsening lower extremity edema on 1/16. He had diffuse body aches and sweats and a dry cough. He tested positive for COVID-19. He has steadily declined since admission. On 1/18 a CT head found New lacunar infarct left thalamus. He had to be intubated on 1/23. He has a significant past medical history of COPD, hyperlipidemia, Type 2 DM, Right kidney mass, and abdominal aortic aneurysm repair in 2005. His potassium has been steadily rising since 1/24/22. The most current Potassium level is 5.8. His creatine is improved to 1.71. This information was retrieved through chart review and nursing. Patient was unable to provide information due to current status on mechanical ventilation and sedation. Review Of Systems:  Unable to obtain due to patient's current clinical condition. Remains on mechanical ventilation and sedated. Past Medical History:   Diagnosis Date    Arthritis     Atherosclerotic plaque 7/28/2019    Cervical disc disease     degenerative disc disease    Diabetes mellitus ([facility] [address]     type 2    History of blood transfusion     Hx of blood clots     left leg    Hyperlipidemia     Neuropathy     Right kidney mass     \"urologist is watching\"    Snores     Type 2 diabetes mellitus treated with insulin ([facility] [address] 7/28/2019       Prior to Admission medications    Medication Sig Start Date End Date Taking?  Authorizing Provider   rosuvastatin (CRESTOR) 40 MG tablet Take 40 mg by mouth every evening   Yes Historical Provider, MD   insulin NPH (HUMULIN N;NOVOLIN N) 100 UNIT/ML injection vial Inject 20 Units into the skin 2 times daily (before meals)   Yes Historical Provider, MD   levETIRAcetam (KEPPRA) 500 MG tablet Take 1 tablet by mouth 2 times daily 11/3/21  Yes Javi Fuentes MD   venlafaxine (EFFEXOR XR) 150 MG extended release capsule Take 1 capsule by mouth daily (with breakfast) 7/29/19  Yes Arabelladaniel Kiser   vitamin B-1 (THIAMINE) 100 MG tablet Take 100 mg by mouth daily   Yes Historical Provider, MD   ferrous sulfate 325 (65 Fe) MG tablet Take 325 mg by mouth daily (with breakfast)   Yes Historical Provider, MD   ALPRAZolam (XANAX) 0.5 MG tablet Take 0.5 mg by mouth three times daily.    Yes Historical Provider, MD   furosemide (LASIX) 40 MG tablet Take 1 tablet by mouth 2 times daily 12/11/18  Yes Harish Evans MD   tiotropium (SPIRIVA RESPIMAT) 2.5 MCG/ACT AERS inhaler Inhale 2 puffs into the lungs daily    Yes Historical Provider, MD   albuterol sulfate  (90 Base) MCG/ACT inhaler Inhale 2 puffs into the lungs every 6 hours as needed for Wheezing or Shortness of Breath   Yes Historical Provider, MD   metoprolol succinate (TOPROL XL) 50 MG extended release tablet Take 50 mg by mouth daily   Yes Historical Provider, MD   Multiple Vitamins-Minerals (MULTIVITAMIN ADULT) TABS Take 1 tablet by mouth daily   Yes Historical Provider, MD   vitamin D (CHOLECALCIFEROL) 1000 UNIT TABS tablet Take 1,000 Units by mouth daily   Yes Historical Provider, MD   fluticasone (FLONASE) 50 MCG/ACT nasal spray 1 spray by Each Nare route daily as needed for Rhinitis   Yes Historical Provider, MD   aspirin 325 MG EC tablet Take 325 mg by mouth daily    Yes Historical Provider, MD   Omega-3 Fatty Acids (FISH OIL) 1000 MG CAPS Take 1 capsule by mouth daily    Yes Historical Provider, MD   amLODIPine (NORVASC) 5 MG tablet Take 5 mg by mouth nightly    Yes Historical Provider, MD   mirtazapine (REMERON) 45 MG tablet Take 45 mg by mouth nightly   Yes Historical Provider, MD       Scheduled Meds:   meropenem  1,000 mg IntraVENous Q8H    linezolid  600 mg IntraVENous Q12H    sodium chloride  80 mL IntraVENous Once    sucralfate  1 g Oral 4 times per day    amLODIPine  5 mg Oral BID    insulin lispro  0-18 Units SubCUTAneous TID WC    insulin lispro  0-9 Units SubCUTAneous Nightly    amiodarone  200 mg Per NG tube Daily    insulin glargine  15 Units SubCUTAneous BID    Vitamin D  1,000 Units Oral Daily    polyethylene glycol  17 g Per NG tube Daily    levalbuterol  1.25 mg Nebulization Q6H    bisacodyl  10 mg Rectal Daily    pantoprazole  40 mg IntraVENous Daily    And    sodium chloride (PF)  10 mL IntraVENous Daily    aspirin  324 mg Oral Daily    levETIRAcetam  500 mg Oral BID    chlorhexidine  15 mL Mouth/Throat BID    enoxaparin  30 mg SubCUTAneous BID    QUEtiapine  50 mg Oral Once    budesonide  0.5 mg Nebulization BID    sodium chloride flush  5-40 mL IntraVENous 2 times per day    atorvastatin  20 mg Oral Daily     Continuous Infusions:   phenylephrine (KEARA-SYNEPHRINE) 50mg/250mL infusion 30 mcg/min (02/13/22 0630)    dextrose      dexmedetomidine (PRECEDEX) IV infusion 1.4 mcg/kg/hr (02/13/22 0600)    fentaNYL 50 mcg/hr (02/13/22 0145)    propofol Stopped (02/09/22 1030)    midazolam (VERSED) 1 mg/mL in D5W infusion 1 mg/hr (02/13/22 0600)    sodium chloride          Physical Exam:  Vitals:    02/13/22 0400 02/13/22 0500 02/13/22 0600 02/13/22 0700   BP: (!) 140/59 (!) 135/56 (!) 144/58 (!) 127/59   Pulse: 68 67 62 61   Resp: 26 24 23 26   Temp: 100.8 °F (38.2 °C)  100.5 °F (38.1 °C)    TempSrc: Axillary  Oral    SpO2: 99% 100% 100% 100%   Weight:       Height:         I/O last 3 completed shifts: In: 5664.9 [I.V.:2773.1; NG/GT:1992; IV Piggyback:899.7]  Out: 0504 [[facility]:9246]    General: Intubated, sedated, not in distress. Appears to be stated age. HEENT: Atraumatic, normocephalic. Anicteric sclera. Pink and moist oral mucosa. Neck supple. No JVD. Chest: Bilateral air entry, clear to auscultation, no wheezing, rhonchi or rales. Cardiovascular: RRR, S1S2, no murmur, rub or gallop. Bilateral +1 pitting lower extremity edema. Abdomen: Soft, non tender to palpation. PEG, hypoactive bowel sounds  Musculoskeletal: No cyanosis or clubbing. Integumentary: Pink, warm and dry. Free from rash or lesions. CNS: Sedated, intubated, face symmetrical. No tremor.      Data:  CBC:   Lab Results   Component Value Date    WBC 10.8 02/13/2022    HGB 11.7 (L) 02/13/2022    HCT 39.9 (L) 02/13/2022    MCV 82.8 02/13/2022     02/13/2022     BMP:    Lab Results   Component Value Date     02/13/2022     02/12/2022     (H) 02/11/2022    K 4.6 02/13/2022    K 4.4 02/12/2022    K 4.3 02/11/2022     02/13/2022     (H) 02/12/2022     (H) 02/11/2022    CO2 21 02/13/2022    CO2 24 02/12/2022    CO2 23 02/11/2022    BUN 38 (H) 02/13/2022    BUN 35 (H) 02/12/2022    BUN 42 (H) 02/11/2022    CREATININE 0.78 02/13/2022    CREATININE 0.69 (L) 02/12/2022    CREATININE 0.73 02/11/2022    GLUCOSE 255 (H) 02/13/2022    GLUCOSE 95 02/12/2022    GLUCOSE 192 (H) 02/11/2022     CMP:   Lab Results   Component Value Date     02/13/2022    K 4.6 02/13/2022     02/13/2022    CO2 21 02/13/2022    BUN 38 02/13/2022    CREATININE 0.78 02/13/2022    GLUCOSE 255 02/13/2022    CALCIUM 8.7 02/13/2022    PROT 5.0 02/12/2022    LABALBU 1.6 02/12/2022    BILITOT 0.37 02/12/2022    ALKPHOS 105 02/12/2022    AST 39 02/12/2022    ALT 36 02/12/2022      Hepatic:   Lab Results   Component Value Date    AST 39 02/12/2022    AST 72 (H) 02/03/2022    AST 46 (H) 02/01/2022    ALT 36 02/12/2022    ALT 77 (H) 02/03/2022    ALT 43 (H) 02/01/2022    BILITOT 0.37 02/12/2022    BILITOT 0.37 02/03/2022    BILITOT 0.28 (L) 02/01/2022    ALKPHOS 105 02/12/2022    ALKPHOS 117 02/03/2022    ALKPHOS 100 02/01/2022     BNP: No results found for: BNP  Lipids:   Lab Results   Component Value Date    CHOL 129 11/03/2021    HDL 30 (L) 11/03/2021     INR:   Lab Results   Component Value Date    INR 1.0 01/17/2022    INR 1.0 11/03/2021    INR 1.0 07/27/2019     PTH: No results found for: PTH  Phosphorus:    Lab Results   Component Value Date    PHOS 3.2 02/13/2022     Ionized Calcium: No results found for: IONCA  Magnesium:   Lab Results   Component Value Date    MG 1.9 02/13/2022     Albumin:   Lab Results   Component Value Date    LABALBU 1.6 02/12/2022     Last 3 CK, CKMB, Troponin: @LABRCNT(CKTOTAL:3,CKMB:3,TROPONINI:3)       URINE:)No results found for: [facility]    Radiology:  Reviewed. Assessment:  Hypokalemia, used to have hyperkalemia. Hypernatremia  Hypophosphatemia  Hypovitaminosis D. Metabolic alkalosis. Hypertension. Diabetes mellitis. COVID19 pneumonia. CHF. COPD. Plan:  Na stable. Continue Free water flushes 250 ml every 4 hours. Will give one time dose Lasix 20 mg IV. Replace electrolytes per protocol. Repeat echo 2/11/2022 reviewed. Metoprolol discontinued per cardiology. Lisinopril 20 mg daily started 2/2/2022 per cardiology. Monitor K and creatinine. Has not received last 3 doses of amlodipine 5 mg due to hypotension. Continue vitamin D supplementation. Monitor blood pressure closely. TF per primary. Avoid hypotension, nephrotoxic drugs, Fleets enema and IV contrast exposure. Follow up chemistries daily in the a.m. We will continue to follow with you. Patient seen in collaboration with Dr. Karolyn Watson. Electronically signed by HERB Browne CNP  on 2/13/2022 at 7:44 AM   [facility].   Ph: [phone]

## 2022-02-13 NOTE — PROGRESS NOTES
[facility]  Progress Note    Betsy Corcoran  MRN: [id number]  Date: 2/13/2022  LOS: 29     Reason for F/U :   COVID-19 virus infection    Impression :   COVID-19 virus infection tested + 1/16/2022  Acute respiratory failure currently ventilator dependent  Intubated 1/23/2022  Emphysema with worsening changes on imaging  Worsening acute interstitial lung disease and clinically not typical of COVID-19 virus infection  Worsening moderate to severe pulmonary artery hypertension  Bilateral lower extremity edema likely related to above  Leukopenia and thrombocytopenia  Lacunar  infarct on the left thalamus  Intermittent fevers  Acute kidney injury    Recommendations:   COVID-19 therapy: The patient was on Decadron 6 mg IV daily through 02/04/2022  The patient has been started on baricitinib 1/17/2022 but it was discontinued 1/18/2022 due to cytopenias. Actemra had been considered but again due to the cytopenias and thrombocytopenia this has been held. Antimicrobial therapy: The patient received Rocephin 1000 mg and azithromycin 500 mg on 1/16/2022  The patient received a dose of levofloxacin 500 mg on 1/18/2020. Patient started on cefepime and vancomycin 1/23/2022 plan completed a 7-day course of cefepime on 1/28/2022  Vancomycin discontinued due to acute kidney injury 1/24/2022    Continue intravenous antimicrobial therapy with meropenem and Zyvox for now started 2/12/2022   The blood culture data has remained negative  Repeat imaging with findings suggestive of pneumonia as well as some fibrotic changes from the COVID pneumonia.   Continue supportive care    Infection Control Recommendations:   Droplet plus precautions    Discharge Planning:   Patient will need Midline Catheter Insertion/ PICC line Insertion: No  Patient will need: Home IV , [facility],  SNF,  LTAC: Undetermined  Patient willneed outpatient wound care: No    Medical Decision making / Summary of Stay:   Betsy Corcoran is a 64y.o.-year-old male who was initially admitted on 1/16/2022. Jame Robertson has a history of diabetes mellitus type 2, essential hypertension, seizure disorder, chronic kidney disease stage III, hyperlipidemia among other medical problems.     The patient reports that about 1 to 2 months ago he had his diabetes medications changed and since that time he has noticed increasing swelling in his legs, hardness in the legs, difficulty ambulating, and weight gain from 178 to 255 pounds over the last 1 to 2 months. He did not report any subjective fevers, chills, cough or shortness of breath. He denies any loss of smell or change in taste. No abdominal pain nausea vomiting or diarrhea. The patient does report that he has home oxygen that he uses as needed at home and he reports that he hardly needs it. He is vaccinated did receive the J&J vaccine but has not yet received a booster dose.     The patient presented to the emergency department and was found to have leukopenia with a white blood cell count of 2.7 and thrombocytopenia with a platelet count of 081. Creatinine slightly elevated at 1.31 and proBNP is elevated at 9327. Chest x-ray showed hyperinflated lungs with cardiomegaly seen and diffuse increased interstitial markings in both lungs which may represent baseline chronic interstitial lung changes given that these findings were present before. A CT of the chest was done with IV contrast that showed no evidence of pulmonary embolism and compared to 2008 there is worsening underlying emphysema with worsening subacute or acute interstitial lung disease best seen in the left upper lobe. Findings were not felt typical for COVID-19 virus pneumonia. There was thickening and distortion of the left major fissure with an ill-defined masslike focus in the left lower lobe.   There is worsening moderate to severe pulmonary artery hypertension     COVID testing was positive and I was asked to evaluate and help with COVID-19 virus infection    The patient did have a CT scan of the abdomen pelvis done 2022 which was a limited study with no evidence of bowel obstruction and moderate stool burden noted felt related to constipation. Tiny amount of free fluid scattered in the abdomen, moderate pleural effusions and left basilar consolidation and basilar interstitial thickening increased from 2022    The patient is seen and evaluated at bedside he continues to have fevers up to 102.9 degrees   Current evaluation:2022    BP (!) 126/94   Pulse 68   Temp 99.2 °F (37.3 °C) (Oral)   Resp 23   Ht 5' 10\" (1.778 m)   Wt 233 lb 3.2 oz (105.8 kg)   SpO2 95%   BMI 33.46 kg/m²     Temperature Range: Temp: 99.2 °F (37.3 °C) Temp  Av.7 °F (37.6 °C)  Min: 98.2 °F (36.8 °C)  Max: 100.8 °F (38.2 °C)   The patient had a CT of the head 2022 with no acute intracranial abnormality, CT of the chest that showed no evidence of pulmonary embolism but interval development of extensive airspace disease within the lower lobes bilaterally, diffuse interstitial pulmonary fibrosis and worsening of the reticulonodular changes suggesting superimposed infection or edema  And a CT of the abdomen and pelvis that showed patient was status post gastrostomy tube placement and development of mild pneumoperitoneum seen along the anterior aspect of the upper abdomen along with the hepatic margin. Slightly more pronounced pelvic and abdominal ascites compared to prior exam, stable anasarca. The patient now is a low-grade fever. Is a 90% FiO2 and 14 of PEEP. Continues on Luke-Synephrine for blood pressure support. Continues to require sedation with Precedex and fentanyl. I did start the patient on meropenem and Zyvox on 2022 due to elevated WBC, worsening infiltrates on chest x-ray    Review of Systems   Unable to perform ROS: Intubated       Physical Examination :     Physical Exam  Constitutional:       Appearance: He is well-developed. Interventions: He is sedated and intubated. HENT:      Head: Normocephalic and atraumatic. Cardiovascular:      Rate and Rhythm: Normal rate. Heart sounds: Normal heart sounds. No friction rub. No gallop. Pulmonary:      Effort: Pulmonary effort is normal. He is intubated. Breath sounds: Normal breath sounds. No wheezing. Abdominal:      General: Bowel sounds are normal.      Palpations: Abdomen is soft. There is no mass. Tenderness: There is no abdominal tenderness. Musculoskeletal:         General: Swelling (Bilateral upper extremity swelling) present. Cervical back: Neck supple. Lymphadenopathy:      Cervical: No cervical adenopathy. Skin:     General: Skin is warm and dry. Comments:  There cyanotic changes in the legs and feet bilaterally related to the pressor support         Laboratory data:   I have independently reviewed the followinglabs:  CBC with Differential:   Recent Labs     02/12/22  0600 02/13/22  0620   WBC 11.9* 10.8   HGB 11.4* 11.7*   HCT 38.2* 39.9*    154   LYMPHOPCT 5* 6*   MONOPCT 4 4     BMP:   Recent Labs     02/12/22  0600 02/13/22  0620    138   K 4.4 4.6   * 104   CO2 24 21   BUN 35* 38*   CREATININE 0.69* 0.78   MG 1.8 1.9     Hepatic Function Panel:   Recent Labs     02/12/22  0600   PROT 5.0*   LABALBU 1.6*   BILIDIR 0.21   IBILI 0.16   BILITOT 0.37   ALKPHOS 105   ALT 36   AST 39         Lab Results   Component Value Date    PROCAL 0.25 02/01/2022    PROCAL 0.24 01/23/2022    PROCAL 0.11 01/19/2022     Lab Results   Component Value Date    CRP 23.5 01/16/2022    CRP 2.0 07/27/2019     Lab Results   Component Value Date    SEDRATE 3 01/16/2022         Lab Results   Component Value Date    DDIMER 4.53 02/12/2022    DDIMER 2.96 01/30/2022    DDIMER 5.84 01/23/2022    DDIMER 1.53 01/18/2022    DDIMER 1.73 01/16/2022     Lab Results   Component Value Date    FERRITIN 569 01/16/2022    FERRITIN 50 07/23/2019    FERRITIN 18 07/08/2019     Lab Results   Component Value Date     01/16/2022     Lab Results   Component Value Date    FIBRINOGEN 402 01/16/2022       Results in Past 30 Days  Result Component Current Result Ref Range Previous Result Ref Range   SARS-CoV-2, Rapid DETECTED (A) (1/16/2022) Not Detected Not in Time Range      Lab Results   Component Value Date    COVID19 DETECTED 01/16/2022    COVID19 Not Detected 11/02/2021       No results for input(s): VANCOTROUGH in the last 72 hours. Imaging Studies:   ONE XRAY VIEW OF THE CHEST 2/13/2022 4:42 am  Impression   Stable appearance of the chest with diffuse reticular and ground-glass   opacities bilaterally. CT OF THE ABDOMEN AND PELVIS WITH CONTRAST 2/12/2022 2:03 pm  Impression   Status post gastrostomy tube with tip in the upper stomach.  There is   development of mild pneumoperitoneum seen along the anterior aspect of the   upper abdomen along the hepatic margin.  It is not clear whether the free air   is associated of the recent gastrostomy placement.       Recommend surgical consultation       Slightly more pronounced pelvic and abdominal ascites when compared to the   prior exam       Stable anasarca       Critical results were called by Dr. Christiano Quintana MD to Dr Alesha Ramírez on 2/12/2022 at 21:14. CTA OF THE CHEST 2/12/2022 12:03 pm  Impression   1. No evidence of pulmonary embolism   2. Interval development of extensive airspace disease within the lower lobes   bilaterally.  Differential considerations would include aspiration changes,   bacterial pneumonia, and atelectasis   3. Diffuse interstitial pulmonary fibrosis, with worsening of the   reticulonodular changes, suggesting superimposed infection or edema   4. Small amount of pneumoperitoneum present within the upper abdomen. Correlation with the dedicated CT scan abdomen pelvis recommended. 5. Cardiomegaly, with extensive coronary arterial calcifications   6.  Small bilateral lacunar infarct left thalamus, age indeterminate.  MRI may be helpful.           Cultures:     Culture, Blood 1 [[id number]] Collected: 02/08/22 1559   Order Status: Completed Specimen: Blood Updated: 02/12/22 2034    Specimen Description . BLOOD    Special Requests RT ARTERY,10ML    Culture NO GROWTH 4 DAYS   Culture, Blood 1 [[id number]] Collected: 02/08/22 1549   Order Status: Completed Specimen: Blood Updated: 02/12/22 2032    Specimen Description . BLOOD    Special Requests RT HAND,10ML    Culture NO GROWTH 4 DAYS     Culture, Blood 1 [[id number]] Collected: 02/01/22 0003   Order Status: Completed Specimen: Blood Updated: 02/06/22 0830    Specimen Description . BLOOD    Special Requests ART LINE 20ML    Culture NO GROWTH 5 DAYS   Culture, Blood 1 [[id number]] Collected: 01/31/22 1853   Order Status: Completed Specimen: Blood Updated: 02/05/22 2315    Specimen Description . BLOOD    Special Requests RT HAND,10ML    Culture NO GROWTH 5 DAYS     Culture, Respiratory [[id number]] Collected: 01/28/22 1030   Order Status: Completed Specimen: Sputum, Suctioned Updated: 01/30/22 0932    Specimen Description . SUCTIONED SPUTUM    Special Requests NOT REPORTED    Direct Exam < 10 EPITHELIAL CELLS/LPF     <10 NEUTROPHILS/LPF     NO SIGNIFICANT PATHOGENS SEEN    Culture NORMAL RESPIRATORY PO HEAVY GROWTH       Culture, Blood 1 [[id number]] Collected: 01/23/22 1759   Order Status: Completed Specimen: Blood Updated: 01/28/22 2111    Specimen Description . BLOOD    Special Requests RT HAND 19ML    Culture NO GROWTH 5 DAYS   Culture, Blood 1 [[id number]] Collected: 01/23/22 1759   Order Status: Completed Specimen: Blood Updated: 01/28/22 2110    Specimen Description . BLOOD    Special Requests ART LINE 10ML    Culture NO GROWTH 5 DAYS     Culture, Blood 2 [[id number]] Collected: 01/21/22 0742   Order Status: Completed Specimen: Blood Updated: 01/26/22 1001    Specimen Description . BLOOD    Special Requests LEFT AC 20ML    Culture NO GROWTH 5 DAYS   Culture, Blood 1 [[id number]] Collected: 01/21/22 0750   Order Status: Completed Specimen: Blood Updated: 01/26/22 1000    Specimen Description . BLOOD    Special Requests LEFT HAND 5ML    Culture NO GROWTH 5 DAYS   MRSA DNA Probe, Nasal [[id number]] Collected: 01/23/22 2258   Order Status: Completed Specimen: Nasal Updated: 01/25/22 1038    Specimen Description . NASAL SWAB    MRSA, DNA, Nasal NEGATIVE    Comment: NEGATIVE:  MRSA DNA not detected by nucleic acid amplification.                                                     Results should be used as an adjunct to nosocomial control efforts to identify patients   needing enhanced precautions.     The test is not intended to identify patients with staphylococcal infections.  Results   should not be used to guide or monitor treatment for MRSA infections. Culture, Blood 1 [[id number]] Collected: 01/16/22 1220   Order Status: Completed Specimen: Blood Updated: 01/21/22 1521    Specimen Description . BLOOD    Special Requests LAC 12ML    Culture NO GROWTH 5 DAYS   Culture, Blood 1 [[id number]] Collected: 01/16/22 1205   Order Status: Completed Specimen: Blood Updated: 01/21/22 1521    Specimen Description . BLOOD    Special Requests RAC 12ML    Culture NO GROWTH 5 DAYS       Culture, Urine [[id number]] Collected: 01/16/22 1315   Order Status: Completed Specimen: Urine, clean catch Updated: 01/17/22 2128    Specimen Description . CLEAN CATCH URINE    Special Requests NOT REPORTED    Culture NO SIGNIFICANT GROWTH       COVID-19, Rapid [[id number]] (Abnormal) Collected: 01/16/22 1230   Order Status: Completed Specimen: Nasopharyngeal Swab Updated: 01/16/22 1314    Specimen Description . NASOPHARYNGEAL SWAB    SARS-CoV-2, Rapid DETECTED Abnormal     Comment:        Rapid NAAT: The specimen is POSITIVE for SARS-Cov-2, the novel coronavirus associated with   COVID-19.         This test has been authorized by the FDA under an Emergency Use Authorization (EUA) for use   by authorized laboratories.         The ID NOW COVID-19 assay is designed to detect the virus that causes COVID-19 in patients   with signs and symptoms of infection who are suspected of COVID-19. An individual without symptoms of COVID-19 and who is not shedding SARS-CoV-2 virus would   expect to have a negative (not detected) result in this assay. Fact sheet for Healthcare Providers: Cee   Fact sheet for Patients: [email or web address].[email or web address]           Methodology: Isothermal Nucleic Acid Amplification         Results reported to the appropriate Health Department         Flu A/B Ag Detection [[id number]] Collected: 01/16/22 1230   Order Status: Completed Specimen: Nasopharyngeal Swab Updated: 01/16/22 1313    Specimen Description . NASOPHARYNGEAL SWAB    Special Requests NOT REPORTED    Direct Exam NEGATIVE for Influenza A + B antigens.                                PCR testing to confirm this result is available upon request. Alex Sheets will be saved in the laboratory for 7 days.  Please call [phone] if PCR testing is indicated.      Medications:      meropenem  1,000 mg IntraVENous Q8H    linezolid  600 mg IntraVENous Q12H    sodium chloride  80 mL IntraVENous Once    sucralfate  1 g Oral 4 times per day    amLODIPine  5 mg Oral BID    insulin lispro  0-18 Units SubCUTAneous TID WC    insulin lispro  0-9 Units SubCUTAneous Nightly    amiodarone  200 mg Per NG tube Daily    insulin glargine  15 Units SubCUTAneous BID    Vitamin D  1,000 Units Oral Daily    polyethylene glycol  17 g Per NG tube Daily    levalbuterol  1.25 mg Nebulization Q6H    bisacodyl  10 mg Rectal Daily    pantoprazole  40 mg IntraVENous Daily    And    sodium chloride (PF)  10 mL IntraVENous Daily    aspirin  324 mg Oral Daily    levETIRAcetam  500 mg Oral BID    chlorhexidine  15 mL Mouth/Throat BID    enoxaparin  30 mg SubCUTAneous BID    QUEtiapine  50 mg Oral Once    budesonide  0.5 mg Nebulization BID    sodium chloride flush  5-40 mL IntraVENous 2 times per day    atorvastatin  20 mg Oral Daily           [facility]  Antoine Kerr MD  [employer] messaging  OFFICE: [phone]      Electronically signed by Antoine Kerr MD on 2/13/2022 at 3:09 PM  Thank you for allowing us to participate in the care of this patient. Please call with questions. This note iscreated with the assistance of a speech recognition program.  While intending to generate a document that actually reflects the content of the visit, the document can still have some errors including those of syntax andsound a like substitutions which may escape proof reading. In such instances, actual meaning can be extrapolated by contextual diversion.

## 2022-02-14 NOTE — PROGRESS NOTES
.  Nephrology  Progress Note    Reason for Consult: Hyperkalemia    Requesting Physician:  Yanira Alba MD    INTERVAL HISTORY:  Remains sedated on mechanical ventilation FiO2 70%  TF have been resumed  Potassium stable. Serum Na stable at 138, corrected Na 141. Patient continues to require vasopressor support. Continues to have low grade fevers. HISTORY OF PRESENT ILLNESS:    The patient is a 76 y.o. male who presented with to the hospital for worsening shortness of breath ad and worsening lower extremity edema on 1/16. He had diffuse body aches and sweats and a dry cough. He tested positive for COVID-19. He has steadily declined since admission. On 1/18 a CT head found New lacunar infarct left thalamus. He had to be intubated on 1/23. He has a significant past medical history of COPD, hyperlipidemia, Type 2 DM, Right kidney mass, and abdominal aortic aneurysm repair in 2005. His potassium has been steadily rising since 1/24/22. The most current Potassium level is 5.8. His creatine is improved to 1.71. This information was retrieved through chart review and nursing. Patient was unable to provide information due to current status on mechanical ventilation and sedation. Review Of Systems:  Unable to obtain due to patient's current clinical condition. Remains on mechanical ventilation and sedated. Past Medical History:   Diagnosis Date    Arthritis     Atherosclerotic plaque 7/28/2019    Cervical disc disease     degenerative disc disease    Diabetes mellitus ([address]     type 2    History of blood transfusion     Hx of blood clots     left leg    Hyperlipidemia     Neuropathy     Right kidney mass     \"urologist is watching\"    Snores     Type 2 diabetes mellitus treated with insulin ([facility] [address] 7/28/2019       Prior to Admission medications    Medication Sig Start Date End Date Taking?  Authorizing Provider   rosuvastatin (CRESTOR) 40 MG tablet Take 40 mg by mouth every evening   Yes Historical Provider, MD   insulin NPH (HUMULIN N;NOVOLIN N) 100 UNIT/ML injection vial Inject 20 Units into the skin 2 times daily (before meals)   Yes Historical Provider, MD   levETIRAcetam (KEPPRA) 500 MG tablet Take 1 tablet by mouth 2 times daily 11/3/21  Yes Ramona Danielle MD   venlafaxine (EFFEXOR XR) 150 MG extended release capsule Take 1 capsule by mouth daily (with breakfast) 7/29/19  Yes Arabella DALTON EstevesMargi   vitamin B-1 (THIAMINE) 100 MG tablet Take 100 mg by mouth daily   Yes Historical Provider, MD   ferrous sulfate 325 (65 Fe) MG tablet Take 325 mg by mouth daily (with breakfast)   Yes Historical Provider, MD   ALPRAZolam (XANAX) 0.5 MG tablet Take 0.5 mg by mouth three times daily.    Yes Historical Provider, MD   furosemide (LASIX) 40 MG tablet Take 1 tablet by mouth 2 times daily 12/11/18  Yes Jenny Evans MD   tiotropium (SPIRIVA RESPIMAT) 2.5 MCG/ACT AERS inhaler Inhale 2 puffs into the lungs daily    Yes Historical Provider, MD   albuterol sulfate  (90 Base) MCG/ACT inhaler Inhale 2 puffs into the lungs every 6 hours as needed for Wheezing or Shortness of Breath   Yes Historical Provider, MD   metoprolol succinate (TOPROL XL) 50 MG extended release tablet Take 50 mg by mouth daily   Yes Historical Provider, MD   Multiple Vitamins-Minerals (MULTIVITAMIN ADULT) TABS Take 1 tablet by mouth daily   Yes Historical Provider, MD   vitamin D (CHOLECALCIFEROL) 1000 UNIT TABS tablet Take 1,000 Units by mouth daily   Yes Historical Provider, MD   fluticasone (FLONASE) 50 MCG/ACT nasal spray 1 spray by Each Nare route daily as needed for Rhinitis   Yes Historical Provider, MD   aspirin 325 MG EC tablet Take 325 mg by mouth daily    Yes Historical Provider, MD   Omega-3 Fatty Acids (FISH OIL) 1000 MG CAPS Take 1 capsule by mouth daily    Yes Historical Provider, MD   amLODIPine (NORVASC) 5 MG tablet Take 5 mg by mouth nightly    Yes Historical Provider, MD   mirtazapine (REMERON) 45 MG tablet Take 45 mg by mouth nightly   Yes Historical Provider, MD       Scheduled Meds:   meropenem  1,000 mg IntraVENous Q8H    linezolid  600 mg IntraVENous Q12H    sodium chloride  80 mL IntraVENous Once    sucralfate  1 g Oral 4 times per day    amLODIPine  5 mg Oral BID    insulin lispro  0-18 Units SubCUTAneous TID WC    insulin lispro  0-9 Units SubCUTAneous Nightly    amiodarone  200 mg Per NG tube Daily    insulin glargine  15 Units SubCUTAneous BID    Vitamin D  1,000 Units Oral Daily    polyethylene glycol  17 g Per NG tube Daily    levalbuterol  1.25 mg Nebulization Q6H    bisacodyl  10 mg Rectal Daily    pantoprazole  40 mg IntraVENous Daily    And    sodium chloride (PF)  10 mL IntraVENous Daily    aspirin  324 mg Oral Daily    levETIRAcetam  500 mg Oral BID    chlorhexidine  15 mL Mouth/Throat BID    enoxaparin  30 mg SubCUTAneous BID    QUEtiapine  50 mg Oral Once    budesonide  0.5 mg Nebulization BID    sodium chloride flush  5-40 mL IntraVENous 2 times per day    atorvastatin  20 mg Oral Daily     Continuous Infusions:   phenylephrine (KEARA-SYNEPHRINE) 50mg/250mL infusion 15 mcg/min (02/14/22 0658)    dextrose      dexmedetomidine (PRECEDEX) IV infusion 1.2 mcg/kg/hr (02/14/22 0651)    fentaNYL 50 mcg/hr (02/13/22 2120)    propofol Stopped (02/09/22 1030)    midazolam (VERSED) 1 mg/mL in D5W infusion Stopped (02/13/22 0905)    sodium chloride          Physical Exam:  Vitals:    02/14/22 1100 02/14/22 1123 02/14/22 1127 02/14/22 1200   BP:    (!) 113/52   Pulse: 70 64 64 71   Resp: 26 (!) 0 (!) 0 27   Temp:    98.5 °F (36.9 °C)   TempSrc:    Axillary   SpO2: 100% 100% 100% 100%   Weight:       Height:         I/O last 3 completed shifts: In: 0361 [I.V.:1478.9; NG/GT:3074; IV Piggyback:1345.1]  Out: 5034 [Urine:1275]    General: Intubated, sedated, not in distress. Appears to be stated age. HEENT: Atraumatic, normocephalic. Anicteric sclera. Pink and moist oral mucosa.    Neck supple. No JVD. Chest: Bilateral air entry, clear to auscultation, no wheezing, rhonchi or rales. Cardiovascular: RRR, S1S2, no murmur, rub or gallop. Bilateral +1 pitting lower extremity edema. Abdomen: Soft, non tender to palpation. PEG, hypoactive bowel sounds  Musculoskeletal: No cyanosis or clubbing. Integumentary: Pink, warm and dry. Free from rash or lesions. CNS: Sedated, intubated, face symmetrical. No tremor.      Data:  CBC:   Lab Results   Component Value Date    WBC 7.9 02/14/2022    HGB 11.9 (L) 02/14/2022    HCT 39.8 (L) 02/14/2022    MCV 82.7 02/14/2022     02/14/2022     BMP:    Lab Results   Component Value Date     02/14/2022     02/13/2022     02/12/2022    K 4.3 02/14/2022    K 4.6 02/13/2022    K 4.4 02/12/2022     02/14/2022     02/13/2022     (H) 02/12/2022    CO2 22 02/14/2022    CO2 21 02/13/2022    CO2 24 02/12/2022    BUN 42 (H) 02/14/2022    BUN 38 (H) 02/13/2022    BUN 35 (H) 02/12/2022    CREATININE 0.74 02/14/2022    CREATININE 0.78 02/13/2022    CREATININE 0.69 (L) 02/12/2022    GLUCOSE 228 (H) 02/14/2022    GLUCOSE 255 (H) 02/13/2022    GLUCOSE 95 02/12/2022     CMP:   Lab Results   Component Value Date     02/14/2022    K 4.3 02/14/2022     02/14/2022    CO2 22 02/14/2022    BUN 42 02/14/2022    CREATININE 0.74 02/14/2022    GLUCOSE 228 02/14/2022    CALCIUM 9.0 02/14/2022    PROT 5.0 02/12/2022    LABALBU 1.6 02/12/2022    BILITOT 0.37 02/12/2022    ALKPHOS 105 02/12/2022    AST 39 02/12/2022    ALT 36 02/12/2022      Hepatic:   Lab Results   Component Value Date    AST 39 02/12/2022    AST 72 (H) 02/03/2022    AST 46 (H) 02/01/2022    ALT 36 02/12/2022    ALT 77 (H) 02/03/2022    ALT 43 (H) 02/01/2022    BILITOT 0.37 02/12/2022    BILITOT 0.37 02/03/2022    BILITOT 0.28 (L) 02/01/2022    ALKPHOS 105 02/12/2022    ALKPHOS 117 02/03/2022    ALKPHOS 100 02/01/2022     BNP: No results found for: BNP  Lipids:   Lab Results Component Value Date    CHOL 129 11/03/2021    HDL 30 (L) 11/03/2021     INR:   Lab Results   Component Value Date    INR 1.0 01/17/2022    INR 1.0 11/03/2021    INR 1.0 07/27/2019     PTH: No results found for: PTH  Phosphorus:    Lab Results   Component Value Date    PHOS 2.6 02/14/2022     Ionized Calcium: No results found for: IONCA  Magnesium:   Lab Results   Component Value Date    MG 1.9 02/14/2022     Albumin:   Lab Results   Component Value Date    LABALBU 1.6 02/12/2022     Last 3 CK, CKMB, Troponin: @LABRCNT(CKTOTAL:3,CKMB:3,TROPONINI:3)       URINE:)No results found for: Marquis Ned    Radiology:  Reviewed. Assessment:  Hypokalemia, used to have hyperkalemia. Hypernatremia, Na stable. Hypophosphatemia  Hypovitaminosis D. Metabolic alkalosis. Hypertension. Diabetes mellitis. COVID19 pneumonia. CHF. COPD. Plan:  Continue free water flushes at 250 ml every 4 hours. Replace electrolytes per protocol. Repeat echo 2/11/2022 reviewed. Metoprolol discontinued per cardiology. Lisinopril 20 mg daily started 2/2/2022 per cardiology. Monitor K and creatinine. Has not received last 3 doses of amlodipine 5 mg due to hypotension. Monitor blood pressure closely. Continue vitamin D supplementation. TF per primary. Avoid hypotension, nephrotoxic drugs, Fleets enema and IV contrast exposure. Follow up chemistries daily in the a.m. We will continue to follow with you. Electronically signed by Isabella Peng MD  on 2/14/2022 at 12:31 PM   [facility].   Ph: [phone]

## 2022-02-14 NOTE — PROGRESS NOTES
Patient had bowel movement, cleaned up by staff. Patient assessed and given morning medications. Patient only responds to pain at this time. Will continue to monitor.

## 2022-02-14 NOTE — PROGRESS NOTES
General Surgery:  Daily Progress Note                   PATIENT NAME: Madyson Roberts     TODAY'S DATE: 2/14/2022, 5:49 AM  SUBJECTIVE:     Pt seen and examined at bedside this morning. Remains intubated and sedated. Currently on phenylephrine drip at 30 mcg. FiO2-75%, PEEP-14. OBJECTIVE:   VITALS:  BP (!) 124/57   Pulse 71   Temp 100 °F (37.8 °C) (Axillary)   Resp 24   Ht 5' 10\" (1.778 m)   Wt 233 lb 3.2 oz (105.8 kg)   SpO2 100%   BMI 33.46 kg/m²      INTAKE/OUTPUT:      Intake/Output Summary (Last 24 hours) at 2/14/2022 0549  Last data filed at 2/14/2022 0423  Gross per 24 hour   Intake 3865.97 ml   Output 725 ml   Net 3140.97 ml       PHYSICAL EXAM:  General Appearance: Intubated, sedated, does not follow commands. HEENT:  Normocephalic, atraumatic, mucus membranes moist   Skin:  Skin color, texture, turgor normal. No rashes or lesions. Lungs:  No chest wall tenderness. Heart:  Heart regular rate and rhythm  Abdomen:   soft, ND, NTTP, +bowel sounds, peg tube in place  Extremities: Extremities warm to touch, pink, with no edema.           Data:  CBC with Differential:    Lab Results   Component Value Date    WBC 7.9 02/14/2022    RBC 4.81 02/14/2022    HGB 11.9 02/14/2022    HCT 39.8 02/14/2022     02/14/2022    MCV 82.7 02/14/2022    MCH 24.7 02/14/2022    MCHC 29.9 02/14/2022    RDW 15.9 02/14/2022    LYMPHOPCT PENDING 02/14/2022    MONOPCT PENDING 02/14/2022    BASOPCT PENDING 02/14/2022    MONOSABS PENDING 02/14/2022    LYMPHSABS PENDING 02/14/2022    EOSABS PENDING 02/14/2022    BASOSABS PENDING 02/14/2022    DIFFTYPE NOT REPORTED 02/14/2022     BMP:    Lab Results   Component Value Date     02/14/2022    K 4.3 02/14/2022     02/14/2022    CO2 22 02/14/2022    BUN 42 02/14/2022    LABALBU 1.6 02/12/2022    CREATININE 0.74 02/14/2022    CALCIUM 9.0 02/14/2022    GFRAA >60 02/14/2022    LABGLOM >60 02/14/2022    GLUCOSE 228 02/14/2022         ASSESSMENT:  C/Chance [address] Problems    Diagnosis Date Noted    Constipation [K59.00]     Abdominal pain, generalized [R10.84]     Acute on chronic systolic CHF (congestive heart failure) ([facility]) [I50.23]     Acute respiratory failure with hypoxia ([facility]) [J96.01]     BLANCHE (acute kidney injury) ([facility] [address] [N17.9]     COPD exacerbation ([facility]) [J44.1]     Hypokalemia [E87.6]     Hypomagnesemia [E83.42]     Palliative care encounter [Z51.5]     Goals of care, counseling/discussion [Z71.89]     DNR (do not resuscitate) discussion [Z71.89]     ACP (advance care planning) [Z71.89]     COVID-19 [U07.1] 01/16/2022       1. 76 y.o. male with respiratory failure due to COVID-19. Plan:  1. Patient was seen examined bedside this morning. 2. Patient with worsening vent settings with increase in PEEP. Requiring pressor support. 3. We will follow along and discuss timing of tracheostomy.   4. PEG tube in place, continue tube feeds at goal.    Electronically signed by Jenna Wills DO  on 2/14/2022 at 5:49 AM

## 2022-02-14 NOTE — PROGRESS NOTES
Patient now able to open eyes spontaneously. He is not able to follow commands at this time. Will continue to monitor.

## 2022-02-14 NOTE — PROGRESS NOTES
[facility]  Progress Note    Agapito Mann  MRN: [id number]  Date: 2/14/2022  LOS: 34     Reason for F/U :   COVID-19 virus infection    Impression :   COVID-19 virus infection tested + 1/16/2022  Acute respiratory failure currently ventilator dependent  Intubated 1/23/2022  Emphysema with worsening changes on imaging  Worsening acute interstitial lung disease and clinically not typical of COVID-19 virus infection  Worsening moderate to severe pulmonary artery hypertension  Bilateral lower extremity edema likely related to above  Leukopenia and thrombocytopenia  Lacunar  infarct on the left thalamus  Intermittent fevers  Acute kidney injury    Recommendations:   COVID-19 therapy: The patient was on Decadron 6 mg IV daily through 02/04/2022  The patient has been started on baricitinib 1/17/2022 but it was discontinued 1/18/2022 due to cytopenias. Actemra had been considered but again due to the cytopenias and thrombocytopenia this has been held. Antimicrobial therapy: The patient received Rocephin 1000 mg and azithromycin 500 mg on 1/16/2022  The patient received a dose of levofloxacin 500 mg on 1/18/2020. Patient started on cefepime and vancomycin 1/23/2022 plan completed a 7-day course of cefepime on 1/28/2022  Vancomycin discontinued due to acute kidney injury 1/24/2022    Continue intravenous antimicrobial therapy with meropenem and Zyvox for now started 2/12/2022   Continue supportive therapy    Infection Control Recommendations:   Droplet plus precautions    Discharge Planning:   Patient will need Midline Catheter Insertion/ PICC line Insertion: No  [facility],  LTAC: Undetermined  Patient willneed outpatient wound care: No    Medical Decision making / Summary of Stay:   Agapito Mann is a 64y.o.-year-old male who was initially admitted on 1/16/2022.    Keri Amasa has a history of diabetes mellitus type 2, essential hypertension, seizure disorder, chronic kidney disease stage III, hyperlipidemia among other medical problems.     The patient reports that about 1 to 2 months ago he had his diabetes medications changed and since that time he has noticed increasing swelling in his legs, hardness in the legs, difficulty ambulating, and weight gain from 178 to 255 pounds over the last 1 to 2 months. He did not report any subjective fevers, chills, cough or shortness of breath. He denies any loss of smell or change in taste. No abdominal pain nausea vomiting or diarrhea. The patient does report that he has home oxygen that he uses as needed at home and he reports that he hardly needs it. He is vaccinated did receive the J&J vaccine but has not yet received a booster dose.     The patient presented to the emergency department and was found to have leukopenia with a white blood cell count of 2.7 and thrombocytopenia with a platelet count of 789. Creatinine slightly elevated at 1.31 and proBNP is elevated at 9327. Chest x-ray showed hyperinflated lungs with cardiomegaly seen and diffuse increased interstitial markings in both lungs which may represent baseline chronic interstitial lung changes given that these findings were present before. A CT of the chest was done with IV contrast that showed no evidence of pulmonary embolism and compared to 2008 there is worsening underlying emphysema with worsening subacute or acute interstitial lung disease best seen in the left upper lobe. Findings were not felt typical for COVID-19 virus pneumonia. There was thickening and distortion of the left major fissure with an ill-defined masslike focus in the left lower lobe.   There is worsening moderate to severe pulmonary artery hypertension     COVID testing was positive and I was asked to evaluate and help with COVID-19 virus infection    The patient did have a CT scan of the abdomen pelvis done 1/28/2022 which was a limited study with no evidence of bowel obstruction and moderate stool burden noted felt related to constipation. Tiny amount of free fluid scattered in the abdomen, moderate pleural effusions and left basilar consolidation and basilar interstitial thickening increased from 2022    The patient is seen and evaluated at bedside he continues to have fevers up to 102.9 degrees     The patient had a CT of the head 2022 with no acute intracranial abnormality, CT of the chest that showed no evidence of pulmonary embolism but interval development of extensive airspace disease within the lower lobes bilaterally, diffuse interstitial pulmonary fibrosis and worsening of the reticulonodular changes suggesting superimposed infection or edema  And a CT of the abdomen and pelvis that showed patient was status post gastrostomy tube placement and development of mild pneumoperitoneum seen along the anterior aspect of the upper abdomen along with the hepatic margin. Slightly more pronounced pelvic and abdominal ascites compared to prior exam, stable anasarca. Repeat CT imaging imaging with findings suggestive of pneumonia as well as some fibrotic changes from the COVID pneumonia. I did start the patient on meropenem and Zyvox on 2022 due to elevated WBC, worsening infiltrates on chest x-ray    Current evaluation:2022    BP (!) 96/56   Pulse 71   Temp 98.6 °F (37 °C) (Axillary)   Resp 24   Ht 5' 10\" (1.778 m)   Wt 233 lb 3.2 oz (105.8 kg)   SpO2 91%   BMI 33.46 kg/m²     Temperature Range: Temp: 98.6 °F (37 °C) Temp  Av.2 °F (37.9 °C)  Min: 98.6 °F (37 °C)  Max: 102.3 °F (39.1 °C)   The patient is seen and evaluated at bedside and is sedated on the ventilator with Precedex and fentanyl. He is on 7% FiO2 14 of PEEP. Continues to require Luke-Synephrine for blood pressure support.     Review of Systems   Unable to perform ROS: Intubated       Physical Examination :     Physical Exam  Constitutional:       Appearance: He is well-developed. Interventions: He is sedated and intubated. HENT:      Head: Normocephalic and atraumatic. Cardiovascular:      Rate and Rhythm: Normal rate. Heart sounds: Normal heart sounds. No friction rub. No gallop. Pulmonary:      Effort: Pulmonary effort is normal. He is intubated. Breath sounds: Normal breath sounds. No wheezing. Abdominal:      General: Bowel sounds are normal.      Palpations: Abdomen is soft. There is no mass. Tenderness: There is no abdominal tenderness. Musculoskeletal:         General: Swelling (Bilateral upper extremity swelling) present. Cervical back: Neck supple. Lymphadenopathy:      Cervical: No cervical adenopathy. Skin:     General: Skin is warm and dry. Comments:  There cyanotic changes in the legs and feet bilaterally related to the pressor support         Laboratory data:   I have independently reviewed the followinglabs:  CBC with Differential:   Recent Labs     02/13/22  0620 02/14/22  0443   WBC 10.8 7.9   HGB 11.7* 11.9*   HCT 39.9* 39.8*    144   LYMPHOPCT 6* 4*   MONOPCT 4 5     BMP:   Recent Labs     02/13/22  0620 02/14/22  0443    138   K 4.6 4.3    105   CO2 21 22   BUN 38* 42*   CREATININE 0.78 0.74   MG 1.9 1.9     Hepatic Function Panel:   Recent Labs     02/12/22  0600   PROT 5.0*   LABALBU 1.6*   BILIDIR 0.21   IBILI 0.16   BILITOT 0.37   ALKPHOS 105   ALT 36   AST 39         Lab Results   Component Value Date    PROCAL 0.25 02/01/2022    PROCAL 0.24 01/23/2022    PROCAL 0.11 01/19/2022     Lab Results   Component Value Date    CRP 23.5 01/16/2022    CRP 2.0 07/27/2019     Lab Results   Component Value Date    SEDRATE 3 01/16/2022         Lab Results   Component Value Date    DDIMER 4.53 02/12/2022    DDIMER 2.96 01/30/2022    DDIMER 5.84 01/23/2022    DDIMER 1.53 01/18/2022    DDIMER 1.73 01/16/2022     Lab Results   Component Value Date    FERRITIN 569 01/16/2022    FERRITIN 50 07/23/2019 FERRITIN 18 07/08/2019     Lab Results   Component Value Date     01/16/2022     Lab Results   Component Value Date    FIBRINOGEN 402 01/16/2022       Results in Past 30 Days  Result Component Current Result Ref Range Previous Result Ref Range   SARS-CoV-2, Rapid DETECTED (A) (1/16/2022) Not Detected Not in Time Range      Lab Results   Component Value Date    COVID19 DETECTED 01/16/2022    COVID19 Not Detected 11/02/2021       No results for input(s): VANCOTROUGH in the last 72 hours. Imaging Studies:   ONE XRAY VIEW OF THE CHEST 2/13/2022 4:42 am  Impression   Stable appearance of the chest with diffuse reticular and ground-glass   opacities bilaterally. CT OF THE ABDOMEN AND PELVIS WITH CONTRAST 2/12/2022 2:03 pm  Impression   Status post gastrostomy tube with tip in the upper stomach.  There is   development of mild pneumoperitoneum seen along the anterior aspect of the   upper abdomen along the hepatic margin.  It is not clear whether the free air   is associated of the recent gastrostomy placement.       Recommend surgical consultation       Slightly more pronounced pelvic and abdominal ascites when compared to the   prior exam       Stable anasarca       Critical results were called by Dr. Patrick Caro MD to Dr Dianna Mendez on 2/12/2022 at 21:14. CTA OF THE CHEST 2/12/2022 12:03 pm  Impression   1. No evidence of pulmonary embolism   2. Interval development of extensive airspace disease within the lower lobes   bilaterally.  Differential considerations would include aspiration changes,   bacterial pneumonia, and atelectasis   3. Diffuse interstitial pulmonary fibrosis, with worsening of the   reticulonodular changes, suggesting superimposed infection or edema   4. Small amount of pneumoperitoneum present within the upper abdomen. Correlation with the dedicated CT scan abdomen pelvis recommended. 5. Cardiomegaly, with extensive coronary arterial calcifications   6.  Small bilateral pleural effusions   7.  Critical results were called by Dr. Bing Dalton to Dr Shana Key on   2/12/2022 at 5:45 p.m. hours. CT OF THE HEAD WITHOUT CONTRAST  2/12/2022 5:02 pm  Impression   1. No acute intracranial abnormality. 2. Microangiopathic change. ONE XRAY VIEW OF THE CHEST 2/12/2022 5:28 am    Impression   Bilateral lung airspace disease which has slightly progressed within the   right lower lobe.               CT OF THE ABDOMEN AND PELVIS WITHOUT CONTRAST 1/28/2022 8:34 am    Impression   1.  Overall mildly limited study due to motion and lack of contrast.       2.  No evidence of bowel obstruction.  Moderate stool burden is noted,   possibly related to constipation.  Enteric tube is in place with distal tip   in the proximal stomach.       3.  Tiny amount of free fluid scattered in the abdomen, including   presacral/perirectal fluid, most likely related to volume overload.  No   definite findings of rectal wall thickening or fecal impaction.       4.  Moderate pleural effusions, left basilar consolidation, and bibasilar   interstitial thickening, increased when compared to 01/16/2022.       RECOMMENDATIONS:   Unavailable         Veins: Lower Extremities DVT Study, Venous Scan Lower Bilateral.  Indications for Study: Leg Swelling . Patient Status: In Patient. Technical Quality: Limited visualization. Limitation reason: Edema. Comments: Simultaneous real time imaging utilizing B-Mode, color doppler and spectral waveform analysis was performed on the  bilateral lower extremities for venous examination of the deep and superficial systems. Conclusions  Summary  No evidence of superficial or deep venous thrombosis in both lower extremities.   Signature  Electronically signed by Juan Black RVT(Sonographer) on 01/19/2022 08:10 AM  Electronically signed by Kasie Ibarra physician) on 01/19/2022 06:21 PM      CT OF THE HEAD WITHOUT CONTRAST  1/18/2022 5:42 pm  Impression   New lacunar infarct left thalamus, age indeterminate. Lenoria Citizen may be helpful.           Cultures:     Culture, Blood 1 [[id number]] Collected: 02/08/22 1559   Order Status: Completed Specimen: Blood Updated: 02/12/22 2034    Specimen Description . BLOOD    Special Requests RT ARTERY,10ML    Culture NO GROWTH 4 DAYS   Culture, Blood 1 [[id number]] Collected: 02/08/22 1549   Order Status: Completed Specimen: Blood Updated: 02/12/22 2032    Specimen Description . BLOOD    Special Requests RT HAND,10ML    Culture NO GROWTH 4 DAYS     Culture, Blood 1 [[id number]] Collected: 02/01/22 0003   Order Status: Completed Specimen: Blood Updated: 02/06/22 0830    Specimen Description . BLOOD    Special Requests ART LINE 20ML    Culture NO GROWTH 5 DAYS   Culture, Blood 1 [[id number]] Collected: 01/31/22 1853   Order Status: Completed Specimen: Blood Updated: 02/05/22 2315    Specimen Description . BLOOD    Special Requests RT HAND,10ML    Culture NO GROWTH 5 DAYS     Culture, Respiratory [[id number]] Collected: 01/28/22 1030   Order Status: Completed Specimen: Sputum, Suctioned Updated: 01/30/22 0932    Specimen Description . SUCTIONED SPUTUM    Special Requests NOT REPORTED    Direct Exam < 10 EPITHELIAL CELLS/LPF     <10 NEUTROPHILS/LPF     NO SIGNIFICANT PATHOGENS SEEN    Culture NORMAL RESPIRATORY PO HEAVY GROWTH       Culture, Blood 1 [[id number]] Collected: 01/23/22 1759   Order Status: Completed Specimen: Blood Updated: 01/28/22 2111    Specimen Description . BLOOD    Special Requests RT HAND 19ML    Culture NO GROWTH 5 DAYS   Culture, Blood 1 [[id number]] Collected: 01/23/22 1759   Order Status: Completed Specimen: Blood Updated: 01/28/22 2110    Specimen Description . BLOOD    Special Requests ART LINE 10ML    Culture NO GROWTH 5 DAYS     Culture, Blood 2 [[id number]] Collected: 01/21/22 0742   Order Status: Completed Specimen: Blood Updated: 01/26/22 1001    Specimen Description . BLOOD    Special Requests LEFT AC 20ML    Culture NO GROWTH 5 DAYS   Culture, Blood 1 [[id number]] Collected: 01/21/22 0750   Order Status: Completed Specimen: Blood Updated: 01/26/22 1000    Specimen Description . BLOOD    Special Requests LEFT HAND 5ML    Culture NO GROWTH 5 DAYS   MRSA DNA Probe, Nasal [[id number]] Collected: 01/23/22 2258   Order Status: Completed Specimen: Nasal Updated: 01/25/22 1038    Specimen Description . NASAL SWAB    MRSA, DNA, Nasal NEGATIVE    Comment: NEGATIVE:  MRSA DNA not detected by nucleic acid amplification.                                                     Results should be used as an adjunct to nosocomial control efforts to identify patients   needing enhanced precautions.     The test is not intended to identify patients with staphylococcal infections.  Results   should not be used to guide or monitor treatment for MRSA infections. Culture, Blood 1 [[id number]] Collected: 01/16/22 1220   Order Status: Completed Specimen: Blood Updated: 01/21/22 1521    Specimen Description . BLOOD    Special Requests LAC 12ML    Culture NO GROWTH 5 DAYS   Culture, Blood 1 [[id number]] Collected: 01/16/22 1205   Order Status: Completed Specimen: Blood Updated: 01/21/22 1521    Specimen Description . BLOOD    Special Requests RAC 12ML    Culture NO GROWTH 5 DAYS       Culture, Urine [[id number]] Collected: 01/16/22 1315   Order Status: Completed Specimen: Urine, clean catch Updated: 01/17/22 2128    Specimen Description . CLEAN CATCH URINE    Special Requests NOT REPORTED    Culture NO SIGNIFICANT GROWTH       COVID-19, Rapid [[id number]] (Abnormal) Collected: 01/16/22 1230   Order Status: Completed Specimen: Nasopharyngeal Swab Updated: 01/16/22 1314    Specimen Description . NASOPHARYNGEAL SWAB    SARS-CoV-2, Rapid DETECTED Abnormal     Comment:        Rapid NAAT: The specimen is POSITIVE for SARS-Cov-2, the novel coronavirus associated with   COVID-19.         This test has been authorized by the FDA under an Emergency Use Authorization (EUA) for use   by authorized laboratories.         The ID NOW COVID-19 assay is designed to detect the virus that causes COVID-19 in patients   with signs and symptoms of infection who are suspected of COVID-19. An individual without symptoms of COVID-19 and who is not shedding SARS-CoV-2 virus would   expect to have a negative (not detected) result in this assay. Fact sheet for Healthcare Providers: Cee   Fact sheet for Patients: Cee           Methodology: Isothermal Nucleic Acid Amplification         Results reported to the appropriate Health Department         Flu A/B Ag Detection [[id number]] Collected: 01/16/22 1230   Order Status: Completed Specimen: Nasopharyngeal Swab Updated: 01/16/22 1313    Specimen Description . NASOPHARYNGEAL SWAB    Special Requests NOT REPORTED    Direct Exam NEGATIVE for Influenza A + B antigens.                                PCR testing to confirm this result is available upon request. Coty Jimenez will be saved in the laboratory for 7 days.  Please call [phone] if PCR testing is indicated.      Medications:      meropenem  1,000 mg IntraVENous Q8H    linezolid  600 mg IntraVENous Q12H    sodium chloride  80 mL IntraVENous Once    sucralfate  1 g Oral 4 times per day    amLODIPine  5 mg Oral BID    insulin lispro  0-18 Units SubCUTAneous TID WC    insulin lispro  0-9 Units SubCUTAneous Nightly    amiodarone  200 mg Per NG tube Daily    insulin glargine  15 Units SubCUTAneous BID    Vitamin D  1,000 Units Oral Daily    polyethylene glycol  17 g Per NG tube Daily    levalbuterol  1.25 mg Nebulization Q6H    bisacodyl  10 mg Rectal Daily    pantoprazole  40 mg IntraVENous Daily    And    sodium chloride (PF)  10 mL IntraVENous Daily    aspirin  324 mg Oral Daily    levETIRAcetam  500 mg Oral BID    chlorhexidine  15 mL Mouth/Throat BID    enoxaparin  30 mg SubCUTAneous BID    QUEtiapine  50 mg Oral Once    budesonide  0.5 mg Nebulization BID    sodium chloride flush  5-40 mL IntraVENous 2 times per day    atorvastatin  20 mg Oral Daily           [facility]  Urbano Barfield MD  [employer] messaging  OFFICE: [phone]      Electronically signed by Ubrano Barfield MD on 2/14/2022 at 9:08 AM  Thank you for allowing us to participate in the care of this patient. Please call with questions. This note iscreated with the assistance of a speech recognition program.  While intending to generate a document that actually reflects the content of the visit, the document can still have some errors including those of syntax andsound a like substitutions which may escape proof reading. In such instances, actual meaning can be extrapolated by contextual diversion.

## 2022-02-14 NOTE — PROGRESS NOTES
Progress note  [facility]    Adult Hospitalist      Name: Zoey Alan  MRN: [id number]     Acct: [de-identified]  Room: [address] [place] [address]-    Admit Date: 1/16/2022 11:51 AM  PCP: Byron Miller MD    Primary Problem  Active Problems:    COVID-19    Acute on chronic systolic CHF (congestive heart failure) (HCC)    Acute respiratory failure with hypoxia (HCC)    BLANCHE (acute kidney injury) ([facility]    COPD exacerbation ([facility]    Hypokalemia    Hypomagnesemia    Palliative care encounter    Goals of care, counseling/discussion    DNR (do not resuscitate) discussion    ACP (advance care planning)    Constipation    Abdominal pain, generalized  Resolved Problems:    * No resolved hospital problems. *        Assesment:   Acute congestive heart failure, diastolic   [id number]   Viral pneumonia secondary to above  Acute respiratory failure with hypoxia intubated on 1/23/2022  Hyperkalemia  Paroxysmal atrial fibrillation  Essential hypertension  Mixed hyperlipidemia  Diabetes mellitus type 2  History of seizure disorder  History of CKD stage III, presently normal renal function  Lung mass?   Leukopenia  Polycythemia  Thrombocytopenia  Anxiety disorder  Recent past h/o lacunar infarct left thalamus   Altered mental status/agitation  Endotracheal intubation secondary to hypoxia dated 1/23/2022  Supraventricular tachycardia/elevated troponin  Fever  Emphysema   Pulmonary artery hypertension       Plan:   Admit patient to intermediate floor  Mechanical ventilation sedation as per critical care, patient is requiring PEEP of 10-->14 with  90% FiO2  Telemetry  Check vitals closely  CBC BMP daily    Cardiology consult  Amiodarone  Precedex gtt    IV Decadron completed  Off pressors  Off amiodarone drip  Patient completed a course of cefepime for 7 days  Bronchodilators  Pulmicort  Patient is deemed not a candidate for Actemra or baricitinib secondary to cytopenia  Infectious disease consult  Pulmonology consult    Continue Keppra  Continue amlodipine, atorvastatin  Continue aspirin    GI signed off   Tube feeds  Consult nephrology   Insulin gtt- DC  Lantus w SSI-->increase to high intensity as BS running high, now better controlled    Plan tracheostomy, continue to wean FiO2  General surgery following, plan for tracheostomy  S/P PEG tube today  Continue other medication as below.     Scheduled Meds:   meropenem  1,000 mg IntraVENous Q8H    linezolid  600 mg IntraVENous Q12H    sodium chloride  80 mL IntraVENous Once    sucralfate  1 g Oral 4 times per day    amLODIPine  5 mg Oral BID    insulin lispro  0-18 Units SubCUTAneous TID WC    insulin lispro  0-9 Units SubCUTAneous Nightly    amiodarone  200 mg Per NG tube Daily    insulin glargine  15 Units SubCUTAneous BID    Vitamin D  1,000 Units Oral Daily    polyethylene glycol  17 g Per NG tube Daily    levalbuterol  1.25 mg Nebulization Q6H    bisacodyl  10 mg Rectal Daily    pantoprazole  40 mg IntraVENous Daily    And    sodium chloride (PF)  10 mL IntraVENous Daily    aspirin  324 mg Oral Daily    levETIRAcetam  500 mg Oral BID    chlorhexidine  15 mL Mouth/Throat BID    enoxaparin  30 mg SubCUTAneous BID    QUEtiapine  50 mg Oral Once    budesonide  0.5 mg Nebulization BID    sodium chloride flush  5-40 mL IntraVENous 2 times per day    atorvastatin  20 mg Oral Daily     Continuous Infusions:   phenylephrine (KEARA-SYNEPHRINE) 50mg/250mL infusion 15 mcg/min (02/14/22 0658)    dextrose      dexmedetomidine (PRECEDEX) IV infusion 1.2 mcg/kg/hr (02/14/22 0651)    fentaNYL 50 mcg/hr (02/13/22 2120)    propofol Stopped (02/09/22 1030)    midazolam (VERSED) 1 mg/mL in D5W infusion Stopped (02/13/22 0905)    sodium chloride       PRN Meds:  sodium chloride flush, 10 mL, PRN  potassium chloride, 20 mEq, PRN  potassium chloride, 10 mEq, PRN  magnesium sulfate, 1,000 mg, PRN  glucose, 15 g, PRN  glucagon (rDNA), 1 mg, PRN  dextrose, 100 mL/hr, PRN  sodium chloride nebulizer, 3 mL, Q8H PRN  LORazepam, 1 mg, Q4H PRN  dextrose bolus (hypoglycemia), 125 mL, PRN   Or  dextrose bolus (hypoglycemia), 250 mL, PRN  sodium chloride flush, 10 mL, PRN  sodium chloride, 25 mL, PRN  ondansetron, 4 mg, Q8H PRN   Or  ondansetron, 4 mg, Q6H PRN  acetaminophen, 650 mg, Q6H PRN   Or  acetaminophen, 650 mg, Q6H PRN  hydrALAZINE, 10 mg, Q6H PRN        Chief Complaint:     Chief Complaint   Patient presents with    Leg Swelling     bilateral, has CHF, on diuretic, EMT saw pt , assisted pt into car    Fever    Other     low SPO2         History of Present Illness:   Patient seen examined at bedside, patient remains in medical ICU  Patient remains intubated sedated  Patient did not wake up on decreasing sedation  Presently on fentanyl and propofol  Initially FiO2 had reduced  Patient started having labored breathing, vent settings changed  Patient has increasing residuals from tube feeding      Review of systems:  Unable to conduct ROS secondary to patient being intubated      Initial HPI  Sammy Schrader is a 76 y.o.  male who presents with Leg Swelling (bilateral, has CHF, on diuretic, EMT saw pt , assisted pt into car), Fever, and Other (low SPO2)  This is a 77-year-old gentleman admitted via ER, come to ER with complaint of having shortness of breath, patient has medical history significant for COPD patient with history of cardiac failure: Patient noted that he has been having increasing swelling in his lower extremity and becoming more short of breath, further patient also been having some body aches and sweats, patient patient testing in the emergency room showed that he is positive for COVID-19 also noticed to have an elevated BNP, imaging concerning for fluid overload, admitted for further regiment    I have personally reviewed the past medical history, past surgical history, medications, social history, and family history, and summarized in the note.     Review of Systems:       Unable to conduct ROS secondary to patient being intubated      Past Medical History:     Past Medical History:   Diagnosis Date    Arthritis     Atherosclerotic plaque 7/28/2019    Cervical disc disease     degenerative disc disease    Diabetes mellitus ([facility]     type 2    History of blood transfusion     Hx of blood clots     left leg    Hyperlipidemia     Neuropathy     Right kidney mass     \"urologist is watching\"    Snores     Type 2 diabetes mellitus treated with insulin ([facility] 7/28/2019        Past Surgical History:     Past Surgical History:   Procedure Laterality Date    ABDOMINAL AORTIC ANEURYSM REPAIR  2005    CARPAL TUNNEL RELEASE Bilateral     CERVICAL SPINE SURGERY      COLONOSCOPY      benign polyps removed    INGUINAL HERNIA REPAIR Right     GA REPAIR INCISIONAL HERNIA,REDUCIBLE N/A 5/10/2017    HERNIA VENTRAL REPAIR WITH MESH  performed by Pushpa Delgadillo DO at [place] [id number] N/A 2/9/2022    EGD   PEG TUBE INSERTION performed by Isaiah Mosley MD at [facility]  05/10/2017    with mesh        Medications Prior to Admission:       Prior to Admission medications    Medication Sig Start Date End Date Taking?  Authorizing Provider   rosuvastatin (CRESTOR) 40 MG tablet Take 40 mg by mouth every evening   Yes Historical Provider, MD   insulin NPH (HUMULIN N;NOVOLIN N) 100 UNIT/ML injection vial Inject 20 Units into the skin 2 times daily (before meals)   Yes Historical Provider, MD   levETIRAcetam (KEPPRA) 500 MG tablet Take 1 tablet by mouth 2 times daily 11/3/21  Yes Stephanie Wheatley MD   venlafaxine (EFFEXOR XR) 150 MG extended release capsule Take 1 capsule by mouth daily (with breakfast) 7/29/19  Yes Arabella Kiser   vitamin B-1 (THIAMINE) 100 MG tablet Take 100 mg by mouth daily   Yes Historical Provider, MD   ferrous sulfate 325 (65 Fe) MG tablet Take 325 mg by mouth daily (with breakfast) Yes Historical Provider, MD   ALPRAZolam Clifm Combe) 0.5 MG tablet Take 0.5 mg by mouth three times daily. Yes Historical Provider, MD   furosemide (LASIX) 40 MG tablet Take 1 tablet by mouth 2 times daily 12/11/18  Yes Katlyn Evans MD   tiotropium (SPIRIVA RESPIMAT) 2.5 MCG/ACT AERS inhaler Inhale 2 puffs into the lungs daily    Yes Historical Provider, MD   albuterol sulfate  (90 Base) MCG/ACT inhaler Inhale 2 puffs into the lungs every 6 hours as needed for Wheezing or Shortness of Breath   Yes Historical Provider, MD   metoprolol succinate (TOPROL XL) 50 MG extended release tablet Take 50 mg by mouth daily   Yes Historical Provider, MD   Multiple Vitamins-Minerals (MULTIVITAMIN ADULT) TABS Take 1 tablet by mouth daily   Yes Historical Provider, MD   vitamin D (CHOLECALCIFEROL) 1000 UNIT TABS tablet Take 1,000 Units by mouth daily   Yes Historical Provider, MD   fluticasone (FLONASE) 50 MCG/ACT nasal spray 1 spray by Each Nare route daily as needed for Rhinitis   Yes Historical Provider, MD   aspirin 325 MG EC tablet Take 325 mg by mouth daily    Yes Historical Provider, MD   Omega-3 Fatty Acids (FISH OIL) 1000 MG CAPS Take 1 capsule by mouth daily    Yes Historical Provider, MD   amLODIPine (NORVASC) 5 MG tablet Take 5 mg by mouth nightly    Yes Historical Provider, MD   mirtazapine (REMERON) 45 MG tablet Take 45 mg by mouth nightly   Yes Historical Provider, MD        Allergies: Barbiturates, Codeine, and Sulfa antibiotics    Social History:     Tobacco:    reports that he has quit smoking. He has never used smokeless tobacco.  Alcohol:      reports no history of alcohol use. Drug Use:  reports no history of drug use.     Family History:     Family History   Problem Relation Age of Onset    Ovarian Cancer Sister     Ovarian Cancer Sister          Physical Exam:     Vitals:  BP (!) 147/61   Pulse 76   Temp 98.5 °F (36.9 °C) (Axillary)   Resp 26   Ht 5' 10\" (1.778 m)   Wt 242 lb (109.8 kg) SpO2 99%   BMI 34.72 kg/m²   Temp (24hrs), Av.3 °F (37.9 °C), Min:98.5 °F (36.9 °C), Max:102.3 °F (39.1 °C)      General appearance -on ventilator  Mental status -sedated  Head - normocephalic and atraumatic  Eyes - conjunctiva clear  Ears -external ears normal  Nose - no drainage noted  Mouth - mucous membranes moist  Neck - supple, no carotid bruits, thyroid not palpable  Chest -basal crackles with decreased air entry bilaterally to auscultation, increased effort  Heart - normal rate, regular rhythm, no murmur  Abdomen - soft, nontender, nondistended, bowel sounds present all four quadrants, no masses, hepatomegaly or splenomegaly  Neurological -sedated on vent  Extremities - extremity edema, lower distal extremity discoloration    Skin - no gross lesions, rashes, or induration noted        Data:     Labs:    Hematology:  Recent Labs     22  0600 22  1458 22  0620 22  0443   WBC 11.9*  --  10.8 7.9   RBC 4.61  --  4.82 4.81   HGB 11.4*  --  11.7* 11.9*   HCT 38.2*  --  39.9* 39.8*   MCV 82.9  --  82.8 82.7   MCH 24.7*  --  24.3* 24.7*   MCHC 29.8  --  29.3 29.9   RDW 16.2*  --  16.2* 15.9*     --  154 144   MPV 12.3  --  11.9 12.8   DDIMER  --  4.53*  --   --      Chemistry:  Recent Labs     22  0600 22  0620 22  0443    138 138   K 4.4 4.6 4.3   * 104 105   CO2 24 21 22   GLUCOSE 95 255* 228*   BUN 35* 38* 42*   CREATININE 0.69* 0.78 0.74   MG 1.8 1.9 1.9   ANIONGAP 10 13 11   LABGLOM >60 >60 >60   GFRAA >60 >60 >60   CALCIUM 8.6 8.7 9.0   PHOS 2.6 3.2 2.6     Recent Labs     22  0600 22  0717 22  0620 22  0738 22  1128 22  1628 22  2114 22  2304 22  0705 22  1107   PROT 5.0*  --   --   --   --   --   --   --   --   --    LABALBU 1.6*  --   --   --   --   --   --   --   --   --    AST 39  --   --   --   --   --   --   --   --   --    ALT 36  --   --   --   --   --   --   --   --   -- ALKPHOS 105  --   --   --   --   --   --   --   --   --    BILITOT 0.37  --   --   --   --   --   --   --   --   --    BILIDIR 0.21  --   --   --   --   --   --   --   --   --    AMMONIA  --   --  30  --   --   --   --   --   --   --    POCGLU  --    < >  --    < > 179* 186* 173* 191* 186* 214*    < > = values in this interval not displayed. Lab Results   Component Value Date    INR 1.0 01/17/2022    INR 1.0 11/03/2021    INR 1.0 07/27/2019    PROTIME 13.3 01/17/2022    PROTIME 11.1 11/03/2021    PROTIME 10.5 07/27/2019       Lab Results   Component Value Date/Time    SPECIAL RT ARTERY,10ML 02/08/2022 03:59 PM     Lab Results   Component Value Date/Time    CULTURE NO GROWTH 5 DAYS 02/08/2022 03:59 PM       Lab Results   Component Value Date    POCPH 7.351 02/14/2022    POCPCO2 49.0 02/14/2022    POCPO2 170.0 02/14/2022    POCHCO3 27.1 02/14/2022    NBEA NOT REPORTED 02/14/2022    PBEA 1 02/14/2022    EYG9CFW NOT REPORTED 02/14/2022    IFWU9HPR 99 02/14/2022    FIO2 90.0 02/14/2022       Radiology:    XR CHEST 1 VIEW    Result Date: 1/16/2022  Hypoinflated lungs. Cardiomegaly again seen, when compared to the previous study performed 07/27/2019. Diffuse, increased interstitial markings throughout both lungs, some of which can be seen on the prior study may represent baseline chronic interstitial lung changes. The increased prominence on this exam could be secondary to the suboptimal inspiratory effort. The presence of pulmonary vascular congestion/mild CHF or bilateral interstitial pneumonia cannot be excluded. Clinical correlation is recommended. CT CHEST PULMONARY EMBOLISM W CONTRAST    Result Date: 1/16/2022  No evidence of pulmonary embolism.  Compared to 2008, worsening underlying emphysema, with worsening subacute or acute interstitial lung disease, best seen left upper lobe; differential includes interstitial pneumonia or pneumonitis superimposed on emphysema, DIP, HP, and other interstitial pneumonias as well as infectious or inflammatory process superimposed on emphysema disease. Findings are not typical for COVID-19 pneumonia, but an element of the latter should be considered. Thickening and distortion left major fissure with ill-defined mass-like focus left lower lobe. The latter could also be infectious or inflammatory, although neoplasm should be excluded. Underlying worsening emphysema. Nodular densities, best seen right upper lobe. Small pleural effusions, slightly larger on the left. See recommendations below. Mild mediastinal and left hilar adenopathy; see recommendations below. Worsening now moderate-severe pulmonary artery hypertension. Mild cardiomegaly. Stable mild dilatation ascending thoracic aorta. Additional unchanged or incidental findings, as above. RECOMMENDATIONS: Clinical correlation and short-term follow-up CT chest in 1-4 months depending upon the clinical presentation/course. All radiological studies reviewed                Code Status:  DNR-CCA    Electronically signed by Zaki Singh MD on 2/14/2022 at 2:20 PM     Copy sent to Dr. Derek Deleon MD    This note was created with the assistance of a speech-recognition program.  Although the intention is to generate a document that actually reflects the content of the visit, no guarantees can be provided that every mistake has been identified and corrected by editing. Note was updated later by me after  physical examination and  completion of the assessment.

## 2022-02-14 NOTE — PROGRESS NOTES
Section of Cardiology  Progress Note      Date:  2/14/2022  Patient: Rogers Jensen  Admission:  1/16/2022 11:51 AM  Admit DX: Hypokalemia [E87.6]  Hypomagnesemia [E83.42]  COPD exacerbation (HCC) [J44.1]  BLANCHE (acute kidney injury) ([facility] [N17.9]  Acute respiratory failure with hypoxia (HCC) [J96.01]  Acute on chronic systolic CHF (congestive heart failure) ([facility] [I50.23]  COVID [U07.1]  COVID-19 [U07.1]  Age:  76 y.o., 1945     LOS: 29 days     Reason for evaluation:   atrial fibrillation      SUBJECTIVE:     The patient was seen and examined. Notes and labs reviewed. Pt remains intubated/sedated. Still on high ventilation requirements  Still full code. OBJECTIVE:      EXAM:   Vitals:    VITALS:  BP (!) 96/56   Pulse 71   Temp 98.6 °F (37 °C) (Axillary)   Resp 24   Ht 5' 10\" (1.778 m)   Wt 233 lb 3.2 oz (105.8 kg)   SpO2 91%   BMI 33.46 kg/m²   24HR INTAKE/OUTPUT:      Intake/Output Summary (Last 24 hours) at 2/14/2022 0913  Last data filed at 2/14/2022 0800  Gross per 24 hour   Intake 4733.14 ml   Output 750 ml   Net 3983.14 ml       Physical Exam  Vitals and nursing note reviewed. Constitutional:       Comments: Intubated/sedated. More comfortable today   Neck:      Vascular: No carotid bruit. Cardiovascular:      Rate and Rhythm: Normal rate and regular rhythm. Pulses: Normal pulses. Heart sounds: Normal heart sounds. No murmur heard. Pulmonary:      Effort: Pulmonary effort is normal.      Breath sounds: Normal breath sounds. Comments: Intubated. Respiratory coarse breath sounds. Equal entry  Abdominal:      General: Abdomen is flat. Palpations: Abdomen is soft. Comments: PEG tube in situ   Musculoskeletal:      Right lower leg: Edema present. Left lower leg: Edema present. Skin:     General: Skin is warm and dry.       Comments: Dusky/purple toes peripherally   Neurological:      Comments: Intubated/sedated   Psychiatric:      Comments: Intubated/sedated         Current Inpatient Medications:   meropenem  1,000 mg IntraVENous Q8H    linezolid  600 mg IntraVENous Q12H    sodium chloride  80 mL IntraVENous Once    sucralfate  1 g Oral 4 times per day    amLODIPine  5 mg Oral BID    insulin lispro  0-18 Units SubCUTAneous TID WC    insulin lispro  0-9 Units SubCUTAneous Nightly    amiodarone  200 mg Per NG tube Daily    insulin glargine  15 Units SubCUTAneous BID    Vitamin D  1,000 Units Oral Daily    polyethylene glycol  17 g Per NG tube Daily    levalbuterol  1.25 mg Nebulization Q6H    bisacodyl  10 mg Rectal Daily    pantoprazole  40 mg IntraVENous Daily    And    sodium chloride (PF)  10 mL IntraVENous Daily    aspirin  324 mg Oral Daily    levETIRAcetam  500 mg Oral BID    chlorhexidine  15 mL Mouth/Throat BID    enoxaparin  30 mg SubCUTAneous BID    QUEtiapine  50 mg Oral Once    budesonide  0.5 mg Nebulization BID    sodium chloride flush  5-40 mL IntraVENous 2 times per day    atorvastatin  20 mg Oral Daily       IV Infusions (if any):   phenylephrine (KEARA-SYNEPHRINE) 50mg/250mL infusion 15 mcg/min (02/14/22 0658)    dextrose      dexmedetomidine (PRECEDEX) IV infusion 1.2 mcg/kg/hr (02/14/22 0651)    fentaNYL 50 mcg/hr (02/13/22 2120)    propofol Stopped (02/09/22 1030)    midazolam (VERSED) 1 mg/mL in D5W infusion Stopped (02/13/22 0905)    sodium chloride         Diagnostics:   Telemetry: NSR/Sinus Thai    ECHO 2/11/2022  Technically difficult study  Mild to moderately increased LV wall thickness  Left ventricle is normal in size. Difficult to assess for wall motion  abnormalities  Global left ventricular systolic function is normal with an estimated  ejection fraction of > 55% . RV appears mildly dilated with reduced function. RVSP 37 mmHg  Left atrium is mildly dilated. Right atrium is mildly dilated . Aortic valve leaflets are mildly thickened. No aortic stenosis. No aortic insufficiency.   Trivial pericardial effusion. IVC not well visualized    Labs:   CBC:  Recent Labs     02/13/22  0620 02/14/22  0443   WBC 10.8 7.9   HGB 11.7* 11.9*   HCT 39.9* 39.8*    144     Magnesium:  Recent Labs     02/13/22  0620 02/14/22  0443   MG 1.9 1.9     BMP:  Recent Labs     02/13/22  0620 02/14/22  0443    138   K 4.6 4.3   CALCIUM 8.7 9.0   CO2 21 22   BUN 38* 42*   CREATININE 0.78 0.74   LABGLOM >60 >60   GLUCOSE 255* 228*     BNP:No results for input(s): BNP, PROBNP in the last 72 hours. PT/INR:No results for input(s): PROTIME, INR in the last 72 hours. APTT:No results for input(s): APTT in the last 72 hours. CARDIAC ENZYMES:No results for input(s): MYOGLOBIN, CKTOTAL, CKMB, CKMBINDEX, TROPHS, TROPONINT in the last 72 hours. FASTING LIPID PANEL:  Lab Results   Component Value Date    HDL 30 11/03/2021    TRIG 181 11/03/2021     LIVER PROFILE:  Recent Labs     02/12/22  0600   AST 39   ALT 36   LABALBU 1.6*   ALKPHOS 105   BILITOT 0.37   BILIDIR 0.21   IBILI 0.16   PROT 5.0*   GLOB NOT REPORTED   ALBUMIN NOT REPORTED        ASSESSMENT:  · Paroxysmal Atrial Fibrillation - now in sinus  · Hx of CHD  · HTN  · COVID 19 PNA - managed by others  · Chronic Kidney Disease  · COPD  · Acute Hypoxic Respiratory Failure - intubated  · Hypotension with Wide Pulse Pressure    PLAN:  1. Continue current medications. 2. Cont. Amio 200mg PO/NG daily  3. Cont. Treatment of COVID/pneumonia  4. Supportive management otherwise. Continue goals of care with family as patient as very poor prognosis at this time  Continue supportive therapy. Patient is not a candidate for tracheostomy as per nursing staff.      Discussed with Juan Luis Peck MD

## 2022-02-14 NOTE — CARE COORDINATION
Discharge planning:    Patient remains intubated. FIO2 70 and peep of 14. Surgery is following for trach placement, when medically appropriate. Paient is febrile and has labored breathing. Palliative care to speak with family today.

## 2022-02-14 NOTE — PROGRESS NOTES
Pt noted to take a long time to recover his SpO2 after being repositioned today. Also his left radial arterial line is positional and been in since January 20th. Will update Dr Laurita Baumgarten when he rounds.

## 2022-02-14 NOTE — PROGRESS NOTES
Pulmonary Critical Care Progress Note       Patient seen for the follow up of COVID-19 pneumonia, acute hypoxic respiratory failure. Subjective:  He sedated with Precedex and fentanyl  50 mcg/hr on the ventilator with increased oxygen requirements 100%, currently at 70% FiO2/PEEP 12 tidal volume 550 RR 26 . He is on 25 mics of Luke-Synephrine. He is off amiodarone drip. He has tolerated tube feeds at 50 mils an hour. Blood pressure stable. .    Examination:  Vitals: BP (!) 140/61   Pulse 78   Temp 98.5 °F (36.9 °C) (Axillary)   Resp 26   Ht 5' 10\" (1.778 m)   Wt 242 lb (109.8 kg)   SpO2 93%   BMI 34.72 kg/m²   General appearance: Sedated, intubated on ventilator  Neck: No JVD  Lungs decreased breath sound no crackles or wheezing  Heart: regular rate and rhythm, S1, S2 normal, no gallop  Abdomen: Soft, non tender, + BS  Extremities: no cyanosis or clubbing. 3+ edema    LABs:  CBC:   Recent Labs     02/13/22  0620 02/14/22 0443   WBC 10.8 7.9   HGB 11.7* 11.9*   HCT 39.9* 39.8*    144     BMP:   Recent Labs     02/13/22  0620 02/14/22  0443    138   K 4.6 4.3   CO2 21 22   BUN 38* 42*   CREATININE 0.78 0.74   LABGLOM >60 >60   GLUCOSE 255* 228*     ABG:  Lab Results   Component Value Date    FCX6GHG NOT REPORTED 02/14/2022    FIO2 90.0 02/14/2022       Lab Results   Component Value Date    POCPH 7.351 02/14/2022    POCPCO2 49.0 02/14/2022    POCPO2 170.0 02/14/2022    POCHCO3 27.1 02/14/2022    PBEA 1 02/14/2022    FQG1AWZ NOT REPORTED 02/14/2022    HDSZ2GKF 99 02/14/2022    FIO2 90.0 02/14/2022     Radiology:  Chest x-ray 2/13  1. Unchanged position of support devices.     2. No significant change in bilateral airspace disease        X-ray abdomen 2/13  Pneumoperitoneum is not visualized on current radiograph.       CT chest     1. No evidence of pulmonary embolism   2.  Interval development of extensive airspace disease within the lower lobes   bilaterally.  Differential considerations would include aspiration changes,   bacterial pneumonia, and atelectasis   3. Diffuse interstitial pulmonary fibrosis, with worsening of the   reticulonodular changes, suggesting superimposed infection or edema   4. Small amount of pneumoperitoneum present within the upper abdomen. Correlation with the dedicated CT scan abdomen pelvis recommended. 5. Cardiomegaly, with extensive coronary arterial calcifications   6. Small bilateral pleural effusions             CT abdomen pelvis  Status post gastrostomy tube with tip in the upper stomach.  There is   development of mild pneumoperitoneum seen along the anterior aspect of the   upper abdomen along the hepatic margin.  It is not clear whether the free air   is associated of the recent gastrostomy placement.       Recommend surgical consultation       Slightly more pronounced pelvic and abdominal ascites when compared to the   prior exam       Stable anasarca               :  CT brain  No acute intracranial abnormality. 2. Microangiopathic change     Impression:  · Acute on chronic hypoxic respiratory failure  · COVID-19 pneumonia/ARDS  · COPD/pulmonary emphysema  · Acute left thalamic infarct/CVA  · Left lower lobe opacity versus nodule  · Pulmonary edema  · Elevated D-dimer, decreased platelets  · Systolic heart failure, s/p AICD placement  · BLANCHE on CKD  · Diabetes mellitus    Recommendations:  · Continue vent support, wean FiO2 as tolerate keep keep PEEP  14  · Fentanyl drip RASS -2  · Continue precedex drip RASS -2  · Add Versed drip if needed to RASS -2  · Will consider Nimbex  · Xopenex by nebulizer  · Pulmicort 0.5 every 12 hours  · Monitor blood sugars off insulin drip  · Wean off Luke-Synephrine, keep map  65 to 75 mmHg  · Amiodarone per cardiology.     · Out of Airborne isolation  · IV Decadron 6 mg daily  · Linezolid and meropenem per ID  · Not a candidate for Actemra/baricitinib stopped secondary to cytopenias  · Neurology on consult  · Doppler lower and upper extremity  · Lasix Lasix 40 IV every 8 hours  · Nephology on consult/on free water flushes  · Tube feeds as tolerated  · GI prophylaxis, Protonix  · Discussed with RN and RT  · General surgery following for tracheostomy  · DVT prophylaxis Lovenox 30 twice daily    Electronically signed by     Ronaldo Aschoff, MD on 2/14/2022 at 3:52 PM  Pulmonary Critical Care and Sleep Medicine,  [facility] [place]: [phone]  Cc: 35 minutes

## 2022-02-14 NOTE — PROGRESS NOTES
Right TLC drsg changed since he was repositioned drsg is not intact to skin. He tolerated drsg change well. Will continue to monitor.

## 2022-02-14 NOTE — PLAN OF CARE
Problem: Airway Clearance - Ineffective  Goal: Achieve or maintain patent airway  2/14/2022 0337 by Crystal Kwok RN  Outcome: Ongoing  2/13/2022 1525 by Destiny Martínez RN  Outcome: Ongoing     Problem: Gas Exchange - Impaired  Goal: Absence of hypoxia  2/14/2022 0337 by Crystal Kwok RN  Outcome: Ongoing  2/13/2022 1525 by Destiny Martínez RN  Outcome: Ongoing  Goal: Promote optimal lung function  2/13/2022 1525 by Destiny Martínez RN  Outcome: Ongoing     Problem: Breathing Pattern - Ineffective  Goal: Ability to achieve and maintain a regular respiratory rate  2/14/2022 0337 by Crystal Kwok RN  Outcome: Ongoing  2/13/2022 1525 by Destiny Martínez RN  Outcome: Ongoing     Problem:  Body Temperature -  Risk of, Imbalanced  Goal: Complications related to the disease process, condition or treatment will be avoided or minimized  2/14/2022 0337 by Crystal Kwok RN  Outcome: Ongoing  2/13/2022 1525 by Destiny Martínez RN  Outcome: Ongoing     Problem: Nutrition Deficits  Goal: Optimize nutritional status  2/14/2022 0337 by Crystal Kwok RN  Outcome: Ongoing  2/13/2022 1525 by Destiny Martínez RN  Outcome: Ongoing     Problem: Risk for Fluid Volume Deficit  Goal: Maintain normal heart rhythm  2/14/2022 0337 by Crystal Kwok RN  Outcome: Ongoing  2/13/2022 1525 by Destiny Martínez RN  Outcome: Ongoing  Goal: Maintain absence of muscle cramping  2/14/2022 0337 by Crystal Kwok RN  Outcome: Ongoing  2/13/2022 1525 by Destiny Martínez RN  Outcome: Ongoing  Goal: Maintain normal serum potassium, sodium, calcium, phosphorus, and pH  2/14/2022 0337 by Crystal Kwok RN  Outcome: Ongoing  2/13/2022 1525 by Destiny Martínez RN  Outcome: Ongoing     Problem: Loneliness or Risk for Loneliness  Goal: Demonstrate positive use of time alone when socialization is not possible  2/13/2022 1525 by Destiny Martínez RN  Outcome: Ongoing     Problem: Fatigue  Goal: Verbalize increase energy and improved vitality  2/13/2022 1525 by Zayra Whyte RN  Outcome: Ongoing     Problem: Patient Education: Go to Patient Education Activity  Goal: Patient/Family Education  2/13/2022 1525 by Zayra Whyte RN  Outcome: Ongoing     Problem: Falls - Risk of:  Goal: Will remain free from falls  Description: Will remain free from falls  2/13/2022 1525 by Zayra Whyte RN  Outcome: Ongoing  Goal: Absence of physical injury  Description: Absence of physical injury  2/13/2022 1525 by Zayra Whyte RN  Outcome: Ongoing     Problem: Infection:  Goal: Will remain free from infection  Description: Will remain free from infection  2/14/2022 0337 by Kaylene Mancilla RN  Outcome: Ongoing  2/13/2022 1525 by Zayra Whyte RN  Outcome: Ongoing     Problem: Safety:  Goal: Free from accidental physical injury  Description: Free from accidental physical injury  2/13/2022 1525 by Zayra Whyte RN  Outcome: Ongoing  Goal: Free from intentional harm  Description: Free from intentional harm  2/13/2022 1525 by Zayra Whyte RN  Outcome: Ongoing     Problem: Daily Care:  Goal: Daily care needs are met  Description: Daily care needs are met  2/13/2022 1525 by Zayra Whyte RN  Outcome: Ongoing     Problem: Pain:  Goal: Patient's pain/discomfort is manageable  Description: Patient's pain/discomfort is manageable  2/13/2022 1525 by Zayra Whyte RN  Outcome: Ongoing     Problem: Skin Integrity:  Goal: Skin integrity will stabilize  Description: Skin integrity will stabilize  2/13/2022 1525 by Zayra Whyte RN  Outcome: Ongoing     Problem: Discharge Planning:  Goal: Patients continuum of care needs are met  Description: Patients continuum of care needs are met  2/13/2022 1525 by Zayra Whyte RN  Outcome: Ongoing     Problem: Nutrition  Goal: Optimal nutrition therapy  2/13/2022 1525 by Zayra Whyte RN  Outcome: Ongoing     Problem: Skin Integrity:  Goal: Will show no infection signs and symptoms  Description: Will show no infection signs and symptoms  2/13/2022 1525 by Yuniel Walter RN  Outcome: Ongoing  Goal: Absence of new skin breakdown  Description: Absence of new skin breakdown  2/13/2022 1525 by Yuniel Walter RN  Outcome: Ongoing

## 2022-02-14 NOTE — PROGRESS NOTES
Comprehensive Nutrition Assessment    Type and Reason for Visit:  Reassess    Nutrition Recommendations/Plan:   1. Diet NPO  2. Continue Nepro at 50 mL/hr (1200 mL/hr)  3. Monitor tube feeding rate/ tolerance, labs, vent status and GI function    Nutrition Assessment:  Patient remains intubated and sedated. Vent setting have been increasing over the past days. Vent setting are to high for trach at this time. Tube feeding is at goal rate and tolerated well. Will monitor tube feeding rate and tolerance. Malnutrition Assessment:  Malnutrition Status: At risk for malnutrition (Comment)      Estimated Daily Nutrient Needs:  Energy (kcal):  7832-3015 kcal (MSJ 1.2-1.3 factor0; Weight Used for Energy Requirements:  Current     Protein (g):  102-110 gm (1.3-1.4 gm/kg); Weight Used for Protein Requirements:  Ideal          Nutrition Related Findings:  Edema: +3 pitting BUE, +3 pitting BLE. Bowel sounds: hypoactive- LUQ and RUQ; active LLQ and RLQ. Fevers      Wounds:  None       Current Nutrition Therapies:    Diet NPO  ADULT TUBE FEEDING; Orogastric; Renal Formula; Continuous; 10; Yes; 10; Q 6 hours; 50; 250; Q 4 hours    Anthropometric Measures:  · Height: 5' 10\" (177.8 cm)  · Current Body Weight: 242 lb (109.8 kg)   · Admission Body Weight: 184 lb 15.5 oz (83.9 kg)    · Usual Body Weight: 255 lb (115.7 kg) (Fluid retention)     · Ideal Body Weight: 166 lbs; % Ideal Body Weight 145.8 %   · BMI: 34.7  · Adjusted Body Weight:  ; No Adjustment   · BMI Categories: Obese Class 1 (BMI 30.0-34. 9)       Nutrition Diagnosis:   · Inadequate protein-energy intake related to inadequate protein-energy intake,impaired respiratory function as evidenced by NPO or clear liquid status due to medical condition,intubation,nutrition support - enteral nutrition    Nutrition Interventions:   Food and/or Nutrient Delivery:  Continue NPO,Continue Current Tube Feeding  Nutrition Education/Counseling:  Education not indicated   Coordination of Nutrition Care:  Continue to monitor while inpatient    Goals:  EN support to provide >75% of estimated nutritional needs       Nutrition Monitoring and Evaluation:   Behavioral-Environmental Outcomes:  None Identified   Food/Nutrient Intake Outcomes:  Enteral Nutrition Intake/Tolerance  Physical Signs/Symptoms Outcomes:  Biochemical Data,Skin,Weight,GI Status     Discharge Planning:     Too soon to determine         Rae CONNN, RDN, LDN  Lead Clinical Dietitian  RD Office Phone [phone]

## 2022-02-15 NOTE — PROGRESS NOTES
Pulmonary Critical Care Progress Note       Patient seen for the follow up of COVID-19 pneumonia, acute hypoxic respiratory failure. Subjective:  He sedated with Precedex and fentanyl  50 mcg/hr on the ventilator with 70% FiO2/PEEP 14  tidal volume 550 RR 26 . He is on 20  mics of Luke-Synephrine. He had increased heart rate today with A. fib RVR and was placed back on amiodarone drip. He has tolerated tube feeds at 50 mils an hour. Blood pressure stable. Dina Brittle He opens eyes but does not follow commands. Examination:  Vitals: /70   Pulse 118   Temp 98.6 °F (37 °C) (Oral)   Resp 24   Ht 5' 10\" (1.778 m)   Wt 242 lb (109.8 kg)   SpO2 100%   BMI 34.72 kg/m²   General appearance: Sedated, intubated on ventilator  Neck: No JVD  Lungs decreased breath sound no crackles or wheezing  Heart: regular rate and rhythm, S1, S2 normal, no gallop  Abdomen: Soft, non tender, + BS  Extremities: no cyanosis or clubbing. 3+ edema    LABs:  CBC:   Recent Labs     02/14/22  0443 02/15/22  0525   WBC 7.9 5.0   HGB 11.9* 11.2*   HCT 39.8* 37.1*    119*     BMP:   Recent Labs     02/14/22  0443 02/15/22  0525    141   K 4.3 3.7   CO2 22 24   BUN 42* 43*   CREATININE 0.74 0.73   LABGLOM >60 >60   GLUCOSE 228* 211*     ABG:  Lab Results   Component Value Date    WRG1DFQ NOT REPORTED 02/15/2022    FIO2 75.0 02/15/2022       Lab Results   Component Value Date    POCPH 7.349 02/15/2022    POCPCO2 52.2 02/15/2022    POCPO2 77.1 02/15/2022    POCHCO3 28.8 02/15/2022    PBEA 2 02/15/2022    LIJ8IIL NOT REPORTED 02/15/2022    PNER2PRZ 94 02/15/2022    FIO2 75.0 02/15/2022     Radiology:  Chest x-ray 2/15  No significant interval change.  Diffuse interstitial and patchy hazy   opacities within the lungs. X-ray abdomen 2/13  Pneumoperitoneum is not visualized on current radiograph.       CT chest     1. No evidence of pulmonary embolism   2.  Interval development of extensive airspace disease within the lower lobes   bilaterally.  Differential considerations would include aspiration changes,   bacterial pneumonia, and atelectasis   3. Diffuse interstitial pulmonary fibrosis, with worsening of the   reticulonodular changes, suggesting superimposed infection or edema   4. Small amount of pneumoperitoneum present within the upper abdomen. Correlation with the dedicated CT scan abdomen pelvis recommended. 5. Cardiomegaly, with extensive coronary arterial calcifications   6. Small bilateral pleural effusions             CT abdomen pelvis  Status post gastrostomy tube with tip in the upper stomach.  There is   development of mild pneumoperitoneum seen along the anterior aspect of the   upper abdomen along the hepatic margin.  It is not clear whether the free air   is associated of the recent gastrostomy placement.       Recommend surgical consultation       Slightly more pronounced pelvic and abdominal ascites when compared to the   prior exam       Stable anasarca               :  CT brain  No acute intracranial abnormality. 2. Microangiopathic change     Impression:  · Acute on chronic hypoxic respiratory failure  · COVID-19 pneumonia/ARDS  · COPD/pulmonary emphysema  · Acute left thalamic infarct/CVA  · Left lower lobe opacity versus nodule  · Pulmonary edema  · Elevated D-dimer, decreased platelets  · Systolic heart failure, s/p AICD placement  · BLANCHE on CKD  · Diabetes mellitus    Recommendations:  · Continue vent support, wean FiO2 as tolerate keep keep PEEP  14  · Fentanyl drip RASS -2  · Continue precedex drip RASS -2  · Add Versed drip if needed to RASS -2  · Xopenex by nebulizer  · Pulmicort 0.5 every 12 hours  · Monitor blood sugars off insulin drip  · Wean off Luke-Synephrine, keep map  65 to 75 mmHg  · Amiodarone drip per cardiology.     · Out of Airborne isolation  · IV Decadron 6 mg daily  · Linezolid and meropenem per ID  · Not a candidate for Actemra/baricitinib stopped secondary to cytopenias  · Neurology on consult  · Doppler lower and upper extremity  ·  Lasix 40 IV every 8 hours  · Discussed with Nephology on consult/consider stopping free water flushes  · Tube feeds as tolerated  · GI prophylaxis, Protonix  · Discussed with RN and RT  · General surgery following for tracheostomy  · Discussed with daughter at bedside advised poor prognosis and prolonged ventilator support she will discuss with family  · DVT prophylaxis Lovenox 30 twice daily    Electronically signed by     Yossi Whitman MD on 2/15/2022 at 3:01 PM  Pulmonary Critical Care and Sleep Medicine,  [facility]: [phone]  Cc: 35 minutes

## 2022-02-15 NOTE — CONSULTS
[facility]  Consult Note       NAME:  Neida Contreras  MEDICAL RECORD NUMBER:  [id number]  AGE: 76 y.o. GENDER: male  : 1945  TODAY'S DATE:  2/15/2022    Subjective:      Neida Contreras is a 76 y.o. male with inpatient referral to [facility] for:  Sacral wound      Wound Identification:  Wound Type: pressure  Contributing Factors: diabetes, chronic pressure, decreased mobility, shear force, arterial insufficiency and incontinence of stool    Wound History: unknown, first documentation of buttock concerns 22, no documentation of right heel noted. The patient is intubated/sedated.   Current Wound Care Treatment: foam    Patient Goal of Care:  [x] Wound Healing  [] Odor Control  [] Palliative Care  [] Pain Control   [] Other:         PAST MEDICAL HISTORY        Diagnosis Date    Arthritis     Atherosclerotic plaque 2019    Cervical disc disease     degenerative disc disease    Diabetes mellitus ([address]     type 2    History of blood transfusion     Hx of blood clots     left leg    Hyperlipidemia     Neuropathy     Right kidney mass     \"urologist is watching\"    Snores     Type 2 diabetes mellitus treated with insulin ([facility] [address] 2019       PAST SURGICAL HISTORY    Past Surgical History:   Procedure Laterality Date    ABDOMINAL AORTIC ANEURYSM REPAIR  2005    CARPAL TUNNEL RELEASE Bilateral     CERVICAL SPINE SURGERY      COLONOSCOPY      benign polyps removed    INGUINAL HERNIA REPAIR Right     MS REPAIR INCISIONAL HERNIA,REDUCIBLE N/A 5/10/2017    HERNIA VENTRAL REPAIR WITH MESH  performed by Ashleigh Gould DO at [address] [place] [address] ENDOSCOPY N/A 2022    EGD   PEG TUBE INSERTION performed by Epi Mckeon MD at [facility]  05/10/2017    with mesh       FAMILY HISTORY    Family History   Problem Relation Age of Onset    Ovarian Cancer Sister    Herson Ovarian Cancer Sister        SOCIAL HISTORY    Social History     Tobacco Use    Smoking status: Former Smoker    Smokeless tobacco: Never Used    Tobacco comment: quit 30 years ago   Substance Use Topics    Alcohol use: No    Drug use: No         ALLERGIES    Allergies   Allergen Reactions    Barbiturates Anxiety     Other reaction(s): Unknown    Codeine Palpitations    Sulfa Antibiotics Rash     Other reaction(s): Unknown       HOME MEDICATIONS  Prior to Admission medications    Medication Sig Start Date End Date Taking? Authorizing Provider   rosuvastatin (CRESTOR) 40 MG tablet Take 40 mg by mouth every evening   Yes Historical Provider, MD   insulin NPH (HUMULIN N;NOVOLIN N) 100 UNIT/ML injection vial Inject 20 Units into the skin 2 times daily (before meals)   Yes Historical Provider, MD   levETIRAcetam (KEPPRA) 500 MG tablet Take 1 tablet by mouth 2 times daily 11/3/21  Yes Puma Youssef MD   venlafaxine (EFFEXOR XR) 150 MG extended release capsule Take 1 capsule by mouth daily (with breakfast) 7/29/19  Yes Arabella Kiser   vitamin B-1 (THIAMINE) 100 MG tablet Take 100 mg by mouth daily   Yes Historical Provider, MD   ferrous sulfate 325 (65 Fe) MG tablet Take 325 mg by mouth daily (with breakfast)   Yes Historical Provider, MD   ALPRAZolam (XANAX) 0.5 MG tablet Take 0.5 mg by mouth three times daily.    Yes Historical Provider, MD   furosemide (LASIX) 40 MG tablet Take 1 tablet by mouth 2 times daily 12/11/18  Yes Jitendra Evans MD   tiotropium (SPIRIVA RESPIMAT) 2.5 MCG/ACT AERS inhaler Inhale 2 puffs into the lungs daily    Yes Historical Provider, MD   albuterol sulfate  (90 Base) MCG/ACT inhaler Inhale 2 puffs into the lungs every 6 hours as needed for Wheezing or Shortness of Breath   Yes Historical Provider, MD   metoprolol succinate (TOPROL XL) 50 MG extended release tablet Take 50 mg by mouth daily   Yes Historical Provider, MD   Multiple Vitamins-Minerals (MULTIVITAMIN ADULT) TABS Take 1 tablet by mouth daily   Yes Historical Provider, MD   vitamin D (CHOLECALCIFEROL) 1000 UNIT TABS tablet Take 1,000 Units by mouth daily   Yes Historical Provider, MD   fluticasone (FLONASE) 50 MCG/ACT nasal spray 1 spray by Each Nare route daily as needed for Rhinitis   Yes Historical Provider, MD   aspirin 325 MG EC tablet Take 325 mg by mouth daily    Yes Historical Provider, MD   Omega-3 Fatty Acids (FISH OIL) 1000 MG CAPS Take 1 capsule by mouth daily    Yes Historical Provider, MD   amLODIPine (NORVASC) 5 MG tablet Take 5 mg by mouth nightly    Yes Historical Provider, MD   mirtazapine (REMERON) 45 MG tablet Take 45 mg by mouth nightly   Yes Historical Provider, MD       CURRENT MEDICATIONS:  Current Facility-Administered Medications   Medication Dose Route Frequency Provider Last Rate Last Admin    amiodarone (CORDARONE) 450 mg in dextrose 5 % 250 mL infusion  1 mg/min IntraVENous Continuous Irl Hodgkins, MD 33.3 mL/hr at 02/15/22 0930 1 mg/min at 02/15/22 0930    potassium phosphate 15 mmol in dextrose 5 % 250 mL IVPB  15 mmol IntraVENous Once [place] Salgadoocent, APRN - CNP 62.5 mL/hr at 02/15/22 1444 15 mmol at 02/15/22 1444    furosemide (LASIX) injection 40 mg  40 mg IntraVENous TID Johnathan Baum MD   40 mg at 02/15/22 1447    meropenem (MERREM) 1,000 mg in sodium chloride 0.9 % 100 mL IVPB (mini-bag)  1,000 mg IntraVENous Q8H Cornelia Melendrez MD   Stopped at 02/15/22 1411    linezolid (ZYVOX) IVPB 600 mg  600 mg IntraVENous Q12H Cornelia Melendrez MD   Stopped at 02/15/22 1109    sodium chloride flush 0.9 % injection 10 mL  10 mL IntraVENous PRN Alirio Underwood MD   10 mL at 02/12/22 1705    0.9 % sodium chloride bolus  80 mL IntraVENous Once Alirio Underwood MD        potassium chloride 20 mEq/50 mL IVPB (Central Line)  20 mEq IntraVENous PRN Yi Cordero MD   Stopped at 02/11/22 1313    potassium chloride 10 mEq/100 mL IVPB (Peripheral Line)  10 mEq IntraVENous PRN Harleen De La Torre MD        magnesium sulfate 1000 mg in dextrose 5% 100 mL IVPB  1,000 mg IntraVENous PRN Enid Posada MD        sucralfate (CARAFATE) tablet 1 g  1 g Oral 4 times per day Azeem Wellington MD   1 g at 02/15/22 1316    phenylephrine (KEARA-SYNEPHRINE) 50 mg in dextrose 5 % 250 mL infusion   mcg/min IntraVENous Continuous Nivia Quintana MD   Stopped at 02/14/22 1816    amLODIPine (NORVASC) tablet 5 mg  5 mg Oral BID Sophia Martinez MD   5 mg at 02/15/22 0803    insulin lispro (HUMALOG) injection vial 0-18 Units  0-18 Units SubCUTAneous TID WC Lizbeth Hamilton MD   6 Units at 02/15/22 1207    insulin lispro (HUMALOG) injection vial 0-9 Units  0-9 Units SubCUTAneous Nightly Lizbeth Hamilton MD   2 Units at 02/14/22 2210    glucose (GLUTOSE) 40 % oral gel 15 g  15 g Oral PRN Lizbeth Hamilton MD        glucagon (rDNA) injection 1 mg  1 mg IntraMUSCular PRN Ivon Jordan MD        dextrose 5 % solution  100 mL/hr IntraVENous PRN Ivon Jordan MD        dexmedetomidine (PRECEDEX) 1,000 mcg in sodium chloride 0.9 % 250 mL infusion  0.2-1.4 mcg/kg/hr IntraVENous Continuous Nivia Quintana MD 36.23 mL/hr at 02/15/22 1549 1.4 mcg/kg/hr at 02/15/22 1549    amiodarone (CORDARONE) tablet 200 mg  200 mg Per NG tube Daily Kiarra Kay MD   200 mg at 02/15/22 0759    insulin glargine (LANTUS) injection vial 15 Units  15 Units SubCUTAneous BID Windy Melgoza MD   15 Units at 02/15/22 0759    Vitamin D (CHOLECALCIFEROL) tablet 1,000 Units  1,000 Units Oral Daily Sophia Martinez MD   1,000 Units at 02/15/22 0759    fentaNYL 20 mcg/mL Infusion  50 mcg/hr IntraVENous Continuous Nivia Quintana MD 2.5 mL/hr at 02/15/22 1107 50 mcg/hr at 02/15/22 1107    polyethylene glycol (GLYCOLAX) packet 17 g  17 g Per NG tube Daily HERB Omalley - CNP   17 g at 02/13/22 0908    levalbuterol (XOPENEX) nebulizer solution 1.25 mg  1.25 mg Nebulization Q6H Kathleen Dotson MD   1.25 mg at 02/15/22 1453    sodium chloride nebulizer 0.9 % solution 3 mL  3 mL Nebulization Q8H PRN Carson Simon MD   3 mL at 02/13/22 1419    bisacodyl (DULCOLAX) suppository 10 mg  10 mg Rectal Daily Stephen Camps, APRN - CNP   10 mg at 02/13/22 0923    pantoprazole (PROTONIX) injection 40 mg  40 mg IntraVENous Daily Marquis Sintia MD   40 mg at 02/15/22 0759    And    sodium chloride (PF) 0.9 % injection 10 mL  10 mL IntraVENous Daily Marquis Sintia MD   10 mL at 02/15/22 0759    aspirin chewable tablet 324 mg  324 mg Oral Daily Marquis Sintia MD   324 mg at 02/15/22 0759    levETIRAcetam (KEPPRA) 100 MG/ML solution 500 mg  500 mg Oral BID Marquis Sintia MD   500 mg at 02/15/22 0800    propofol injection  5-50 mcg/kg/min IntraVENous Titrated Marquis Sintia MD   Stopped at 02/09/22 1030    midazolam (VERSED) 1 mg/mL in D5W infusion  1-10 mg/hr IntraVENous Continuous Marquis Sintia MD   Stopped at 02/13/22 0905    chlorhexidine (PERIDEX) 0.12 % solution 15 mL  15 mL Mouth/Throat BID Deja More MD   15 mL at 02/15/22 0900    enoxaparin (LOVENOX) injection 30 mg  30 mg SubCUTAneous BID Radha Chang MD   30 mg at 02/15/22 0759    LORazepam (ATIVAN) injection 1 mg  1 mg IntraVENous Q4H PRN Deja More MD   1 mg at 01/23/22 0618    QUEtiapine (SEROQUEL) tablet 50 mg  50 mg Oral Once Marivel Estrada MD        budesonide (PULMICORT) nebulizer suspension 500 mcg  0.5 mg Nebulization BID Carson Simon MD   500 mcg at 02/15/22 0800    dextrose bolus (hypoglycemia) 10% 125 mL  125 mL IntraVENous PRN Radha Chang MD   Stopped at 02/12/22 0825    Or    dextrose bolus (hypoglycemia) 10% 250 mL  250 mL IntraVENous PRN Ivon Jordan MD        sodium chloride flush 0.9 % injection 5-40 mL  5-40 mL IntraVENous 2 times per day Radha Chang MD   10 mL at 02/12/22 2200    sodium chloride flush 0.9 % injection 10 mL  10 mL IntraVENous PRN Ivon Jordan MD   10 mL at 02/12/22 1503    0.9 % sodium chloride infusion  25 mL IntraVENous PRN Christie Lucas MD        ondansetron (ZOFRAN-ODT) disintegrating tablet 4 mg  4 mg Oral Q8H PRN Christie Lucas MD        Or    ondansetron (ZOFRAN) injection 4 mg  4 mg IntraVENous Q6H PRN Christie Lucas MD   4 mg at 01/22/22 2058    acetaminophen (TYLENOL) tablet 650 mg  650 mg Oral Q6H PRN Christie Lucas MD   650 mg at 02/14/22 0846    Or    acetaminophen (TYLENOL) suppository 650 mg  650 mg Rectal Q6H PRN Christie Lucas MD   650 mg at 02/12/22 0842    atorvastatin (LIPITOR) tablet 20 mg  20 mg Oral Daily Christie Lucas MD   20 mg at 02/15/22 0759    hydrALAZINE (APRESOLINE) injection 10 mg  10 mg IntraVENous Q6H PRN Adebayo Grande MD   10 mg at 02/03/22 8591       Review of Systems      Objective:      /70   Pulse 79   Temp 98.6 °F (37 °C) (Oral)   Resp 24   Ht 5' 10\" (1.778 m)   Wt 242 lb (109.8 kg)   SpO2 97%   BMI 34.72 kg/m²       LABS    CBC:   Lab Results   Component Value Date    WBC 5.0 02/15/2022    RBC 4.56 02/15/2022    HGB 11.2 02/15/2022     SED RATE:   Lab Results   Component Value Date    SEDRATE 3 01/16/2022       CMP:  Albumin:    Lab Results   Component Value Date    LABALBU 1.6 02/12/2022     PT/INR:    Lab Results   Component Value Date    PROTIME 13.3 01/17/2022    INR 1.0 01/17/2022     HgBA1c:    Lab Results   Component Value Date    LABA1C 9.7 02/08/2022     PTT: No components found for: LABPTT      Assessment:     Physical Exam      Raulito Risk Score: Raulito Scale Score: 7    Patient Active Problem List   Diagnosis Code    Biventricular CHF (congestive heart failure) ([facility]) I50.82    CKD (chronic kidney disease) stage 3, GFR 30-59 ml/min ([facility]) N18.30    Essential hypertension I10    Blurred vision, right eye H53.8    Type 2 diabetes mellitus treated with insulin ([facility] [address] E11.9, Z79.4    Atherosclerotic plaque I70.90    Weakness on left side of face R29.810    Carotid stenosis, asymptomatic, bilateral I65.23    New onset seizure ([facility] [address] R56.9    COVID-19 U07.1    Acute on chronic systolic CHF (congestive heart failure) ([facility]) I50.23    Acute respiratory failure with hypoxia ([facility]) J96.01    BLANCHE (acute kidney injury) ([facility] [address] N17.9    COPD exacerbation ([facility]) J44.1    Hypokalemia E87.6    Hypomagnesemia E83.42    Palliative care encounter Z51.5    Goals of care, counseling/discussion Z71.89    DNR (do not resuscitate) discussion Z70.80    ACP (advance care planning) Z71.89    Constipation K59.00    Abdominal pain, generalized R10.84         Measurements:  Wound 02/14/22 Sacrum (Active)   Wound Image   02/15/22 1612   Wound Etiology Deep tissue/Injury 02/15/22 1612   Dressing Status New dressing applied; Old drainage noted 02/15/22 1612   Wound Cleansed Soap and water 02/15/22 1612   Dressing/Treatment Barrier film; Foam 02/15/22 1612   Dressing Change Due 02/18/22 02/15/22 1612   Wound Length (cm) 7.5 cm 02/15/22 1612   Wound Width (cm) 12 cm 02/15/22 1612   Wound Depth (cm) 0.1 cm 02/15/22 1612   Wound Surface Area (cm^2) 90 cm^2 02/15/22 1612   Wound Volume (cm^3) 9 cm^3 02/15/22 1612   Wound Assessment Purple/maroon;Pink/red 02/15/22 1612   Drainage Amount Moderate 02/15/22 1612   Drainage Description Serosanguinous 02/15/22 1612   Odor None 02/15/22 1612   Chen-wound Assessment Blanchable erythema 02/15/22 1612   Margins Attached edges 02/15/22 1612   Wound Thickness Description not for Pressure Injury Partial thickness 02/15/22 1300   Number of days: 1       Wound Heel Left (Active)   Wound Image   02/15/22 1612   Wound Etiology Deep tissue/Injury 02/15/22 1612   Dressing Status New dressing applied; Old drainage noted 02/15/22 1612   Wound Cleansed Cleansed with saline 02/15/22 1612   Dressing/Treatment Barrier film 02/15/22 1612   Offloading for Diabetic Foot Ulcers Offloading boot 02/15/22 1612   Dressing Change Due 02/16/22 02/15/22 1612   Wound Length (cm) 0.5 cm 02/15/22 1612   Wound Width (cm) 0.3 cm 02/15/22 1612 Wound Surface Area (cm^2) 0.15 cm^2 02/15/22 1612   Wound Assessment Purple/maroon 02/15/22 1612   Drainage Amount None 02/15/22 1612   Odor None 02/15/22 1612   Chen-wound Assessment Blanchable erythema 02/15/22 1612   Number of days:        WOUND DESCRIPTION:   Large area of deep tissue injury to sacrococcygeal area. Some skin is beginning to slough away. Noted purple discoloration. This area is likely to evolve. Will recommend foam protection for now as long as the dressing will stay on without tenting. The patient is quite diaphoretic and has had some bowel movements soil the dressing. Per PCT, he is no longer having liquid stool so foam may work- if not, use zinc paste and keep him turned side to side only. Also found a small area of non-blanchable purple discoloration on the left heel. Will recommend skin barrier wipe and offloading boots. Right foot is discolored purple- especially toes. They are quite cold. Recommend keeping warm to prevent further vasoconstriction. Response to treatment:  Well tolerated by patient. Plan:     Plan of Care:     -Sacrococcygeal wound care: Cleanse with foaming soap, pat dry. Apply skin barrier wipe to wound edges/perimeter. Apply foam dressing to cover. Change every 3 days and as needed if loose or soiled. If the dressing soils frequently due to diaphoresis or stool, remove and use zinc paste.    -Left heel wound care: use barrier film, cover with foam dressing.     -Use offloading boots at all times to offload heels    -Turn side to side only    -Ordering HALIMA surface    -Minimize layers under the patient so that the low air loss function of the bed can be optimized    -Turn every 2 hours    -Float heels off of bed with pillows under calves. If needed- use offloading boots.    -Routine incontinence care with foaming cleanser and zinc oxide cream. Apply zinc oxide cream twice daily and prn incontinence. Use moisture wicking under pad (one layer only).     Specialty Bed Required : Yes   [] Low Air Loss   [] Pressure Redistribution  [] Fluid Immersion  [] Bariatric  [] Total Pressure Relief  [] Other:     Current Diet: Diet NPO  ADULT TUBE FEEDING; Orogastric; Renal Formula; Continuous; 10; Yes; 10; Q 6 hours; 50; 350; Q 3 hours  Dietician consult:  Yes    Discharge Plan:  Placement for patient upon discharge: skilled nursing   Patient appropriate for Outpatient [address] [facility] [address] [facility] [address]: Yes    Patient/Caregiver Teaching:  Level of patientunderstanding able to:     [] Indicates understanding       [x] Needs reinforcement  [x] Unsuccessful      [] Verbal Understanding  [] Demonstrated understanding       [] No evidence of learning  [] Refused teaching         [] N/A       Electronically signed by Fabian Meyer RN on  2/15/2022 at 4:15 PM

## 2022-02-15 NOTE — PROGRESS NOTES
[facility]  Progress Note    Demetria Stevenson  MRN: [id number]  Date: 2/15/2022  LOS: 27     Reason for F/U :   COVID-19 virus infection    Impression :   COVID-19 virus infection tested + 1/16/2022  Acute respiratory failure currently ventilator dependent  Intubated 1/23/2022  Emphysema with worsening changes on imaging  Worsening acute interstitial lung disease and clinically not typical of COVID-19 virus infection  Worsening moderate to severe pulmonary artery hypertension  Bilateral lower extremity edema likely related to above  Leukopenia and thrombocytopenia  Lacunar  infarct on the left thalamus  Intermittent fevers  Acute kidney injury    Recommendations:   COVID-19 therapy: The patient was on Decadron 6 mg IV daily through 02/04/2022  The patient has been started on baricitinib 1/17/2022 but it was discontinued 1/18/2022 due to cytopenias. Actemra had been considered but again due to the cytopenias and thrombocytopenia this has been held. Antimicrobial therapy: The patient received Rocephin 1000 mg and azithromycin 500 mg on 1/16/2022  The patient received a dose of levofloxacin 500 mg on 1/18/2020. Patient started on cefepime and vancomycin 1/23/2022 plan completed a 7-day course of cefepime on 1/28/2022  Vancomycin discontinued due to acute kidney injury 1/24/2022  Continue intravenous antimicrobial therapy with meropenem and Zyvox for now started 2/12/2022     The patient did have a run of A. fib today. The patient continues on supportive therapy with 75% FiO2 and 14 of PEEP on the ventilator.   Continue supportive therapy    Infection Control Recommendations:   Droplet plus precautions    Discharge Planning:   Patient will need Midline Catheter Insertion/ PICC line Insertion: No  Patient will need: Home IV , [place],  SNF,  LTAC: Undetermined  Patient willneed outpatient wound care: No    Medical Decision making / Summary of Stay:   Demetria Stevenson is a 64y.o.-year-old male who was initially admitted on 1/16/2022. Zhanna Leyva has a history of diabetes mellitus type 2, essential hypertension, seizure disorder, chronic kidney disease stage III, hyperlipidemia among other medical problems.     The patient reports that about 1 to 2 months ago he had his diabetes medications changed and since that time he has noticed increasing swelling in his legs, hardness in the legs, difficulty ambulating, and weight gain from 178 to 255 pounds over the last 1 to 2 months. He did not report any subjective fevers, chills, cough or shortness of breath. He denies any loss of smell or change in taste. No abdominal pain nausea vomiting or diarrhea. The patient does report that he has home oxygen that he uses as needed at home and he reports that he hardly needs it. He is vaccinated did receive the J&J vaccine but has not yet received a booster dose.     The patient presented to the emergency department and was found to have leukopenia with a white blood cell count of 2.7 and thrombocytopenia with a platelet count of 584. Creatinine slightly elevated at 1.31 and proBNP is elevated at 9327. Chest x-ray showed hyperinflated lungs with cardiomegaly seen and diffuse increased interstitial markings in both lungs which may represent baseline chronic interstitial lung changes given that these findings were present before. A CT of the chest was done with IV contrast that showed no evidence of pulmonary embolism and compared to 2008 there is worsening underlying emphysema with worsening subacute or acute interstitial lung disease best seen in the left upper lobe. Findings were not felt typical for COVID-19 virus pneumonia. There was thickening and distortion of the left major fissure with an ill-defined masslike focus in the left lower lobe.   There is worsening moderate to severe pulmonary artery hypertension     COVID testing was positive and I was asked to evaluate and help with COVID-19 virus infection    The patient did have a CT scan of the abdomen pelvis done 2022 which was a limited study with no evidence of bowel obstruction and moderate stool burden noted felt related to constipation. Tiny amount of free fluid scattered in the abdomen, moderate pleural effusions and left basilar consolidation and basilar interstitial thickening increased from 2022    The patient is seen and evaluated at bedside he continues to have fevers up to 102.9 degrees     The patient had a CT of the head 2022 with no acute intracranial abnormality, CT of the chest that showed no evidence of pulmonary embolism but interval development of extensive airspace disease within the lower lobes bilaterally, diffuse interstitial pulmonary fibrosis and worsening of the reticulonodular changes suggesting superimposed infection or edema  And a CT of the abdomen and pelvis that showed patient was status post gastrostomy tube placement and development of mild pneumoperitoneum seen along the anterior aspect of the upper abdomen along with the hepatic margin. Slightly more pronounced pelvic and abdominal ascites compared to prior exam, stable anasarca. Repeat CT imaging imaging with findings suggestive of pneumonia as well as some fibrotic changes from the COVID pneumonia. I did start the patient on meropenem and Zyvox on 2022 due to elevated WBC, worsening infiltrates on chest x-ray    Current evaluation:2/15/2022    BP (!) 151/58   Pulse 75   Temp 98.5 °F (36.9 °C) (Axillary)   Resp 26   Ht 5' 10\" (1.778 m)   Wt 242 lb (109.8 kg)   SpO2 100%   BMI 34.72 kg/m²     Temperature Range: Temp: 98.5 °F (36.9 °C) Temp  Av.8 °F (37.1 °C)  Min: 98.3 °F (36.8 °C)  Max: 99.6 °F (37.6 °C)   The patient is seen and evaluated at bedside and is sedated on the ventilator with Precedex and fentanyl. He is on 75% FiO2 14 of PEEP. Continues to require Luke-Synephrine for blood pressure support. The patient did have A. fib with RVR was started on amiodarone. Plan times blood pressures have been labile. Review of Systems   Unable to perform ROS: Intubated       Physical Examination :     Physical Exam  Constitutional:       Appearance: He is well-developed. Interventions: He is sedated and intubated. HENT:      Head: Normocephalic and atraumatic. Cardiovascular:      Rate and Rhythm: Normal rate. Heart sounds: Normal heart sounds. No friction rub. No gallop. Pulmonary:      Effort: Pulmonary effort is normal. He is intubated. Breath sounds: Normal breath sounds. No wheezing. Abdominal:      General: Bowel sounds are normal.      Palpations: Abdomen is soft. There is no mass. Tenderness: There is no abdominal tenderness. Musculoskeletal:         General: Swelling (Bilateral upper extremity swelling) present. Cervical back: Neck supple. Lymphadenopathy:      Cervical: No cervical adenopathy. Skin:     General: Skin is warm and dry. Comments: There cyanotic changes in the legs and feet bilaterally related to the pressor support         Laboratory data:   I have independently reviewed the followinglabs:  CBC with Differential:   Recent Labs     02/14/22  0443 02/15/22  0525   WBC 7.9 5.0   HGB 11.9* 11.2*   HCT 39.8* 37.1*    119*   LYMPHOPCT 4* 4*   MONOPCT 5 3     BMP:   Recent Labs     02/14/22  0443 02/15/22  0525    141   K 4.3 3.7    107   CO2 22 24   BUN 42* 43*   CREATININE 0.74 0.73   MG 1.9 1.7     Hepatic Function Panel:   No results for input(s): PROT, LABALBU, BILIDIR, IBILI, BILITOT, ALKPHOS, ALT, AST in the last 72 hours.       Lab Results   Component Value Date    PROCAL 0.25 02/01/2022    PROCAL 0.24 01/23/2022    PROCAL 0.11 01/19/2022     Lab Results   Component Value Date    CRP 23.5 01/16/2022    CRP 2.0 07/27/2019     Lab Results   Component Value Date    SEDRATE 3 01/16/2022         Lab Results   Component Value Date    DDIMER 4.53 02/12/2022 DDIMER 2.96 01/30/2022    DDIMER 5.84 01/23/2022    DDIMER 1.53 01/18/2022    DDIMER 1.73 01/16/2022     Lab Results   Component Value Date    FERRITIN 569 01/16/2022    FERRITIN 50 07/23/2019    FERRITIN 18 07/08/2019     Lab Results   Component Value Date     01/16/2022     Lab Results   Component Value Date    FIBRINOGEN 402 01/16/2022       Results in Past 30 Days  Result Component Current Result Ref Range Previous Result Ref Range   SARS-CoV-2, Rapid DETECTED (A) (1/16/2022) Not Detected Not in Time Range      Lab Results   Component Value Date    COVID19 DETECTED 01/16/2022    COVID19 Not Detected 11/02/2021       No results for input(s): VANCOTROUGH in the last 72 hours. Imaging Studies:   ONE XRAY VIEW OF THE CHEST 2/13/2022 4:42 am  Impression   Stable appearance of the chest with diffuse reticular and ground-glass   opacities bilaterally. CT OF THE ABDOMEN AND PELVIS WITH CONTRAST 2/12/2022 2:03 pm  Impression   Status post gastrostomy tube with tip in the upper stomach.  There is   development of mild pneumoperitoneum seen along the anterior aspect of the   upper abdomen along the hepatic margin.  It is not clear whether the free air   is associated of the recent gastrostomy placement.       Recommend surgical consultation       Slightly more pronounced pelvic and abdominal ascites when compared to the   prior exam       Stable anasarca       Critical results were called by Dr. Siddhartha Mcneill MD to Dr Bharati Hogan on 2/12/2022 at 21:14. CTA OF THE CHEST 2/12/2022 12:03 pm  Impression   1. No evidence of pulmonary embolism   2. Interval development of extensive airspace disease within the lower lobes   bilaterally.  Differential considerations would include aspiration changes,   bacterial pneumonia, and atelectasis   3. Diffuse interstitial pulmonary fibrosis, with worsening of the   reticulonodular changes, suggesting superimposed infection or edema   4.  Small amount of pneumoperitoneum present within the upper abdomen. Correlation with the dedicated CT scan abdomen pelvis recommended. 5. Cardiomegaly, with extensive coronary arterial calcifications   6. Small bilateral pleural effusions   7.  Critical results were called by Dr. Alvin Hare to Dr Ranjana Schofield on   2/12/2022 at 5:45 p.m. hours. CT OF THE HEAD WITHOUT CONTRAST  2/12/2022 5:02 pm  Impression   1. No acute intracranial abnormality. 2. Microangiopathic change. ONE XRAY VIEW OF THE CHEST 2/12/2022 5:28 am    Impression   Bilateral lung airspace disease which has slightly progressed within the   right lower lobe.               CT OF THE ABDOMEN AND PELVIS WITHOUT CONTRAST 1/28/2022 8:34 am    Impression   1.  Overall mildly limited study due to motion and lack of contrast.       2.  No evidence of bowel obstruction.  Moderate stool burden is noted,   possibly related to constipation.  Enteric tube is in place with distal tip   in the proximal stomach.       3.  Tiny amount of free fluid scattered in the abdomen, including   presacral/perirectal fluid, most likely related to volume overload.  No   definite findings of rectal wall thickening or fecal impaction.       4.  Moderate pleural effusions, left basilar consolidation, and bibasilar   interstitial thickening, increased when compared to 01/16/2022.       RECOMMENDATIONS:   Unavailable         Veins: Lower Extremities DVT Study, Venous Scan Lower Bilateral.  Indications for Study: Leg Swelling . Patient Status: In Patient. Technical Quality: Limited visualization. Limitation reason: Edema. Comments: Simultaneous real time imaging utilizing B-Mode, color doppler and spectral waveform analysis was performed on the  bilateral lower extremities for venous examination of the deep and superficial systems. Conclusions  Summary  No evidence of superficial or deep venous thrombosis in both lower extremities.   Signature  Electronically signed by Malcolm Jacinto LARA Alvarez(Sonographer) on 01/19/2022 08:10 AM  Electronically signed by Marilu Brody physician) on 01/19/2022 06:21 PM      CT OF THE HEAD WITHOUT CONTRAST  1/18/2022 5:42 pm  Impression   New lacunar infarct left thalamus, age indeterminate. Vonzell Albino may be helpful.           Cultures:     Culture, Blood 1 [[id number]] Collected: 02/08/22 1559   Order Status: Completed Specimen: Blood Updated: 02/13/22 2041    Specimen Description . BLOOD    Special Requests RT ARTERY,10ML    Culture NO GROWTH 5 DAYS   Culture, Blood 1 [[id number]] Collected: 02/08/22 1549   Order Status: Completed Specimen: Blood Updated: 02/13/22 2035    Specimen Description . BLOOD    Special Requests RT HAND,10ML    Culture NO GROWTH 5 DAYS     Culture, Blood 1 [[id number]] Collected: 02/01/22 0003   Order Status: Completed Specimen: Blood Updated: 02/06/22 0830    Specimen Description . BLOOD    Special Requests ART LINE 20ML    Culture NO GROWTH 5 DAYS   Culture, Blood 1 [[id number]] Collected: 01/31/22 1853   Order Status: Completed Specimen: Blood Updated: 02/05/22 2315    Specimen Description . BLOOD    Special Requests RT HAND,10ML    Culture NO GROWTH 5 DAYS     Culture, Respiratory [[id number]] Collected: 01/28/22 1030   Order Status: Completed Specimen: Sputum, Suctioned Updated: 01/30/22 0932    Specimen Description . SUCTIONED SPUTUM    Special Requests NOT REPORTED    Direct Exam < 10 EPITHELIAL CELLS/LPF     <10 NEUTROPHILS/LPF     NO SIGNIFICANT PATHOGENS SEEN    Culture NORMAL RESPIRATORY PO HEAVY GROWTH       Culture, Blood 1 [[id number]] Collected: 01/23/22 1759   Order Status: Completed Specimen: Blood Updated: 01/28/22 2111    Specimen Description . BLOOD    Special Requests RT HAND 19ML    Culture NO GROWTH 5 DAYS   Culture, Blood 1 [[id number]] Collected: 01/23/22 1759   Order Status: Completed Specimen: Blood Updated: 01/28/22 2110    Specimen Description . BLOOD    Special Requests ART LINE 10ML    Culture NO GROWTH 5 DAYS     Culture, Blood 2 [[id number]] Collected: 01/21/22 0742   Order Status: Completed Specimen: Blood Updated: 01/26/22 1001    Specimen Description . BLOOD    Special Requests LEFT AC 20ML    Culture NO GROWTH 5 DAYS   Culture, Blood 1 [[id number]] Collected: 01/21/22 0750   Order Status: Completed Specimen: Blood Updated: 01/26/22 1000    Specimen Description . BLOOD    Special Requests LEFT HAND 5ML    Culture NO GROWTH 5 DAYS   MRSA DNA Probe, Nasal [[id number]] Collected: 01/23/22 2258   Order Status: Completed Specimen: Nasal Updated: 01/25/22 1038    Specimen Description . NASAL SWAB    MRSA, DNA, Nasal NEGATIVE    Comment: NEGATIVE:  MRSA DNA not detected by nucleic acid amplification.                                                     Results should be used as an adjunct to nosocomial control efforts to identify patients   needing enhanced precautions.     The test is not intended to identify patients with staphylococcal infections.  Results   should not be used to guide or monitor treatment for MRSA infections. Culture, Blood 1 [[id number]] Collected: 01/16/22 1220   Order Status: Completed Specimen: Blood Updated: 01/21/22 1521    Specimen Description . BLOOD    Special Requests LAC 12ML    Culture NO GROWTH 5 DAYS   Culture, Blood 1 [[id number]] Collected: 01/16/22 1205   Order Status: Completed Specimen: Blood Updated: 01/21/22 1521    Specimen Description . BLOOD    Special Requests RAC 12ML    Culture NO GROWTH 5 DAYS       Culture, Urine [[id number]] Collected: 01/16/22 1315   Order Status: Completed Specimen: Urine, clean catch Updated: 01/17/22 2128    Specimen Description . CLEAN CATCH URINE    Special Requests NOT REPORTED    Culture NO SIGNIFICANT GROWTH       COVID-19, Rapid [[id number]] (Abnormal) Collected: 01/16/22 1230   Order Status: Completed Specimen: Nasopharyngeal Swab Updated: 01/16/22 1314    Specimen Description . NASOPHARYNGEAL SWAB    SARS-CoV-2, Rapid DETECTED Abnormal     Comment:        Rapid NAAT: The specimen is POSITIVE for SARS-Cov-2, the novel coronavirus associated with   COVID-19.         This test has been authorized by the FDA under an Emergency Use Authorization (EUA) for use   by authorized laboratories.         The ID NOW COVID-19 assay is designed to detect the virus that causes COVID-19 in patients   with signs and symptoms of infection who are suspected of COVID-19. An individual without symptoms of COVID-19 and who is not shedding SARS-CoV-2 virus would   expect to have a negative (not detected) result in this assay. Fact sheet for Healthcare Providers: BuildHer.es   Fact sheet for Patients: BuildHer.es           Methodology: Isothermal Nucleic Acid Amplification         Results reported to the appropriate Health Department         Flu A/B Ag Detection [[id number]] Collected: 01/16/22 1230   Order Status: Completed Specimen: Nasopharyngeal Swab Updated: 01/16/22 1313    Specimen Description . NASOPHARYNGEAL SWAB    Special Requests NOT REPORTED    Direct Exam NEGATIVE for Influenza A + B antigens.                                PCR testing to confirm this result is available upon request. Demetrius Novak will be saved in the laboratory for 7 days.  Please call [phone] if PCR testing is indicated.      Medications:      amiodarone  150 mg IntraVENous Once    furosemide  40 mg IntraVENous TID    meropenem  1,000 mg IntraVENous Q8H    linezolid  600 mg IntraVENous Q12H    sodium chloride  80 mL IntraVENous Once    sucralfate  1 g Oral 4 times per day    amLODIPine  5 mg Oral BID    insulin lispro  0-18 Units SubCUTAneous TID WC    insulin lispro  0-9 Units SubCUTAneous Nightly    amiodarone  200 mg Per NG tube Daily    insulin glargine  15 Units SubCUTAneous BID    Vitamin D  1,000 Units Oral Daily    polyethylene glycol  17 g Per NG tube Daily    levalbuterol  1.25 mg Nebulization Q6H    bisacodyl  10 mg Rectal Daily    pantoprazole  40 mg IntraVENous Daily    And    sodium chloride (PF)  10 mL IntraVENous Daily    aspirin  324 mg Oral Daily    levETIRAcetam  500 mg Oral BID    chlorhexidine  15 mL Mouth/Throat BID    enoxaparin  30 mg SubCUTAneous BID    QUEtiapine  50 mg Oral Once    budesonide  0.5 mg Nebulization BID    sodium chloride flush  5-40 mL IntraVENous 2 times per day    atorvastatin  20 mg Oral Daily           [facility]  Nena Mckeon MD  [employer]  OFFICE: [phone]      Electronically signed by Nena Mckeon MD on 2/15/2022 at 9:10 AM  Thank you for allowing us to participate in the care of this patient. Please call with questions. This note iscreated with the assistance of a speech recognition program.  While intending to generate a document that actually reflects the content of the visit, the document can still have some errors including those of syntax andsound a like substitutions which may escape proof reading. In such instances, actual meaning can be extrapolated by contextual diversion.

## 2022-02-15 NOTE — PROGRESS NOTES
.  Nephrology  Progress Note    Reason for Consult: Hyperkalemia    Requesting Physician:  Luna Bello MD    INTERVAL HISTORY:  Remains sedated on mechanical ventilation FiO2 70%  Phos low  Potassium stable. Serum Na 141, corrected Na 143. Vasopressors restarted today  A. Fib RVR requiring amio gtt. HISTORY OF PRESENT ILLNESS:    The patient is a 76 y.o. male who presented with to the hospital for worsening shortness of breath ad and worsening lower extremity edema on 1/16. He had diffuse body aches and sweats and a dry cough. He tested positive for COVID-19. He has steadily declined since admission. On 1/18 a CT head found New lacunar infarct left thalamus. He had to be intubated on 1/23. He has a significant past medical history of COPD, hyperlipidemia, Type 2 DM, Right kidney mass, and abdominal aortic aneurysm repair in 2005. His potassium has been steadily rising since 1/24/22. The most current Potassium level is 5.8. His creatine is improved to 1.71. This information was retrieved through chart review and nursing. Patient was unable to provide information due to current status on mechanical ventilation and sedation. Review Of Systems:  Unable to obtain due to patient's current clinical condition. Remains on mechanical ventilation and sedated. Past Medical History:   Diagnosis Date    Arthritis     Atherosclerotic plaque 7/28/2019    Cervical disc disease     degenerative disc disease    Diabetes mellitus ([facility]     type 2    History of blood transfusion     Hx of blood clots     left leg    Hyperlipidemia     Neuropathy     Right kidney mass     \"urologist is watching\"    Snores     Type 2 diabetes mellitus treated with insulin ([facility] 7/28/2019       Prior to Admission medications    Medication Sig Start Date End Date Taking?  Authorizing Provider   rosuvastatin (CRESTOR) 40 MG tablet Take 40 mg by mouth every evening   Yes Historical Provider, MD   insulin NPH (HUMULIN N; NOVOLIN N) 100 UNIT/ML injection vial Inject 20 Units into the skin 2 times daily (before meals)   Yes Historical Provider, MD   levETIRAcetam (KEPPRA) 500 MG tablet Take 1 tablet by mouth 2 times daily 11/3/21  Yes Javi Fuentes MD   venlafaxine (EFFEXOR XR) 150 MG extended release capsule Take 1 capsule by mouth daily (with breakfast) 7/29/19  Yes Arabella Kiser   vitamin B-1 (THIAMINE) 100 MG tablet Take 100 mg by mouth daily   Yes Historical Provider, MD   ferrous sulfate 325 (65 Fe) MG tablet Take 325 mg by mouth daily (with breakfast)   Yes Historical Provider, MD   ALPRAZolam (XANAX) 0.5 MG tablet Take 0.5 mg by mouth three times daily.    Yes Historical Provider, MD   furosemide (LASIX) 40 MG tablet Take 1 tablet by mouth 2 times daily 12/11/18  Yes Harish Evans MD   tiotropium (SPIRIVA RESPIMAT) 2.5 MCG/ACT AERS inhaler Inhale 2 puffs into the lungs daily    Yes Historical Provider, MD   albuterol sulfate  (90 Base) MCG/ACT inhaler Inhale 2 puffs into the lungs every 6 hours as needed for Wheezing or Shortness of Breath   Yes Historical Provider, MD   metoprolol succinate (TOPROL XL) 50 MG extended release tablet Take 50 mg by mouth daily   Yes Historical Provider, MD   Multiple Vitamins-Minerals (MULTIVITAMIN ADULT) TABS Take 1 tablet by mouth daily   Yes Historical Provider, MD   vitamin D (CHOLECALCIFEROL) 1000 UNIT TABS tablet Take 1,000 Units by mouth daily   Yes Historical Provider, MD   fluticasone (FLONASE) 50 MCG/ACT nasal spray 1 spray by Each Nare route daily as needed for Rhinitis   Yes Historical Provider, MD   aspirin 325 MG EC tablet Take 325 mg by mouth daily    Yes Historical Provider, MD   Omega-3 Fatty Acids (FISH OIL) 1000 MG CAPS Take 1 capsule by mouth daily    Yes Historical Provider, MD   amLODIPine (NORVASC) 5 MG tablet Take 5 mg by mouth nightly    Yes Historical Provider, MD   mirtazapine (REMERON) 45 MG tablet Take 45 mg by mouth nightly   Yes Historical Provider, MD       Scheduled Meds:   furosemide  40 mg IntraVENous TID    meropenem  1,000 mg IntraVENous Q8H    linezolid  600 mg IntraVENous Q12H    sodium chloride  80 mL IntraVENous Once    sucralfate  1 g Oral 4 times per day    amLODIPine  5 mg Oral BID    insulin lispro  0-18 Units SubCUTAneous TID WC    insulin lispro  0-9 Units SubCUTAneous Nightly    amiodarone  200 mg Per NG tube Daily    insulin glargine  15 Units SubCUTAneous BID    Vitamin D  1,000 Units Oral Daily    polyethylene glycol  17 g Per NG tube Daily    levalbuterol  1.25 mg Nebulization Q6H    bisacodyl  10 mg Rectal Daily    pantoprazole  40 mg IntraVENous Daily    And    sodium chloride (PF)  10 mL IntraVENous Daily    aspirin  324 mg Oral Daily    levETIRAcetam  500 mg Oral BID    chlorhexidine  15 mL Mouth/Throat BID    enoxaparin  30 mg SubCUTAneous BID    QUEtiapine  50 mg Oral Once    budesonide  0.5 mg Nebulization BID    sodium chloride flush  5-40 mL IntraVENous 2 times per day    atorvastatin  20 mg Oral Daily     Continuous Infusions:   amiodarone 1 mg/min (02/15/22 0930)    phenylephrine (KEARA-SYNEPHRINE) 50mg/250mL infusion Stopped (02/14/22 1816)    dextrose      dexmedetomidine (PRECEDEX) IV infusion 1.4 mcg/kg/hr (02/15/22 0820)    fentaNYL 50 mcg/hr (02/15/22 1107)    propofol Stopped (02/09/22 1030)    midazolam (VERSED) 1 mg/mL in D5W infusion Stopped (02/13/22 0905)    sodium chloride          Physical Exam:  Vitals:    02/15/22 1030 02/15/22 1045 02/15/22 1100 02/15/22 1150   BP:   (!) 131/90    Pulse: 103 105 114 103   Resp:   16    Temp:       TempSrc:       SpO2: 100% 100% 100% 100%   Weight:       Height:         I/O last 3 completed shifts: In: 5686.9 [I.V.:1000.3; NG/GT:3812; IV Piggyback:874.7]  Out: 2450 [Urine:2450]    General: Intubated, sedated, not in distress. Appears to be stated age. HEENT: Atraumatic, normocephalic. Anicteric sclera. Pink and moist oral mucosa.    Neck supple. No JVD. Chest: Bilateral air entry, clear to auscultation, no wheezing, rhonchi or rales. Cardiovascular: RRR, S1S2, no murmur, rub or gallop. Bilateral +1 pitting lower extremity edema. Abdomen: Soft, non tender to palpation. PEG, hypoactive bowel sounds  Musculoskeletal: No cyanosis or clubbing. Integumentary: Pink, warm and dry. Free from rash or lesions. CNS: Sedated, intubated, face symmetrical. No tremor.      Data:  CBC:   Lab Results   Component Value Date    WBC 5.0 02/15/2022    HGB 11.2 (L) 02/15/2022    HCT 37.1 (L) 02/15/2022    MCV 81.4 (L) 02/15/2022     (L) 02/15/2022     BMP:    Lab Results   Component Value Date     02/15/2022     02/14/2022     02/13/2022    K 3.7 02/15/2022    K 4.3 02/14/2022    K 4.6 02/13/2022     02/15/2022     02/14/2022     02/13/2022    CO2 24 02/15/2022    CO2 22 02/14/2022    CO2 21 02/13/2022    BUN 43 (H) 02/15/2022    BUN 42 (H) 02/14/2022    BUN 38 (H) 02/13/2022    CREATININE 0.73 02/15/2022    CREATININE 0.74 02/14/2022    CREATININE 0.78 02/13/2022    GLUCOSE 211 (H) 02/15/2022    GLUCOSE 228 (H) 02/14/2022    GLUCOSE 255 (H) 02/13/2022     CMP:   Lab Results   Component Value Date     02/15/2022    K 3.7 02/15/2022     02/15/2022    CO2 24 02/15/2022    BUN 43 02/15/2022    CREATININE 0.73 02/15/2022    GLUCOSE 211 02/15/2022    CALCIUM 8.8 02/15/2022    PROT 5.0 02/12/2022    LABALBU 1.6 02/12/2022    BILITOT 0.37 02/12/2022    ALKPHOS 105 02/12/2022    AST 39 02/12/2022    ALT 36 02/12/2022      Hepatic:   Lab Results   Component Value Date    AST 39 02/12/2022    AST 72 (H) 02/03/2022    AST 46 (H) 02/01/2022    ALT 36 02/12/2022    ALT 77 (H) 02/03/2022    ALT 43 (H) 02/01/2022    BILITOT 0.37 02/12/2022    BILITOT 0.37 02/03/2022    BILITOT 0.28 (L) 02/01/2022    ALKPHOS 105 02/12/2022    ALKPHOS 117 02/03/2022    ALKPHOS 100 02/01/2022     BNP: No results found for: BNP  Lipids:   Lab Results   Component Value Date    CHOL 129 11/03/2021    HDL 30 (L) 11/03/2021     INR:   Lab Results   Component Value Date    INR 1.0 01/17/2022    INR 1.0 11/03/2021    INR 1.0 07/27/2019     PTH: No results found for: PTH  Phosphorus:    Lab Results   Component Value Date    PHOS 2.0 02/15/2022     Ionized Calcium: No results found for: IONCA  Magnesium:   Lab Results   Component Value Date    MG 1.7 02/15/2022     Albumin:   Lab Results   Component Value Date    LABALBU 1.6 02/12/2022     Last 3 CK, CKMB, Troponin: @LABRCNT(CKTOTAL:3,CKMB:3,TROPONINI:3)       URINE:)No results found for: Tscoline Cure    Radiology:  Reviewed. Assessment:  Hypokalemia, used to have hyperkalemia. Hypernatremia, Na stable. Hypophosphatemia  Hypovitaminosis D. Metabolic alkalosis. Hypertension. Diabetes mellitis. COVID19 pneumonia. CHF. COPD. Plan: Will increase free water flushes to 350 ml every 3 hours. Will give 15 mmol K phos iv. Metoprolol discontinued per cardiology. Lisinopril 20 mg daily started 2/2/2022 per cardiology. Monitor K and creatinine. Monitor blood pressure closely. Continue vitamin D supplementation. Amio gtt per cardiology  TF per primary. Repeat echo 2/11/2022 reviewed. Avoid hypotension, nephrotoxic drugs, Fleets enema and IV contrast exposure. Follow up chemistries daily in the a.m. We will continue to follow with you. Patient seen in collaboration with Dr. Stephanie Darby. Electronically signed by HERB Eisenberg CNP  on 2/15/2022 at 1:14 PM   [facility]. Ph: [phone]    Reviewed with NP. Agree with above note. Above note was modified. We will continue to follow up with you. Electronically signed by Brianna Norris MD on 2/15/2022 at 4:57 PM  [facility].   Ph: [phone]

## 2022-02-15 NOTE — PROGRESS NOTES
PALLIATIVE CARE NURSING ASSESSMENT    Patient: Emilee Wheeler  Room: 3666/7935-86    Reason For Consult   Goals of care evaluation  Distress management  Guidance and support  Facilitate communications  Assistance in coordinating care    Code Status:  [facility]      Impression: Emilee Wheeler is a 76y.o. year old male  has a past medical history of Arthritis, Atherosclerotic plaque, Cervical disc disease, Diabetes mellitus ([address] History of blood transfusion, Hx of blood clots, Hyperlipidemia, Neuropathy, Right kidney mass, Snores, and Type 2 diabetes mellitus treated with insulin ([address] .  Currently hospitalized for the management of covid 19 pneumonia, acute hypoxic respiratory failure. The Palliative Care Team is following to assist with patient/family support, goals of care and communication. Vital Signs  Blood pressure (!) 151/58, pulse 75, temperature 99.2 °F (37.3 °C), temperature source Axillary, resp. rate 26, height 5' 10\" (1.778 m), weight 242 lb (109.8 kg), SpO2 100 %. Patient Active Problem List   Diagnosis    Bivent[facility]est[facility]) [facility]    CKD (chronic kidney disease) stage 3, GFR 30-59 ml/min ([facility])    Essential hypertension    Blurred vision, right eye    Type 2 diabetes mellitus treated with insulin ([address]    Atherosclerotic plaque    Weakness on left side of face    Carotid stenosis, asymptomatic, bilateral    New onset seizure ([address]    COVID-19    Acute on chronic systolic CHF (congestive heart failure) (HCC)    Acute respiratory failure with hypoxia ([address]    BLANCHE (acute kidney injury) ([address]    COPD exacerbation ([address]    Hypokalemia    Hypomagnesemia    Palliative care encounter    Goals of care, counseling/discussion    DNR (do not resuscitate) discussion    ACP (advance care planning)    Constipation    Abdominal pain, generalized       Palliative Interaction:  Discussed case with ICU manager. Patient has been here 30 days.   Intubated for 22 days.    Updates received from bedside nurse Bobo RN. Patient on full vent support rate of 26, FIO2 75%, Peep 14. Patient breathing over the vent. Rate of 32 noted, pulse ox 100%  bp 145/60, heart rate 107 now. Patient was Afib with RVR earlier this morning and amiodarone drip was restarted. Pt is also on neosynephrine drip. Pt sedated and on precedex, fentanyl. Pt opens eyes, does not follow commands. gtube in place and tube feedings on at 48. Surgery is following for trach when medically stable. Called Vero to offer support and encourage her and her mom to consider meeting with doctor to discuss patients condition. Vero relates she would like to talk to her mother. Vero relates she got an update last evening that the oxygen from the vent had to be increased. I updated her on Viet's ventilator settings and vital signs this morning. I let her know his heart rate went back up so we had to restart the amiodarone drip to slow the heart rate to let it pump more effectively. We discussed that he is not making improvements at this time. I talked to her about his current code status and let her know what a [facility] would be. I encouraged her again to consider coming in and meeting with critical care specialist.  I let her know that Dr. Epi Medley will be rounding in the afternoon tomorrow and I would be happy to set up a time to meet. Wendi Negron is the only  for her grandchild and has her til 4:00pm tomorrow. Chloe Mantilla know that I can try to set up meeting around 5:30pm if dr is available. Vero is agreeable. I let her know we would call her tomorrow afternoon when we have ok'd the time with Dr. Epi Medley. I asked Wendi Migdalia if she would like me to call her mom. Vero relates she will update her, she relates her mom has been working a lot. She works til 3:30pm.  Chloe Mantilla know I will be in office til 4:30pm she or her mom can call me.       Goals/Plan of care  Education/support to staff  Education/support to family  Communications with primary service  Providing support for coping/adaptation/distress of family  Discussing meaning/purpose   Decision making regarding life prolonging treatment  Continue with current plan of care  Clarification of medical condition to patient and family  Code status clarified: [facility]  Code status clarified: [facility]  Validating patient/family distress  Continued communication updates  Recognizing, reflecting, and empathizing with family members' anticipatory grief  ET tube to vent, patient sedated with fentanyl and precedex, opens eyes does not follow commands. Vent settings FIO2 75% rate 26 Peep 14.  went back into Afib with RVR, amiodarone restarted. neosynephrine drip on. Updated daughter Latesha Steiner on condition. Offered support to her and her mom. Suggested that it may be time to have a meeting with the critical care/pulmonary specialist physician to discuss how things are going and discuss goals. Daughter is agreeable, offered her afternoon appointment with Dr. Bibi Weinstein for tomorrow. she relates they could be here by 5:30. I will ask staff to check this time with Dr. Ivy Sifuentes and we would update her if 5:30pm works. Plan for family meeting 2/16/22 AT 5:30PM with Dr. Bibi Weinstein. Please make sure  Is ok with this time on 2/16 and call daughter back with confirmation of appointment.         Palliative Care Coordinator  Dontrell SIMMONS, RN, [facility] Office: [address] Office: [phone]    For [facility] scheduling please call [phone]

## 2022-02-15 NOTE — PROGRESS NOTES
Patient in A fib RVR. Dr. Denia Handy notified, orders to give amio bolus and start drip. Will continue to monitor.

## 2022-02-15 NOTE — FLOWSHEET NOTE
Patient is intubated and unresponsive. No family is present. Patient is no longer in isolation. Writer offers a prayer at bedside. Patient does not respond. Spiritual Care will follow as needed. 02/15/22 1030   Encounter Summary   Services provided to: Patient   Referral/Consult From: Palliative Care;Rounding   Support System Spouse; Children;Family members   Continue Visiting   (2/15/22 Intubated)   Complexity of Encounter Low   Length of Encounter 15 minutes   Routine   Type Follow up   Assessment Unable to respond   Intervention Prayer   Outcome Did not respond

## 2022-02-15 NOTE — PROGRESS NOTES
[facility]   Section of Cardiology  Progress Note      Date:  2/15/2022  Patient: Milla Gimenez  Admission:  1/16/2022 11:51 AM  Admit DX: Hypokalemia [E87.6]  Hypomagnesemia [E83.42]  COPD exacerbation (HCC) [J44.1]  BLANCHE (acute kidney injury) ([facility] [N17.9]  Acute respiratory failure with hypoxia (HCC) [J96.01]  Acute on chronic systolic CHF (congestive heart failure) ([facility] [I50.23]  COVID [U07.1]  COVID-19 [U07.1]  Age:  76 y.o., 1945                           LOS: 30 days     Reason for evaluation:   Atrial fibrillation  covid 19 pneumonia. SUBJECTIVE:     Intubated and sedated. OBJECTIVE:    BP (!) 151/58   Pulse 75   Temp 98.5 °F (36.9 °C) (Axillary)   Resp 26   Ht 5' 10\" (1.778 m)   Wt 242 lb (109.8 kg)   SpO2 100%   BMI 34.72 kg/m²     Intake/Output Summary (Last 24 hours) at 2/15/2022 0839  Last data filed at 2/15/2022 0509  Gross per 24 hour   Intake 3119.73 ml   Output 1900 ml   Net 1219.73 ml       EXAM:   CONSTITUTIONAL:  Intubated and sedated. [place] HEENT: Normal jugular venous pulsations, no carotid bruits. Head is atraumatic, normocephalic. Eyes and oral mucosa are normal.  LUNGS: Good respiratory effort. No for increased work of breathing. On auscultation: clear to auscultation bilaterally. CARDIOVASCULAR:  Normal apical impulse, regular rate and rhythm, normal S1 and S2, no S3 or S4, and no murmur or rub noted. ABDOMEN: Soft, nontender, nondistended. Bowel sounds present. No masses or tenderness. SKIN: Warm and dry. EXTREMITIES: No lower extremity edema. Motor movement grossly intact. No cyanosis or clubbing.     Current Inpatient Medications:   furosemide  40 mg IntraVENous TID    meropenem  1,000 mg IntraVENous Q8H    linezolid  600 mg IntraVENous Q12H    sodium chloride  80 mL IntraVENous Once    sucralfate  1 g Oral 4 times per day    amLODIPine  5 mg Oral BID    insulin lispro  0-18 Units SubCUTAneous TID WC    insulin lispro  0-9 Units SubCUTAneous Nightly    amiodarone  200 mg Per NG tube Daily    insulin glargine  15 Units SubCUTAneous BID    Vitamin D  1,000 Units Oral Daily    polyethylene glycol  17 g Per NG tube Daily    levalbuterol  1.25 mg Nebulization Q6H    bisacodyl  10 mg Rectal Daily    pantoprazole  40 mg IntraVENous Daily    And    sodium chloride (PF)  10 mL IntraVENous Daily    aspirin  324 mg Oral Daily    levETIRAcetam  500 mg Oral BID    chlorhexidine  15 mL Mouth/Throat BID    enoxaparin  30 mg SubCUTAneous BID    QUEtiapine  50 mg Oral Once    budesonide  0.5 mg Nebulization BID    sodium chloride flush  5-40 mL IntraVENous 2 times per day    atorvastatin  20 mg Oral Daily       IV Infusions (if any):   phenylephrine (KEARA-SYNEPHRINE) 50mg/250mL infusion Stopped (02/14/22 1816)    dextrose      dexmedetomidine (PRECEDEX) IV infusion 1.4 mcg/kg/hr (02/15/22 0820)    fentaNYL 50 mcg/hr (02/14/22 1541)    propofol Stopped (02/09/22 1030)    midazolam (VERSED) 1 mg/mL in D5W infusion Stopped (02/13/22 0905)    sodium chloride         Diagnostics:   Telemetry: Sinus  Labs:   CBC:   Recent Labs     02/14/22  0443 02/15/22  0525   WBC 7.9 5.0   HGB 11.9* 11.2*   HCT 39.8* 37.1*    119*     BMP:   Recent Labs     02/14/22  0443 02/15/22  0525    141   K 4.3 3.7   CO2 22 24   BUN 42* 43*   CREATININE 0.74 0.73   LABGLOM >60 >60   GLUCOSE 228* 211*     BNP: No results for input(s): BNP in the last 72 hours. PT/INR: No results for input(s): PROTIME, INR in the last 72 hours. APTT:No results for input(s): APTT in the last 72 hours. CARDIAC ENZYMES:No results for input(s): CKTOTAL, CKMB, CKMBINDEX, TROPONINI in the last 72 hours. FASTING LIPID PANEL:  Lab Results   Component Value Date    HDL 30 11/03/2021    TRIG 181 11/03/2021     LIVER PROFILE:No results for input(s): AST, ALT, LABALBU in the last 72 hours.     ASSESSMENT:    Patient Active Problem List   Diagnosis    [facility]ivent[facility]    CKD (chronic kidney disease) stage 3, GFR 30-59 ml/min ([facility]    Essential hypertension    Blurred vision, right eye    Type 2 diabetes mellitus treated with insulin (HCC)    Atherosclerotic plaque    Weakness on left side of face    Carotid stenosis, asymptomatic, bilateral    [facility]    COVID-19    Acute on chronic systolic CHF (congestive heart failure) ([facility])    Acute respiratory failure with hypoxia ([facility])    BLANCHE (acute kidney injury) ([facility]    COPD exacerbation ([facility])    Hypokalemia    Hypomagnesemia    Palliative care encounter    Goals of care, counseling/discussion    DNR (do not resuscitate) discussion    ACP (advance care planning)    Constipation    Abdominal pain, generalized       PLAN:    1. Paroxysmal atrial fibrillation  2. covid 19 pneumonia  3. Respiratory failure  Plan  Continue conservative therapy  Diuresis as needed for anasarca. Amiodarone 200mg po daily. Family to discuss code issues. Please see orders. Discussed with  nursing.     Corey Nayak MD, MD

## 2022-02-15 NOTE — PROGRESS NOTES
Physical Therapy  DATE: 2/15/2022    NAME: Lana Cheng  MRN: [id number]   : 1945    Patient not seen this date for Physical Therapy due to:      [] Cancel by RN or physician due to:    [] Hemodialysis    [] Critical Lab Value Level     [] Blood transfusion in progress    [] Acute or unstable cardiovascular status   _MAP < 55 or more than >115  _HR < 40 or > 130    [x] Acute or unstable pulmonary status    -FiO2 > 60%(currently at 75%)   _RR < 5 or >40    _O2 sats < 85%    [] Strict Bedrest    [] Off Unit for surgery or procedure    [] Off Unit for testing       [] Pending imaging to R/O fracture    [] Refusal by Patient      [] Other      [] PT being discontinued at this time. Patient independent. No further needs. [] PT being discontinued at this time as the patient has been transferred to hospice care. No further needs.       [address], PT

## 2022-02-15 NOTE — PROGRESS NOTES
General Surgery:  Daily Progress Note               PATIENT NAME: Lana Cheng     TODAY'S DATE: 2/15/2022, 6:25 AM    SUBJECTIVE:     Pt seen and examined at bedside this morning. No acute events overnight. Afebrile. Patient remains intubated and sedated. On and off pressors overnight. Remains on 75% FiO2, PEEP-14. OBJECTIVE:   VITALS:  BP (!) 136/56   Pulse 74   Temp 98.5 °F (36.9 °C) (Axillary)   Resp 26   Ht 5' 10\" (1.778 m)   Wt 242 lb (109.8 kg)   SpO2 98%   BMI 34.72 kg/m²      INTAKE/OUTPUT:      Intake/Output Summary (Last 24 hours) at 2/15/2022 5115  Last data filed at 2/15/2022 0509  Gross per 24 hour   Intake 4434.07 ml   Output 2450 ml   Net 1984.07 ml       PHYSICAL EXAM:  General Appearance: Intubated, sedated, does not follow commands  HEENT:  Normocephalic, atraumatic, mucus membranes moist   Skin:  Skin color, texture, turgor normal. No rashes or lesions. Lungs:  Normal expansion. Clear to auscultation. No rales, rhonchi, or wheezing. Heart:  Heart regular rate and rhythm  Abdomen:   soft, ND, NTTP, +bowel sounds, PEG tube in place, rectal tube in place  Extremities: Extremities warm to touch, pink, with no edema.          Data:  CBC with Differential:    Lab Results   Component Value Date    WBC 5.0 02/15/2022    RBC 4.56 02/15/2022    HGB 11.2 02/15/2022    HCT 37.1 02/15/2022     02/15/2022    MCV 81.4 02/15/2022    MCH 24.6 02/15/2022    MCHC 30.2 02/15/2022    RDW 15.5 02/15/2022    LYMPHOPCT 4 02/15/2022    MONOPCT 3 02/15/2022    BASOPCT 0 02/15/2022    MONOSABS 0.15 02/15/2022    LYMPHSABS 0.20 02/15/2022    EOSABS 0.00 02/15/2022    BASOSABS 0.00 02/15/2022    DIFFTYPE NOT REPORTED 02/15/2022     BMP:    Lab Results   Component Value Date     02/15/2022    K 3.7 02/15/2022     02/15/2022    CO2 24 02/15/2022    BUN 43 02/15/2022    LABALBU 1.6 02/12/2022    CREATININE 0.73 02/15/2022    CALCIUM 8.8 02/15/2022    GFRAA >60 02/15/2022    LABGLOM >60

## 2022-02-16 NOTE — PROGRESS NOTES
Pulmonary Critical Care Progress Note       Patient seen for the follow up of COVID-19 pneumonia, acute hypoxic respiratory failure. Subjective:  Patient is on 1.4 mg of Precedex and is awake. He is on FiO2 at 60% with PEEP of 14. He is tolerating tube feeds. He has a bowel movement. His blood pressure is stable. Examination:  Vitals: /89   Pulse 74   Temp 98.8 °F (37.1 °C) (Oral)   Resp 22   Ht 5' 10\" (1.778 m)   Wt 242 lb (109.8 kg)   SpO2 98%   BMI 34.72 kg/m²   General appearance: Mildly sedated, intubated on ventilator  Neck: No JVD  Lungs decreased breath sound no crackles or wheezing  Heart: regular rate and rhythm, S1, S2 normal, no gallop  Abdomen: Soft, non tender, + BS  Extremities: no cyanosis or clubbing. 3+ edema    LABs:  CBC:   Recent Labs     02/15/22  0525 02/16/22  0540   WBC 5.0 6.5   HGB 11.2* 11.1*   HCT 37.1* 36.1*   * 120*     BMP:   Recent Labs     02/15/22  0525 02/16/22  0540    138   K 3.7 3.5*   CO2 24 25   BUN 43* 42*   CREATININE 0.73 0.66*   LABGLOM >60 >60   GLUCOSE 211* 211*     ABG:  Lab Results   Component Value Date    EBS5BZH NOT REPORTED 02/16/2022    FIO2 65.0 02/16/2022       Lab Results   Component Value Date    POCPH 7.422 02/16/2022    POCPCO2 45.6 02/16/2022    POCPO2 71.3 02/16/2022    POCHCO3 29.7 02/16/2022    PBEA 4 02/16/2022    CZN2UEK NOT REPORTED 02/16/2022    ZLLA3ODB 94 02/16/2022    FIO2 65.0 02/16/2022     Radiology:  Chest x-ray 2/15  No significant interval change.  Diffuse interstitial and patchy hazy   opacities within the lungs. CT chest: No PE, bibasilar infiltrate. Small amount of pneumoperitoneum.     CT abdomen pelvis  Status post gastrostomy tube with tip in the upper stomach.  There is   development of mild pneumoperitoneum seen along the anterior aspect of the   upper abdomen along the hepatic margin.  It is not clear whether the free air   is associated of the recent gastrostomy placement.     Recommend surgical consultation       Slightly more pronounced pelvic and abdominal ascites when compared to the   prior exam       Stable anasarca               :  CT brain: No acute intracranial abnormality  Lower extremity Dopplers: 2/14/2022: No DVT  Upper extremity Dopplers 2/14/2022: No DVT  Impression:  · Acute on chronic hypoxic respiratory failure  · COVID-19 pneumonia/ARDS  · COPD/pulmonary emphysema  · Acute left thalamic infarct/CVA  · Left lower lobe opacity versus nodule  · Pulmonary edema  · Elevated D-dimer, decreased platelets  · Systolic heart failure, s/p AICD placement  · BLANCHE on CKD  · Diabetes mellitus    Recommendations:  · Continue vent support, wean FiO2 as tolerate keep keep PEEP  14  · Fentanyl drip RASS -2  · Continue precedex drip RASS -2  · Start Diprivan drip, for RASS score -1 to -2  · Xopenex by nebulizer  · Pulmicort 0.5 every 12 hours  · Monitor blood sugars off insulin drip  · Monitor blood pressure off of Luke-Synephrine   · Amiodarone drip per cardiology.     · Out of Airborne isolation  · Linezolid and meropenem per ID  · Not a candidate for Actemra/baricitinib stopped secondary to cytopenias  · Neurology on consult  · Lasix 40 IV every 8 hours  · Tube feeds as tolerated  · GI prophylaxis, Protonix  · Discussed with RN  · General surgery following for tracheostomy  · DVT prophylaxis Lovenox 30 twice daily  · We will follow with you    Electronically signed by     Ronit Caceres MD on 2/16/2022 at 5:01 PM  Pulmonary Critical Care and Sleep Medicine,  [facility] AT [place]: [phone]  Cc: 35 minutes

## 2022-02-16 NOTE — PROGRESS NOTES
Mod amt of leakage noted from PEG site of tan liquid. Drsg changed and no leakage noted during drsg change so will monitor.

## 2022-02-16 NOTE — PLAN OF CARE
I spoke with Dr Rosmery Lee and discussed meeting with patient daughter Marcelo Saldivar at 5 PM tonight. Dr Rosmery Lee is not sure if he can make the meeting tonight but states he will call Vero tomorrow after rounding on her with an update. I called and left a message for Marcelo Saldivar notifying her of this. Palliative care will continue to follow and I updated patient nurse.     Evaline Dials CNP  Palliative care

## 2022-02-16 NOTE — PROGRESS NOTES
Pt bp softened, map fell to 50's ands remained, Luke-synephrine restarted. Map increased to over 65. Will continue to monitor.

## 2022-02-16 NOTE — PROGRESS NOTES
Progress note  [facility]    Adult Hospitalist      Name: Milla Gimenez  MRN: [id number]     Acct: [de-identified]  Room: [address] [place] [address]-    Admit Date: 1/16/2022 11:51 AM  PCP: Amalia Mora MD    Primary Problem  Active Problems:    COVID-19    Acute on chronic systolic CHF (congestive heart failure) (HCC)    Acute respiratory failure with hypoxia (HCC)    BLANCHE (acute kidney injury) ([facility] [facility]    COPD exacerbation ([facility] [facility]    Hypokalemia    Hypomagnesemia    Palliative care encounter    Goals of care, counseling/discussion    DNR (do not resuscitate) discussion    ACP (advance care planning)    Constipation    Abdominal pain, generalized  Resolved Problems:    * No resolved hospital problems. *        Assesment:   Acute congestive heart failure, diastolic   [id number]   Viral pneumonia secondary to above  Acute respiratory failure with hypoxia intubated on 1/23/2022  Hyperkalemia  Paroxysmal atrial fibrillation  Essential hypertension  Mixed hyperlipidemia  Diabetes mellitus type 2  History of seizure disorder  History of CKD stage III, presently normal renal function  Lung mass?   Leukopenia  Polycythemia  Thrombocytopenia  Anxiety disorder  Recent past h/o lacunar infarct left thalamus   Altered mental status/agitation  Endotracheal intubation secondary to hypoxia dated 1/23/2022  Supraventricular tachycardia/elevated troponin  Fever  Emphysema   Pulmonary artery hypertension   Decubitus ulcer buttocks      Plan:   Admit patient to intermediate floor  Mechanical ventilation sedation as per critical care, patient is requiring PEEP of 10-->14 with  90% FiO2  Telemetry  Check vitals closely  CBC BMP daily    Cardiology consult  Amiodarone  Precedex gtt    IV Decadron completed  Off pressors  Off amiodarone drip  Patient completed a course of cefepime for 7 days  Bronchodilators  Pulmicort  Patient is deemed not a candidate for Actemra or baricitinib secondary to cytopenia  Infectious disease consult  Pulmonology consult    Continue Keppra  Continue amlodipine, atorvastatin  Continue aspirin    GI signed off   Tube feeds  Consult nephrology   Insulin gtt- DC  Lantus w SSI-->increase to high intensity as BS running high, now better controlled    Plan tracheostomy, continue to wean FiO2  General surgery following, plan for tracheostomy  S/P PEG tube   Wound care   Continue other medication as below.     Scheduled Meds:   furosemide  40 mg IntraVENous TID    meropenem  1,000 mg IntraVENous Q8H    linezolid  600 mg IntraVENous Q12H    sodium chloride  80 mL IntraVENous Once    sucralfate  1 g Oral 4 times per day    amLODIPine  5 mg Oral BID    insulin lispro  0-18 Units SubCUTAneous TID WC    insulin lispro  0-9 Units SubCUTAneous Nightly    amiodarone  200 mg Per NG tube Daily    insulin glargine  15 Units SubCUTAneous BID    Vitamin D  1,000 Units Oral Daily    polyethylene glycol  17 g Per NG tube Daily    levalbuterol  1.25 mg Nebulization Q6H    bisacodyl  10 mg Rectal Daily    pantoprazole  40 mg IntraVENous Daily    And    sodium chloride (PF)  10 mL IntraVENous Daily    aspirin  324 mg Oral Daily    levETIRAcetam  500 mg Oral BID    chlorhexidine  15 mL Mouth/Throat BID    enoxaparin  30 mg SubCUTAneous BID    QUEtiapine  50 mg Oral Once    budesonide  0.5 mg Nebulization BID    sodium chloride flush  5-40 mL IntraVENous 2 times per day    atorvastatin  20 mg Oral Daily     Continuous Infusions:   amiodarone 1 mg/min (02/15/22 1724)    phenylephrine (KEARA-SYNEPHRINE) 50mg/250mL infusion Stopped (02/15/22 1443)    dextrose      dexmedetomidine (PRECEDEX) IV infusion 1.4 mcg/kg/hr (02/15/22 1724)    fentaNYL 50 mcg/hr (02/15/22 1107)    propofol Stopped (02/09/22 1030)    midazolam (VERSED) 1 mg/mL in D5W infusion Stopped (02/13/22 0905)    sodium chloride       PRN Meds:  sodium chloride flush, 10 mL, PRN  potassium chloride, 20 mEq, PRN  potassium chloride, 10 mEq, PRN  magnesium sulfate, 1,000 mg, PRN  glucose, 15 g, PRN  glucagon (rDNA), 1 mg, PRN  dextrose, 100 mL/hr, PRN  sodium chloride nebulizer, 3 mL, Q8H PRN  LORazepam, 1 mg, Q4H PRN  dextrose bolus (hypoglycemia), 125 mL, PRN   Or  dextrose bolus (hypoglycemia), 250 mL, PRN  sodium chloride flush, 10 mL, PRN  sodium chloride, 25 mL, PRN  ondansetron, 4 mg, Q8H PRN   Or  ondansetron, 4 mg, Q6H PRN  acetaminophen, 650 mg, Q6H PRN   Or  acetaminophen, 650 mg, Q6H PRN  hydrALAZINE, 10 mg, Q6H PRN        Chief Complaint:     Chief Complaint   Patient presents with    Leg Swelling     bilateral, has CHF, on diuretic, EMT saw pt , assisted pt into car    Fever    Other     low SPO2         History of Present Illness:   Patient seen examined at bedside, patient remains in medical ICU  Patient remains intubated sedated  Patient did not wake up on decreasing sedation  Presently on fentanyl and propofol  Initially FiO2 had reduced  Patient started having labored breathing, vent settings changed  Patient has increasing residuals from tube feeding      Review of systems:  Unable to conduct ROS secondary to patient being intubated      Initial HPI  Hans Garcia is a 76 y.o.  male who presents with Leg Swelling (bilateral, has CHF, on diuretic, EMT saw pt , assisted pt into car), Fever, and Other (low SPO2)  This is a 77-year-old gentleman admitted via ER, come to ER with complaint of having shortness of breath, patient has medical history significant for COPD patient with history of cardiac failure: Patient noted that he has been having increasing swelling in his lower extremity and becoming more short of breath, further patient also been having some body aches and sweats, patient patient testing in the emergency room showed that he is positive for COVID-19 also noticed to have an elevated BNP, imaging concerning for fluid overload, admitted for further regiment    I have personally reviewed the past medical history, past surgical history, medications, social history, and family history, and summarized in the note. Review of Systems:       Unable to conduct ROS secondary to patient being intubated      Past Medical History:     Past Medical History:   Diagnosis Date    Arthritis     Atherosclerotic plaque 7/28/2019    Cervical disc disease     degenerative disc disease    Diabetes mellitus ([facility] [address]     type 2    History of blood transfusion     Hx of blood clots     left leg    Hyperlipidemia     Neuropathy     Right kidney mass     \"urologist is watching\"    Snores     Type 2 diabetes mellitus treated with insulin ([facility] [address] 7/28/2019        Past Surgical History:     Past Surgical History:   Procedure Laterality Date    ABDOMINAL AORTIC ANEURYSM REPAIR  2005    CARPAL TUNNEL RELEASE Bilateral     CERVICAL SPINE SURGERY      COLONOSCOPY      benign polyps removed    INGUINAL HERNIA REPAIR Right     MO REPAIR INCISIONAL HERNIA,REDUCIBLE N/A 5/10/2017    HERNIA VENTRAL REPAIR WITH MESH  performed by Von Wilson DO at [place] [address] N/A 2/9/2022    EGD   PEG TUBE INSERTION performed by Nick Zuniga MD at [address]  05/10/2017    with mesh        Medications Prior to Admission:       Prior to Admission medications    Medication Sig Start Date End Date Taking?  Authorizing Provider   rosuvastatin (CRESTOR) 40 MG tablet Take 40 mg by mouth every evening   Yes Historical Provider, MD   insulin NPH (HUMULIN N;NOVOLIN N) 100 UNIT/ML injection vial Inject 20 Units into the skin 2 times daily (before meals)   Yes Historical Provider, MD   levETIRAcetam (KEPPRA) 500 MG tablet Take 1 tablet by mouth 2 times daily 11/3/21  Yes Crow Rdz MD   venlafaxine (EFFEXOR XR) 150 MG extended release capsule Take 1 capsule by mouth daily (with breakfast) 7/29/19  Yes Arabella Kiser   vitamin B-1 (THIAMINE) 100 MG tablet Take 100 mg by mouth daily   Yes Historical Provider, MD   ferrous sulfate 325 (65 Fe) MG tablet Take 325 mg by mouth daily (with breakfast)   Yes Historical Provider, MD   ALPRAZolam (XANAX) 0.5 MG tablet Take 0.5 mg by mouth three times daily. Yes Historical Provider, MD   furosemide (LASIX) 40 MG tablet Take 1 tablet by mouth 2 times daily 12/11/18  Yes Teo Evans MD   tiotropium (SPIRIVA RESPIMAT) 2.5 MCG/ACT AERS inhaler Inhale 2 puffs into the lungs daily    Yes Historical Provider, MD   albuterol sulfate  (90 Base) MCG/ACT inhaler Inhale 2 puffs into the lungs every 6 hours as needed for Wheezing or Shortness of Breath   Yes Historical Provider, MD   metoprolol succinate (TOPROL XL) 50 MG extended release tablet Take 50 mg by mouth daily   Yes Historical Provider, MD   Multiple Vitamins-Minerals (MULTIVITAMIN ADULT) TABS Take 1 tablet by mouth daily   Yes Historical Provider, MD   vitamin D (CHOLECALCIFEROL) 1000 UNIT TABS tablet Take 1,000 Units by mouth daily   Yes Historical Provider, MD   fluticasone (FLONASE) 50 MCG/ACT nasal spray 1 spray by Each Nare route daily as needed for Rhinitis   Yes Historical Provider, MD   aspirin 325 MG EC tablet Take 325 mg by mouth daily    Yes Historical Provider, MD   Omega-3 Fatty Acids (FISH OIL) 1000 MG CAPS Take 1 capsule by mouth daily    Yes Historical Provider, MD   amLODIPine (NORVASC) 5 MG tablet Take 5 mg by mouth nightly    Yes Historical Provider, MD   mirtazapine (REMERON) 45 MG tablet Take 45 mg by mouth nightly   Yes Historical Provider, MD        Allergies: Barbiturates, Codeine, and Sulfa antibiotics    Social History:     Tobacco:    reports that he has quit smoking. He has never used smokeless tobacco.  Alcohol:      reports no history of alcohol use. Drug Use:  reports no history of drug use.     Family History:     Family History   Problem Relation Age of Onset    Ovarian Cancer Sister     Ovarian Cancer Sister          Physical Exam:     Vitals:  BP (!) 134/55 Pulse 72   Temp 98.1 °F (36.7 °C) (Axillary)   Resp 27   Ht 5' 10\" (1.778 m)   Wt 242 lb (109.8 kg)   SpO2 98%   BMI 34.72 kg/m²   Temp (24hrs), Av.7 °F (37.1 °C), Min:98.1 °F (36.7 °C), Max:99.2 °F (37.3 °C)      General appearance -on ventilator  Mental status -sedated  Head - normocephalic and atraumatic  Eyes - conjunctiva clear  Ears -external ears normal  Nose - no drainage noted  Mouth - mucous membranes moist  Neck - supple, no carotid bruits, thyroid not palpable  Chest -basal crackles with decreased air entry bilaterally to auscultation, increased effort  Heart - normal rate, regular rhythm, no murmur  Abdomen - soft, nontender, nondistended, bowel sounds present all four quadrants, no masses, hepatomegaly or splenomegaly  Neurological -sedated on vent  Extremities - extremity edema, lower distal extremity discoloration    Skin - no gross lesions, rashes, or induration noted        Data:     Labs:    Hematology:  Recent Labs     02/13/22  0620 02/14/22  0443 02/15/22  0525   WBC 10.8 7.9 5.0   RBC 4.82 4.81 4.56   HGB 11.7* 11.9* 11.2*   HCT 39.9* 39.8* 37.1*   MCV 82.8 82.7 81.4*   MCH 24.3* 24.7* 24.6*   MCHC 29.3 29.9 30.2   RDW 16.2* 15.9* 15.5*    144 119*   MPV 11.9 12.8 11.1     Chemistry:  Recent Labs     22  0620 223 02/15/22  0525    138 141   K 4.6 4.3 3.7    105 107   CO2 21 22 24   GLUCOSE 255* 228* 211*   BUN 38* 42* 43*   CREATININE 0.78 0.74 0.73   MG 1.9 1.9 1.7   ANIONGAP 13 11 10   LABGLOM >60 >60 >60   GFRAA >60 >60 >60   CALCIUM 8.7 9.0 8.8   PHOS 3.2 2.6 2.0*     Recent Labs     22  0620 22  0738 22  1858 22  2206 02/15/22  0734 02/15/22  1131 02/15/22  1630 02/15/22  1900   AMMONIA 30  --   --   --   --   --   --   --    POCGLU  --    < > 192* 194* 186* 246* 238* 223*    < > = values in this interval not displayed.        Lab Results   Component Value Date    INR 1.0 2022    INR 1.0 2021    INR 1.0 07/27/2019    PROTIME 13.3 01/17/2022    PROTIME 11.1 11/03/2021    PROTIME 10.5 07/27/2019       Lab Results   Component Value Date/Time    SPECIAL RT ARTERY,10ML 02/08/2022 03:59 PM     Lab Results   Component Value Date/Time    CULTURE NO GROWTH 5 DAYS 02/08/2022 03:59 PM       Lab Results   Component Value Date    POCPH 7.349 02/15/2022    POCPCO2 52.2 02/15/2022    POCPO2 77.1 02/15/2022    POCHCO3 28.8 02/15/2022    NBEA NOT REPORTED 02/15/2022    PBEA 2 02/15/2022    ICZ3ZGB NOT REPORTED 02/15/2022    WDUQ6CJE 94 02/15/2022    FIO2 75.0 02/15/2022       Radiology:    XR CHEST 1 VIEW    Result Date: 1/16/2022  Hypoinflated lungs. Cardiomegaly again seen, when compared to the previous study performed 07/27/2019. Diffuse, increased interstitial markings throughout both lungs, some of which can be seen on the prior study may represent baseline chronic interstitial lung changes. The increased prominence on this exam could be secondary to the suboptimal inspiratory effort. The presence of pulmonary vascular congestion/mild CHF or bilateral interstitial pneumonia cannot be excluded. Clinical correlation is recommended. CT CHEST PULMONARY EMBOLISM W CONTRAST    Result Date: 1/16/2022  No evidence of pulmonary embolism. Compared to 2008, worsening underlying emphysema, with worsening subacute or acute interstitial lung disease, best seen left upper lobe; differential includes interstitial pneumonia or pneumonitis superimposed on emphysema, DIP, HP, and other interstitial pneumonias as well as infectious or inflammatory process superimposed on emphysema disease. Findings are not typical for COVID-19 pneumonia, but an element of the latter should be considered. Thickening and distortion left major fissure with ill-defined mass-like focus left lower lobe. The latter could also be infectious or inflammatory, although neoplasm should be excluded. Underlying worsening emphysema.   Nodular densities, best seen right upper lobe. Small pleural effusions, slightly larger on the left. See recommendations below. Mild mediastinal and left hilar adenopathy; see recommendations below. Worsening now moderate-severe pulmonary artery hypertension. Mild cardiomegaly. Stable mild dilatation ascending thoracic aorta. Additional unchanged or incidental findings, as above. RECOMMENDATIONS: Clinical correlation and short-term follow-up CT chest in 1-4 months depending upon the clinical presentation/course. All radiological studies reviewed                Code Status:  DNR-CCA    Electronically signed by Lissy Portillo MD on 2/15/2022 at 10:31 PM     Copy sent to Dr. Gary Abebe MD    This note was created with the assistance of a speech-recognition program.  Although the intention is to generate a document that actually reflects the content of the visit, no guarantees can be provided that every mistake has been identified and corrected by editing. Note was updated later by me after  physical examination and  completion of the assessment.

## 2022-02-16 NOTE — PROGRESS NOTES
[facility]   Section of Cardiology  Progress Note      Date:  2/16/2022  Patient: Neida Contreras  Admission:  1/16/2022 11:51 AM  Admit DX: Hypokalemia [E87.6]  Hypomagnesemia [E83.42]  COPD exacerbation (HCC) [J44.1]  BLANCHE (acute kidney injury) ([facility] [address] [N17.9]  Acute respiratory failure with hypoxia (HCC) [J96.01]  Acute on chronic systolic CHF (congestive heart failure) ([facility] [address] [I50.23]  COVID [U07.1]  COVID-19 [U07.1]  Age:  76 y.o., 1945                           LOS: 31 days     Reason for evaluation:   Atrial fibrillation  covid 19 pneumonia. SUBJECTIVE:     Intubated and sedated. Continues to be stable with no overnight cardiac issues. Yesterday patient was in atrial fibrillation and required iv amiodarone. He continues to be on a drip. OBJECTIVE:    /89   Pulse 74   Temp 98.8 °F (37.1 °C) (Oral)   Resp 22   Ht 5' 10\" (1.778 m)   Wt 242 lb (109.8 kg)   SpO2 98%   BMI 34.72 kg/m²     Intake/Output Summary (Last 24 hours) at 2/16/2022 1719  Last data filed at 2/16/2022 1615  Gross per 24 hour   Intake 6456.46 ml   Output 3575 ml   Net 2881.46 ml       EXAM:   CONSTITUTIONAL:  Intubated and sedated. Mari Gineel HEENT: Normal jugular venous pulsations, no carotid bruits. Head is atraumatic, normocephalic. Eyes and oral mucosa are normal.  LUNGS: Good respiratory effort. No for increased work of breathing. On auscultation: clear to auscultation bilaterally. CARDIOVASCULAR:  Normal apical impulse, regular rate and rhythm, normal S1 and S2, no S3 or S4, and no murmur or rub noted. ABDOMEN: Soft, nontender, nondistended. Bowel sounds present. No masses or tenderness. SKIN: Warm and dry. EXTREMITIES: 2+ lower extremity edema. Motor movement grossly intact. No cyanosis or clubbing.     Current Inpatient Medications:   potassium phosphate IVPB  20 mmol IntraVENous Once    furosemide  40 mg IntraVENous TID    meropenem  1,000 mg IntraVENous Q8H    linezolid  600 mg IntraVENous Q12H    sodium chloride  80 mL IntraVENous Once    sucralfate  1 g Oral 4 times per day    amLODIPine  5 mg Oral BID    insulin lispro  0-18 Units SubCUTAneous TID WC    insulin lispro  0-9 Units SubCUTAneous Nightly    amiodarone  200 mg Per NG tube Daily    insulin glargine  15 Units SubCUTAneous BID    Vitamin D  1,000 Units Oral Daily    polyethylene glycol  17 g Per NG tube Daily    levalbuterol  1.25 mg Nebulization Q6H    bisacodyl  10 mg Rectal Daily    pantoprazole  40 mg IntraVENous Daily    And    sodium chloride (PF)  10 mL IntraVENous Daily    aspirin  324 mg Oral Daily    levETIRAcetam  500 mg Oral BID    chlorhexidine  15 mL Mouth/Throat BID    enoxaparin  30 mg SubCUTAneous BID    QUEtiapine  50 mg Oral Once    budesonide  0.5 mg Nebulization BID    sodium chloride flush  5-40 mL IntraVENous 2 times per day    atorvastatin  20 mg Oral Daily       IV Infusions (if any):   amiodarone 1 mg/min (02/16/22 1533)    phenylephrine (KEARA-SYNEPHRINE) 50mg/250mL infusion Stopped (02/16/22 0343)    dextrose      dexmedetomidine (PRECEDEX) IV infusion 1.4 mcg/kg/hr (02/16/22 1531)    fentaNYL 50 mcg/hr (02/16/22 0452)    propofol Stopped (02/09/22 1030)    midazolam (VERSED) 1 mg/mL in D5W infusion Stopped (02/13/22 0905)    sodium chloride         Diagnostics:   Telemetry: Sinus rhythm  Labs:   CBC:   Recent Labs     02/15/22  0525 02/16/22  0540   WBC 5.0 6.5   HGB 11.2* 11.1*   HCT 37.1* 36.1*   * 120*     BMP:   Recent Labs     02/15/22  0525 02/16/22  0540    138   K 3.7 3.5*   CO2 24 25   BUN 43* 42*   CREATININE 0.73 0.66*   LABGLOM >60 >60   GLUCOSE 211* 211*     BNP: No results for input(s): BNP in the last 72 hours. PT/INR: No results for input(s): PROTIME, INR in the last 72 hours. APTT:No results for input(s): APTT in the last 72 hours. CARDIAC ENZYMES:No results for input(s): CKTOTAL, CKMB, CKMBINDEX, TROPONINI in the last 72 hours.   FASTING LIPID PANEL:  Lab Results   Component Value Date    HDL 30 11/03/2021    TRIG 181 11/03/2021     LIVER PROFILE:No results for input(s): AST, ALT, LABALBU in the last 72 hours. ASSESSMENT:    Patient Active Problem List   Diagnosis    Biventricular CHF (congestive heart failure) (HCC)    CKD (chronic kidney disease) stage 3, GFR 30-59 ml/min (HCC)    Essential hypertension    Blurred vision, right eye    Type 2 diabetes mellitus treated with insulin ([address]    Atherosclerotic plaque    Weakness on left side of face    Carotid stenosis, asymptomatic, bilateral    New onset seizure ([address]    COVID-19    Acute on chronic systolic CHF (congestive heart failure) (HCC)    Acute respiratory failure with hypoxia ([address]    BLANCHE (acute kidney injury) ([address]    COPD exacerbation ([address]    Hypokalemia    Hypomagnesemia    Palliative care encounter    Goals of care, counseling/discussion    DNR (do not resuscitate) discussion    ACP (advance care planning)    Constipation    Abdominal pain, generalized       PLAN:    1. Paroxysmal atrial fibrillation  2. covid 19 pneumonia  3. Respiratory failure  Plan  Continue conservative therapy  Diuresis as needed for anasarca. Continue iv amiodarone for paroxysmal atrial fibrillation. Family to discuss code issues. Please see orders. Discussed with  nursing.     Edna Serna MD, MD

## 2022-02-16 NOTE — PROGRESS NOTES
.  Nephrology  Progress Note    Reason for Consult: Electrolyte issues. Requesting Physician:  Gilmer Klein MD    INTERVAL HISTORY:  Remains sedated on mechanical ventilation  Phos and Potassium are low. Serum Na 138, corrected Na 141. A. Fib RVR requiring amio gtt. HISTORY OF PRESENT ILLNESS:    The patient is a 76 y.o. male who presented with to the hospital for worsening shortness of breath ad and worsening lower extremity edema on 1/16. He had diffuse body aches and sweats and a dry cough. He tested positive for COVID-19. He has steadily declined since admission. On 1/18 a CT head found New lacunar infarct left thalamus. He had to be intubated on 1/23. He has a significant past medical history of COPD, hyperlipidemia, Type 2 DM, Right kidney mass, and abdominal aortic aneurysm repair in 2005. His potassium has been steadily rising since 1/24/22. The most current Potassium level is 5.8. His creatine is improved to 1.71. This information was retrieved through chart review and nursing. Patient was unable to provide information due to current status on mechanical ventilation and sedation. Review Of Systems:  Unable to obtain due to patient's current clinical condition. Remains on mechanical ventilation and sedated. Past Medical History:   Diagnosis Date    Arthritis     Atherosclerotic plaque 7/28/2019    Cervical disc disease     degenerative disc disease    Diabetes mellitus ([address]     type 2    History of blood transfusion     Hx of blood clots     left leg    Hyperlipidemia     Neuropathy     Right kidney mass     \"urologist is watching\"    Snores     Type 2 diabetes mellitus treated with insulin ([facility] [address] 7/28/2019       Prior to Admission medications    Medication Sig Start Date End Date Taking?  Authorizing Provider   rosuvastatin (CRESTOR) 40 MG tablet Take 40 mg by mouth every evening   Yes Historical Provider, MD   insulin NPH (HUMULIN N;NOVOLIN N) 100 UNIT/ML injection vial Inject 20 Units into the skin 2 times daily (before meals)   Yes Historical Provider, MD   levETIRAcetam (KEPPRA) 500 MG tablet Take 1 tablet by mouth 2 times daily 11/3/21  Yes Flora Mitchell MD   venlafaxine (EFFEXOR XR) 150 MG extended release capsule Take 1 capsule by mouth daily (with breakfast) 7/29/19  Yes Arabella Kiser   vitamin B-1 (THIAMINE) 100 MG tablet Take 100 mg by mouth daily   Yes Historical Provider, MD   ferrous sulfate 325 (65 Fe) MG tablet Take 325 mg by mouth daily (with breakfast)   Yes Historical Provider, MD   ALPRAZolam (XANAX) 0.5 MG tablet Take 0.5 mg by mouth three times daily.    Yes Historical Provider, MD   furosemide (LASIX) 40 MG tablet Take 1 tablet by mouth 2 times daily 12/11/18  Yes Saintclair Canal M Magsi, MD   tiotropium (SPIRIVA RESPIMAT) 2.5 MCG/ACT AERS inhaler Inhale 2 puffs into the lungs daily    Yes Historical Provider, MD   albuterol sulfate  (90 Base) MCG/ACT inhaler Inhale 2 puffs into the lungs every 6 hours as needed for Wheezing or Shortness of Breath   Yes Historical Provider, MD   metoprolol succinate (TOPROL XL) 50 MG extended release tablet Take 50 mg by mouth daily   Yes Historical Provider, MD   Multiple Vitamins-Minerals (MULTIVITAMIN ADULT) TABS Take 1 tablet by mouth daily   Yes Historical Provider, MD   vitamin D (CHOLECALCIFEROL) 1000 UNIT TABS tablet Take 1,000 Units by mouth daily   Yes Historical Provider, MD   fluticasone (FLONASE) 50 MCG/ACT nasal spray 1 spray by Each Nare route daily as needed for Rhinitis   Yes Historical Provider, MD   aspirin 325 MG EC tablet Take 325 mg by mouth daily    Yes Historical Provider, MD   Omega-3 Fatty Acids (FISH OIL) 1000 MG CAPS Take 1 capsule by mouth daily    Yes Historical Provider, MD   amLODIPine (NORVASC) 5 MG tablet Take 5 mg by mouth nightly    Yes Historical Provider, MD   mirtazapine (REMERON) 45 MG tablet Take 45 mg by mouth nightly   Yes Historical Provider, MD       Scheduled Meds:   furosemide 40 mg IntraVENous TID    meropenem  1,000 mg IntraVENous Q8H    linezolid  600 mg IntraVENous Q12H    sodium chloride  80 mL IntraVENous Once    sucralfate  1 g Oral 4 times per day    amLODIPine  5 mg Oral BID    insulin lispro  0-18 Units SubCUTAneous TID WC    insulin lispro  0-9 Units SubCUTAneous Nightly    amiodarone  200 mg Per NG tube Daily    insulin glargine  15 Units SubCUTAneous BID    Vitamin D  1,000 Units Oral Daily    polyethylene glycol  17 g Per NG tube Daily    levalbuterol  1.25 mg Nebulization Q6H    bisacodyl  10 mg Rectal Daily    pantoprazole  40 mg IntraVENous Daily    And    sodium chloride (PF)  10 mL IntraVENous Daily    aspirin  324 mg Oral Daily    levETIRAcetam  500 mg Oral BID    chlorhexidine  15 mL Mouth/Throat BID    enoxaparin  30 mg SubCUTAneous BID    QUEtiapine  50 mg Oral Once    budesonide  0.5 mg Nebulization BID    sodium chloride flush  5-40 mL IntraVENous 2 times per day    atorvastatin  20 mg Oral Daily     Continuous Infusions:   amiodarone 1 mg/min (02/16/22 0739)    phenylephrine (KEARA-SYNEPHRINE) 50mg/250mL infusion Stopped (02/16/22 0343)    dextrose      dexmedetomidine (PRECEDEX) IV infusion 1.4 mcg/kg/hr (02/16/22 0740)    fentaNYL 50 mcg/hr (02/16/22 0452)    propofol Stopped (02/09/22 1030)    midazolam (VERSED) 1 mg/mL in D5W infusion Stopped (02/13/22 0905)    sodium chloride          Physical Exam:  Vitals:    02/16/22 0900 02/16/22 0930 02/16/22 1000 02/16/22 1030   BP: (!) 152/61  129/62    Pulse: 74 75 74 69   Resp: 25 25 24 28   Temp: 97.5 °F (36.4 °C)      TempSrc:       SpO2: 99% 97% 100% 100%   Weight:       Height:         I/O last 3 completed shifts: In: 7619.5 [I.V.:2045.8; FK/WA:9094; IV Piggyback:1624.7]  Out: 9535 [Urine:4550]    General: Intubated, sedated, not in distress. Appears to be stated age. HEENT: Atraumatic, normocephalic. Anicteric sclera. Pink and moist oral mucosa. Neck supple. No JVD.   Chest: Bilateral air entry, clear to auscultation, no wheezing, rhonchi or rales. Cardiovascular: RRR, S1S2, no murmur, rub or gallop. Bilateral +1 pitting lower extremity edema. Abdomen: Soft, non tender to palpation. PEG, hypoactive bowel sounds  Musculoskeletal: No cyanosis or clubbing. Integumentary: Pink, warm and dry. Free from rash or lesions. CNS: Sedated, intubated, face symmetrical. No tremor.      Data:  CBC:   Lab Results   Component Value Date    WBC 6.5 02/16/2022    HGB 11.1 (L) 02/16/2022    HCT 36.1 (L) 02/16/2022    MCV 80.2 (L) 02/16/2022     (L) 02/16/2022     BMP:    Lab Results   Component Value Date     02/16/2022     02/15/2022     02/14/2022    K 3.5 (L) 02/16/2022    K 3.7 02/15/2022    K 4.3 02/14/2022     02/16/2022     02/15/2022     02/14/2022    CO2 25 02/16/2022    CO2 24 02/15/2022    CO2 22 02/14/2022    BUN 42 (H) 02/16/2022    BUN 43 (H) 02/15/2022    BUN 42 (H) 02/14/2022    CREATININE 0.66 (L) 02/16/2022    CREATININE 0.73 02/15/2022    CREATININE 0.74 02/14/2022    GLUCOSE 211 (H) 02/16/2022    GLUCOSE 211 (H) 02/15/2022    GLUCOSE 228 (H) 02/14/2022     CMP:   Lab Results   Component Value Date     02/16/2022    K 3.5 02/16/2022     02/16/2022    CO2 25 02/16/2022    BUN 42 02/16/2022    CREATININE 0.66 02/16/2022    GLUCOSE 211 02/16/2022    CALCIUM 8.7 02/16/2022    PROT 5.0 02/12/2022    LABALBU 1.6 02/12/2022    BILITOT 0.37 02/12/2022    ALKPHOS 105 02/12/2022    AST 39 02/12/2022    ALT 36 02/12/2022      Hepatic:   Lab Results   Component Value Date    AST 39 02/12/2022    AST 72 (H) 02/03/2022    AST 46 (H) 02/01/2022    ALT 36 02/12/2022    ALT 77 (H) 02/03/2022    ALT 43 (H) 02/01/2022    BILITOT 0.37 02/12/2022    BILITOT 0.37 02/03/2022    BILITOT 0.28 (L) 02/01/2022    ALKPHOS 105 02/12/2022    ALKPHOS 117 02/03/2022    ALKPHOS 100 02/01/2022     BNP: No results found for: BNP  Lipids:   Lab Results   Component Value Date    CHOL 129 11/03/2021    HDL 30 (L) 11/03/2021     INR:   Lab Results   Component Value Date    INR 1.0 01/17/2022    INR 1.0 11/03/2021    INR 1.0 07/27/2019     PTH: No results found for: PTH  Phosphorus:    Lab Results   Component Value Date    PHOS 1.9 02/16/2022     Ionized Calcium: No results found for: IONCA  Magnesium:   Lab Results   Component Value Date    MG 1.5 02/16/2022     Albumin:   Lab Results   Component Value Date    LABALBU 1.6 02/12/2022     Last 3 CK, CKMB, Troponin: @LABRCNT(CKTOTAL:3,CKMB:3,TROPONINI:3)       URINE:)No results found for: Dallas Macdonald    Radiology:  Reviewed. Assessment:  Hypokalemia, used to have hyperkalemia. Hypernatremia, Na stable. Hypophosphatemia. Hypomagnesemia. Hypovitaminosis D. Metabolic alkalosis. Hypertension. Diabetes mellitis. COVID19 pneumonia. CHF. COPD. Plan:  Continue free water flushes at 300 ml every 3 hours. Will give 20 mmol K phos iv x 1. Will give magnesium sulfate 2 gm iv x 1. Metoprolol discontinued per cardiology. Lisinopril 20 mg daily started 2/2/2022 per cardiology. Monitor K and creatinine. Monitor blood pressure closely. Continue vitamin D supplementation. Amio gtt as per cardiology  TF per primary. Repeat echo 2/11/2022 reviewed. Avoid hypotension, nephrotoxic drugs, Fleets enema and IV contrast exposure. Follow up chemistries daily in the a.m. We will continue to follow with you. Electronically signed by Shavon Gage MD  on 2/16/2022 at 10:46 AM   [facility].   Ph: [phone]

## 2022-02-16 NOTE — PROGRESS NOTES
Pt rests quietly a little tracking with eyes noted. Updated and explained what staff are doing and care provided.

## 2022-02-16 NOTE — PROGRESS NOTES
[facility]  Progress Note    Sammy Schrader  MRN: [id number]  Date: 2/16/2022  LOS: 32     Reason for F/U :   COVID-19 virus infection    Impression :   COVID-19 virus infection tested + 1/16/2022  Acute respiratory failure currently ventilator dependent  Intubated 1/23/2022  Emphysema with worsening changes on imaging  Worsening acute interstitial lung disease/fibrosis   Right lower lobe pneumonia   Worsening moderate to severe pulmonary artery hypertension  Bilateral lower extremity edema likely related to above  Leukopenia and thrombocytopenia  Lacunar infarct on the left thalamus  Intermittent fevers  Acute kidney injury    Recommendations:   COVID-19 therapy: The patient was on Decadron 6 mg IV daily through 02/04/2022  The patient has been started on baricitinib 1/17/2022 but it was discontinued 1/18/2022 due to cytopenias. Actemra had been considered but again due to the cytopenias and thrombocytopenia this has been held. Antimicrobial therapy: The patient received Rocephin 1000 mg and azithromycin 500 mg on 1/16/2022  The patient received a dose of levofloxacin 500 mg on 1/18/2020. Patient started on cefepime and vancomycin 1/23/2022 and completed a 7-day course of cefepime on 1/28/2022  Vancomycin discontinued due to acute kidney injury 1/24/2022  Continue intravenous antimicrobial therapy with meropenem and Zyvox started 2/12/2022     The chest x-ray shows show improvement in the right lower lobe airspace disease. The patient has been able to be weaned down to 65% FiO2 on the ventilator.   Continue supportive therapy    Infection Control Recommendations:   Droplet plus precautions    Discharge Planning:   Patient will need Midline Catheter Insertion/ PICC line Insertion: No  Patient will need: Home IV , [place],  SNF,  LTAC: Undetermined  Patient willneed outpatient wound care: No    Medical Decision making / Summary of Stay:   Sammy Schrader is a 64y.o.-year-old male who was initially admitted on 1/16/2022. Roddy Henriquez has a history of diabetes mellitus type 2, essential hypertension, seizure disorder, chronic kidney disease stage III, hyperlipidemia among other medical problems.     The patient reports that about 1 to 2 months ago he had his diabetes medications changed and since that time he has noticed increasing swelling in his legs, hardness in the legs, difficulty ambulating, and weight gain from 178 to 255 pounds over the last 1 to 2 months. He did not report any subjective fevers, chills, cough or shortness of breath. He denies any loss of smell or change in taste. No abdominal pain nausea vomiting or diarrhea. The patient does report that he has home oxygen that he uses as needed at home and he reports that he hardly needs it. He is vaccinated did receive the J&J vaccine but has not yet received a booster dose.     The patient presented to the emergency department and was found to have leukopenia with a white blood cell count of 2.7 and thrombocytopenia with a platelet count of 018. Creatinine slightly elevated at 1.31 and proBNP is elevated at 9327. Chest x-ray showed hyperinflated lungs with cardiomegaly seen and diffuse increased interstitial markings in both lungs which may represent baseline chronic interstitial lung changes given that these findings were present before. A CT of the chest was done with IV contrast that showed no evidence of pulmonary embolism and compared to 2008 there is worsening underlying emphysema with worsening subacute or acute interstitial lung disease best seen in the left upper lobe. Findings were not felt typical for COVID-19 virus pneumonia. There was thickening and distortion of the left major fissure with an ill-defined masslike focus in the left lower lobe.   There is worsening moderate to severe pulmonary artery hypertension     COVID testing was positive and I was asked to evaluate and help with COVID-19 virus infection    The patient did have a CT scan of the abdomen pelvis done 2022 which was a limited study with no evidence of bowel obstruction and moderate stool burden noted felt related to constipation. Tiny amount of free fluid scattered in the abdomen, moderate pleural effusions and left basilar consolidation and basilar interstitial thickening increased from 2022    The patient is seen and evaluated at bedside he continues to have fevers up to 102.9 degrees     The patient had a CT of the head 2022 with no acute intracranial abnormality, CT of the chest that showed no evidence of pulmonary embolism but interval development of extensive airspace disease within the lower lobes bilaterally, diffuse interstitial pulmonary fibrosis and worsening of the reticulonodular changes suggesting superimposed infection or edema  And a CT of the abdomen and pelvis that showed patient was status post gastrostomy tube placement and development of mild pneumoperitoneum seen along the anterior aspect of the upper abdomen along with the hepatic margin. Slightly more pronounced pelvic and abdominal ascites compared to prior exam, stable anasarca. Repeat CT imaging imaging with findings suggestive of pneumonia as well as some fibrotic changes from the COVID pneumonia. I did start the patient on meropenem and Zyvox on 2022 due to elevated WBC, worsening infiltrates on chest x-ray    Current evaluation:2022    BP (!) 150/64   Pulse 72   Temp 99.5 °F (37.5 °C)   Resp 23   Ht 5' 10\" (1.778 m)   Wt 242 lb (109.8 kg)   SpO2 99%   BMI 34.72 kg/m²     Temperature Range: Temp: 99.5 °F (37.5 °C) Temp  Av.9 °F (37.2 °C)  Min: 98.1 °F (36.7 °C)  Max: 99.5 °F (37.5 °C)   The patient is seen and evaluated at bedside is on 65% FiO2 and 14 of PEEP  He is on Precedex and fentanyl for sedation.   The patient does have some episodes of tachypnea  The patient does answer bouts of rapid ventricular response remains on amiodarone. There have been episodes of hypotension and the patient was started on Luke-Synephrine overnight but this was recently discontinued early this morning    Review of Systems   Unable to perform ROS: Intubated       Physical Examination :     Physical Exam  Constitutional:       Appearance: He is well-developed. Interventions: He is sedated and intubated. HENT:      Head: Normocephalic and atraumatic. Cardiovascular:      Rate and Rhythm: Normal rate. Heart sounds: Normal heart sounds. No friction rub. No gallop. Pulmonary:      Effort: Pulmonary effort is normal. He is intubated. Breath sounds: Normal breath sounds. No wheezing. Abdominal:      General: Bowel sounds are normal.      Palpations: Abdomen is soft. There is no mass. Tenderness: There is no abdominal tenderness. Musculoskeletal:         General: Swelling (Bilateral upper extremity swelling) present. Cervical back: Neck supple. Lymphadenopathy:      Cervical: No cervical adenopathy. Skin:     General: Skin is warm and dry. Comments: There cyanotic changes in the legs and feet bilaterally related to the pressor support         Laboratory data:   I have independently reviewed the followinglabs:  CBC with Differential:   Recent Labs     02/15/22  0525 02/16/22  0540   WBC 5.0 6.5   HGB 11.2* 11.1*   HCT 37.1* 36.1*   * 120*   LYMPHOPCT 4* PENDING   MONOPCT 3 PENDING     BMP:   Recent Labs     02/14/22  0443 02/15/22  0525    141   K 4.3 3.7    107   CO2 22 24   BUN 42* 43*   CREATININE 0.74 0.73   MG 1.9 1.7     Hepatic Function Panel:   No results for input(s): PROT, LABALBU, BILIDIR, IBILI, BILITOT, ALKPHOS, ALT, AST in the last 72 hours.       Lab Results   Component Value Date    PROCAL 0.25 02/01/2022    PROCAL 0.24 01/23/2022    PROCAL 0.11 01/19/2022     Lab Results   Component Value Date    CRP 23.5 01/16/2022    CRP 2.0 07/27/2019     Lab Results   Component Value Date    SEDRATE 3 01/16/2022         Lab Results   Component Value Date    DDIMER 4.53 02/12/2022    DDIMER 2.96 01/30/2022    DDIMER 5.84 01/23/2022    DDIMER 1.53 01/18/2022    DDIMER 1.73 01/16/2022     Lab Results   Component Value Date    FERRITIN 569 01/16/2022    FERRITIN 50 07/23/2019    FERRITIN 18 07/08/2019     Lab Results   Component Value Date     01/16/2022     Lab Results   Component Value Date    FIBRINOGEN 402 01/16/2022       No results found for requested labs within last 30 days. Lab Results   Component Value Date    COVID19 DETECTED 01/16/2022    COVID19 Not Detected 11/02/2021       No results for input(s): VANCOTROUGH in the last 72 hours. Imaging Studies:   ONE XRAY VIEW OF THE CHEST 2/15/2022 6:40 am    Impression   No significant interval change.  Diffuse interstitial and patchy hazy   opacities within the lungs. CT OF THE ABDOMEN AND PELVIS WITH CONTRAST 2/12/2022 2:03 pm  Impression   Status post gastrostomy tube with tip in the upper stomach.  There is   development of mild pneumoperitoneum seen along the anterior aspect of the   upper abdomen along the hepatic margin.  It is not clear whether the free air   is associated of the recent gastrostomy placement.       Recommend surgical consultation       Slightly more pronounced pelvic and abdominal ascites when compared to the   prior exam       Stable anasarca       Critical results were called by Dr. Wing Frias MD to Dr Giuseppe Ho on 2/12/2022 at 21:14. CTA OF THE CHEST 2/12/2022 12:03 pm      Impression   1. No evidence of pulmonary embolism   2. Interval development of extensive airspace disease within the lower lobes   bilaterally.  Differential considerations would include aspiration changes,   bacterial pneumonia, and atelectasis   3. Diffuse interstitial pulmonary fibrosis, with worsening of the   reticulonodular changes, suggesting superimposed infection or edema   4.  Small amount of pneumoperitoneum present within the upper abdomen. Correlation with the dedicated CT scan abdomen pelvis recommended. 5. Cardiomegaly, with extensive coronary arterial calcifications   6. Small bilateral pleural effusions   7.  Critical results were called by Dr. Alvin Hare to Dr Ranjana Schofield on   2/12/2022 at 5:45 p.m. hours. CT OF THE HEAD WITHOUT CONTRAST  2/12/2022 5:02 pm  Impression   1. No acute intracranial abnormality. 2. Microangiopathic change. ONE XRAY VIEW OF THE CHEST 2/12/2022 5:28 am    Impression   Bilateral lung airspace disease which has slightly progressed within the   right lower lobe.               CT OF THE ABDOMEN AND PELVIS WITHOUT CONTRAST 1/28/2022 8:34 am    Impression   1.  Overall mildly limited study due to motion and lack of contrast.       2.  No evidence of bowel obstruction.  Moderate stool burden is noted,   possibly related to constipation.  Enteric tube is in place with distal tip   in the proximal stomach.       3.  Tiny amount of free fluid scattered in the abdomen, including   presacral/perirectal fluid, most likely related to volume overload.  No   definite findings of rectal wall thickening or fecal impaction.       4.  Moderate pleural effusions, left basilar consolidation, and bibasilar   interstitial thickening, increased when compared to 01/16/2022.       RECOMMENDATIONS:   Unavailable         Veins: Lower Extremities DVT Study, Venous Scan Lower Bilateral.  Indications for Study: Leg Swelling . Patient Status: In Patient. Technical Quality: Limited visualization. Limitation reason: Edema. Comments: Simultaneous real time imaging utilizing B-Mode, color doppler and spectral waveform analysis was performed on the  bilateral lower extremities for venous examination of the deep and superficial systems. Conclusions  Summary  No evidence of superficial or deep venous thrombosis in both lower extremities.   Signature  Electronically signed by Malcolm Jacinto LARA Alvarez(Sonographer) on 01/19/2022 08:10 AM  Electronically signed by Ethan Siegel physician) on 01/19/2022 06:21 PM      CT OF THE HEAD WITHOUT CONTRAST  1/18/2022 5:42 pm  Impression   New lacunar infarct left thalamus, age indeterminate. Christel Angelucci may be helpful.           Cultures:     Culture, Blood 1 [[id number]] Collected: 02/08/22 1559   Order Status: Completed Specimen: Blood Updated: 02/13/22 2041    Specimen Description . BLOOD    Special Requests RT ARTERY,10ML    Culture NO GROWTH 5 DAYS   Culture, Blood 1 [[id number]] Collected: 02/08/22 1549   Order Status: Completed Specimen: Blood Updated: 02/13/22 2035    Specimen Description . BLOOD    Special Requests RT HAND,10ML    Culture NO GROWTH 5 DAYS     Culture, Blood 1 [[id number]] Collected: 02/01/22 0003   Order Status: Completed Specimen: Blood Updated: 02/06/22 0830    Specimen Description . BLOOD    Special Requests ART LINE 20ML    Culture NO GROWTH 5 DAYS   Culture, Blood 1 [[id number]] Collected: 01/31/22 1853   Order Status: Completed Specimen: Blood Updated: 02/05/22 2315    Specimen Description . BLOOD    Special Requests RT HAND,10ML    Culture NO GROWTH 5 DAYS     Culture, Respiratory [[id number]] Collected: 01/28/22 1030   Order Status: Completed Specimen: Sputum, Suctioned Updated: 01/30/22 0932    Specimen Description . SUCTIONED SPUTUM    Special Requests NOT REPORTED    Direct Exam < 10 EPITHELIAL CELLS/LPF     <10 NEUTROPHILS/LPF     NO SIGNIFICANT PATHOGENS SEEN    Culture NORMAL RESPIRATORY PO HEAVY GROWTH       Culture, Blood 1 [[id number]] Collected: 01/23/22 1759   Order Status: Completed Specimen: Blood Updated: 01/28/22 2111    Specimen Description . BLOOD    Special Requests RT HAND 19ML    Culture NO GROWTH 5 DAYS   Culture, Blood 1 [[id number]] Collected: 01/23/22 1759   Order Status: Completed Specimen: Blood Updated: 01/28/22 2110    Specimen Description . BLOOD    Special Requests ART LINE 10ML    Culture NO GROWTH 5 DAYS     Culture, Blood 2 [[id number]] Collected: 01/21/22 0742   Order Status: Completed Specimen: Blood Updated: 01/26/22 1001    Specimen Description . BLOOD    Special Requests LEFT AC 20ML    Culture NO GROWTH 5 DAYS   Culture, Blood 1 [[id number]] Collected: 01/21/22 0750   Order Status: Completed Specimen: Blood Updated: 01/26/22 1000    Specimen Description . BLOOD    Special Requests LEFT HAND 5ML    Culture NO GROWTH 5 DAYS   MRSA DNA Probe, Nasal [[id number]] Collected: 01/23/22 2258   Order Status: Completed Specimen: Nasal Updated: 01/25/22 1038    Specimen Description . NASAL SWAB    MRSA, DNA, Nasal NEGATIVE    Comment: NEGATIVE:  MRSA DNA not detected by nucleic acid amplification.                                                     Results should be used as an adjunct to nosocomial control efforts to identify patients   needing enhanced precautions.     The test is not intended to identify patients with staphylococcal infections.  Results   should not be used to guide or monitor treatment for MRSA infections. Culture, Blood 1 [[id number]] Collected: 01/16/22 1220   Order Status: Completed Specimen: Blood Updated: 01/21/22 1521    Specimen Description . BLOOD    Special Requests LAC 12ML    Culture NO GROWTH 5 DAYS   Culture, Blood 1 [[id number]] Collected: 01/16/22 1205   Order Status: Completed Specimen: Blood Updated: 01/21/22 1521    Specimen Description . BLOOD    Special Requests RAC 12ML    Culture NO GROWTH 5 DAYS       Culture, Urine [[id number]] Collected: 01/16/22 1315   Order Status: Completed Specimen: Urine, clean catch Updated: 01/17/22 2128    Specimen Description . CLEAN CATCH URINE    Special Requests NOT REPORTED    Culture NO SIGNIFICANT GROWTH       COVID-19, Rapid [[id number]] (Abnormal) Collected: 01/16/22 1230   Order Status: Completed Specimen: Nasopharyngeal Swab Updated: 01/16/22 1314    Specimen Description . NASOPHARYNGEAL SWAB    SARS-CoV-2, Rapid DETECTED Abnormal     Comment:        Rapid NAAT: The specimen is POSITIVE for SARS-Cov-2, the novel coronavirus associated with   COVID-19.         This test has been authorized by the FDA under an Emergency Use Authorization (EUA) for use   by authorized laboratories.         The ID NOW COVID-19 assay is designed to detect the virus that causes COVID-19 in patients   with signs and symptoms of infection who are suspected of COVID-19. An individual without symptoms of COVID-19 and who is not shedding SARS-CoV-2 virus would   expect to have a negative (not detected) result in this assay. Fact sheet for Healthcare Providers: BuildHer.es   Fact sheet for Patients: BuildHer.es           Methodology: Isothermal Nucleic Acid Amplification         Results reported to the appropriate Health Department         Flu A/B Ag Detection [[id number]] Collected: 01/16/22 1230   Order Status: Completed Specimen: Nasopharyngeal Swab Updated: 01/16/22 1313    Specimen Description . NASOPHARYNGEAL SWAB    Special Requests NOT REPORTED    Direct Exam NEGATIVE for Influenza A + B antigens.                                PCR testing to confirm this result is available upon request. Alex Sheets will be saved in the laboratory for 7 days.  Please call [phone] if PCR testing is indicated.      Medications:      furosemide  40 mg IntraVENous TID    meropenem  1,000 mg IntraVENous Q8H    linezolid  600 mg IntraVENous Q12H    sodium chloride  80 mL IntraVENous Once    sucralfate  1 g Oral 4 times per day    amLODIPine  5 mg Oral BID    insulin lispro  0-18 Units SubCUTAneous TID WC    insulin lispro  0-9 Units SubCUTAneous Nightly    amiodarone  200 mg Per NG tube Daily    insulin glargine  15 Units SubCUTAneous BID    Vitamin D  1,000 Units Oral Daily    polyethylene glycol  17 g Per NG tube Daily    levalbuterol  1.25 mg Nebulization Q6H    bisacodyl  10 mg Rectal Daily    pantoprazole  40 mg IntraVENous Daily    And    sodium chloride (PF)  10 mL IntraVENous Daily    aspirin  324 mg Oral Daily    levETIRAcetam  500 mg Oral BID    chlorhexidine  15 mL Mouth/Throat BID    enoxaparin  30 mg SubCUTAneous BID    QUEtiapine  50 mg Oral Once    budesonide  0.5 mg Nebulization BID    sodium chloride flush  5-40 mL IntraVENous 2 times per day    atorvastatin  20 mg Oral Daily           [facility]  Cat Soto MD  [employer] messaging  OFFICE: [phone]      Electronically signed by Cat Soto MD on 2/16/2022 at 6:05 AM  Thank you for allowing us to participate in the care of this patient. Please call with questions. This note iscreated with the assistance of a speech recognition program.  While intending to generate a document that actually reflects the content of the visit, the document can still have some errors including those of syntax andsound a like substitutions which may escape proof reading. In such instances, actual meaning can be extrapolated by contextual diversion.

## 2022-02-16 NOTE — PROGRESS NOTES
General Surgery:  Daily Progress Note              PATIENT NAME: Hans Garcia     TODAY'S DATE: 2/16/2022, 6:21 AM    SUBJECTIVE:     Pt seen and examined at bedside this morning. No acute events overnight. Afebrile. Patient remains intubated and sedated. FiO2-65%, PEEP-14. OBJECTIVE:   VITALS:  BP (!) 144/65   Pulse 70   Temp 99.5 °F (37.5 °C)   Resp 22   Ht 5' 10\" (1.778 m)   Wt 242 lb (109.8 kg)   SpO2 98%   BMI 34.72 kg/m²      INTAKE/OUTPUT:      Intake/Output Summary (Last 24 hours) at 2/16/2022 5269  Last data filed at 2/16/2022 0500  Gross per 24 hour   Intake 6006.46 ml   Output 3450 ml   Net 2556.46 ml       PHYSICAL EXAM:  General Appearance: Intubated, sedated, opens eyes  HEENT:  Normocephalic, atraumatic, mucus membranes moist   Skin:  Skin color, texture, turgor normal. No rashes or lesions. Lungs:  No chest wall tenderness. Heart:  Heart regular rate and rhythm  Abdomen:  soft, ND, NTTP, +bowel sounds, PEG tube in place  Extremities: Extremities warm to touch, pink, with no edema.        Data:  CBC with Differential:    Lab Results   Component Value Date    WBC 6.5 02/16/2022    RBC 4.50 02/16/2022    HGB 11.1 02/16/2022    HCT 36.1 02/16/2022     02/16/2022    MCV 80.2 02/16/2022    MCH 24.7 02/16/2022    MCHC 30.7 02/16/2022    RDW 15.5 02/16/2022    LYMPHOPCT PENDING 02/16/2022    MONOPCT PENDING 02/16/2022    BASOPCT PENDING 02/16/2022    MONOSABS PENDING 02/16/2022    LYMPHSABS PENDING 02/16/2022    EOSABS PENDING 02/16/2022    BASOSABS PENDING 02/16/2022    DIFFTYPE NOT REPORTED 02/16/2022     BMP:    Lab Results   Component Value Date     02/16/2022    K 3.5 02/16/2022     02/16/2022    CO2 25 02/16/2022    BUN 42 02/16/2022    LABALBU 1.6 02/12/2022    CREATININE 0.66 02/16/2022    CALCIUM 8.7 02/16/2022    GFRAA >60 02/16/2022    LABGLOM >60 02/16/2022    GLUCOSE 211 02/16/2022       ASSESSMENT:  Active Hospital Problems    Diagnosis Date Noted    Constipation [K59.00]     Abdominal pain, generalized [R10.84]     Acute on chronic systolic CHF (congestive heart failure) ([facility]) [I50.23]     Acute respiratory failure with hypoxia ([facility]) [J96.01]     BLANCHE (acute kidney injury) ([facility] [address] [N17.9]     COPD exacerbation ([facility]) [J44.1]     Hypokalemia [E87.6]     Hypomagnesemia [E83.42]     Palliative care encounter [Z51.5]     Goals of care, counseling/discussion [Z71.89]     DNR (do not resuscitate) discussion [Z71.89]     ACP (advance care planning) [Z71.89]     COVID-19 [U07.1] 01/16/2022       1. 76 y.o. male with acute respiratory failure status post COVID-19    Plan:  1. Patient seen examined at bedside this morning  2. Patient continues to require higher levels of FiO2 and PEEP. Currently at 65% FiO2, PEEP-14  3. We will continue to follow. We will plan for trach when vent settings lower.   4. Continue medical management per primary team.    Electronically signed by Travis Silva DO  on 2/16/2022 at 6:21 AM

## 2022-02-17 NOTE — PROGRESS NOTES
General Surgery:  Daily Progress Note              PATIENT NAME: Madyson Roberts     TODAY'S DATE: 2/17/2022, 6:06 AM    SUBJECTIVE:     Pt seen and examined at bedside this morning. No acute events overnight. Afebrile. Remains intubated and sedated. Does not follow commands. PEEP-14, FiO2-60. OBJECTIVE:   VITALS:  BP (!) 113/44   Pulse 54   Temp 97.9 °F (36.6 °C) (Axillary)   Resp 23   Ht 5' 10\" (1.778 m)   Wt 242 lb (109.8 kg)   SpO2 98%   BMI 34.72 kg/m²      INTAKE/OUTPUT:      Intake/Output Summary (Last 24 hours) at 2/17/2022 0606  Last data filed at 2/17/2022 0517  Gross per 24 hour   Intake 5536.56 ml   Output 3900 ml   Net 1636.56 ml       PHYSICAL EXAM:  General Appearance: Intubated, sedated, does not follow commands  HEENT:  Normocephalic, atraumatic, mucus membranes moist   Skin:  Skin color, texture, turgor normal. No rashes or lesions. Lungs:  No chest wall tenderness. Heart:  Heart regular rate and rhythm  Abdomen:   soft, ND, NTTP, +bowel sounds, PEG tube in place  Extremities: Extremities warm to touch, pink, with no edema.        Data:  CBC with Differential:    Lab Results   Component Value Date    WBC 6.1 02/17/2022    RBC 4.25 02/17/2022    HGB 10.5 02/17/2022    HCT 33.7 02/17/2022     02/17/2022    MCV 79.3 02/17/2022    MCH 24.7 02/17/2022    MCHC 31.2 02/17/2022    RDW 15.5 02/17/2022    LYMPHOPCT PENDING 02/17/2022    MONOPCT PENDING 02/17/2022    BASOPCT PENDING 02/17/2022    MONOSABS PENDING 02/17/2022    LYMPHSABS PENDING 02/17/2022    EOSABS PENDING 02/17/2022    BASOSABS PENDING 02/17/2022    DIFFTYPE NOT REPORTED 02/17/2022     BMP:    Lab Results   Component Value Date     02/17/2022    K 3.3 02/17/2022    CL 98 02/17/2022    CO2 25 02/17/2022    BUN 40 02/17/2022    LABALBU 1.6 02/12/2022    CREATININE 0.60 02/17/2022    CALCIUM 8.3 02/17/2022    GFRAA >60 02/17/2022    LABGLOM >60 02/17/2022    GLUCOSE 227 02/17/2022       ASSESSMENT:  C/Chance Bermudez 1102 Problems    Diagnosis Date Noted    Constipation [K59.00]     Abdominal pain, generalized [R10.84]     Acute on chronic systolic CHF (congestive heart failure) ([facility]) [I50.23]     Acute respiratory failure with hypoxia ([facility]) [J96.01]     BLANCHE (acute kidney injury) ([facility] [address] [N17.9]     COPD exacerbation ([facility]) [J44.1]     Hypokalemia [E87.6]     Hypomagnesemia [E83.42]     Palliative care encounter [Z51.5]     Goals of care, counseling/discussion [Z71.89]     DNR (do not resuscitate) discussion [Z71.89]     ACP (advance care planning) [Z71.89]     COVID-19 [U07.1] 01/16/2022       1. 76 y.o. male with acute respiratory failure due to COVID-19    Plan:  1. Patient was seen and examined at bedside this morning. 2. Patient wes intubated and sedated. PEEP-14, FiO2-60%. 3. Vent settings remain too high for tracheostomy at this time. We will continue to follow. Continue to try and wean from ventilator.   4. Continue medical management per primary team.    Electronically signed by Param Lott DO  on 2/17/2022 at 6:06 AM

## 2022-02-17 NOTE — PROGRESS NOTES
Pulmonary Critical Care Progress Note       Patient seen for the follow up of COVID-19 pneumonia, acute hypoxic respiratory failure. Subjective:  He is sedated, intubated on ventilator. He is on fentanyl, propofol and Precedex. He is on FiO2 at 75% with PEEP of 14, oxygen requirements increase since yesterday, O2 was 55 on last night's ABG. He is on amiodarone and Luke-Synephrine drips. He is tolerating tube feeds. He has a bowel movement. Examination:  Vitals: /60   Pulse 53   Temp 97.9 °F (36.6 °C) (Axillary)   Resp 26   Ht 5' 10\" (1.778 m)   Wt 242 lb (109.8 kg)   SpO2 100%   BMI 34.72 kg/m²   General appearance: Mildly sedated, intubated on ventilator  Neck: No JVD  Lungs decreased breath sound no crackles or wheezing  Heart: regular rate and rhythm, S1, S2 normal, no gallop  Abdomen: Soft, non tender, + BS  Extremities: no cyanosis or clubbing. 3+ edema    LABs:  CBC:   Recent Labs     02/16/22  0540 02/17/22 0514   WBC 6.5 6.1   HGB 11.1* 10.5*   HCT 36.1* 33.7*   * 111*     BMP:   Recent Labs     02/16/22  0540 02/17/22  0514    135   K 3.5* 3.3*   CO2 25 25   BUN 42* 40*   CREATININE 0.66* 0.60*   LABGLOM >60 >60   GLUCOSE 211* 227*     ABG:  Lab Results   Component Value Date    BGS1QBF NOT REPORTED 02/17/2022    FIO2 NOT REPORTED 02/17/2022       Lab Results   Component Value Date    POCPH 7.437 02/17/2022    POCPCO2 41.8 02/17/2022    POCPO2 55.3 02/17/2022    POCHCO3 28.2 02/17/2022    PBEA 4 02/17/2022    JKI5HQR NOT REPORTED 02/17/2022    EXUI2CZK 89 02/17/2022    FIO2 NOT REPORTED 02/17/2022     Radiology:  Chest x-ray 2/15  No significant interval change.  Diffuse interstitial and patchy hazy   opacities within the lungs. CT chest: No PE, bibasilar infiltrate. Small amount of pneumoperitoneum.     CT abdomen pelvis  Status post gastrostomy tube with tip in the upper stomach.  There is   development of mild pneumoperitoneum seen along the anterior aspect of the   upper abdomen along the hepatic margin.  It is not clear whether the free air   is associated of the recent gastrostomy placement.       Recommend surgical consultation       Slightly more pronounced pelvic and abdominal ascites when compared to the   prior exam       Stable anasarca               :  CT brain: No acute intracranial abnormality  Lower extremity Dopplers: 2/14/2022: No DVT  Upper extremity Dopplers 2/14/2022: No DVT    Impression:  · Acute on chronic hypoxic respiratory failure  · COVID-19 pneumonia/ARDS  · COPD/pulmonary emphysema  · Acute left thalamic infarct/CVA  · Left lower lobe opacity versus nodule  · Pulmonary edema  · Elevated D-dimer, decreased platelets  · Systolic heart failure, s/p AICD placement  · BLANCHE on CKD  · Diabetes mellitus    Recommendations:  · Continue vent support, wean FiO2 as tolerated, keep PEEP  14  · Fentanyl drip RASS -2  · Continue precedex drip RASS -2  · Diprivan drip, for RASS score -1 to -2  · Luke-Synephrine titrate for MAP 65 to 75 mmHg  · Xopenex by nebulizer  · Pulmicort 0.5 every 12 hours  · Monitor blood sugars off insulin drip  · Amiodarone drip per cardiology.     · Out of Airborne isolation  · Linezolid and meropenem per ID  · Not a candidate for Actemra/baricitinib stopped secondary to cytopenias  · Neurology on consult  · Lasix 40 IV every 8 hours  · Tube feeds as tolerated  · GI prophylaxis, Protonix  · Discussed with RN  · General surgery following for tracheostomy  · DVT prophylaxis, Lovenox 30 twice daily  · We will follow with you    Electronically signed by     Daniel Guillermo MD on 2/17/2022 at 11:16 AM  Pulmonary Critical Care and Sleep Medicine,  [facility]: [phone]  Cc: 35 minutes

## 2022-02-17 NOTE — PLAN OF CARE
Problem: Airway Clearance - Ineffective  Goal: Achieve or maintain patent airway  2/16/2022 2238 by Reji Muller RN  Outcome: Ongoing  2/16/2022 1549 by Sho Mcallister RN  Outcome: Ongoing     Problem: Gas Exchange - Impaired  Goal: Absence of hypoxia  2/16/2022 2238 by Reji Muller RN  Outcome: Ongoing  2/16/2022 1549 by Sho Mcallister RN  Outcome: Ongoing     Problem: Gas Exchange - Impaired  Goal: Promote optimal lung function  2/16/2022 2238 by Reji Muller RN  Outcome: Ongoing  2/16/2022 1549 by Sho Mcallister RN  Outcome: Ongoing     Problem: Breathing Pattern - Ineffective  Goal: Ability to achieve and maintain a regular respiratory rate  2/16/2022 2238 by Reji Muller RN  Outcome: Ongoing  2/16/2022 1549 by Sho Mcallister RN  Outcome: Ongoing     Problem:  Body Temperature -  Risk of, Imbalanced  Goal: Ability to maintain a body temperature within defined limits  2/16/2022 2238 by Reji Muller RN  Outcome: Ongoing  2/16/2022 1549 by Sho Mcallister RN  Outcome: Ongoing

## 2022-02-17 NOTE — PROGRESS NOTES
Physical Therapy  DATE: 2022    NAME: Maricarmen Betts  MRN: [id number]   : 1945    Patient not seen this date for Physical Therapy due to:      [] Cancel by RN or physician due to:    [] Hemodialysis    [] Critical Lab Value Level     [] Blood transfusion in progress    [] Acute or unstable cardiovascular status   _MAP < 55 or more than >115  _HR < 40 or > 130    [] Acute or unstable pulmonary status   -FiO2 > 60%   _RR < 5 or >40    _O2 sats < 85%          [] Off Unit for surgery or procedure    [] Off Unit for testing       [] Pending imaging to R/O fracture    [] Refusal by Patient      [] Other      [x] PT being discontinued at this time, Intubated/sedated since 22 , POOR PROGNOSIS &  N/A for PT. Please reorder PT if & when appropriate. [] PT being discontinued at this time as the patient has been transferred to hospice care. No further needs.       [address], PT

## 2022-02-17 NOTE — PROGRESS NOTES
Nutrition Assessment     Type and Reason for Visit: Reassess    Nutrition Recommendations/Plan:   1. Diet NPO  2. Continue Nepro at 50 mL/hr (1200 mL/hr)  3. Monitor tube feeding rate/ tolerance, labs, vent status and GI function     Nutrition Assessment:  Patient remains intubated and sedated. Patient continues to tolerate Nepro tube feeding at goal rate 50 mL/hr (1200 mL total volume). Vent settings continue to be high. Patient has deep tissue injury on sacrum and left heel. Will monitor tube feeding rate and tolerance. Malnutrition Assessment:  Malnutrition Status: At risk for malnutrition (Comment)    Estimated Daily Nutrient Needs:  Energy (kcal): 9267-3880 kcal (MSJ, 1.2-1.3 factor); Weight Used for Energy Requirements:  Current     Protein (g): 102-110 gm (1.3-1.4 gm/kg); Weight Used for Protein Requirements:  Ideal          Nutrition Related Findings: Edema: +3 pitting BUE, +2 pitting BLE. Hypoactive bowels.       Current Nutrition Therapies:    Diet NPO  ADULT TUBE FEEDING; Orogastric; Renal Formula; Continuous; 10; Yes; 10; Q 6 hours; 50; 300; Q 4 hours    Anthropometric Measures:  · Height: 5' 10\" (177.8 cm)  · Current Body Wt: 242 lb (109.8 kg)   · BMI: 34.7    Nutrition Diagnosis:   · Inadequate protein-energy intake related to inadequate protein-energy intake,impaired respiratory function as evidenced by NPO or clear liquid status due to medical condition,intubation,nutrition support - enteral nutrition      Nutrition Interventions:   Food and/or Nutrient Delivery:  Continue NPO,Continue Current Tube Feeding  Nutrition Education/Counseling:  Education not indicated   Coordination of Nutrition Care:  Continue to monitor while inpatient    Goals:  EN support to provide >75% of estimated nutritional needs       Nutrition Monitoring and Evaluation:   Behavioral-Environmental Outcomes:  None Identified   Food/Nutrient Intake Outcomes:  Enteral Nutrition Intake/Tolerance  Physical Signs/Symptoms Outcomes:  Biochemical Data,Skin,Weight,GI Status     Discharge Planning:     Too soon to determine         Ailyn Dus  MFN, RDN, LDN  Lead Clinical Dietitian  RD Office Phone [phone]

## 2022-02-17 NOTE — PROGRESS NOTES
.  Nephrology  Progress Note    Reason for Consult: Electrolyte issues. Requesting Physician:  Kita Guerra MD    INTERVAL HISTORY:  Remains sedated on mechanical ventilation  Corrected sodium was 137  Potassium was low at 3.3    HISTORY OF PRESENT ILLNESS:    The patient is a 76 y.o. male who presented with to the hospital for worsening shortness of breath ad and worsening lower extremity edema on 1/16. He had diffuse body aches and sweats and a dry cough. He tested positive for COVID-19. He has steadily declined since admission. On 1/18 a CT head found New lacunar infarct left thalamus. He had to be intubated on 1/23. He has a significant past medical history of COPD, hyperlipidemia, Type 2 DM, Right kidney mass, and abdominal aortic aneurysm repair in 2005. His potassium has been steadily rising since 1/24/22. The most current Potassium level is 5.8. His creatine is improved to 1.71. This information was retrieved through chart review and nursing. Patient was unable to provide information due to current status on mechanical ventilation and sedation. Review Of Systems:  Unable to obtain due to patient's current clinical condition. Remains on mechanical ventilation and sedated. Past Medical History:   Diagnosis Date    Arthritis     Atherosclerotic plaque 7/28/2019    Cervical disc disease     degenerative disc disease    Diabetes mellitus ([facility] [address]     type 2    History of blood transfusion     Hx of blood clots     left leg    Hyperlipidemia     Neuropathy     Right kidney mass     \"urologist is watching\"    Snores     Type 2 diabetes mellitus treated with insulin ([facility] [address] 7/28/2019       Prior to Admission medications    Medication Sig Start Date End Date Taking?  Authorizing Provider   rosuvastatin (CRESTOR) 40 MG tablet Take 40 mg by mouth every evening   Yes Historical Provider, MD   insulin NPH (HUMULIN N;NOVOLIN N) 100 UNIT/ML injection vial Inject 20 Units into the skin 2 times daily (before meals)   Yes Historical Provider, MD   levETIRAcetam (KEPPRA) 500 MG tablet Take 1 tablet by mouth 2 times daily 11/3/21  Yes Tammy Reeder MD   venlafaxine (EFFEXOR XR) 150 MG extended release capsule Take 1 capsule by mouth daily (with breakfast) 7/29/19  Yes Arabella Kiser   vitamin B-1 (THIAMINE) 100 MG tablet Take 100 mg by mouth daily   Yes Historical Provider, MD   ferrous sulfate 325 (65 Fe) MG tablet Take 325 mg by mouth daily (with breakfast)   Yes Historical Provider, MD   ALPRAZolam (XANAX) 0.5 MG tablet Take 0.5 mg by mouth three times daily.    Yes Historical Provider, MD   furosemide (LASIX) 40 MG tablet Take 1 tablet by mouth 2 times daily 12/11/18  Yes Atul Evans MD   tiotropium (SPIRIVA RESPIMAT) 2.5 MCG/ACT AERS inhaler Inhale 2 puffs into the lungs daily    Yes Historical Provider, MD   albuterol sulfate  (90 Base) MCG/ACT inhaler Inhale 2 puffs into the lungs every 6 hours as needed for Wheezing or Shortness of Breath   Yes Historical Provider, MD   metoprolol succinate (TOPROL XL) 50 MG extended release tablet Take 50 mg by mouth daily   Yes Historical Provider, MD   Multiple Vitamins-Minerals (MULTIVITAMIN ADULT) TABS Take 1 tablet by mouth daily   Yes Historical Provider, MD   vitamin D (CHOLECALCIFEROL) 1000 UNIT TABS tablet Take 1,000 Units by mouth daily   Yes Historical Provider, MD   fluticasone (FLONASE) 50 MCG/ACT nasal spray 1 spray by Each Nare route daily as needed for Rhinitis   Yes Historical Provider, MD   aspirin 325 MG EC tablet Take 325 mg by mouth daily    Yes Historical Provider, MD   Omega-3 Fatty Acids (FISH OIL) 1000 MG CAPS Take 1 capsule by mouth daily    Yes Historical Provider, MD   amLODIPine (NORVASC) 5 MG tablet Take 5 mg by mouth nightly    Yes Historical Provider, MD   mirtazapine (REMERON) 45 MG tablet Take 45 mg by mouth nightly   Yes Historical Provider, MD       Scheduled Meds:   furosemide  40 mg IntraVENous TID    meropenem  1,000 mg IntraVENous Q8H    linezolid  600 mg IntraVENous Q12H    sodium chloride  80 mL IntraVENous Once    sucralfate  1 g Oral 4 times per day    amLODIPine  5 mg Oral BID    insulin lispro  0-18 Units SubCUTAneous TID WC    insulin lispro  0-9 Units SubCUTAneous Nightly    amiodarone  200 mg Per NG tube Daily    insulin glargine  15 Units SubCUTAneous BID    Vitamin D  1,000 Units Oral Daily    polyethylene glycol  17 g Per NG tube Daily    levalbuterol  1.25 mg Nebulization Q6H    bisacodyl  10 mg Rectal Daily    pantoprazole  40 mg IntraVENous Daily    And    sodium chloride (PF)  10 mL IntraVENous Daily    aspirin  324 mg Oral Daily    levETIRAcetam  500 mg Oral BID    chlorhexidine  15 mL Mouth/Throat BID    enoxaparin  30 mg SubCUTAneous BID    QUEtiapine  50 mg Oral Once    budesonide  0.5 mg Nebulization BID    sodium chloride flush  5-40 mL IntraVENous 2 times per day    atorvastatin  20 mg Oral Daily     Continuous Infusions:   amiodarone 1 mg/min (02/17/22 0630)    phenylephrine (KEARA-SYNEPHRINE) 50mg/250mL infusion 14 mcg/min (02/17/22 1034)    dextrose      dexmedetomidine (PRECEDEX) IV infusion 1.1 mcg/kg/hr (02/17/22 1032)    fentaNYL 50 mcg/hr (02/17/22 0830)    propofol 20 mcg/kg/min (02/17/22 1110)    midazolam (VERSED) 1 mg/mL in D5W infusion Stopped (02/13/22 0905)    sodium chloride          Physical Exam:  Vitals:    02/17/22 1030 02/17/22 1045 02/17/22 1100 02/17/22 1115   BP:   (!) 133/58    Pulse: 53 58 56 56   Resp: 26 26 26 26   Temp:       TempSrc:       SpO2: 100% 100% 100% 100%   Weight:       Height:         I/O last 3 completed shifts: In: 8846.8 [I.V.:2756. 4; NG/GT:4215; IV Piggyback:1875.4]  Out: 5775 [Urine:5775]    General: Intubated, sedated, not in distress. Appears to be stated age. HEENT: Atraumatic, normocephalic. Anicteric sclera. Pink and moist oral mucosa. Neck supple. No JVD.   Chest: Bilateral air entry, clear to auscultation, no wheezing, rhonchi or rales. Cardiovascular: RRR, S1S2, no murmur, rub or gallop. Bilateral +1 pitting lower extremity edema. Abdomen: Soft, non tender to palpation. PEG, hypoactive bowel sounds  Musculoskeletal: No cyanosis or clubbing. Integumentary: Pink, warm and dry. Free from rash or lesions. CNS: Sedated, intubated, face symmetrical. No tremor.      Data:  CBC:   Lab Results   Component Value Date    WBC 6.1 02/17/2022    HGB 10.5 (L) 02/17/2022    HCT 33.7 (L) 02/17/2022    MCV 79.3 (L) 02/17/2022     (L) 02/17/2022     BMP:    Lab Results   Component Value Date     02/17/2022     02/16/2022     02/15/2022    K 3.3 (L) 02/17/2022    K 3.5 (L) 02/16/2022    K 3.7 02/15/2022    CL 98 02/17/2022     02/16/2022     02/15/2022    CO2 25 02/17/2022    CO2 25 02/16/2022    CO2 24 02/15/2022    BUN 40 (H) 02/17/2022    BUN 42 (H) 02/16/2022    BUN 43 (H) 02/15/2022    CREATININE 0.60 (L) 02/17/2022    CREATININE 0.66 (L) 02/16/2022    CREATININE 0.73 02/15/2022    GLUCOSE 227 (H) 02/17/2022    GLUCOSE 211 (H) 02/16/2022    GLUCOSE 211 (H) 02/15/2022     CMP:   Lab Results   Component Value Date     02/17/2022    K 3.3 02/17/2022    CL 98 02/17/2022    CO2 25 02/17/2022    BUN 40 02/17/2022    CREATININE 0.60 02/17/2022    GLUCOSE 227 02/17/2022    CALCIUM 8.3 02/17/2022    PROT 5.0 02/12/2022    LABALBU 1.6 02/12/2022    BILITOT 0.37 02/12/2022    ALKPHOS 105 02/12/2022    AST 39 02/12/2022    ALT 36 02/12/2022      Hepatic:   Lab Results   Component Value Date    AST 39 02/12/2022    AST 72 (H) 02/03/2022    AST 46 (H) 02/01/2022    ALT 36 02/12/2022    ALT 77 (H) 02/03/2022    ALT 43 (H) 02/01/2022    BILITOT 0.37 02/12/2022    BILITOT 0.37 02/03/2022    BILITOT 0.28 (L) 02/01/2022    ALKPHOS 105 02/12/2022    ALKPHOS 117 02/03/2022    ALKPHOS 100 02/01/2022     BNP: No results found for: BNP  Lipids:   Lab Results   Component Value Date    CHOL 129 11/03/2021    HDL 30 (L) 11/03/2021     INR:   Lab Results   Component Value Date    INR 1.0 01/17/2022    INR 1.0 11/03/2021    INR 1.0 07/27/2019     PTH: No results found for: PTH  Phosphorus:    Lab Results   Component Value Date    PHOS 2.4 02/17/2022     Ionized Calcium: No results found for: IONCA  Magnesium:   Lab Results   Component Value Date    MG 1.7 02/17/2022     Albumin:   Lab Results   Component Value Date    LABALBU 1.6 02/12/2022     Last 3 CK, CKMB, Troponin: @LABRCNT(CKTOTAL:3,CKMB:3,TROPONINI:3)       URINE:)No results found for: Ozzy Simmons    Radiology:  Reviewed. Assessment:  Hypokalemia, used to have hyperkalemia. Hypernatremia, Na stable. Hypophosphatemia. Hypomagnesemia. Hypovitaminosis D. Metabolic alkalosis. Hypertension. Diabetes mellitis. COVID19 pneumonia. CHF. COPD. Plan:  Decrease free water flushes to 300 ml every 4 hours. Replace potassium  Okay to replace electrolytes per protocol  Monitor blood pressure closely. Continue vitamin D supplementation. Amio gtt as per cardiology  TF per primary. Repeat echo 2/11/2022 reviewed. Avoid hypotension, nephrotoxic drugs, Fleets enema and IV contrast exposure. Prognosis guarded  Electronically signed by Katy Lopez MD  on 2/17/2022 at 11:38 AM   [facility].   Ph: [phone]

## 2022-02-17 NOTE — PROGRESS NOTES
Progress note  [facility]    Adult Hospitalist      Name: Donal Cortes  MRN: [id number]     Acct: [de-identified]  Room: [address] [place] [address]-    Admit Date: 1/16/2022 11:51 AM  PCP: Kaveh Cano MD    Primary Problem  Active Problems:    COVID-19    Acute on chronic systolic CHF (congestive heart failure) (HCC)    Acute respiratory failure with hypoxia (HCC)    BLANCHE (acute kidney injury) ([facility]    COPD exacerbation ([facility]    Hypokalemia    Hypomagnesemia    Palliative care encounter    Goals of care, counseling/discussion    DNR (do not resuscitate) discussion    ACP (advance care planning)    Constipation    Abdominal pain, generalized  Resolved Problems:    * No resolved hospital problems. *        Assesment:   Acute congestive heart failure, diastolic   [id number]   Viral pneumonia secondary to above  Acute respiratory failure with hypoxia intubated on 1/23/2022  Hyperkalemia  Paroxysmal atrial fibrillation  Essential hypertension  Mixed hyperlipidemia  Diabetes mellitus type 2  History of seizure disorder  History of CKD stage III, presently normal renal function  Lung mass?   Leukopenia  Polycythemia  Thrombocytopenia  Anxiety disorder  Recent past h/o lacunar infarct left thalamus   Altered mental status/agitation  Endotracheal intubation secondary to hypoxia dated 1/23/2022  Supraventricular tachycardia/elevated troponin  Fever  Emphysema   Pulmonary artery hypertension   Decubitus ulcer buttocks      Plan:   Admit patient to intermediate floor  Mechanical ventilation sedation as per critical care, patient is requiring PEEP of 10-->14 with  90% FiO2  Telemetry  Check vitals closely  CBC BMP daily    Cardiology consult  Amiodarone  Precedex gtt    IV Decadron completed  Off pressors  Off amiodarone drip  Patient completed a course of cefepime for 7 days  Bronchodilators  Pulmicort  Patient is deemed not a candidate for Actemra or baricitinib secondary to cytopenia  Infectious disease consult  Pulmonology consult    Continue Keppra  Continue amlodipine, atorvastatin  Continue aspirin    GI signed off   Tube feeds  Consult nephrology   Insulin gtt- DC  Lantus w SSI-->increase to high intensity as BS running high, now better controlled    Plan tracheostomy, continue to wean FiO2  General surgery following, plan for tracheostomy  S/P PEG tube   Wound care   D/w RN  Continue other medication as below.     Scheduled Meds:   furosemide  40 mg IntraVENous TID    meropenem  1,000 mg IntraVENous Q8H    linezolid  600 mg IntraVENous Q12H    sodium chloride  80 mL IntraVENous Once    sucralfate  1 g Oral 4 times per day    amLODIPine  5 mg Oral BID    insulin lispro  0-18 Units SubCUTAneous TID WC    insulin lispro  0-9 Units SubCUTAneous Nightly    amiodarone  200 mg Per NG tube Daily    insulin glargine  15 Units SubCUTAneous BID    Vitamin D  1,000 Units Oral Daily    polyethylene glycol  17 g Per NG tube Daily    levalbuterol  1.25 mg Nebulization Q6H    bisacodyl  10 mg Rectal Daily    pantoprazole  40 mg IntraVENous Daily    And    sodium chloride (PF)  10 mL IntraVENous Daily    aspirin  324 mg Oral Daily    levETIRAcetam  500 mg Oral BID    chlorhexidine  15 mL Mouth/Throat BID    enoxaparin  30 mg SubCUTAneous BID    QUEtiapine  50 mg Oral Once    budesonide  0.5 mg Nebulization BID    sodium chloride flush  5-40 mL IntraVENous 2 times per day    atorvastatin  20 mg Oral Daily     Continuous Infusions:   phenylephrine (KEARA-SYNEPHRINE) 50mg/250mL infusion 15 mcg/min (02/17/22 1355)    dextrose      dexmedetomidine (PRECEDEX) IV infusion 1.1 mcg/kg/hr (02/17/22 1032)    fentaNYL 50 mcg/hr (02/17/22 0830)    propofol 15 mcg/kg/min (02/17/22 1356)    midazolam (VERSED) 1 mg/mL in D5W infusion Stopped (02/13/22 0905)    sodium chloride       PRN Meds:  potassium alternative oral replacement, 40 mEq, PRN  sodium chloride flush, 10 mL, PRN  potassium chloride, 20 mEq, PRN  magnesium sulfate, 1,000 mg, PRN  glucose, 15 g, PRN  glucagon (rDNA), 1 mg, PRN  dextrose, 100 mL/hr, PRN  sodium chloride nebulizer, 3 mL, Q8H PRN  LORazepam, 1 mg, Q4H PRN  dextrose bolus (hypoglycemia), 125 mL, PRN   Or  dextrose bolus (hypoglycemia), 250 mL, PRN  sodium chloride flush, 10 mL, PRN  sodium chloride, 25 mL, PRN  ondansetron, 4 mg, Q8H PRN   Or  ondansetron, 4 mg, Q6H PRN  acetaminophen, 650 mg, Q6H PRN   Or  acetaminophen, 650 mg, Q6H PRN  hydrALAZINE, 10 mg, Q6H PRN        Chief Complaint:     Chief Complaint   Patient presents with    Leg Swelling     bilateral, has CHF, on diuretic, EMT saw pt , assisted pt into car    Fever    Other     low SPO2         History of Present Illness:   Patient seen examined at bedside, patient remains in medical ICU  Patient remains intubated sedated  Patient not responding with reducing sedation  Presently on fentanyl and propofol  tube feeding at 50ml/h  Palliative care meeting being planned  D/w RN      Review of systems:  Unable to conduct ROS secondary to patient being intubated      Initial HPI  Gela Choudhury is a 76 y.o.  male who presents with Leg Swelling (bilateral, has CHF, on diuretic, EMT saw pt , assisted pt into car), Fever, and Other (low SPO2)  This is a 78-year-old gentleman admitted via ER, come to ER with complaint of having shortness of breath, patient has medical history significant for COPD patient with history of cardiac failure: Patient noted that he has been having increasing swelling in his lower extremity and becoming more short of breath, further patient also been having some body aches and sweats, patient patient testing in the emergency room showed that he is positive for COVID-19 also noticed to have an elevated BNP, imaging concerning for fluid overload, admitted for further regiment    I have personally reviewed the past medical history, past surgical history, medications, social history, and family history, and summarized in the note.    Review of Systems:       Unable to conduct ROS secondary to patient being intubated      Past Medical History:     Past Medical History:   Diagnosis Date    Arthritis     Atherosclerotic plaque 7/28/2019    Cervical disc disease     degenerative disc disease    Diabetes mellitus ([facility] [address]     type 2    History of blood transfusion     Hx of blood clots     left leg    Hyperlipidemia     Neuropathy     Right kidney mass     \"urologist is watching\"    Snores     Type 2 diabetes mellitus treated with insulin ([facility] [address] 7/28/2019        Past Surgical History:     Past Surgical History:   Procedure Laterality Date    ABDOMINAL AORTIC ANEURYSM REPAIR  2005    CARPAL TUNNEL RELEASE Bilateral     CERVICAL SPINE SURGERY      COLONOSCOPY      benign polyps removed    INGUINAL HERNIA REPAIR Right     NE REPAIR INCISIONAL HERNIA,REDUCIBLE N/A 5/10/2017    HERNIA VENTRAL REPAIR WITH MESH  performed by Desirae Whitman DO at [place] [address] N/A 2/9/2022    EGD   PEG TUBE INSERTION performed by Nola Gonzalez MD at [address]  05/10/2017    with mesh        Medications Prior to Admission:       Prior to Admission medications    Medication Sig Start Date End Date Taking?  Authorizing Provider   rosuvastatin (CRESTOR) 40 MG tablet Take 40 mg by mouth every evening   Yes Historical Provider, MD   insulin NPH (HUMULIN N;NOVOLIN N) 100 UNIT/ML injection vial Inject 20 Units into the skin 2 times daily (before meals)   Yes Historical Provider, MD   levETIRAcetam (KEPPRA) 500 MG tablet Take 1 tablet by mouth 2 times daily 11/3/21  Yes Judy Ospina MD   venlafaxine (EFFEXOR XR) 150 MG extended release capsule Take 1 capsule by mouth daily (with breakfast) 7/29/19  Yes Arabella Kiser   vitamin B-1 (THIAMINE) 100 MG tablet Take 100 mg by mouth daily   Yes Historical Provider, MD   ferrous sulfate 325 (65 Fe) MG tablet Take 325 mg by mouth daily (with breakfast)   Yes Historical Provider, MD   ALPRAZolam (XANAX) 0.5 MG tablet Take 0.5 mg by mouth three times daily. Yes Historical Provider, MD   furosemide (LASIX) 40 MG tablet Take 1 tablet by mouth 2 times daily 12/11/18  Yes Federico Evans MD   tiotropium (SPIRIVA RESPIMAT) 2.5 MCG/ACT AERS inhaler Inhale 2 puffs into the lungs daily    Yes Historical Provider, MD   albuterol sulfate  (90 Base) MCG/ACT inhaler Inhale 2 puffs into the lungs every 6 hours as needed for Wheezing or Shortness of Breath   Yes Historical Provider, MD   metoprolol succinate (TOPROL XL) 50 MG extended release tablet Take 50 mg by mouth daily   Yes Historical Provider, MD   Multiple Vitamins-Minerals (MULTIVITAMIN ADULT) TABS Take 1 tablet by mouth daily   Yes Historical Provider, MD   vitamin D (CHOLECALCIFEROL) 1000 UNIT TABS tablet Take 1,000 Units by mouth daily   Yes Historical Provider, MD   fluticasone (FLONASE) 50 MCG/ACT nasal spray 1 spray by Each Nare route daily as needed for Rhinitis   Yes Historical Provider, MD   aspirin 325 MG EC tablet Take 325 mg by mouth daily    Yes Historical Provider, MD   Omega-3 Fatty Acids (FISH OIL) 1000 MG CAPS Take 1 capsule by mouth daily    Yes Historical Provider, MD   amLODIPine (NORVASC) 5 MG tablet Take 5 mg by mouth nightly    Yes Historical Provider, MD   mirtazapine (REMERON) 45 MG tablet Take 45 mg by mouth nightly   Yes Historical Provider, MD        Allergies: Barbiturates, Codeine, and Sulfa antibiotics    Social History:     Tobacco:    reports that he has quit smoking. He has never used smokeless tobacco.  Alcohol:      reports no history of alcohol use. Drug Use:  reports no history of drug use.     Family History:     Family History   Problem Relation Age of Onset    Ovarian Cancer Sister     Ovarian Cancer Sister          Physical Exam:     Vitals:  BP (!) 133/58   Pulse (!) 43   Temp 97.9 °F (36.6 °C) (Axillary)   Resp 24   Ht 5' 10\" (1.778 m)   Wt 242 lb (109.8 kg)   SpO2 100%   BMI 34.72 kg/m²   Temp (24hrs), Av.3 °F (36.8 °C), Min:97.9 °F (36.6 °C), Max:98.8 °F (37.1 °C)      General appearance -on ventilator  Mental status -sedated  Head - normocephalic and atraumatic  Eyes - conjunctiva clear  Ears -external ears normal  Nose - no drainage noted  Mouth - mucous membranes moist  Neck - supple, no carotid bruits, thyroid not palpable  Chest -basal crackles with decreased air entry bilaterally to auscultation, increased effort  Heart - normal rate, regular rhythm, no murmur  Abdomen - soft, nontender, nondistended, bowel sounds present all four quadrants, no masses, hepatomegaly or splenomegaly  Neurological -sedated on vent  Extremities - extremity edema, lower distal extremity discoloration    Skin - no gross lesions, rashes, or induration noted        Data:     Labs:    Hematology:  Recent Labs     02/15/22  0522   WBC 5.0 6.5 6.1   RBC 4.56 4.50 4.25   HGB 11.2* 11.1* 10.5*   HCT 37.1* 36.1* 33.7*   MCV 81.4* 80.2* 79.3*   MCH 24.6* 24.7* 24.7*   MCHC 30.2 30.7 31.2   RDW 15.5* 15.5* 15.5*   * 120* 111*   MPV 11.1 11.9 11.5     Chemistry:  Recent Labs     02/15/22  0525 22  0540 22    138 135   K 3.7 3.5* 3.3*    102 98   CO2 24 25 25   GLUCOSE 211* 211* 227*   BUN 43* 42* 40*   CREATININE 0.73 0.66* 0.60*   MG 1.7 1.5* 1.7   ANIONGAP 10 11 12   LABGLOM >60 >60 >60   GFRAA >60 >60 >60   CALCIUM 8.8 8.7 8.3*   PHOS 2.0* 1.9* 2.4*     Recent Labs     2222  1209 22  1639 22  1919 22  0829 22  1134   POCGLU 163* 185* 178* 175* 201* 170*       Lab Results   Component Value Date    INR 1.0 2022    INR 1.0 2021    INR 1.0 2019    PROTIME 13.3 2022    PROTIME 11.1 2021    PROTIME 10.5 2019       Lab Results   Component Value Date/Time    SPECIAL RT ARTERY,10ML 2022 03:59 PM     Lab Results   Component Value Date/Time    CULTURE NO GROWTH 5 DAYS 02/08/2022 03:59 PM       Lab Results   Component Value Date    POCPH 7.437 02/17/2022    POCPCO2 41.8 02/17/2022    POCPO2 55.3 02/17/2022    POCHCO3 28.2 02/17/2022    NBEA NOT REPORTED 02/17/2022    PBEA 4 02/17/2022    GQB9WBG NOT REPORTED 02/17/2022    EKPN0RGA 89 02/17/2022    FIO2 NOT REPORTED 02/17/2022       Radiology:    XR CHEST 1 VIEW    Result Date: 1/16/2022  Hypoinflated lungs. Cardiomegaly again seen, when compared to the previous study performed 07/27/2019. Diffuse, increased interstitial markings throughout both lungs, some of which can be seen on the prior study may represent baseline chronic interstitial lung changes. The increased prominence on this exam could be secondary to the suboptimal inspiratory effort. The presence of pulmonary vascular congestion/mild CHF or bilateral interstitial pneumonia cannot be excluded. Clinical correlation is recommended. CT CHEST PULMONARY EMBOLISM W CONTRAST    Result Date: 1/16/2022  No evidence of pulmonary embolism. Compared to 2008, worsening underlying emphysema, with worsening subacute or acute interstitial lung disease, best seen left upper lobe; differential includes interstitial pneumonia or pneumonitis superimposed on emphysema, DIP, HP, and other interstitial pneumonias as well as infectious or inflammatory process superimposed on emphysema disease. Findings are not typical for COVID-19 pneumonia, but an element of the latter should be considered. Thickening and distortion left major fissure with ill-defined mass-like focus left lower lobe. The latter could also be infectious or inflammatory, although neoplasm should be excluded. Underlying worsening emphysema. Nodular densities, best seen right upper lobe. Small pleural effusions, slightly larger on the left. See recommendations below. Mild mediastinal and left hilar adenopathy; see recommendations below.  Worsening now moderate-severe pulmonary artery hypertension. Mild cardiomegaly. Stable mild dilatation ascending thoracic aorta. Additional unchanged or incidental findings, as above. RECOMMENDATIONS: Clinical correlation and short-term follow-up CT chest in 1-4 months depending upon the clinical presentation/course. All radiological studies reviewed                Code Status:  DNR-CCA    Electronically signed by Sim Essex, MD on 2/17/2022 at 2:01 PM     Copy sent to Dr. Josefa Klein MD    This note was created with the assistance of a speech-recognition program.  Although the intention is to generate a document that actually reflects the content of the visit, no guarantees can be provided that every mistake has been identified and corrected by editing. Note was updated later by me after  physical examination and  completion of the assessment.

## 2022-02-17 NOTE — PROGRESS NOTES
Pt  Became bradycardic  HR between 38- low 40s)Sedation titrated accordingly, will continue to monitor.

## 2022-02-17 NOTE — PROGRESS NOTES
[facility]  Progress Note    Milla Gimenez  MRN: [id number]  Date: 2/17/2022  LOS: 28     Reason for F/U :   COVID-19 virus infection    Impression :   COVID-19 virus infection tested + 1/16/2022  Acute respiratory failure currently ventilator dependent  Intubated 1/23/2022  Emphysema  Worsening acute interstitial lung disease/fibrosis related to COVID-19 virus infection  Right lower lobe pneumonia   Worsening moderate to severe pulmonary artery hypertension  Bilateral lower extremity edema likely related to above  Leukopenia and thrombocytopenia  Lacunar infarct on the left thalamus  Intermittent fevers  Acute kidney injury    Recommendations:   COVID-19 therapy: The patient was on Decadron 6 mg IV daily through 02/04/2022  The patient has been started on baricitinib 1/17/2022 but it was discontinued 1/18/2022 due to cytopenias. Actemra had been considered but again due to the cytopenias and thrombocytopenia this has been held. Antimicrobial therapy: The patient received Rocephin 1000 mg and azithromycin 500 mg on 1/16/2022  The patient received a dose of levofloxacin 500 mg on 1/18/2020. Patient started on cefepime and vancomycin 1/23/2022 and completed a 7-day course of cefepime on 1/28/2022  Vancomycin discontinued due to acute kidney injury 1/24/2022  Continue intravenous antimicrobial therapy with meropenem and Zyvox started 2/12/2022     The patient is seen and is on 75% FiO2 and 14 of PEEP today. Remains on sedation had to be increased with Precedex. The patient has had some bradycardia and hypotensive episodes and is back on pressors.     Infection Control Recommendations:   Droplet plus precautions    Discharge Planning:   Patient will need Midline Catheter Insertion/ PICC line Insertion: No  Patient will need: Home IV , [place],  SNF,  LTAC: Undetermined  Patient willneed outpatient wound care: No    Medical Decision making / Summary of Stay:   Milla Gimenez is a 64 y.o.-year-old male who was initially admitted on 1/16/2022. Zhanna Leyva has a history of diabetes mellitus type 2, essential hypertension, seizure disorder, chronic kidney disease stage III, hyperlipidemia among other medical problems.     The patient reports that about 1 to 2 months ago he had his diabetes medications changed and since that time he has noticed increasing swelling in his legs, hardness in the legs, difficulty ambulating, and weight gain from 178 to 255 pounds over the last 1 to 2 months. He did not report any subjective fevers, chills, cough or shortness of breath. He denies any loss of smell or change in taste. No abdominal pain nausea vomiting or diarrhea. The patient does report that he has home oxygen that he uses as needed at home and he reports that he hardly needs it. He is vaccinated did receive the J&J vaccine but has not yet received a booster dose.     The patient presented to the emergency department and was found to have leukopenia with a white blood cell count of 2.7 and thrombocytopenia with a platelet count of 857. Creatinine slightly elevated at 1.31 and proBNP is elevated at 9327. Chest x-ray showed hyperinflated lungs with cardiomegaly seen and diffuse increased interstitial markings in both lungs which may represent baseline chronic interstitial lung changes given that these findings were present before. A CT of the chest was done with IV contrast that showed no evidence of pulmonary embolism and compared to 2008 there is worsening underlying emphysema with worsening subacute or acute interstitial lung disease best seen in the left upper lobe. Findings were not felt typical for COVID-19 virus pneumonia. There was thickening and distortion of the left major fissure with an ill-defined masslike focus in the left lower lobe.   There is worsening moderate to severe pulmonary artery hypertension     COVID testing was positive and I was asked to evaluate and help with COVID-19 virus infection    The patient did have a CT scan of the abdomen pelvis done 2022 which was a limited study with no evidence of bowel obstruction and moderate stool burden noted felt related to constipation. Tiny amount of free fluid scattered in the abdomen, moderate pleural effusions and left basilar consolidation and basilar interstitial thickening increased from 2022    The patient is seen and evaluated at bedside he continues to have fevers up to 102.9 degrees     The patient had a CT of the head 2022 with no acute intracranial abnormality, CT of the chest that showed no evidence of pulmonary embolism but interval development of extensive airspace disease within the lower lobes bilaterally, diffuse interstitial pulmonary fibrosis and worsening of the reticulonodular changes suggesting superimposed infection or edema  And a CT of the abdomen and pelvis that showed patient was status post gastrostomy tube placement and development of mild pneumoperitoneum seen along the anterior aspect of the upper abdomen along with the hepatic margin. Slightly more pronounced pelvic and abdominal ascites compared to prior exam, stable anasarca. Repeat CT imaging imaging with findings suggestive of pneumonia as well as some fibrotic changes from the COVID pneumonia. I did start the patient on meropenem and Zyvox on 2022 due to elevated WBC, worsening infiltrates on chest x-ray    Current evaluation:2022    BP (!) 121/54   Pulse (!) 48   Temp 97.9 °F (36.6 °C) (Axillary)   Resp 20   Ht 5' 10\" (1.778 m)   Wt 242 lb (109.8 kg)   SpO2 98%   BMI 34.72 kg/m²     Temperature Range: Temp: 97.9 °F (36.6 °C) Temp  Av.3 °F (36.8 °C)  Min: 97.5 °F (36.4 °C)  Max: 98.9 °F (37.2 °C)   The patient is seen and evaluated at bedside and is on 75% FiO2 and 14 of PEEP. The patient remains on sedation but had Precedex added.   The patient has had some hypotension and bradycardia and the Luke-Synephrine was resumed    Review of Systems   Unable to perform ROS: Intubated       Physical Examination :     Physical Exam  Constitutional:       Appearance: He is well-developed. Interventions: He is sedated and intubated. HENT:      Head: Normocephalic and atraumatic. Cardiovascular:      Rate and Rhythm: Normal rate. Heart sounds: Normal heart sounds. No friction rub. No gallop. Pulmonary:      Effort: Pulmonary effort is normal. He is intubated. Breath sounds: Normal breath sounds. No wheezing. Abdominal:      General: Bowel sounds are normal.      Palpations: Abdomen is soft. There is no mass. Tenderness: There is no abdominal tenderness. Musculoskeletal:         General: Swelling (Bilateral upper extremity swelling) present. Cervical back: Neck supple. Lymphadenopathy:      Cervical: No cervical adenopathy. Skin:     General: Skin is warm and dry. Comments: There cyanotic changes in the legs and feet bilaterally related to the pressor support         Laboratory data:   I have independently reviewed the followinglabs:  CBC with Differential:   Recent Labs     02/16/22  0540 02/17/22  0514   WBC 6.5 6.1   HGB 11.1* 10.5*   HCT 36.1* 33.7*   * 111*   LYMPHOPCT 6* 6*   MONOPCT 5 4     BMP:   Recent Labs     02/16/22  0540 02/17/22  0514    135   K 3.5* 3.3*    98   CO2 25 25   BUN 42* 40*   CREATININE 0.66* 0.60*   MG 1.5* 1.7     Hepatic Function Panel:   No results for input(s): PROT, LABALBU, BILIDIR, IBILI, BILITOT, ALKPHOS, ALT, AST in the last 72 hours.       Lab Results   Component Value Date    PROCAL 0.25 02/01/2022    PROCAL 0.24 01/23/2022    PROCAL 0.11 01/19/2022     Lab Results   Component Value Date    CRP 23.5 01/16/2022    CRP 2.0 07/27/2019     Lab Results   Component Value Date    SEDRATE 3 01/16/2022         Lab Results   Component Value Date    DDIMER 4.53 02/12/2022    DDIMER 2.96 01/30/2022    DDIMER 5.84 01/23/2022    DDIMER 1.53 01/18/2022 DDIMER 1.73 01/16/2022     Lab Results   Component Value Date    FERRITIN 569 01/16/2022    FERRITIN 50 07/23/2019    FERRITIN 18 07/08/2019     Lab Results   Component Value Date     01/16/2022     Lab Results   Component Value Date    FIBRINOGEN 402 01/16/2022       No results found for requested labs within last 30 days. Lab Results   Component Value Date    COVID19 DETECTED 01/16/2022    COVID19 Not Detected 11/02/2021       No results for input(s): VANCOTROUGH in the last 72 hours. Imaging Studies:   ONE XRAY VIEW OF THE CHEST 2/15/2022 6:40 am    Impression   No significant interval change.  Diffuse interstitial and patchy hazy   opacities within the lungs. CT OF THE ABDOMEN AND PELVIS WITH CONTRAST 2/12/2022 2:03 pm  Impression   Status post gastrostomy tube with tip in the upper stomach.  There is   development of mild pneumoperitoneum seen along the anterior aspect of the   upper abdomen along the hepatic margin.  It is not clear whether the free air   is associated of the recent gastrostomy placement.       Recommend surgical consultation       Slightly more pronounced pelvic and abdominal ascites when compared to the   prior exam       Stable anasarca       Critical results were called by Dr. Willa Diaz MD to Dr Kimmie Theodore on 2/12/2022 at 21:14. CTA OF THE CHEST 2/12/2022 12:03 pm      Impression   1. No evidence of pulmonary embolism   2. Interval development of extensive airspace disease within the lower lobes   bilaterally.  Differential considerations would include aspiration changes,   bacterial pneumonia, and atelectasis   3. Diffuse interstitial pulmonary fibrosis, with worsening of the   reticulonodular changes, suggesting superimposed infection or edema   4. Small amount of pneumoperitoneum present within the upper abdomen. Correlation with the dedicated CT scan abdomen pelvis recommended.    5. Cardiomegaly, with extensive coronary arterial calcifications   6. Small bilateral pleural effusions   7.  Critical results were called by Dr. He Mitchell to Dr Clemente Ledezma on   2/12/2022 at 5:45 p.m. hours. CT OF THE HEAD WITHOUT CONTRAST  2/12/2022 5:02 pm  Impression   1. No acute intracranial abnormality. 2. Microangiopathic change. ONE XRAY VIEW OF THE CHEST 2/12/2022 5:28 am    Impression   Bilateral lung airspace disease which has slightly progressed within the   right lower lobe.               CT OF THE ABDOMEN AND PELVIS WITHOUT CONTRAST 1/28/2022 8:34 am    Impression   1.  Overall mildly limited study due to motion and lack of contrast.       2.  No evidence of bowel obstruction.  Moderate stool burden is noted,   possibly related to constipation.  Enteric tube is in place with distal tip   in the proximal stomach.       3.  Tiny amount of free fluid scattered in the abdomen, including   presacral/perirectal fluid, most likely related to volume overload.  No   definite findings of rectal wall thickening or fecal impaction.       4.  Moderate pleural effusions, left basilar consolidation, and bibasilar   interstitial thickening, increased when compared to 01/16/2022.       RECOMMENDATIONS:   Unavailable         Veins: Lower Extremities DVT Study, Venous Scan Lower Bilateral.  Indications for Study: Leg Swelling . Patient Status: In Patient. Technical Quality: Limited visualization. Limitation reason: Edema. Comments: Simultaneous real time imaging utilizing B-Mode, color doppler and spectral waveform analysis was performed on the  bilateral lower extremities for venous examination of the deep and superficial systems. Conclusions  Summary  No evidence of superficial or deep venous thrombosis in both lower extremities.   Signature  Electronically signed by Eb Ma RVT(Sonographer) on 01/19/2022 08:10 AM  Electronically signed by Belem Loxley physician) on 01/19/2022 06:21 PM      CT OF THE HEAD WITHOUT CONTRAST  1/18/2022 5:42 pm  Impression   New lacunar infarct left thalamus, age indeterminate.  MRI may be helpful.           Cultures:     Culture, Blood 1 [[id number]] Collected: 02/08/22 1559   Order Status: Completed Specimen: Blood Updated: 02/13/22 2041    Specimen Description . BLOOD    Special Requests RT ARTERY,10ML    Culture NO GROWTH 5 DAYS   Culture, Blood 1 [[id number]] Collected: 02/08/22 1549   Order Status: Completed Specimen: Blood Updated: 02/13/22 2035    Specimen Description . BLOOD    Special Requests RT HAND,10ML    Culture NO GROWTH 5 DAYS     Culture, Blood 1 [[id number]] Collected: 02/01/22 0003   Order Status: Completed Specimen: Blood Updated: 02/06/22 0830    Specimen Description . BLOOD    Special Requests ART LINE 20ML    Culture NO GROWTH 5 DAYS   Culture, Blood 1 [[id number]] Collected: 01/31/22 1853   Order Status: Completed Specimen: Blood Updated: 02/05/22 2315    Specimen Description . BLOOD    Special Requests RT HAND,10ML    Culture NO GROWTH 5 DAYS     Culture, Respiratory [[id number]] Collected: 01/28/22 1030   Order Status: Completed Specimen: Sputum, Suctioned Updated: 01/30/22 0932    Specimen Description . SUCTIONED SPUTUM    Special Requests NOT REPORTED    Direct Exam < 10 EPITHELIAL CELLS/LPF     <10 NEUTROPHILS/LPF     NO SIGNIFICANT PATHOGENS SEEN    Culture NORMAL RESPIRATORY PO HEAVY GROWTH       Culture, Blood 1 [[id number]] Collected: 01/23/22 1759   Order Status: Completed Specimen: Blood Updated: 01/28/22 2111    Specimen Description . BLOOD    Special Requests RT HAND 19ML    Culture NO GROWTH 5 DAYS   Culture, Blood 1 [[id number]] Collected: 01/23/22 1759   Order Status: Completed Specimen: Blood Updated: 01/28/22 2110    Specimen Description . BLOOD    Special Requests ART LINE 10ML    Culture NO GROWTH 5 DAYS     Culture, Blood 2 [[id number]] Collected: 01/21/22 0742   Order Status: Completed Specimen: Blood Updated: 01/26/22 1001    Specimen Description . BLOOD Special Requests LEFT AC 20ML    Culture NO GROWTH 5 DAYS   Culture, Blood 1 [[id number]] Collected: 01/21/22 0750   Order Status: Completed Specimen: Blood Updated: 01/26/22 1000    Specimen Description . BLOOD    Special Requests LEFT HAND 5ML    Culture NO GROWTH 5 DAYS   MRSA DNA Probe, Nasal [[id number]] Collected: 01/23/22 2258   Order Status: Completed Specimen: Nasal Updated: 01/25/22 1038    Specimen Description . NASAL SWAB    MRSA, DNA, Nasal NEGATIVE    Comment: NEGATIVE:  MRSA DNA not detected by nucleic acid amplification.                                                     Results should be used as an adjunct to nosocomial control efforts to identify patients   needing enhanced precautions.     The test is not intended to identify patients with staphylococcal infections.  Results   should not be used to guide or monitor treatment for MRSA infections. Culture, Blood 1 [[id number]] Collected: 01/16/22 1220   Order Status: Completed Specimen: Blood Updated: 01/21/22 1521    Specimen Description . BLOOD    Special Requests LAC 12ML    Culture NO GROWTH 5 DAYS   Culture, Blood 1 [[id number]] Collected: 01/16/22 1205   Order Status: Completed Specimen: Blood Updated: 01/21/22 1521    Specimen Description . BLOOD    Special Requests RAC 12ML    Culture NO GROWTH 5 DAYS       Culture, Urine [[id number]] Collected: 01/16/22 1315   Order Status: Completed Specimen: Urine, clean catch Updated: 01/17/22 2128    Specimen Description . CLEAN CATCH URINE    Special Requests NOT REPORTED    Culture NO SIGNIFICANT GROWTH       COVID-19, Rapid [[id number]] (Abnormal) Collected: 01/16/22 1230   Order Status: Completed Specimen: Nasopharyngeal Swab Updated: 01/16/22 1314    Specimen Description . NASOPHARYNGEAL SWAB    SARS-CoV-2, Rapid DETECTED Abnormal     Comment:        Rapid NAAT: The specimen is POSITIVE for SARS-Cov-2, the novel coronavirus associated with   COVID-19.         This test has been authorized by the FDA under an Emergency Use Authorization (EUA) for use   by authorized laboratories.         The ID NOW COVID-19 assay is designed to detect the virus that causes COVID-19 in patients   with signs and symptoms of infection who are suspected of COVID-19. An individual without symptoms of COVID-19 and who is not shedding SARS-CoV-2 virus would   expect to have a negative (not detected) result in this assay. Fact sheet for Healthcare Providers: Cee   Fact sheet for Patients: Cee           Methodology: Isothermal Nucleic Acid Amplification         Results reported to the appropriate Health Department         Flu A/B Ag Detection [[id number]] Collected: 01/16/22 1230   Order Status: Completed Specimen: Nasopharyngeal Swab Updated: 01/16/22 1313    Specimen Description . NASOPHARYNGEAL SWAB    Special Requests NOT REPORTED    Direct Exam NEGATIVE for Influenza A + B antigens.                                PCR testing to confirm this result is available upon request. Karishma Tolliver will be saved in the laboratory for 7 days.  Please call [phone] if PCR testing is indicated.      Medications:      furosemide  40 mg IntraVENous TID    meropenem  1,000 mg IntraVENous Q8H    linezolid  600 mg IntraVENous Q12H    sodium chloride  80 mL IntraVENous Once    sucralfate  1 g Oral 4 times per day    amLODIPine  5 mg Oral BID    insulin lispro  0-18 Units SubCUTAneous TID WC    insulin lispro  0-9 Units SubCUTAneous Nightly    amiodarone  200 mg Per NG tube Daily    insulin glargine  15 Units SubCUTAneous BID    Vitamin D  1,000 Units Oral Daily    polyethylene glycol  17 g Per NG tube Daily    levalbuterol  1.25 mg Nebulization Q6H    bisacodyl  10 mg Rectal Daily    pantoprazole  40 mg IntraVENous Daily    And    sodium chloride (PF)  10 mL IntraVENous Daily    aspirin  324 mg Oral Daily    levETIRAcetam  500 mg Oral BID    chlorhexidine  15 mL Mouth/Throat BID    enoxaparin  30 mg SubCUTAneous BID    QUEtiapine  50 mg Oral Once    budesonide  0.5 mg Nebulization BID    sodium chloride flush  5-40 mL IntraVENous 2 times per day    atorvastatin  20 mg Oral Daily           [facility]  Giancarlo Varela MD  [employer] messaging  OFFICE: [phone]      Electronically signed by Giancarlo Varela MD on 2/17/2022 at 7:16 AM  Thank you for allowing us to participate in the care of this patient. Please call with questions. This note iscreated with the assistance of a speech recognition program.  While intending to generate a document that actually reflects the content of the visit, the document can still have some errors including those of syntax andsound a like substitutions which may escape proof reading. In such instances, actual meaning can be extrapolated by contextual diversion.

## 2022-02-17 NOTE — CONSULTS
Clinical Ethics Consultation Note    History and Context:  Active Problems:    COVID-19    Acute on chronic systolic CHF (congestive heart failure) (HCC)    Acute respiratory failure with hypoxia (HCC)    BLANCHE (acute kidney injury) ([facility] [address]    COPD exacerbation (HCC)    Hypokalemia    Hypomagnesemia    Palliative care encounter    Goals of care, counseling/discussion    DNR (do not resuscitate) discussion    ACP (advance care planning)    Constipation    Abdominal pain, generalized  Resolved Problems:    * No resolved hospital problems. *    Age: 76 y.o. Gender: male  Gender Identity: male  Admitted Date: 1/16/2022  Consult Date: 02/17/22  MRN: [id number]  Valid Advanced Medical Directive: No    Process:  Writer reviews chart; speaks with bedside RN; speaks with intensive care physician. Ethical Question(s) and Concern(s):  Concern for appropriateness of treatment plan. Analysis:  Patient is vent dependent but unable to receive tracheostomy due to high vent settings. Patient does not appear to be progressing. Family is updated on patient's condition and prognosis. Medical team are communicating with family. Palliative Care onboard. Patient is DNR-CCA. Recommendations:  Recommend providing medically indicated treatment only. Recommend meeting with family to clarify patient's condition and make recommendations regarding comfort care. Closing: Thank you for this consult. We will continue to follow with you.     Javi Rodriguez MDiv, [address] [facility], [address] [place] [address] [place]    Primary Ethical Theme: Primary Ethical Themes: Treatment appropriateness  Secondary Ethical Theme: Secondary Ethical Themes: End of Life related

## 2022-02-17 NOTE — PROGRESS NOTES
HR sustained in the 30s sinus bradycardia and junctional bradycardia noted. Dr Sanford rausch served update and orders to dc amiodarone drip. See MAR for drip titrations and flowsheet for VS. Will continue to closely monitor.

## 2022-02-17 NOTE — PROGRESS NOTES
[facility]   Section of Cardiology  Progress Note      Date:  2/17/2022  Patient: Gela Choudhury  Admission:  1/16/2022 11:51 AM  Admit DX: Hypokalemia [E87.6]  Hypomagnesemia [E83.42]  COPD exacerbation (HCC) [J44.1]  BLANCHE (acute kidney injury) ([facility] [N17.9]  Acute respiratory failure with hypoxia (HCC) [J96.01]  Acute on chronic systolic CHF (congestive heart failure) ([facility] [address] [I50.23]  COVID [U07.1]  COVID-19 [U07.1]  Age:  76 y.o., 1945                           LOS: 32 days     Reason for evaluation:   Atrial fibrillation  covid 19 pneumonia. SUBJECTIVE:     He continues to be intubated. Patient converted to sinus rhythm and is markedly bradycardic. Amiodarone drip was discontinued due to bradycardia. No fever overnight. OBJECTIVE:    BP (!) 133/58   Pulse 54   Temp 97.9 °F (36.6 °C) (Axillary)   Resp 22   Ht 5' 10\" (1.778 m)   Wt 242 lb (109.8 kg)   SpO2 92%   BMI 34.72 kg/m²     Intake/Output Summary (Last 24 hours) at 2/17/2022 1655  Last data filed at 2/17/2022 1409  Gross per 24 hour   Intake 5278.29 ml   Output 3800 ml   Net 1478.29 ml       EXAM:   CONSTITUTIONAL:  Intubated and sedated. Nando Case HEENT: Normal jugular venous pulsations, no carotid bruits. Head is atraumatic, normocephalic. Eyes and oral mucosa are normal.  LUNGS: Good respiratory effort. No for increased work of breathing. On auscultation: clear to auscultation bilaterally. CARDIOVASCULAR:  Normal apical impulse, regular rate and rhythm, bradycardic  ABDOMEN: Soft, nontender, nondistended. Bowel sounds present. No masses or tenderness. SKIN: Warm and dry. EXTREMITIES: + lower extremity edema. Motor movement grossly intact. No cyanosis or clubbing.     Current Inpatient Medications:   furosemide  40 mg IntraVENous TID    meropenem  1,000 mg IntraVENous Q8H    linezolid  600 mg IntraVENous Q12H    sodium chloride  80 mL IntraVENous Once    sucralfate  1 g Oral 4 times per day    amLODIPine  5 mg Oral BID    insulin lispro  0-18 Units SubCUTAneous TID     insulin lispro  0-9 Units SubCUTAneous Nightly    amiodarone  200 mg Per NG tube Daily    insulin glargine  15 Units SubCUTAneous BID    Vitamin D  1,000 Units Oral Daily    polyethylene glycol  17 g Per NG tube Daily    levalbuterol  1.25 mg Nebulization Q6H    bisacodyl  10 mg Rectal Daily    pantoprazole  40 mg IntraVENous Daily    And    sodium chloride (PF)  10 mL IntraVENous Daily    aspirin  324 mg Oral Daily    levETIRAcetam  500 mg Oral BID    chlorhexidine  15 mL Mouth/Throat BID    enoxaparin  30 mg SubCUTAneous BID    QUEtiapine  50 mg Oral Once    budesonide  0.5 mg Nebulization BID    sodium chloride flush  5-40 mL IntraVENous 2 times per day    atorvastatin  20 mg Oral Daily       IV Infusions (if any):   phenylephrine (KEARA-SYNEPHRINE) 50mg/250mL infusion 17 mcg/min (02/17/22 1557)    dextrose      dexmedetomidine (PRECEDEX) IV infusion 1.1 mcg/kg/hr (02/17/22 1032)    fentaNYL 50 mcg/hr (02/17/22 0830)    propofol 20 mcg/kg/min (02/17/22 1412)    midazolam (VERSED) 1 mg/mL in D5W infusion Stopped (02/13/22 0905)    sodium chloride         Diagnostics:   Telemetry: Sinus bradycardia  Labs:   CBC:   Recent Labs     02/16/22  0540 02/17/22  0514   WBC 6.5 6.1   HGB 11.1* 10.5*   HCT 36.1* 33.7*   * 111*     BMP:   Recent Labs     02/16/22  0540 02/17/22  0514    135   K 3.5* 3.3*   CO2 25 25   BUN 42* 40*   CREATININE 0.66* 0.60*   LABGLOM >60 >60   GLUCOSE 211* 227*     BNP: No results for input(s): BNP in the last 72 hours. PT/INR: No results for input(s): PROTIME, INR in the last 72 hours. APTT:No results for input(s): APTT in the last 72 hours. CARDIAC ENZYMES:No results for input(s): CKTOTAL, CKMB, CKMBINDEX, TROPONINI in the last 72 hours.   FASTING LIPID PANEL:  Lab Results   Component Value Date    HDL 30 11/03/2021    TRIG 181 11/03/2021     LIVER PROFILE:No results for input(s): AST, ALT, LABALBU in the last 72 hours. ASSESSMENT:    Patient Active Problem List   Diagnosis    Biventricular CHF (congestive heart failure) ([facility])    CKD (chronic kidney disease) stage 3, GFR 30-59 ml/min ([facility])    Essential hypertension    Blurred vision, right eye    Type 2 diabetes mellitus treated with insulin ([address]    Atherosclerotic plaque    Weakness on left side of face    Carotid stenosis, asymptomatic, bilateral    New onset seizure ([address]    COVID-19    Acute on chronic systolic CHF (congestive heart failure) ([facility])    Acute respiratory failure with hypoxia ([address]    BLANCHE (acute kidney injury) ([address]    COPD exacerbation ([address]    Hypokalemia    Hypomagnesemia    Palliative care encounter    Goals of care, counseling/discussion    DNR (do not resuscitate) discussion    ACP (advance care planning)    Constipation    Abdominal pain, generalized       PLAN:    1. Paroxysmal atrial fibrillation  2. covid 19 pneumonia  3. Respiratory failure  4. Bradycardia. Plan  Continue conservative therapy  Monitor off amiodarone for now. Family to discuss code issues. Please see orders. Discussed with  nursing.     Shanika Gaitan MD, MD

## 2022-02-18 NOTE — PROGRESS NOTES
Family approached nurse and informed they are ready to extubate pt and make him comfortable. RT notified and staff to room.

## 2022-02-18 NOTE — PROGRESS NOTES
Progress note  [facility].,    Adult Hospitalist      Name: Leonie Morales  MRN: [id number]     Acct: [de-identified]  Room: [address] [place], [place] [id number]-    Admit Date: 1/16/2022 11:51 AM  PCP: Harvey Valentin MD    Primary Problem  Active Problems:    COVID-19    Acute on chronic systolic CHF (congestive heart failure) (HCC)    Acute respiratory failure with hypoxia (HCC)    BLANCHE (acute kidney injury) ([facility] [address]    COPD exacerbation ([facility] [address]    Hypokalemia    Hypomagnesemia    Palliative care encounter    Goals of care, counseling/discussion    DNR (do not resuscitate) discussion    ACP (advance care planning)    Constipation    Abdominal pain, generalized  Resolved Problems:    * No resolved hospital problems. *        Assesment:   Acute congestive heart failure, diastolic   [id number]   Viral pneumonia secondary to above  Acute respiratory failure with hypoxia intubated on 1/23/2022  Hyperkalemia  Paroxysmal atrial fibrillation  Essential hypertension  Mixed hyperlipidemia  Diabetes mellitus type 2  History of seizure disorder  History of CKD stage III, presently normal renal function  Lung mass?   Leukopenia  Polycythemia  Thrombocytopenia  Anxiety disorder  Recent past h/o lacunar infarct left thalamus   Altered mental status/agitation  Endotracheal intubation secondary to hypoxia dated 1/23/2022  Supraventricular tachycardia/elevated troponin  Fever  Emphysema   Pulmonary artery hypertension   Decubitus ulcer buttocks      Plan:   Admit patient to intermediate floor  Mechanical ventilation sedation as per critical care, patient is requiring PEEP of 10-->14 with  90% FiO2  Telemetry  Check vitals closely  CBC BMP daily    Cardiology consult  Amiodarone  Precedex gtt    IV Decadron completed  Off pressors  Off amiodarone drip  Patient completed a course of cefepime for 7 days  Bronchodilators  Pulmicort  Patient is deemed not a candidate for Actemra or baricitinib secondary to cytopenia  Infectious disease consult  Zyvox  Meropenem  Pulmonology consult    Continue Keppra  Continue amlodipine, atorvastatin  Continue aspirin  lasix    GI signed off   PPI IV  Carafate  Tube feeds  Consult nephrology   Insulin gtt- DC  Lantus w SSI-->increase to high intensity as BS running high, now better controlled    Plan tracheostomy, continue to wean FiO2  General surgery following, plan for tracheostomy  S/P PEG tube   Wound care   D/w RN    Family will be here in the evening  They are contemplating withdrawal of life support  Continue other medication as below.     Scheduled Meds:   furosemide  40 mg IntraVENous TID    meropenem  1,000 mg IntraVENous Q8H    linezolid  600 mg IntraVENous Q12H    sodium chloride  80 mL IntraVENous Once    sucralfate  1 g Oral 4 times per day    amLODIPine  5 mg Oral BID    insulin lispro  0-18 Units SubCUTAneous TID WC    insulin lispro  0-9 Units SubCUTAneous Nightly    amiodarone  200 mg Per NG tube Daily    insulin glargine  15 Units SubCUTAneous BID    Vitamin D  1,000 Units Oral Daily    polyethylene glycol  17 g Per NG tube Daily    levalbuterol  1.25 mg Nebulization Q6H    bisacodyl  10 mg Rectal Daily    pantoprazole  40 mg IntraVENous Daily    And    sodium chloride (PF)  10 mL IntraVENous Daily    aspirin  324 mg Oral Daily    levETIRAcetam  500 mg Oral BID    chlorhexidine  15 mL Mouth/Throat BID    enoxaparin  30 mg SubCUTAneous BID    QUEtiapine  50 mg Oral Once    budesonide  0.5 mg Nebulization BID    sodium chloride flush  5-40 mL IntraVENous 2 times per day    atorvastatin  20 mg Oral Daily     Continuous Infusions:   phenylephrine (KEARA-SYNEPHRINE) 50mg/250mL infusion 15 mcg/min (02/18/22 1113)    dextrose      dexmedetomidine (PRECEDEX) IV infusion 1.1 mcg/kg/hr (02/18/22 0335)    fentaNYL 50 mcg/hr (02/18/22 0618)    propofol 30 mcg/kg/min (02/18/22 1112)    midazolam (VERSED) 1 mg/mL in D5W infusion Stopped (02/13/22 0905)    sodium chloride PRN Meds:  potassium alternative oral replacement, 40 mEq, PRN  sodium chloride flush, 10 mL, PRN  potassium chloride, 20 mEq, PRN  magnesium sulfate, 1,000 mg, PRN  glucose, 15 g, PRN  glucagon (rDNA), 1 mg, PRN  dextrose, 100 mL/hr, PRN  sodium chloride nebulizer, 3 mL, Q8H PRN  LORazepam, 1 mg, Q4H PRN  dextrose bolus (hypoglycemia), 125 mL, PRN   Or  dextrose bolus (hypoglycemia), 250 mL, PRN  sodium chloride flush, 10 mL, PRN  sodium chloride, 25 mL, PRN  ondansetron, 4 mg, Q8H PRN   Or  ondansetron, 4 mg, Q6H PRN  acetaminophen, 650 mg, Q6H PRN   Or  acetaminophen, 650 mg, Q6H PRN  hydrALAZINE, 10 mg, Q6H PRN        Chief Complaint:     Chief Complaint   Patient presents with    Leg Swelling     bilateral, has CHF, on diuretic, EMT saw pt , assisted pt into car    Fever    Other     low SPO2         History of Present Illness:   Patient seen examined at bedside, patient remains in medical ICU  Patient remains intubated sedated  Patient not responding with reducing sedation  Presently on fentanyl and propofol  tube feeding at 50ml/h  CODE STATUS  Palliative care meeting   D/w RN      Review of systems:  Unable to conduct ROS secondary to patient being intubated      Initial HPI  Koko Melchor is a 76 y.o.  male who presents with Leg Swelling (bilateral, has CHF, on diuretic, EMT saw pt , assisted pt into car), Fever, and Other (low SPO2)  This is a 77-year-old gentleman admitted via ER, come to ER with complaint of having shortness of breath, patient has medical history significant for COPD patient with history of cardiac failure: Patient noted that he has been having increasing swelling in his lower extremity and becoming more short of breath, further patient also been having some body aches and sweats, patient patient testing in the emergency room showed that he is positive for COVID-19 also noticed to have an elevated BNP, imaging concerning for fluid overload, admitted for further regiment    I have personally reviewed the past medical history, past surgical history, medications, social history, and family history, and summarized in the note. Review of Systems:       Unable to conduct ROS secondary to patient being intubated      Past Medical History:     Past Medical History:   Diagnosis Date    Arthritis     Atherosclerotic plaque 7/28/2019    Cervical disc disease     degenerative disc disease    Diabetes mellitus ([facility] [address]     type 2    History of blood transfusion     Hx of blood clots     left leg    Hyperlipidemia     Neuropathy     Right kidney mass     \"urologist is watching\"    Snores     Type 2 diabetes mellitus treated with insulin ([facility] [address] 7/28/2019        Past Surgical History:     Past Surgical History:   Procedure Laterality Date    ABDOMINAL AORTIC ANEURYSM REPAIR  2005    CARPAL TUNNEL RELEASE Bilateral     CERVICAL SPINE SURGERY      COLONOSCOPY      benign polyps removed    INGUINAL HERNIA REPAIR Right     LA REPAIR INCISIONAL HERNIA,REDUCIBLE N/A 5/10/2017    HERNIA VENTRAL REPAIR WITH MESH  performed by Pushpa Delgadillo DO at [place] [id number] N/A 2/9/2022    EGD   PEG TUBE INSERTION performed by Isaiah Mosley MD at [address] [facility]  05/10/2017    with mesh        Medications Prior to Admission:       Prior to Admission medications    Medication Sig Start Date End Date Taking?  Authorizing Provider   rosuvastatin (CRESTOR) 40 MG tablet Take 40 mg by mouth every evening   Yes Historical Provider, MD   insulin NPH (HUMULIN N;NOVOLIN N) 100 UNIT/ML injection vial Inject 20 Units into the skin 2 times daily (before meals)   Yes Historical Provider, MD   levETIRAcetam (KEPPRA) 500 MG tablet Take 1 tablet by mouth 2 times daily 11/3/21  Yes Stephanie Wheatley MD   venlafaxine (EFFEXOR XR) 150 MG extended release capsule Take 1 capsule by mouth daily (with breakfast) 7/29/19  Yes Evelina Escobar Margi   vitamin B-1 (THIAMINE) 100 MG tablet Take 100 mg by mouth daily   Yes Historical Provider, MD   ferrous sulfate 325 (65 Fe) MG tablet Take 325 mg by mouth daily (with breakfast)   Yes Historical Provider, MD   ALPRAZolam (XANAX) 0.5 MG tablet Take 0.5 mg by mouth three times daily. Yes Historical Provider, MD   furosemide (LASIX) 40 MG tablet Take 1 tablet by mouth 2 times daily 12/11/18  Yes Jitendra Evans MD   tiotropium (SPIRIVA RESPIMAT) 2.5 MCG/ACT AERS inhaler Inhale 2 puffs into the lungs daily    Yes Historical Provider, MD   albuterol sulfate  (90 Base) MCG/ACT inhaler Inhale 2 puffs into the lungs every 6 hours as needed for Wheezing or Shortness of Breath   Yes Historical Provider, MD   metoprolol succinate (TOPROL XL) 50 MG extended release tablet Take 50 mg by mouth daily   Yes Historical Provider, MD   Multiple Vitamins-Minerals (MULTIVITAMIN ADULT) TABS Take 1 tablet by mouth daily   Yes Historical Provider, MD   vitamin D (CHOLECALCIFEROL) 1000 UNIT TABS tablet Take 1,000 Units by mouth daily   Yes Historical Provider, MD   fluticasone (FLONASE) 50 MCG/ACT nasal spray 1 spray by Each Nare route daily as needed for Rhinitis   Yes Historical Provider, MD   aspirin 325 MG EC tablet Take 325 mg by mouth daily    Yes Historical Provider, MD   Omega-3 Fatty Acids (FISH OIL) 1000 MG CAPS Take 1 capsule by mouth daily    Yes Historical Provider, MD   amLODIPine (NORVASC) 5 MG tablet Take 5 mg by mouth nightly    Yes Historical Provider, MD   mirtazapine (REMERON) 45 MG tablet Take 45 mg by mouth nightly   Yes Historical Provider, MD        Allergies: Barbiturates, Codeine, and Sulfa antibiotics    Social History:     Tobacco:    reports that he has quit smoking. He has never used smokeless tobacco.  Alcohol:      reports no history of alcohol use. Drug Use:  reports no history of drug use.     Family History:     Family History   Problem Relation Age of Onset    Ovarian Cancer Sister     Ovarian Cancer Sister          Physical Exam:     Vitals:  BP (!) 129/55   Pulse 70   Temp 97.3 °F (36.3 °C) (Temporal)   Resp 28   Ht 5' 10\" (1.778 m)   Wt 242 lb (109.8 kg)   SpO2 94%   BMI 34.72 kg/m²   Temp (24hrs), Av.9 °F (36.6 °C), Min:97.3 °F (36.3 °C), Max:98.3 °F (36.8 °C)      General appearance -on ventilator  Mental status -sedated  Head - normocephalic and atraumatic  Eyes - conjunctiva clear  Ears -external ears normal  Nose - no drainage noted  Mouth - mucous membranes moist  Neck - supple, no carotid bruits, thyroid not palpable  Chest -basal crackles with decreased air entry bilaterally to auscultation, increased effort  Heart - normal rate, regular rhythm, no murmur  Abdomen - soft, nontender, nondistended, bowel sounds present all four quadrants, no masses, hepatomegaly or splenomegaly  Neurological -sedated on vent  Extremities - extremity edema, lower distal extremity discoloration    Skin - no gross lesions, rashes, or induration noted        Data:     Labs:    Hematology:  Recent Labs     22  0540 22  0514 22  1024   WBC 6.5 6.1 7.7   RBC 4.50 4.25 4.68   HGB 11.1* 10.5* 11.5*   HCT 36.1* 33.7* 36.7*   MCV 80.2* 79.3* 78.4*   MCH 24.7* 24.7* 24.6*   MCHC 30.7 31.2 31.3   RDW 15.5* 15.5* 15.8*   * 111* 121*   MPV 11.9 11.5 12.0     Chemistry:  Recent Labs     22  0540 22  0514 22  1024    135 133*   K 3.5* 3.3* 3.5*    98 96*   CO2 25 25 26   GLUCOSE 211* 227* 183*   BUN 42* 40* 38*   CREATININE 0.66* 0.60* 0.59*   MG 1.5* 1.7 1.7   ANIONGAP 11 12 11   LABGLOM >60 >60 >60   GFRAA >60 >60 >60   CALCIUM 8.7 8.3* 8.6   PHOS 1.9* 2.4* 2.3*     Recent Labs     22  1134 22  1654 22  0031 22  0619 22  1121   POCGLU 170* 149* 137* 133* 101 108       Lab Results   Component Value Date    INR 1.0 2022    INR 1.0 2021    INR 1.0 2019    PROTIME 13.3 2022 PROTIME 11.1 11/03/2021    PROTIME 10.5 07/27/2019       Lab Results   Component Value Date/Time    SPECIAL RT ARTERY,10ML 02/08/2022 03:59 PM     Lab Results   Component Value Date/Time    CULTURE NO GROWTH 5 DAYS 02/08/2022 03:59 PM       Lab Results   Component Value Date    POCPH 7.437 02/17/2022    POCPCO2 41.8 02/17/2022    POCPO2 55.3 02/17/2022    POCHCO3 28.2 02/17/2022    NBEA NOT REPORTED 02/17/2022    PBEA 4 02/17/2022    EBC0JNW NOT REPORTED 02/17/2022    TDRP2WDI 89 02/17/2022    FIO2 NOT REPORTED 02/17/2022       Radiology:    XR CHEST 1 VIEW    Result Date: 1/16/2022  Hypoinflated lungs. Cardiomegaly again seen, when compared to the previous study performed 07/27/2019. Diffuse, increased interstitial markings throughout both lungs, some of which can be seen on the prior study may represent baseline chronic interstitial lung changes. The increased prominence on this exam could be secondary to the suboptimal inspiratory effort. The presence of pulmonary vascular congestion/mild CHF or bilateral interstitial pneumonia cannot be excluded. Clinical correlation is recommended. CT CHEST PULMONARY EMBOLISM W CONTRAST    Result Date: 1/16/2022  No evidence of pulmonary embolism. Compared to 2008, worsening underlying emphysema, with worsening subacute or acute interstitial lung disease, best seen left upper lobe; differential includes interstitial pneumonia or pneumonitis superimposed on emphysema, DIP, HP, and other interstitial pneumonias as well as infectious or inflammatory process superimposed on emphysema disease. Findings are not typical for COVID-19 pneumonia, but an element of the latter should be considered. Thickening and distortion left major fissure with ill-defined mass-like focus left lower lobe. The latter could also be infectious or inflammatory, although neoplasm should be excluded. Underlying worsening emphysema. Nodular densities, best seen right upper lobe.   Small pleural effusions, slightly larger on the left. See recommendations below. Mild mediastinal and left hilar adenopathy; see recommendations below. Worsening now moderate-severe pulmonary artery hypertension. Mild cardiomegaly. Stable mild dilatation ascending thoracic aorta. Additional unchanged or incidental findings, as above. RECOMMENDATIONS: Clinical correlation and short-term follow-up CT chest in 1-4 months depending upon the clinical presentation/course. All radiological studies reviewed                Code Status:  DNR-CCA    Electronically signed by Kamran Lima MD on 2/18/2022 at 12:27 PM     Copy sent to Dr. Iam Florez MD    This note was created with the assistance of a speech-recognition program.  Although the intention is to generate a document that actually reflects the content of the visit, no guarantees can be provided that every mistake has been identified and corrected by editing. Note was updated later by me after  physical examination and  completion of the assessment.

## 2022-02-18 NOTE — PROGRESS NOTES
General Surgery:  Daily Progress Note            PATIENT NAME: Gina Bansal     TODAY'S DATE: 2/18/2022, 6:00 AM    SUBJECTIVE:     Pt seen and examined at bedside this morning. No acute events overnight. Afebrile. Patient remains intubated and sedated. Patient requiring 65% FiO2, PEEP-14. Currently on pressors. OBJECTIVE:   VITALS:  BP (!) 115/43   Pulse (!) 43   Temp 97.3 °F (36.3 °C) (Temporal)   Resp (!) 0   Ht 5' 10\" (1.778 m)   Wt 242 lb (109.8 kg)   SpO2 99%   BMI 34.72 kg/m²      INTAKE/OUTPUT:      Intake/Output Summary (Last 24 hours) at 2/18/2022 0600  Last data filed at 2/18/2022 0400  Gross per 24 hour   Intake 2323.09 ml   Output 3450 ml   Net -1126.91 ml       PHYSICAL EXAM:  General Appearance: Intubated, sedated, does not follow commands  HEENT:  Normocephalic, atraumatic, mucus membranes moist, endotracheal tube in place  Skin:  Skin color, texture, turgor normal. No rashes or lesions. Lungs:  No chest wall tenderness. Heart:  Heart regular rate and rhythm  Abdomen:   soft, ND, NTTP, +bowel sounds, peg tube in place   Extremities: Extremities warm to touch, pink, with no edema.         Data:  CBC with Differential:    Lab Results   Component Value Date    WBC 6.1 02/17/2022    RBC 4.25 02/17/2022    HGB 10.5 02/17/2022    HCT 33.7 02/17/2022     02/17/2022    MCV 79.3 02/17/2022    MCH 24.7 02/17/2022    MCHC 31.2 02/17/2022    RDW 15.5 02/17/2022    LYMPHOPCT 6 02/17/2022    MONOPCT 4 02/17/2022    BASOPCT 0 02/17/2022    MONOSABS 0.24 02/17/2022    LYMPHSABS 0.37 02/17/2022    EOSABS 0.18 02/17/2022    BASOSABS 0.00 02/17/2022    DIFFTYPE NOT REPORTED 02/17/2022     BMP:    Lab Results   Component Value Date     02/17/2022    K 3.3 02/17/2022    CL 98 02/17/2022    CO2 25 02/17/2022    BUN 40 02/17/2022    LABALBU 1.6 02/12/2022    CREATININE 0.60 02/17/2022    CALCIUM 8.3 02/17/2022    GFRAA >60 02/17/2022    LABGLOM >60 02/17/2022    GLUCOSE 227 02/17/2022 ASSESSMENT:  Active Hospital Problems    Diagnosis Date Noted    Constipation [K59.00]     Abdominal pain, generalized [R10.84]     Acute on chronic systolic CHF [facility]congestive heart failure) ([facility]) [I50.23]     Acute respiratory failure with hypoxia ([facility]) [J96.01]     BLANCHE (acute kidney injury) ([facility] [address] [N17.9]     COPD exacerbation ([facility]) [J44.1]     Hypokalemia [E87.6]     Hypomagnesemia [E83.42]     Palliative care encounter [Z51.5]     Goals of care, counseling/discussion [Z71.89]     DNR (do not resuscitate) discussion [Z71.89]     ACP (advance care planning) [Z71.89]     COVID-19 [U07.1] 01/16/2022       1. 76 y.o. male with acute respiratory failure due to COVID-19    Plan:  1. Patient was seen and examined at bedside this morning. 2. Patient remains intubated and sedated. Currently requiring pressor support. Vent settings: 65% FiO2, PEEP-14. 3. Continue to wean from ventilator. Will discuss surgical timing when vent settings decreased.       Electronically signed by Maricarmen Marino DO  on 2/18/2022 at 6:00 AM

## 2022-02-18 NOTE — PROGRESS NOTES
.. PALLIATIVE CARE TEAM    Patient: Maurizio Crawford  Room: [facility]1018-89    Reason For Consult   Goals of care evaluation  Distress management  Symptom Management  Guidance and support  Facilitate communications  Assistance in coordinating care  Recommendations for the above    Impression: Maurizio Crawford is a 76y.o. year old male with  has a past medical history of Arthritis, Atherosclerotic plaque, Cervical disc disease, Diabetes mellitus ([address] History of blood transfusion, Hx of blood clots, Hyperlipidemia, Neuropathy, Right kidney mass, Snores, and Type 2 diabetes mellitus treated with insulin ([address] .  Currently hospitalized for the management of Covid 19 pneumonia. The Palliative Care Team is following to assist with goals of care and family support. Code Status  DNR-CCA    Vital Signs:  BP (!) 129/55   Pulse 52   Temp 97.3 °F (36.3 °C) (Temporal)   Resp 20   Ht 5' 10\" (1.778 m)   Wt 242 lb (109.8 kg)   SpO2 98%   BMI 34.72 kg/m²     Patient Active Problem List   Diagnosis    Biventricular CHF (congestive heart failure) (HCC)    CKD (chronic kidney disease) stage 3, GFR 30-59 ml/min (HCC)    Essential hypertension    Blurred vision, right eye    Type 2 diabetes mellitus treated with insulin ([address]    Atherosclerotic plaque    Weakness on left side of face    Carotid stenosis, asymptomatic, bilateral    New onset seizure ([address]    COVID-19    Acute on chronic systolic CHF (congestive heart failure) (HCC)    [facility])    BLANCHE (acute kidney injury) ([address]    COPD exacerbation ([address]    Hypokalemia    Hypomagnesemia    Palliative care encounter    Goals of care, counseling/discussion    DNR (do not resuscitate) discussion    ACP (advance care planning)    Constipation    Abdominal pain, generalized       Palliative Interaction: Patient remains intubated and sedated.    I get call from the patient's daughter Davis. I introduce myself to her as part of the palliative care team.   Vero updates me that the family, and his wife Kyra Lynch has decided to take the patient off the machines and make him comfortable. Elizabeth Mullen tells me that her Dad ( Elizabeth Mullen states that he is her stepfather) that he has been her Dad since she was a little girl. She tells me that he was really sick with a heart condition as well, and that when he came in to the hospital, that his legs were so swollen that he could not even walk. Vero states that they were hopeful for a while and that his vent settings came down, and that they are high again. The decision is to now just make him comfortable. Vero states that there will be several family members that want to see the patient and be with him. Vero as well states that the patient has 2 biological daughters are well that have been estranged for several years, and that they may visit separately. Their names are Parth Lazar and Laz amanda. Vero states that she was waiting for the doctor to call for the last few days. I ask if they need another medical update and Vero states\" no we just want to proceed with making my Dad comfortable. \"  I tell Elizabeth Mullen that we can help to coordinate the family's wish for the patient. Vero states that family will be here at 5 pm.     I offer Vero much emotional support and she is appreciative. I call the ICU and update [facility] to update the bedside nurse Baron Chang. Will follow for family support.      Goals/Plan of care  Education/support to family  Communications with primary service  Providing support for coping/adaptation/distress of family  Continue with current plan of care  Code status clarified: [facility]  Validating patient/family distress  Continued communication updates  Recognizing, reflecting, and empathizing with family members' anticipatory grief  Daughter Elizabeth Mullen calls me and the family has made a decision to remove the patient's ventilator and provide comfort care, and the time is 5 pm for family to arrive. I offer much emotional support and staff is as well updated.      Electronically signed by   Nyasia Wright RN  Palliative Care Team  on 2/18/2022 at 10:35 AM

## 2022-02-18 NOTE — PROGRESS NOTES
Multiple family members at bedside informed daughter Elizabeth Mullen, wife Khris Neri al to call nurse when ready to have machines turned off and ETT removed.

## 2022-02-18 NOTE — PROGRESS NOTES
[facility]   Section of Cardiology  Progress Note      Date:  2/18/2022  Patient: Gina Bansal  Admission:  1/16/2022 11:51 AM  Admit DX: Hypokalemia [E87.6]  Hypomagnesemia [E83.42]  COPD exacerbation (HCC) [J44.1]  BLANCHE (acute kidney injury) ([facility] [N17.9]  Acute respiratory failure with hypoxia (HCC) [J96.01]  Acute on chronic systolic CHF (congestive heart failure) ([facility] [I50.23]  COVID [U07.1]  COVID-19 [U07.1]  Age:  76 y.o., 1945                           LOS: 33 days     Reason for evaluation:   Atrial fibrillation  covid 19 pneumonia. SUBJECTIVE:     He continues to be intubated. Family will be in later to change code status. He continues to be bradycardic. OBJECTIVE:    BP (!) 129/55   Pulse 70   Temp 97.3 °F (36.3 °C) (Temporal)   Resp 28   Ht 5' 10\" (1.778 m)   Wt 242 lb (109.8 kg)   SpO2 94%   BMI 34.72 kg/m²     Intake/Output Summary (Last 24 hours) at 2/18/2022 1320  Last data filed at 2/18/2022 0400  Gross per 24 hour   Intake 2231.36 ml   Output 3450 ml   Net -1218.64 ml       EXAM:   CONSTITUTIONAL:  Intubated and sedated. Yanelis Kid HEENT: Normal jugular venous pulsations, no carotid bruits. Head is atraumatic, normocephalic. Eyes and oral mucosa are normal.  LUNGS: Good respiratory effort. No for increased work of breathing. On auscultation: clear to auscultation bilaterally. CARDIOVASCULAR:  Normal apical impulse, regular rate and rhythm, bradycardic  ABDOMEN: Soft, nontender, nondistended. Bowel sounds present. No masses or tenderness. SKIN: Warm and dry. EXTREMITIES: + lower extremity edema. Motor movement grossly intact. No cyanosis or clubbing.     Current Inpatient Medications:   furosemide  40 mg IntraVENous TID    meropenem  1,000 mg IntraVENous Q8H    linezolid  600 mg IntraVENous Q12H    sodium chloride  80 mL IntraVENous Once    sucralfate  1 g Oral 4 times per day    amLODIPine  5 mg Oral BID    insulin lispro  0-18 Units SubCUTAneous TID WC    insulin lispro  0-9 Units SubCUTAneous Nightly    amiodarone  200 mg Per NG tube Daily    insulin glargine  15 Units SubCUTAneous BID    Vitamin D  1,000 Units Oral Daily    polyethylene glycol  17 g Per NG tube Daily    levalbuterol  1.25 mg Nebulization Q6H    bisacodyl  10 mg Rectal Daily    pantoprazole  40 mg IntraVENous Daily    And    sodium chloride (PF)  10 mL IntraVENous Daily    aspirin  324 mg Oral Daily    levETIRAcetam  500 mg Oral BID    chlorhexidine  15 mL Mouth/Throat BID    enoxaparin  30 mg SubCUTAneous BID    QUEtiapine  50 mg Oral Once    budesonide  0.5 mg Nebulization BID    sodium chloride flush  5-40 mL IntraVENous 2 times per day    atorvastatin  20 mg Oral Daily       IV Infusions (if any):   phenylephrine (KEARA-SYNEPHRINE) 50mg/250mL infusion 20 mcg/min (02/18/22 1247)    dextrose      dexmedetomidine (PRECEDEX) IV infusion 1.1 mcg/kg/hr (02/18/22 0335)    fentaNYL 50 mcg/hr (02/18/22 0618)    propofol 40 mcg/kg/min (02/18/22 1234)    midazolam (VERSED) 1 mg/mL in D5W infusion Stopped (02/13/22 0905)    sodium chloride         Diagnostics:   Telemetry: Sinus bradycardia  Labs:   CBC:   Recent Labs     02/17/22  0514 02/18/22  1024   WBC 6.1 7.7   HGB 10.5* 11.5*   HCT 33.7* 36.7*   * 121*     BMP:   Recent Labs     02/17/22  0514 02/18/22  1024    133*   K 3.3* 3.5*   CO2 25 26   BUN 40* 38*   CREATININE 0.60* 0.59*   LABGLOM >60 >60   GLUCOSE 227* 183*     BNP: No results for input(s): BNP in the last 72 hours. PT/INR: No results for input(s): PROTIME, INR in the last 72 hours. APTT:No results for input(s): APTT in the last 72 hours. CARDIAC ENZYMES:No results for input(s): CKTOTAL, CKMB, CKMBINDEX, TROPONINI in the last 72 hours. FASTING LIPID PANEL:  Lab Results   Component Value Date    HDL 30 11/03/2021    TRIG 181 11/03/2021     LIVER PROFILE:No results for input(s): AST, ALT, LABALBU in the last 72 hours.     ASSESSMENT:    Patient Active Problem List Diagnosis    Biventricular CHF (congestive heart failure) (HCC)    CKD (chronic kidney disease) stage 3, GFR 30-59 ml/min ([facility]    Essential hypertension    Blurred vision, right eye    Type 2 diabetes mellitus treated with insulin ([address]    Atherosclerotic plaque    Weakness on left side of face    Carotid stenosis, asymptomatic, bilateral    New onset seizure ([address]    COVID-19    Acute on chronic systolic CHF (congestive heart failure) ([facility]    Acute respiratory failure with hypoxia ([facility])    BLANCHE (acute kidney injury) ([address]    COPD exacerbation ([address]    Hypokalemia    Hypomagnesemia    Palliative care encounter    Goals of care, counseling/discussion    DNR (do not resuscitate) discussion    ACP (advance care planning)    Constipation    Abdominal pain, generalized       PLAN:    1. Paroxysmal atrial fibrillation  2. covid 19 pneumonia  3. Respiratory failure  4. Bradycardia. Plan  Continue conservative therapy  Prognosis is poor. Await family decisions. Please see orders. Discussed with  nursing.     Luc Gordon MD, MD

## 2022-02-18 NOTE — PROGRESS NOTES
[facility]  Progress Note    Aliya Tamez  MRN: [id number]  Date: 2/18/2022  LOS: 35     Reason for F/U :   COVID-19 virus infection    Impression :   COVID-19 virus infection tested + 1/16/2022  Acute respiratory failure currently ventilator dependent  Intubated 1/23/2022  Emphysema  Worsening acute interstitial lung disease/fibrosis related to COVID-19 virus infection  Right lower lobe pneumonia   Worsening moderate to severe pulmonary artery hypertension  Bilateral lower extremity edema likely related to above  Leukopenia and thrombocytopenia  Lacunar infarct on the left thalamus  Intermittent fevers  Acute kidney injury    Recommendations:   COVID-19 therapy: The patient was on Decadron 6 mg IV daily through 02/04/2022  The patient has been started on baricitinib 1/17/2022 but it was discontinued 1/18/2022 due to cytopenias. Actemra had been considered but again due to the cytopenias and thrombocytopenia this has been held. Antimicrobial therapy: The patient received Rocephin 1000 mg and azithromycin 500 mg on 1/16/2022  The patient received a dose of levofloxacin 500 mg on 1/18/2020. Patient started on cefepime and vancomycin 1/23/2022 and completed a 7-day course of cefepime on 1/28/2022  Vancomycin discontinued due to acute kidney injury 1/24/2022  Continue intravenous antimicrobial therapy with meropenem and Zyvox started 2/12/2022     The patient is seen and evaluated at bedside and remains on the ventilator 65% FiO2 14 of PEEP. The patient continues to have some agitation and hypertension with laying on the left side.   Continue supportive therapy    Infection Control Recommendations:   Droplet plus precautions    Discharge Planning:   Patient will need Midline Catheter Insertion/ PICC line Insertion: No  Patient will need: Home IV , [place],  SNF,  LTAC: Undetermined  Patient willneed outpatient wound care: No    Medical Decision making / Summary of Stay:   Imelda Camejo Caroline Angeles is a 64y.o.-year-old male who was initially admitted on 1/16/2022. Renan Looyla has a history of diabetes mellitus type 2, essential hypertension, seizure disorder, chronic kidney disease stage III, hyperlipidemia among other medical problems.     The patient reports that about 1 to 2 months ago he had his diabetes medications changed and since that time he has noticed increasing swelling in his legs, hardness in the legs, difficulty ambulating, and weight gain from 178 to 255 pounds over the last 1 to 2 months. He did not report any subjective fevers, chills, cough or shortness of breath. He denies any loss of smell or change in taste. No abdominal pain nausea vomiting or diarrhea. The patient does report that he has home oxygen that he uses as needed at home and he reports that he hardly needs it. He is vaccinated did receive the J&J vaccine but has not yet received a booster dose.     The patient presented to the emergency department and was found to have leukopenia with a white blood cell count of 2.7 and thrombocytopenia with a platelet count of 563. Creatinine slightly elevated at 1.31 and proBNP is elevated at 9327. Chest x-ray showed hyperinflated lungs with cardiomegaly seen and diffuse increased interstitial markings in both lungs which may represent baseline chronic interstitial lung changes given that these findings were present before. A CT of the chest was done with IV contrast that showed no evidence of pulmonary embolism and compared to 2008 there is worsening underlying emphysema with worsening subacute or acute interstitial lung disease best seen in the left upper lobe. Findings were not felt typical for COVID-19 virus pneumonia. There was thickening and distortion of the left major fissure with an ill-defined masslike focus in the left lower lobe.   There is worsening moderate to severe pulmonary artery hypertension     COVID testing was positive and I was asked to evaluate and help with COVID-19 virus infection    The patient did have a CT scan of the abdomen pelvis done 2022 which was a limited study with no evidence of bowel obstruction and moderate stool burden noted felt related to constipation. Tiny amount of free fluid scattered in the abdomen, moderate pleural effusions and left basilar consolidation and basilar interstitial thickening increased from 2022    The patient is seen and evaluated at bedside he continues to have fevers up to 102.9 degrees     The patient had a CT of the head 2022 with no acute intracranial abnormality, CT of the chest that showed no evidence of pulmonary embolism but interval development of extensive airspace disease within the lower lobes bilaterally, diffuse interstitial pulmonary fibrosis and worsening of the reticulonodular changes suggesting superimposed infection or edema  And a CT of the abdomen and pelvis that showed patient was status post gastrostomy tube placement and development of mild pneumoperitoneum seen along the anterior aspect of the upper abdomen along with the hepatic margin. Slightly more pronounced pelvic and abdominal ascites compared to prior exam, stable anasarca. Repeat CT imaging imaging with findings suggestive of pneumonia as well as some fibrotic changes from the COVID pneumonia. I did start the patient on meropenem and Zyvox on 2022 due to elevated WBC, worsening infiltrates on chest x-ray    Current evaluation:2022    BP (!) 129/55   Pulse 52   Temp 97.3 °F (36.3 °C) (Temporal)   Resp 20   Ht 5' 10\" (1.778 m)   Wt 242 lb (109.8 kg)   SpO2 98%   BMI 34.72 kg/m²     Temperature Range: Temp: 97.3 °F (36.3 °C) Temp  Av °F (36.7 °C)  Min: 97.3 °F (36.3 °C)  Max: 98.3 °F (36.8 °C)   The patient is seen and evaluated at bedside and is on 65% FiO2 and 14 of PEEP.   The patient remains on sedation and is on blood pressure medications though he does get agitated and hypertensive at times.    Review of Systems   Unable to perform ROS: Intubated       Physical Examination :     Physical Exam  Constitutional:       Appearance: He is well-developed. Interventions: He is sedated and intubated. HENT:      Head: Normocephalic and atraumatic. Cardiovascular:      Rate and Rhythm: Normal rate. Heart sounds: Normal heart sounds. No friction rub. No gallop. Pulmonary:      Effort: Pulmonary effort is normal. He is intubated. Breath sounds: Normal breath sounds. No wheezing. Abdominal:      General: Bowel sounds are normal.      Palpations: Abdomen is soft. There is no mass. Tenderness: There is no abdominal tenderness. Musculoskeletal:         General: Swelling (Bilateral upper extremity swelling) present. Cervical back: Neck supple. Lymphadenopathy:      Cervical: No cervical adenopathy. Skin:     General: Skin is warm and dry. Comments: There cyanotic changes in the legs and feet bilaterally related to the pressor support         Laboratory data:   I have independently reviewed the followinglabs:  CBC with Differential:   Recent Labs     02/16/22  0540 02/17/22  0514   WBC 6.5 6.1   HGB 11.1* 10.5*   HCT 36.1* 33.7*   * 111*   LYMPHOPCT 6* 6*   MONOPCT 5 4     BMP:   Recent Labs     02/16/22  0540 02/17/22  0514    135   K 3.5* 3.3*    98   CO2 25 25   BUN 42* 40*   CREATININE 0.66* 0.60*   MG 1.5* 1.7     Hepatic Function Panel:   No results for input(s): PROT, LABALBU, BILIDIR, IBILI, BILITOT, ALKPHOS, ALT, AST in the last 72 hours.       Lab Results   Component Value Date    PROCAL 0.25 02/01/2022    PROCAL 0.24 01/23/2022    PROCAL 0.11 01/19/2022     Lab Results   Component Value Date    CRP 23.5 01/16/2022    CRP 2.0 07/27/2019     Lab Results   Component Value Date    SEDRATE 3 01/16/2022         Lab Results   Component Value Date    DDIMER 4.53 02/12/2022    DDIMER 2.96 01/30/2022    DDIMER 5.84 01/23/2022    DDIMER 1.53 01/18/2022 DDIMER 1.73 01/16/2022     Lab Results   Component Value Date    FERRITIN 569 01/16/2022    FERRITIN 50 07/23/2019    FERRITIN 18 07/08/2019     Lab Results   Component Value Date     01/16/2022     Lab Results   Component Value Date    FIBRINOGEN 402 01/16/2022       No results found for requested labs within last 30 days. Lab Results   Component Value Date    COVID19 DETECTED 01/16/2022    COVID19 Not Detected 11/02/2021       No results for input(s): VANCOTROUGH in the last 72 hours. Imaging Studies:   ONE XRAY VIEW OF THE CHEST 2/18/2022 4:50 am   Impression   1. Unchanged position of support devices.       2. Bilateral airspace disease again demonstrated.  Increasing basilar   opacities with probable small effusions. ONE XRAY VIEW OF THE CHEST 2/15/2022 6:40 am    Impression   No significant interval change.  Diffuse interstitial and patchy hazy   opacities within the lungs. CT OF THE ABDOMEN AND PELVIS WITH CONTRAST 2/12/2022 2:03 pm  Impression   Status post gastrostomy tube with tip in the upper stomach.  There is   development of mild pneumoperitoneum seen along the anterior aspect of the   upper abdomen along the hepatic margin.  It is not clear whether the free air   is associated of the recent gastrostomy placement.       Recommend surgical consultation       Slightly more pronounced pelvic and abdominal ascites when compared to the   prior exam       Stable anasarca       Critical results were called by Dr. Yuri Stephen MD to Dr Natali Lainez on 2/12/2022 at 21:14. CTA OF THE CHEST 2/12/2022 12:03 pm      Impression   1. No evidence of pulmonary embolism   2. Interval development of extensive airspace disease within the lower lobes   bilaterally.  Differential considerations would include aspiration changes,   bacterial pneumonia, and atelectasis   3.  Diffuse interstitial pulmonary fibrosis, with worsening of the   reticulonodular changes, suggesting superimposed infection or edema   4. Small amount of pneumoperitoneum present within the upper abdomen. Correlation with the dedicated CT scan abdomen pelvis recommended. 5. Cardiomegaly, with extensive coronary arterial calcifications   6. Small bilateral pleural effusions   7.  Critical results were called by Dr. Maddie Quiñones to Dr Carole Donis on   2/12/2022 at 5:45 p.m. hours. CT OF THE HEAD WITHOUT CONTRAST  2/12/2022 5:02 pm  Impression   1. No acute intracranial abnormality. 2. Microangiopathic change. ONE XRAY VIEW OF THE CHEST 2/12/2022 5:28 am    Impression   Bilateral lung airspace disease which has slightly progressed within the   right lower lobe.               CT OF THE ABDOMEN AND PELVIS WITHOUT CONTRAST 1/28/2022 8:34 am    Impression   1.  Overall mildly limited study due to motion and lack of contrast.       2.  No evidence of bowel obstruction.  Moderate stool burden is noted,   possibly related to constipation.  Enteric tube is in place with distal tip   in the proximal stomach.       3.  Tiny amount of free fluid scattered in the abdomen, including   presacral/perirectal fluid, most likely related to volume overload.  No   definite findings of rectal wall thickening or fecal impaction.       4.  Moderate pleural effusions, left basilar consolidation, and bibasilar   interstitial thickening, increased when compared to 01/16/2022.       RECOMMENDATIONS:   Unavailable         Veins: Lower Extremities DVT Study, Venous Scan Lower Bilateral.  Indications for Study: Leg Swelling . Patient Status: In Patient. Technical Quality: Limited visualization. Limitation reason: Edema. Comments: Simultaneous real time imaging utilizing B-Mode, color doppler and spectral waveform analysis was performed on the  bilateral lower extremities for venous examination of the deep and superficial systems.   Conclusions  Summary  No evidence of superficial or deep venous thrombosis in both lower extremities. Signature  Electronically signed by Berenice Jiang RVT(Sonographer) on 01/19/2022 08:10 AM  Electronically signed by Maria Del Carmen Bridges physician) on 01/19/2022 06:21 PM      CT OF THE HEAD WITHOUT CONTRAST  1/18/2022 5:42 pm  Impression   New lacunar infarct left thalamus, age indeterminate. Genevie Andrea may be helpful.           Cultures:     Culture, Blood 1 [[id number]] Collected: 02/08/22 1559   Order Status: Completed Specimen: Blood Updated: 02/13/22 2041    Specimen Description . BLOOD    Special Requests RT ARTERY,10ML    Culture NO GROWTH 5 DAYS   Culture, Blood 1 [[id number]] Collected: 02/08/22 1549   Order Status: Completed Specimen: Blood Updated: 02/13/22 2035    Specimen Description . BLOOD    Special Requests RT HAND,10ML    Culture NO GROWTH 5 DAYS     Culture, Blood 1 [[id number]] Collected: 02/01/22 0003   Order Status: Completed Specimen: Blood Updated: 02/06/22 0830    Specimen Description . BLOOD    Special Requests ART LINE 20ML    Culture NO GROWTH 5 DAYS   Culture, Blood 1 [[id number]] Collected: 01/31/22 1853   Order Status: Completed Specimen: Blood Updated: 02/05/22 2315    Specimen Description . BLOOD    Special Requests RT HAND,10ML    Culture NO GROWTH 5 DAYS     Culture, Respiratory [[id number]] Collected: 01/28/22 1030   Order Status: Completed Specimen: Sputum, Suctioned Updated: 01/30/22 0932    Specimen Description . SUCTIONED SPUTUM    Special Requests NOT REPORTED    Direct Exam < 10 EPITHELIAL CELLS/LPF     <10 NEUTROPHILS/LPF     NO SIGNIFICANT PATHOGENS SEEN    Culture NORMAL RESPIRATORY PO HEAVY GROWTH       Culture, Blood 1 [[id number]] Collected: 01/23/22 1759   Order Status: Completed Specimen: Blood Updated: 01/28/22 2111    Specimen Description . BLOOD    Special Requests RT HAND 19ML    Culture NO GROWTH 5 DAYS   Culture, Blood 1 [[id number]] Collected: 01/23/22 1759   Order Status: Completed Specimen: Blood Updated: 01/28/22 2110    Specimen Description Quinn Wilcox BLOOD    Special Requests ART LINE 10ML    Culture NO GROWTH 5 DAYS     Culture, Blood 2 [[id number]] Collected: 01/21/22 0742   Order Status: Completed Specimen: Blood Updated: 01/26/22 1001    Specimen Description . BLOOD    Special Requests LEFT AC 20ML    Culture NO GROWTH 5 DAYS   Culture, Blood 1 [[id number]] Collected: 01/21/22 0750   Order Status: Completed Specimen: Blood Updated: 01/26/22 1000    Specimen Description . BLOOD    Special Requests LEFT HAND 5ML    Culture NO GROWTH 5 DAYS   MRSA DNA Probe, Nasal [[id number]] Collected: 01/23/22 2258   Order Status: Completed Specimen: Nasal Updated: 01/25/22 1038    Specimen Description . NASAL SWAB    MRSA, DNA, Nasal NEGATIVE    Comment: NEGATIVE:  MRSA DNA not detected by nucleic acid amplification.                                                     Results should be used as an adjunct to nosocomial control efforts to identify patients   needing enhanced precautions.     The test is not intended to identify patients with staphylococcal infections.  Results   should not be used to guide or monitor treatment for MRSA infections. Culture, Blood 1 [[id number]] Collected: 01/16/22 1220   Order Status: Completed Specimen: Blood Updated: 01/21/22 1521    Specimen Description . BLOOD    Special Requests LAC 12ML    Culture NO GROWTH 5 DAYS   Culture, Blood 1 [[id number]] Collected: 01/16/22 1205   Order Status: Completed Specimen: Blood Updated: 01/21/22 1521    Specimen Description . BLOOD    Special Requests RAC 12ML    Culture NO GROWTH 5 DAYS       Culture, Urine [[id number]] Collected: 01/16/22 1315   Order Status: Completed Specimen: Urine, clean catch Updated: 01/17/22 2128    Specimen Description . CLEAN CATCH URINE    Special Requests NOT REPORTED    Culture NO SIGNIFICANT GROWTH       COVID-19, Rapid [[id number]] (Abnormal) Collected: 01/16/22 1230   Order Status: Completed Specimen: Nasopharyngeal Swab Updated: 01/16/22 1314    Specimen Description [place] NASOPHARYNGEAL SWAB    SARS-CoV-2, Rapid DETECTED Abnormal     Comment:        Rapid NAAT: The specimen is POSITIVE for SARS-Cov-2, the novel coronavirus associated with   COVID-19.         This test has been authorized by the FDA under an Emergency Use Authorization (EUA) for use   by authorized laboratories.         The ID NOW COVID-19 assay is designed to detect the virus that causes COVID-19 in patients   with signs and symptoms of infection who are suspected of COVID-19. An individual without symptoms of COVID-19 and who is not shedding SARS-CoV-2 virus would   expect to have a negative (not detected) result in this assay. Fact sheet for Healthcare Providers: BuildHer.es   Fact sheet for Patients: BuildHer.es           Methodology: Isothermal Nucleic Acid Amplification         Results reported to the appropriate Health Department         Flu A/B Ag Detection [[id number]] Collected: 01/16/22 1230   Order Status: Completed Specimen: Nasopharyngeal Swab Updated: 01/16/22 1313    Specimen Description . NASOPHARYNGEAL SWAB    Special Requests NOT REPORTED    Direct Exam NEGATIVE for Influenza A + B antigens.                                PCR testing to confirm this result is available upon request. Samara Peter will be saved in the laboratory for 7 days.  Please call [phone] if PCR testing is indicated.      Medications:      furosemide  40 mg IntraVENous TID    meropenem  1,000 mg IntraVENous Q8H    linezolid  600 mg IntraVENous Q12H    sodium chloride  80 mL IntraVENous Once    sucralfate  1 g Oral 4 times per day    amLODIPine  5 mg Oral BID    insulin lispro  0-18 Units SubCUTAneous TID WC    insulin lispro  0-9 Units SubCUTAneous Nightly    amiodarone  200 mg Per NG tube Daily    insulin glargine  15 Units SubCUTAneous BID    Vitamin D  1,000 Units Oral Daily    polyethylene glycol  17 g Per NG tube Daily    levalbuterol  1.25 mg Nebulization Q6H    bisacodyl  10 mg Rectal Daily    pantoprazole  40 mg IntraVENous Daily    And    sodium chloride (PF)  10 mL IntraVENous Daily    aspirin  324 mg Oral Daily    levETIRAcetam  500 mg Oral BID    chlorhexidine  15 mL Mouth/Throat BID    enoxaparin  30 mg SubCUTAneous BID    QUEtiapine  50 mg Oral Once    budesonide  0.5 mg Nebulization BID    sodium chloride flush  5-40 mL IntraVENous 2 times per day    atorvastatin  20 mg Oral Daily           [facility]  Laney Eric MD  [employer] messaging  OFFICE: [phone]      Electronically signed by Laney Eric MD on 2/18/2022 at 9:46 AM  Thank you for allowing us to participate in the care of this patient. Please call with questions. This note iscreated with the assistance of a speech recognition program.  While intending to generate a document that actually reflects the content of the visit, the document can still have some errors including those of syntax andsound a like substitutions which may escape proof reading. In such instances, actual meaning can be extrapolated by contextual diversion.

## 2022-02-18 NOTE — PROGRESS NOTES
.  Nephrology  Progress Note    Reason for Consult: Electrolyte issues. Requesting Physician:  Kareem Ramirez MD    INTERVAL HISTORY:  Remains sedated on mechanical ventilation  Corrected sodium was 134  Potassium was low at 3.5    HISTORY OF PRESENT ILLNESS:    The patient is a 76 y.o. male who presented with to the hospital for worsening shortness of breath ad and worsening lower extremity edema on 1/16. He had diffuse body aches and sweats and a dry cough. He tested positive for COVID-19. He has steadily declined since admission. On 1/18 a CT head found New lacunar infarct left thalamus. He had to be intubated on 1/23. He has a significant past medical history of COPD, hyperlipidemia, Type 2 DM, Right kidney mass, and abdominal aortic aneurysm repair in 2005. His potassium has been steadily rising since 1/24/22. The most current Potassium level is 5.8. His creatine is improved to 1.71. This information was retrieved through chart review and nursing. Patient was unable to provide information due to current status on mechanical ventilation and sedation. Review Of Systems:  Unable to obtain due to patient's current clinical condition. Remains on mechanical ventilation and sedated. Past Medical History:   Diagnosis Date    Arthritis     Atherosclerotic plaque 7/28/2019    Cervical disc disease     degenerative disc disease    Diabetes mellitus ([facility] [address]     type 2    History of blood transfusion     Hx of blood clots     left leg    Hyperlipidemia     Neuropathy     Right kidney mass     \"urologist is watching\"    Snores     Type 2 diabetes mellitus treated with insulin ([facility] [address] 7/28/2019       Prior to Admission medications    Medication Sig Start Date End Date Taking?  Authorizing Provider   rosuvastatin (CRESTOR) 40 MG tablet Take 40 mg by mouth every evening   Yes Historical Provider, MD   insulin NPH (HUMULIN N;NOVOLIN N) 100 UNIT/ML injection vial Inject 20 Units into the skin 2 times daily (before meals)   Yes Historical Provider, MD   levETIRAcetam (KEPPRA) 500 MG tablet Take 1 tablet by mouth 2 times daily 11/3/21  Yes Ramona Danielle MD   venlafaxine (EFFEXOR XR) 150 MG extended release capsule Take 1 capsule by mouth daily (with breakfast) 7/29/19  Yes Arabella Kiser   vitamin B-1 (THIAMINE) 100 MG tablet Take 100 mg by mouth daily   Yes Historical Provider, MD   ferrous sulfate 325 (65 Fe) MG tablet Take 325 mg by mouth daily (with breakfast)   Yes Historical Provider, MD   ALPRAZolam (XANAX) 0.5 MG tablet Take 0.5 mg by mouth three times daily.    Yes Historical Provider, MD   furosemide (LASIX) 40 MG tablet Take 1 tablet by mouth 2 times daily 12/11/18  Yes Jenny Evans MD   tiotropium (SPIRIVA RESPIMAT) 2.5 MCG/ACT AERS inhaler Inhale 2 puffs into the lungs daily    Yes Historical Provider, MD   albuterol sulfate  (90 Base) MCG/ACT inhaler Inhale 2 puffs into the lungs every 6 hours as needed for Wheezing or Shortness of Breath   Yes Historical Provider, MD   metoprolol succinate (TOPROL XL) 50 MG extended release tablet Take 50 mg by mouth daily   Yes Historical Provider, MD   Multiple Vitamins-Minerals (MULTIVITAMIN ADULT) TABS Take 1 tablet by mouth daily   Yes Historical Provider, MD   vitamin D (CHOLECALCIFEROL) 1000 UNIT TABS tablet Take 1,000 Units by mouth daily   Yes Historical Provider, MD   fluticasone (FLONASE) 50 MCG/ACT nasal spray 1 spray by Each Nare route daily as needed for Rhinitis   Yes Historical Provider, MD   aspirin 325 MG EC tablet Take 325 mg by mouth daily    Yes Historical Provider, MD   Omega-3 Fatty Acids (FISH OIL) 1000 MG CAPS Take 1 capsule by mouth daily    Yes Historical Provider, MD   amLODIPine (NORVASC) 5 MG tablet Take 5 mg by mouth nightly    Yes Historical Provider, MD   mirtazapine (REMERON) 45 MG tablet Take 45 mg by mouth nightly   Yes Historical Provider, MD       Scheduled Meds:   furosemide  40 mg IntraVENous TID    meropenem  1,000 rales. Cardiovascular: RRR, S1S2, no murmur, rub or gallop. Bilateral +1 pitting lower extremity edema. Abdomen: Soft, non tender to palpation. PEG, hypoactive bowel sounds  Musculoskeletal: No cyanosis or clubbing. Integumentary: Pink, warm and dry. Free from rash or lesions. CNS: Sedated, intubated, face symmetrical. No tremor.      Data:  CBC:   Lab Results   Component Value Date    WBC 7.7 02/18/2022    HGB 11.5 (L) 02/18/2022    HCT 36.7 (L) 02/18/2022    MCV 78.4 (L) 02/18/2022     (L) 02/18/2022     BMP:    Lab Results   Component Value Date     (L) 02/18/2022     02/17/2022     02/16/2022    K 3.5 (L) 02/18/2022    K 3.3 (L) 02/17/2022    K 3.5 (L) 02/16/2022    CL 96 (L) 02/18/2022    CL 98 02/17/2022     02/16/2022    CO2 26 02/18/2022    CO2 25 02/17/2022    CO2 25 02/16/2022    BUN 38 (H) 02/18/2022    BUN 40 (H) 02/17/2022    BUN 42 (H) 02/16/2022    CREATININE 0.59 (L) 02/18/2022    CREATININE 0.60 (L) 02/17/2022    CREATININE 0.66 (L) 02/16/2022    GLUCOSE 183 (H) 02/18/2022    GLUCOSE 227 (H) 02/17/2022    GLUCOSE 211 (H) 02/16/2022     CMP:   Lab Results   Component Value Date     02/18/2022    K 3.5 02/18/2022    CL 96 02/18/2022    CO2 26 02/18/2022    BUN 38 02/18/2022    CREATININE 0.59 02/18/2022    GLUCOSE 183 02/18/2022    CALCIUM 8.6 02/18/2022    PROT 5.0 02/12/2022    LABALBU 1.6 02/12/2022    BILITOT 0.37 02/12/2022    ALKPHOS 105 02/12/2022    AST 39 02/12/2022    ALT 36 02/12/2022      Hepatic:   Lab Results   Component Value Date    AST 39 02/12/2022    AST 72 (H) 02/03/2022    AST 46 (H) 02/01/2022    ALT 36 02/12/2022    ALT 77 (H) 02/03/2022    ALT 43 (H) 02/01/2022    BILITOT 0.37 02/12/2022    BILITOT 0.37 02/03/2022    BILITOT 0.28 (L) 02/01/2022    ALKPHOS 105 02/12/2022    ALKPHOS 117 02/03/2022    ALKPHOS 100 02/01/2022     BNP: No results found for: BNP  Lipids:   Lab Results   Component Value Date    CHOL 129 11/03/2021    HDL 30 (L) 11/03/2021     INR:   Lab Results   Component Value Date    INR 1.0 01/17/2022    INR 1.0 11/03/2021    INR 1.0 07/27/2019     PTH: No results found for: PTH  Phosphorus:    Lab Results   Component Value Date    PHOS 2.3 02/18/2022     Ionized Calcium: No results found for: IONCA  Magnesium:   Lab Results   Component Value Date    MG 1.7 02/18/2022     Albumin:   Lab Results   Component Value Date    LABALBU 1.6 02/12/2022     Last 3 CK, CKMB, Troponin: @LABRCNT(CKTOTAL:3,CKMB:3,TROPONINI:3)       URINE:)No results found for: Ricardo Estevez    Radiology:  Reviewed. Assessment:  Hypokalemia, used to have hyperkalemia. Hypernatremia, Na stable. Hypophosphatemia. Hypomagnesemia. Hypovitaminosis D. Metabolic alkalosis. Hypertension. Diabetes mellitis. COVID19 pneumonia. CHF. COPD. Plan:  Decrease free water flushes to 300 ml every 6 hours. Replace potassium  Okay to replace electrolytes per protocol  Monitor blood pressure closely. Continue vitamin D supplementation. Amio gtt as per cardiology  Plans for family meeting to decide long-term care plans and CODE STATUS noted    electronically signed by Dillan Zhang MD  on 2/18/2022 at [address] [facility].   Ph: [phone]

## 2022-02-18 NOTE — PROGRESS NOTES
Pulmonary Critical Care Progress Note  Abdiaziz Herrera MD     Patient seen for the follow up of COVID-19 pneumonia, acute hypoxic respiratory failure. Subjective:  He is sedated, intubated on ventilator. He is on fentanyl, propofol and Precedex. He is on FiO2 at 65% with PEEP of 14. He is on Luke-Synephrine drip. He is tolerating tube feeds. Examination:  Vitals: BP (!) 88/42   Pulse 70   Temp 97.3 °F (36.3 °C) (Temporal)   Resp 23   Ht 5' 10\" (1.778 m)   Wt 242 lb (109.8 kg)   SpO2 (!) 89%   BMI 34.72 kg/m²   General appearance:  sedated, intubated on ventilator  Neck: No JVD  Lungs decreased breath sound no crackles or wheezing  Heart: regular rate and rhythm, S1, S2 normal, no gallop  Abdomen: Soft, non tender, + BS  Extremities: no cyanosis or clubbing. 3+ edema    LABs:  CBC:   Recent Labs     02/17/22  0514 02/18/22  1024   WBC 6.1 7.7   HGB 10.5* 11.5*   HCT 33.7* 36.7*   * 121*     BMP:   Recent Labs     02/17/22  0514 02/18/22  1024    133*   K 3.3* 3.5*   CO2 25 26   BUN 40* 38*   CREATININE 0.60* 0.59*   LABGLOM >60 >60   GLUCOSE 227* 183*     ABG:  Lab Results   Component Value Date    LJJ0GJY NOT REPORTED 02/17/2022    FIO2 NOT REPORTED 02/17/2022       Lab Results   Component Value Date    POCPH 7.437 02/17/2022    POCPCO2 41.8 02/17/2022    POCPO2 55.3 02/17/2022    POCHCO3 28.2 02/17/2022    PBEA 4 02/17/2022    KKI0XJK NOT REPORTED 02/17/2022    ZKQB7XAV 89 02/17/2022    FIO2 NOT REPORTED 02/17/2022     Radiology:  2/18/22      CT chest: No PE, bibasilar infiltrate. Small amount of pneumoperitoneum.     CT abdomen pelvis  Status post gastrostomy tube with tip in the upper stomach.  There is   development of mild pneumoperitoneum seen along the anterior aspect of the   upper abdomen along the hepatic margin.  It is not clear whether the free air   is associated of the recent gastrostomy placement.       Recommend surgical consultation       Slightly more pronounced pelvic and abdominal ascites when compared to the   prior exam       Stable anasarca               CT brain: No acute intracranial abnormality  Lower extremity Dopplers: 2/14/2022: No DVT  Upper extremity Dopplers 2/14/2022: No DVT    Impression:  · Acute on chronic hypoxic respiratory failure  · COVID-19 pneumonia/ARDS  · COPD/pulmonary emphysema  · Acute left thalamic infarct/CVA  · Left lower lobe opacity versus nodule  · Pulmonary edema  · Elevated D-dimer, decreased platelets  · Systolic heart failure, s/p AICD placement  · BLANCHE on CKD  · Diabetes mellitus    Recommendations:  · Continue vent support, wean FiO2 as tolerated, keep PEEP  14  · Fentanyl drip RASS -2  · Continue precedex drip RASS -2  · Diprivan drip, for RASS score -1 to -2  · Luke-Synephrine titrate for MAP 65 to 75 mmHg  · Xopenex by nebulizer  · Pulmicort 0.5 every 12 hours  · Monitor blood sugars off insulin drip  · Amiodarone drip per cardiology. · Out of Airborne isolation  · Linezolid and meropenem per ID  · Not a candidate for Actemra/baricitinib stopped secondary to cytopenias  · Neurology on consult  · Lasix 40 IV every 8 hours  · Tube feeds as tolerated  · GI prophylaxis, Protonix  · Discussed with RN  · DVT prophylaxis, Lovenox 30 twice daily  · Patient's family has decided to withdraw care later today.   · We will follow with you    Electronically signed by     Gilford Moulder, MD on 2/18/2022 at 2:07 PM  Pulmonary Critical Care and Sleep Medicine,  [facility] AT [place]: [phone]

## 2022-02-18 NOTE — PLAN OF CARE
Problem: Gas Exchange - Impaired  Goal: Absence of hypoxia  Outcome: Ongoing     Problem: Falls - Risk of:  Goal: Will remain free from falls  Description: Will remain free from falls  Outcome: Ongoing     Problem: Pain:  Goal: Patient's pain/discomfort is manageable  Description: Patient's pain/discomfort is manageable  Outcome: Ongoing     Problem: Skin Integrity:  Goal: Skin integrity will stabilize  Description: Skin integrity will stabilize  Outcome: Ongoing     Problem: Skin Integrity:  Goal: Will show no infection signs and symptoms  Description: Will show no infection signs and symptoms  Outcome: Ongoing   Pt will maintain SpO2 GT 92%. Fall precautions in place will continue to monitor. Patient will have progressive improvement with pain scale with interventions. Continue to monitor skin integrity and IV sites.

## 2022-02-18 NOTE — FLOWSHEET NOTE
Patient is intubated and unresponsive. No family is present. Writer offers a prayer for the patient. Patient does not respond. Spiritual Care will follow as needed. 02/17/22 0610   Encounter Summary   Services provided to: Patient not available   Referral/Consult From: Palliative Care;Rounding   Support System Spouse; Children   Continue Visiting   (2/17/22)   Complexity of Encounter Low   Length of Encounter 15 minutes   Routine   Type Follow up   Assessment Unable to respond   Intervention Prayer   Outcome Did not respond

## 2022-02-19 NOTE — FLOWSHEET NOTE
[address]   Patient Death Note  DEATH                  Room # 9647/4588-22   Name: Heri Guo            Age: 76 y.o. Gender: male          Druze: Other       Admit Date & Time: 2022 11:51 AM        Actual date of death: 22  TOD: 1818 pm       Referral:  Unit: ICU  Nurse: Pineda Steele    SITUATION AT DEATH:  Organ failure, CHF, kidney failure    IS THIS A 'S CASE? No    SPIRITUAL/EMOTIONAL INTERVENTION:  Present was 3 family members, family was calm, no major needs or prayers were expressed.  helped celebrate  life,helped family to  share stories. DOCTOR SIGNING DEATH CERTIFICATE:  Name: Dr. Krishan Mesa  Phone number: [phone]    Copy of COMPLETED Release of Body Form Received? Yes    Patient's belongings:  Mia Navarro with family a while ago.  HOME:  Contacted Time 7:00 pm  Name: [address] [place] [address] Avenue: [facility]  Phone Number: [phone]    NEXT OF KIN:  Name: Fam Arreola   Relationship: Spouse  Street Address: [address] [place] [address] [facility] [place]   Zip code: [address]   Phone Number: [phone] / [facility] [address] [place] [address]? No      Electronically signed by Chaplain Roney, on 2022 at 7:33 PM.  Dorothy  [phone]     22 193   Encounter Summary   Services provided to: Family   Referral/Consult From: Multi-disciplinary team   Support System Spouse; Children   Continue Visiting   (22)   Complexity of Encounter Moderate   Length of Encounter 1 hour   Spiritual Assessment Completed Yes   Routine   Type Follow up   Grief and Life Adjustment   Type Death   Assessment Approachable;Calm   Intervention Explored feelings, thoughts, concerns; Discussed belief system/Jain practices/gustavo;Discussed illness/injury and it's impact   Outcome Expressed gratitude;Receptive;Engaged in conversation;Expressed feelings/needs/concerns

## 2022-02-19 NOTE — PROGRESS NOTES
Physicians notified of pt passing via perfect serve:  [address] [facility] Dr Garrison Yeager read [phone] Dr Phylicia Isaac and msg read [phone] Dr Juan Meadows and msg read [phone] Dr Shon Crouch and msg read [phone] Dr Jody Chacko and msg read [phone]

## 2022-02-27 NOTE — DISCHARGE SUMMARY
[facility].,    Adult Hospitalist      Patient ID: Leonie Morales  MRN: [id number]     Acct:  [de-identified]       Patient's PCP: Harvey Valentin MD    Admit Date: 1/16/2022     Discharge Date: 2/18/2022      Admitting Physician: Taryn Eller MD    Discharge Physician: Minnie Hodgkins, MD     CONSULTANTS: Patient Care Team:  Harvey Valentin MD as PCP - General (Family Medicine)    PROCEDURES PERFORMED:     Active Discharge Diagnoses:  Acute congestive heart failure, diastolic   [id number]   Viral pneumonia secondary to above  Acute respiratory failure with hypoxia intubated on 1/23/2022  Hyperkalemia  Paroxysmal atrial fibrillation  Essential hypertension  Mixed hyperlipidemia  Diabetes mellitus type 2  History of seizure disorder  History of CKD stage III, presently normal renal function  Lung mass? Leukopenia  Polycythemia  Thrombocytopenia  Anxiety disorder  Recent past h/o lacunar infarct left thalamus   Altered mental status/agitation  Endotracheal intubation secondary to hypoxia dated 1/23/2022  Supraventricular tachycardia/elevated troponin  Fever  Emphysema   Pulmonary artery hypertension   Decubitus ulcer buttocks      Primary Problem  <principal problem not specified>    Hospital Course: Patient had been admitted to the hospital with COVID-19 pneumonia leading to acute respiratory failure with hypoxia. Patient developed acute diastolic congestive heart failure in addition. Patient was given IV Decadron. Pulmonology and infectious disease were consulted. Patient complete an antibiotic course of cefepime for 7 days. Bronchodilators and Pulmicort were continued. Patient had developed cytopenia for which she was not a candidate for Actemra or baricitinib. However patient was given Zyvox and meropenem for a bacterial infection. Patient was reported to have a lung mass and showed leukopenia, polycythemia and thrombocytopenia.   Patient had an episode of confusion after which a stroke alert was called in. An MRI of the brain showed recent past lacunar infarct in the left thalamus. Patient's agitation slowly improved and no additional treatment was recommended by neurology. Patient developed rapid ventricular response with paroxysmal atrial fibrillation. Patient had been agitated and was placed on a Precedex infusion. Amiodarone was given in addition. Other essential home medications were resumed. Patient intubated on 2022. GI were consulted for PEG tube. Patient was started on tube feeds. Patient had earlier been given Protonix IV and Carafate. Patient had developed a decubitus ulcer on his buttocks for which wound care continue to manage to wounds. Patient position was switched to every 2 hours. Patient was found to have elevated blood glucose for which an insulin infusion was started. Once stabilized insulin IV was discontinued and patient was started on Lantus with sliding scale insulin. Patient remained on assisted ventilation for a prolonged period of time. Family were offered for the patient to have a tracheostomy which they refused. Patient continued to require higher amounts of oxygen. Family decided to withdraw life support and wean off ventilation after which the patient  on 2022    The patient was seen and examined on day of discharge and this discharge summary is in conjunction with any daily progress note from day of discharge. Hospital Data:    Labs:    Hematology:No results for input(s): WBC, RBC, HGB, HCT, MCV, MCH, MCHC, RDW, PLT, MPV, SEDRATE, CRP, INR, DDIMER, OA7RPIQP, LABABSO in the last 72 hours. Invalid input(s): PT  Chemistry:No results for input(s): NA, K, CL, CO2, GLUCOSE, BUN, CREATININE, MG, ANIONGAP, LABGLOM, GFRAA, CALCIUM, CAION, PHOS, PSA, PROBNP, TROPHS, CKTOTAL, CKMB, CKMBINDEX, MYOGLOBIN, DIGOXIN, LACTACIDWB in the last 72 hours.   No results for input(s): PROT, LABALBU, LABA1C, J0XTSVC, Y2VJDPP, FT4, TSH, AST, ALT, LDH, GGT, ALKPHOS, LABGGT, BILITOT, BILIDIR, AMMONIA, AMYLASE, LIPASE, LACTATE, CHOL, HDL, LDLCHOLESTEROL, CHOLHDLRATIO, TRIG, VLDL, YCO93OM, PHENYTOIN, PHENYF, URICACID, POCGLU in the last 72 hours. Lab Results   Component Value Date    INR 1.0 2022    INR 1.0 2021    INR 1.0 2019    PROTIME 13.3 2022    PROTIME 11.1 2021    PROTIME 10.5 2019     Lab Results   Component Value Date/Time    SPECIAL RT ARTERY,10ML 2022 03:59 PM     Lab Results   Component Value Date/Time    CULTURE NO GROWTH 5 DAYS 2022 03:59 PM       Lab Results   Component Value Date    POCPH 7.437 2022    POCPCO2 41.8 2022    POCPO2 55.3 2022    POCHCO3 28.2 2022    NBEA NOT REPORTED 2022    PBEA 4 2022    BHE0POI NOT REPORTED 2022    DWGQ4AFW 89 2022    FIO2 NOT REPORTED 2022       Radiology:    No results found. All radiological studies reviewed      Reviews of Symptoms:        Physical Exam:    Vitals:  BP (!) 69/31   Pulse (!) 0   Temp 98.5 °F (36.9 °C) (Oral)   Resp (!) 0   Ht 5' 10\" (1.778 m)   Wt 242 lb (109.8 kg)   SpO2 100%   BMI 34.72 kg/m²   No data recorded. Consults:  IP CONSULT TO CARDIOLOGY  IP CONSULT TO HOSPITALIST  IP CONSULT TO INFECTIOUS DISEASES  IP CONSULT TO PULMONOLOGY  IP CONSULT TO VASCULAR SURGERY  IP CONSULT TO NEUROLOGY  IP CONSULT TO NEUROLOGY  PALLIATIVE CARE EVAL  IP CONSULT TO DIETITIAN  IP CONSULT TO CARDIOLOGY  PHARMACY TO DOSE VANCOMYCIN  PALLIATIVE CARE EVAL  IP CONSULT TO DIETITIAN  IP CONSULT TO NEUROLOGY  IP CONSULT TO NEPHROLOGY  IP CONSULT TO GI  IP CONSULT TO GENERAL SURGERY  IP CONSULT TO GI  IP CONSULT TO PALLIATIVE CARE    Disposition:     Discharged Condition: Stable    Follow Up: No follow-up provider specified.     Lab Frequency Next Occurrence   Basic Metabolic Panel Once          Diet: No diet orders on file    Discharge Medications: Medication List      STOP taking these medications    atorvastatin 20 MG tablet  Commonly known as: LIPITOR     Basaglar KwikPen 100 UNIT/ML injection pen  Generic drug: insulin glargine     HumaLOG KwikPen 100 UNIT/ML Sopn  Generic drug: insulin lispro (1 Unit Dial)     lisinopril 20 MG tablet  Commonly known as: PRINIVIL;ZESTRIL        ASK your doctor about these medications    albuterol sulfate  (90 Base) MCG/ACT inhaler     ALPRAZolam 0.5 MG tablet  Commonly known as: XANAX     amLODIPine 5 MG tablet  Commonly known as: NORVASC     aspirin 325 MG EC tablet     Crestor 40 MG tablet  Generic drug: rosuvastatin     ferrous sulfate 325 (65 Fe) MG tablet  Commonly known as: IRON 325     fish oil 1000 MG Caps     fluticasone 50 MCG/ACT nasal spray  Commonly known as: FLONASE     furosemide 40 MG tablet  Commonly known as: Lasix  Take 1 tablet by mouth 2 times daily     insulin  UNIT/ML injection vial  Commonly known as: HUMULIN N;NOVOLIN N     levETIRAcetam 500 MG tablet  Commonly known as: Keppra  Take 1 tablet by mouth 2 times daily     metoprolol succinate 50 MG extended release tablet  Commonly known as: TOPROL XL     mirtazapine 45 MG tablet  Commonly known as: REMERON     Multivitamin Adult Tabs     Spiriva Respimat 2.5 MCG/ACT Aers inhaler  Generic drug: tiotropium     venlafaxine 150 MG extended release capsule  Commonly known as: EFFEXOR XR  Take 1 capsule by mouth daily (with breakfast)     vitamin B-1 100 MG tablet  Commonly known as: THIAMINE     vitamin D 1000 UNIT Tabs tablet  Commonly known as: CHOLECALCIFEROL            Code Status:  Prior    Time Spent on discharge is  35 mins in patient examination, evaluation, counseling as well as coordination     Electronically signed by Kaushal Obrien MD on 2/27/2022 at 5:27 PM     Thank you Dr. Rangel Hallman MD for the opportunity to be involved in this patient's care.     This note was created with the assistance of a speech-recognition

## 2024-02-01 NOTE — PROGRESS NOTES
Called and updated pt wife on today's events and pt current status Continue Regimen: Clobetasol foam QD PRN flares \\nTaclonex QD PRN flares\\nStelara every 3 months Render In Strict Bullet Format?: No Detail Level: Zone

## 2024-05-10 NOTE — PROGRESS NOTES
Pt HR remains high in afib 130-140s this shift. Contacted cardiology, initially received telephone order for heparin, order verbally changed to amio drip with bolus. Unable to lvm to schedule PE.

## 2024-05-24 NOTE — PROGRESS NOTES
Progress note  [facility]    Adult Hospitalist      Name: Gina Bansal  MRN: [id number]     Acct: [de-identified]  Room: [address] [place] [address]    Admit Date: 1/16/2022 11:51 AM  PCP: Felisa Davison MD    Primary Problem  Active Problems:    COVID-19    Acute on chronic systolic CHF (congestive heart failure) (HCC)    Acute respiratory failure with hypoxia (HCC)    BLANCHE (acute kidney injury) ([facility]    COPD exacerbation ([facility]    Hypokalemia    Hypomagnesemia    Palliative care encounter    Goals of care, counseling/discussion    DNR (do not resuscitate) discussion    ACP (advance care planning)    Constipation    Abdominal pain, generalized  Resolved Problems:    * No resolved hospital problems. *        Assesment:   Acute congestive heart failure, diastolic   [id number]   Viral pneumonia secondary to above  Acute respiratory failure with hypoxia intubated on 1/23/2022  Hyperkalemia  Paroxysmal atrial fibrillation  Essential hypertension  Mixed hyperlipidemia  Diabetes mellitus type 2  History of seizure disorder  History of CKD stage III, presently normal renal function  Lung mass?   Leukopenia  Polycythemia  Thrombocytopenia  Anxiety disorder  Recent past h/o lacunar infarct left thalamus   Altered mental status/agitation  Endotracheal intubation secondary to hypoxia dated 1/23/2022  Supraventricular tachycardia/elevated troponin  Fever  Emphysema   Pulmonary artery hypertension   Decubitus ulcer buttocks      Plan:   Admit patient to intermediate floor  Mechanical ventilation sedation as per critical care, patient is requiring PEEP of 10-->14 with  90% FiO2  Telemetry  Check vitals closely  CBC BMP daily    Cardiology consult  Amiodarone  Precedex gtt    IV Decadron completed  Off pressors  Off amiodarone drip  Patient completed a course of cefepime for 7 days  Bronchodilators  Pulmicort  Patient is deemed not a candidate for Actemra or baricitinib secondary to cytopenia  Infectious disease consult  Pulmonology consult    Continue Keppra  Continue amlodipine, atorvastatin  Continue aspirin    GI signed off   Tube feeds  Consult nephrology   Insulin gtt- DC  Lantus w SSI-->increase to high intensity as BS running high, now better controlled    Plan tracheostomy, continue to wean FiO2  General surgery following, plan for tracheostomy  S/P PEG tube   Wound care   Continue other medication as below.     Scheduled Meds:   furosemide  40 mg IntraVENous TID    meropenem  1,000 mg IntraVENous Q8H    linezolid  600 mg IntraVENous Q12H    sodium chloride  80 mL IntraVENous Once    sucralfate  1 g Oral 4 times per day    amLODIPine  5 mg Oral BID    insulin lispro  0-18 Units SubCUTAneous TID WC    insulin lispro  0-9 Units SubCUTAneous Nightly    amiodarone  200 mg Per NG tube Daily    insulin glargine  15 Units SubCUTAneous BID    Vitamin D  1,000 Units Oral Daily    polyethylene glycol  17 g Per NG tube Daily    levalbuterol  1.25 mg Nebulization Q6H    bisacodyl  10 mg Rectal Daily    pantoprazole  40 mg IntraVENous Daily    And    sodium chloride (PF)  10 mL IntraVENous Daily    aspirin  324 mg Oral Daily    levETIRAcetam  500 mg Oral BID    chlorhexidine  15 mL Mouth/Throat BID    enoxaparin  30 mg SubCUTAneous BID    QUEtiapine  50 mg Oral Once    budesonide  0.5 mg Nebulization BID    sodium chloride flush  5-40 mL IntraVENous 2 times per day    atorvastatin  20 mg Oral Daily     Continuous Infusions:   amiodarone 1 mg/min (02/16/22 1533)    phenylephrine (KEARA-SYNEPHRINE) 50mg/250mL infusion Stopped (02/16/22 0343)    dextrose      dexmedetomidine (PRECEDEX) IV infusion 1.4 mcg/kg/hr (02/16/22 1531)    fentaNYL 50 mcg/hr (02/16/22 0452)    propofol Stopped (02/09/22 1030)    midazolam (VERSED) 1 mg/mL in D5W infusion Stopped (02/13/22 0905)    sodium chloride       PRN Meds:  sodium chloride flush, 10 mL, PRN  potassium chloride, 20 mEq, PRN  potassium chloride, 10 mEq, PRN  magnesium sulfate, 1,000 mg, PRN  glucose, 15 g, PRN  glucagon (rDNA), 1 mg, PRN  dextrose, 100 mL/hr, PRN  sodium chloride nebulizer, 3 mL, Q8H PRN  LORazepam, 1 mg, Q4H PRN  dextrose bolus (hypoglycemia), 125 mL, PRN   Or  dextrose bolus (hypoglycemia), 250 mL, PRN  sodium chloride flush, 10 mL, PRN  sodium chloride, 25 mL, PRN  ondansetron, 4 mg, Q8H PRN   Or  ondansetron, 4 mg, Q6H PRN  acetaminophen, 650 mg, Q6H PRN   Or  acetaminophen, 650 mg, Q6H PRN  hydrALAZINE, 10 mg, Q6H PRN        Chief Complaint:     Chief Complaint   Patient presents with    Leg Swelling     bilateral, has CHF, on diuretic, EMT saw pt , assisted pt into car    Fever    Other     low SPO2         History of Present Illness:   Patient seen examined at bedside, patient remains in medical ICU  Patient remains intubated sedated  Patient did not wake up on decreasing sedation  Presently on fentanyl and propofol  occ Rt gaze  tube feeding      Review of systems:  Unable to conduct ROS secondary to patient being intubated      Initial HPI  Beatriz Salas is a 76 y.o.  male who presents with Leg Swelling (bilateral, has CHF, on diuretic, EMT saw pt , assisted pt into car), Fever, and Other (low SPO2)  This is a 64-year-old gentleman admitted via ER, come to ER with complaint of having shortness of breath, patient has medical history significant for COPD patient with history of cardiac failure: Patient noted that he has been having increasing swelling in his lower extremity and becoming more short of breath, further patient also been having some body aches and sweats, patient patient testing in the emergency room showed that he is positive for COVID-19 also noticed to have an elevated BNP, imaging concerning for fluid overload, admitted for further regiment    I have personally reviewed the past medical history, past surgical history, medications, social history, and family history, and summarized in the note.     Review of Systems:       Unable to conduct ROS secondary to patient being intubated      Past Medical History:     Past Medical History:   Diagnosis Date    Arthritis     Atherosclerotic plaque 7/28/2019    Cervical disc disease     degenerative disc disease    Diabetes mellitus ([facility] [address]     type 2    History of blood transfusion     Hx of blood clots     left leg    Hyperlipidemia     Neuropathy     Right kidney mass     \"urologist is watching\"    Snores     Type 2 diabetes mellitus treated with insulin ([facility] 7/28/2019        Past Surgical History:     Past Surgical History:   Procedure Laterality Date    ABDOMINAL AORTIC ANEURYSM REPAIR  2005    CARPAL TUNNEL RELEASE Bilateral     CERVICAL SPINE SURGERY      COLONOSCOPY      benign polyps removed    INGUINAL HERNIA REPAIR Right     TN REPAIR INCISIONAL HERNIA,REDUCIBLE N/A 5/10/2017    HERNIA VENTRAL REPAIR WITH MESH  performed by Wilfred Gann DO at [place] [address] N/A 2/9/2022    EGD   PEG TUBE INSERTION performed by Nohemi Burton MD at [address] [facility]  05/10/2017    with mesh        Medications Prior to Admission:       Prior to Admission medications    Medication Sig Start Date End Date Taking?  Authorizing Provider   rosuvastatin (CRESTOR) 40 MG tablet Take 40 mg by mouth every evening   Yes Historical Provider, MD   insulin NPH (HUMULIN N;NOVOLIN N) 100 UNIT/ML injection vial Inject 20 Units into the skin 2 times daily (before meals)   Yes Historical Provider, MD   levETIRAcetam (KEPPRA) 500 MG tablet Take 1 tablet by mouth 2 times daily 11/3/21  Yes Kellie Coates MD   venlafaxine (EFFEXOR XR) 150 MG extended release capsule Take 1 capsule by mouth daily (with breakfast) 7/29/19  Yes Arabella Kiser   vitamin B-1 (THIAMINE) 100 MG tablet Take 100 mg by mouth daily   Yes Historical Provider, MD   ferrous sulfate 325 (65 Fe) MG tablet Take 325 mg by mouth daily (with breakfast)   Yes Historical Provider, MD   ALPRAZolam Wyatt Rico) 0.5 MG tablet Take 0.5 mg by mouth three times daily. Yes Historical Provider, MD   furosemide (LASIX) 40 MG tablet Take 1 tablet by mouth 2 times daily 12/11/18  Yes Jade Evans MD   tiotropium (SPIRIVA RESPIMAT) 2.5 MCG/ACT AERS inhaler Inhale 2 puffs into the lungs daily    Yes Historical Provider, MD   albuterol sulfate  (90 Base) MCG/ACT inhaler Inhale 2 puffs into the lungs every 6 hours as needed for Wheezing or Shortness of Breath   Yes Historical Provider, MD   metoprolol succinate (TOPROL XL) 50 MG extended release tablet Take 50 mg by mouth daily   Yes Historical Provider, MD   Multiple Vitamins-Minerals (MULTIVITAMIN ADULT) TABS Take 1 tablet by mouth daily   Yes Historical Provider, MD   vitamin D (CHOLECALCIFEROL) 1000 UNIT TABS tablet Take 1,000 Units by mouth daily   Yes Historical Provider, MD   fluticasone (FLONASE) 50 MCG/ACT nasal spray 1 spray by Each Nare route daily as needed for Rhinitis   Yes Historical Provider, MD   aspirin 325 MG EC tablet Take 325 mg by mouth daily    Yes Historical Provider, MD   Omega-3 Fatty Acids (FISH OIL) 1000 MG CAPS Take 1 capsule by mouth daily    Yes Historical Provider, MD   amLODIPine (NORVASC) 5 MG tablet Take 5 mg by mouth nightly    Yes Historical Provider, MD   mirtazapine (REMERON) 45 MG tablet Take 45 mg by mouth nightly   Yes Historical Provider, MD        Allergies: Barbiturates, Codeine, and Sulfa antibiotics    Social History:     Tobacco:    reports that he has quit smoking. He has never used smokeless tobacco.  Alcohol:      reports no history of alcohol use. Drug Use:  reports no history of drug use.     Family History:     Family History   Problem Relation Age of Onset    Ovarian Cancer Sister     Ovarian Cancer Sister          Physical Exam:     Vitals:  /89   Pulse 74   Temp 98.8 °F (37.1 °C) (Oral)   Resp 22   Ht 5' 10\" (1.778 m)   Wt 242 lb (109.8 kg)   SpO2 98%   BMI 34.72 kg/m²   Temp (24hrs), Av.6 °F (37 °C), Min:97.5 °F (36.4 °C), Max:99.5 °F (37.5 °C)      General appearance -on ventilator  Mental status -sedated  Head - normocephalic and atraumatic  Eyes - conjunctiva clear  Ears -external ears normal  Nose - no drainage noted  Mouth - mucous membranes moist  Neck - supple, no carotid bruits, thyroid not palpable  Chest -basal crackles with decreased air entry bilaterally to auscultation, increased effort  Heart - normal rate, regular rhythm, no murmur  Abdomen - soft, nontender, nondistended, bowel sounds present all four quadrants, no masses, hepatomegaly or splenomegaly  Neurological -sedated on vent  Extremities - extremity edema, lower distal extremity discoloration    Skin - no gross lesions, rashes, or induration noted        Data:     Labs:    Hematology:  Recent Labs     02/14/22  0443 02/15/22  0525 22  0540   WBC 7.9 5.0 6.5   RBC 4.81 4.56 4.50   HGB 11.9* 11.2* 11.1*   HCT 39.8* 37.1* 36.1*   MCV 82.7 81.4* 80.2*   MCH 24.7* 24.6* 24.7*   MCHC 29.9 30.2 30.7   RDW 15.9* 15.5* 15.5*    119* 120*   MPV 12.8 11.1 11.9     Chemistry:  Recent Labs     22  0443 02/15/22  0525 22  0540    141 138   K 4.3 3.7 3.5*    107 102   CO2 22 24 25   GLUCOSE 228* 211* 211*   BUN 42* 43* 42*   CREATININE 0.74 0.73 0.66*   MG 1.9 1.7 1.5*   ANIONGAP 11 10 11   LABGLOM >60 >60 >60   GFRAA >60 >60 >60   CALCIUM 9.0 8.8 8.7   PHOS 2.6 2.0* 1.9*     Recent Labs     02/15/22  1131 02/15/22  1630 02/15/22  1900 22  0723 22  1209 22  1639   POCGLU 246* 238* 223* 163* 185* 178*       Lab Results   Component Value Date    INR 1.0 2022    INR 1.0 2021    INR 1.0 2019    PROTIME 13.3 2022    PROTIME 11.1 2021    PROTIME 10.5 2019       Lab Results   Component Value Date/Time    SPECIAL RT ARTERY,10ML 2022 03:59 PM     Lab Results   Component Value Date/Time    CULTURE NO GROWTH 5 DAYS 2022 03:59 PM       Lab Results   Component Value Date    POCPH 7.422 02/16/2022    POCPCO2 45.6 02/16/2022    POCPO2 71.3 02/16/2022    POCHCO3 29.7 02/16/2022    NBEA NOT REPORTED 02/16/2022    PBEA 4 02/16/2022    PQF6CJO NOT REPORTED 02/16/2022    QFBN3QGP 94 02/16/2022    FIO2 65.0 02/16/2022       Radiology:    XR CHEST 1 VIEW    Result Date: 1/16/2022  Hypoinflated lungs. Cardiomegaly again seen, when compared to the previous study performed 07/27/2019. Diffuse, increased interstitial markings throughout both lungs, some of which can be seen on the prior study may represent baseline chronic interstitial lung changes. The increased prominence on this exam could be secondary to the suboptimal inspiratory effort. The presence of pulmonary vascular congestion/mild CHF or bilateral interstitial pneumonia cannot be excluded. Clinical correlation is recommended. CT CHEST PULMONARY EMBOLISM W CONTRAST    Result Date: 1/16/2022  No evidence of pulmonary embolism. Compared to 2008, worsening underlying emphysema, with worsening subacute or acute interstitial lung disease, best seen left upper lobe; differential includes interstitial pneumonia or pneumonitis superimposed on emphysema, DIP, HP, and other interstitial pneumonias as well as infectious or inflammatory process superimposed on emphysema disease. Findings are not typical for COVID-19 pneumonia, but an element of the latter should be considered. Thickening and distortion left major fissure with ill-defined mass-like focus left lower lobe. The latter could also be infectious or inflammatory, although neoplasm should be excluded. Underlying worsening emphysema. Nodular densities, best seen right upper lobe. Small pleural effusions, slightly larger on the left. See recommendations below. Mild mediastinal and left hilar adenopathy; see recommendations below. Worsening now moderate-severe pulmonary artery hypertension. Mild cardiomegaly.  Stable mild dilatation ascending thoracic aorta. Additional unchanged or incidental findings, as above. RECOMMENDATIONS: Clinical correlation and short-term follow-up CT chest in 1-4 months depending upon the clinical presentation/course. All radiological studies reviewed                Code Status:  DNR-CCA    Electronically signed by Mihir Myers MD on 2/16/2022 at 5:56 PM     Copy sent to Dr. Yesica Dominguez MD    This note was created with the assistance of a speech-recognition program.  Although the intention is to generate a document that actually reflects the content of the visit, no guarantees can be provided that every mistake has been identified and corrected by editing. Note was updated later by me after  physical examination and  completion of the assessment. yes

## (undated) DEVICE — JELLY,LUBE,STERILE,FLIP TOP,TUBE,2-OZ: Brand: MEDLINE

## (undated) DEVICE — GAUZE,SPONGE,4"X4",16PLY,STRL,LF,10/TRAY: Brand: MEDLINE

## (undated) DEVICE — MEDICINE CUP, GRADUATED, STER: Brand: MEDLINE

## (undated) DEVICE — TRANS-A-JET

## (undated) DEVICE — TUBING, SUCTION, 1/4" X 12', STRAIGHT: Brand: MEDLINE

## (undated) DEVICE — GLOVE SURG 8 11.7IN BEAD CUF LIGHT BRN SENSICARE LTX FREE

## (undated) DEVICE — Device: Brand: DEFENDO VALVE AND CONNECTOR KIT

## (undated) DEVICE — GLOVE SURG SZ 75 L12IN FNGR THK87MIL WHT LTX FREE

## (undated) DEVICE — SUTURE MCRYL SZ 0 L36IN ABSRB UD L36MM CT-1 1/2 CIR Y946H

## (undated) DEVICE — ADAPTER TBNG LUER STUB 15 GA INTMED

## (undated) DEVICE — MIC* SAFETY PERCUTANEOUS ENDOSCOPIC GASTROSTOMY PEG KIT - 20 FR - PUSH OTW: Brand: MIC PEG TUBE

## (undated) DEVICE — CHLORAPREP 26ML ORANGE

## (undated) DEVICE — BLOCK BITE 60FR RUBBER ADLT DENTAL

## (undated) DEVICE — FILTER CLP DISP FOR 5513E CLIPVAC

## (undated) DEVICE — BASIN EMSIS 700ML GRAPHITE PLAS KID SHP GRAD

## (undated) DEVICE — BLADE CLIPPER GEN PURP NS

## (undated) DEVICE — DRAPE IRRIG FLD WRM W44XL44IN W/ AORN STD PRTBL INTRATEMP

## (undated) DEVICE — DISCONTINUED NO SUB IDED TG GLOVE SURG SENSICARE ALOE LT LF PF ST GRN SZ 8